# Patient Record
Sex: MALE | Race: WHITE | NOT HISPANIC OR LATINO | Employment: OTHER | URBAN - METROPOLITAN AREA
[De-identification: names, ages, dates, MRNs, and addresses within clinical notes are randomized per-mention and may not be internally consistent; named-entity substitution may affect disease eponyms.]

---

## 2017-01-06 ENCOUNTER — OFFICE VISIT (OUTPATIENT)
Dept: OPTOMETRY | Facility: CLINIC | Age: 77
End: 2017-01-06
Payer: MEDICARE

## 2017-01-06 ENCOUNTER — LAB VISIT (OUTPATIENT)
Dept: LAB | Facility: HOSPITAL | Age: 77
End: 2017-01-06
Attending: INTERNAL MEDICINE
Payer: MEDICARE

## 2017-01-06 ENCOUNTER — OFFICE VISIT (OUTPATIENT)
Dept: FAMILY MEDICINE | Facility: CLINIC | Age: 77
End: 2017-01-06
Payer: MEDICARE

## 2017-01-06 VITALS
WEIGHT: 166.88 LBS | OXYGEN SATURATION: 95 % | DIASTOLIC BLOOD PRESSURE: 80 MMHG | TEMPERATURE: 97 F | SYSTOLIC BLOOD PRESSURE: 110 MMHG | HEART RATE: 65 BPM | HEIGHT: 67 IN | BODY MASS INDEX: 26.19 KG/M2

## 2017-01-06 DIAGNOSIS — H35.3130 AGE-RELATED MACULAR DEGENERATION, DRY, BOTH EYES: ICD-10-CM

## 2017-01-06 DIAGNOSIS — Z00.00 ROUTINE MEDICAL EXAM: Primary | ICD-10-CM

## 2017-01-06 DIAGNOSIS — I10 ESSENTIAL HYPERTENSION: ICD-10-CM

## 2017-01-06 DIAGNOSIS — H52.7 REFRACTIVE ERROR: ICD-10-CM

## 2017-01-06 DIAGNOSIS — I25.10 CORONARY ARTERY DISEASE INVOLVING NATIVE HEART WITHOUT ANGINA PECTORIS, UNSPECIFIED VESSEL OR LESION TYPE: ICD-10-CM

## 2017-01-06 DIAGNOSIS — E11.9 TYPE 2 DIABETES MELLITUS WITHOUT COMPLICATION: ICD-10-CM

## 2017-01-06 DIAGNOSIS — F41.0 PANIC ATTACKS: ICD-10-CM

## 2017-01-06 DIAGNOSIS — H25.13 NUCLEAR SCLEROSIS, BILATERAL: ICD-10-CM

## 2017-01-06 DIAGNOSIS — E11.9 DIABETES MELLITUS TYPE 2 WITHOUT RETINOPATHY: Primary | ICD-10-CM

## 2017-01-06 DIAGNOSIS — E11.9 CONTROLLED TYPE 2 DIABETES MELLITUS WITHOUT COMPLICATION, WITHOUT LONG-TERM CURRENT USE OF INSULIN: ICD-10-CM

## 2017-01-06 LAB
ALBUMIN SERPL BCP-MCNC: 3.6 G/DL
ALP SERPL-CCNC: 59 U/L
ALT SERPL W/O P-5'-P-CCNC: 20 U/L
ANION GAP SERPL CALC-SCNC: 7 MMOL/L
AST SERPL-CCNC: 24 U/L
BASOPHILS # BLD AUTO: 0.02 K/UL
BASOPHILS NFR BLD: 0.3 %
BILIRUB SERPL-MCNC: 0.7 MG/DL
BUN SERPL-MCNC: 12 MG/DL
CALCIUM SERPL-MCNC: 9.3 MG/DL
CHLORIDE SERPL-SCNC: 104 MMOL/L
CHOLEST/HDLC SERPL: 4.6 {RATIO}
CHOLEST/HDLC SERPL: 4.6 {RATIO}
CO2 SERPL-SCNC: 30 MMOL/L
CREAT SERPL-MCNC: 1.3 MG/DL
DIFFERENTIAL METHOD: ABNORMAL
EOSINOPHIL # BLD AUTO: 0.5 K/UL
EOSINOPHIL NFR BLD: 6.9 %
ERYTHROCYTE [DISTWIDTH] IN BLOOD BY AUTOMATED COUNT: 14.7 %
EST. GFR  (AFRICAN AMERICAN): >60 ML/MIN/1.73 M^2
EST. GFR  (NON AFRICAN AMERICAN): 53.8 ML/MIN/1.73 M^2
GLUCOSE SERPL-MCNC: 119 MG/DL
HCT VFR BLD AUTO: 47.3 %
HDL/CHOLESTEROL RATIO: 21.8 %
HDL/CHOLESTEROL RATIO: 21.8 %
HDLC SERPL-MCNC: 271 MG/DL
HDLC SERPL-MCNC: 271 MG/DL
HDLC SERPL-MCNC: 59 MG/DL
HDLC SERPL-MCNC: 59 MG/DL
HGB BLD-MCNC: 15.9 G/DL
LDLC SERPL CALC-MCNC: 178.2 MG/DL
LDLC SERPL CALC-MCNC: 178.2 MG/DL
LYMPHOCYTES # BLD AUTO: 2 K/UL
LYMPHOCYTES NFR BLD: 29.5 %
MCH RBC QN AUTO: 30.5 PG
MCHC RBC AUTO-ENTMCNC: 33.6 %
MCV RBC AUTO: 91 FL
MONOCYTES # BLD AUTO: 0.6 K/UL
MONOCYTES NFR BLD: 9.1 %
NEUTROPHILS # BLD AUTO: 3.6 K/UL
NEUTROPHILS NFR BLD: 53.9 %
NONHDLC SERPL-MCNC: 212 MG/DL
NONHDLC SERPL-MCNC: 212 MG/DL
PLATELET # BLD AUTO: 180 K/UL
PMV BLD AUTO: 10.7 FL
POTASSIUM SERPL-SCNC: 4.1 MMOL/L
PROT SERPL-MCNC: 6.9 G/DL
RBC # BLD AUTO: 5.22 M/UL
SODIUM SERPL-SCNC: 141 MMOL/L
TRIGL SERPL-MCNC: 169 MG/DL
TRIGL SERPL-MCNC: 169 MG/DL
TSH SERPL DL<=0.005 MIU/L-ACNC: 1.56 UIU/ML
WBC # BLD AUTO: 6.71 K/UL

## 2017-01-06 PROCEDURE — 3078F DIAST BP <80 MM HG: CPT | Mod: S$GLB,,, | Performed by: OPTOMETRIST

## 2017-01-06 PROCEDURE — 99999 PR PBB SHADOW E&M-EST. PATIENT-LVL II: CPT | Mod: PBBFAC,,, | Performed by: OPTOMETRIST

## 2017-01-06 PROCEDURE — 99499 UNLISTED E&M SERVICE: CPT | Mod: S$GLB,,, | Performed by: INTERNAL MEDICINE

## 2017-01-06 PROCEDURE — 3074F SYST BP LT 130 MM HG: CPT | Mod: S$GLB,,, | Performed by: OPTOMETRIST

## 2017-01-06 PROCEDURE — 92015 DETERMINE REFRACTIVE STATE: CPT | Mod: S$GLB,,, | Performed by: OPTOMETRIST

## 2017-01-06 PROCEDURE — 3074F SYST BP LT 130 MM HG: CPT | Mod: S$GLB,,, | Performed by: INTERNAL MEDICINE

## 2017-01-06 PROCEDURE — 99999 PR PBB SHADOW E&M-EST. PATIENT-LVL IV: CPT | Mod: PBBFAC,,, | Performed by: INTERNAL MEDICINE

## 2017-01-06 PROCEDURE — 92004 COMPRE OPH EXAM NEW PT 1/>: CPT | Mod: S$GLB,,, | Performed by: OPTOMETRIST

## 2017-01-06 PROCEDURE — 3079F DIAST BP 80-89 MM HG: CPT | Mod: S$GLB,,, | Performed by: INTERNAL MEDICINE

## 2017-01-06 PROCEDURE — 99397 PER PM REEVAL EST PAT 65+ YR: CPT | Mod: S$GLB,,, | Performed by: INTERNAL MEDICINE

## 2017-01-06 PROCEDURE — 99499 UNLISTED E&M SERVICE: CPT | Mod: ,,, | Performed by: INTERNAL MEDICINE

## 2017-01-06 PROCEDURE — 99499 UNLISTED E&M SERVICE: CPT | Mod: S$GLB,,, | Performed by: OPTOMETRIST

## 2017-01-06 RX ORDER — ASPIRIN 81 MG/1
81 TABLET ORAL DAILY
Refills: 0 | COMMUNITY
Start: 2017-01-06 | End: 2018-01-09

## 2017-01-06 RX ORDER — CLONAZEPAM 0.5 MG/1
0.5 TABLET ORAL DAILY PRN
Qty: 60 TABLET | Refills: 0 | Status: SHIPPED | OUTPATIENT
Start: 2017-01-06 | End: 2017-07-21

## 2017-01-06 NOTE — MR AVS SNAPSHOT
[facility]  [address]  [place] [address]  Phone: [phone]  Fax: [phone]                  Eric Jackson   2017 1:30 PM   Office Visit    Description:  Male : 1940   Provider:  Sheila Braden OD   Department:  [facility]           Reason for Visit     Concerns About Ocular Health     Diabetic Eye Exam     Hypertensive Eye Exam           Diagnoses this Visit        Comments    Diabetes mellitus type 2 without retinopathy    -  Primary     Age-related macular degeneration, dry, both eyes         Nuclear sclerosis, bilateral         Refractive error                To Do List           Future Appointments        Provider Department Dept Phone    2017 10:45 AM Ronald Andrade DPM [facility] [phone]      Goals (5 Years of Data)     Increase water intake       Follow-Up and Disposition     Return in about 1 year (around 2018) for Diabetic Eye Exam.      [facility] On [facility]     [facility] On Call Nurse [facility] Line - / Assistance  Registered nurses in the [facility] On Call Center provide clinical advisement, health education, appointment booking, and other advisory services.  Call for this free service at [phone].             Medications           Message regarding Medications     Verify the changes and/or additions to your medication regime listed below are the same as discussed with your clinician today.  If any of these changes or additions are incorrect, please notify your healthcare provider.             Verify that the below list of medications is an accurate representation of the medications you are currently taking.  If none reported, the list may be blank. If incorrect, please contact your healthcare provider. Carry this list with you in case of emergency.           Current Medications     ANDROGEL 20.25 mg/1.25 gram (1.62 %) GlPm     clobetasol 0.05% (TEMOVATE) 0.05 % Oint     imiquimod (ALDARA) 5 % cream     ketoconazole (NIZORAL) 2 % cream      losartan-hydrochlorothiazide 50-12.5 mg (HYZAAR) 50-12.5 mg per tablet Take 1 tablet by mouth once daily.    metformin (GLUCOPHAGE-XR) 500 MG 24 hr tablet Take 1 tablet (500 mg total) by mouth once daily.    mupirocin (BACTROBAN) 2 % ointment     NITROSTAT 0.4 mg SL tablet     ONE TOUCH ULTRA TEST Strp     aspirin (ECOTRIN) 81 MG EC tablet Take 1 tablet (81 mg total) by mouth once daily.    clonazePAM (KLONOPIN) 0.5 MG tablet Take 1 tablet (0.5 mg total) by mouth daily as needed (panic attack).    fluticasone (FLONASE) 50 mcg/actuation nasal spray 1 spray by Each Nare route 2 (two) times daily.    sildenafil (VIAGRA) 100 MG tablet Take 1 tablet (100 mg total) by mouth daily as needed for Erectile Dysfunction.           Clinical Reference Information           Allergies as of 1/6/2017     Statins-hmg-coa Reductase Inhibitors      Immunizations Administered on Date of Encounter - 1/6/2017     None      Instructions    Age-Related Macular Degeneration    Age-Related Macular Degeneration (AMD) is the leading cause of severe vision loss in adults over age 50. The Centers for Disease Control and Prevention estimate that 1.8 million people have AMD and another 7.3 million are at substantial risk for vision loss from AMD. Caucasians are at higher risk for developing AMD than other races. Women also develop AMD at an earlier age than men. This eye disease occurs when there are changes to the macula, a small portion of the retina that is located on the inside back layer of the eye. AMD is a loss of central vision that can occur in two forms: dry or atrophic and wet or exudative.  Most people with macular degeneration have the dry form, for which there is no known treatment. The less common wet form may respond to laser procedures, if diagnosed and treated early.    Some common symptoms are: a gradual loss of ability to see objects clearly, distorted vision, a gradual loss of color vision, and a dark or empty area  appearing in the center of vision. If you experience any of these, contact your doctor of optometry immediately for a comprehensive examination. Central vision that is lost to macular degeneration cannot be restored. However, low vision devices, such as telescopic and microscopic lenses, can be prescribed to maximize existing vision.    Researchers have linked eye-friendly nutrients such as lutein/zeaxanthin, vitamin C, vitamin E, and zinc to reducing the risk of certain eye diseases, including macular degeneration. For more information on the importance of good nutrition and eye health, please see the diet and nutrition section.      Courtesy of The American Optometric Association      Diabetic Retinopathy: Controlling Your Risk Factors    Diabetic retinopathy occurs when diabetes harms blood vessels in the rear of the eye. This can lead to vision loss. You can help reduce your risk of vision loss by taking care of your health. Managing your diabetes and other health problems can make diabetic retinopathy less likely.      A diabetes educator can help you make an action plan to control your risk factors.       Manage Your Diabetes  You can help protect your vision. To do this, keep your blood sugar level in a healthy range, check your blood sugar often, follow your diabetes management plan, and work closely with your health care providers. This includes your endocrinologist (a doctor who specializes in diabetes), primary care provider, or any other provider who helps to manage your diabetes. He or she can help if you are having trouble keeping your blood sugar in range.    Control Your Risk Factors  There are other factors that damage blood vessels. These can make diabetic retinopathy worse. These factors include:  High blood pressure  Smoking  High cholesterol  Work with your health care team to control these problems. This can help lower your risk of developing diabetic retinopathy. A diabetes educator can help  you control blood pressure and high cholesterol. He or she can also talk to you about programs to help you quit smoking. Diabetic patients should also see an eye doctor regularly for an eye exam.     To Learn More  The resources below can help you learn more:  American Diabetes Association   638.678.1498 www.diabetes.org  [employer]   566.173.7521 www.lighthouse.org  [employer] [facility] [place]   660.101.3647 www.nei.nih.gov   [employer]   730.408.2864 www.hormone.org    © 1288-1460 [employer]. [address] [place] [address]. All rights reserved. This information is not intended as a substitute for professional medical care. Always follow your healthcare professional's instructions.              Cataract Surgery FAQs  Frequently Asked Questions    My doctor told me I have a cataract     What do I do next?   Relax. Cataracts are a normal part of aging, and cataract surgery is among the safest and most common procedures performed today.  Most patients who have had cataract surgery report significant improvement in their quality of life because of their improved vision, with a speedy recovery and minimal discomfort or inconvenience.    Your optometrist will recommend a cataract surgeon who will examine your eyes, evaluate the need for surgery, and discuss the details with you and your family.     What exactly is a cataract?   A cataract is simply a darkening or clouding of the eyes internal lens, which blurs your vision.  It is not a film which grows over the eye, but rather a degenerative process which causes the eyes normally clear lens to become cloudy or hazy.     Because this degenerative process occurs slowly over many years, we generally wait until the cataract begins to significantly impact your vision, before recommend surgery.                     How is cataract surgery performed?  Cataract surgery involves removal of the dark or cloudy lens from the eye, and  implantation of a new, clear lens implant or IOL.  Cataract surgery is a lens replacement surgery.          Modern cataract surgery is performed as a same-day surgery and typically takes about 15 minutes to complete.  It is performed while you are awake, but you will be medicated so that you are relaxed and comfortable. Of course, the eye is anesthetized so that you dont feel any discomfort, and many people only vaguely remember the actual procedure, recalling only lights and the sensation of moisture on the eye.     Although is takes about a week to fully heal, most people are able to see better and resume their normal activities the very next day!  You will need to use eye drops for about one month after your surgery.     Will I need glasses after cataract surgery?   The purpose of cataract surgery is to improve the overall quality of your vision, but it does not necessarily eliminate the need for glasses. Although some people dont need to wear glasses after standard cataract surgery, most people will still need to wear glasses for certain tasks.  If you are interested in minimizing or even eliminating the need for glasses, you should ask your surgeon about Refractive Cataract Surgery.    What is Refractive Cataract Surgery?   Refractive Cataract Surgery refers to the use of additional techniques performed either at the time of your cataract surgery or soon afterwards, designed to minimize and often eliminate your need for glasses.  Technologies such as LASIK, Astigmatism Correcting Incisions, Multifocal, Accommodating, or Toric lens implants can all be used, depending on the particular needs of each patient. Only your surgeon can determine if you are a candidate and which techniques are appropriate for your goals and your eye.                         LASIK                Multifocal IOL         Will my insurance cover these additional procedures?   Although your insurance will fully cover your cataract  surgery, any other additional procedures -designed solely to eliminate the need for glasses- are considered elective (not medically necessary), and therefore not covered by your insurance.  Rest assured that your vision will be better after cataract surgery, in either case.  You simply need to decide whether minimizing your dependence on glasses is worth the additional investment in your eyes.  (Costs typically range between $1,000 to $2,000 per eye, depending on your needs).    View a 1hr video discussing cataract surgery with live patient questions and answers on the [facility] Web Site (Keywords: [employer] Cataract):    [email or web address]

## 2017-01-06 NOTE — PATIENT INSTRUCTIONS
Start baby aspirin daily.      We may be able to get you off of metformin.     Keep up the great work with diet and exercise!

## 2017-01-06 NOTE — PROGRESS NOTES
Chief Complaint  Chief Complaint   Patient presents with    Annual Exam       HPI  Eric Jackson is a 76 y.o. male with multiple medical diagnoses as listed in the medical history and problem list that presents for routine physical.  Pt is new to me but is known to this clinic with his last appointment being 8/24/2016.  He was previously followed by one of my partners that has since retired.  His last labs have all been drawn by Dr. Muñoz's office.  No acute complaints.  He is followed intermittently by Dr. Bills at [facility].  Cannot tolerate statins.  Discussed talking to cards about new cholesterol lowering medications.       PAST MEDICAL HISTORY:  Past Medical History   Diagnosis Date    Anxiety     Coronary artery disease     Depression     Diabetes mellitus type II     Erectile dysfunction     Eye injuries      fb ou    GERD (gastroesophageal reflux disease)     Hypertension        PAST SURGICAL HISTORY:  Past Surgical History   Procedure Laterality Date    Hernia repair      Wrist surgery      Coronary artery bypass graft  2001       SOCIAL HISTORY:  Social History     Social History    Marital status:      Spouse name: N/A    Number of children: N/A    Years of education: N/A     Occupational History    Not on file.     Social History Main Topics    Smoking status: Former Smoker     Packs/day: 2.00     Years: 23.00     Quit date: 1/1/1989    Smokeless tobacco: Never Used    Alcohol use 0.6 oz/week     1 Cans of beer per week      Comment: 1 can of beer per day    Drug use: No    Sexual activity: Yes     Partners: Female     Other Topics Concern    Not on file     Social History Narrative       FAMILY HISTORY:  Family History   Problem Relation Age of Onset    Adopted: Yes    No Known Problems Mother     No Known Problems Father     No Known Problems Maternal Grandmother     No Known Problems Maternal Grandfather     No Known Problems Paternal Grandmother     No Known  Problems Paternal Grandfather     No Known Problems Sister     No Known Problems Brother     No Known Problems Maternal Aunt     No Known Problems Maternal Uncle     No Known Problems Paternal Aunt     No Known Problems Paternal Uncle     Amblyopia Neg Hx     Blindness Neg Hx     Cancer Neg Hx     Cataracts Neg Hx     Diabetes Neg Hx     Glaucoma Neg Hx     Hypertension Neg Hx     Macular degeneration Neg Hx     Retinal detachment Neg Hx     Strabismus Neg Hx     Stroke Neg Hx     Thyroid disease Neg Hx        ALLERGIES AND MEDICATIONS: updated and reviewed.  Review of patient's allergies indicates:   Allergen Reactions    Statins-hmg-coa reductase inhibitors Other (See Comments)     Muscle weakness     Current Outpatient Prescriptions   Medication Sig Dispense Refill    ANDROGEL 20.25 mg/1.25 gram (1.62 %) GlPm       clobetasol 0.05% (TEMOVATE) 0.05 % Oint   2    imiquimod (ALDARA) 5 % cream   3    ketoconazole (NIZORAL) 2 % cream       losartan-hydrochlorothiazide 50-12.5 mg (HYZAAR) 50-12.5 mg per tablet Take 1 tablet by mouth once daily. 90 tablet 4    metformin (GLUCOPHAGE-XR) 500 MG 24 hr tablet Take 1 tablet (500 mg total) by mouth once daily. 90 tablet 4    mupirocin (BACTROBAN) 2 % ointment       NITROSTAT 0.4 mg SL tablet       ONE TOUCH ULTRA TEST Strp       aspirin (ECOTRIN) 81 MG EC tablet Take 1 tablet (81 mg total) by mouth once daily.  0    clonazePAM (KLONOPIN) 0.5 MG tablet Take 1 tablet (0.5 mg total) by mouth daily as needed (panic attack). 60 tablet 0    fluticasone (FLONASE) 50 mcg/actuation nasal spray 1 spray by Each Nare route 2 (two) times daily. 16 g 3    sildenafil (VIAGRA) 100 MG tablet Take 1 tablet (100 mg total) by mouth daily as needed for Erectile Dysfunction. 5 tablet 12     No current facility-administered medications for this visit.          ROS  Review of Systems   Constitutional: Negative for chills, fever and unexpected weight change.   HENT:  "Negative for congestion, dental problem, ear pain, hearing loss, rhinorrhea, sore throat and trouble swallowing.    Eyes: Negative for discharge, redness and visual disturbance.   Respiratory: Negative for cough, chest tightness, shortness of breath and wheezing.    Cardiovascular: Negative for chest pain, palpitations and leg swelling.   Gastrointestinal: Negative for abdominal pain, constipation, diarrhea, nausea and vomiting.   Endocrine: Negative for polydipsia, polyphagia and polyuria.   Genitourinary: Negative for decreased urine volume, dysuria and hematuria.   Musculoskeletal: Negative for arthralgias and myalgias.   Skin: Negative for color change and rash.   Neurological: Negative for dizziness, weakness, light-headedness and headaches.   Psychiatric/Behavioral: Negative for decreased concentration, dysphoric mood, sleep disturbance and suicidal ideas.         Physical Exam  Vitals:    01/06/17 0854   BP: 110/80   BP Location: Left arm   Patient Position: Sitting   BP Method: Manual   Pulse: 65   Temp: 97.4 °F (36.3 °C)   TempSrc: Oral   SpO2: 95%   Weight: 75.7 kg (166 lb 14.2 oz)   Height: 5' 7" (1.702 m)    Body mass index is 26.14 kg/(m^2).  Weight: 75.7 kg (166 lb 14.2 oz)   Height: 5' 7" (170.2 cm)   General appearance: alert, appears stated age, cooperative and no distress  Head: Normocephalic, without obvious abnormality, atraumatic  Eyes: PERRL EOMI conjunctiva clear  Ears: normal TM's and external ear canals both ears  Nose: Nares normal. Septum midline. Mucosa normal. No drainage or sinus tenderness.  Throat: lips, mucosa, and tongue normal; teeth and gums normal  Neck: no adenopathy, no carotid bruit, no JVD, supple, symmetrical, trachea midline and thyroid not enlarged, symmetric, no tenderness/mass/nodules  Back: symmetric, no curvature. ROM normal. No CVA tenderness.  Lungs: clear to auscultation bilaterally  Heart: regular rate and rhythm, S1, S2 normal, no murmur, click, rub or " gallop  Abdomen: soft, non-tender; bowel sounds normal; no masses,  no organomegaly  Extremities: extremities normal, atraumatic, no cyanosis or edema  Pulses: 2+ and symmetric  Neurologic: Mental status: Alert, oriented, thought content appropriate  Sensory: normal  Motor:grossly normal  Coordination: normal  Gait: Normal  Symmetric facial movements palate elevated symmetrically tongue midline  Protective Sensation (w/ 10 gram monofilament):  Right: Intact  Left: Intact    Visual Inspection:  toenail thickening bilaterally    Pedal Pulses:   Right: Present  Left: Present    Posterior tibialis:   Right:Present  Left: Present          Health Maintenance       Date Due Completion Date    Pneumococcal (65+) (1 of 2 - PCV13) 11/20/2007 ---    Abdominal Aortic Aneurysm Screening 11/20/2007 ---    TETANUS VACCINE 9/15/2015 9/15/2005    Hemoglobin A1c 1/14/2016 7/14/2015    Override on 2/24/2015: Done (Dr. Arias Muñoz)    Eye Exam 2/10/2016 2/10/2015 (Done)    Override on 2/10/2015: Done    Influenza Vaccine 8/1/2016 4/23/2015 (Declined)    Override on 4/23/2015: Declined    Urine Microalbumin 12/3/2016 12/3/2015    Override on 2/24/2015: Done (Dr. Arias Muñoz)    Colonoscopy 1/20/2017 1/20/2014    Lipid Panel 8/26/2017 8/26/2016 (Done)    Override on 8/26/2016: Done ([facility]-Arias Muñoz MD/GEETHA Razo-Lipid Panel:Cholesterol 227 mg/dL, NON- mg/dL, HDL 61 mg/dL, CHol/HDL Ratio 3.7,  mg/dL, Triglyceride 101 mg/dL)    Override on 2/24/2015: Done (Dr. Arias Muñoz)    Foot Exam 9/9/2017 9/9/2016 (Done)    Override on 9/9/2016: Done ([facility]-Arias Muñoz MD/GEETHA Razo-Diabetic feet examnormal by visual exam, normal pulses,, sensation intact, Monofilament exam 10/10)    Override on 3/3/2015: Done (Dr.Tilak MESSI Sky)            Assessment & Plan  Routine medical exam - Discussed healthy diet,  regular exercise, necessary labs, age appropriate cancer screening, and routine vaccinations.      Essential hypertension - The current medical regimen is effective;  continue present plan and medications.  -     CBC auto differential; Future; Expected date: 1/6/17  -     Comprehensive metabolic panel; Future; Expected date: 1/6/17  -     TSH; Future; Expected date: 1/6/17    Coronary artery disease involving native heart without angina pectoris, unspecified vessel or lesion type - add low dose ASA.  Followed by Dr[facility] Bills at [facility]  -     aspirin (ECOTRIN) 81 MG EC tablet; Take 1 tablet (81 mg total) by mouth once daily.; Refill: 0  -     CBC auto differential; Future; Expected date: 1/6/17  -     Comprehensive metabolic panel; Future; Expected date: 1/6/17  -     Lipid panel; Future; Expected date: 1/6/17    Controlled type 2 diabetes mellitus without complication, without long-term current use of insulin - may be able to come off of metformin.  He no longer wants to see Endocrine since he is well controlled.  Transitioning care here  -     Comprehensive metabolic panel; Future; Expected date: 1/6/17  -     Lipid panel; Future; Expected date: 1/6/17  -     Hemoglobin A1c; Future; Expected date: 1/6/17  -     Microalbumin/creatinine urine ratio; Future; Expected date: 1/6/17  -     Ambulatory referral to Podiatry    Panic attacks - stop alprazolam.  Change to clonazepam.  Occassional use.  Counseled that he can likely stop this.  I had a lengthy discussion with the patient regarding the risks of long term BZD use including but not limited to worsening anxiety symptoms, increased risk of dementia, development of dependence, risk of respiratory suppression.    -     clonazePAM (KLONOPIN) 0.5 MG tablet; Take 1 tablet (0.5 mg total) by mouth daily as needed (panic attack).  Dispense: 60 tablet; Refill: 0        Health Maintenance reviewed, declined vaccinations.    Follow-up: Return in about 6 months (around 7/6/2017)  for follow up for chronic conditions.

## 2017-01-06 NOTE — PROGRESS NOTES
HPI     Dls: 1 yr     Pt here for diabetic and hypertensive eye exam.   Pt states no changes in vision. Pt wears bifocal glasses. Pt states no   tearing no itching no burning no pain no ha's floaters ou off/on.     Eye meds:   Visine ou prn    No results found for: HGBA1C    Last A1c = 6.2 per patient  BS usually 110-120     Family OHx - pt adopted - no family history          Last edited by Sheila Braden, OD on 1/6/2017  2:03 PM.     Assessment /Plan     For exam results, see Encounter Report.    Diabetes mellitus type 2 without retinopathy  - No diabetic retinopathy. Educated patient on importance of good blood sugar control, compliance with meds, and follow up care with PCP. Return in 1 year for dilated eye exam.    Age-related macular degeneration, dry, both eyes  - Educated patient on dry macular degeneration and the possibility of worsening, including progression to wet AMD. Ed pt on AREDs smoker formulation and on the use of an Amsler Grid once a week to detect vision changes. Ed pt to RTC ASAP in the event of any loss of vision or changes. RTC x 1 yr    Nuclear sclerosis, bilateral  - Not visually significant. Educated pt on presence of cataracts and effects on vision. No surgery at this time. Recheck in one year.    Refractive error  - Educated patient on refractive error and discussed lens options. Gave pt Rx for specs. RTC in 1 year.    Eyeglass Final Rx     Eyeglass Final Rx      Sphere Cylinder Axis Add   Right -3.25 +1.00 140 +2.50   Left -3.50 +0.75 055 +2.50       Expiration Date:  1/7/2018

## 2017-01-07 LAB
ESTIMATED AVG GLUCOSE: 126 MG/DL
HBA1C MFR BLD HPLC: 6 %

## 2017-01-07 NOTE — PROGRESS NOTES
Your lab results are looking good except for the cholesterol as was expected.  Please continue your current medications and doses; we can discuss the metformin again at your follow up.  It is doing a good job of controlling your diabetes, but we could consider stopping it if your main goal was to reduce your pill intake.  Please feel free to contact me with any questions or concerns.    Sincerely,  Dom Hill  [email or web address]

## 2017-01-09 ENCOUNTER — OFFICE VISIT (OUTPATIENT)
Dept: PODIATRY | Facility: CLINIC | Age: 77
End: 2017-01-09
Payer: MEDICARE

## 2017-01-09 VITALS — WEIGHT: 166 LBS | HEIGHT: 67 IN | BODY MASS INDEX: 26.06 KG/M2

## 2017-01-09 DIAGNOSIS — B35.1 ONYCHOMYCOSIS DUE TO DERMATOPHYTE: ICD-10-CM

## 2017-01-09 DIAGNOSIS — M20.41 HAMMER TOES OF BOTH FEET: ICD-10-CM

## 2017-01-09 DIAGNOSIS — E11.9 CONTROLLED TYPE 2 DIABETES MELLITUS WITHOUT COMPLICATION, WITHOUT LONG-TERM CURRENT USE OF INSULIN: Primary | ICD-10-CM

## 2017-01-09 DIAGNOSIS — M20.42 HAMMER TOES OF BOTH FEET: ICD-10-CM

## 2017-01-09 PROCEDURE — 4010F ACE/ARB THERAPY RXD/TAKEN: CPT | Mod: S$GLB,,, | Performed by: PODIATRIST

## 2017-01-09 PROCEDURE — 99999 PR PBB SHADOW E&M-EST. PATIENT-LVL II: CPT | Mod: PBBFAC,,, | Performed by: PODIATRIST

## 2017-01-09 PROCEDURE — 1157F ADVNC CARE PLAN IN RCRD: CPT | Mod: S$GLB,,, | Performed by: PODIATRIST

## 2017-01-09 PROCEDURE — 1160F RVW MEDS BY RX/DR IN RCRD: CPT | Mod: S$GLB,,, | Performed by: PODIATRIST

## 2017-01-09 PROCEDURE — 99499 UNLISTED E&M SERVICE: CPT | Mod: S$GLB,,, | Performed by: PODIATRIST

## 2017-01-09 PROCEDURE — 99202 OFFICE O/P NEW SF 15 MIN: CPT | Mod: S$GLB,,, | Performed by: PODIATRIST

## 2017-01-09 PROCEDURE — 3044F HG A1C LEVEL LT 7.0%: CPT | Mod: S$GLB,,, | Performed by: PODIATRIST

## 2017-01-09 PROCEDURE — 1126F AMNT PAIN NOTED NONE PRSNT: CPT | Mod: S$GLB,,, | Performed by: PODIATRIST

## 2017-01-09 PROCEDURE — 1159F MED LIST DOCD IN RCRD: CPT | Mod: S$GLB,,, | Performed by: PODIATRIST

## 2017-01-09 NOTE — LETTER
January 9, 2017      Dom Hill MD  [address] [place] [address]           [facility]  [address] [place] [address]  Phone: [phone]          Patient: Eric Jackson   MR Number: [id number]   YOB: 1940   Date of Visit: 1/9/2017       Dear Dr. Dom Hill:    Thank you for referring Eric Jackson to me for evaluation. Attached you will find relevant portions of my assessment and plan of care.    If you have questions, please do not hesitate to call me. I look forward to following Eric Jackson along with you.    Sincerely,    Ronald Andrade DPM    Enclosure  CC:  No Recipients    If you would like to receive this communication electronically, please contact [email or web address] or [phone] to request more information on [employer] Link access.    For providers and/or their staff who would like to refer a patient to [facility], please contact us through our one-stop-shop provider referral line, [facility] , at [phone].    If you feel you have received this communication in error or would no longer like to receive these types of communications, please e-mail [email or web address]

## 2017-01-09 NOTE — MR AVS SNAPSHOT
[facility]  [address]  [place] [address]  Phone: [phone]                  Eric Jackson   2017 10:45 AM   Office Visit    Description:  Male : 1940   Provider:  Ronald Andrade DPM   Department:  [facility]           Reason for Visit     Diabetes Mellitus     Diabetic Foot Exam     Nail Care                To Do List           Goals (5 Years of Data)     Increase water intake       [facility]     [facility] -  Assistance  Registered nurses in the [facility] provide clinical advisement, health education, appointment booking, and other advisory services.  Call for this free service at [phone].             Medications           Message regarding Medications     Verify the changes and/or additions to your medication regime listed below are the same as discussed with your clinician today.  If any of these changes or additions are incorrect, please notify your healthcare provider.             Verify that the below list of medications is an accurate representation of the medications you are currently taking.  If none reported, the list may be blank. If incorrect, please contact your healthcare provider. Carry this list with you in case of emergency.           Current Medications     ANDROGEL 20.25 mg/1.25 gram (1.62 %) GlPm     aspirin (ECOTRIN) 81 MG EC tablet Take 1 tablet (81 mg total) by mouth once daily.    clobetasol 0.05% (TEMOVATE) 0.05 % Oint     clonazePAM (KLONOPIN) 0.5 MG tablet Take 1 tablet (0.5 mg total) by mouth daily as needed (panic attack).    fluticasone (FLONASE) 50 mcg/actuation nasal spray 1 spray by Each Nare route 2 (two) times daily.    imiquimod (ALDARA) 5 % cream     ketoconazole (NIZORAL) 2 % cream     losartan-hydrochlorothiazide 50-12.5 mg (HYZAAR) 50-12.5 mg per tablet Take 1 tablet by mouth once daily.    metformin (GLUCOPHAGE-XR) 500 MG 24 hr tablet Take 1 tablet (500 mg total) by mouth once  "daily.    mupirocin (BACTROBAN) 2 % ointment     NITROSTAT 0.4 mg SL tablet     ONE TOUCH ULTRA TEST Strp     sildenafil (VIAGRA) 100 MG tablet Take 1 tablet (100 mg total) by mouth daily as needed for Erectile Dysfunction.           Clinical Reference Information           Vital Signs - Last Recorded  Most recent update: 1/9/2017 10:38 AM by Mariel Canseco MA    Ht Wt BMI          5' 7" (1.702 m) 75.3 kg (166 lb) 26 kg/m2        Allergies as of 1/9/2017     Statins-hmg-coa Reductase Inhibitors      Immunizations Administered on Date of Encounter - 1/9/2017     None      "

## 2017-01-09 NOTE — PROGRESS NOTES
Subjective:      Patient ID: Eric Jackson is a 76 y.o. male.    Chief Complaint: Diabetes Mellitus (Pcp Dr. Hill 01/06/2016); Diabetic Foot Exam; and Nail Care    Eric is a 76 y.o. male who presents to the clinic upon referral from Dr. Hill  for evaluation and treatment of diabetic feet. Eric has a past medical history of Anxiety; Coronary artery disease; Depression; Diabetes mellitus type II; Erectile dysfunction; Eye injuries; GERD (gastroesophageal reflux disease); and Hypertension. Patient relates no major problem with feet. Only complaints today consist of long, thick toenails that are difficult for him to trim.    PCP: Dom Hill MD    Date Last Seen by PCP:   Chief Complaint   Patient presents with    Diabetes Mellitus     Pcp Dr. Hill 01/06/2016    Diabetic Foot Exam    Nail Care         Current shoe gear: Tennis shoes    Hemoglobin A1C   Date Value Ref Range Status   01/06/2017 6.0 4.5 - 6.2 % Final     Comment:     According to ADA guidelines, hemoglobin A1C <7.0% represents  optimal control in non-pregnant diabetic patients.  Different  metrics may apply to specific populations.   Standards of Medical Care in Diabetes - 2016.  For the purpose of screening for the presence of diabetes:  <5.7%     Consistent with the absence of diabetes  5.7-6.4%  Consistent with increasing risk for diabetes   (prediabetes)  >or=6.5%  Consistent with diabetes  Currently no consensus exists for use of hemoglobin A1C  for diagnosis of diabetes for children.             Review of Systems   Constitution: Negative for chills and fever.   Cardiovascular: Negative for chest pain, claudication and leg swelling.   Respiratory: Negative for cough and shortness of breath.    Skin: Positive for nail changes.   Musculoskeletal: Negative.    Gastrointestinal: Negative for nausea and vomiting.   Neurological: Negative for numbness and paresthesias.   Psychiatric/Behavioral: Negative for altered mental status.            Objective:      Physical Exam   Constitutional: He is oriented to person, place, and time. He appears well-developed and well-nourished.   HENT:   Head: Normocephalic.   Cardiovascular: Intact distal pulses.    Pulses:       Dorsalis pedis pulses are 2+ on the right side, and 2+ on the left side.        Posterior tibial pulses are 2+ on the right side, and 2+ on the left side.   CRT < 3 sec to tips of toes. No edema noted to b/l LE. No vericosities noted to b/l LEs.      Pulmonary/Chest: No respiratory distress.   Musculoskeletal:   Gastrocnemius equinus noted to b/l ankles with decreased DF noted on exam. MMT 5/5 in DF/PF/Inv/Ev resistance with no reproduction of pain in any direction. Passive range of motion of ankle and pedal joints is painless. Adequate pedal joint ROM.   Semi-reducible hammertoe contractures noted to toes 2-4 b/l-asymptomatic. HAV, mild, non trackbound noted b/l with mild medial bony prominence at 1st met head--asymptomatic.      Neurological: He is alert and oriented to person, place, and time. He has normal strength. No sensory deficit.   Light touch, proprioception, and sharp/dull sensation are all intact bilaterally. Protective threshold with the Rochester-Wienstein monofilament is intact bilaterally. Vibratory sensation intact b/l distal foot.      Skin: Skin is warm, dry and intact. No erythema.   No open lesions, lacerations or wounds noted. Nails are thickened, elongated, discolored yellow/brown with subungual debris and brittleness to R 1-5 and L 1-5. Interdigital spaces clean, dry and intact b/l. No erythema noted to b/l foot. Skin texture normal. Pedal hair adequate. Skin temperature normal b/l foot.    Psychiatric: He has a normal mood and affect. His behavior is normal. Judgment and thought content normal.   Vitals reviewed.            Assessment:       Encounter Diagnoses   Name Primary?    Controlled type 2 diabetes mellitus without complication, without long-term current  use of insulin Yes    Onychomycosis due to dermatophyte     Hammer toes of both feet          Plan:       Eric was seen today for diabetes mellitus, diabetic foot exam and nail care.    Diagnoses and all orders for this visit:    Controlled type 2 diabetes mellitus without complication, without long-term current use of insulin    Onychomycosis due to dermatophyte    Hammer toes of both feet      I counseled the patient on his conditions, their implications and medical management.        - Shoe inspection. Diabetic Foot Education. Patient reminded of the importance of good nutrition and blood sugar control to help prevent podiatric complications of diabetes. Patient instructed on proper foot hygeine. We discussed wearing proper shoe gear, daily foot inspections, never walking without protective shoe gear, never putting sharp instruments to feet, routine podiatric check up visits annually.      - Advised to monitor and inspect feet daily, wear protective shoe gear when ambulatory, moisturizer to maintain skin integrity and follow in this office in approximately 1 year, sooner p.r.n.    - discussed routine and regular trimming of nails to keep them under control.     Discussed regular and routine moisturizer to skin of both feet to help improve dry skin. Advised to apply twice daily until resolution of symptoms. Avoid between toes.     RTC 1 year for annual dm foot exam, sooner, PRN, for nail trimming as Proc B.

## 2017-03-30 ENCOUNTER — OFFICE VISIT (OUTPATIENT)
Dept: FAMILY MEDICINE | Facility: CLINIC | Age: 77
End: 2017-03-30
Payer: MEDICARE

## 2017-03-30 VITALS
WEIGHT: 166 LBS | BODY MASS INDEX: 26.06 KG/M2 | HEIGHT: 67 IN | SYSTOLIC BLOOD PRESSURE: 128 MMHG | HEART RATE: 65 BPM | DIASTOLIC BLOOD PRESSURE: 70 MMHG | RESPIRATION RATE: 18 BRPM | OXYGEN SATURATION: 97 % | TEMPERATURE: 98 F

## 2017-03-30 DIAGNOSIS — T30.0 BURN: Primary | ICD-10-CM

## 2017-03-30 DIAGNOSIS — E11.9 CONTROLLED TYPE 2 DIABETES MELLITUS WITHOUT COMPLICATION, WITHOUT LONG-TERM CURRENT USE OF INSULIN: ICD-10-CM

## 2017-03-30 PROCEDURE — 1159F MED LIST DOCD IN RCRD: CPT | Mod: S$GLB,,, | Performed by: FAMILY MEDICINE

## 2017-03-30 PROCEDURE — 3074F SYST BP LT 130 MM HG: CPT | Mod: S$GLB,,, | Performed by: FAMILY MEDICINE

## 2017-03-30 PROCEDURE — 1157F ADVNC CARE PLAN IN RCRD: CPT | Mod: S$GLB,,, | Performed by: FAMILY MEDICINE

## 2017-03-30 PROCEDURE — 1160F RVW MEDS BY RX/DR IN RCRD: CPT | Mod: S$GLB,,, | Performed by: FAMILY MEDICINE

## 2017-03-30 PROCEDURE — 1125F AMNT PAIN NOTED PAIN PRSNT: CPT | Mod: S$GLB,,, | Performed by: FAMILY MEDICINE

## 2017-03-30 PROCEDURE — 99214 OFFICE O/P EST MOD 30 MIN: CPT | Mod: S$GLB,,, | Performed by: FAMILY MEDICINE

## 2017-03-30 PROCEDURE — 3044F HG A1C LEVEL LT 7.0%: CPT | Mod: S$GLB,,, | Performed by: FAMILY MEDICINE

## 2017-03-30 PROCEDURE — 4010F ACE/ARB THERAPY RXD/TAKEN: CPT | Mod: S$GLB,,, | Performed by: FAMILY MEDICINE

## 2017-03-30 PROCEDURE — 99999 PR PBB SHADOW E&M-EST. PATIENT-LVL III: CPT | Mod: PBBFAC,,, | Performed by: FAMILY MEDICINE

## 2017-03-30 PROCEDURE — 99499 UNLISTED E&M SERVICE: CPT | Mod: S$GLB,,, | Performed by: FAMILY MEDICINE

## 2017-03-30 PROCEDURE — 3078F DIAST BP <80 MM HG: CPT | Mod: S$GLB,,, | Performed by: FAMILY MEDICINE

## 2017-03-30 RX ORDER — SILVER SULFADIAZINE 10 G/1000G
CREAM TOPICAL DAILY
Qty: 50 G | Refills: 0 | Status: SHIPPED | OUTPATIENT
Start: 2017-03-30 | End: 2017-10-04 | Stop reason: ALTCHOICE

## 2017-03-30 RX ORDER — DOXYCYCLINE 100 MG/1
100 CAPSULE ORAL EVERY 12 HOURS
Qty: 20 CAPSULE | Refills: 0 | Status: SHIPPED | OUTPATIENT
Start: 2017-03-30 | End: 2017-04-09

## 2017-03-30 NOTE — PATIENT INSTRUCTIONS
Hot Water Burn  A hot water burn to the skin causes a first- or second-degree burn. A first-degree burn causes only redness and heals in a few days. A second-degree burn is deeper. It causes a blister to form. The blister may break and leak clear fluid. It may become infected. Second-degree burns take 1 to 2 weeks to heal.  Home care  The following guidelines will help you care for your burn at home:  · On the first day, put a small towel soaked in cool water on the area to ease severe pain.  · If no blister formed, you may use creams with benzocaine if the burn is painful.  · If a blister formed and broke and a bandage was applied, change it once a day, or as directed. If the bandage sticks, remove it by soaking it in warm water. Wash the burned area daily with soap and water. Pat dry with a clean towel. For the next 3 to 5 days, put an antibiotic cream or ointment on the area after washing. This will help to prevent an infection and to keep the bandage from sticking.  · If a blister formed, it will go down by itself. Or it will break on its own in the next few days. If the blister breaks, a clear fluid will leak from it for a day or two. The loose skin from the broken blister has no feeling.  You can carefully trim away this skin with clean, small, sharp scissors. Sterilize by soaking in alcohol first. Or wash with soap and water. Wash the raw surface under the blister daily with soap and water. For the next 3 to 5 days, put an antibiotic cream or ointment on the area after washing. This will help prevent an infection and keep the bandage from sticking.  · You may use acetaminophen or ibuprofen to control pain, unless another pain medicine was prescribed. If you have chronic liver or kidney disease, talk with your health care provider before using these medicines. Also talk with your provider if you've had a stomach ulcer or GI bleeding. Don't give ibuprofen to children younger than 6 months of age.  · Don't  pick or scratch at the affected areas. Use over-the-counter medicine for itching.  · Wear a hat, sunscreen, and long sleeves while in the sun.  · Don't wear tight-fitting clothes.  · Add more calories and protein to your diet until the wound has healed.  Follow-up care  Most hot water burns heal without becoming infected. Occasionally an infection occurs even with proper treatment. You should watch for the signs of infection listed below.  When to seek medical advice  Call your healthcare provider right away if any of these occur:  · Pain that gets worse  · Redness or swelling that gets worse  · Pus coming from the wound  · Fever of 100.4º F (38º C) or higher, or as directed by your health care provider  · The wound doesn't seem to be healing  · Nausea or vomiting   Date Last Reviewed: 10/15/2014  © 0183-6284 [employer]. [address] [place] [address], [place] [address]. All rights reserved. This information is not intended as a substitute for professional medical care. Always follow your healthcare professional's instructions.

## 2017-03-30 NOTE — MR AVS SNAPSHOT
[facility]  [address]  [place] [address]  Phone: [phone]  Fax: [phone]                  Eric Jackson   3/30/2017 3:20 PM   Office Visit    Description:  Male : 1940   Provider:  Nabila Mariscal MD   Department:  [facility]           Reason for Visit     Burn           Diagnoses this Visit        Comments    Burn    -  Primary     Controlled type 2 diabetes mellitus without complication, without long-term current use of insulin                To Do List           Goals (5 Years of Data)     Increase water intake       Follow-Up and Disposition     Return if symptoms worsen or fail to improve.       These Medications        Disp Refills Start End    silver sulfADIAZINE 1% (SILVADENE) 1 % cream 50 g 0 3/30/2017     Apply topically once daily. - Topical (Top)    Pharmacy: [facility] [address] [place] [address] [address] AT [facility] #: [phone]         [facility]     [facility] On Call Nurse Care Line -  Assistance  Unless otherwise directed by your provider, please contact [facility], our nurse care line that is available for  assistance.     Registered nurses in the [facility] Call Center provide: appointment scheduling, clinical advisement, health education, and other advisory services.  Call: [phone] (toll free)               Medications           Message regarding Medications     Verify the changes and/or additions to your medication regime listed below are the same as discussed with your clinician today.  If any of these changes or additions are incorrect, please notify your healthcare provider.        START taking these NEW medications        Refills    silver sulfADIAZINE 1% (SILVADENE) 1 % cream 0    Sig: Apply topically once daily.    Class: Normal    Route: Topical (Top)           Verify that the below list of medications is an accurate representation of the medications you are  "currently taking.  If none reported, the list may be blank. If incorrect, please contact your healthcare provider. Carry this list with you in case of emergency.           Current Medications     ANDROGEL 20.25 mg/1.25 gram (1.62 %) GlPm     aspirin (ECOTRIN) 81 MG EC tablet Take 1 tablet (81 mg total) by mouth once daily.    clobetasol 0.05% (TEMOVATE) 0.05 % Oint     losartan-hydrochlorothiazide 50-12.5 mg (HYZAAR) 50-12.5 mg per tablet Take 1 tablet by mouth once daily.    metformin (GLUCOPHAGE-XR) 500 MG 24 hr tablet Take 1 tablet (500 mg total) by mouth once daily.    mupirocin (BACTROBAN) 2 % ointment     clonazePAM (KLONOPIN) 0.5 MG tablet Take 1 tablet (0.5 mg total) by mouth daily as needed (panic attack).    fluticasone (FLONASE) 50 mcg/actuation nasal spray 1 spray by Each Nare route 2 (two) times daily.    imiquimod (ALDARA) 5 % cream     ketoconazole (NIZORAL) 2 % cream     NITROSTAT 0.4 mg SL tablet     ONE TOUCH ULTRA TEST Strp     sildenafil (VIAGRA) 100 MG tablet Take 1 tablet (100 mg total) by mouth daily as needed for Erectile Dysfunction.    silver sulfADIAZINE 1% (SILVADENE) 1 % cream Apply topically once daily.           Clinical Reference Information           Your Vitals Were     BP Pulse Temp Resp Height Weight    128/70 65 97.8 °F (36.6 °C) (Oral) 18 5' 7" (1.702 m) 75.3 kg (166 lb 0.1 oz)    SpO2 BMI             97% 26 kg/m2         Blood Pressure          Most Recent Value    BP  128/70      Allergies as of 3/30/2017     Statins-hmg-coa Reductase Inhibitors      Immunizations Administered on Date of Encounter - 3/30/2017     None      Instructions      Hot Water Burn  A hot water burn to the skin causes a first- or second-degree burn. A first-degree burn causes only redness and heals in a few days. A second-degree burn is deeper. It causes a blister to form. The blister may break and leak clear fluid. It may become infected. Second-degree burns take 1 to 2 weeks to heal.  Home care  The " following guidelines will help you care for your burn at home:  · On the first day, put a small towel soaked in cool water on the area to ease severe pain.  · If no blister formed, you may use creams with benzocaine if the burn is painful.  · If a blister formed and broke and a bandage was applied, change it once a day, or as directed. If the bandage sticks, remove it by soaking it in warm water. Wash the burned area daily with soap and water. Pat dry with a clean towel. For the next 3 to 5 days, put an antibiotic cream or ointment on the area after washing. This will help to prevent an infection and to keep the bandage from sticking.  · If a blister formed, it will go down by itself. Or it will break on its own in the next few days. If the blister breaks, a clear fluid will leak from it for a day or two. The loose skin from the broken blister has no feeling.  You can carefully trim away this skin with clean, small, sharp scissors. Sterilize by soaking in alcohol first. Or wash with soap and water. Wash the raw surface under the blister daily with soap and water. For the next 3 to 5 days, put an antibiotic cream or ointment on the area after washing. This will help prevent an infection and keep the bandage from sticking.  · You may use acetaminophen or ibuprofen to control pain, unless another pain medicine was prescribed. If you have chronic liver or kidney disease, talk with your health care provider before using these medicines. Also talk with your provider if you've had a stomach ulcer or GI bleeding. Don't give ibuprofen to children younger than 6 months of age.  · Don't pick or scratch at the affected areas. Use over-the-counter medicine for itching.  · Wear a hat, sunscreen, and long sleeves while in the sun.  · Don't wear tight-fitting clothes.  · Add more calories and protein to your diet until the wound has healed.  Follow-up care  Most hot water burns heal without becoming infected. Occasionally an  infection occurs even with proper treatment. You should watch for the signs of infection listed below.  When to seek medical advice  Call your healthcare provider right away if any of these occur:  · Pain that gets worse  · Redness or swelling that gets worse  · Pus coming from the wound  · Fever of 100.4º F (38º C) or higher, or as directed by your health care provider  · The wound doesn't seem to be healing  · Nausea or vomiting   Date Last Reviewed: 10/15/2014  © 1654-8595 [employer]. [address] [place] [address]. All rights reserved. This information is not intended as a substitute for professional medical care. Always follow your healthcare professional's instructions.             Language Assistance Services     ATTENTION: Language assistance services are available, free of charge. Please call [phone].      ATENCIÓN: Si mariela green, tiene a acuña disposición servicios gratuitos de asistencia lingüística. Llame al [phone].     CHÚ Ý: N?u b?n nói Ti?ng Vi?t, có các d?ch v? h? tr? ngôn ng? mi?n phí dành cho b?n. G?i s? [phone].         [facility] complies with applicable Federal civil rights laws and does not discriminate on the basis of race, color, national origin, age, disability, or sex.

## 2017-03-31 NOTE — PROGRESS NOTES
Chief Complaint   Patient presents with    Burn     L hand       HPI  Eric Jackson is a 76 y.o. male with multiple medical diagnoses as listed in the medical history and problem list that presents for evaluation of a burn on his left hand that occurred four days ago after he spilled boiling hot water on his left hand. His pain is tolerable, but he is having redness and skin sloughing and has been burned in the past and would like topical therapy. He is diabetic but his sugar is controlled. He has noted some areas of the burn with a small amt of purulent drainage. He is not having numbness or tingling.     PAST MEDICAL HISTORY:  Past Medical History:   Diagnosis Date    Anxiety     Coronary artery disease     Depression     Diabetes mellitus type II     Erectile dysfunction     Eye injuries     fb ou    GERD (gastroesophageal reflux disease)     Hypertension        PAST SURGICAL HISTORY:  Past Surgical History:   Procedure Laterality Date    CORONARY ARTERY BYPASS GRAFT  2001    HERNIA REPAIR      WRIST SURGERY         SOCIAL HISTORY:  Social History     Social History    Marital status:      Spouse name: N/A    Number of children: N/A    Years of education: N/A     Occupational History    Not on file.     Social History Main Topics    Smoking status: Former Smoker     Packs/day: 2.00     Years: 23.00     Quit date: 1/1/1989    Smokeless tobacco: Never Used    Alcohol use 0.6 oz/week     1 Cans of beer per week      Comment: 1 can of beer per day    Drug use: No    Sexual activity: Yes     Partners: Female     Other Topics Concern    Not on file     Social History Narrative       FAMILY HISTORY:  Family History   Problem Relation Age of Onset    Adopted: Yes    No Known Problems Mother     No Known Problems Father     No Known Problems Maternal Grandmother     No Known Problems Maternal Grandfather     No Known Problems Paternal Grandmother     No Known Problems Paternal  Grandfather     No Known Problems Sister     No Known Problems Brother     No Known Problems Maternal Aunt     No Known Problems Maternal Uncle     No Known Problems Paternal Aunt     No Known Problems Paternal Uncle     Amblyopia Neg Hx     Blindness Neg Hx     Cancer Neg Hx     Cataracts Neg Hx     Diabetes Neg Hx     Glaucoma Neg Hx     Hypertension Neg Hx     Macular degeneration Neg Hx     Retinal detachment Neg Hx     Strabismus Neg Hx     Stroke Neg Hx     Thyroid disease Neg Hx        ALLERGIES AND MEDICATIONS: updated and reviewed.  Review of patient's allergies indicates:   Allergen Reactions    Statins-hmg-coa reductase inhibitors Other (See Comments)     Muscle weakness     Current Outpatient Prescriptions   Medication Sig Dispense Refill    ANDROGEL 20.25 mg/1.25 gram (1.62 %) GlPm       aspirin (ECOTRIN) 81 MG EC tablet Take 1 tablet (81 mg total) by mouth once daily.  0    clobetasol 0.05% (TEMOVATE) 0.05 % Oint   2    losartan-hydrochlorothiazide 50-12.5 mg (HYZAAR) 50-12.5 mg per tablet Take 1 tablet by mouth once daily. 90 tablet 4    metformin (GLUCOPHAGE-XR) 500 MG 24 hr tablet Take 1 tablet (500 mg total) by mouth once daily. 90 tablet 4    mupirocin (BACTROBAN) 2 % ointment       clonazePAM (KLONOPIN) 0.5 MG tablet Take 1 tablet (0.5 mg total) by mouth daily as needed (panic attack). 60 tablet 0    doxycycline (VIBRAMYCIN) 100 MG Cap Take 1 capsule (100 mg total) by mouth every 12 (twelve) hours. 20 capsule 0    fluticasone (FLONASE) 50 mcg/actuation nasal spray 1 spray by Each Nare route 2 (two) times daily. 16 g 3    imiquimod (ALDARA) 5 % cream   3    ketoconazole (NIZORAL) 2 % cream       NITROSTAT 0.4 mg SL tablet       ONE TOUCH ULTRA TEST Strp       sildenafil (VIAGRA) 100 MG tablet Take 1 tablet (100 mg total) by mouth daily as needed for Erectile Dysfunction. 5 tablet 12    silver sulfADIAZINE 1% (SILVADENE) 1 % cream Apply topically once daily. 50 g 0  "    No current facility-administered medications for this visit.        ROS  Review of Systems   Constitutional: Negative for chills, fatigue, fever and unexpected weight change.   HENT: Negative for ear pain, postnasal drip, rhinorrhea, sinus pressure and sore throat.    Eyes: Negative for photophobia and visual disturbance.   Respiratory: Negative for apnea, cough, chest tightness, shortness of breath and wheezing.    Cardiovascular: Negative for chest pain and palpitations.   Gastrointestinal: Negative for abdominal pain, blood in stool, constipation, diarrhea, nausea and vomiting.   Genitourinary: Negative for difficulty urinating.   Musculoskeletal: Negative for arthralgias and joint swelling.   Skin: Positive for color change and wound. Negative for rash.   Neurological: Negative for facial asymmetry, speech difficulty, weakness, numbness and headaches.   Psychiatric/Behavioral: Negative for dysphoric mood.       Physical Exam  Vitals:    03/30/17 1516   BP: 128/70   Pulse: 65   Resp: 18   Temp: 97.8 °F (36.6 °C)   TempSrc: Oral   SpO2: 97%   Weight: 75.3 kg (166 lb 0.1 oz)   Height: 5' 7" (1.702 m)    Body mass index is 26 kg/(m^2).  Weight: 75.3 kg (166 lb 0.1 oz)   Height: 5' 7" (170.2 cm)     Physical Exam   Constitutional: He is oriented to person, place, and time. He appears well-developed and well-nourished.   HENT:   Head: Normocephalic and atraumatic.   Eyes: EOM are normal.   Neurological: He is alert and oriented to person, place, and time.   Skin: Skin is warm and dry. No rash noted. There is erythema.   Left hand with third degree burn over wrist and dorsal hand surface, skin sloughing present over wrist, small area of induration over medial wrist, no signs of purulent drainage   Psychiatric: He has a normal mood and affect. His behavior is normal.   Nursing note and vitals reviewed.      Health Maintenance       Date Due Completion Date    Abdominal Aortic Aneurysm Screening 11/20/2007 ---    " Colonoscopy 1/20/2017 1/20/2014    Hemoglobin A1c 7/6/2017 1/6/2017    Override on 2/24/2015: Done (Dr. Arias Muñoz)    Foot Exam 1/6/2018 1/6/2017    Override on 9/9/2016: Done ([facility]-Arias Muñoz MD/Nelda Finley, FNP-Diabetic feet examnormal by visual exam, normal pulses,, sensation intact, Monofilament exam 10/10)    Override on 3/3/2015: Done (Dr.Tilak MESSI Sky)    Lipid Panel 1/6/2018 1/6/2017    Override on 8/26/2016: Done ([facility]-Arias Muñoz MD/Nelda Finley, RODRIGUEZP-Lipid Panel:Cholesterol 227 mg/dL, NON- mg/dL, HDL 61 mg/dL, CHol/HDL Ratio 3.7,  mg/dL, Triglyceride 101 mg/dL)    Override on 2/24/2015: Done (Dr. Arias Muñoz)    Eye Exam 1/6/2018 1/6/2017 (Done)    Override on 1/6/2017: Done    Override on 2/10/2015: Done    Urine Microalbumin 1/6/2018 1/6/2017    Override on 2/24/2015: Done (Dr. Arias Muñoz)    TETANUS VACCINE 3/30/2027 3/30/2017 (Declined)    Override on 3/30/2017: Declined            ASSESSMENT     1. Burn    2. Controlled type 2 diabetes mellitus without complication, without long-term current use of insulin        PLAN:     Burn  -     silver sulfADIAZINE 1% (SILVADENE) 1 % cream; Apply topically once daily.  Dispense: 50 g; Refill: 0  -     doxycycline (VIBRAMYCIN) 100 MG Cap; Take 1 capsule (100 mg total) by mouth every 12 (twelve) hours.  Dispense: 20 capsule; Refill: 0    Controlled type 2 diabetes mellitus without complication, without long-term current use of insulin  -     doxycycline (VIBRAMYCIN) 100 MG Cap; Take 1 capsule (100 mg total) by mouth every 12 (twelve) hours.  Dispense: 20 capsule; Refill: 0        Antibiotic prophylaxis due to diabetes and infection risk  Silvadene cream TID with occlusive dressing, may d/c once skin slough completes  Keep wound covered during work, he is a  and he works with gloves      Nabila Mariscal MD  03/31/2017 7:41  AM        Return if symptoms worsen or fail to improve.

## 2017-07-03 ENCOUNTER — TELEPHONE (OUTPATIENT)
Dept: FAMILY MEDICINE | Facility: CLINIC | Age: 77
End: 2017-07-03

## 2017-07-03 NOTE — TELEPHONE ENCOUNTER
----- Message from Alda Alva sent at 6/30/2017  4:14 PM CDT -----  Good Afternoon,               My name is Alda & I schedule the HRA appts here at [facility] in [place]. While I was on the line with patient Eric Jackson (mrn# [id number]) he requested to have his high blood pressure medication refilled because he ran out of pills.  If possible can someone please contact & assist Mr. Jackson regarding his request. Thank you, your assistance in this matter would be greatly appreciated.

## 2017-07-03 NOTE — TELEPHONE ENCOUNTER
Patient states he has not established care with a PCP. I scheduled an appointment with Dr. Mariscal. Patient states he is not completely out of medication.

## 2017-07-21 ENCOUNTER — OFFICE VISIT (OUTPATIENT)
Dept: FAMILY MEDICINE | Facility: CLINIC | Age: 77
End: 2017-07-21
Payer: MEDICARE

## 2017-07-21 VITALS
OXYGEN SATURATION: 97 % | TEMPERATURE: 98 F | HEART RATE: 66 BPM | RESPIRATION RATE: 16 BRPM | WEIGHT: 165.13 LBS | DIASTOLIC BLOOD PRESSURE: 70 MMHG | BODY MASS INDEX: 25.92 KG/M2 | HEIGHT: 67 IN | SYSTOLIC BLOOD PRESSURE: 128 MMHG

## 2017-07-21 DIAGNOSIS — I10 ESSENTIAL HYPERTENSION: ICD-10-CM

## 2017-07-21 DIAGNOSIS — F32.A DEPRESSION, UNSPECIFIED DEPRESSION TYPE: ICD-10-CM

## 2017-07-21 DIAGNOSIS — E11.9 CONTROLLED TYPE 2 DIABETES MELLITUS WITHOUT COMPLICATION, WITHOUT LONG-TERM CURRENT USE OF INSULIN: Primary | ICD-10-CM

## 2017-07-21 DIAGNOSIS — I25.10 CORONARY ARTERY DISEASE INVOLVING NATIVE HEART WITHOUT ANGINA PECTORIS, UNSPECIFIED VESSEL OR LESION TYPE: ICD-10-CM

## 2017-07-21 DIAGNOSIS — F41.9 ANXIETY: ICD-10-CM

## 2017-07-21 PROCEDURE — 99214 OFFICE O/P EST MOD 30 MIN: CPT | Mod: S$GLB,,, | Performed by: FAMILY MEDICINE

## 2017-07-21 PROCEDURE — 1126F AMNT PAIN NOTED NONE PRSNT: CPT | Mod: S$GLB,,, | Performed by: FAMILY MEDICINE

## 2017-07-21 PROCEDURE — 99999 PR PBB SHADOW E&M-EST. PATIENT-LVL III: CPT | Mod: PBBFAC,,, | Performed by: FAMILY MEDICINE

## 2017-07-21 PROCEDURE — 3044F HG A1C LEVEL LT 7.0%: CPT | Mod: S$GLB,,, | Performed by: FAMILY MEDICINE

## 2017-07-21 PROCEDURE — 99499 UNLISTED E&M SERVICE: CPT | Mod: S$GLB,,, | Performed by: FAMILY MEDICINE

## 2017-07-21 PROCEDURE — 4010F ACE/ARB THERAPY RXD/TAKEN: CPT | Mod: S$GLB,,, | Performed by: FAMILY MEDICINE

## 2017-07-21 PROCEDURE — 1159F MED LIST DOCD IN RCRD: CPT | Mod: S$GLB,,, | Performed by: FAMILY MEDICINE

## 2017-07-21 RX ORDER — VENLAFAXINE HYDROCHLORIDE 150 MG/1
150 CAPSULE, EXTENDED RELEASE ORAL DAILY
Qty: 90 CAPSULE | Refills: 2 | Status: SHIPPED | OUTPATIENT
Start: 2017-07-21 | End: 2018-06-29 | Stop reason: SDUPTHER

## 2017-07-21 RX ORDER — ALPRAZOLAM 1 MG/1
1 TABLET ORAL NIGHTLY PRN
Qty: 60 TABLET | Refills: 1 | Status: SHIPPED | OUTPATIENT
Start: 2017-07-21 | End: 2018-05-24 | Stop reason: SDUPTHER

## 2017-07-21 RX ORDER — LOSARTAN POTASSIUM AND HYDROCHLOROTHIAZIDE 12.5; 5 MG/1; MG/1
1 TABLET ORAL DAILY
Qty: 90 TABLET | Refills: 3 | Status: SHIPPED | OUTPATIENT
Start: 2017-07-21 | End: 2018-05-24 | Stop reason: SDUPTHER

## 2017-07-21 NOTE — PROGRESS NOTES
Chief Complaint   Patient presents with    Establish Care       HPI  Eric Jackson is a 76 y.o. male with multiple medical diagnoses as listed in the medical history and problem list that presents for establishment of care .    He has diabetes, hypertension and anxiety/depression. He has seen another provider at this practice and was started on clonazepam for his anxiety. He states this does not work well for him. One xanax script may last him a year, he uses it as needed. He does not have any thoughts of harm. He would like to refill his effexor.     He feels well and has no complaints, he is still working construction.    PAST MEDICAL HISTORY:  Past Medical History:   Diagnosis Date    Anxiety     Coronary artery disease     Depression     Diabetes mellitus type II     Erectile dysfunction     Eye injuries     fb ou    GERD (gastroesophageal reflux disease)     Hypertension        PAST SURGICAL HISTORY:  Past Surgical History:   Procedure Laterality Date    CORONARY ARTERY BYPASS GRAFT  2001    HERNIA REPAIR      WRIST SURGERY         SOCIAL HISTORY:  Social History     Social History    Marital status:      Spouse name: N/A    Number of children: N/A    Years of education: N/A     Occupational History    Not on file.     Social History Main Topics    Smoking status: Former Smoker     Packs/day: 2.00     Years: 23.00     Quit date: 1/1/1989    Smokeless tobacco: Never Used    Alcohol use 0.6 oz/week     1 Cans of beer per week      Comment: 1 can of beer per day    Drug use: No    Sexual activity: Yes     Partners: Female     Other Topics Concern    Not on file     Social History Narrative    No narrative on file       FAMILY HISTORY:  Family History   Problem Relation Age of Onset    Adopted: Yes    No Known Problems Mother     No Known Problems Father     No Known Problems Maternal Grandmother     No Known Problems Maternal Grandfather     No Known Problems Paternal  Grandmother     No Known Problems Paternal Grandfather     No Known Problems Sister     No Known Problems Brother     No Known Problems Maternal Aunt     No Known Problems Maternal Uncle     No Known Problems Paternal Aunt     No Known Problems Paternal Uncle     Amblyopia Neg Hx     Blindness Neg Hx     Cancer Neg Hx     Cataracts Neg Hx     Diabetes Neg Hx     Glaucoma Neg Hx     Hypertension Neg Hx     Macular degeneration Neg Hx     Retinal detachment Neg Hx     Strabismus Neg Hx     Stroke Neg Hx     Thyroid disease Neg Hx        ALLERGIES AND MEDICATIONS: updated and reviewed.  Review of patient's allergies indicates:   Allergen Reactions    Statins-hmg-coa reductase inhibitors Other (See Comments)     Muscle weakness     Current Outpatient Prescriptions   Medication Sig Dispense Refill    ANDROGEL 20.25 mg/1.25 gram (1.62 %) GlPm       aspirin (ECOTRIN) 81 MG EC tablet Take 1 tablet (81 mg total) by mouth once daily.  0    losartan-hydrochlorothiazide 50-12.5 mg (HYZAAR) 50-12.5 mg per tablet Take 1 tablet by mouth once daily. 90 tablet 3    metformin (GLUCOPHAGE-XR) 500 MG 24 hr tablet Take 1 tablet (500 mg total) by mouth once daily. 90 tablet 4    ONE TOUCH ULTRA TEST Strp       alprazolam (XANAX) 1 MG tablet Take 1 tablet (1 mg total) by mouth nightly as needed for Anxiety. 60 tablet 1    clobetasol 0.05% (TEMOVATE) 0.05 % Oint   2    fluticasone (FLONASE) 50 mcg/actuation nasal spray 1 spray by Each Nare route 2 (two) times daily. 16 g 3    imiquimod (ALDARA) 5 % cream   3    ketoconazole (NIZORAL) 2 % cream       mupirocin (BACTROBAN) 2 % ointment       NITROSTAT 0.4 mg SL tablet       sildenafil (VIAGRA) 100 MG tablet Take 1 tablet (100 mg total) by mouth daily as needed for Erectile Dysfunction. 5 tablet 12    silver sulfADIAZINE 1% (SILVADENE) 1 % cream Apply topically once daily. 50 g 0    venlafaxine (EFFEXOR-XR) 150 MG Cp24 Take 1 capsule (150 mg total) by mouth  "once daily. 90 capsule 2     No current facility-administered medications for this visit.        ROS  Review of Systems   Constitutional: Negative for chills, fatigue, fever and unexpected weight change.   HENT: Negative for ear pain, postnasal drip, rhinorrhea, sinus pressure and sore throat.    Eyes: Negative for photophobia and visual disturbance.   Respiratory: Negative for apnea, cough, chest tightness, shortness of breath and wheezing.    Cardiovascular: Negative for chest pain and palpitations.   Gastrointestinal: Negative for abdominal pain, blood in stool, constipation, diarrhea, nausea and vomiting.   Genitourinary: Negative for difficulty urinating.   Musculoskeletal: Negative for arthralgias and joint swelling.   Skin: Negative for rash.   Neurological: Negative for facial asymmetry, speech difficulty, weakness, numbness and headaches.   Psychiatric/Behavioral: Positive for dysphoric mood. The patient is nervous/anxious.        Physical Exam  Vitals:    07/21/17 1124   BP: 128/70   Pulse: 66   Resp: 16   Temp: 97.9 °F (36.6 °C)   TempSrc: Oral   SpO2: 97%   Weight: 74.9 kg (165 lb 2 oz)   Height: 5' 7" (1.702 m)    Body mass index is 25.86 kg/m².  Weight: 74.9 kg (165 lb 2 oz)   Height: 5' 7" (170.2 cm)     Physical Exam   Constitutional: He is oriented to person, place, and time. He appears well-developed and well-nourished.   HENT:   Head: Normocephalic and atraumatic.   Eyes: EOM are normal.   Neurological: He is alert and oriented to person, place, and time.   Skin: Skin is warm and dry. No rash noted.   Psychiatric: He has a normal mood and affect. His behavior is normal.   No SI/HI   Nursing note and vitals reviewed.      Health Maintenance       Date Due Completion Date    Abdominal Aortic Aneurysm Screening 11/20/2007 ---    Colonoscopy 01/20/2017 1/20/2014    Hemoglobin A1c 07/06/2017 1/6/2017    Override on 2/24/2015: Done (Dr. Arias Muñoz)    Foot Exam 01/06/2018 1/6/2017    Override on " 9/9/2016: Done ([facility]-Arias Muñoz MD/GEETHA Razo-Diabetic feet examnormal by visual exam, normal pulses,, sensation intact, Monofilament exam 10/10)    Override on 3/3/2015: Done (Dr.Tilak MESSI Sky)    Lipid Panel 01/06/2018 1/6/2017    Override on 8/26/2016: Done ([facility]-Arias Muñoz MD/GEETHA Razo-Lipid Panel:Cholesterol 227 mg/dL, NON- mg/dL, HDL 61 mg/dL, CHol/HDL Ratio 3.7,  mg/dL, Triglyceride 101 mg/dL)    Override on 2/24/2015: Done (Dr. Arias Muñoz)    Eye Exam 01/06/2018 1/6/2017 (Done)    Override on 1/6/2017: Done    Override on 2/10/2015: Done    Urine Microalbumin 01/06/2018 1/6/2017    Override on 2/24/2015: Done (Dr. Arias Muñoz)    TETANUS VACCINE 03/30/2027 3/30/2017 (Declined)    Override on 3/30/2017: Declined            ASSESSMENT     1. Controlled type 2 diabetes mellitus without complication, without long-term current use of insulin    2. Essential hypertension    3. Coronary artery disease involving native heart without angina pectoris, unspecified vessel or lesion type    4. Depression, unspecified depression type    5. Anxiety        PLAN:     Controlled type 2 diabetes mellitus without complication, without long-term current use of insulin  -This is a chronic medical condition that is stable under the current regimen. Continue to monitor and we will make medication adjustments as needed.       Essential hypertension  -This is a chronic medical condition that is stable under the current regimen. Continue to monitor and we will make medication adjustments as needed.     -     losartan-hydrochlorothiazide 50-12.5 mg (HYZAAR) 50-12.5 mg per tablet; Take 1 tablet by mouth once daily.  Dispense: 90 tablet; Refill: 3    Coronary artery disease involving native heart without angina pectoris, unspecified vessel or lesion type  -This is a chronic medical condition  that is stable under the current regimen. Continue to monitor and we will make medication adjustments as needed.       Depression, unspecified depression type  -continue current dose  -     venlafaxine (EFFEXOR-XR) 150 MG Cp24; Take 1 capsule (150 mg total) by mouth once daily.  Dispense: 90 capsule; Refill: 2    Anxiety  -he is aware of the side effects of long term benzo use  -will refill xanax he will follow up when he is due for refills, he is aware this is a controlled substance        Nabila Mariscal MD  07/21/2017 1:17 PM        Return in about 3 months (around 10/21/2017) for Follow up.

## 2017-10-03 NOTE — PROGRESS NOTES
"This note was created by combination of typed  and Dragon dictation.  Transcription errors may be present.  If there are any questions, please contact me.    Assessment & Plan  Malaise and fatigue  Insomnia, unspecified type  -nonfocal exam.  Trial of trazodone.  He's tried OTC meds without relief. Tried antihistamines; some effort already with self directed CBT online. Reports normal labs about 3 months ago with endo.  -     trazodone (DESYREL) 50 MG tablet; 1/4 to 1/2 tablet at night for sleep  Dispense: 30 tablet; Refill: 5        There are no discontinued medications.    Follow-up: No Follow-up on file.      =================================================================      No chief complaint on file.      DARRYL Reyes is a 76 y.o. male, last appointment with this clinic was 7/21/2017.    Pt of Dr. Mariscal.  Followed for DM2; CAD; HTN; anxiety and depression    Notes fatigue.  Not sleeping well.  Hx of xanax but does not like the rx b/c of disorientation the following morning.  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  Thinks he sleeps for 3 hours at a time. Last week - legs and arms felt weak.  Almost like growing pains. Aching "so bad" this was Thur and stayed in bed a lot on Fri and Sat.  Monday started to feel better.  Still was sleeping on and off.  Got on the treadmill but instead of energized he felt fatigued.  Worked out light weights too.  Reports labs with endo was good.  Reports 3 months ago labs done. A1c, CMP possibly but he is not sure.  Takes testosterone supplement, unsure if that was recently checked or not.  Notes at work with hard exertion, some dyspnea. No chest pain no dyspnea. No fever no chills no changes no diarrhea no dysuria. No rash.  Yes joint pain - pain in the hands, knees, elbows; these are chronic.  Not on statin.  SE of meds.  He thinks that he is feeling accumulated effects of not sleeping well.  Has tried melatonin without relief. OTC antihistamine " without relief.    Has tried limited self directed CBT online for insomnia.  Is interested in medication for sleep to take PRN.  BP elevated he thinks b/c he has had poor sleep.  Has had this before in similar situation.    Patient Care Team:  Nabila Mariscal MD as PCP - General (Internal Medicine)  Arias Muñoz MD as Consulting Physician (Endocrinology)    Patient Active Problem List    Diagnosis Date Noted    Insomnia 08/10/2014    GERD (gastroesophageal reflux disease)     Anxiety     Depression     Coronary artery disease 11/14/2012    Essential hypertension 11/14/2012    Diabetes mellitus type II, controlled 11/14/2012       PAST MEDICAL HISTORY:  Past Medical History:   Diagnosis Date    Anxiety     Coronary artery disease     Depression     Diabetes mellitus type II     Erectile dysfunction     Eye injuries     fb ou    GERD (gastroesophageal reflux disease)     Hypertension        PAST SURGICAL HISTORY:  Past Surgical History:   Procedure Laterality Date    CORONARY ARTERY BYPASS GRAFT  2001    HERNIA REPAIR      WRIST SURGERY         SOCIAL HISTORY:  Social History     Social History    Marital status:      Spouse name: N/A    Number of children: N/A    Years of education: N/A     Occupational History    Not on file.     Social History Main Topics    Smoking status: Former Smoker     Packs/day: 2.00     Years: 23.00     Quit date: 1/1/1989    Smokeless tobacco: Never Used    Alcohol use 0.6 oz/week     1 Cans of beer per week      Comment: 1 can of beer per day    Drug use: No    Sexual activity: Yes     Partners: Female     Other Topics Concern    Not on file     Social History Narrative    No narrative on file       ALLERGIES AND MEDICATIONS: updated and reviewed.  Review of patient's allergies indicates:   Allergen Reactions    Statins-hmg-coa reductase inhibitors Other (See Comments)     Muscle weakness     Current Outpatient Prescriptions   Medication Sig Dispense  Refill    alprazolam (XANAX) 1 MG tablet Take 1 tablet (1 mg total) by mouth nightly as needed for Anxiety. 60 tablet 1    ANDROGEL 20.25 mg/1.25 gram (1.62 %) GlPm       aspirin (ECOTRIN) 81 MG EC tablet Take 1 tablet (81 mg total) by mouth once daily.  0    clobetasol 0.05% (TEMOVATE) 0.05 % Oint   2    fluticasone (FLONASE) 50 mcg/actuation nasal spray 1 spray by Each Nare route 2 (two) times daily. 16 g 3    imiquimod (ALDARA) 5 % cream   3    ketoconazole (NIZORAL) 2 % cream       losartan-hydrochlorothiazide 50-12.5 mg (HYZAAR) 50-12.5 mg per tablet Take 1 tablet by mouth once daily. 90 tablet 3    metformin (GLUCOPHAGE-XR) 500 MG 24 hr tablet Take 1 tablet (500 mg total) by mouth once daily. 90 tablet 4    mupirocin (BACTROBAN) 2 % ointment       NITROSTAT 0.4 mg SL tablet       ONE TOUCH ULTRA TEST Strp       sildenafil (VIAGRA) 100 MG tablet Take 1 tablet (100 mg total) by mouth daily as needed for Erectile Dysfunction. 5 tablet 12    silver sulfADIAZINE 1% (SILVADENE) 1 % cream Apply topically once daily. 50 g 0    venlafaxine (EFFEXOR-XR) 150 MG Cp24 Take 1 capsule (150 mg total) by mouth once daily. 90 capsule 2     No current facility-administered medications for this visit.        Review of Systems   Constitutional: Positive for malaise/fatigue. Negative for fever and weight loss.   HENT: Negative for congestion.    Eyes: Negative for blurred vision and pain.   Respiratory: Negative for shortness of breath and wheezing.    Cardiovascular: Negative for chest pain, palpitations and leg swelling.   Gastrointestinal: Negative for abdominal pain, blood in stool, constipation, diarrhea and heartburn.   Genitourinary: Negative for dysuria, hematuria and urgency.   Musculoskeletal: Positive for myalgias. Negative for joint pain.   Neurological: Negative for tingling, focal weakness, weakness and headaches.   Psychiatric/Behavioral: Negative for depression. The patient has insomnia. The patient  "is not nervous/anxious.        Physical Exam   Vitals:    10/04/17 1120   BP: (!) 142/74   Pulse: 60   Temp: 98.1 °F (36.7 °C)   SpO2: 97%   Weight: 72.6 kg (160 lb)   Height: 5' 7" (1.702 m)    There is no height or weight on file to calculate BMI.  Vitals:    10/04/17 1120   BP: (!) 142/74   Pulse: 60   Temp: 98.1 °F (36.7 °C)   SpO2: 97%   Weight: 72.6 kg (160 lb)   Height: 5' 7" (1.702 m)               Physical Exam   Constitutional: He is oriented to person, place, and time. He appears well-developed and well-nourished. No distress.   Eyes: EOM are normal.   Cardiovascular: Normal rate, regular rhythm and normal heart sounds.    No murmur heard.  Pulmonary/Chest: Effort normal and breath sounds normal.   Abdominal: Soft. He exhibits no distension. There is no tenderness. There is no guarding.   Musculoskeletal: Normal range of motion.   Neurological: He is alert and oriented to person, place, and time. Coordination normal.   Skin: Skin is warm and dry.   Psychiatric: He has a normal mood and affect. His behavior is normal. Thought content normal.     "

## 2017-10-04 ENCOUNTER — OFFICE VISIT (OUTPATIENT)
Dept: FAMILY MEDICINE | Facility: CLINIC | Age: 77
End: 2017-10-04
Payer: MEDICARE

## 2017-10-04 VITALS
OXYGEN SATURATION: 97 % | HEART RATE: 60 BPM | DIASTOLIC BLOOD PRESSURE: 74 MMHG | SYSTOLIC BLOOD PRESSURE: 142 MMHG | BODY MASS INDEX: 25.11 KG/M2 | WEIGHT: 160 LBS | TEMPERATURE: 98 F | HEIGHT: 67 IN

## 2017-10-04 DIAGNOSIS — R53.81 MALAISE AND FATIGUE: Primary | ICD-10-CM

## 2017-10-04 DIAGNOSIS — R53.83 MALAISE AND FATIGUE: Primary | ICD-10-CM

## 2017-10-04 DIAGNOSIS — G47.00 INSOMNIA, UNSPECIFIED TYPE: ICD-10-CM

## 2017-10-04 PROCEDURE — 99213 OFFICE O/P EST LOW 20 MIN: CPT | Mod: S$GLB,,, | Performed by: INTERNAL MEDICINE

## 2017-10-04 PROCEDURE — 99999 PR PBB SHADOW E&M-EST. PATIENT-LVL III: CPT | Mod: PBBFAC,,, | Performed by: INTERNAL MEDICINE

## 2017-10-04 PROCEDURE — 99499 UNLISTED E&M SERVICE: CPT | Mod: S$GLB,,, | Performed by: INTERNAL MEDICINE

## 2017-10-04 RX ORDER — TRAZODONE HYDROCHLORIDE 50 MG/1
TABLET ORAL
Qty: 30 TABLET | Refills: 5 | Status: SHIPPED | OUTPATIENT
Start: 2017-10-04 | End: 2018-01-09

## 2017-10-04 NOTE — PATIENT INSTRUCTIONS
Trazodone tablets  What is this medicine?  TRAZODONE (TRAZ oh done) is used to treat depression.  How should I use this medicine?  Take this medicine by mouth with a glass of water. Follow the directions on the prescription label. Take this medicine shortly after a meal or a light snack. Take your medicine at regular intervals. Do not take your medicine more often than directed. Do not stop taking this medicine suddenly except upon the advice of your doctor. Stopping this medicine too quickly may cause serious side effects or your condition may worsen.  A special MedGuide will be given to you by the pharmacist with each prescription and refill. Be sure to read this information carefully each time.  Talk to your pediatrician regarding the use of this medicine in children. Special care may be needed.  What side effects may I notice from receiving this medicine?  Side effects that you should report to your doctor or health care professional as soon as possible:  · allergic reactions like skin rash, itching or hives, swelling of the face, lips, or tongue  · fast, irregular heartbeat  · feeling faint or lightheaded, falls  · painful erections or other sexual dysfunction  · suicidal thoughts or other mood changes  · trembling  Side effects that usually do not require medical attention (report to your doctor or health care professional if they continue or are bothersome):  · constipation  · headache  · muscle aches or pains  · nausea, vomiting  · unusually weak or tired  What may interact with this medicine?  Do not take this medicine with any of the following medications:  · certain medicines for fungal infections like fluconazole, itraconazole, ketoconazole, posaconazole, voriconazole  · cisapride  · dofetilide  · dronedarone  · linezolid  · MAOIs like Carbex, Eldepryl, Marplan, Nardil, and Parnate  · mesoridazine  · methylene blue (injected into a vein)  · pimozide  · saquinavir  · thioridazine  · ziprasidone  This  medicine may also interact with the following medications:  · alcohol  · antiviral medicines for HIV or AIDS  · aspirin and aspirin-like medicines  · barbiturates like phenobarbital  · certain medicines for blood pressure, heart disease, irregular heart beat  · certain medicines for depression, anxiety, or psychotic disturbances  · certain medicines for migraine headache like almotriptan, eletriptan, frovatriptan, naratriptan, rizatriptan, sumatriptan, zolmitriptan  · certain medicines for seizures like carbamazepine and phenytoin  · certain medicines for sleep  · certain medicines that treat or prevent blood clots like dalteparin, enoxaparin, warfarin  · digoxin  · fentanyl  · lithium  · NSAIDS, medicines for pain and inflammation, like ibuprofen or naproxen  · other medicines that prolong the QT interval (cause an abnormal heart rhythm)  · rasagiline  · supplements like Rosetta's wort, kava kava, valerian  · tramadol  · tryptophan  What if I miss a dose?  If you miss a dose, take it as soon as you can. If it is almost time for your next dose, take only that dose. Do not take double or extra doses.  Where should I keep my medicine?  Keep out of the reach of children.  Store at room temperature between 15 and 30 degrees C (59 to 86 degrees F). Protect from light. Keep container tightly closed. Throw away any unused medicine after the expiration date.  What should I tell my health care provider before I take this medicine?  They need to know if you have any of these conditions:  · attempted suicide or thinking about it  · bipolar disorder  · bleeding problems  · glaucoma  · heart disease, or previous heart attack  · irregular heart beat  · kidney or liver disease  · low levels of sodium in the blood  · an unusual or allergic reaction to trazodone, other medicines, foods, dyes or preservatives  · pregnant or trying to get pregnant  · breast-feeding  What should I watch for while using this medicine?  Tell your doctor  if your symptoms do not get better or if they get worse. Visit your doctor or health care professional for regular checks on your progress. Because it may take several weeks to see the full effects of this medicine, it is important to continue your treatment as prescribed by your doctor.  Patients and their families should watch out for new or worsening thoughts of suicide or depression. Also watch out for sudden changes in feelings such as feeling anxious, agitated, panicky, irritable, hostile, aggressive, impulsive, severely restless, overly excited and hyperactive, or not being able to sleep. If this happens, especially at the beginning of treatment or after a change in dose, call your health care professional.  You may get drowsy or dizzy. Do not drive, use machinery, or do anything that needs mental alertness until you know how this medicine affects you. Do not stand or sit up quickly, especially if you are an older patient. This reduces the risk of dizzy or fainting spells. Alcohol may interfere with the effect of this medicine. Avoid alcoholic drinks.  This medicine may cause dry eyes and blurred vision. If you wear contact lenses you may feel some discomfort. Lubricating drops may help. See your eye doctor if the problem does not go away or is severe.  Your mouth may get dry. Chewing sugarless gum, sucking hard candy and drinking plenty of water may help. Contact your doctor if the problem does not go away or is severe.  NOTE:This sheet is a summary. It may not cover all possible information. If you have questions about this medicine, talk to your doctor, pharmacist, or health care provider. Copyright© 2017 [employer]

## 2017-10-18 ENCOUNTER — INITIAL CONSULT (OUTPATIENT)
Dept: OPHTHALMOLOGY | Facility: CLINIC | Age: 77
End: 2017-10-18
Payer: MEDICARE

## 2017-10-18 DIAGNOSIS — H35.3132 NONEXUDATIVE AGE-RELATED MACULAR DEGENERATION, BILATERAL, INTERMEDIATE DRY STAGE: Primary | ICD-10-CM

## 2017-10-18 DIAGNOSIS — H43.813 PVD (POSTERIOR VITREOUS DETACHMENT), BILATERAL: ICD-10-CM

## 2017-10-18 DIAGNOSIS — H35.373 EPIRETINAL MEMBRANE (ERM) OF BOTH EYES: ICD-10-CM

## 2017-10-18 DIAGNOSIS — H25.13 NUCLEAR SCLEROSIS OF BOTH EYES: ICD-10-CM

## 2017-10-18 PROCEDURE — 99999 PR PBB SHADOW E&M-EST. PATIENT-LVL II: CPT | Mod: PBBFAC,,, | Performed by: OPHTHALMOLOGY

## 2017-10-18 PROCEDURE — 92225 PR SPECIAL EYE EXAM, INITIAL: CPT | Mod: 50,S$GLB,, | Performed by: OPHTHALMOLOGY

## 2017-10-18 PROCEDURE — 92014 COMPRE OPH EXAM EST PT 1/>: CPT | Mod: S$GLB,,, | Performed by: OPHTHALMOLOGY

## 2017-10-18 PROCEDURE — 92134 CPTRZ OPH DX IMG PST SGM RTA: CPT | Mod: S$GLB,,, | Performed by: OPHTHALMOLOGY

## 2017-10-18 NOTE — LETTER
October 18, 2017      Sheila Braden, OD  [address]  [place] [address]           [facility]  [address] [facility] [address]  [place] [address]  Phone: [phone]  Fax: [phone]          Patient: Eric Jackson   MR Number: [id number]   YOB: 1940   Date of Visit: 10/18/2017       Dear Dr. Sheila Braden:    Thank you for referring Eric Jackson to me for evaluation. Attached you will find relevant portions of my assessment and plan of care.    If you have questions, please do not hesitate to call me. I look forward to following Eric Jackson along with you.    Sincerely,    Mil Wood MD    Enclosure  CC:  No Recipients    If you would like to receive this communication electronically, please contact [email or web address] or [phone] to request more information on [facility] Link access.    For providers and/or their staff who would like to refer a patient to [facility], please contact us through our one-stop-shop provider referral line, [facility], at [phone].    If you feel you have received this communication in error or would no longer like to receive these types of communications, please e-mail [email or web address]

## 2017-10-18 NOTE — PROGRESS NOTES
HPI     Macular Degeneration Consult   CEZAR 01/06/2017 Dr. Braden    Pt sts since last visit with Dr. Braden no change in vision just requested to   be seen by retina specialist to learn more about his AMD and what else he   can do to help it.  Pt notes he has some blur and distortion OD has come   on gradually.  OS Va good.  No pain.  (-)Flashes (-)Floaters  (+)Photophobia  (+)Glare    Visine PRN   Taking Preservision    Diabetes x9 years diagnosed   LBS:[id number] this am  Hemoglobin A1C       Date                     Value               Ref Range             Status                01/06/2017               6.0                 4.5 - 6.2 %           Final            ----------    Hx of metal taken out of eyes several times was a    No SX     Last edited by Mil Wood MD on 10/18/2017  8:40 AM. (History)        [place] SDOCT:   OD: good quality, ERM with some distortion, no IRF/SRF  OS: good quality, ERM with min distortion, no IRF/SRF      Assessment /Plan     For exam results, see Encounter Report.    Nonexudative age-related macular degeneration, bilateral, intermediate dry stage    Epiretinal membrane (ERM) of both eyes    PVD (posterior vitreous detachment), bilateral    Nuclear sclerosis of both eyes      Dry Int ARMD OU  Discussed Dry and Wet AMD in detail  Recommend AREDS 2 Vitamins  Home Amsler Grid Testing    ERM OU.  Some distortion OD, may have some contribution to Va OD.  Discussed PPV/MP.  Pt elects observation at this time    NS OU-pt overall content with vision and doesn't want any sx now-observe    PVD OU-no breaks, RD precautions    RTC 6 months sooner PRN

## 2017-12-14 ENCOUNTER — OFFICE VISIT (OUTPATIENT)
Dept: FAMILY MEDICINE | Facility: CLINIC | Age: 77
End: 2017-12-14
Payer: MEDICARE

## 2017-12-14 VITALS
TEMPERATURE: 98 F | SYSTOLIC BLOOD PRESSURE: 160 MMHG | BODY MASS INDEX: 26.85 KG/M2 | RESPIRATION RATE: 18 BRPM | WEIGHT: 171.06 LBS | HEART RATE: 75 BPM | DIASTOLIC BLOOD PRESSURE: 92 MMHG | OXYGEN SATURATION: 97 % | HEIGHT: 67 IN

## 2017-12-14 DIAGNOSIS — I10 ESSENTIAL HYPERTENSION: ICD-10-CM

## 2017-12-14 DIAGNOSIS — E11.9 CONTROLLED TYPE 2 DIABETES MELLITUS WITHOUT COMPLICATION, WITHOUT LONG-TERM CURRENT USE OF INSULIN: ICD-10-CM

## 2017-12-14 DIAGNOSIS — J18.9 ATYPICAL PNEUMONIA: Primary | ICD-10-CM

## 2017-12-14 PROCEDURE — 99214 OFFICE O/P EST MOD 30 MIN: CPT | Mod: S$GLB,,, | Performed by: FAMILY MEDICINE

## 2017-12-14 PROCEDURE — 99499 UNLISTED E&M SERVICE: CPT | Mod: S$GLB,,, | Performed by: FAMILY MEDICINE

## 2017-12-14 PROCEDURE — 99999 PR PBB SHADOW E&M-EST. PATIENT-LVL III: CPT | Mod: PBBFAC,,, | Performed by: FAMILY MEDICINE

## 2017-12-14 RX ORDER — BENZONATATE 200 MG/1
200 CAPSULE ORAL 3 TIMES DAILY PRN
Qty: 45 CAPSULE | Refills: 1 | Status: SHIPPED | OUTPATIENT
Start: 2017-12-14 | End: 2017-12-24

## 2017-12-14 RX ORDER — AZITHROMYCIN 250 MG/1
TABLET, FILM COATED ORAL
Qty: 6 TABLET | Refills: 0 | Status: SHIPPED | OUTPATIENT
Start: 2017-12-14 | End: 2017-12-19

## 2017-12-14 NOTE — PROGRESS NOTES
Chief Complaint   Patient presents with    URI    Influenza       HPI  Eric Jackson is a 77 y.o. male with multiple medical diagnoses as listed in the medical history and problem list that presents for evaluation for 7 days of chills, productive cough for which he was taking OTC nyquil at  A half dose. He is not having nasal congestion. He is having headache. He has been having dizziness. He has had pleuritic chest pain.    PAST MEDICAL HISTORY:  Past Medical History:   Diagnosis Date    Anxiety     Coronary artery disease     Depression     Diabetes mellitus type II     Erectile dysfunction     Eye injuries     fb ou    GERD (gastroesophageal reflux disease)     Hypertension        PAST SURGICAL HISTORY:  Past Surgical History:   Procedure Laterality Date    CORONARY ARTERY BYPASS GRAFT  2001    HERNIA REPAIR      WRIST SURGERY         SOCIAL HISTORY:  Social History     Social History    Marital status:      Spouse name: N/A    Number of children: N/A    Years of education: N/A     Occupational History    Not on file.     Social History Main Topics    Smoking status: Former Smoker     Packs/day: 2.00     Years: 23.00     Quit date: 1/1/1989    Smokeless tobacco: Never Used    Alcohol use 0.6 oz/week     1 Cans of beer per week      Comment: 1 can of beer per day    Drug use: No    Sexual activity: Yes     Partners: Female     Other Topics Concern    Not on file     Social History Narrative    No narrative on file       FAMILY HISTORY:  Family History   Problem Relation Age of Onset    Adopted: Yes    No Known Problems Mother     No Known Problems Father     No Known Problems Maternal Grandmother     No Known Problems Maternal Grandfather     No Known Problems Paternal Grandmother     No Known Problems Paternal Grandfather     No Known Problems Sister     No Known Problems Brother     No Known Problems Maternal Aunt     No Known Problems Maternal Uncle     No Known  Problems Paternal Aunt     No Known Problems Paternal Uncle     Amblyopia Neg Hx     Blindness Neg Hx     Cancer Neg Hx     Cataracts Neg Hx     Diabetes Neg Hx     Glaucoma Neg Hx     Hypertension Neg Hx     Macular degeneration Neg Hx     Retinal detachment Neg Hx     Strabismus Neg Hx     Stroke Neg Hx     Thyroid disease Neg Hx        ALLERGIES AND MEDICATIONS: updated and reviewed.  Review of patient's allergies indicates:   Allergen Reactions    Statins-hmg-coa reductase inhibitors Other (See Comments)     Muscle weakness     Current Outpatient Prescriptions   Medication Sig Dispense Refill    alprazolam (XANAX) 1 MG tablet Take 1 tablet (1 mg total) by mouth nightly as needed for Anxiety. 60 tablet 1    ANDROGEL 20.25 mg/1.25 gram (1.62 %) GlPm       aspirin (ECOTRIN) 81 MG EC tablet Take 1 tablet (81 mg total) by mouth once daily.  0    clobetasol 0.05% (TEMOVATE) 0.05 % Oint   2    fluticasone (FLONASE) 50 mcg/actuation nasal spray 1 spray by Each Nare route 2 (two) times daily. 16 g 3    imiquimod (ALDARA) 5 % cream   3    ketoconazole (NIZORAL) 2 % cream       losartan-hydrochlorothiazide 50-12.5 mg (HYZAAR) 50-12.5 mg per tablet Take 1 tablet by mouth once daily. 90 tablet 3    metformin (GLUCOPHAGE-XR) 500 MG 24 hr tablet Take 1 tablet (500 mg total) by mouth once daily. 90 tablet 4    mupirocin (BACTROBAN) 2 % ointment       NITROSTAT 0.4 mg SL tablet       ONE TOUCH ULTRA TEST Strp       sildenafil (VIAGRA) 100 MG tablet Take 1 tablet (100 mg total) by mouth daily as needed for Erectile Dysfunction. 5 tablet 12    trazodone (DESYREL) 50 MG tablet 1/4 to 1/2 tablet at night for sleep 30 tablet 5    venlafaxine (EFFEXOR-XR) 150 MG Cp24 Take 1 capsule (150 mg total) by mouth once daily. 90 capsule 2    azithromycin (Z-ASHLEY) 250 MG tablet Take 2 tablets by mouth on day 1; Take 1 tablet by mouth on days 2-5 6 tablet 0    benzonatate (TESSALON) 200 MG capsule Take 1 capsule  "(200 mg total) by mouth 3 (three) times daily as needed. 45 capsule 1     No current facility-administered medications for this visit.        ROS  Review of Systems   Constitutional: Positive for chills. Negative for fatigue, fever and unexpected weight change.   HENT: Positive for congestion and sore throat. Negative for ear pain, postnasal drip, rhinorrhea and sinus pressure.    Eyes: Negative for photophobia and visual disturbance.   Respiratory: Positive for cough. Negative for apnea, chest tightness, shortness of breath and wheezing.    Cardiovascular: Negative for chest pain and palpitations.   Gastrointestinal: Negative for abdominal pain, blood in stool, constipation, diarrhea, nausea and vomiting.   Genitourinary: Negative for difficulty urinating.   Musculoskeletal: Negative for arthralgias and joint swelling.   Skin: Negative for rash.   Neurological: Positive for light-headedness and headaches. Negative for facial asymmetry, speech difficulty, weakness and numbness.   Psychiatric/Behavioral: Negative for dysphoric mood.       Physical Exam  Vitals:    12/14/17 1300 12/14/17 1352   BP: (!) 150/96 (!) 160/92   Pulse: 75    Resp: 18    Temp: 98.4 °F (36.9 °C)    TempSrc: Oral    SpO2: 97%    Weight: 77.6 kg (171 lb 1.2 oz)    Height: 5' 7" (1.702 m)     Body mass index is 26.79 kg/m².  Weight: 77.6 kg (171 lb 1.2 oz)   Height: 5' 7" (170.2 cm)     Physical Exam   Constitutional: He is oriented to person, place, and time. He appears well-developed and well-nourished.   HENT:   Head: Normocephalic and atraumatic.   Nose: Mucosal edema present. Right sinus exhibits frontal sinus tenderness. Right sinus exhibits no maxillary sinus tenderness. Left sinus exhibits frontal sinus tenderness. Left sinus exhibits no maxillary sinus tenderness.   Mouth/Throat: No oropharyngeal exudate.   Eyes: EOM are normal.   Cardiovascular: Normal rate, regular rhythm and normal heart sounds.  Exam reveals no gallop and no friction " rub.    No murmur heard.  Pulmonary/Chest: Effort normal and breath sounds normal. No respiratory distress. He has no wheezes. He has no rales. He exhibits no tenderness.   Lymphadenopathy:     He has no cervical adenopathy.   Neurological: He is alert and oriented to person, place, and time.   Skin: Skin is warm and dry.   Psychiatric: He has a normal mood and affect. His behavior is normal.   Nursing note and vitals reviewed.      Health Maintenance       Date Due Completion Date    Abdominal Aortic Aneurysm Screening 11/20/2007 ---    Colonoscopy 01/20/2017 1/20/2014    Hemoglobin A1c 09/30/2017 3/30/2017    Override on 2/24/2015: Done (Dr. Arias Muñoz)    Foot Exam 01/06/2018 1/6/2017    Override on 9/9/2016: Done ([facility]-Arias Muñoz MD/Nelda Finley, RODRIGUEZP-Diabetic feet examnormal by visual exam, normal pulses,, sensation intact, Monofilament exam 10/10)    Override on 3/3/2015: Done (Dr.Tilak MESSI Sky)    Urine Microalbumin 01/06/2018 1/6/2017    Override on 2/24/2015: Done (Dr. Arias Muñoz)    Lipid Panel 01/06/2018 1/6/2017    Override on 8/26/2016: Done ([facility]-Arias Muñoz MD/Nelda Finley, RODRIGUEZP-Lipid Panel:Cholesterol 227 mg/dL, NON- mg/dL, HDL 61 mg/dL, CHol/HDL Ratio 3.7,  mg/dL, Triglyceride 101 mg/dL)    Override on 2/24/2015: Done (Dr. Arias Muñoz)    Eye Exam 10/18/2018 10/18/2017    Override on 1/6/2017: Done    Override on 2/10/2015: Done    TETANUS VACCINE 03/30/2027 3/30/2017 (Declined)    Override on 3/30/2017: Declined            ASSESSMENT     1. Atypical pneumonia    2. Controlled type 2 diabetes mellitus without complication, without long-term current use of insulin    3. Essential hypertension        PLAN:     Problem List Items Addressed This Visit        Cardiac/Vascular    Essential hypertension  -needs BP recheck, does not want to make nurse visit but insists he will  call us to make an appt       Endocrine    Diabetes mellitus type II, controlled  -will get records from Dr. Shah for his A1C      Other Visit Diagnoses     Atypical pneumonia    -  Primary  -notify me if symptoms worsen and we will get x ray    Relevant Medications    azithromycin (Z-ASHLEY) 250 MG tablet    benzonatate (TESSALON) 200 MG capsule            Nabila Mariscal MD  12/14/2017 1:25 PM        Return if symptoms worsen or fail to improve.

## 2018-01-02 ENCOUNTER — OFFICE VISIT (OUTPATIENT)
Dept: FAMILY MEDICINE | Facility: CLINIC | Age: 78
End: 2018-01-02
Payer: MEDICARE

## 2018-01-02 ENCOUNTER — HOSPITAL ENCOUNTER (OUTPATIENT)
Dept: RADIOLOGY | Facility: HOSPITAL | Age: 78
Discharge: HOME | End: 2018-01-02
Attending: NURSE PRACTITIONER
Payer: MEDICARE

## 2018-01-02 VITALS
HEART RATE: 64 BPM | TEMPERATURE: 98 F | HEIGHT: 67 IN | OXYGEN SATURATION: 99 % | WEIGHT: 169.19 LBS | SYSTOLIC BLOOD PRESSURE: 112 MMHG | DIASTOLIC BLOOD PRESSURE: 50 MMHG | BODY MASS INDEX: 26.55 KG/M2

## 2018-01-02 DIAGNOSIS — E11.9 CONTROLLED TYPE 2 DIABETES MELLITUS WITHOUT COMPLICATION, WITHOUT LONG-TERM CURRENT USE OF INSULIN: ICD-10-CM

## 2018-01-02 DIAGNOSIS — R53.83 FATIGUE, UNSPECIFIED TYPE: ICD-10-CM

## 2018-01-02 DIAGNOSIS — R06.02 SHORTNESS OF BREATH: ICD-10-CM

## 2018-01-02 DIAGNOSIS — R06.02 SHORTNESS OF BREATH: Primary | ICD-10-CM

## 2018-01-02 DIAGNOSIS — I10 ESSENTIAL HYPERTENSION: ICD-10-CM

## 2018-01-02 PROCEDURE — 99999 PR PBB SHADOW E&M-EST. PATIENT-LVL V: CPT | Mod: PBBFAC,,, | Performed by: NURSE PRACTITIONER

## 2018-01-02 PROCEDURE — 93005 ELECTROCARDIOGRAM TRACING: CPT | Mod: S$GLB,,, | Performed by: NURSE PRACTITIONER

## 2018-01-02 PROCEDURE — 99214 OFFICE O/P EST MOD 30 MIN: CPT | Mod: S$GLB,,, | Performed by: NURSE PRACTITIONER

## 2018-01-02 PROCEDURE — 99499 UNLISTED E&M SERVICE: CPT | Mod: S$GLB,,, | Performed by: NURSE PRACTITIONER

## 2018-01-02 PROCEDURE — 71046 X-RAY EXAM CHEST 2 VIEWS: CPT | Mod: FY,,, | Performed by: RADIOLOGY

## 2018-01-02 PROCEDURE — 71046 X-RAY EXAM CHEST 2 VIEWS: CPT | Mod: TC,PO,FY

## 2018-01-02 PROCEDURE — 93010 ELECTROCARDIOGRAM REPORT: CPT | Mod: S$GLB,,, | Performed by: INTERNAL MEDICINE

## 2018-01-02 RX ORDER — METFORMIN HYDROCHLORIDE 500 MG/1
TABLET ORAL
COMMUNITY
Start: 2017-11-29 | End: 2018-01-24 | Stop reason: SDUPTHER

## 2018-01-02 NOTE — PROGRESS NOTES
History of Present Illness   Eric Jackson is a 77 y.o. man with medical history as listed below who presents today for evaluation of shortness of breath. He states that over the past month he has noticed that he is feeling winded on exertion. This occurs with minimal exertion despite patient being fairly active on a regular basis. He also complains of some tingling in his legs at times. He feels his pulse is more bounding but does not feel as if his heart is racing. He also feels more fatigued than usual. He has been checking his blood pressure and heart rate at home which have been within normal range. He has had no chest pain, palpitations, dizziness, or lower extremity swelling. He has no fevers or chills. He has had one episode of black tarry stool. He has no additional complaints and is otherwise healthy on today's visit.     Past Medical History:   Diagnosis Date    Anxiety     Coronary artery disease     Depression     Diabetes mellitus type II     Erectile dysfunction     Eye injuries     fb ou    GERD (gastroesophageal reflux disease)     Hypertension        Past Surgical History:   Procedure Laterality Date    CORONARY ARTERY BYPASS GRAFT  2001    HERNIA REPAIR      WRIST SURGERY         Social History     Social History    Marital status:      Spouse name: N/A    Number of children: N/A    Years of education: N/A     Social History Main Topics    Smoking status: Former Smoker     Packs/day: 2.00     Years: 23.00     Quit date: 1/1/1989    Smokeless tobacco: Never Used    Alcohol use 0.6 oz/week     1 Cans of beer per week      Comment: 1 can of beer per day    Drug use: No    Sexual activity: Yes     Partners: Female     Other Topics Concern    None     Social History Narrative    None       Family History   Problem Relation Age of Onset    Adopted: Yes    No Known Problems Mother     No Known Problems Father     No Known Problems Maternal Grandmother     No Known  "Problems Maternal Grandfather     No Known Problems Paternal Grandmother     No Known Problems Paternal Grandfather     No Known Problems Sister     No Known Problems Brother     No Known Problems Maternal Aunt     No Known Problems Maternal Uncle     No Known Problems Paternal Aunt     No Known Problems Paternal Uncle     Amblyopia Neg Hx     Blindness Neg Hx     Cancer Neg Hx     Cataracts Neg Hx     Diabetes Neg Hx     Glaucoma Neg Hx     Hypertension Neg Hx     Macular degeneration Neg Hx     Retinal detachment Neg Hx     Strabismus Neg Hx     Stroke Neg Hx     Thyroid disease Neg Hx        Review of Systems  Review of Systems   Constitutional: Positive for malaise/fatigue. Negative for fever.   Respiratory: Positive for shortness of breath. Negative for cough and wheezing.    Cardiovascular: Negative for chest pain, palpitations, orthopnea, leg swelling and PND.   Neurological: Positive for tingling and headaches. Negative for dizziness and loss of consciousness.     A complete review of systems was otherwise negative.    Physical Exam  BP (!) 112/50 (BP Location: Right arm, Patient Position: Sitting, BP Method: Medium (Manual))   Pulse 64   Temp 97.5 °F (36.4 °C) (Oral)   Ht 5' 7" (1.702 m)   Wt 76.8 kg (169 lb 3.3 oz)   SpO2 99%   BMI 26.50 kg/m²   General appearance: alert, appears stated age, cooperative and no distress  Eyes: conjunctivae/corneas clear. PERRL, EOM's intact. Fundi benign., conjunctivae pale  Throat: lips, mucosa, and tongue normal; teeth and gums normal  Lungs: clear to auscultation bilaterally  Heart: regular rate and rhythm, S1, S2 normal, no murmur, click, rub or gallop  Extremities: extremities normal, atraumatic, no cyanosis or edema  Pulses: 2+ and symmetric  Skin: Skin color, texture, turgor normal. No rashes or lesions or skin is pale  Neurologic: Grossly normal    Assessment/Plan  Shortness of breath  EKG shows no ST or T-wave abnormality. 1st degree AV " block.  We will obtain chest x-ray and labs as listed below.  Concern for anemia given presentation.  If blood counts low, given one episode of black tarry stool with history of coffee grind emesis, we will refer him to GI for further work-up and management.  ER precautions discussed with patient at length.  RTC PRN for persistent or worsening symptoms.  -     X-Ray Chest PA And Lateral; Future; Expected date: 01/02/2018  -     CBC auto differential; Future; Expected date: 01/02/2018  -     Comprehensive metabolic panel; Future; Expected date: 01/02/2018  -     IN OFFICE EKG 12-LEAD (to Muse)  -     TSH; Future; Expected date: 01/02/2018  -     T4, free; Future; Expected date: 01/02/2018    Fatigue, unspecified type  As above.  -     X-Ray Chest PA And Lateral; Future; Expected date: 01/02/2018  -     CBC auto differential; Future; Expected date: 01/02/2018  -     Comprehensive metabolic panel; Future; Expected date: 01/02/2018  -     IN OFFICE EKG 12-LEAD (to Muse)  -     TSH; Future; Expected date: 01/02/2018  -     T4, free; Future; Expected date: 01/02/2018    Essential hypertension  The current medical regimen is effective;  continue present plan and medications.    Controlled type 2 diabetes mellitus without complication, without long-term current use of insulin  The current medical regimen is effective;  continue present plan and medications.    He has verbalized understanding and is in agreement with plan of care.    Return if symptoms worsen or fail to improve.

## 2018-01-02 NOTE — PATIENT INSTRUCTIONS

## 2018-01-03 ENCOUNTER — TELEPHONE (OUTPATIENT)
Dept: FAMILY MEDICINE | Facility: CLINIC | Age: 78
End: 2018-01-03

## 2018-01-03 DIAGNOSIS — D64.9 NORMOCYTIC ANEMIA: Primary | ICD-10-CM

## 2018-01-03 DIAGNOSIS — R53.83 FATIGUE, UNSPECIFIED TYPE: ICD-10-CM

## 2018-01-03 DIAGNOSIS — R06.02 SHORTNESS OF BREATH: ICD-10-CM

## 2018-01-03 NOTE — TELEPHONE ENCOUNTER
Spoke with patient and informed him of lab results which reveal a normocytic anemia. We will repeat CBC on Friday, if H&H decreased or if shortness of breath and fatigue worsen, he will go to ER. We will have colonoscopy and endoscopy and refer him to hematology. He has verbalized understanding and agreement with plan with no further questions or concerns.    FRED RayC

## 2018-01-05 ENCOUNTER — PATIENT MESSAGE (OUTPATIENT)
Dept: FAMILY MEDICINE | Facility: CLINIC | Age: 78
End: 2018-01-05

## 2018-01-05 ENCOUNTER — TELEPHONE (OUTPATIENT)
Dept: FAMILY MEDICINE | Facility: CLINIC | Age: 78
End: 2018-01-05

## 2018-01-05 ENCOUNTER — LAB VISIT (OUTPATIENT)
Dept: LAB | Facility: HOSPITAL | Age: 78
End: 2018-01-05
Attending: NURSE PRACTITIONER
Payer: MEDICARE

## 2018-01-05 DIAGNOSIS — D64.9 NORMOCYTIC ANEMIA: ICD-10-CM

## 2018-01-05 DIAGNOSIS — R53.83 FATIGUE, UNSPECIFIED TYPE: ICD-10-CM

## 2018-01-05 DIAGNOSIS — R06.02 SHORTNESS OF BREATH: ICD-10-CM

## 2018-01-05 LAB
BASOPHILS # BLD AUTO: 0.02 K/UL
BASOPHILS NFR BLD: 0.2 %
DIFFERENTIAL METHOD: ABNORMAL
EOSINOPHIL # BLD AUTO: 0.4 K/UL
EOSINOPHIL NFR BLD: 3.3 %
ERYTHROCYTE [DISTWIDTH] IN BLOOD BY AUTOMATED COUNT: 15.8 %
HCT VFR BLD AUTO: 25.6 %
HGB BLD-MCNC: 8.3 G/DL
LYMPHOCYTES # BLD AUTO: 1.6 K/UL
LYMPHOCYTES NFR BLD: 13.7 %
MCH RBC QN AUTO: 31.1 PG
MCHC RBC AUTO-ENTMCNC: 32.4 G/DL
MCV RBC AUTO: 96 FL
MONOCYTES # BLD AUTO: 0.9 K/UL
MONOCYTES NFR BLD: 8.2 %
NEUTROPHILS # BLD AUTO: 8.5 K/UL
NEUTROPHILS NFR BLD: 74.2 %
PLATELET # BLD AUTO: 257 K/UL
PMV BLD AUTO: 9.4 FL
RBC # BLD AUTO: 2.67 M/UL
WBC # BLD AUTO: 11.37 K/UL

## 2018-01-05 PROCEDURE — 36415 COLL VENOUS BLD VENIPUNCTURE: CPT

## 2018-01-05 PROCEDURE — 85025 COMPLETE CBC W/AUTO DIFF WBC: CPT

## 2018-01-09 ENCOUNTER — INITIAL CONSULT (OUTPATIENT)
Dept: HEMATOLOGY/ONCOLOGY | Facility: CLINIC | Age: 78
End: 2018-01-09
Payer: MEDICARE

## 2018-01-09 ENCOUNTER — LAB VISIT (OUTPATIENT)
Dept: LAB | Facility: HOSPITAL | Age: 78
End: 2018-01-09
Attending: INTERNAL MEDICINE
Payer: MEDICARE

## 2018-01-09 VITALS
SYSTOLIC BLOOD PRESSURE: 129 MMHG | OXYGEN SATURATION: 97 % | WEIGHT: 169.06 LBS | HEIGHT: 67 IN | BODY MASS INDEX: 26.53 KG/M2 | TEMPERATURE: 98 F | HEART RATE: 85 BPM | DIASTOLIC BLOOD PRESSURE: 59 MMHG

## 2018-01-09 DIAGNOSIS — D53.9 NUTRITIONAL ANEMIA: ICD-10-CM

## 2018-01-09 DIAGNOSIS — D64.9 ANEMIA, UNSPECIFIED TYPE: Primary | ICD-10-CM

## 2018-01-09 DIAGNOSIS — D64.9 ANEMIA, UNSPECIFIED TYPE: ICD-10-CM

## 2018-01-09 LAB
BASOPHILS # BLD AUTO: 0.01 K/UL
BASOPHILS NFR BLD: 0.1 %
DIFFERENTIAL METHOD: ABNORMAL
EOSINOPHIL # BLD AUTO: 0.1 K/UL
EOSINOPHIL NFR BLD: 0.6 %
ERYTHROCYTE [DISTWIDTH] IN BLOOD BY AUTOMATED COUNT: 14.1 %
HCT VFR BLD AUTO: 25 %
HGB BLD-MCNC: 8.2 G/DL
IRON SERPL-MCNC: 11 UG/DL
LDH SERPL L TO P-CCNC: 175 U/L
LYMPHOCYTES # BLD AUTO: 1.1 K/UL
LYMPHOCYTES NFR BLD: 10.1 %
MCH RBC QN AUTO: 30.1 PG
MCHC RBC AUTO-ENTMCNC: 32.8 G/DL
MCV RBC AUTO: 92 FL
MONOCYTES # BLD AUTO: 1.7 K/UL
MONOCYTES NFR BLD: 15.2 %
NEUTROPHILS # BLD AUTO: 8.1 K/UL
NEUTROPHILS NFR BLD: 74 %
PLATELET # BLD AUTO: 310 K/UL
PMV BLD AUTO: 8.8 FL
RBC # BLD AUTO: 2.72 M/UL
RETICS/RBC NFR AUTO: 4.9 %
SATURATED IRON: 4 %
TOTAL IRON BINDING CAPACITY: 263 UG/DL
TRANSFERRIN SERPL-MCNC: 178 MG/DL
WBC # BLD AUTO: 10.93 K/UL

## 2018-01-09 PROCEDURE — 99205 OFFICE O/P NEW HI 60 MIN: CPT | Mod: S$GLB,,, | Performed by: INTERNAL MEDICINE

## 2018-01-09 PROCEDURE — 36415 COLL VENOUS BLD VENIPUNCTURE: CPT

## 2018-01-09 PROCEDURE — 99999 PR PBB SHADOW E&M-EST. PATIENT-LVL III: CPT | Mod: PBBFAC,,, | Performed by: INTERNAL MEDICINE

## 2018-01-09 PROCEDURE — 83540 ASSAY OF IRON: CPT

## 2018-01-09 PROCEDURE — 83615 LACTATE (LD) (LDH) ENZYME: CPT

## 2018-01-09 PROCEDURE — 85025 COMPLETE CBC W/AUTO DIFF WBC: CPT

## 2018-01-09 PROCEDURE — 85045 AUTOMATED RETICULOCYTE COUNT: CPT

## 2018-01-09 PROCEDURE — 82607 VITAMIN B-12: CPT

## 2018-01-09 RX ORDER — FERROUS GLUCONATE 324(38)MG
324 TABLET ORAL
Status: ON HOLD | COMMUNITY
End: 2018-01-17 | Stop reason: HOSPADM

## 2018-01-09 RX ORDER — CHOLECALCIFEROL (VITAMIN D3) 25 MCG
1000 TABLET ORAL DAILY
COMMUNITY

## 2018-01-09 NOTE — PROGRESS NOTES
Chief Complaint :  Anemia.    Hx of Present illness :  Patient is a 77 y.o. year old male who presents to the clinic today for  Evaluation of anemia.      Allergies :    Review of patient's allergies indicates:   Allergen Reactions    Statins-hmg-coa reductase inhibitors Other (See Comments)     Muscle weakness       Occupation :  [employer]     Transfusion :  2012    Menstrual & obstetric Hx :  N/A      Present Meds :   Medication List with Changes/Refills   Current Medications    ALPRAZOLAM (XANAX) 1 MG TABLET    Take 1 tablet (1 mg total) by mouth nightly as needed for Anxiety.    ANDROGEL 20.25 MG/1.25 GRAM (1.62 %) GLPM        FERROUS GLUCONATE (FERGON) 324 MG TABLET    Take 324 mg by mouth daily with breakfast.    LOSARTAN-HYDROCHLOROTHIAZIDE 50-12.5 MG (HYZAAR) 50-12.5 MG PER TABLET    Take 1 tablet by mouth once daily.    METFORMIN (GLUCOPHAGE) 500 MG TABLET        METFORMIN (GLUCOPHAGE-XR) 500 MG 24 HR TABLET    Take 1 tablet (500 mg total) by mouth once daily.    ONE TOUCH ULTRA TEST STRP        VENLAFAXINE (EFFEXOR-XR) 150 MG CP24    Take 1 capsule (150 mg total) by mouth once daily.    VITAMIN D 1000 UNITS TAB    Take 1,000 Units by mouth once daily.   Discontinued Medications    ASPIRIN (ECOTRIN) 81 MG EC TABLET    Take 1 tablet (81 mg total) by mouth once daily.    CLOBETASOL 0.05% (TEMOVATE) 0.05 % OINT        FLUTICASONE (FLONASE) 50 MCG/ACTUATION NASAL SPRAY    1 spray by Each Nare route 2 (two) times daily.    IMIQUIMOD (ALDARA) 5 % CREAM        KETOCONAZOLE (NIZORAL) 2 % CREAM        MUPIROCIN (BACTROBAN) 2 % OINTMENT        NITROSTAT 0.4 MG SL TABLET        SILDENAFIL (VIAGRA) 100 MG TABLET    Take 1 tablet (100 mg total) by mouth daily as needed for Erectile Dysfunction.    TRAZODONE (DESYREL) 50 MG TABLET    1/4 to 1/2 tablet at night for sleep       Past Medical Hx :  Upper GI Bleed in 2012. No Hx of PUD, Hepatitis, Liver disease, CVA, seizure disorder, PE, DVT. No prior Hx of TB,Sarcoid,  Lupus, rheumatoid arthritis. Hard of Hearing  One episode of malaria recurrence.  Past Medical Hx :  Past Medical History:   Diagnosis Date    Anxiety     Coronary artery disease     Depression     Diabetes mellitus type II     Erectile dysfunction     Eye injuries     fb ou    GERD (gastroesophageal reflux disease)     Hypertension        Travel Hx :  [place] in the 70's. Had had one episode of Malaria    Immunization :  Immunization History   Administered Date(s) Administered    Hepatitis A, Adult 09/15/2005    Hepatitis B, Adult 09/15/2005, 10/19/2005, 03/27/2006    Td (ADULT) 09/15/2005, 01/01/2016       Family Hx :  Family History   Problem Relation Age of Onset    Adopted: Yes    No Known Problems Mother     No Known Problems Father     No Known Problems Maternal Grandmother     No Known Problems Maternal Grandfather     No Known Problems Paternal Grandmother     No Known Problems Paternal Grandfather     No Known Problems Sister     No Known Problems Brother     No Known Problems Maternal Aunt     No Known Problems Maternal Uncle     No Known Problems Paternal Aunt     No Known Problems Paternal Uncle     Amblyopia Neg Hx     Blindness Neg Hx     Cancer Neg Hx     Cataracts Neg Hx     Diabetes Neg Hx     Glaucoma Neg Hx     Hypertension Neg Hx     Macular degeneration Neg Hx     Retinal detachment Neg Hx     Strabismus Neg Hx     Stroke Neg Hx     Thyroid disease Neg Hx        Social Hx :  Social History     Social History    Marital status:      Spouse name: N/A    Number of children: N/A    Years of education: N/A     Occupational History    Not on file.     Social History Main Topics    Smoking status: Former Smoker     Packs/day: 2.00     Years: 23.00     Quit date: 1/1/1989    Smokeless tobacco: Never Used    Alcohol use 0.6 oz/week     1 Cans of beer per week      Comment: 1 can of beer per day    Drug use: No    Sexual activity: Yes      Partners: Female     Other Topics Concern    Not on file     Social History Narrative    No narrative on file       Surgery :  CABG 2001. EGD; Colonoscopy.    Symptoms :    Review of Systems   Constitutional: Positive for weight loss (ten pounds in 3 months). Negative for chills, diaphoresis, fever and malaise/fatigue.   HENT: Positive for hearing loss (Hard of hearing). Negative for congestion, ear discharge, ear pain, nosebleeds, sinus pain, sore throat and tinnitus.    Eyes: Negative for blurred vision, double vision, photophobia, pain, discharge and redness.   Respiratory: Positive for cough (Non Productive) and shortness of breath. Negative for hemoptysis, sputum production, wheezing and stridor.    Cardiovascular: Negative for chest pain, palpitations, orthopnea, claudication, leg swelling and PND.   Gastrointestinal: Positive for constipation (Chronic Constipation), heartburn and nausea. Negative for abdominal pain, blood in stool, diarrhea and vomiting.   Genitourinary: Negative for dysuria, flank pain, frequency, hematuria and urgency.   Musculoskeletal: Positive for back pain and joint pain. Negative for falls, myalgias and neck pain.   Neurological: Positive for dizziness and weakness. Negative for tingling, tremors, sensory change, speech change, focal weakness, seizures, loss of consciousness and headaches.   Endo/Heme/Allergies: Negative for polydipsia. Does not bruise/bleed easily.   Psychiatric/Behavioral: Negative for depression, hallucinations, memory loss, substance abuse and suicidal ideas. The patient does not have insomnia.        Physical Exam :  Wife present in the room.  Physical Exam   Constitutional: He is oriented to person, place, and time and well-developed, well-nourished, and in no distress. No distress.   HENT:   Head: Normocephalic and atraumatic.   Right Ear: External ear normal.   Left Ear: External ear normal.   Nose: Nose normal.   Mouth/Throat: Oropharynx is clear and moist.  No oropharyngeal exudate.   Eyes: Conjunctivae and EOM are normal. Pupils are equal, round, and reactive to light. Right eye exhibits no discharge. Left eye exhibits no discharge. No scleral icterus.   Neck: Normal range of motion. Neck supple. No JVD present. No tracheal deviation present. No thyromegaly present.   Cardiovascular: Normal rate, regular rhythm, normal heart sounds and intact distal pulses.    Pulmonary/Chest: Effort normal and breath sounds normal. No stridor. No respiratory distress. He has no wheezes. He has no rales. He exhibits no tenderness.   Abdominal: Soft. Bowel sounds are normal. He exhibits no distension and no mass. There is no tenderness. There is no rebound and no guarding.   Genitourinary:   Genitourinary Comments: Not examined   Musculoskeletal: Normal range of motion.   Lymphadenopathy:        Head (right side): No submental, no submandibular, no tonsillar, no preauricular, no posterior auricular and no occipital adenopathy present.        Head (left side): No submental, no submandibular, no tonsillar, no preauricular, no posterior auricular and no occipital adenopathy present.     He has no axillary adenopathy.        Right: No inguinal, no supraclavicular and no epitrochlear adenopathy present.        Left: No inguinal, no supraclavicular and no epitrochlear adenopathy present.   Neurological: He is alert and oriented to person, place, and time. He has normal motor skills, normal sensation, normal strength, normal reflexes and intact cranial nerves. Gait normal. GCS score is 15.   Skin: Skin is warm, dry and intact. No rash noted. No cyanosis. No pallor. Nails show no clubbing.   Psychiatric: Mood, memory, affect and judgment normal.         Labs & Imaging :  hgb 15.9 in jan 2017 01/02/18 : hgb 8.3; Hct 25.6. Plts 257,000 ANC 8,500. MCV 96. Cr. 0.9. Ca 8.4 Alb 3.2. Chest X Ray No acute process      Dx :  Normochromic normocytic anemia. Symptomatic. On oral iron x 3 days. Remote Hx  of Upper GI bleed      Assessment & Plan: Reviewed with patient & spouse. EGD & Colonoscopy being scheduled. Check Iron level today. Await results of Endoscopy. Patient & spouse agreeable

## 2018-01-09 NOTE — LETTER
January 9, 2018      Paola Meyer, JANEE  [address] [place] [address]           [facility]  [address] [facility] [address] [place] [address]  Phone: [phone]          Patient: Eric Jackson   MR Number: [id number]   YOB: 1940   Date of Visit: 1/9/2018       Dear Paola Meyer:    Thank you for referring Eric Jackson to me for evaluation. Attached you will find relevant portions of my assessment and plan of care.    If you have questions, please do not hesitate to call me. I look forward to following Eric Jackson along with you.    Sincerely,    Amita Jackson MD    Enclosure  CC:  No Recipients    If you would like to receive this communication electronically, please contact [email or web address] or [phone] to request more information on [facility] access.    For providers and/or their staff who would like to refer a patient to [facility], please contact us through our one-stop-shop provider referral line, Calvin Yarbrough, at [phone].    If you feel you have received this communication in error or would no longer like to receive these types of communications, please e-mail [email or web address]

## 2018-01-10 ENCOUNTER — TELEPHONE (OUTPATIENT)
Dept: FAMILY MEDICINE | Facility: CLINIC | Age: 78
End: 2018-01-10

## 2018-01-10 DIAGNOSIS — Z12.11 SCREEN FOR COLON CANCER: Primary | ICD-10-CM

## 2018-01-10 LAB — VIT B12 SERPL-MCNC: 600 PG/ML

## 2018-01-10 RX ORDER — POLYETHYLENE GLYCOL 3350, SODIUM SULFATE, SODIUM CHLORIDE, POTASSIUM CHLORIDE, SODIUM ASCORBATE, AND ASCORBIC ACID 7.5-2.691G
2000 KIT ORAL ONCE
Qty: 1 BOTTLE | Refills: 0 | Status: SHIPPED | OUTPATIENT
Start: 2018-01-10 | End: 2018-01-10

## 2018-01-10 NOTE — TELEPHONE ENCOUNTER
----- Message from Ella Marroquin sent at 1/10/2018 10:49 AM CST -----  Contact: self  Pt is asking for orders for a Colonoscopy.  Please contact pt back at [phone].

## 2018-01-10 NOTE — TELEPHONE ENCOUNTER
Please let him know that his diabetes is under control and no change is needed to his medications.

## 2018-01-10 NOTE — TELEPHONE ENCOUNTER
Please let the patient know the colonoscopy and endoscopy were ordered on 1/3/2018    Thanks!  LINETTE Ray

## 2018-01-15 ENCOUNTER — HOSPITAL ENCOUNTER (INPATIENT)
Facility: HOSPITAL | Age: 78
LOS: 2 days | Discharge: HOME | DRG: 194 | End: 2018-01-17
Attending: EMERGENCY MEDICINE | Admitting: EMERGENCY MEDICINE
Payer: MEDICARE

## 2018-01-15 DIAGNOSIS — F32.A DEPRESSION, UNSPECIFIED DEPRESSION TYPE: ICD-10-CM

## 2018-01-15 DIAGNOSIS — E11.9 CONTROLLED TYPE 2 DIABETES MELLITUS WITHOUT COMPLICATION, WITHOUT LONG-TERM CURRENT USE OF INSULIN: ICD-10-CM

## 2018-01-15 DIAGNOSIS — D64.9 SYMPTOMATIC ANEMIA: ICD-10-CM

## 2018-01-15 DIAGNOSIS — J18.9 PNEUMONIA OF LEFT LOWER LOBE DUE TO INFECTIOUS ORGANISM: ICD-10-CM

## 2018-01-15 DIAGNOSIS — K21.9 GASTROESOPHAGEAL REFLUX DISEASE, ESOPHAGITIS PRESENCE NOT SPECIFIED: ICD-10-CM

## 2018-01-15 DIAGNOSIS — I10 ESSENTIAL HYPERTENSION: ICD-10-CM

## 2018-01-15 DIAGNOSIS — R50.9 FEVER: ICD-10-CM

## 2018-01-15 DIAGNOSIS — R53.1 GENERALIZED WEAKNESS: ICD-10-CM

## 2018-01-15 DIAGNOSIS — D64.9 SYMPTOMATIC ANEMIA: Primary | ICD-10-CM

## 2018-01-15 DIAGNOSIS — R74.01 TRANSAMINITIS: ICD-10-CM

## 2018-01-15 DIAGNOSIS — E87.1 HYPONATREMIA: ICD-10-CM

## 2018-01-15 LAB
ABO + RH BLD: NORMAL
ALBUMIN SERPL BCP-MCNC: 2.6 G/DL
ALP SERPL-CCNC: 247 U/L
ALT SERPL W/O P-5'-P-CCNC: 129 U/L
ANION GAP SERPL CALC-SCNC: 10 MMOL/L
APTT BLDCRRT: 28.2 SEC
AST SERPL-CCNC: 83 U/L
BASOPHILS # BLD AUTO: 0.04 K/UL
BASOPHILS NFR BLD: 0.3 %
BILIRUB SERPL-MCNC: 0.5 MG/DL
BILIRUB UR QL STRIP: NEGATIVE
BLD GP AB SCN CELLS X3 SERPL QL: NORMAL
BLD PROD TYP BPU: NORMAL
BLD PROD TYP BPU: NORMAL
BLOOD UNIT EXPIRATION DATE: NORMAL
BLOOD UNIT EXPIRATION DATE: NORMAL
BLOOD UNIT TYPE CODE: 600
BLOOD UNIT TYPE CODE: 600
BLOOD UNIT TYPE: NORMAL
BLOOD UNIT TYPE: NORMAL
BUN SERPL-MCNC: 12 MG/DL
CALCIUM SERPL-MCNC: 9.4 MG/DL
CHLORIDE SERPL-SCNC: 97 MMOL/L
CLARITY UR: CLEAR
CO2 SERPL-SCNC: 25 MMOL/L
CODING SYSTEM: NORMAL
CODING SYSTEM: NORMAL
COLOR UR: YELLOW
CREAT SERPL-MCNC: 1.1 MG/DL
DIFFERENTIAL METHOD: ABNORMAL
DISPENSE STATUS: NORMAL
DISPENSE STATUS: NORMAL
EOSINOPHIL # BLD AUTO: 0.2 K/UL
EOSINOPHIL NFR BLD: 1.3 %
ERYTHROCYTE [DISTWIDTH] IN BLOOD BY AUTOMATED COUNT: 15.2 %
EST. GFR  (AFRICAN AMERICAN): >60 ML/MIN/1.73 M^2
EST. GFR  (NON AFRICAN AMERICAN): >60 ML/MIN/1.73 M^2
FERRITIN SERPL-MCNC: 176 NG/ML
FLUAV AG SPEC QL IA: NEGATIVE
FLUBV AG SPEC QL IA: NEGATIVE
GLUCOSE SERPL-MCNC: 142 MG/DL
GLUCOSE UR QL STRIP: NEGATIVE
HCT VFR BLD AUTO: 23.6 %
HGB BLD-MCNC: 7.4 G/DL
HGB UR QL STRIP: NEGATIVE
INR PPP: 1.1
IRON SERPL-MCNC: <10 UG/DL
KETONES UR QL STRIP: NEGATIVE
LACTATE SERPL-SCNC: 1 MMOL/L
LDH SERPL L TO P-CCNC: 192 U/L
LEUKOCYTE ESTERASE UR QL STRIP: NEGATIVE
LYMPHOCYTES # BLD AUTO: 1 K/UL
LYMPHOCYTES NFR BLD: 7 %
MAGNESIUM SERPL-MCNC: 2.2 MG/DL
MCH RBC QN AUTO: 29 PG
MCHC RBC AUTO-ENTMCNC: 31.4 G/DL
MCV RBC AUTO: 93 FL
MICROSCOPIC COMMENT: NORMAL
MONOCYTES # BLD AUTO: 1.3 K/UL
MONOCYTES NFR BLD: 9.7 %
NEUTROPHILS # BLD AUTO: 11.1 K/UL
NEUTROPHILS NFR BLD: 81.3 %
NITRITE UR QL STRIP: NEGATIVE
PH UR STRIP: 8 [PH] (ref 5–8)
PLATELET # BLD AUTO: 652 K/UL
PMV BLD AUTO: 9.4 FL
POCT GLUCOSE: 110 MG/DL (ref 70–110)
POTASSIUM SERPL-SCNC: 4.7 MMOL/L
PROT SERPL-MCNC: 7.3 G/DL
PROT UR QL STRIP: NEGATIVE
PROTHROMBIN TIME: 11.3 SEC
RBC # BLD AUTO: 2.55 M/UL
RETICS/RBC NFR AUTO: 2.5 %
RH BLD: NORMAL
SATURATED IRON: ABNORMAL %
SODIUM SERPL-SCNC: 132 MMOL/L
SP GR UR STRIP: 1.01 (ref 1–1.03)
SPECIMEN SOURCE: NORMAL
TOTAL IRON BINDING CAPACITY: 235 UG/DL
TRANS ERYTHROCYTES VOL PATIENT: NORMAL ML
TRANS ERYTHROCYTES VOL PATIENT: NORMAL ML
TRANSFERRIN SERPL-MCNC: 159 MG/DL
TROPONIN I SERPL DL<=0.01 NG/ML-MCNC: 0.01 NG/ML
URN SPEC COLLECT METH UR: NORMAL
UROBILINOGEN UR STRIP-ACNC: NEGATIVE EU/DL
WBC # BLD AUTO: 13.6 K/UL

## 2018-01-15 PROCEDURE — 81000 URINALYSIS NONAUTO W/SCOPE: CPT

## 2018-01-15 PROCEDURE — 93010 ELECTROCARDIOGRAM REPORT: CPT | Mod: ,,, | Performed by: INTERNAL MEDICINE

## 2018-01-15 PROCEDURE — 82746 ASSAY OF FOLIC ACID SERUM: CPT

## 2018-01-15 PROCEDURE — 36430 TRANSFUSION BLD/BLD COMPNT: CPT

## 2018-01-15 PROCEDURE — 87040 BLOOD CULTURE FOR BACTERIA: CPT | Mod: 59

## 2018-01-15 PROCEDURE — 25000003 PHARM REV CODE 250: Performed by: EMERGENCY MEDICINE

## 2018-01-15 PROCEDURE — 84484 ASSAY OF TROPONIN QUANT: CPT

## 2018-01-15 PROCEDURE — P9021 RED BLOOD CELLS UNIT: HCPCS

## 2018-01-15 PROCEDURE — 82728 ASSAY OF FERRITIN: CPT

## 2018-01-15 PROCEDURE — 85610 PROTHROMBIN TIME: CPT

## 2018-01-15 PROCEDURE — 83605 ASSAY OF LACTIC ACID: CPT

## 2018-01-15 PROCEDURE — 86901 BLOOD TYPING SEROLOGIC RH(D): CPT | Mod: 91

## 2018-01-15 PROCEDURE — 21400001 HC TELEMETRY ROOM

## 2018-01-15 PROCEDURE — 85730 THROMBOPLASTIN TIME PARTIAL: CPT

## 2018-01-15 PROCEDURE — 63600175 PHARM REV CODE 636 W HCPCS: Performed by: EMERGENCY MEDICINE

## 2018-01-15 PROCEDURE — 83540 ASSAY OF IRON: CPT

## 2018-01-15 PROCEDURE — 83615 LACTATE (LD) (LDH) ENZYME: CPT

## 2018-01-15 PROCEDURE — 83010 ASSAY OF HAPTOGLOBIN QUANT: CPT

## 2018-01-15 PROCEDURE — 99291 CRITICAL CARE FIRST HOUR: CPT | Mod: 25

## 2018-01-15 PROCEDURE — 87086 URINE CULTURE/COLONY COUNT: CPT

## 2018-01-15 PROCEDURE — 96374 THER/PROPH/DIAG INJ IV PUSH: CPT

## 2018-01-15 PROCEDURE — 87400 INFLUENZA A/B EACH AG IA: CPT

## 2018-01-15 PROCEDURE — 96361 HYDRATE IV INFUSION ADD-ON: CPT

## 2018-01-15 PROCEDURE — 86901 BLOOD TYPING SEROLOGIC RH(D): CPT

## 2018-01-15 PROCEDURE — 85025 COMPLETE CBC W/AUTO DIFF WBC: CPT

## 2018-01-15 PROCEDURE — 85045 AUTOMATED RETICULOCYTE COUNT: CPT

## 2018-01-15 PROCEDURE — 80053 COMPREHEN METABOLIC PANEL: CPT

## 2018-01-15 PROCEDURE — 86920 COMPATIBILITY TEST SPIN: CPT

## 2018-01-15 PROCEDURE — 83735 ASSAY OF MAGNESIUM: CPT

## 2018-01-15 PROCEDURE — 96375 TX/PRO/DX INJ NEW DRUG ADDON: CPT

## 2018-01-15 RX ORDER — HYDRALAZINE HYDROCHLORIDE 20 MG/ML
10 INJECTION INTRAMUSCULAR; INTRAVENOUS EVERY 6 HOURS PRN
Status: DISCONTINUED | OUTPATIENT
Start: 2018-01-15 | End: 2018-01-17 | Stop reason: HOSPADM

## 2018-01-15 RX ORDER — LOSARTAN POTASSIUM 25 MG/1
50 TABLET ORAL DAILY
Status: DISCONTINUED | OUTPATIENT
Start: 2018-01-16 | End: 2018-01-17 | Stop reason: HOSPADM

## 2018-01-15 RX ORDER — PANTOPRAZOLE SODIUM 40 MG/1
40 TABLET, DELAYED RELEASE ORAL DAILY
Status: DISCONTINUED | OUTPATIENT
Start: 2018-01-16 | End: 2018-01-17 | Stop reason: HOSPADM

## 2018-01-15 RX ORDER — GLUCAGON 1 MG
1 KIT INJECTION
Status: DISCONTINUED | OUTPATIENT
Start: 2018-01-15 | End: 2018-01-17 | Stop reason: HOSPADM

## 2018-01-15 RX ORDER — CEFTRIAXONE 1 G/1
1 INJECTION, POWDER, FOR SOLUTION INTRAMUSCULAR; INTRAVENOUS
Status: DISCONTINUED | OUTPATIENT
Start: 2018-01-15 | End: 2018-01-15 | Stop reason: CLARIF

## 2018-01-15 RX ORDER — IBUPROFEN 200 MG
16 TABLET ORAL
Status: DISCONTINUED | OUTPATIENT
Start: 2018-01-15 | End: 2018-01-17 | Stop reason: HOSPADM

## 2018-01-15 RX ORDER — IBUPROFEN 200 MG
24 TABLET ORAL
Status: DISCONTINUED | OUTPATIENT
Start: 2018-01-15 | End: 2018-01-17 | Stop reason: HOSPADM

## 2018-01-15 RX ORDER — INSULIN ASPART 100 [IU]/ML
0-5 INJECTION, SOLUTION INTRAVENOUS; SUBCUTANEOUS
Status: DISCONTINUED | OUTPATIENT
Start: 2018-01-15 | End: 2018-01-17 | Stop reason: HOSPADM

## 2018-01-15 RX ORDER — HYDROCODONE BITARTRATE AND ACETAMINOPHEN 500; 5 MG/1; MG/1
TABLET ORAL
Status: DISCONTINUED | OUTPATIENT
Start: 2018-01-15 | End: 2018-01-17 | Stop reason: HOSPADM

## 2018-01-15 RX ORDER — IPRATROPIUM BROMIDE AND ALBUTEROL SULFATE 2.5; .5 MG/3ML; MG/3ML
3 SOLUTION RESPIRATORY (INHALATION) EVERY 4 HOURS PRN
Status: DISCONTINUED | OUTPATIENT
Start: 2018-01-15 | End: 2018-01-17 | Stop reason: HOSPADM

## 2018-01-15 RX ORDER — ACETAMINOPHEN 500 MG
1000 TABLET ORAL
Status: COMPLETED | OUTPATIENT
Start: 2018-01-15 | End: 2018-01-15

## 2018-01-15 RX ORDER — RAMELTEON 8 MG/1
8 TABLET ORAL NIGHTLY PRN
Status: DISCONTINUED | OUTPATIENT
Start: 2018-01-15 | End: 2018-01-17 | Stop reason: HOSPADM

## 2018-01-15 RX ORDER — CEFTRIAXONE 1 G/1
1 INJECTION, POWDER, FOR SOLUTION INTRAMUSCULAR; INTRAVENOUS
Status: DISCONTINUED | OUTPATIENT
Start: 2018-01-15 | End: 2018-01-15 | Stop reason: SDUPTHER

## 2018-01-15 RX ORDER — CEFEPIME HYDROCHLORIDE 1 G/1
1 INJECTION, POWDER, FOR SOLUTION INTRAMUSCULAR; INTRAVENOUS
Status: COMPLETED | OUTPATIENT
Start: 2018-01-15 | End: 2018-01-15

## 2018-01-15 RX ORDER — FERROUS GLUCONATE 324(38)MG
324 TABLET ORAL
Status: DISCONTINUED | OUTPATIENT
Start: 2018-01-16 | End: 2018-01-17 | Stop reason: HOSPADM

## 2018-01-15 RX ORDER — VENLAFAXINE HYDROCHLORIDE 37.5 MG/1
150 CAPSULE, EXTENDED RELEASE ORAL DAILY
Status: DISCONTINUED | OUTPATIENT
Start: 2018-01-16 | End: 2018-01-17 | Stop reason: HOSPADM

## 2018-01-15 RX ORDER — LOSARTAN POTASSIUM AND HYDROCHLOROTHIAZIDE 12.5; 5 MG/1; MG/1
1 TABLET ORAL DAILY
Status: DISCONTINUED | OUTPATIENT
Start: 2018-01-16 | End: 2018-01-15

## 2018-01-15 RX ADMIN — SODIUM CHLORIDE 1000 ML: 0.9 INJECTION, SOLUTION INTRAVENOUS at 02:01

## 2018-01-15 RX ADMIN — CEFEPIME 1 G: 1 INJECTION, POWDER, FOR SOLUTION INTRAMUSCULAR; INTRAVENOUS at 02:01

## 2018-01-15 RX ADMIN — AZITHROMYCIN MONOHYDRATE 500 MG: 500 INJECTION, POWDER, LYOPHILIZED, FOR SOLUTION INTRAVENOUS at 07:01

## 2018-01-15 RX ADMIN — CEFTRIAXONE SODIUM 1 G: 1 INJECTION, POWDER, FOR SOLUTION INTRAMUSCULAR; INTRAVENOUS at 07:01

## 2018-01-15 RX ADMIN — ACETAMINOPHEN 1000 MG: 500 TABLET ORAL at 02:01

## 2018-01-15 NOTE — ED PROVIDER NOTES
"Encounter Date: 1/15/2018    SCRIBE #1 NOTE: I, Wendy Telles, am scribing for, and in the presence of,  Leticia Ding MD. I have scribed the following portions of the note - Other sections scribed: ROS and HPI.       History     Chief Complaint   Patient presents with    Fatigue     chronic generalized weakness. "Feels worse today." Hx of Anemia. No Transfusions before.      CC: Fatigue  HPI: This 77 y.o. male with a past medical history of Anxiety; CAD; Depression; Diabetes mellitus type II;  GERD; and Hypertension, presents to the ED complaining of worsening fatigue and generalized weakness today. He was not able to walk today. He has hx of anemia. Labs were drawn last week also showed anemia. He denies bloody/tarry stool, hemoptysis, hematemesis and/or bleeding from any other orifices. He has an appointment scheduled with Dr. Ruggiero, Hematology for next Thursday. He also has an appointment scheduled for endoscopy and colonoscopy for next Wednesday. He denies any other complaint at this time. Denies any prior hx of blood transfusion.      The history is provided by the patient. No  was used.     Review of patient's allergies indicates:   Allergen Reactions    Statins-hmg-coa reductase inhibitors Other (See Comments)     Muscle weakness     Past Medical History:   Diagnosis Date    Anxiety     Coronary artery disease     Depression     Diabetes mellitus type II     Erectile dysfunction     Eye injuries     fb ou    GERD (gastroesophageal reflux disease)     Hypertension      Past Surgical History:   Procedure Laterality Date    CORONARY ARTERY BYPASS GRAFT  2001    HERNIA REPAIR      WRIST SURGERY       Family History   Problem Relation Age of Onset    Adopted: Yes    No Known Problems Mother     No Known Problems Father     No Known Problems Maternal Grandmother     No Known Problems Maternal Grandfather     No Known Problems Paternal Grandmother     No Known Problems Paternal " Grandfather     No Known Problems Sister     No Known Problems Brother     No Known Problems Maternal Aunt     No Known Problems Maternal Uncle     No Known Problems Paternal Aunt     No Known Problems Paternal Uncle     Amblyopia Neg Hx     Blindness Neg Hx     Cancer Neg Hx     Cataracts Neg Hx     Diabetes Neg Hx     Glaucoma Neg Hx     Hypertension Neg Hx     Macular degeneration Neg Hx     Retinal detachment Neg Hx     Strabismus Neg Hx     Stroke Neg Hx     Thyroid disease Neg Hx      Social History   Substance Use Topics    Smoking status: Former Smoker     Packs/day: 2.00     Years: 23.00     Quit date: 1/1/1989    Smokeless tobacco: Never Used    Alcohol use 0.6 oz/week     1 Cans of beer per week      Comment: 1 can of beer per day     Review of Systems   Constitutional: Positive for fatigue. Negative for chills and fever.   HENT: Negative for ear pain and sore throat.    Eyes: Negative for pain.   Respiratory: Negative for cough and shortness of breath.    Cardiovascular: Negative for chest pain.   Gastrointestinal: Negative for abdominal pain, diarrhea, nausea and vomiting.   Endocrine: Negative.    Genitourinary: Negative for dysuria.   Musculoskeletal: Negative for back pain.        (-) arm or leg problems   Skin: Negative for rash.   Neurological: Positive for weakness (generalized). Negative for headaches.   Psychiatric/Behavioral: Negative.  Negative for agitation and behavioral problems.   All other systems reviewed and are negative.      Physical Exam     Initial Vitals [01/15/18 1045]   BP Pulse Resp Temp SpO2   117/60 83 16 (!) 101 °F (38.3 °C) 95 %      MAP       79         Physical Exam    Nursing note and vitals reviewed.  Constitutional: He appears well-developed and well-nourished. He is not diaphoretic. No distress.   HENT:   Head: Normocephalic and atraumatic.   Eyes: Conjunctivae and EOM are normal. Pupils are equal, round, and reactive to light.   The patient has  pale bilateral conjunctivae.   Neck: Normal range of motion. Neck supple. No thyromegaly present.   Cardiovascular: Normal rate and regular rhythm. Exam reveals no gallop and no friction rub.    No murmur heard.  Pulmonary/Chest: Breath sounds normal. No respiratory distress. He has no wheezes. He has no rhonchi. He has no rales.   Abdominal: Soft. Bowel sounds are normal. He exhibits no distension. There is no tenderness.   Genitourinary: Rectal exam shows guaiac negative stool. Guaiac negative stool.   Genitourinary Comments: Rectal exam showed normal rectal tone.  Brown stool guaiac negative.  Chaperone was Ms. Karen RN.   Musculoskeletal: Normal range of motion. He exhibits no edema or tenderness.   Lymphadenopathy:     He has no cervical adenopathy.   Neurological: He is alert and oriented to person, place, and time. No cranial nerve deficit or sensory deficit.   Skin: Skin is warm and dry. There is pallor.   Psychiatric: He has a normal mood and affect. Thought content normal.         ED Course   Procedures  Labs Reviewed   COMPREHENSIVE METABOLIC PANEL - Abnormal; Notable for the following:        Result Value    Sodium 132 (*)     Glucose 142 (*)     Albumin 2.6 (*)     Alkaline Phosphatase 247 (*)     AST 83 (*)      (*)     All other components within normal limits   CBC W/ AUTO DIFFERENTIAL - Abnormal; Notable for the following:     WBC 13.60 (*)     RBC 2.55 (*)     Hemoglobin 7.4 (*)     Hematocrit 23.6 (*)     MCHC 31.4 (*)     RDW 15.2 (*)     Platelets 652 (*)     Gran # 11.1 (*)     Mono # 1.3 (*)     Gran% 81.3 (*)     Lymph% 7.0 (*)     All other components within normal limits   RETICULOCYTES - Abnormal; Notable for the following:     Retic 2.5 (*)     All other components within normal limits   IRON AND TIBC - Abnormal; Notable for the following:     Iron <10 (*)     Transferrin 159 (*)     TIBC 235 (*)     All other components within normal limits   MAGNESIUM   LACTIC ACID, PLASMA    URINALYSIS   APTT   PROTIME-INR   TROPONIN I   INFLUENZA A AND B ANTIGEN   URINALYSIS MICROSCOPIC   FERRITIN   LACTATE DEHYDROGENASE   OCCULT BLOOD X 1, STOOL   TYPE & SCREEN   RH TYPING     Imaging Results          X-Ray Chest AP Portable (Final result)  Result time 01/15/18 18:31:00    Final result by Jozef Brandon MD (01/15/18 18:31:00)                 Impression:     Findings compatible with pneumonia affecting likely the superior segment of the left lower lobe and portion of the left upper lobe.  Followup radiographs are recommended in 6-8 weeks to document resolution.      Electronically signed by: JOZEF BRANDON MD  Date:     01/15/18  Time:    18:31              Narrative:    Comparison: 1/2/18.    Findings: AP radiograph of the chest demonstrates a focal air space consolidation involving both the left upper and lower lobe.  The right lung is free of abnormality.  There is no pneumothorax or pleural effusion.  No mass.  The patient has undergone median sternotomy and CABG.  The osseous and soft tissue structures are stable for this patient.                              EKG Readings: (Independently Interpreted)   Initial Reading: No STEMI. Rhythm: Normal Sinus Rhythm. Heart Rate: 83. ST Segments: Non-Specific ST Segment Depression. T Waves: Normal.          Medical Decision Making:   Differential Diagnosis:   Symptomatic anemia.  Generalized weakness.  Fever.  Other:   I have discussed this case with another health care provider.       <> Summary of the Discussion: I consulted the patient hematologist , he wants patient transfused with units of blood.  Patient was admitted by Dr. Melissa Fletcher internal medicine doctor on-call.  I told the patient's nurse Ms. Prajapatidylan to carry out additional blood test as ordered by Dr. Fletcher prior to starting blood transfusion.            Scribe Attestation:   Scribe #1: I performed the above scribed service and the documentation accurately describes the  services I performed. I attest to the accuracy of the note.    Attending Attestation:         Attending Critical Care:   Critical Care Times:   Direct Patient Care (initial evaluation, reassessments, and time considering the case)................................................................20 minutes.   Additional History from reviewing old medical records or taking additional history from the family, EMS, PCP, etc.......................5 minutes.   Ordering, Reviewing, and Interpreting Diagnostic Studies...............................................................................................................10 minutes.   Documentation..................................................................................................................................................................................10 minutes.   Consultation with other Physicians. .................................................................................................................................................15 minutes.   Consultation with the patient's family directly relating to the patient's condition, care, and DNR status (when patient unable)......5 minutes.   ==============================================================  · Total Critical Care Time - exclusive of procedural time: 65 minutes.  ==============================================================  Critical care was time spent personally by me on the following activities: obtaining history from patient or relative, examination of patient, review of old charts, ordering lab, x-rays, and/or EKG, development of treatment plan with patient or relative, discussions with primary provider and discussion with consultants.   Critical Care Condition: potentially life-threatening     Physician Attestation for Scribe:  Physician Attestation Statement for Scribe #1: I, Leticia Ding MD, reviewed documentation, as scribed by Wendy Telles MD in my presence, and it is  both accurate and complete.     Comments: I, Dr. Ding, personally performed the services described in this documentation. All medical record entries made by the scribe were at my direction and in my presence.  I have reviewed the chart and agree that the record reflects my personal performance and is accurate and complete.              ED Course      Clinical Impression:   The primary encounter diagnosis was Symptomatic anemia. Diagnoses of Fever, Generalized weakness, and Pneumonia of left lower lobe due to infectious organism were also pertinent to this visit.    Disposition:   Disposition: Admitted  Condition: Stable                        Leticia Ding MD  01/16/18 1161

## 2018-01-15 NOTE — ED TRIAGE NOTES
Pt arrived to ED c/o fatigue, generalized weakness for a month. Pt said that seeing PCP and was dx with anemic.

## 2018-01-16 ENCOUNTER — DOCUMENTATION ONLY (OUTPATIENT)
Dept: HEMATOLOGY/ONCOLOGY | Facility: CLINIC | Age: 78
End: 2018-01-16

## 2018-01-16 LAB
BASOPHILS # BLD AUTO: 0.01 K/UL
BASOPHILS # BLD AUTO: 0.01 K/UL
BASOPHILS NFR BLD: 0.1 %
BASOPHILS NFR BLD: 0.1 %
DIFFERENTIAL METHOD: ABNORMAL
DIFFERENTIAL METHOD: ABNORMAL
EOSINOPHIL # BLD AUTO: 0.2 K/UL
EOSINOPHIL # BLD AUTO: 0.3 K/UL
EOSINOPHIL NFR BLD: 1.5 %
EOSINOPHIL NFR BLD: 2.3 %
ERYTHROCYTE [DISTWIDTH] IN BLOOD BY AUTOMATED COUNT: 15.2 %
ERYTHROCYTE [DISTWIDTH] IN BLOOD BY AUTOMATED COUNT: 15.3 %
FOLATE SERPL-MCNC: 12.1 NG/ML
HAPTOGLOB SERPL-MCNC: 499 MG/DL
HCT VFR BLD AUTO: 26.7 %
HCT VFR BLD AUTO: 27.3 %
HGB BLD-MCNC: 9 G/DL
HGB BLD-MCNC: 9 G/DL
LYMPHOCYTES # BLD AUTO: 1.1 K/UL
LYMPHOCYTES # BLD AUTO: 1.3 K/UL
LYMPHOCYTES NFR BLD: 10.2 %
LYMPHOCYTES NFR BLD: 7.5 %
MCH RBC QN AUTO: 29.2 PG
MCH RBC QN AUTO: 29.9 PG
MCHC RBC AUTO-ENTMCNC: 33 G/DL
MCHC RBC AUTO-ENTMCNC: 33.7 G/DL
MCV RBC AUTO: 89 FL
MCV RBC AUTO: 89 FL
MONOCYTES # BLD AUTO: 1 K/UL
MONOCYTES # BLD AUTO: 1.5 K/UL
MONOCYTES NFR BLD: 8 %
MONOCYTES NFR BLD: 9.7 %
NEUTROPHILS # BLD AUTO: 12.2 K/UL
NEUTROPHILS # BLD AUTO: 9.8 K/UL
NEUTROPHILS NFR BLD: 79.4 %
NEUTROPHILS NFR BLD: 81.2 %
PLATELET # BLD AUTO: 511 K/UL
PLATELET # BLD AUTO: 572 K/UL
PMV BLD AUTO: 8.8 FL
PMV BLD AUTO: 9.4 FL
POCT GLUCOSE: 114 MG/DL (ref 70–110)
POCT GLUCOSE: 152 MG/DL (ref 70–110)
POCT GLUCOSE: 159 MG/DL (ref 70–110)
POCT GLUCOSE: 183 MG/DL (ref 70–110)
RBC # BLD AUTO: 3.01 M/UL
RBC # BLD AUTO: 3.08 M/UL
WBC # BLD AUTO: 12.31 K/UL
WBC # BLD AUTO: 15 K/UL

## 2018-01-16 PROCEDURE — 25000003 PHARM REV CODE 250: Performed by: INTERNAL MEDICINE

## 2018-01-16 PROCEDURE — 94761 N-INVAS EAR/PLS OXIMETRY MLT: CPT

## 2018-01-16 PROCEDURE — 63600175 PHARM REV CODE 636 W HCPCS: Performed by: HOSPITALIST

## 2018-01-16 PROCEDURE — 25000003 PHARM REV CODE 250: Performed by: NURSE PRACTITIONER

## 2018-01-16 PROCEDURE — 36415 COLL VENOUS BLD VENIPUNCTURE: CPT

## 2018-01-16 PROCEDURE — 63600175 PHARM REV CODE 636 W HCPCS: Performed by: EMERGENCY MEDICINE

## 2018-01-16 PROCEDURE — 85025 COMPLETE CBC W/AUTO DIFF WBC: CPT | Mod: 91

## 2018-01-16 PROCEDURE — 21400001 HC TELEMETRY ROOM

## 2018-01-16 RX ORDER — ACETAMINOPHEN 500 MG
500 TABLET ORAL EVERY 6 HOURS PRN
Status: DISCONTINUED | OUTPATIENT
Start: 2018-01-16 | End: 2018-01-17 | Stop reason: HOSPADM

## 2018-01-16 RX ADMIN — VENLAFAXINE HYDROCHLORIDE 150 MG: 37.5 CAPSULE, EXTENDED RELEASE ORAL at 09:01

## 2018-01-16 RX ADMIN — ACETAMINOPHEN 500 MG: 500 TABLET ORAL at 04:01

## 2018-01-16 RX ADMIN — AZITHROMYCIN MONOHYDRATE 500 MG: 500 INJECTION, POWDER, LYOPHILIZED, FOR SOLUTION INTRAVENOUS at 06:01

## 2018-01-16 RX ADMIN — LOSARTAN POTASSIUM 50 MG: 25 TABLET, FILM COATED ORAL at 09:01

## 2018-01-16 RX ADMIN — INSULIN DETEMIR 3 UNITS: 100 INJECTION, SOLUTION SUBCUTANEOUS at 10:01

## 2018-01-16 RX ADMIN — PANTOPRAZOLE SODIUM 40 MG: 40 TABLET, DELAYED RELEASE ORAL at 09:01

## 2018-01-16 RX ADMIN — CEFTRIAXONE 1 G: 1 INJECTION, SOLUTION INTRAVENOUS at 07:01

## 2018-01-16 RX ADMIN — FERROUS GLUCONATE TAB 324 MG (37.5 MG ELEMENTAL IRON) 324 MG: 324 (37.5 FE) TAB at 09:01

## 2018-01-16 NOTE — HPI
Eric Jackson is a 77 y.o. male with a history as below who presented to the ED with a CC of worsening generalized weakness. He reports ~ 1.5 weeks ago he was told he was anemic and contributed symptoms to that. He states he has been occasionally SOB, occasion non-productive dry cough, poor appetite and unable to get warm.  He denies fever, diaphoresis, chest pain, palpitations, abdominal pain, hematuria, melena, NVD or any other associated symptoms. He does not smoke and drinks 2-3 beers a weak.  In ED, found to have an elevated temp (101). CXR concerning for PNA. CBC with elevated WBCs (13.60) + low hgb/hct 7.4/23.6.  Admitted for symptomatic anemia + left lower lobe PNA

## 2018-01-16 NOTE — ASSESSMENT & PLAN NOTE
- On Admit hgb/hct 7.4/23.6, appears chronicity with iron deficiency component  - shortness of breath may have been secondary to symptomatic anemia versus PNA  - No signs of overt bleeding  - transfused 2U RBC with Hgb response to 9  - continue to trend CBC BID  - GI consulted: recommend outpatient EGD and cscope  - Heme recs pending  - Fe 325 daily

## 2018-01-16 NOTE — ASSESSMENT & PLAN NOTE
Transaminitis.    Asymptomatic. Could be APR in response to low hgb/hct +/- PNA +/- dehydration 2/2 poor nutrition +/- use of diuretics.  Will monitor for now and repeat CMP in AM. Consider fluid restriction if it does not self correct.

## 2018-01-16 NOTE — ASSESSMENT & PLAN NOTE
CXR shows pneumonia affecting likely the superior segment of the left lower lobe and portion of the left upper lobe. Likely cause of leukocytosis and elevated temp (101).  Empiric IV ABX initiated. Blood cultures pending. O2 PRN - keep sats >93%. Duonebs q4 PRN. CBC in AM

## 2018-01-16 NOTE — SUBJECTIVE & OBJECTIVE
Past Medical History:   Diagnosis Date    Anxiety     Coronary artery disease     Depression     Diabetes mellitus type II     Erectile dysfunction     Eye injuries     fb ou    GERD (gastroesophageal reflux disease)     Hypertension        Past Surgical History:   Procedure Laterality Date    CORONARY ARTERY BYPASS GRAFT  2001    HERNIA REPAIR      WRIST SURGERY         Review of patient's allergies indicates:   Allergen Reactions    Statins-hmg-coa reductase inhibitors Other (See Comments)     Muscle weakness       No current facility-administered medications on file prior to encounter.      Current Outpatient Prescriptions on File Prior to Encounter   Medication Sig    losartan-hydrochlorothiazide 50-12.5 mg (HYZAAR) 50-12.5 mg per tablet Take 1 tablet by mouth once daily.    metformin (GLUCOPHAGE-XR) 500 MG 24 hr tablet Take 1 tablet (500 mg total) by mouth once daily.    vitamin D 1000 units Tab Take 1,000 Units by mouth once daily.    alprazolam (XANAX) 1 MG tablet Take 1 tablet (1 mg total) by mouth nightly as needed for Anxiety.    ANDROGEL 20.25 mg/1.25 gram (1.62 %) GlPm     ferrous gluconate (FERGON) 324 MG tablet Take 324 mg by mouth daily with breakfast.    metFORMIN (GLUCOPHAGE) 500 MG tablet     ONE TOUCH ULTRA TEST Strp     venlafaxine (EFFEXOR-XR) 150 MG Cp24 Take 1 capsule (150 mg total) by mouth once daily.     Family History     Problem Relation (Age of Onset)    No Known Problems Mother, Father, Maternal Grandmother, Maternal Grandfather, Paternal Grandmother, Paternal Grandfather, Sister, Brother, Maternal Aunt, Maternal Uncle, Paternal Aunt, Paternal Uncle        Social History Main Topics    Smoking status: Former Smoker     Packs/day: 2.00     Years: 23.00     Quit date: 1/1/1989    Smokeless tobacco: Never Used    Alcohol use 0.6 oz/week     1 Cans of beer per week      Comment: 1 can of beer per day    Drug use: No    Sexual activity: Yes     Partners: Female      Review of Systems   Constitutional: Positive for activity change, appetite change and chills. Negative for diaphoresis and fever.   HENT: Negative for congestion, postnasal drip, sinus pressure and sore throat.    Eyes: Negative for visual disturbance.   Respiratory: Positive for cough (dry cough) and shortness of breath. Negative for chest tightness and wheezing.    Cardiovascular: Negative for chest pain, palpitations and leg swelling.   Gastrointestinal: Negative for abdominal distention, abdominal pain, anal bleeding, blood in stool, constipation, diarrhea, nausea and vomiting.   Endocrine: Negative.    Genitourinary: Negative for dysuria and hematuria.   Musculoskeletal: Negative.    Skin: Negative.    Allergic/Immunologic: Negative.    Neurological: Positive for weakness (generalized). Negative for dizziness, numbness and headaches.   Hematological: Negative.    Psychiatric/Behavioral: Negative.      Objective:     Vital Signs (Most Recent):  Temp: 97.4 °F (36.3 °C) (01/15/18 2014)  Pulse: 63 (01/15/18 2014)  Resp: 18 (01/15/18 2014)  BP: (!) 122/57 (01/15/18 2014)  SpO2: 97 % (01/15/18 2014) Vital Signs (24h Range):  Temp:  [97.4 °F (36.3 °C)-101 °F (38.3 °C)] 97.4 °F (36.3 °C)  Pulse:  [63-83] 63  Resp:  [12-18] 18  SpO2:  [95 %-100 %] 97 %  BP: ()/(51-60) 122/57     Weight: 73.5 kg (162 lb 0.6 oz)  Body mass index is 25.38 kg/m².    Physical Exam        Significant Labs:   Blood Culture: No results for input(s): LABBLOO in the last 48 hours.  CBC:   Recent Labs  Lab 01/15/18  1415   WBC 13.60*   HGB 7.4*   HCT 23.6*   *     CMP:   Recent Labs  Lab 01/15/18  1415   *   K 4.7   CL 97   CO2 25   *   BUN 12   CREATININE 1.1   CALCIUM 9.4   PROT 7.3   ALBUMIN 2.6*   BILITOT 0.5   ALKPHOS 247*   AST 83*   *   ANIONGAP 10   EGFRNONAA >60     Cardiac Markers: No results for input(s): CKMB, MYOGLOBIN, BNP, TROPISTAT in the last 48 hours.  Coagulation:   Recent Labs  Lab  01/15/18  1415   INR 1.1   APTT 28.2     Troponin:   Recent Labs  Lab 01/15/18  1415   TROPONINI 0.008     TSH:   Recent Labs  Lab 01/02/18  1323   TSH 2.033       Significant Imaging: \  Imaging Results          X-Ray Chest AP Portable (Final result)  Result time 01/15/18 18:31:00    Final result by Jozef Brandon MD (01/15/18 18:31:00)                 Impression:     Findings compatible with pneumonia affecting likely the superior segment of the left lower lobe and portion of the left upper lobe.  Followup radiographs are recommended in 6-8 weeks to document resolution.      Electronically signed by: JOZEF BRANDON MD  Date:     01/15/18  Time:    18:31              Narrative:    Comparison: 1/2/18.    Findings: AP radiograph of the chest demonstrates a focal air space consolidation involving both the left upper and lower lobe.  The right lung is free of abnormality.  There is no pneumothorax or pleural effusion.  No mass.  The patient has undergone median sternotomy and CABG.  The osseous and soft tissue structures are stable for this patient.

## 2018-01-16 NOTE — H&P
"[facility] Hospital Medicine  History & Physical    Patient Name: Eric Jackson  MRN: [id number]  Admission Date: 1/15/2018  Attending Physician: Melissa Fletcher MD   Primary Care Provider: Nabila Mariscal MD         Patient information was obtained from patient and ER records.     Subjective:     Principal Problem:<principal problem not specified>    Chief Complaint:   Chief Complaint   Patient presents with    Fatigue     chronic generalized weakness. "Feels worse today." Hx of Anemia. No Transfusions before.         HPI: Eric Jackson is a 77 y.o. male with a history as below who presented to the ED with a CC of worsening generalized weakness. He reports ~ 1.5 weeks ago he was told he was anemic and contributed symptoms to that. He states he has been occasionally SOB, occasion non-productive dry cough, poor appetite and unable to get warm.  He denies fever, diaphoresis, chest pain, palpitations, abdominal pain, hematuria, melena, NVD or any other associated symptoms. He does not smoke and drinks 2-3 beers a weak.  In ED, found to have an elevated temp (101). CXR concerning for PNA. CBC with elevated WBCs (13.60) + low hgb/hct 7.4/23.6.  Admitted for symptomatic anemia + left lower lobe PNA             Past Medical History:   Diagnosis Date    Anxiety     Coronary artery disease     Depression     Diabetes mellitus type II     Erectile dysfunction     Eye injuries     fb ou    GERD (gastroesophageal reflux disease)     Hypertension        Past Surgical History:   Procedure Laterality Date    CORONARY ARTERY BYPASS GRAFT  2001    HERNIA REPAIR      WRIST SURGERY         Review of patient's allergies indicates:   Allergen Reactions    Statins-hmg-coa reductase inhibitors Other (See Comments)     Muscle weakness       No current facility-administered medications on file prior to encounter.      Current Outpatient Prescriptions on File Prior to Encounter   Medication Sig    " losartan-hydrochlorothiazide 50-12.5 mg (HYZAAR) 50-12.5 mg per tablet Take 1 tablet by mouth once daily.    metformin (GLUCOPHAGE-XR) 500 MG 24 hr tablet Take 1 tablet (500 mg total) by mouth once daily.    vitamin D 1000 units Tab Take 1,000 Units by mouth once daily.    alprazolam (XANAX) 1 MG tablet Take 1 tablet (1 mg total) by mouth nightly as needed for Anxiety.    ANDROGEL 20.25 mg/1.25 gram (1.62 %) GlPm     ferrous gluconate (FERGON) 324 MG tablet Take 324 mg by mouth daily with breakfast.    metFORMIN (GLUCOPHAGE) 500 MG tablet     ONE TOUCH ULTRA TEST Strp     venlafaxine (EFFEXOR-XR) 150 MG Cp24 Take 1 capsule (150 mg total) by mouth once daily.     Family History     Problem Relation (Age of Onset)    No Known Problems Mother, Father, Maternal Grandmother, Maternal Grandfather, Paternal Grandmother, Paternal Grandfather, Sister, Brother, Maternal Aunt, Maternal Uncle, Paternal Aunt, Paternal Uncle        Social History Main Topics    Smoking status: Former Smoker     Packs/day: 2.00     Years: 23.00     Quit date: 1/1/1989    Smokeless tobacco: Never Used    Alcohol use 0.6 oz/week     1 Cans of beer per week      Comment: 1 can of beer per day    Drug use: No    Sexual activity: Yes     Partners: Female     Review of Systems   Constitutional: Positive for activity change, appetite change and chills. Negative for diaphoresis and fever.   HENT: Negative for congestion, postnasal drip, sinus pressure and sore throat.    Eyes: Negative for visual disturbance.   Respiratory: Positive for cough (dry cough) and shortness of breath. Negative for chest tightness and wheezing.    Cardiovascular: Negative for chest pain, palpitations and leg swelling.   Gastrointestinal: Negative for abdominal distention, abdominal pain, anal bleeding, blood in stool, constipation, diarrhea, nausea and vomiting.   Endocrine: Negative.    Genitourinary: Negative for dysuria and hematuria.   Musculoskeletal: Negative.     Skin: Negative.    Allergic/Immunologic: Negative.    Neurological: Positive for weakness (generalized). Negative for dizziness, numbness and headaches.   Hematological: Negative.    Psychiatric/Behavioral: Negative.      Objective:     Vital Signs (Most Recent):  Temp: 97.4 °F (36.3 °C) (01/15/18 2014)  Pulse: 63 (01/15/18 2014)  Resp: 18 (01/15/18 2014)  BP: (!) 122/57 (01/15/18 2014)  SpO2: 97 % (01/15/18 2014) Vital Signs (24h Range):  Temp:  [97.4 °F (36.3 °C)-101 °F (38.3 °C)] 97.4 °F (36.3 °C)  Pulse:  [63-83] 63  Resp:  [12-18] 18  SpO2:  [95 %-100 %] 97 %  BP: ()/(51-60) 122/57     Weight: 73.5 kg (162 lb 0.6 oz)  Body mass index is 25.38 kg/m².    Physical Exam        Significant Labs:   Blood Culture: No results for input(s): LABBLOO in the last 48 hours.  CBC:   Recent Labs  Lab 01/15/18  1415   WBC 13.60*   HGB 7.4*   HCT 23.6*   *     CMP:   Recent Labs  Lab 01/15/18  1415   *   K 4.7   CL 97   CO2 25   *   BUN 12   CREATININE 1.1   CALCIUM 9.4   PROT 7.3   ALBUMIN 2.6*   BILITOT 0.5   ALKPHOS 247*   AST 83*   *   ANIONGAP 10   EGFRNONAA >60     Cardiac Markers: No results for input(s): CKMB, MYOGLOBIN, BNP, TROPISTAT in the last 48 hours.  Coagulation:   Recent Labs  Lab 01/15/18  1415   INR 1.1   APTT 28.2     Troponin:   Recent Labs  Lab 01/15/18  1415   TROPONINI 0.008     TSH:   Recent Labs  Lab 01/02/18  1323   TSH 2.033       Significant Imaging: \  Imaging Results          X-Ray Chest AP Portable (Final result)  Result time 01/15/18 18:31:00    Final result by Jozef Brandon MD (01/15/18 18:31:00)                 Impression:     Findings compatible with pneumonia affecting likely the superior segment of the left lower lobe and portion of the left upper lobe.  Followup radiographs are recommended in 6-8 weeks to document resolution.      Electronically signed by: JOZEF BRANDON MD  Date:     01/15/18  Time:    18:31              Narrative:    Comparison:  1/2/18.    Findings: AP radiograph of the chest demonstrates a focal air space consolidation involving both the left upper and lower lobe.  The right lung is free of abnormality.  There is no pneumothorax or pleural effusion.  No mass.  The patient has undergone median sternotomy and CABG.  The osseous and soft tissue structures are stable for this patient.                                Assessment/Plan:     Transaminitis    See above.            Hyponatremia    Transaminitis.    Asymptomatic. Could be APR in response to low hgb/hct +/- PNA +/- dehydration 2/2 poor nutrition +/- use of diuretics.  Will monitor for now and repeat CMP in AM. Consider fluid restriction if it does not self correct.             Left lower lobe pneumonia    CXR shows pneumonia affecting likely the superior segment of the left lower lobe and portion of the left upper lobe. Likely cause of leukocytosis and elevated temp (101).  Empiric IV ABX initiated. Blood cultures pending. O2 PRN - keep sats >93%. Duonebs q4 PRN. CBC in AM                Symptomatic anemia, microcytic/hypochromic     On Admit hgb/hct 7.4/23.6, appears chronicity with iron deficiency component.  ?symptomatic anemia versus ?PNA. No signs of overt bleeding. Baseline appears to be  Hgb/hct 8/25. Plan to transfuse 2u PRBCs. Continue Fe supplement. Serial H/H. GI/hematology consulted in ED.                  Insomnia    [place] Nightly.          Depression    Continue effexor          GERD (gastroesophageal reflux disease)    Continue PPI          Diabetes mellitus type II, controlled    A1C 5.5 Jan 2018. Controlled on a home oral regimen. POC glucose checks AC and HS. Correction scale and adjust accordingly. Hypoglycemia Protocol                Essential hypertension    Continue current medication regimen. Hydralazine IV PRN             VTE Risk Mitigation         Ordered     Low Risk of VTE  Once      01/15/18 2026             GEETHA Fong  Department of Hospital  Medicine   [facility]

## 2018-01-16 NOTE — ASSESSMENT & PLAN NOTE
- Asymptomatic  - Na 132 on admit  - etiology: nutritional vs diuretics vs PNA  - monitor BMP in AM

## 2018-01-16 NOTE — ASSESSMENT & PLAN NOTE
- meets sepsis criteria by fever and leukocytosis  - CXR showing ARIK/LLL PNA  - started ceftriaxone + azithromycin on 1/15  - on room air  - blood cultures pending  - PRN duonebs Q4

## 2018-01-16 NOTE — PROGRESS NOTES
Spoke with ER physician on 01/16/17. Brought to ER by family. Very weak. Hgb 7.5. Recommend two unit PRBC transfusion and GI consult.

## 2018-01-16 NOTE — ASSESSMENT & PLAN NOTE
A1C 5.5 1/2018  On oral Rx at home  ACHS accuchecks  Glucose goal <180- well controlled, has not required SSI  Hypoglycemia Protocol

## 2018-01-16 NOTE — PROGRESS NOTES
[facility] Medicine  Progress Note    Patient Name: Eric Jackson  MRN: [id number]  Patient Class: IP- Inpatient   Admission Date: 1/15/2018  Length of Stay: 1 days  Attending Physician: Melissa Fletcehr MD  Primary Care Provider: Nabila Mariscal MD        Subjective:     Principal Problem:<principal problem not specified>    HPI:  Eric Jackson is a 77 y.o. male with a history as below who presented to the ED with a CC of worsening generalized weakness. He reports ~ 1.5 weeks ago he was told he was anemic and contributed symptoms to that. He states he has been occasionally SOB, occasion non-productive dry cough, poor appetite and unable to get warm.  He denies fever, diaphoresis, chest pain, palpitations, abdominal pain, hematuria, melena, NVD or any other associated symptoms. He does not smoke and drinks 2-3 beers a weak.  In ED, found to have an elevated temp (101). CXR concerning for PNA. CBC with elevated WBCs (13.60) + low hgb/hct 7.4/23.6.  Admitted for symptomatic anemia + left lower lobe PNA             Hospital Course:  Admitted for community acquired PNA and anemia. Started on CTX + azithro for PNA with improvement in shortness of breath and cough. Transfused 2U for anemia. Seen by GI who defer EGD and cscope to outpatient in the setting of no active bleeding and normal Hgb.     Interval History: Improving shortness of breath and cough. Denies hematochezia, melena, hematemesis.     Review of Systems   All other systems reviewed and are negative.    Objective:     Vital Signs (Most Recent):  Temp: 98.4 °F (36.9 °C) (01/16/18 1624)  Pulse: 84 (01/16/18 1624)  Resp: 20 (01/16/18 1624)  BP: 117/61 (01/16/18 1624)  SpO2: (!) 94 % (01/16/18 1624) Vital Signs (24h Range):  Temp:  [97.4 °F (36.3 °C)-101.8 °F (38.8 °C)] 98.4 °F (36.9 °C)  Pulse:  [63-97] 84  Resp:  [12-22] 20  SpO2:  [93 %-100 %] 94 %  BP: (100-134)/(51-62) 117/61     Weight: 73.5 kg (162 lb 0.6 oz)  Body mass index  is 25.38 kg/m².    Intake/Output Summary (Last 24 hours) at 01/16/18 1704  Last data filed at 01/16/18 0900   Gross per 24 hour   Intake          1153.67 ml   Output              550 ml   Net           603.67 ml      Physical Exam   Constitutional: He is oriented to person, place, and time. He appears well-developed. No distress.   HENT:   Head: Normocephalic and atraumatic.   Nose: Nose normal.   Mouth/Throat: Oropharynx is clear and moist. No oropharyngeal exudate.   Eyes: Conjunctivae and EOM are normal. Pupils are equal, round, and reactive to light. No scleral icterus.   Neck: Neck supple. No JVD present.   Cardiovascular: Normal rate, regular rhythm, normal heart sounds and intact distal pulses.  Exam reveals no gallop and no friction rub.    No murmur heard.  Pulmonary/Chest: Effort normal. No respiratory distress. He has no wheezes. He has no rales.   On room air. Crackles L mid lung field   Abdominal: Soft. Bowel sounds are normal. He exhibits no distension and no mass. There is no tenderness. There is no guarding.   Musculoskeletal: He exhibits no edema, tenderness or deformity.   Lymphadenopathy:     He has no cervical adenopathy.   Neurological: He is alert and oriented to person, place, and time. No cranial nerve deficit or sensory deficit.   Skin: Skin is warm and dry. He is not diaphoretic.       Significant Labs:   BMP:   Recent Labs  Lab 01/15/18  1415   *   *   K 4.7   CL 97   CO2 25   BUN 12   CREATININE 1.1   CALCIUM 9.4   MG 2.2     CBC:   Recent Labs  Lab 01/15/18  1415 01/16/18  0603   WBC 13.60* 15.00*   HGB 7.4* 9.0*   HCT 23.6* 27.3*   * 572*       Significant Imaging: I have reviewed and interpreted all pertinent imaging results/findings within the past 24 hours.    Assessment/Plan:      Transaminitis    - AST/ALT/ALP elevated, previously normal  - no abdominal pain on exam  - may be secondary to acute illness  - check CMP in AM  - if not improved, check hepatitis panel  and RUQ US          Hyponatremia    - Asymptomatic  - Na 132 on admit  - etiology: nutritional vs diuretics vs PNA  - monitor BMP in AM          Left lower lobe pneumonia    - meets sepsis criteria by fever and leukocytosis  - CXR showing ARIK/LLL PNA  - started ceftriaxone + azithromycin on 1/15  - on room air  - blood cultures pending  - PRN duonebs Q4                Symptomatic anemia, microcytic/hypochromic     - On Admit hgb/hct 7.4/23.6, appears chronicity with iron deficiency component  - shortness of breath may have been secondary to symptomatic anemia versus PNA  - No signs of overt bleeding  - transfused 2U RBC with Hgb response to 9  - continue to trend CBC BID  - GI consulted: recommend outpatient EGD and cscope  - Heme recs pending  - Fe 325 daily                 Insomnia    PRN ramelteon          Depression    Continue venlafaxine 150mg daily          GERD (gastroesophageal reflux disease)    Continue Pantoprazole 40mg QD          Diabetes mellitus type II, controlled    A1C 5.5 1/2018  On oral Rx at home  ACHS accuchecks  Glucose goal <180- well controlled, has not required SSI  Hypoglycemia Protocol                 Essential hypertension    BP controlled   Continue losartan 50mg daily            VTE Risk Mitigation         Ordered     Low Risk of VTE  Once      01/15/18 2026      No pharm DVT ppx in setting of anemia requiring transfusion          Melissa Fletcher MD  Department of Hospital Medicine   [facility]

## 2018-01-16 NOTE — HOSPITAL COURSE
Mr. Jackson was admitted for community acquired PNA and symptomatic anemia. He was treated with Ceftriaxone and Azithromycin for PNA with improvement in shortness of breath and cough. He did meet criteria for sepsis with fever and leukocytosis which resolved prior to discharge. On admit his H/H was 7.4/23.6. He was transfused 2U PRBC for anemia. He was seen by GI who defered EGD and colonscopy to outpatient in the setting of no active bleeding. His H/H remained stable post transfusion. He was discharged on PO abx to complete full course of treatment for his PNA and he was stable on RA and he was arranged for follow up with GI for endoscopy as outpatient.

## 2018-01-16 NOTE — ASSESSMENT & PLAN NOTE
A1C 5.5 Jan 2018. Controlled on a home oral regimen. POC glucose checks AC and HS. Correction scale and adjust accordingly. Hypoglycemia Protocol

## 2018-01-16 NOTE — ASSESSMENT & PLAN NOTE
On Admit hgb/hct 7.4/23.6, appears chronicity with iron deficiency component.  ?symptomatic anemia versus ?PNA. No signs of overt bleeding. Baseline appears to be  Hgb/hct 8/25. Plan to transfuse 2u PRBCs. Continue Fe supplement. Serial H/H. GI/hematology consulted in ED.

## 2018-01-16 NOTE — SUBJECTIVE & OBJECTIVE
Interval History: Improving shortness of breath and cough. Denies hematochezia, melena, hematemesis.     Review of Systems   All other systems reviewed and are negative.    Objective:     Vital Signs (Most Recent):  Temp: 98.4 °F (36.9 °C) (01/16/18 1624)  Pulse: 84 (01/16/18 1624)  Resp: 20 (01/16/18 1624)  BP: 117/61 (01/16/18 1624)  SpO2: (!) 94 % (01/16/18 1624) Vital Signs (24h Range):  Temp:  [97.4 °F (36.3 °C)-101.8 °F (38.8 °C)] 98.4 °F (36.9 °C)  Pulse:  [63-97] 84  Resp:  [12-22] 20  SpO2:  [93 %-100 %] 94 %  BP: (100-134)/(51-62) 117/61     Weight: 73.5 kg (162 lb 0.6 oz)  Body mass index is 25.38 kg/m².    Intake/Output Summary (Last 24 hours) at 01/16/18 1704  Last data filed at 01/16/18 0900   Gross per 24 hour   Intake          1153.67 ml   Output              550 ml   Net           603.67 ml      Physical Exam   Constitutional: He is oriented to person, place, and time. He appears well-developed. No distress.   HENT:   Head: Normocephalic and atraumatic.   Nose: Nose normal.   Mouth/Throat: Oropharynx is clear and moist. No oropharyngeal exudate.   Eyes: Conjunctivae and EOM are normal. Pupils are equal, round, and reactive to light. No scleral icterus.   Neck: Neck supple. No JVD present.   Cardiovascular: Normal rate, regular rhythm, normal heart sounds and intact distal pulses.  Exam reveals no gallop and no friction rub.    No murmur heard.  Pulmonary/Chest: Effort normal. No respiratory distress. He has no wheezes. He has no rales.   On room air. Crackles L mid lung field   Abdominal: Soft. Bowel sounds are normal. He exhibits no distension and no mass. There is no tenderness. There is no guarding.   Musculoskeletal: He exhibits no edema, tenderness or deformity.   Lymphadenopathy:     He has no cervical adenopathy.   Neurological: He is alert and oriented to person, place, and time. No cranial nerve deficit or sensory deficit.   Skin: Skin is warm and dry. He is not diaphoretic.        Significant Labs:   BMP:   Recent Labs  Lab 01/15/18  1415   *   *   K 4.7   CL 97   CO2 25   BUN 12   CREATININE 1.1   CALCIUM 9.4   MG 2.2     CBC:   Recent Labs  Lab 01/15/18  1415 01/16/18  0603   WBC 13.60* 15.00*   HGB 7.4* 9.0*   HCT 23.6* 27.3*   * 572*       Significant Imaging: I have reviewed and interpreted all pertinent imaging results/findings within the past 24 hours.

## 2018-01-16 NOTE — CONSULTS
"Chief complaint: Low blood counts    History of present illness: The patient is a 77 year old male with a history of HTN, DM, CAD s/p CABG, depression, anxiety and up's esophagus presenting with generalized weakness. He reports progressive weakness for "a little while", worsening over the past 2-3 weeks. Initial work up revealed a hemoglobin of 7.4 and hematocrit of 23.6. Patient denies black or bloody stools. He states his bowel pattern has been regular. No abdominal pain, nausea, vomiting, dysphagia or weight loss. He has chronic GERD, but states he does not take medication for this at home currently. He denies NSAID use. His most recent colonoscopy was in January 2014 and revealed 4 benign polyps and internal hemorrhoids. He underwent an EGD/stretta in March 2015 which showed small hiatal hernia and up's esophagus.       Past medical history:  Hypertension.  Diabetes mellitus.  Coronary artery disease.  Depression.  Anxiety.  Up's esophagus.     Past surgical history:  Coronary artery bypass graft, 2001.  Inguinal hernia repair, bilateral.  Wrist surgery - ligament repair.     Social history:  Former smoker, quit many years ago.   He drinks approximately one case of beer in a month.   No illicit drugs.    Family history:  Unknown - patient was adopted.     Review of systems:  CONSTITUTIONAL: Positive for weakness and fatigue. Negative for fever, weight loss, weight gain.  HEENT: Eyes: Negative for double/blurred vision. Ears: Negative pain or loss of hearing. Nose:Negative for nasal congestion. Negative for rhinorrhea Mouth: Negative for dry mouth/pain Throat: Negative for masses or hoarseness.  CARDIOVASCULAR: Negative for chest pain or palpitations.  RESPIRATORY: Positive for cough. Negative for SOB.  GASTROINTESTINAL: See HPI  GENITOURINARY: Negative for dysuria, hematuria.  MUSCULOSKELETAL: Negative for osteoarthritis, muscle pain.  SKIN: Negative for rashes/lesions.  NEUROLOGIC: Negative for " headaches, numbness/tingling.  ENDOCRINE: Positive for diabetes mellitus. No thyroid abnormalities.  HEMATOLOGIC: Negative for anemia or blood dyscrasias.    Physical examination:  Vitals:    01/16/18 0245 01/16/18 0337 01/16/18 0803 01/16/18 1126   BP: 120/62 (!) 115/57 (!) 127/59 (!) 110/59   BP Location:  Left arm Left arm Left arm   Patient Position:  Lying Sitting Lying   Pulse: 96 97 80 76   Resp: 18 18 20 18   Temp: 99 °F (37.2 °C) (!) 101.8 °F (38.8 °C) 97.7 °F (36.5 °C) 98.2 °F (36.8 °C)   TempSrc: Oral Oral Oral Oral   SpO2: 98% (!) 93% (!) 93% (!) 93%   Weight:  73.5 kg (162 lb 0.6 oz)     Height:         General: well developed, well nourished, no apparent distress  HENT: Normocephalic, hearing grossly intact   Eyes: PERRLA, EOMI, anicteric sclera  Cardiovascular: Regular rate and rhythm. No murmur noted.   Lungs: Non-labored respirations. Breath sounds equal.   Abdomen: soft, NTND, normoactive BS  Extremities: No C/C/E, 2+ dorsalis pedis pulses bilaterally  Neuro: Alert and oriented x 3, no asterixes or tremors  Psych: Appropriate mood and affect. No SI.   Skin: No jaundice, rashes or lesions  Musculoskeletal: 5/5 strength bilaterally    Lab Results   Component Value Date    WBC 15.00 (H) 01/16/2018    HGB 9.0 (L) 01/16/2018    HCT 27.3 (L) 01/16/2018    MCV 89 01/16/2018     (H) 01/16/2018     Lab Results   Component Value Date     (L) 01/15/2018    K 4.7 01/15/2018    CL 97 01/15/2018    CO2 25 01/15/2018    BUN 12 01/15/2018    CREATININE 1.1 01/15/2018    CALCIUM 9.4 01/15/2018    ANIONGAP 10 01/15/2018    ESTGFRAFRICA >60 01/15/2018    EGFRNONAA >60 01/15/2018   AST= 83  ALT= 129  ALP= 247  Bilirubin= 0.5    Imaging:  Chest x-ray (1-15-18):  Findings compatible with pneumonia affecting likely the superior segment of the left lower lobe and portion of the left upper lobe.  Followup radiographs are recommended in 6-8 weeks to document resolution.      Assessment:  77 year old male with a  history of HTN, DM, CAD s/p CABG, depression, anxiety and up's esophagus presenting with symptomatic anemia, LLL pneumonia - on empiric abx, and elevated liver enzymes.     Plan:  1. Symptomatic anemia - normocytic, iron level low; ferritin normal. No overt GI bleeding. Transfused 2 units pRBCs. Continue PPI once daily. He will need EGD/colonoscopy in the near future, but recommend treating pneumonia prior to endoscopic procedures. Likely can be done on an outpatient basis. Case discussed with Dr. Aguayo.

## 2018-01-16 NOTE — ASSESSMENT & PLAN NOTE
- AST/ALT/ALP elevated, previously normal  - no abdominal pain on exam  - may be secondary to acute illness  - check CMP in AM  - if not improved, check hepatitis panel and RUQ US

## 2018-01-17 VITALS
HEART RATE: 70 BPM | OXYGEN SATURATION: 95 % | HEIGHT: 67 IN | WEIGHT: 162.06 LBS | SYSTOLIC BLOOD PRESSURE: 132 MMHG | TEMPERATURE: 97 F | BODY MASS INDEX: 25.44 KG/M2 | DIASTOLIC BLOOD PRESSURE: 65 MMHG | RESPIRATION RATE: 16 BRPM

## 2018-01-17 LAB
ALBUMIN SERPL BCP-MCNC: 2.2 G/DL
ALP SERPL-CCNC: 172 U/L
ALT SERPL W/O P-5'-P-CCNC: 83 U/L
ANION GAP SERPL CALC-SCNC: 7 MMOL/L
AST SERPL-CCNC: 53 U/L
BACTERIA UR CULT: NORMAL
BASOPHILS # BLD AUTO: 0.04 K/UL
BASOPHILS NFR BLD: 0.4 %
BILIRUB SERPL-MCNC: 0.3 MG/DL
BUN SERPL-MCNC: 14 MG/DL
CALCIUM SERPL-MCNC: 8.7 MG/DL
CHLORIDE SERPL-SCNC: 101 MMOL/L
CO2 SERPL-SCNC: 27 MMOL/L
CREAT SERPL-MCNC: 0.9 MG/DL
DIFFERENTIAL METHOD: ABNORMAL
EOSINOPHIL # BLD AUTO: 0.4 K/UL
EOSINOPHIL NFR BLD: 3.6 %
ERYTHROCYTE [DISTWIDTH] IN BLOOD BY AUTOMATED COUNT: 15.1 %
EST. GFR  (AFRICAN AMERICAN): >60 ML/MIN/1.73 M^2
EST. GFR  (NON AFRICAN AMERICAN): >60 ML/MIN/1.73 M^2
GLUCOSE SERPL-MCNC: 112 MG/DL
HCT VFR BLD AUTO: 28.1 %
HGB BLD-MCNC: 9 G/DL
LYMPHOCYTES # BLD AUTO: 1.2 K/UL
LYMPHOCYTES NFR BLD: 11.1 %
MCH RBC QN AUTO: 28.9 PG
MCHC RBC AUTO-ENTMCNC: 32 G/DL
MCV RBC AUTO: 90 FL
MONOCYTES # BLD AUTO: 1.2 K/UL
MONOCYTES NFR BLD: 11 %
NEUTROPHILS # BLD AUTO: 8.2 K/UL
NEUTROPHILS NFR BLD: 73.9 %
PLATELET # BLD AUTO: 651 K/UL
PMV BLD AUTO: 9.5 FL
POCT GLUCOSE: 119 MG/DL (ref 70–110)
POCT GLUCOSE: 173 MG/DL (ref 70–110)
POTASSIUM SERPL-SCNC: 4.6 MMOL/L
PROT SERPL-MCNC: 6.1 G/DL
RBC # BLD AUTO: 3.11 M/UL
SODIUM SERPL-SCNC: 135 MMOL/L
WBC # BLD AUTO: 11.17 K/UL

## 2018-01-17 PROCEDURE — 25000003 PHARM REV CODE 250: Performed by: NURSE PRACTITIONER

## 2018-01-17 PROCEDURE — 80053 COMPREHEN METABOLIC PANEL: CPT

## 2018-01-17 PROCEDURE — 63600175 PHARM REV CODE 636 W HCPCS: Performed by: HOSPITALIST

## 2018-01-17 PROCEDURE — 36415 COLL VENOUS BLD VENIPUNCTURE: CPT

## 2018-01-17 PROCEDURE — 85025 COMPLETE CBC W/AUTO DIFF WBC: CPT

## 2018-01-17 PROCEDURE — 99900035 HC TECH TIME PER 15 MIN (STAT)

## 2018-01-17 PROCEDURE — 94760 N-INVAS EAR/PLS OXIMETRY 1: CPT

## 2018-01-17 RX ORDER — AZITHROMYCIN 500 MG/1
500 TABLET, FILM COATED ORAL DAILY
Qty: 4 TABLET | Refills: 0 | Status: SHIPPED | OUTPATIENT
Start: 2018-01-17 | End: 2018-01-21

## 2018-01-17 RX ORDER — AZITHROMYCIN 250 MG/1
500 TABLET, FILM COATED ORAL ONCE
Status: DISCONTINUED | OUTPATIENT
Start: 2018-01-17 | End: 2018-01-17 | Stop reason: HOSPADM

## 2018-01-17 RX ORDER — CEFUROXIME AXETIL 500 MG/1
500 TABLET ORAL EVERY 12 HOURS
Qty: 14 TABLET | Refills: 0 | Status: SHIPPED | OUTPATIENT
Start: 2018-01-17 | End: 2018-01-24

## 2018-01-17 RX ORDER — PANTOPRAZOLE SODIUM 40 MG/1
40 TABLET, DELAYED RELEASE ORAL DAILY
Qty: 30 TABLET | Refills: 0 | Status: SHIPPED | OUTPATIENT
Start: 2018-01-17 | End: 2019-01-14

## 2018-01-17 RX ORDER — CEFUROXIME AXETIL 250 MG/1
500 TABLET ORAL EVERY 12 HOURS
Status: DISCONTINUED | OUTPATIENT
Start: 2018-01-17 | End: 2018-01-17 | Stop reason: HOSPADM

## 2018-01-17 RX ADMIN — CEFTRIAXONE 1 G: 1 INJECTION, SOLUTION INTRAVENOUS at 06:01

## 2018-01-17 RX ADMIN — PANTOPRAZOLE SODIUM 40 MG: 40 TABLET, DELAYED RELEASE ORAL at 07:01

## 2018-01-17 RX ADMIN — VENLAFAXINE HYDROCHLORIDE 150 MG: 37.5 CAPSULE, EXTENDED RELEASE ORAL at 07:01

## 2018-01-17 RX ADMIN — FERROUS GLUCONATE TAB 324 MG (37.5 MG ELEMENTAL IRON) 324 MG: 324 (37.5 FE) TAB at 07:01

## 2018-01-17 RX ADMIN — LOSARTAN POTASSIUM 50 MG: 25 TABLET, FILM COATED ORAL at 07:01

## 2018-01-17 NOTE — PROGRESS NOTES
WRITTEN HEALTHCARE INFORMATION:    Follow-up Information     Nabila Mariscal MD On 1/31/2018.    Specialty:  Internal Medicine  Why:  11:00 a.m.  Wednesday January 31, 2018 see Dr. Mariscal for your hospital followup.  Contact information:  [address] [facility]  [place] [address]  [phone]             Sy Carrera MD. Schedule an appointment as soon as possible for a visit in 2 weeks.    Specialty:  Gastroenterology  Contact information:  [address] [place] [address]  [address]  [facility]  [place] [address]  [phone]                 [facility]  Unless otherwise directed by your provider, please   contact [facility], our nurse care line   that is available for 24/7 assistance.      [phone] (toll-free)      Registered nurses in the [facility]   provide: appointment scheduling, clinical advisement, health education, and other advisory services.  Thank you for choosing [facility] for your care.  Within 48-72 hours after leaving the hospital you will receive a call from [facility] nurses following up to see how you are doing.  The team will ask you a few questions and the call will last approximately 20 minutes.    Please answer any calls you may receive from [facility].  We want to continue to support you as you manage your healthcare needs.  [facility] is happy to have the opportunity to serve you.    Sincerely      Tish-  Your [facility]  [phone]

## 2018-01-17 NOTE — PROGRESS NOTES
"Gastroenterology    CC: Anemia    HPI 77 y.o. male with no overt bleeding.  No abd pain.  No N/V.        Past Medical History:   Diagnosis Date    Anxiety     Coronary artery disease     Depression     Diabetes mellitus type II     Erectile dysfunction     Eye injuries     fb ou    GERD (gastroesophageal reflux disease)     Hypertension          Review of Systems  General ROS: negative for chills, fever  Cardiovascular ROS: no chest pain or dyspnea at rest    Physical Examination  /61 (BP Location: Left arm, Patient Position: Lying)   Pulse 75   Temp 97.8 °F (36.6 °C) (Oral)   Resp 18   Ht 5' 7" (1.702 m)   Wt 73.5 kg (162 lb 0.6 oz)   SpO2 96%   BMI 25.38 kg/m²   General appearance: alert, cooperative, no distress  HENT: Normocephalic, atraumatic, neck symmetrical, no nasal discharge   Eyes: conjunctivae/corneas clear, no icterus, EOM's intact  Lungs: resp non-labored  Heart: regular rate   Abdomen: soft, non-tender; ND, no rebound  Extremities: extremities symmetric; no clubbing, cyanosis, or edema  Neurologic: Alert and oriented X 3, MAEW    Labs:  H/H up after transfusion    Assessment:     77 year old male with a history of HTN, DM, CAD s/p CABG, depression, anxiety and up's esophagus presenting with symptomatic anemia, LLL pneumonia - on empiric abx.    Plan:   1. Symptomatic anemia - normocytic, iron level low; ferritin normal. No overt GI bleeding. Transfused 2 units pRBCs. Continue PPI once daily empircally. He will need EGD/colonoscopy in the near future, but recommend treating pneumonia prior to endoscopic procedures. Will plan as outpatient.  D/W pt.  Will arrange follow up.  Call back if H/H drops significantly or if overt bleeding occurs.          "

## 2018-01-18 ENCOUNTER — ANESTHESIA (OUTPATIENT)
Dept: ENDOSCOPY | Facility: HOSPITAL | Age: 78
End: 2018-01-18

## 2018-01-18 ENCOUNTER — PES CALL (OUTPATIENT)
Dept: ADMINISTRATIVE | Facility: CLINIC | Age: 78
End: 2018-01-18

## 2018-01-18 ENCOUNTER — ANESTHESIA EVENT (OUTPATIENT)
Dept: ENDOSCOPY | Facility: HOSPITAL | Age: 78
End: 2018-01-18

## 2018-01-18 NOTE — ANESTHESIA PREPROCEDURE EVALUATION
01/18/2018  Eric Jackson is a 77 y.o., male.    Pre-op Assessment    I have reviewed the Patient Summary Reports.     I have reviewed the Medications.     Review of Systems  Anesthesia Hx:  No problems with previous Anesthesia    Social:  Former Smoker, Alcohol Use    Cardiovascular:   Hypertension CAD      Hepatic/GI:   GERD Liver Disease,    Endocrine:   Diabetes, well controlled, type 2    Psych:   Psychiatric History anxiety depression          Physical Exam  General:  Well nourished    Airway/Jaw/Neck:  Airway Findings: Mouth Opening: Normal Tongue: Normal  General Airway Assessment: Adult  Mallampati: II  TM Distance: Normal, at least 6 cm  Jaw/Neck Findings:  Neck ROM: Normal ROM      Dental:  Dental Findings: In tact   Chest/Lungs:  Chest/Lungs Findings: Clear to auscultation, Normal Respiratory Rate     Heart/Vascular:  Heart Findings: Rate: Normal        Mental Status:  Mental Status Findings:  Cooperative, Alert and Oriented         Anesthesia Plan  Type of Anesthesia, risks & benefits discussed:  Anesthesia Type:  general  Patient's Preference:   Intra-op Monitoring Plan: standard ASA monitors  Intra-op Monitoring Plan Comments:   Post Op Pain Control Plan: multimodal analgesia, IV/PO Opioids PRN and per primary service following discharge from PACU  Post Op Pain Control Plan Comments:   Induction:   IV  Beta Blocker:  Patient is not currently on a Beta-Blocker (No further documentation required).       Informed Consent:    ASA Score: 3     Day of Surgery Review of History & Physical:    H&P update referred to the surgeon.

## 2018-01-19 ENCOUNTER — PATIENT OUTREACH (OUTPATIENT)
Dept: ADMINISTRATIVE | Facility: CLINIC | Age: 78
End: 2018-01-19

## 2018-01-19 NOTE — PATIENT INSTRUCTIONS
Discharge Instructions for Pneumonia  You have been diagnosed with pneumonia, a serious lung infection. Most cases of pneumonia are caused by bacteria. Pneumonia most often occurs in older adults, young children, and people with chronic health problems.  Home Care  Take your medication exactly as directed. Dont skip doses. Continue taking your antibiotics as directed until they are all gone--even if you start to feel better. This will prevent the pneumonia from coming back.  Drink at least 8 glasses of water daily, unless directed otherwise. This helps to loosen and thin secretions so that you can cough them up.  Use a cool-mist humidifier in your bedroom. Be sure to clean the humidifier daily.  Coughing up mucus is normal. Dont use medications to suppress your cough unless your cough is dry, painful, or interferes with your sleep. You may use an expectorant if ordered by your doctor.  Warm compresses or a moist heating pad on the lowest setting can be used to relieve chest discomfort. Use several times a day for 15-20 minutes at a time. (To prevent injuring your skin, be sure the temperature of the compress or heating pad is warm, not hot.)  Get plenty of rest until your fever, shortness of breath, and chest pain go away.  Plan to get a flu shot every year.  Ask your doctor about a pneumonia vaccination.  Follow-Up  Make a follow-up appointment as directed by our staff.    When to Seek Medical Attention  Call 911 right away if you have any of the following:  Chest pain  Trouble breathing  Blue lips or fingernails  Otherwise, call your doctor if you have any of the following:  Fever above 100.4°F  Yellow, green, bloody, or smelly sputum  More than normal mucus production  Vomiting   © 5970-7324 [employer], [address] [place] [address], [place] [address]. All rights reserved. This information is not intended as a substitute for professional medical care. Always follow your healthcare professional's instructions.

## 2018-01-19 NOTE — DISCHARGE SUMMARY
[facility] Hospital Medicine  Discharge Summary      Patient Name: Eric Jackson  MRN: [id number]  Admission Date: 1/15/2018  Hospital Length of Stay: 2 days  Discharge Date and Time: 1/17/2018  2:45 PM  Attending Physician: Cristina Gutierrez MD  Discharging Provider: Cristina Gutierrez MD  Primary Care Provider: Nabila Mariscal MD      HPI:   Eric Jackson is a 77 y.o. male with a history as below who presented to the ED with a CC of worsening generalized weakness. He reports ~ 1.5 weeks ago he was told he was anemic and contributed symptoms to that. He states he has been occasionally SOB, occasion non-productive dry cough, poor appetite and unable to get warm.  He denies fever, diaphoresis, chest pain, palpitations, abdominal pain, hematuria, melena, NVD or any other associated symptoms. He does not smoke and drinks 2-3 beers a weak.  In ED, found to have an elevated temp (101). CXR concerning for PNA. CBC with elevated WBCs (13.60) + low hgb/hct 7.4/23.6.  Admitted for symptomatic anemia + left lower lobe PNA     * No surgery found *      Hospital Course:   Mr. Jackson was admitted for community acquired PNA and symptomatic anemia. He was treated with Ceftriaxone and Azithromycin for PNA with improvement in shortness of breath and cough. He did meet criteria for sepsis with fever and leukocytosis which resolved prior to discharge. On admit his H/H was 7.4/23.6. He was transfused 2U PRBC for anemia. He was seen by GI who defered EGD and colonscopy to outpatient in the setting of no active bleeding. His H/H remained stable post transfusion. He was discharged on PO abx to complete full course of treatment for his PNA and he was stable on RA and he was arranged for follow up with GI for endoscopy as outpatient.     Consults:   Consults         Status Ordering Provider     Inpatient consult to Gastroenterology  Once     Provider:  Ravi Roque MD    Completed RAYA DENNIS,        Service: Hospital  Medicine    Final Active Diagnoses:    Diagnosis Date Noted POA    PRINCIPAL PROBLEM:  Left lower lobe pneumonia [J18.1] 01/15/2018 Yes    Symptomatic anemia, microcytic/hypochromic  [D64.9] 01/15/2018 Yes    Hyponatremia [E87.1] 01/15/2018 Yes    Transaminitis [R74.0] 01/15/2018 Yes    Insomnia [G47.00] 08/10/2014 Yes    GERD (gastroesophageal reflux disease) [K21.9]  Yes    Depression [F32.9]  Yes    Diabetes mellitus type II, controlled [E11.9] 11/14/2012 Yes    Essential hypertension [I10] 11/14/2012 Yes      Problems Resolved During this Admission:    Diagnosis Date Noted Date Resolved POA       Discharged Condition: good    Disposition: Home or Self Care    Follow Up:  Follow-up Information     Nabila Mariscal MD On 1/31/2018.    Specialty:  Internal Medicine  Why:  11:00 a.m.  Wednesday January 31, 2018 see Dr. Mariscal for your hospital followup.  Contact information:  [address] [facility] [address] [phone]             Sy Carrera MD. Schedule an appointment as soon as possible for a visit in 2 weeks.    Specialty:  Gastroenterology  Contact information:  [address] [place] [address]  [address]  [facility]  [facility] [address] [phone]                 Patient Instructions:     Diet diabetic     Activity as tolerated       Significant Diagnostic Studies:   CXR (1/15/18):  Findings compatible with pneumonia affecting likely the superior segment of the left lower lobe and portion of the left upper lobe.  Followup radiographs are recommended in 6-8 weeks to document resolution.    Pending Diagnostic Studies:     None         Medications:  Reconciled Home Medications:   Discharge Medication List as of 1/17/2018  1:58 PM      START taking these medications    Details   azithromycin (ZITHROMAX) 500 MG tablet Take 1 tablet (500 mg total) by mouth once daily., Starting Wed 1/17/2018, Until Sun 1/21/2018, Normal      cefUROXime (CEFTIN) 500 MG tablet Take 1 tablet (500 mg  total) by mouth every 12 (twelve) hours., Starting Wed 1/17/2018, Until Wed 1/24/2018, Normal      pantoprazole (PROTONIX) 40 MG tablet Take 1 tablet (40 mg total) by mouth once daily., Starting Wed 1/17/2018, Until Thu 1/17/2019, Normal         CONTINUE these medications which have NOT CHANGED    Details   alprazolam (XANAX) 1 MG tablet Take 1 tablet (1 mg total) by mouth nightly as needed for Anxiety., Starting Fri 7/21/2017, Until Mon 10/4/2021, Print      ANDROGEL 20.25 mg/1.25 gram (1.62 %) GlPm Starting 9/16/2013, Until Discontinued, Historical Med      losartan-hydrochlorothiazide 50-12.5 mg (HYZAAR) 50-12.5 mg per tablet Take 1 tablet by mouth once daily., Starting Fri 7/21/2017, Until Sat 7/21/2018, Normal      metFORMIN (GLUCOPHAGE) 500 MG tablet Starting Wed 11/29/2017, Historical Med      metformin (GLUCOPHAGE-XR) 500 MG 24 hr tablet Take 1 tablet (500 mg total) by mouth once daily., Starting 2/1/2016, Until Discontinued, Normal      ONE TOUCH ULTRA TEST Strp Starting 9/9/2013, Until Discontinued, Historical Med      venlafaxine (EFFEXOR-XR) 150 MG Cp24 Take 1 capsule (150 mg total) by mouth once daily., Starting Fri 7/21/2017, Until Thu 12/14/2017, Normal      vitamin D 1000 units Tab Take 1,000 Units by mouth once daily., Historical Med         STOP taking these medications       ferrous gluconate (FERGON) 324 MG tablet Comments:   Reason for Stopping:               Indwelling Lines/Drains at time of discharge:   Lines/Drains/Airways          No matching active lines, drains, or airways          Time spent on the discharge of patient: 40 minutes  Patient was seen and examined on the date of discharge and determined to be suitable for discharge.         Cristina Gutierrez MD  Department of Hospital Medicine  [facility]

## 2018-01-19 NOTE — Clinical Note
Please forward this important TCC information to your provider in order to maximize the post discharge care delivery of this patient.  C3 nurse spoke with Eric Jackson  for a TCC post hospital discharge follow up call. The patient has a scheduled HOSFU appointment with Nabila Mariscal MD on 1/24 @ 0060.  Respectfully, Diana Mcclelland RN  [facility] C[facility]   [email or web address]     Please do not reply to this message, as this inbox is not routinely monitored.

## 2018-01-20 LAB
BACTERIA BLD CULT: NORMAL
BACTERIA BLD CULT: NORMAL

## 2018-01-24 ENCOUNTER — OFFICE VISIT (OUTPATIENT)
Dept: FAMILY MEDICINE | Facility: CLINIC | Age: 78
End: 2018-01-24
Payer: MEDICARE

## 2018-01-24 ENCOUNTER — HOSPITAL ENCOUNTER (OUTPATIENT)
Dept: RADIOLOGY | Facility: HOSPITAL | Age: 78
Discharge: HOME | End: 2018-01-24
Attending: INTERNAL MEDICINE
Payer: MEDICARE

## 2018-01-24 VITALS
SYSTOLIC BLOOD PRESSURE: 134 MMHG | BODY MASS INDEX: 25.51 KG/M2 | HEART RATE: 64 BPM | WEIGHT: 162.5 LBS | DIASTOLIC BLOOD PRESSURE: 70 MMHG | HEIGHT: 67 IN | TEMPERATURE: 98 F | OXYGEN SATURATION: 99 %

## 2018-01-24 DIAGNOSIS — D50.9 IRON DEFICIENCY ANEMIA, UNSPECIFIED IRON DEFICIENCY ANEMIA TYPE: ICD-10-CM

## 2018-01-24 DIAGNOSIS — J18.9 PNEUMONIA OF LEFT LOWER LOBE DUE TO INFECTIOUS ORGANISM: Primary | ICD-10-CM

## 2018-01-24 DIAGNOSIS — E11.9 CONTROLLED TYPE 2 DIABETES MELLITUS WITHOUT COMPLICATION, WITHOUT LONG-TERM CURRENT USE OF INSULIN: ICD-10-CM

## 2018-01-24 DIAGNOSIS — J18.9 PNEUMONIA OF LEFT LOWER LOBE DUE TO INFECTIOUS ORGANISM: ICD-10-CM

## 2018-01-24 PROCEDURE — 99496 TRANSJ CARE MGMT HIGH F2F 7D: CPT | Mod: S$GLB,,, | Performed by: INTERNAL MEDICINE

## 2018-01-24 PROCEDURE — 71046 X-RAY EXAM CHEST 2 VIEWS: CPT | Mod: TC,PO

## 2018-01-24 PROCEDURE — 99499 UNLISTED E&M SERVICE: CPT | Mod: S$GLB,,, | Performed by: INTERNAL MEDICINE

## 2018-01-24 PROCEDURE — 71046 X-RAY EXAM CHEST 2 VIEWS: CPT | Mod: 26,,, | Performed by: RADIOLOGY

## 2018-01-24 PROCEDURE — 99999 PR PBB SHADOW E&M-EST. PATIENT-LVL III: CPT | Mod: PBBFAC,,, | Performed by: INTERNAL MEDICINE

## 2018-01-24 NOTE — PROGRESS NOTES
Transitional Care Note  Subjective:       Patient ID: Eric Jackson is a 77 y.o. male.  Chief Complaint: Transitional Care    Family and/or Caretaker present at visit?  Yes.  Diagnostic tests reviewed/disposition: I have reviewed all completed as well as pending diagnostic tests at the time of discharge.  Disease/illness education: n/a  Home health/community services discussion/referrals: Patient does not have home health established from hospital visit.  They do not need home health.  If needed, we will set up home health for the patient.   Establishment or re-establishment of referral orders for community resources: ordered EGD and colonoscopy.   Discussion with other health care providers: No discussion with other health care providers necessary.   DM2; CAD status post CABG 2001; HTN; anxiety and depression; upper GI bleed 2012.  Hospital admission for general weakness.  Left lower lobe community-acquired pneumonia.  He was anemic on admission and he was transfused 2 units red blood cells.  He was seen by GI inpatient, recommended outpatient EGD and colonoscopy given no active bleeding.  1/2014 colonoscopy with 4 polyps and internal hemorrhoids.  EGD done 3/2015 small hiatal hernia and Archer's esophagus.    Feels very fatigued since hospital discharge.  Normally goes on the treadmill but goes maybe treadmill 2 mph x 30 min and fatigued.  Feeling very tired.  However is committed to staying physically active.  No fevers no chills.  No cough.  He finished his course of antibiotics.  It was Ceftin and Zithromax.  He does feel better post transfusion but still feels like he may have some anemia still.    He is ready to pursue EGD and colonoscopy to rule out blood loss as the cause of his iron deficiency anemia.      Review of Systems   Constitutional: Positive for fatigue. Negative for chills, diaphoresis and fever.   HENT: Negative for sinus pressure.    Respiratory: Negative for chest tightness, shortness of  breath and wheezing.    Cardiovascular: Negative for chest pain and palpitations.   Neurological: Negative for dizziness, light-headedness, numbness and headaches.       Objective:      Physical Exam   Constitutional: He is oriented to person, place, and time. He appears well-developed and well-nourished. No distress.   Eyes: EOM are normal.   Cardiovascular: Normal rate, regular rhythm and normal heart sounds.    No murmur heard.  Pulses:       Dorsalis pedis pulses are 3+ on the right side, and 3+ on the left side.        Posterior tibial pulses are 3+ on the right side, and 3+ on the left side.   Pulmonary/Chest: Effort normal and breath sounds normal.   No dullness to percussion.  I heard no crackles.  Crackles were heard during his inpatient admission on the left side.   Musculoskeletal: Normal range of motion.        Right foot: There is no deformity.        Left foot: There is no deformity.   Feet:   Right Foot:   Protective Sensation: 5 sites tested. 5 sites sensed.   Skin Integrity: Negative for ulcer, blister, skin breakdown, erythema or warmth.   Left Foot:   Protective Sensation: 5 sites tested. 5 sites sensed.   Skin Integrity: Negative for ulcer, blister, skin breakdown, erythema or warmth.   Neurological: He is alert and oriented to person, place, and time. Coordination normal.   Skin: Skin is warm and dry.   Psychiatric: He has a normal mood and affect. His behavior is normal. Thought content normal.       Assessment:       1. Pneumonia of left lower lobe due to infectious organism    2. Iron deficiency anemia, unspecified iron deficiency anemia type    3. Controlled type 2 diabetes mellitus without complication, without long-term current use of insulin        Plan:   Pneumonia of left lower lobe due to infectious organism-clinically looks good.  Check chest x-ray to look for clearing.  No indication to restart antibiotics at this time.  -     X-Ray Chest PA And Lateral; Future; Expected date:  01/24/2018    Iron deficiency anemia, unspecified iron deficiency anemia type-status post blood transfusion while hospitalized.  It's been about 10 days since his last blood count, we'll check a CBC for stability.  Ordered EGD and colonoscopy.  -     CBC auto differential; Future; Expected date: 01/24/2018  -     Case request GI: COLONOSCOPY, ESOPHAGOGASTRODUODENOSCOPY (EGD)    Controlled type 2 diabetes mellitus without complication, without long-term current use of insulin-foot exam normal today.

## 2018-01-25 ENCOUNTER — TELEPHONE (OUTPATIENT)
Dept: FAMILY MEDICINE | Facility: CLINIC | Age: 78
End: 2018-01-25

## 2018-01-25 DIAGNOSIS — J18.9 PNEUMONIA OF LEFT LOWER LOBE DUE TO INFECTIOUS ORGANISM: Primary | ICD-10-CM

## 2018-01-25 NOTE — TELEPHONE ENCOUNTER
Please call pt (he does not use [facility]) - the chest X ray shows partial resolution of the pneumonia.  Would repeat 1 month.  I put in the order for 1 month and a reminder as well.    His blood count was stable - no drop.

## 2018-01-26 ENCOUNTER — TELEPHONE (OUTPATIENT)
Dept: FAMILY MEDICINE | Facility: CLINIC | Age: 78
End: 2018-01-26

## 2018-01-26 NOTE — TELEPHONE ENCOUNTER
----- Message from Miya Mtz sent at 1/26/2018 10:56 AM CST -----  Contact: Eric [phone]  Pt is calling to have orders put in for a colonoscopy and an endoscopy. He was released from the hospital a week ago. Please call at your earliest convenience.

## 2018-01-29 ENCOUNTER — TELEPHONE (OUTPATIENT)
Dept: FAMILY MEDICINE | Facility: CLINIC | Age: 78
End: 2018-01-29

## 2018-01-29 NOTE — TELEPHONE ENCOUNTER
"I submitted order for EGD and colonoscopy at his OV.  Under status it is listed as "completed". Do I have to resubmit the referral?  "

## 2018-01-29 NOTE — TELEPHONE ENCOUNTER
----- Message from Ольгаtess Diaz sent at 1/29/2018 12:22 PM CST -----  Contact: self  Please advise pt......Pt is calling to have orders put in for a colonoscopy and an endoscopy. He was released from the hospital a week ago. Contact pt at [phone].    Thanks-

## 2018-01-29 NOTE — TELEPHONE ENCOUNTER
Patient was upset that the orders still have not been yet been placed for colonoscopy and endoscopy. Patient stated that he was in the hospital for anemia and they wanted patient to have tests done to make sure he wasnt bleeding internally. Patient seen Dr. Boo recently and stated he was suppose to place orders. Please advise.

## 2018-02-01 RX ORDER — POLYETHYLENE GLYCOL 3350, SODIUM SULFATE, SODIUM CHLORIDE, POTASSIUM CHLORIDE, SODIUM ASCORBATE, AND ASCORBIC ACID 7.5-2.691G
2000 KIT ORAL ONCE
Qty: 1 BOTTLE | Refills: 0 | Status: SHIPPED | OUTPATIENT
Start: 2018-02-01 | End: 2018-02-01

## 2018-02-06 RX ORDER — POLYETHYLENE GLYCOL 3350, SODIUM SULFATE, SODIUM CHLORIDE, POTASSIUM CHLORIDE, SODIUM ASCORBATE, AND ASCORBIC ACID 7.5-2.691G
2000 KIT ORAL ONCE
Qty: 1 BOTTLE | Refills: 0 | Status: SHIPPED | OUTPATIENT
Start: 2018-02-06 | End: 2018-02-06

## 2018-02-09 ENCOUNTER — ANESTHESIA EVENT (OUTPATIENT)
Dept: ENDOSCOPY | Facility: HOSPITAL | Age: 78
End: 2018-02-09
Payer: MEDICARE

## 2018-02-09 ENCOUNTER — HOSPITAL ENCOUNTER (OUTPATIENT)
Facility: HOSPITAL | Age: 78
Discharge: HOME | End: 2018-02-09
Attending: INTERNAL MEDICINE | Admitting: INTERNAL MEDICINE
Payer: MEDICARE

## 2018-02-09 ENCOUNTER — ANESTHESIA (OUTPATIENT)
Dept: ENDOSCOPY | Facility: HOSPITAL | Age: 78
End: 2018-02-09
Payer: MEDICARE

## 2018-02-09 VITALS
HEIGHT: 67 IN | HEART RATE: 95 BPM | RESPIRATION RATE: 18 BRPM | TEMPERATURE: 98 F | SYSTOLIC BLOOD PRESSURE: 111 MMHG | OXYGEN SATURATION: 96 % | DIASTOLIC BLOOD PRESSURE: 67 MMHG | WEIGHT: 165 LBS | BODY MASS INDEX: 25.9 KG/M2

## 2018-02-09 DIAGNOSIS — D50.9 IRON DEFICIENCY ANEMIA: ICD-10-CM

## 2018-02-09 LAB — POCT GLUCOSE: 105 MG/DL (ref 70–110)

## 2018-02-09 PROCEDURE — 27201012 HC FORCEPS, HOT/COLD, DISP: Performed by: INTERNAL MEDICINE

## 2018-02-09 PROCEDURE — 25000003 PHARM REV CODE 250: Performed by: ANESTHESIOLOGY

## 2018-02-09 PROCEDURE — D9220A PRA ANESTHESIA: Mod: ANES,,, | Performed by: ANESTHESIOLOGY

## 2018-02-09 PROCEDURE — 37000008 HC ANESTHESIA 1ST 15 MINUTES: Performed by: INTERNAL MEDICINE

## 2018-02-09 PROCEDURE — 45385 COLONOSCOPY W/LESION REMOVAL: CPT | Performed by: INTERNAL MEDICINE

## 2018-02-09 PROCEDURE — 63600175 PHARM REV CODE 636 W HCPCS: Performed by: NURSE ANESTHETIST, CERTIFIED REGISTERED

## 2018-02-09 PROCEDURE — 88305 TISSUE EXAM BY PATHOLOGIST: CPT | Mod: 26,,, | Performed by: PATHOLOGY

## 2018-02-09 PROCEDURE — D9220A PRA ANESTHESIA: Mod: CRNA,,, | Performed by: NURSE ANESTHETIST, CERTIFIED REGISTERED

## 2018-02-09 PROCEDURE — 25000003 PHARM REV CODE 250: Performed by: NURSE ANESTHETIST, CERTIFIED REGISTERED

## 2018-02-09 PROCEDURE — 88305 TISSUE EXAM BY PATHOLOGIST: CPT | Performed by: PATHOLOGY

## 2018-02-09 PROCEDURE — 43239 EGD BIOPSY SINGLE/MULTIPLE: CPT | Performed by: INTERNAL MEDICINE

## 2018-02-09 PROCEDURE — 37000009 HC ANESTHESIA EA ADD 15 MINS: Performed by: INTERNAL MEDICINE

## 2018-02-09 PROCEDURE — 27201089 HC SNARE, DISP (ANY): Performed by: INTERNAL MEDICINE

## 2018-02-09 RX ORDER — LIDOCAINE HCL/PF 100 MG/5ML
SYRINGE (ML) INTRAVENOUS
Status: DISCONTINUED | OUTPATIENT
Start: 2018-02-09 | End: 2018-02-09

## 2018-02-09 RX ORDER — PANTOPRAZOLE SODIUM 40 MG/1
40 TABLET, DELAYED RELEASE ORAL DAILY
Qty: 30 TABLET | Refills: 11 | Status: SHIPPED | OUTPATIENT
Start: 2018-02-09 | End: 2018-05-24

## 2018-02-09 RX ORDER — LIDOCAINE HYDROCHLORIDE 20 MG/ML
INJECTION, SOLUTION EPIDURAL; INFILTRATION; INTRACAUDAL; PERINEURAL
Status: DISCONTINUED
Start: 2018-02-09 | End: 2018-02-09 | Stop reason: HOSPADM

## 2018-02-09 RX ORDER — PROPOFOL 10 MG/ML
VIAL (ML) INTRAVENOUS
Status: DISCONTINUED
Start: 2018-02-09 | End: 2018-02-09 | Stop reason: HOSPADM

## 2018-02-09 RX ORDER — SODIUM CHLORIDE 9 MG/ML
INJECTION, SOLUTION INTRAVENOUS ONCE
Status: COMPLETED | OUTPATIENT
Start: 2018-02-09 | End: 2018-02-09

## 2018-02-09 RX ORDER — PROPOFOL 10 MG/ML
VIAL (ML) INTRAVENOUS
Status: COMPLETED
Start: 2018-02-09 | End: 2018-02-09

## 2018-02-09 RX ORDER — PROPOFOL 10 MG/ML
VIAL (ML) INTRAVENOUS
Status: DISCONTINUED | OUTPATIENT
Start: 2018-02-09 | End: 2018-02-09

## 2018-02-09 RX ORDER — SODIUM CHLORIDE 9 MG/ML
INJECTION, SOLUTION INTRAVENOUS CONTINUOUS PRN
Status: DISCONTINUED | OUTPATIENT
Start: 2018-02-09 | End: 2018-02-09

## 2018-02-09 RX ADMIN — LIDOCAINE HYDROCHLORIDE 100 MG: 20 INJECTION, SOLUTION INTRAVENOUS at 11:02

## 2018-02-09 RX ADMIN — PROPOFOL 50 MG: 10 INJECTION, EMULSION INTRAVENOUS at 11:02

## 2018-02-09 RX ADMIN — PROPOFOL 100 MG: 10 INJECTION, EMULSION INTRAVENOUS at 11:02

## 2018-02-09 RX ADMIN — SODIUM CHLORIDE: 0.9 INJECTION, SOLUTION INTRAVENOUS at 10:02

## 2018-02-09 RX ADMIN — PROPOFOL 50 MG: 10 INJECTION, EMULSION INTRAVENOUS at 12:02

## 2018-02-09 NOTE — TRANSFER OF CARE
"Anesthesia Transfer of Care Note    Patient: Eric Jackson    Procedure(s) Performed: Procedure(s) (LRB):  COLONOSCOPY (N/A)  ESOPHAGOGASTRODUODENOSCOPY (EGD) (N/A)    Patient location: GI    Anesthesia Type: general    Transport from OR: Transported from OR on room air with adequate spontaneous ventilation    Post pain: adequate analgesia    Post assessment: no apparent anesthetic complications and tolerated procedure well    Post vital signs: stable    Level of consciousness: sedated    Nausea/Vomiting: no nausea/vomiting    Complications: none    Transfer of care protocol was followed      Last vitals:   Visit Vitals  BP (!) 103/56 (BP Location: Left arm, Patient Position: Lying)   Pulse 64   Temp 36.4 °C (97.6 °F)   Resp 18   Ht 5' 7" (1.702 m)   Wt 74.8 kg (165 lb)   SpO2 (!) 94%   BMI 25.84 kg/m²     "

## 2018-02-12 NOTE — ANESTHESIA PREPROCEDURE EVALUATION
02/12/2018  Eric Jackson is a 77 y.o., male.    Anesthesia Evaluation     I have reviewed the Nursing Notes.      Review of Systems  Social:  Former Smoker   Hematology/Oncology:         -- Anemia:   Cardiovascular:   Denies Pacemaker. Hypertension CAD   CABG/stent     Pulmonary:   Denies Asthma.  Denies Shortness of breath.  Denies Sleep Apnea.    Hepatic/GI:   Bowel Prep. GERD    Neurological:  Neurology Normal    Endocrine:   Diabetes, type 2 Denies Hypothyroidism. Denies Hyperthyroidism.    Psych:   Psychiatric History anxiety          Physical Exam  General:  Well nourished    Airway/Jaw/Neck:  AIRWAY FINDINGS: Normal           Mental Status:  Mental Status Findings: Normal        Anesthesia Plan  Type of Anesthesia, risks & benefits discussed:  Anesthesia Type:  general  Patient's Preference:   Intra-op Monitoring Plan: standard ASA monitors  Intra-op Monitoring Plan Comments:   Post Op Pain Control Plan:   Post Op Pain Control Plan Comments:   Induction:   IV  Beta Blocker:  Patient is not currently on a Beta-Blocker (No further documentation required).       Informed Consent: Patient understands risks and agrees with Anesthesia plan.  Questions answered. Anesthesia consent signed with patient.  ASA Score: 2     Day of Surgery Review of History & Physical:    H&P update referred to the surgeon.         Ready For Surgery From Anesthesia Perspective.

## 2018-02-12 NOTE — ANESTHESIA POSTPROCEDURE EVALUATION
"Anesthesia Post Evaluation    Patient: Eric Jackson    Procedure(s) Performed: Procedure(s) (LRB):  COLONOSCOPY (N/A)  ESOPHAGOGASTRODUODENOSCOPY (EGD) (N/A)    Final Anesthesia Type: general  Patient location during evaluation: GI PACU  Patient participation: Yes- Able to Participate  Level of consciousness: awake and alert  Post-procedure vital signs: reviewed and stable  Pain management: adequate  Airway patency: patent  PONV status at discharge: No PONV  Anesthetic complications: no      Cardiovascular status: blood pressure returned to baseline and hemodynamically stable  Respiratory status: unassisted and spontaneous ventilation  Hydration status: euvolemic  Follow-up not needed.        Visit Vitals  /67 (BP Location: Left arm, Patient Position: Lying)   Pulse 95   Temp 36.4 °C (97.6 °F)   Resp 18   Ht 5' 7" (1.702 m)   Wt 74.8 kg (165 lb)   SpO2 96%   BMI 25.84 kg/m²       Pain/Edith Score: No Data Recorded      "

## 2018-02-22 ENCOUNTER — HOSPITAL ENCOUNTER (OUTPATIENT)
Dept: RADIOLOGY | Facility: HOSPITAL | Age: 78
Discharge: HOME | End: 2018-02-22
Attending: INTERNAL MEDICINE
Payer: MEDICARE

## 2018-02-22 DIAGNOSIS — J18.9 PNEUMONIA OF LEFT LOWER LOBE DUE TO INFECTIOUS ORGANISM: Primary | ICD-10-CM

## 2018-02-22 DIAGNOSIS — J18.9 PNEUMONIA OF LEFT LOWER LOBE DUE TO INFECTIOUS ORGANISM: ICD-10-CM

## 2018-02-22 PROCEDURE — 71046 X-RAY EXAM CHEST 2 VIEWS: CPT | Mod: 26,,, | Performed by: RADIOLOGY

## 2018-02-22 PROCEDURE — 71046 X-RAY EXAM CHEST 2 VIEWS: CPT | Mod: TC,FY,PO

## 2018-02-22 NOTE — PROGRESS NOTES
CXR continued clearing of pneumonia need 1 more (hopefully) to ensure resolution, orders submitted for 1 month.  Has follow up tomorrow with PA for clinical evaluation post pneumonia.

## 2018-02-23 ENCOUNTER — OFFICE VISIT (OUTPATIENT)
Dept: FAMILY MEDICINE | Facility: CLINIC | Age: 78
End: 2018-02-23
Payer: MEDICARE

## 2018-02-23 ENCOUNTER — LAB VISIT (OUTPATIENT)
Dept: LAB | Facility: HOSPITAL | Age: 78
End: 2018-02-23
Payer: MEDICARE

## 2018-02-23 VITALS
BODY MASS INDEX: 26.82 KG/M2 | DIASTOLIC BLOOD PRESSURE: 68 MMHG | TEMPERATURE: 98 F | OXYGEN SATURATION: 96 % | HEIGHT: 67 IN | WEIGHT: 170.88 LBS | SYSTOLIC BLOOD PRESSURE: 164 MMHG | HEART RATE: 74 BPM

## 2018-02-23 DIAGNOSIS — D64.9 SYMPTOMATIC ANEMIA: ICD-10-CM

## 2018-02-23 DIAGNOSIS — J18.9 PNEUMONIA OF LEFT LOWER LOBE DUE TO INFECTIOUS ORGANISM: Primary | ICD-10-CM

## 2018-02-23 DIAGNOSIS — D50.9 IRON DEFICIENCY ANEMIA, UNSPECIFIED IRON DEFICIENCY ANEMIA TYPE: ICD-10-CM

## 2018-02-23 DIAGNOSIS — I10 ESSENTIAL HYPERTENSION: ICD-10-CM

## 2018-02-23 LAB
BASOPHILS # BLD AUTO: 0.05 K/UL
BASOPHILS NFR BLD: 0.7 %
DIFFERENTIAL METHOD: ABNORMAL
EOSINOPHIL # BLD AUTO: 0.6 K/UL
EOSINOPHIL NFR BLD: 9.1 %
ERYTHROCYTE [DISTWIDTH] IN BLOOD BY AUTOMATED COUNT: 14.7 %
HCT VFR BLD AUTO: 39.1 %
HGB BLD-MCNC: 12.2 G/DL
IMM GRANULOCYTES # BLD AUTO: 0.02 K/UL
IMM GRANULOCYTES NFR BLD AUTO: 0.3 %
LYMPHOCYTES # BLD AUTO: 2.6 K/UL
LYMPHOCYTES NFR BLD: 36.4 %
MCH RBC QN AUTO: 28.1 PG
MCHC RBC AUTO-ENTMCNC: 31.2 G/DL
MCV RBC AUTO: 90 FL
MONOCYTES # BLD AUTO: 0.5 K/UL
MONOCYTES NFR BLD: 7 %
NEUTROPHILS # BLD AUTO: 3.3 K/UL
NEUTROPHILS NFR BLD: 46.5 %
NRBC BLD-RTO: 0 /100 WBC
PLATELET # BLD AUTO: 293 K/UL
PMV BLD AUTO: 9.9 FL
RBC # BLD AUTO: 4.34 M/UL
WBC # BLD AUTO: 7.01 K/UL

## 2018-02-23 PROCEDURE — 1126F AMNT PAIN NOTED NONE PRSNT: CPT | Mod: S$GLB,,, | Performed by: NURSE PRACTITIONER

## 2018-02-23 PROCEDURE — 99999 PR PBB SHADOW E&M-EST. PATIENT-LVL IV: CPT | Mod: PBBFAC,,, | Performed by: NURSE PRACTITIONER

## 2018-02-23 PROCEDURE — 1159F MED LIST DOCD IN RCRD: CPT | Mod: S$GLB,,, | Performed by: NURSE PRACTITIONER

## 2018-02-23 PROCEDURE — 3008F BODY MASS INDEX DOCD: CPT | Mod: S$GLB,,, | Performed by: NURSE PRACTITIONER

## 2018-02-23 PROCEDURE — 99499 UNLISTED E&M SERVICE: CPT | Mod: S$GLB,,, | Performed by: NURSE PRACTITIONER

## 2018-02-23 PROCEDURE — 99214 OFFICE O/P EST MOD 30 MIN: CPT | Mod: S$GLB,,, | Performed by: NURSE PRACTITIONER

## 2018-02-23 PROCEDURE — 85025 COMPLETE CBC W/AUTO DIFF WBC: CPT

## 2018-02-23 PROCEDURE — 36415 COLL VENOUS BLD VENIPUNCTURE: CPT | Mod: PO

## 2018-02-23 NOTE — PROGRESS NOTES
History of Present Illness   Eric Jackson is a 77 y.o. man with medical history as listed below who presents today for follow-up on left lower lobe pneumonia. He was admitted on 1/15/18 for pneumonia with symptomatic anemia. He was treated with IV abx and blood transfusion, 2 units. He had follow-up with PCP on 1/24/18 and repeat chest x-ray was ordered for one month. Repeat x-ray on yesterday which showed left lower lobe pneumonia still present but improving. He reports feeling better but still noticing shortness of breath with exertion. He has also noticed that his blood pressure have been consistently running >130/80 for the past month. He is taking his medication daily as prescribed. He denies productive cough, wheezing, chest pain, palpitations, or other symptoms. He has no additional complaints and is otherwise healthy on today's visit.      Past Medical History:   Diagnosis Date    Anxiety     Coronary artery disease     Depression     Diabetes mellitus type II     Erectile dysfunction     Eye injuries     fb ou    GERD (gastroesophageal reflux disease)     Hypertension        Past Surgical History:   Procedure Laterality Date    COLONOSCOPY N/A 2/9/2018    Procedure: COLONOSCOPY;  Surgeon: Mathieu Cao MD;  Location: [facility];  Service: Endoscopy;  Laterality: N/A;    CORONARY ARTERY BYPASS GRAFT  2001    HERNIA REPAIR      WRIST SURGERY         Social History     Social History    Marital status:      Spouse name: N/A    Number of children: N/A    Years of education: N/A     Social History Main Topics    Smoking status: Former Smoker     Packs/day: 2.00     Years: 23.00     Quit date: 1/1/1989    Smokeless tobacco: Never Used    Alcohol use 0.6 oz/week     1 Cans of beer per week      Comment: 1 can of beer per day    Drug use: No    Sexual activity: Yes     Partners: Female     Other Topics Concern    None     Social History Narrative    None       Family History  "  Problem Relation Age of Onset    Adopted: Yes    No Known Problems Mother     No Known Problems Father     No Known Problems Maternal Grandmother     No Known Problems Maternal Grandfather     No Known Problems Paternal Grandmother     No Known Problems Paternal Grandfather     No Known Problems Sister     No Known Problems Brother     No Known Problems Maternal Aunt     No Known Problems Maternal Uncle     No Known Problems Paternal Aunt     No Known Problems Paternal Uncle     Amblyopia Neg Hx     Blindness Neg Hx     Cancer Neg Hx     Cataracts Neg Hx     Diabetes Neg Hx     Glaucoma Neg Hx     Hypertension Neg Hx     Macular degeneration Neg Hx     Retinal detachment Neg Hx     Strabismus Neg Hx     Stroke Neg Hx     Thyroid disease Neg Hx        Review of Systems  Review of Systems   Constitutional: Negative for chills, fever and malaise/fatigue.   Respiratory: Positive for shortness of breath (on exertion). Negative for cough, sputum production and wheezing.    Cardiovascular: Negative for chest pain, palpitations and leg swelling.     A complete review of systems was otherwise negative.    Physical Exam  BP (!) 164/68 (BP Location: Right arm, Patient Position: Sitting, BP Method: Medium (Manual))   Pulse 74   Temp 97.7 °F (36.5 °C) (Oral)   Ht 5' 7" (1.702 m)   Wt 77.5 kg (170 lb 13.7 oz)   SpO2 96%   BMI 26.76 kg/m²   General appearance: alert, appears stated age, cooperative and no distress  Eyes: negative findings: lids and lashes normal, conjunctivae and sclerae normal and pupils equal, round, reactive to light and accomodation  Neck: no JVD and supple, symmetrical, trachea midline  Lungs: clear to auscultation bilaterally and no wheezes, rales, or ronchi  Heart: regular rate and rhythm, S1, S2 normal, no murmur, click, rub or gallop  Extremities: extremities normal, atraumatic, no cyanosis or edema  Pulses: 2+ and symmetric  Skin: Skin color, texture, turgor normal. No " rashes or lesions  Neurologic: Grossly normal    Assessment/Plan  Pneumonia of left lower lobe due to infectious organism  Doing better, slow improvement. No indication for additional abx at this time.  Repeat chest x-ray in one month to ensure resolution.    Essential hypertension  Continue to monitor BP at home and record readings.   Take one dose of losartan-HCTZ in the morning, if >130/80 in the evening take second dose. If BP controlled we will switch to 100mg-25mg tab.    Symptomatic anemia, microcytic/hypochromic   Recheck blood counts today.  This may be attributing to his KIMBROUGH.  Follow-up with hemonc as warranted.   -     CBC auto differential; Future; Expected date: 02/23/2018    Iron deficiency anemia, unspecified iron deficiency anemia type  As above.  -     CBC auto differential; Future; Expected date: 02/23/2018    He has verbalized understanding and is in agreement with plan of care.    Follow-up in about 1 month (around 3/23/2018) for follow-up chronic conditions.

## 2018-02-26 ENCOUNTER — TELEPHONE (OUTPATIENT)
Dept: FAMILY MEDICINE | Facility: CLINIC | Age: 78
End: 2018-02-26

## 2018-02-26 NOTE — TELEPHONE ENCOUNTER
Please let the patient know his blood counts are continuing to improve and are within an acceptable range. We will repeat the x-ray in about one month to check on the status of the pneumonia. Let me know if he has any questions.    Thanks!  LINETTE Ray

## 2018-03-06 ENCOUNTER — TELEPHONE (OUTPATIENT)
Dept: FAMILY MEDICINE | Facility: CLINIC | Age: 78
End: 2018-03-06

## 2018-03-06 NOTE — TELEPHONE ENCOUNTER
Patient instructed to take OTC iron pills and follow up with an xray at the end of the month. Patient verbalized understanding stating he would like to try the iron pills before making an appointment to be seen. Patient instructed to call the office if symptoms worsen or persist. No further questions or concerns at this time

## 2018-03-06 NOTE — TELEPHONE ENCOUNTER
----- Message from Afshan Perales sent at 3/6/2018 12:00 PM CST -----  Contact: self  Pt called stating he is experiencing same pneumonia symptoms/lethargic. Contact pt and advise at [phone].    Thanks-

## 2018-03-09 ENCOUNTER — OFFICE VISIT (OUTPATIENT)
Dept: OPTOMETRY | Facility: CLINIC | Age: 78
End: 2018-03-09
Payer: MEDICARE

## 2018-03-09 DIAGNOSIS — H35.3132 INTERMEDIATE STAGE NONEXUDATIVE AGE-RELATED MACULAR DEGENERATION OF BOTH EYES: ICD-10-CM

## 2018-03-09 DIAGNOSIS — E11.9 DIABETIC EYE EXAM: Primary | ICD-10-CM

## 2018-03-09 DIAGNOSIS — E11.9 TYPE 2 DIABETES MELLITUS WITHOUT COMPLICATION: ICD-10-CM

## 2018-03-09 DIAGNOSIS — H52.7 REFRACTIVE ERROR: ICD-10-CM

## 2018-03-09 DIAGNOSIS — H25.13 NUCLEAR SCLEROSIS OF BOTH EYES: ICD-10-CM

## 2018-03-09 DIAGNOSIS — Z01.00 DIABETIC EYE EXAM: Primary | ICD-10-CM

## 2018-03-09 PROCEDURE — 99999 PR PBB SHADOW E&M-EST. PATIENT-LVL II: CPT | Mod: PBBFAC,,, | Performed by: OPTOMETRIST

## 2018-03-09 PROCEDURE — 99499 UNLISTED E&M SERVICE: CPT | Mod: S$GLB,,, | Performed by: OPTOMETRIST

## 2018-03-09 PROCEDURE — 92015 DETERMINE REFRACTIVE STATE: CPT | Mod: S$GLB,,, | Performed by: OPTOMETRIST

## 2018-03-09 PROCEDURE — 92014 COMPRE OPH EXAM EST PT 1/>: CPT | Mod: S$GLB,,, | Performed by: OPTOMETRIST

## 2018-03-09 NOTE — PROGRESS NOTES
Subjective:       Patient ID: Eric Jackson is a 77 y.o. male      Chief Complaint   Patient presents with    Diabetic Eye Exam     NIDDM 2, A1c = 5.5 (1/2/18), lbs: 120 x 1 day      History of Present Illness  Dls: 10/18/17 Dr. Wood     Pt here for diabetic eye exam.  Pt wants a new rx for glasses. Pt wears bifocal glasses.   Pt states no tearing no itching no burning no pain no ha's no floaters.     Eye meds:  otc visine ou prn    Hemoglobin A1C       Date                     Value               Ref Range           Status                01/02/2018               5.5                 4.0 - 5.6 %         Final                  01/06/2017               6.0                 4.5 - 6.2 %         Final              ----------     Assessment/Plan:     1. Diabetic eye exam  - Eyemed vision exam  - No diabetic retinopathy. Educated patient on importance of good blood sugar control, compliance with meds, and follow up care with PCP. Return in 1 year for dilated eye exam, sooner PRN.    2. Nuclear sclerosis of both eyes  Educated pt on presence of cataracts and effects on vision. No surgery at this time. Recheck in one year.    3. Intermediate stage nonexudative age-related macular degeneration of both eyes  Stable. Continue home amsler. Pt has follow up scheduled with retina next month.    4. Refractive error  Educated patient on refractive error and discussed lens options. Dispensed updated spectacle Rx. Educated about adaptation period to new specs.    Eyeglass Final Rx     Eyeglass Final Rx       Sphere Cylinder Axis Add    Right -3.50 +1.50 145 +2.50    Left -3.75 +1.25 060 +2.50    Expiration Date:  3/10/2019                  Follow-up for retina follow up with Dr. Wood as scheduled.

## 2018-04-17 ENCOUNTER — LAB VISIT (OUTPATIENT)
Dept: LAB | Facility: HOSPITAL | Age: 78
End: 2018-04-17
Attending: FAMILY MEDICINE
Payer: MEDICARE

## 2018-04-17 ENCOUNTER — OFFICE VISIT (OUTPATIENT)
Dept: FAMILY MEDICINE | Facility: CLINIC | Age: 78
End: 2018-04-17
Payer: MEDICARE

## 2018-04-17 VITALS
HEIGHT: 67 IN | OXYGEN SATURATION: 96 % | SYSTOLIC BLOOD PRESSURE: 148 MMHG | WEIGHT: 171 LBS | HEART RATE: 80 BPM | BODY MASS INDEX: 26.84 KG/M2 | DIASTOLIC BLOOD PRESSURE: 80 MMHG | TEMPERATURE: 98 F

## 2018-04-17 DIAGNOSIS — I10 ESSENTIAL HYPERTENSION: ICD-10-CM

## 2018-04-17 DIAGNOSIS — I10 ESSENTIAL HYPERTENSION: Primary | ICD-10-CM

## 2018-04-17 DIAGNOSIS — K21.9 GASTROESOPHAGEAL REFLUX DISEASE, ESOPHAGITIS PRESENCE NOT SPECIFIED: ICD-10-CM

## 2018-04-17 DIAGNOSIS — E11.9 TYPE 2 DIABETES MELLITUS WITHOUT COMPLICATION: ICD-10-CM

## 2018-04-17 DIAGNOSIS — D64.9 SYMPTOMATIC ANEMIA: ICD-10-CM

## 2018-04-17 DIAGNOSIS — Z87.891 FORMER SMOKER: ICD-10-CM

## 2018-04-17 DIAGNOSIS — Z87.01 HISTORY OF PNEUMONIA: ICD-10-CM

## 2018-04-17 DIAGNOSIS — E11.9 CONTROLLED TYPE 2 DIABETES MELLITUS WITHOUT COMPLICATION, WITHOUT LONG-TERM CURRENT USE OF INSULIN: ICD-10-CM

## 2018-04-17 PROBLEM — J18.9 LEFT LOWER LOBE PNEUMONIA: Status: RESOLVED | Noted: 2018-01-15 | Resolved: 2018-04-17

## 2018-04-17 LAB
CREAT UR-MCNC: 35 MG/DL
MICROALBUMIN UR DL<=1MG/L-MCNC: 3 UG/ML
MICROALBUMIN/CREATININE RATIO: 8.6 UG/MG

## 2018-04-17 PROCEDURE — 99499 UNLISTED E&M SERVICE: CPT | Mod: S$GLB,,, | Performed by: NURSE PRACTITIONER

## 2018-04-17 PROCEDURE — 3077F SYST BP >= 140 MM HG: CPT | Mod: CPTII,S$GLB,, | Performed by: NURSE PRACTITIONER

## 2018-04-17 PROCEDURE — 99214 OFFICE O/P EST MOD 30 MIN: CPT | Mod: S$GLB,,, | Performed by: NURSE PRACTITIONER

## 2018-04-17 PROCEDURE — 82043 UR ALBUMIN QUANTITATIVE: CPT

## 2018-04-17 PROCEDURE — 3079F DIAST BP 80-89 MM HG: CPT | Mod: CPTII,S$GLB,, | Performed by: NURSE PRACTITIONER

## 2018-04-17 PROCEDURE — 99999 PR PBB SHADOW E&M-EST. PATIENT-LVL V: CPT | Mod: PBBFAC,,, | Performed by: NURSE PRACTITIONER

## 2018-04-17 PROCEDURE — 3044F HG A1C LEVEL LT 7.0%: CPT | Mod: CPTII,S$GLB,, | Performed by: NURSE PRACTITIONER

## 2018-04-17 RX ORDER — AMLODIPINE BESYLATE 2.5 MG/1
TABLET ORAL
Qty: 90 TABLET | Refills: 0 | OUTPATIENT
Start: 2018-04-17

## 2018-04-17 RX ORDER — AMLODIPINE BESYLATE 2.5 MG/1
2.5 TABLET ORAL DAILY
Qty: 30 TABLET | Refills: 0 | Status: SHIPPED | OUTPATIENT
Start: 2018-04-17 | End: 2018-05-24 | Stop reason: ALTCHOICE

## 2018-04-17 NOTE — PATIENT INSTRUCTIONS
Taking Amlodipine  Amlodipine (pw-IT-gr-stephan) is a calcium channel blocker. It helps relax your blood vessels and get more blood and oxygen to your heart. Relaxing the blood vessels also helps lower your blood pressure and relieve any chest pain you may have.     Each time you take your medicine, gali it on the calendar so you won't forget.     Medication tips  · Read the fact sheet that comes with your medicine. It tells you when and how to take it. Ask for a sheet if you dont get one.  · Take your medicine at the same time each day. If it upsets your stomach, take it with food or milk.  · If you miss a dose, take it as soon as you remember -- unless it is almost time for your next dose. If so, skip the missed dose. Do not take a double dose.  · Call your doctor or pharmacist if you have any questions about taking your medicine.  · Take your medicine even if you feel well. Most people with high blood pressure dont feel sick.  For your safety  · Ask your doctor or pharmacist if there are any foods or medicines you should avoid.  · To prevent dizziness, get up slowly after sitting or lying down.  · Do not stop taking your medicine unless your doctor tells you to. Doing so can make your condition worse. When stopping this medicine, the dose may need to be slowly decreased.  · Tell your doctor or pharmacist before taking any other prescription or over-the-counter medicines. This includes vitamin or mineral supplements and herbal remedies.  · Talk to your doctor or pharmacist about whether drinking alcohol is safe while taking amlodipine.  · Be sure to refill your prescription before you run out.  · Do not share your medicine with anyone.  · Ask your doctor how often you should have your blood pressure checked.  When to seek medical advice  Call your healthcare provider right away if any of these occur:  · You notice swelling in your ankles or feet or your skin flushes  · You have a headache or nausea  · You feel  tired or weak  · You have severe dizziness  · You feel chest pain  · You have trouble breathing  · You develop a skin rash or itching   Date Last Reviewed: 6/1/2016  © 3932-1067 [employer]. [address] [place] [address], [place] [address]. All rights reserved. This information is not intended as a substitute for professional medical care. Always follow your healthcare professional's instructions.

## 2018-04-18 ENCOUNTER — PES CALL (OUTPATIENT)
Dept: ADMINISTRATIVE | Facility: CLINIC | Age: 78
End: 2018-04-18

## 2018-04-20 ENCOUNTER — OFFICE VISIT (OUTPATIENT)
Dept: SLEEP MEDICINE | Facility: CLINIC | Age: 78
End: 2018-04-20
Payer: MEDICARE

## 2018-04-20 VITALS
HEIGHT: 67 IN | DIASTOLIC BLOOD PRESSURE: 78 MMHG | HEART RATE: 81 BPM | BODY MASS INDEX: 26.51 KG/M2 | WEIGHT: 168.88 LBS | OXYGEN SATURATION: 97 % | SYSTOLIC BLOOD PRESSURE: 128 MMHG

## 2018-04-20 DIAGNOSIS — R06.09 DYSPNEA ON EXERTION: Primary | ICD-10-CM

## 2018-04-20 DIAGNOSIS — G47.00 INSOMNIA, UNSPECIFIED TYPE: ICD-10-CM

## 2018-04-20 DIAGNOSIS — Z23 NEED FOR PROPHYLACTIC VACCINATION AGAINST STREPTOCOCCUS PNEUMONIAE (PNEUMOCOCCUS): ICD-10-CM

## 2018-04-20 DIAGNOSIS — G47.33 OSA (OBSTRUCTIVE SLEEP APNEA): ICD-10-CM

## 2018-04-20 PROCEDURE — G0009 ADMIN PNEUMOCOCCAL VACCINE: HCPCS | Mod: S$GLB,,, | Performed by: INTERNAL MEDICINE

## 2018-04-20 PROCEDURE — 90670 PCV13 VACCINE IM: CPT | Mod: S$GLB,,, | Performed by: INTERNAL MEDICINE

## 2018-04-20 PROCEDURE — 99499 UNLISTED E&M SERVICE: CPT | Mod: S$GLB,,, | Performed by: INTERNAL MEDICINE

## 2018-04-20 PROCEDURE — 3074F SYST BP LT 130 MM HG: CPT | Mod: CPTII,S$GLB,, | Performed by: INTERNAL MEDICINE

## 2018-04-20 PROCEDURE — 99999 PR PBB SHADOW E&M-EST. PATIENT-LVL III: CPT | Mod: PBBFAC,,, | Performed by: INTERNAL MEDICINE

## 2018-04-20 PROCEDURE — 3078F DIAST BP <80 MM HG: CPT | Mod: CPTII,S$GLB,, | Performed by: INTERNAL MEDICINE

## 2018-04-20 PROCEDURE — 99203 OFFICE O/P NEW LOW 30 MIN: CPT | Mod: S$GLB,,, | Performed by: INTERNAL MEDICINE

## 2018-04-20 NOTE — PROGRESS NOTES
Prevnar-13 vaccine administered, marion well. Instructed to wait 15mins for observation, no reaction noted @ time of discharge.

## 2018-04-20 NOTE — LETTER
April 20, 2018      Paola Meyer, JANEE  [address] [facility]  [place] [address]           [facility]  [address] [facility] [place] [address]  Phone: [phone]  Fax: [phone]          Patient: Eric Jackson   MR Number: [id number]   YOB: 1940   Date of Visit: 4/20/2018       Dear Paola Meyer:    Thank you for referring Eric Jackson to me for evaluation. Attached you will find relevant portions of my assessment and plan of care.    If you have questions, please do not hesitate to call me. I look forward to following Eric Jackson along with you.    Sincerely,    Severino Brink MD    Enclosure  CC:  No Recipients    If you would like to receive this communication electronically, please contact [email or web address] or [phone] to request more information on GetNotes Link access.    For providers and/or their staff who would like to refer a patient to [facility], please contact us through our one-stop-shop provider referral line, [facility], at [phone].    If you feel you have received this communication in error or would no longer like to receive these types of communications, please e-mail [email or web address]

## 2018-04-20 NOTE — PROGRESS NOTES
"Eric Jackson  was seen as a follow up.  Our last encounter was 3/28/14.      CHIEF COMPLAINT:  COPD; Pneumonia; and Follow-up      HISTORY OF PRESENT ILLNESS: Eric Jackson is a 77 y.o. male with pmh of cad, htn, dm2, gerd, anxiety, nely, tobacco abuse (quit 1989) is here for pulm/sleep follow up.  Our first encounter was 2/28/14.  Patient noticed decreased in exercise tolerance since 12/13.  Patient normally can walk 4-5 miles per day without issue.  However, patient experienced dyspnea when walking 2 miles.  Patient denied fever/chills.  +intermittent wheezing and occasional cough.  Prior episode of dyspnea, patient symptoms improve significantly with nebulizer.  No chest pain.  No orthopnea.  No lower extremities edema.  No PND.  Recently hospitalized for chest pain and underwent c @ [facility].  Patient was told to have "small blockage."  No stent placement.      Patient was diagnosed with nely by Dr. Hearn and currently on CPAP 11 by Dr. Hearn.    Since last visit, patient was hospitalized 1/15/18-1/17/18 for anemia and infiltrate.  Patient was treated with antibiotic along with prbc transfusion.  Patient is back to baseline.  Currently, no fever/chills.  No chest pain.  No orthopnea.  Currently, walking 3-4 miles daily without issue.      Sleep routine:  Bed partner: wife   Time to bed: 8-9 pm   Sleep onset latency: 5 minutes till 60 minutes  Disruptions or awakenings: 0-2 times (no difficulty with going back to sleep)  Wakeup time: 4:30-5 am   Perceived sleep quality: poor (2 out of 5)   Daytime naps: none      PAST MEDICAL HISTORY:    Active Ambulatory Problems     Diagnosis Date Noted    Essential hypertension 11/14/2012    Diabetes mellitus type II, controlled 11/14/2012    GERD (gastroesophageal reflux disease)     Anxiety     Depression     Insomnia 08/10/2014    Symptomatic anemia, microcytic/hypochromic  01/15/2018    Hyponatremia 01/15/2018    Transaminitis 01/15/2018    Iron deficiency " anemia 02/09/2018     Resolved Ambulatory Problems     Diagnosis Date Noted    Acute constipation 08/25/2012    GI bleed 11/14/2012    Acute post-hemorrhagic anemia 11/14/2012    Chest pain 02/03/2014    Other and unspecified angina pectoris 12/30/2014    Type II or unspecified type diabetes mellitus without mention of complication, uncontrolled 12/30/2014    Left lower lobe pneumonia 01/15/2018     Past Medical History:   Diagnosis Date    Anxiety     Coronary artery disease     Depression     Diabetes mellitus type II     Erectile dysfunction     Eye injuries     GERD (gastroesophageal reflux disease)     Hypertension                 PAST SURGICAL HISTORY:    Past Surgical History:   Procedure Laterality Date    COLONOSCOPY N/A 2/9/2018    Procedure: COLONOSCOPY;  Surgeon: Mathieu Cao MD;  Location: [facility];  Service: Endoscopy;  Laterality: N/A;    CORONARY ARTERY BYPASS GRAFT  2001    HERNIA REPAIR      WRIST SURGERY           FAMILY HISTORY:                Family History   Problem Relation Age of Onset    Adopted: Yes    No Known Problems Mother     No Known Problems Father     No Known Problems Maternal Grandmother     No Known Problems Maternal Grandfather     No Known Problems Paternal Grandmother     No Known Problems Paternal Grandfather     No Known Problems Sister     No Known Problems Brother     No Known Problems Maternal Aunt     No Known Problems Maternal Uncle     No Known Problems Paternal Aunt     No Known Problems Paternal Uncle     Amblyopia Neg Hx     Blindness Neg Hx     Cancer Neg Hx     Cataracts Neg Hx     Diabetes Neg Hx     Glaucoma Neg Hx     Hypertension Neg Hx     Macular degeneration Neg Hx     Retinal detachment Neg Hx     Strabismus Neg Hx     Stroke Neg Hx     Thyroid disease Neg Hx        SOCIAL HISTORY:          Tobacco:   History   Smoking Status    Former Smoker    Packs/day: 2.00    Years: 23.00    Quit date: 1/1/1989    Smokeless Tobacco    Never Used       alcohol use:    History   Alcohol Use    0.6 oz/week    1 Cans of beer per week     Comment: 1 can of beer per day                 Occupation:  Retired     ALLERGIES:    Review of patient's allergies indicates:   Allergen Reactions    Statins-hmg-coa reductase inhibitors Other (See Comments)     Muscle weakness       CURRENT MEDICATIONS:    Current Outpatient Prescriptions   Medication Sig Dispense Refill    alprazolam (XANAX) 1 MG tablet Take 1 tablet (1 mg total) by mouth nightly as needed for Anxiety. 60 tablet 1    amLODIPine (NORVASC) 2.5 MG tablet Take 1 tablet (2.5 mg total) by mouth once daily. 30 tablet 0    ANDROGEL 20.25 mg/1.25 gram (1.62 %) GlPm       losartan-hydrochlorothiazide 50-12.5 mg (HYZAAR) 50-12.5 mg per tablet Take 1 tablet by mouth once daily. 90 tablet 3    metformin (GLUCOPHAGE-XR) 500 MG 24 hr tablet Take 1 tablet (500 mg total) by mouth once daily. 90 tablet 4    ONE TOUCH ULTRA TEST Strp       pantoprazole (PROTONIX) 40 MG tablet Take 1 tablet (40 mg total) by mouth once daily. 30 tablet 0    pantoprazole (PROTONIX) 40 MG tablet Take 1 tablet (40 mg total) by mouth once daily. 30 tablet 11    vitamin D 1000 units Tab Take 1,000 Units by mouth once daily.      venlafaxine (EFFEXOR-XR) 150 MG Cp24 Take 1 capsule (150 mg total) by mouth once daily. 90 capsule 2     No current facility-administered medications for this visit.                   REVIEW OF SYSTEMS:     Pulmonary related symptoms as per HPI.  Gen:  no weight loss, no fever, no night sweat  HEENT:  no visual disturbance, no sore throat, decreased hearing acuity  CV:  No chest pain, no orthopnea, no PND  GI:  no melena, no hematochezia, no diarhea, no constipation.  :  no dysuria, no hematuria, no hesistancy, no dribbling  Neuro:  no syncope, no vertigo, no tinitus  Psych:  No homocide or suicide ideation; no depression.  Endocrine:  No heat or cold  "intolerance.  Sleep:  Started on cpap  Otherwise, a balance of systems reviewed is negative.          PHYSICAL EXAM:  Vitals:    04/20/18 1357   BP: 128/78   Pulse: 81   SpO2: 97%   Weight: 76.6 kg (168 lb 14 oz)   Height: 5' 7" (1.702 m)   PainSc: 0-No pain     Body mass index is 26.45 kg/m².     GENERAL:  well develop; no apparent distress  HEENT:  no nasal congestion; no discharge noted; class 4 modified mallampatti.   NECK:  supple; no palpable masses.  CARDIO: regular rate and rhythm  PULM:  clear to auscultation bilaterally; no intercostals retractions; no accessory muscle usage   ABDOMEN:  soft nontender/nondistended.  +bowel sound  EXTREMITIES no cce  NEURO:  CN II-XII intact.  5/5 motor in all extremities.  sensation grossly intact   to light touch.  PSYCH:  normal affect.  Alert and oriented x 4    LABS  Pulmonary Functions Testing Results: 3/6/14 ratio 62%; fvc 79%; fev1 625; tlc 105%; flco 62%  ABG none  CXR:  2/3/14 no effusion or consolidation  2/22/18 resolution of left lung infiltrate.  CT CHEST:  none  PPD none  bnp 2/3/14 41        psg 12/5/13 ahi of 5.1  cpap 1/27/14 optimal cpap of 11 cm H20  Compliance data days with >4 hours usage of 70%    ASSESSMENT  1. Dyspnea on exertion     2. CAL (obstructive sleep apnea)     3. Insomnia, unspecified type     4. Need for prophylactic vaccination against Streptococcus pneumoniae (pneumococcus)  Pneumococcal Conjugate Vaccine (13 Valent) (IM)       PLAN:  -dyspnea - multifactorial.  +cad + copd.  S/p recent lhc with cad.  Patient recommended medical treatment for cad.  Back to baseline.      -copd -stopped smoking.  Prn albuterol.  Recent exacerbation is due to pna and anemia.  Back to baseline.  Pneumonia vaccine.    -cal - have not been using cpap.  Sleep better without cpap.      -pna - s/p antibiotic.  Last cxr 2/22/18 was cleared.      -insomnia - maintenance is the issue.  DDx: untreated cal +/- anxiety. s/p ambien, doxepin, melatonin, and benadryl " without improvement.  Patient find xanax most helpful.  Sleep maintenance improve with stimulus control and sleep restriction.  Sleep time 11 pm till 5 am.  Overall, doing better.    Patient will Follow-up in about 1 year (around 4/20/2019), or if symptoms worsen or fail to improve.

## 2018-05-18 ENCOUNTER — TELEPHONE (OUTPATIENT)
Dept: ENDOCRINOLOGY | Facility: CLINIC | Age: 78
End: 2018-05-18

## 2018-05-24 ENCOUNTER — OFFICE VISIT (OUTPATIENT)
Dept: FAMILY MEDICINE | Facility: CLINIC | Age: 78
End: 2018-05-24
Payer: MEDICARE

## 2018-05-24 ENCOUNTER — HOSPITAL ENCOUNTER (OUTPATIENT)
Dept: RADIOLOGY | Facility: HOSPITAL | Age: 78
Discharge: HOME | End: 2018-05-24
Attending: INTERNAL MEDICINE
Payer: MEDICARE

## 2018-05-24 VITALS
TEMPERATURE: 98 F | BODY MASS INDEX: 27.01 KG/M2 | OXYGEN SATURATION: 96 % | WEIGHT: 172.06 LBS | SYSTOLIC BLOOD PRESSURE: 146 MMHG | DIASTOLIC BLOOD PRESSURE: 84 MMHG | HEART RATE: 67 BPM | HEIGHT: 67 IN

## 2018-05-24 DIAGNOSIS — E11.9 CONTROLLED TYPE 2 DIABETES MELLITUS WITHOUT COMPLICATION, WITHOUT LONG-TERM CURRENT USE OF INSULIN: ICD-10-CM

## 2018-05-24 DIAGNOSIS — I10 ESSENTIAL HYPERTENSION: ICD-10-CM

## 2018-05-24 DIAGNOSIS — D64.9 SYMPTOMATIC ANEMIA: ICD-10-CM

## 2018-05-24 DIAGNOSIS — E29.1 HYPOGONADISM MALE: ICD-10-CM

## 2018-05-24 DIAGNOSIS — D50.9 IRON DEFICIENCY ANEMIA, UNSPECIFIED IRON DEFICIENCY ANEMIA TYPE: ICD-10-CM

## 2018-05-24 DIAGNOSIS — Z87.01 HISTORY OF BACTERIAL PNEUMONIA: Primary | ICD-10-CM

## 2018-05-24 DIAGNOSIS — J18.9 PNEUMONIA OF LEFT LOWER LOBE DUE TO INFECTIOUS ORGANISM: ICD-10-CM

## 2018-05-24 DIAGNOSIS — F41.9 ANXIETY: ICD-10-CM

## 2018-05-24 PROCEDURE — 71046 X-RAY EXAM CHEST 2 VIEWS: CPT | Mod: 26,,, | Performed by: RADIOLOGY

## 2018-05-24 PROCEDURE — 71046 X-RAY EXAM CHEST 2 VIEWS: CPT | Mod: TC,FY,PO

## 2018-05-24 PROCEDURE — 99499 UNLISTED E&M SERVICE: CPT | Mod: S$GLB,,, | Performed by: INTERNAL MEDICINE

## 2018-05-24 PROCEDURE — 99999 PR PBB SHADOW E&M-EST. PATIENT-LVL III: CPT | Mod: PBBFAC,,, | Performed by: INTERNAL MEDICINE

## 2018-05-24 PROCEDURE — 3044F HG A1C LEVEL LT 7.0%: CPT | Mod: CPTII,S$GLB,, | Performed by: INTERNAL MEDICINE

## 2018-05-24 PROCEDURE — 3074F SYST BP LT 130 MM HG: CPT | Mod: CPTII,S$GLB,, | Performed by: INTERNAL MEDICINE

## 2018-05-24 PROCEDURE — 99214 OFFICE O/P EST MOD 30 MIN: CPT | Mod: S$GLB,,, | Performed by: INTERNAL MEDICINE

## 2018-05-24 PROCEDURE — 3079F DIAST BP 80-89 MM HG: CPT | Mod: CPTII,S$GLB,, | Performed by: INTERNAL MEDICINE

## 2018-05-24 RX ORDER — LOSARTAN POTASSIUM AND HYDROCHLOROTHIAZIDE 12.5; 5 MG/1; MG/1
1 TABLET ORAL 2 TIMES DAILY
Qty: 180 TABLET | Refills: 3 | Status: SHIPPED | OUTPATIENT
Start: 2018-05-24 | End: 2018-06-29 | Stop reason: SDUPTHER

## 2018-05-24 RX ORDER — ALPRAZOLAM 1 MG/1
1 TABLET ORAL NIGHTLY PRN
Qty: 60 TABLET | Refills: 0 | Status: SHIPPED | OUTPATIENT
Start: 2018-05-24 | End: 2018-06-29 | Stop reason: SDUPTHER

## 2018-05-24 RX ORDER — TESTOSTERONE 20.25 MG/1.25G
1 GEL TOPICAL DAILY
Qty: 75 G | Refills: 0 | Status: SHIPPED | OUTPATIENT
Start: 2018-05-24 | End: 2018-06-29 | Stop reason: SDUPTHER

## 2018-05-24 NOTE — PROGRESS NOTES
This note was created by combination of typed  and Dragon dictation.  Transcription errors may be present.  If there are any questions, please contact me.    Assessment & Plan:   History of bacterial pneumonia-repeat chest x-ray today    Essential hypertension-labile blood pressures.  He has been checking his blood pressures a bit too frequently I think.  Reduce blood pressure frequency to 2 to 3 times a day.  Increase losartan HCT, take twice daily.  Stop the amlodipine.  -     losartan-hydrochlorothiazide 50-12.5 mg (HYZAAR) 50-12.5 mg per tablet; Take 1 tablet by mouth 2 (two) times daily.  Dispense: 180 tablet; Refill: 3  -     Comprehensive metabolic panel; Future; Expected date: 05/24/2018    Controlled type 2 diabetes mellitus without complication, without long-term current use of insulin-check A1c.  On metformin.  -     Hemoglobin A1c; Future; Expected date: 05/24/2018  -     blood sugar diagnostic (ONETOUCH ULTRA TEST) Strp; Daily glc test  Dispense: 100 strip; Refill: 11    Iron deficiency anemia, unspecified iron deficiency anemia type  Symptomatic anemia, microcytic/hypochromic -has been taking an iron supplement.  Required transfusion with his last hospitalization.  Check CBC, ferritin and iron.  If iron stores are good stop the iron.  -     CBC auto differential; Future; Expected date: 05/24/2018  -     Ferritin; Future; Expected date: 05/24/2018  -     Iron and TIBC; Future; Expected date: 05/24/2018    Hypogonadism male-check testosterone levels on 1 pump daily.  Refilled to pharmacy.  -     Testosterone; Future; Expected date: 05/24/2018  -     testosterone (ANDROGEL) 20.25 mg/1.25 gram (1.62 %) GlPm; Apply 1 Pump topically once daily.  Dispense: 75 g; Refill: 0    Other orders-refilled alprazolam to take as needed.  Refill test strips.  -     ALPRAZolam (XANAX) 1 MG tablet; Take 1 tablet (1 mg total) by mouth nightly as needed for Anxiety.  Dispense: 60 tablet; Refill: 0    Medications  Discontinued During This Encounter   Medication Reason    amLODIPine (NORVASC) 2.5 MG tablet Therapy completed    losartan-hydrochlorothiazide 50-12.5 mg (HYZAAR) 50-12.5 mg per tablet Reorder    alprazolam (XANAX) 1 MG tablet Reorder    ANDROGEL 20.25 mg/1.25 gram (1.62 %) GlPm Reorder    pantoprazole (PROTONIX) 40 MG tablet     ONE TOUCH ULTRA TEST Strp Reorder     Modified Medications    Modified Medication Previous Medication    ALPRAZOLAM (XANAX) 1 MG TABLET alprazolam (XANAX) 1 MG tablet       Take 1 tablet (1 mg total) by mouth nightly as needed for Anxiety.    Take 1 tablet (1 mg total) by mouth nightly as needed for Anxiety.    BLOOD SUGAR DIAGNOSTIC (ONETOUCH ULTRA TEST) STRP ONE TOUCH ULTRA TEST Strp       Daily glc test        LOSARTAN-HYDROCHLOROTHIAZIDE 50-12.5 MG (HYZAAR) 50-12.5 MG PER TABLET losartan-hydrochlorothiazide 50-12.5 mg (HYZAAR) 50-12.5 mg per tablet       Take 1 tablet by mouth 2 (two) times daily.    Take 1 tablet by mouth once daily.    TESTOSTERONE (ANDROGEL) 20.25 MG/1.25 GRAM (1.62 %) GLPM ANDROGEL 20.25 mg/1.25 gram (1.62 %) GlPm       Apply 1 Pump topically once daily.         New Prescriptions    No medications on file       Follow Up: No Follow-up on file.  F/u with PCP      Subjective:     Chief Complaint   Patient presents with    Medication Refill       DARRYL  Eric is a 77 y.o. male, last appointment with this clinic was 4/17/2018.    DM2; CAD status post CABG 2001; HTN; anxiety and depression; upper GI bleed 2012.    Wants to establish endo with [facility]. But he needs RF on medications in the interim.    Male hypogonadism.  Uses testosterone gel, 1 pump once a day.  Was originally prescribed 2 pumps once a day but only taking the 1.  He does not like to take more medication than absolutely necessary.    He had labile blood pressures with variable pressures.  Was taking his pressures up to 10 times a day by his estimate.  In the mornings, before exercise, after  exercise, etc.  No real pattern to his blood pressure fluctuations.  For example 1 morning he could wake up and his blood pressure will be systolic 150 and then the following morning 120.  Generally diet is pretty uniform without excessive sodium intake.  When he was last seen in the office amlodipine was added on for the evening dose and he has not really found that it made any difference.  He would like to keep his blood pressure medicines as streamlined as possible and is inquiring about taking losartan HCT twice daily instead of just once a day.  I think that is reasonable.    Depression and anxiety.  Takes alprazolam on an as-needed basis.  Requesting refill on that.  The Effexor he takes for maybe 1 week at a time and finds that it is effective when taking that way.  Has not noted any correlation of blood pressure variations with Effexor intake.  Takes Effexor maybe once every few months.    He was anemic requiring blood transfusion while hospitalized for pneumonia.  Has been taking an iron supplement daily.  I will check his levels.    Exercises regularly, treadmill.    He needs repeat chest x-ray to ensure resolution of the pneumonia.     He brings in blood pressure meter today, verified its accuracy.    Patient Care Team:  Nabila Mariscal MD as PCP - General (Internal Medicine)  Arias Muñoz MD as Consulting Physician (Endocrinology)    Patient Active Problem List    Diagnosis Date Noted    Iron deficiency anemia 02/09/2018    Symptomatic anemia, microcytic/hypochromic  01/15/2018    Hyponatremia 01/15/2018    Transaminitis 01/15/2018    Insomnia 08/10/2014    GERD (gastroesophageal reflux disease)     Anxiety     Depression     Essential hypertension 11/14/2012    Diabetes mellitus type II, controlled 11/14/2012       PAST MEDICAL HISTORY:  Past Medical History:   Diagnosis Date    Anxiety     Coronary artery disease     Depression     Diabetes mellitus type II     Erectile dysfunction      Eye injuries     fb ou    GERD (gastroesophageal reflux disease)     Hypertension        PAST SURGICAL HISTORY:  Past Surgical History:   Procedure Laterality Date    COLONOSCOPY N/A 2/9/2018    Procedure: COLONOSCOPY;  Surgeon: Mathieu Cao MD;  Location[facility];  Service: Endoscopy;  Laterality: N/A;    CORONARY ARTERY BYPASS GRAFT  2001    HERNIA REPAIR      WRIST SURGERY         SOCIAL HISTORY:  Social History     Social History    Marital status:      Spouse name: N/A    Number of children: N/A    Years of education: N/A     Occupational History    Not on file.     Social History Main Topics    Smoking status: Former Smoker     Packs/day: 2.00     Years: 23.00     Quit date: 1/1/1989    Smokeless tobacco: Never Used    Alcohol use 0.6 oz/week     1 Cans of beer per week      Comment: 1 can of beer per day    Drug use: No    Sexual activity: Yes     Partners: Female     Other Topics Concern    Not on file     Social History Narrative    No narrative on file       ALLERGIES AND MEDICATIONS: updated and reviewed.  Review of patient's allergies indicates:   Allergen Reactions    Statins-hmg-coa reductase inhibitors Other (See Comments)     Muscle weakness     Current Outpatient Prescriptions   Medication Sig Dispense Refill    alprazolam (XANAX) 1 MG tablet Take 1 tablet (1 mg total) by mouth nightly as needed for Anxiety. 60 tablet 1    amLODIPine (NORVASC) 2.5 MG tablet Take 1 tablet (2.5 mg total) by mouth once daily. 30 tablet 0    ANDROGEL 20.25 mg/1.25 gram (1.62 %) GlPm       losartan-hydrochlorothiazide 50-12.5 mg (HYZAAR) 50-12.5 mg per tablet Take 1 tablet by mouth once daily. 90 tablet 3    metformin (GLUCOPHAGE-XR) 500 MG 24 hr tablet Take 1 tablet (500 mg total) by mouth once daily. 90 tablet 4    ONE TOUCH ULTRA TEST Strp       vit C/vit E ac/lut/copper/zinc (PRESERVISION LUTEIN ORAL) Take 1 tablet by mouth once daily.      vitamin D 1000 units Tab Take  "1,000 Units by mouth once daily.      pantoprazole (PROTONIX) 40 MG tablet Take 1 tablet (40 mg total) by mouth once daily. 30 tablet 0    pantoprazole (PROTONIX) 40 MG tablet Take 1 tablet (40 mg total) by mouth once daily. 30 tablet 11    venlafaxine (EFFEXOR-XR) 150 MG Cp24 Take 1 capsule (150 mg total) by mouth once daily. 90 capsule 2     No current facility-administered medications for this visit.        Review of Systems   Constitutional: Negative for chills and fever.   Respiratory: Negative for shortness of breath.    Cardiovascular: Negative for chest pain and palpitations.       Objective:   Physical Exam   Vitals:    05/24/18 1310   BP: (!) 103/56   BP Location: Right arm   Patient Position: Sitting   BP Method: Large (Automatic)   Pulse: 67   Temp: 98 °F (36.7 °C)   SpO2: 96%   Weight: 78 kg (172 lb 1.1 oz)   Height: 5' 7" (1.702 m)    Body mass index is 26.95 kg/m².  Weight: 78 kg (172 lb 1.1 oz)   Height: 5' 7" (170.2 cm)     Physical Exam   Constitutional: He is oriented to person, place, and time. He appears well-developed and well-nourished. No distress.   Eyes: EOM are normal.   Cardiovascular: Normal rate, regular rhythm and normal heart sounds.    No murmur heard.  Pulmonary/Chest: Effort normal and breath sounds normal.   Musculoskeletal: Normal range of motion.   Neurological: He is alert and oriented to person, place, and time. Coordination normal.   Skin: Skin is warm and dry.   Psychiatric: He has a normal mood and affect. His behavior is normal. Thought content normal.     "

## 2018-06-12 DIAGNOSIS — E11.9 CONTROLLED TYPE 2 DIABETES MELLITUS WITHOUT COMPLICATION, WITHOUT LONG-TERM CURRENT USE OF INSULIN: ICD-10-CM

## 2018-06-29 ENCOUNTER — OFFICE VISIT (OUTPATIENT)
Dept: FAMILY MEDICINE | Facility: CLINIC | Age: 78
End: 2018-06-29
Payer: MEDICARE

## 2018-06-29 VITALS
TEMPERATURE: 98 F | SYSTOLIC BLOOD PRESSURE: 144 MMHG | BODY MASS INDEX: 27 KG/M2 | WEIGHT: 172 LBS | HEIGHT: 67 IN | DIASTOLIC BLOOD PRESSURE: 82 MMHG | HEART RATE: 81 BPM | OXYGEN SATURATION: 94 %

## 2018-06-29 DIAGNOSIS — E11.9 CONTROLLED TYPE 2 DIABETES MELLITUS WITHOUT COMPLICATION, WITHOUT LONG-TERM CURRENT USE OF INSULIN: Primary | ICD-10-CM

## 2018-06-29 DIAGNOSIS — R74.01 TRANSAMINITIS: ICD-10-CM

## 2018-06-29 DIAGNOSIS — D50.9 IRON DEFICIENCY ANEMIA, UNSPECIFIED IRON DEFICIENCY ANEMIA TYPE: ICD-10-CM

## 2018-06-29 DIAGNOSIS — E11.9 TYPE 2 DIABETES MELLITUS WITHOUT COMPLICATION, WITHOUT LONG-TERM CURRENT USE OF INSULIN: ICD-10-CM

## 2018-06-29 DIAGNOSIS — E29.1 HYPOGONADISM MALE: ICD-10-CM

## 2018-06-29 DIAGNOSIS — K21.9 GASTROESOPHAGEAL REFLUX DISEASE, ESOPHAGITIS PRESENCE NOT SPECIFIED: ICD-10-CM

## 2018-06-29 DIAGNOSIS — F51.01 PRIMARY INSOMNIA: ICD-10-CM

## 2018-06-29 DIAGNOSIS — F32.A DEPRESSION, UNSPECIFIED DEPRESSION TYPE: ICD-10-CM

## 2018-06-29 DIAGNOSIS — F41.9 ANXIETY: ICD-10-CM

## 2018-06-29 DIAGNOSIS — I10 ESSENTIAL HYPERTENSION: ICD-10-CM

## 2018-06-29 PROCEDURE — 3044F HG A1C LEVEL LT 7.0%: CPT | Mod: CPTII,S$GLB,, | Performed by: NURSE PRACTITIONER

## 2018-06-29 PROCEDURE — 99214 OFFICE O/P EST MOD 30 MIN: CPT | Mod: S$GLB,,, | Performed by: NURSE PRACTITIONER

## 2018-06-29 PROCEDURE — 3077F SYST BP >= 140 MM HG: CPT | Mod: CPTII,S$GLB,, | Performed by: NURSE PRACTITIONER

## 2018-06-29 PROCEDURE — 99999 PR PBB SHADOW E&M-EST. PATIENT-LVL IV: CPT | Mod: PBBFAC,,, | Performed by: NURSE PRACTITIONER

## 2018-06-29 PROCEDURE — 99499 UNLISTED E&M SERVICE: CPT | Mod: S$GLB,,, | Performed by: NURSE PRACTITIONER

## 2018-06-29 PROCEDURE — 3079F DIAST BP 80-89 MM HG: CPT | Mod: CPTII,S$GLB,, | Performed by: NURSE PRACTITIONER

## 2018-06-29 RX ORDER — LANCETS
EACH MISCELLANEOUS
Qty: 100 EACH | Refills: 0 | Status: SHIPPED | OUTPATIENT
Start: 2018-06-29 | End: 2019-11-05

## 2018-06-29 RX ORDER — ALPRAZOLAM 1 MG/1
1 TABLET ORAL NIGHTLY PRN
Qty: 60 TABLET | Refills: 2 | Status: SHIPPED | OUTPATIENT
Start: 2018-06-29 | End: 2019-01-14 | Stop reason: SDUPTHER

## 2018-06-29 RX ORDER — LOSARTAN POTASSIUM AND HYDROCHLOROTHIAZIDE 12.5; 5 MG/1; MG/1
1 TABLET ORAL DAILY
Qty: 90 TABLET | Refills: 3 | Status: SHIPPED | OUTPATIENT
Start: 2018-06-29 | End: 2019-01-14 | Stop reason: SDUPTHER

## 2018-06-29 RX ORDER — INSULIN PUMP SYRINGE, 3 ML
EACH MISCELLANEOUS
Qty: 1 EACH | Refills: 0 | Status: SHIPPED | OUTPATIENT
Start: 2018-06-29 | End: 2019-11-05

## 2018-06-29 RX ORDER — MIRTAZAPINE 30 MG/1
30 TABLET, FILM COATED ORAL NIGHTLY
Qty: 90 TABLET | Refills: 3 | Status: SHIPPED | OUTPATIENT
Start: 2018-06-29 | End: 2019-12-09 | Stop reason: SDUPTHER

## 2018-06-29 RX ORDER — METFORMIN HYDROCHLORIDE 500 MG/1
500 TABLET, EXTENDED RELEASE ORAL DAILY
Qty: 90 TABLET | Refills: 3 | Status: SHIPPED | OUTPATIENT
Start: 2018-06-29 | End: 2019-01-14 | Stop reason: SDUPTHER

## 2018-06-29 RX ORDER — TESTOSTERONE 20.25 MG/1.25G
1 GEL TOPICAL DAILY
Qty: 75 G | Refills: 2 | Status: SHIPPED | OUTPATIENT
Start: 2018-06-29 | End: 2018-12-07

## 2018-06-29 RX ORDER — VENLAFAXINE HYDROCHLORIDE 75 MG/1
75 CAPSULE, EXTENDED RELEASE ORAL DAILY
Qty: 90 CAPSULE | Refills: 3 | Status: SHIPPED | OUTPATIENT
Start: 2018-06-29 | End: 2019-07-30 | Stop reason: DRUGHIGH

## 2018-06-29 NOTE — PROGRESS NOTES
History of Present Illness   Eric Jackson is a 77 y.o. man with medical history as listed below who presents today for refill on his diabetic supplies. He recently switched to [facility] as his pharmacy and is unable to have his strips and lancets filled at his local pharmacy. He is taking and tolerating his medications with no complications. His blood sugars and blood pressures have been running within acceptable range at home. He continues to exercise daily and monitor his dietary intake. He is otherwise feeling well and has no additional complaints on today's visit.      Past Medical History:   Diagnosis Date    Anxiety     Coronary artery disease     Depression     Diabetes mellitus type II     Erectile dysfunction     Eye injuries     fb ou    GERD (gastroesophageal reflux disease)     Hypertension        Past Surgical History:   Procedure Laterality Date    COLONOSCOPY N/A 2/9/2018    Procedure: COLONOSCOPY;  Surgeon: Mathieu Cao MD;  Location: [facility];  Service: Endoscopy;  Laterality: N/A;    CORONARY ARTERY BYPASS GRAFT  2001    HERNIA REPAIR      WRIST SURGERY         Social History     Social History    Marital status:      Spouse name: N/A    Number of children: N/A    Years of education: N/A     Social History Main Topics    Smoking status: Former Smoker     Packs/day: 2.00     Years: 23.00     Quit date: 1/1/1989    Smokeless tobacco: Never Used    Alcohol use 0.6 oz/week     1 Cans of beer per week      Comment: 1 can of beer per day    Drug use: No    Sexual activity: Yes     Partners: Female     Other Topics Concern    None     Social History Narrative    None       Family History   Problem Relation Age of Onset    Adopted: Yes    No Known Problems Mother     No Known Problems Father     No Known Problems Maternal Grandmother     No Known Problems Maternal Grandfather     No Known Problems Paternal Grandmother     No Known Problems Paternal Grandfather   "   No Known Problems Sister     No Known Problems Brother     No Known Problems Maternal Aunt     No Known Problems Maternal Uncle     No Known Problems Paternal Aunt     No Known Problems Paternal Uncle     Amblyopia Neg Hx     Blindness Neg Hx     Cancer Neg Hx     Cataracts Neg Hx     Diabetes Neg Hx     Glaucoma Neg Hx     Hypertension Neg Hx     Macular degeneration Neg Hx     Retinal detachment Neg Hx     Strabismus Neg Hx     Stroke Neg Hx     Thyroid disease Neg Hx        Review of Systems  Review of Systems   Constitutional: Negative for fever.   Eyes: Negative for blurred vision and double vision.   Respiratory: Negative for shortness of breath.    Cardiovascular: Negative for chest pain, palpitations and leg swelling.   Skin: Negative for rash.   Neurological: Negative for dizziness, loss of consciousness and headaches.   Endo/Heme/Allergies: Negative for polydipsia.     A complete review of systems was otherwise negative.    Physical Exam  BP (!) 144/82 (BP Location: Right arm, Patient Position: Sitting, BP Method: Medium (Manual))   Pulse 81   Temp 97.9 °F (36.6 °C) (Oral)   Ht 5' 7" (1.702 m)   Wt 78 kg (172 lb)   SpO2 (!) 94%   BMI 26.94 kg/m²   General appearance: alert, appears stated age, cooperative and no distress  Lungs: clear to auscultation bilaterally  Heart: regular rate and rhythm, S1, S2 normal, no murmur, click, rub or gallop  Extremities: extremities normal, atraumatic, no cyanosis or edema  Pulses: 2+ and symmetric  Skin: Skin color, texture, turgor normal. No rashes or lesions  Neurologic: Grossly normal    Assessment/Plan  Controlled type 2 diabetes mellitus without complication, without long-term current use of insulin  The current medical regimen is effective;  continue present plan and medications.  -     lancets Misc; To check BG 2 times daily, to use with insurance preferred meter  Dispense: 100 each; Refill: 0  -     blood sugar diagnostic Strp; To check " BG 2 times daily, to use with insurance preferred meter  Dispense: 100 each; Refill: 0  -     blood-glucose meter kit; To check BG 2 times daily, to use with insurance preferred meter  Dispense: 1 each; Refill: 0  -     metFORMIN (GLUCOPHAGE-XR) 500 MG 24 hr tablet; Take 1 tablet (500 mg total) by mouth once daily.  Dispense: 90 tablet; Refill: 3    Essential hypertension  The current medical regimen is effective;  continue present plan and medications.  -     losartan-hydrochlorothiazide 50-12.5 mg (HYZAAR) 50-12.5 mg per tablet; Take 1 tablet by mouth once daily.  Dispense: 90 tablet; Refill: 3    Type 2 diabetes mellitus without complication, without long-term current use of insulin  The current medical regimen is effective;  continue present plan and medications.  -     metFORMIN (GLUCOPHAGE-XR) 500 MG 24 hr tablet; Take 1 tablet (500 mg total) by mouth once daily.  Dispense: 90 tablet; Refill: 3    Hypogonadism male  The current medical regimen is effective;  continue present plan and medications.    Depression, unspecified depression type  The current medical regimen is effective;  continue present plan and medications.  -     venlafaxine (EFFEXOR-XR) 75 MG 24 hr capsule; Take 1 capsule (75 mg total) by mouth once daily.  Dispense: 90 capsule; Refill: 3    Primary insomnia  Start the Remeron, trying to cut back on his Xanax use, agree with this plan.  -     mirtazapine (REMERON) 30 MG tablet; Take 1 tablet (30 mg total) by mouth every evening.  Dispense: 90 tablet; Refill: 3    Iron deficiency anemia, unspecified iron deficiency anemia type  The current medical regimen is effective;  continue present plan and medications.    Gastroesophageal reflux disease, esophagitis presence not specified  The current medical regimen is effective;  continue present plan and medications.  Stable off of PPI with dietary modifications.    Transaminitis  The current medical regimen is effective;  continue present plan and  medications.    He has verbalized understanding and is in agreement with plan of care.    Follow-up in about 6 months (around 12/29/2018) for routine follow-up.

## 2018-08-03 DIAGNOSIS — E11.9 TYPE 2 DIABETES MELLITUS WITHOUT COMPLICATION: ICD-10-CM

## 2018-08-29 ENCOUNTER — PES CALL (OUTPATIENT)
Dept: ADMINISTRATIVE | Facility: CLINIC | Age: 78
End: 2018-08-29

## 2018-11-07 ENCOUNTER — TELEPHONE (OUTPATIENT)
Dept: FAMILY MEDICINE | Facility: CLINIC | Age: 78
End: 2018-11-07

## 2018-11-07 DIAGNOSIS — R97.20 ELEVATED PSA: Primary | ICD-10-CM

## 2018-11-07 NOTE — TELEPHONE ENCOUNTER
----- Message from Justine Jon sent at 11/7/2018  2:01 PM CST -----  Contact: self [phone]  Pt is requesting a referral to urology bc his PSA level is high.

## 2018-11-07 NOTE — TELEPHONE ENCOUNTER
Did he get a psa done recently?  None done with our office.  Will submit referral but he should be sure to bring any recent labs with him to consult.

## 2018-12-07 ENCOUNTER — OFFICE VISIT (OUTPATIENT)
Dept: UROLOGY | Facility: CLINIC | Age: 78
End: 2018-12-07
Payer: MEDICARE

## 2018-12-07 VITALS
BODY MASS INDEX: 27.21 KG/M2 | SYSTOLIC BLOOD PRESSURE: 122 MMHG | HEIGHT: 67 IN | WEIGHT: 173.38 LBS | DIASTOLIC BLOOD PRESSURE: 70 MMHG

## 2018-12-07 DIAGNOSIS — R97.20 ELEVATED PSA: Primary | ICD-10-CM

## 2018-12-07 DIAGNOSIS — R39.198 SLOW URINARY STREAM: ICD-10-CM

## 2018-12-07 DIAGNOSIS — R35.1 NOCTURIA: ICD-10-CM

## 2018-12-07 DIAGNOSIS — E29.1 HYPOGONADISM IN MALE: ICD-10-CM

## 2018-12-07 LAB
BILIRUB SERPL-MCNC: NORMAL MG/DL
BLOOD URINE, POC: NORMAL
COLOR, POC UA: NORMAL
GLUCOSE UR QL STRIP: NORMAL
KETONES UR QL STRIP: NORMAL
LEUKOCYTE ESTERASE URINE, POC: NORMAL
NITRITE, POC UA: NORMAL
PH, POC UA: 5
PROTEIN, POC: NORMAL
SPECIFIC GRAVITY, POC UA: 1030
UROBILINOGEN, POC UA: NORMAL

## 2018-12-07 PROCEDURE — 81001 URINALYSIS AUTO W/SCOPE: CPT | Mod: S$GLB,,, | Performed by: NURSE PRACTITIONER

## 2018-12-07 PROCEDURE — 1101F PT FALLS ASSESS-DOCD LE1/YR: CPT | Mod: CPTII,S$GLB,, | Performed by: NURSE PRACTITIONER

## 2018-12-07 PROCEDURE — 3078F DIAST BP <80 MM HG: CPT | Mod: CPTII,S$GLB,, | Performed by: NURSE PRACTITIONER

## 2018-12-07 PROCEDURE — 99999 PR PBB SHADOW E&M-EST. PATIENT-LVL III: CPT | Mod: PBBFAC,HCNC,, | Performed by: NURSE PRACTITIONER

## 2018-12-07 PROCEDURE — 99214 OFFICE O/P EST MOD 30 MIN: CPT | Mod: 25,S$GLB,, | Performed by: NURSE PRACTITIONER

## 2018-12-07 PROCEDURE — 3074F SYST BP LT 130 MM HG: CPT | Mod: CPTII,S$GLB,, | Performed by: NURSE PRACTITIONER

## 2018-12-07 RX ORDER — CIPROFLOXACIN 500 MG/1
500 TABLET ORAL 2 TIMES DAILY
Qty: 6 TABLET | Refills: 0 | Status: SHIPPED | OUTPATIENT
Start: 2018-12-07 | End: 2018-12-10

## 2018-12-07 RX ORDER — TAMSULOSIN HYDROCHLORIDE 0.4 MG/1
0.4 CAPSULE ORAL DAILY
Qty: 30 CAPSULE | Refills: 11 | Status: SHIPPED | OUTPATIENT
Start: 2018-12-07 | End: 2019-01-14

## 2018-12-07 NOTE — PROGRESS NOTES
Subjective:       Patient ID: Eric Jackson is a 78 y.o. male who is a new patient was referred by Samir Boo MD for elevated PSA    Chief Complaint:   Chief Complaint   Patient presents with    Other     Patient states here for elevated PSA.. weak urine stream    Other     Constant urges to have bowel movements       Elevated PSA  Patient is here with an elevated PSA. He has no personal history and no family history of prostate cancer.  He has no prior genitourinary history of hematuria, hematospermia, prostatitis, UTI, urolithiasis, previous  surgery.  Of note, patient was seen by Dr. Puga in 11/2014 for an elevated PSA of 4.7. Patient declined prostate biopsy at that time and elected to observe PSA  He has not been seen by a urologist since this time  Previous PSA values are :  No results found for: PSA    He is being treated for hypogonadism with testosterone replacement by his endocrinologist, Dr. Muñoz.  He is currently using Androgel 1 pump daily    Patient also complains of lower urinary tract symptoms. He reports intermittency, nocturia three times a night and weak stream. He denies frequency, incomplete emptying, straining and urgency. Patient states symptoms are of moderate severity. Onset of symptoms was several years ago and was gradual in onset.  He reports worsening of LUTS within the last 3-4 months.  He reports a history of no complicating symptoms. He denies flank pain, gross hematuria, kidney stones and recurrent UTI.    ACTIVE MEDICAL ISSUES:  Patient Active Problem List   Diagnosis    Essential hypertension    Diabetes mellitus type II, controlled    GERD (gastroesophageal reflux disease)    Anxiety    Depression    Insomnia    Symptomatic anemia, microcytic/hypochromic     Hyponatremia    Transaminitis    Iron deficiency anemia    Elevated PSA    Slow urinary stream       PAST MEDICAL HISTORY  Past Medical History:   Diagnosis Date    Anxiety     Coronary artery  disease     Depression     Diabetes mellitus type II     Erectile dysfunction     Eye injuries     fb ou    GERD (gastroesophageal reflux disease)     Hypertension        PAST SURGICAL HISTORY:  Past Surgical History:   Procedure Laterality Date    COLONOSCOPY N/A 2018    Procedure: COLONOSCOPY;  Surgeon: Mathieu Cao MD;  Location: [facility];  Service: Endoscopy;  Laterality: N/A;    COLONOSCOPY N/A 2018    Performed by [facility]ieu Cao MD at [facility]    CORONARY ARTERY BYPASS GRAFT  2001    EGD (ESOPHAGOGASTRODUODENOSCOPY) Left 2012    [facility]    ESOPHAGOGASTRODUODENOSCOPY (EGD) N/A 2018    Performed by [facility]ieu Cao MD at [facility]    HERNIA REPAIR      WRIST SURGERY         SOCIAL HISTORY:  Social History     Tobacco Use    Smoking status: Former Smoker     Packs/day: 2.00     Years: 23.00     Pack years: 46.00     Last attempt to quit: 1989     Years since quittin.9    Smokeless tobacco: Never Used   Substance Use Topics    Alcohol use: Yes     Alcohol/week: 0.6 oz     Types: 1 Cans of beer per week     Comment: 1 can of beer per day    Drug use: No       FAMILY HISTORY:  Family History   Adopted: Yes   Problem Relation Age of Onset    No Known Problems Mother     No Known Problems Father     No Known Problems Maternal Grandmother     No Known Problems Maternal Grandfather     No Known Problems Paternal Grandmother     No Known Problems Paternal Grandfather     No Known Problems Sister     No Known Problems Brother     No Known Problems Maternal Aunt     No Known Problems Maternal Uncle     No Known Problems Paternal Aunt     No Known Problems Paternal Uncle     Amblyopia Neg Hx     Blindness Neg Hx     Cancer Neg Hx     Cataracts Neg Hx     Diabetes Neg Hx     Glaucoma Neg Hx     Hypertension Neg Hx     Macular degeneration Neg Hx     Retinal detachment Neg Hx     Strabismus Neg Hx     Stroke Neg  Hx     Thyroid disease Neg Hx        ALLERGIES AND MEDICATIONS: updated and reviewed.  Review of patient's allergies indicates:   Allergen Reactions    Prochlorperazine      convulsions    Statins-hmg-coa reductase inhibitors Other (See Comments)     Muscle weakness     Current Outpatient Medications   Medication Sig    blood sugar diagnostic Strp To check BG 2 times daily, to use with insurance preferred meter    blood-glucose meter kit To check BG 2 times daily, to use with insurance preferred meter    lancets Misc To check BG 2 times daily, to use with insurance preferred meter    losartan-hydrochlorothiazide 50-12.5 mg (HYZAAR) 50-12.5 mg per tablet Take 1 tablet by mouth once daily.    metFORMIN (GLUCOPHAGE-XR) 500 MG 24 hr tablet Take 1 tablet (500 mg total) by mouth once daily.    mirtazapine (REMERON) 30 MG tablet Take 1 tablet (30 mg total) by mouth every evening.    pantoprazole (PROTONIX) 40 MG tablet Take 1 tablet (40 mg total) by mouth once daily.    vit C/vit E ac/lut/copper/zinc (PRESERVISION LUTEIN ORAL) Take 1 tablet by mouth once daily.    vitamin D 1000 units Tab Take 1,000 Units by mouth once daily.    ALPRAZolam (XANAX) 1 MG tablet Take 1 tablet (1 mg total) by mouth nightly as needed for Anxiety.    ciprofloxacin HCl (CIPRO) 500 MG tablet Take 1 tablet (500 mg total) by mouth 2 (two) times daily. for 3 days    tamsulosin (FLOMAX) 0.4 mg Cap Take 1 capsule (0.4 mg total) by mouth once daily.    venlafaxine (EFFEXOR-XR) 75 MG 24 hr capsule Take 1 capsule (75 mg total) by mouth once daily.     No current facility-administered medications for this visit.        Review of Systems   Constitutional: Negative for activity change, chills, fatigue, fever and unexpected weight change.   Eyes: Negative for discharge, redness and visual disturbance.   Respiratory: Negative for cough, shortness of breath and wheezing.    Cardiovascular: Negative for chest pain and leg swelling.  "  Gastrointestinal: Negative for abdominal distention, abdominal pain, constipation, diarrhea, nausea and vomiting.   Genitourinary: Positive for difficulty urinating. Negative for discharge, dysuria, flank pain, frequency, hematuria, penile swelling, testicular pain and urgency.   Musculoskeletal: Negative for arthralgias, joint swelling and myalgias.   Skin: Negative for color change and rash.   Neurological: Negative for dizziness and light-headedness.   Psychiatric/Behavioral: Negative for behavioral problems and confusion. The patient is not nervous/anxious.        Objective:      Vitals:    12/07/18 1306   BP: 122/70   Weight: 78.7 kg (173 lb 6.3 oz)   Height: 5' 7" (1.702 m)     Physical Exam   Nursing note and vitals reviewed.  Constitutional: He is oriented to person, place, and time. He appears well-developed.   HENT:   Head: Normocephalic and atraumatic.   Nose: Nose normal.   Eyes: Conjunctivae are normal. Right eye exhibits no discharge. Left eye exhibits no discharge.   Neck: Normal range of motion. Neck supple. No tracheal deviation present. No thyromegaly present.   Cardiovascular: Normal rate and regular rhythm.    Pulmonary/Chest: Effort normal. No stridor. No respiratory distress. He has no wheezes.   Abdominal: Soft. He exhibits no distension. There is no hepatosplenomegaly. There is no tenderness. There is no CVA tenderness. No hernia. Hernia confirmed negative in the right inguinal area and confirmed negative in the left inguinal area.   Genitourinary: Prostate is enlarged. Prostate is not tender. Right testis shows no mass, no swelling and no tenderness. Left testis shows no mass, no swelling and no tenderness. Circumcised. No phimosis, penile erythema or penile tenderness. No discharge found.       Musculoskeletal: Normal range of motion. He exhibits no edema.   Neurological: He is alert and oriented to person, place, and time.   Skin: Skin is warm and dry. No rash noted. No erythema. "     Psychiatric: He has a normal mood and affect. His behavior is normal. Judgment normal.       Urine dipstick shows negative for all components.  Micro exam: negative for WBC's or RBC's.    PSA 10/17/18--7.6    Assessment:       1. Elevated PSA    2. Slow urinary stream    3. Hypogonadism in male    4. Nocturia          Plan:       1. Elevated PSA   - Discussed the etiology of elevated PSA above age-corrected normal including BPH, infection/inflammation of the prostate, and prostate cancer. We had a long discussion regarding workup of elevated PSA.    - He understands that a prostate biopsy is indicated for definitive diagnosis of prostate cancer. Risks, benefits, and alternative of TRUS PBx were discussed thoroughly. Risks include, but are not limited to, pain, bleeding, infection, and sepsis. His pre-procedure regimen would require enema the morning of PBx and appropriate PO antibiotics for 3 days starting the day prior to procedure. He will also receive IM injection of antibiotics immediately before the procedure. He understands even after a prostate biopsy, prostate cancer can be missed and close follow up is necessary, with possible further imaging and/or repeat biopsy in the future.  -He would like to proceed with prostate biopsy  -We discussed how testosterone replacement could potentiate prostate cancer. Advised patient to stop Androgel at this time  - POCT urinalysis, dipstick or tablet reag  - Transrectal Ultrasound w/ Biopsy; Future    2. Slow urinary stream  - tamsulosin (FLOMAX) 0.4 mg Cap; Take 1 capsule (0.4 mg total) by mouth once daily.  Dispense: 30 capsule; Refill: 11    3. Hypogonadism in male  -Recommend patient stop Androgel    4. Nocturia  - POCT urinalysis, dipstick or tablet reag  - tamsulosin (FLOMAX) 0.4 mg Cap; Take 1 capsule (0.4 mg total) by mouth once daily.  Dispense: 30 capsule; Refill: 11            Follow-up in about 4 weeks (around 1/4/2019) for prostate biopsy.

## 2018-12-07 NOTE — LETTER
December 7, 2018      Samir Boo MD  [address] [facility]  [place] [address]           [facility]  [address] [facility] [address]  [place] [address]  Phone: [phone]  Fax: [phone]          Patient: Eric Jackson   MR Number: [id number]   YOB: 1940   Date of Visit: 12/7/2018       Dear Dr. Samir Boo:    Thank you for referring Eric Jackson to me for evaluation. Attached you will find relevant portions of my assessment and plan of care.    If you have questions, please do not hesitate to call me. I look forward to following Eric Jackson along with you.    Sincerely,    Lynnette Kovacs, NP    Enclosure  CC:  No Recipients    If you would like to receive this communication electronically, please contact [email or web address] or [phone] to request more information on [facility] Link access.    For providers and/or their staff who would like to refer a patient to [facility], please contact us through our one-stop-shop provider referral line, [facility] , at [phone].    If you feel you have received this communication in error or would no longer like to receive these types of communications, please e-mail [email or web address]

## 2019-01-07 ENCOUNTER — PROCEDURE VISIT (OUTPATIENT)
Dept: UROLOGY | Facility: CLINIC | Age: 79
End: 2019-01-07
Payer: MEDICARE

## 2019-01-07 ENCOUNTER — PATIENT OUTREACH (OUTPATIENT)
Dept: ADMINISTRATIVE | Facility: HOSPITAL | Age: 79
End: 2019-01-07

## 2019-01-07 DIAGNOSIS — R97.20 ELEVATED PSA: ICD-10-CM

## 2019-01-07 PROCEDURE — 76872 US TRANSRECTAL: CPT | Mod: HCNC,S$GLB,, | Performed by: UROLOGY

## 2019-01-07 PROCEDURE — 76942 TRANSRECTAL ULTRASOUND W/ BIOPSY: ICD-10-PCS | Mod: 59,HCNC,S$GLB, | Performed by: UROLOGY

## 2019-01-07 PROCEDURE — 88305 TISSUE SPECIMEN TO PATHOLOGY, UROLOGY: ICD-10-PCS | Mod: 26,HCNC,, | Performed by: PATHOLOGY

## 2019-01-07 PROCEDURE — 88342 TISSUE SPECIMEN TO PATHOLOGY, UROLOGY: ICD-10-PCS | Mod: 26,HCNC,, | Performed by: PATHOLOGY

## 2019-01-07 PROCEDURE — 55700 TRANSRECTAL ULTRASOUND W/ BIOPSY: ICD-10-PCS | Mod: HCNC,S$GLB,, | Performed by: UROLOGY

## 2019-01-07 PROCEDURE — 88342 IMHCHEM/IMCYTCHM 1ST ANTB: CPT | Mod: 26,HCNC,, | Performed by: PATHOLOGY

## 2019-01-07 PROCEDURE — 76942 ECHO GUIDE FOR BIOPSY: CPT | Mod: 59,HCNC,S$GLB, | Performed by: UROLOGY

## 2019-01-07 PROCEDURE — 88341 TISSUE SPECIMEN TO PATHOLOGY, UROLOGY: ICD-10-PCS | Mod: 26,HCNC,, | Performed by: PATHOLOGY

## 2019-01-07 PROCEDURE — 76872 TRANSRECTAL ULTRASOUND W/ BIOPSY: ICD-10-PCS | Mod: HCNC,S$GLB,, | Performed by: UROLOGY

## 2019-01-07 PROCEDURE — 55700 TRANSRECTAL ULTRASOUND W/ BIOPSY: CPT | Mod: HCNC,S$GLB,, | Performed by: UROLOGY

## 2019-01-07 PROCEDURE — 88341 IMHCHEM/IMCYTCHM EA ADD ANTB: CPT | Mod: 26,HCNC,, | Performed by: PATHOLOGY

## 2019-01-07 PROCEDURE — 88305 TISSUE EXAM BY PATHOLOGIST: CPT | Mod: HCNC | Performed by: PATHOLOGY

## 2019-01-07 PROCEDURE — 88305 TISSUE EXAM BY PATHOLOGIST: CPT | Mod: 26,HCNC,, | Performed by: PATHOLOGY

## 2019-01-07 PROCEDURE — 88342 IMHCHEM/IMCYTCHM 1ST ANTB: CPT | Mod: HCNC | Performed by: PATHOLOGY

## 2019-01-07 PROCEDURE — 88341 IMHCHEM/IMCYTCHM EA ADD ANTB: CPT | Mod: HCNC,59 | Performed by: PATHOLOGY

## 2019-01-07 RX ORDER — GENTAMICIN SULFATE 40 MG/ML
80 INJECTION, SOLUTION INTRAMUSCULAR; INTRAVENOUS
Status: DISCONTINUED | OUTPATIENT
Start: 2019-01-07 | End: 2019-01-14

## 2019-01-07 NOTE — PROCEDURES
"Transrectal Ultrasound w/ Biopsy  Date/Time: 1/7/2019 11:00 AM  Performed by: Layne Huff MD  Authorized by: Lynnette Kovacs NP     Consent Done?:  Yes (Written)  Time out: Immediately prior to procedure a "time out" was called to verify the correct patient, procedure, equipment, support staff and site/side marked as required.    Indications: Elevated PSA    Preparation: Patient was prepped and draped in usual sterile fashion    Total Biopsies:  12    Patient tolerance:  Patient tolerated the procedure well with no immediate complications    CLINICAL HISTORY: Elevated PSA.     Written informed consent obtained prior to procedure.    OPERATIVE PROCEDURE: The patient was brought to the procedure room and after identification by name and number was placed supine on the procedure room table. Pre-operative PO antibiotics and enema was confirmed to be completed per protocol prior to commencement of procedure. He confirmed no use of anti-coagulations in the garrett-operative time. The patient was moved into the lateral decubitis position. Rectal numbing jelly was administered. IM gentamycin was given per protocol.    The transrectal ultrasound probe was inserted through the patient's anus and into his rectum without difficulty. The area of the pudendal nerve was identified bilaterally and injected with 3 mL of 1% lidocaine for nerve block bilaterally. Prostatic apex was also injected with 3 mL of 1% lidocaine bilaterally for further analgesia.    Measurement of the prostate revealed a prostate of approximately 79.9 grams in size. TRUS was carefully performed looking for any ultrasound abnormalities.    A series of prostate needle core biopsies was then performed. A total of 12 biopsies were taken - two each from base, mid, and apex of prostate on both left and right side. These specimens were labeled by side and location and sent to pathology for further analysis.     Transrectal ultrasound probe was removed. The " patient tolerated the procedure well. He was then cleaned and returned to the supine position. Post-procedure instructions given.    ESTIMATED BLOOD LOSS: Minimal.     COMPLICATIONS: None.     FINDINGS:   1. 12 cores of prostate were removed for analysis.   2. 79.9 grams prostate by ultrasound measurement.   3. PSA - 4.7  4. PSA density - 0.06    FOLLOWUP: The patient will follow up in 7 to 10 days for the biopsy   results.      ____________________________  CORY ORELLANA MD

## 2019-01-11 PROBLEM — D12.6 TUBULAR ADENOMA OF COLON: Status: ACTIVE | Noted: 2019-01-11

## 2019-01-11 PROBLEM — E29.1 HYPOGONADISM IN MALE: Status: ACTIVE | Noted: 2019-01-11

## 2019-01-11 PROBLEM — R74.01 TRANSAMINITIS: Status: RESOLVED | Noted: 2018-01-15 | Resolved: 2019-01-11

## 2019-01-11 NOTE — PROGRESS NOTES
This note was created by combination of typed  and Dragon dictation.  Transcription errors may be present.  If there are any questions, please contact me.    Assessment & Plan:   Controlled type 2 diabetes mellitus without complication, without long-term current use of insulin  Type 2 diabetes mellitus without complication, without long-term current use of insulin  -managed by endo.  Check future a1c.  Last check 10/2018 was normal. Foot exam normal. UTD eye exam. On ARB. Not on statin b/c of SE.  We talked about zetia.  He is not opposed to trial of zetia but would like to clear up his prostate issue first. He would like to discuss at future visit.  -     Comprehensive metabolic panel; Future; Expected date: 01/14/2019  -     Lipid panel; Future; Expected date: 01/14/2019  -     TSH; Future; Expected date: 01/14/2019  -     Hemoglobin A1c; Future; Expected date: 01/14/2019  -     metFORMIN (GLUCOPHAGE-XR) 500 MG 24 hr tablet; Take 1 tablet (500 mg total) by mouth once daily.  Dispense: 90 tablet; Refill: 3    Tubular adenoma of colon 2/2018 3 adenomas repeat 3 years    Iron deficiency anemia, unspecified iron deficiency anemia type  -check future labs, blood count, iron profile.  Off of iron supplement.  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    Hypogonadism in male  -he has been off of testosterone for the past month or so and would like to see what his testosterone levels look like off of the medication.  He would like to minimize medications if possible.  I will check future lab in about a month, testosterone off of medication so he will have been off of it for 2 months.  -     Testosterone; Future; Expected date: 01/14/2019    Essential hypertension  -stable, losartan HCTZ refilled.  -      losartan-hydrochlorothiazide 50-12.5 mg (HYZAAR) 50-12.5 mg per tablet; Take 1 tablet by mouth once daily.  Dispense: 90 tablet; Refill: 3    Anxiety  -for PRN use - refilled xanax.  Takes effexor, takes 3 weeks out of the month.   -     ALPRAZolam (XANAX) 1 MG tablet; Take 1 tablet (1 mg total) by mouth nightly as needed for Anxiety.  Dispense: 60 tablet; Refill: 2    There are no discontinued medications.    meds sent this encounter:       Follow Up: No Follow-up on file.    Subjective:     Chief Complaint   Patient presents with    Hypertension    Diabetes       DARRYL Reyes is a 78 y.o. male, last appointment with this clinic was Visit date not found.    DM2  CAD status post CABG 2001  HTN  anxiety and depression  upper GI bleed 2012  Hypogonad on testosterone replacement  Iron deficieny anemia  Statin myalgias  02/09/2018 EGD esophageal mucosal changes consistent with long segment Archer's.  Medium hiatal hernia  02/09/2018 colonoscopy with polyps, 3 tubular adenomas.    5/2018 CBC normalized, iron good. On iron, stop it.   a1c good  Testosterone good on current dose androgel  CXR normalized    I increased his losartan hctz and stopped amlodipine. He saw NP and decreased his losartan hctz.    Saw urology for elevated PSA. Plan was TRUS and bx. 1/7/19 had that done. Results pending.   Statin myalgias. Consider zetia.    Having SE of the tamsulosin - improved urination but had SE of low back pain.      last xanax 10/6 #60    effexor - The Effexor he takes for maybe 3 weeks at a time and finds that it is effective when taking that way. If he takes it chronically, makes him have problems with enjoyment.    Has been using half dose of testosterone.  Has been off of it for a month in anticipation of prostate bx; and he wants to see what the testosterone level looks like off of it.    We talked about zetia.  He is somewhat interested but wants to have prostate follow up first.  He's modified his diet.     Labs  from Dr. Muñoz - very high. 10/17/18 microalbumin ratio 6    HDL 52   Creatinine 1.11  a1c 6.1  PSA 7.6    Patient Care Team:  Samir Boo MD as PCP - General (Internal Medicine)  Arias Muñoz MD as Consulting Physician (Endocrinology)    Patient Active Problem List    Diagnosis Date Noted    Elevated PSA 12/07/2018    Slow urinary stream 12/07/2018    Iron deficiency anemia 02/09/2018    Symptomatic anemia, microcytic/hypochromic  01/15/2018    Hyponatremia 01/15/2018    Transaminitis 01/15/2018    Insomnia 08/10/2014    GERD (gastroesophageal reflux disease)     Anxiety     Depression     Essential hypertension 11/14/2012    Diabetes mellitus type II, controlled 11/14/2012       PAST MEDICAL HISTORY:  Past Medical History:   Diagnosis Date    Anxiety     Coronary artery disease     Depression     Diabetes mellitus type II     Erectile dysfunction     Eye injuries     fb ou    GERD (gastroesophageal reflux disease)     Hypertension        PAST SURGICAL HISTORY:  Past Surgical History:   Procedure Laterality Date    COLONOSCOPY N/A 2/9/2018    [facility]    CORONARY ARTERY BYPASS GRAFT  2001    EGD (ESOPHAGOGASTRODUODENOSCOPY) Left 11/14/2012    Performed by Carlos Navarro [facility]    ESOPHAGOGASTRODUODENOSCOPY (EGD) N/A 2/9/2018    Per[facility] at [facility]    HERNIA REPAIR      WRIST SURGERY         SOCIAL HISTORY:  Social History     Socioeconomic History    Marital status:      Spouse name: Not on file    Number of children: Not on file    Years of education: Not on file    Highest education level: Not on file   Social Needs    Financial resource strain: Not on file    Food insecurity - worry: Not on file    Food insecurity - inability: Not on file    Transportation needs - medical: Not on file    Transportation needs - non-medical: Not on file   Occupational History    Not on  file   Tobacco Use    Smoking status: Former Smoker     Packs/day: 2.00     Years: 23.00     Pack years: 46.00     Last attempt to quit: 1989     Years since quittin.0    Smokeless tobacco: Never Used   Substance and Sexual Activity    Alcohol use: Yes     Alcohol/week: 0.6 oz     Types: 1 Cans of beer per week     Comment: 1 can of beer per day    Drug use: No    Sexual activity: Yes     Partners: Female   Other Topics Concern    Not on file   Social History Narrative    Not on file       ALLERGIES AND MEDICATIONS: updated and reviewed.  Review of patient's allergies indicates:   Allergen Reactions    Prochlorperazine      convulsions    Statins-hmg-coa reductase inhibitors Other (See Comments)     Muscle weakness     Current Outpatient Medications   Medication Sig Dispense Refill    ALPRAZolam (XANAX) 1 MG tablet Take 1 tablet (1 mg total) by mouth nightly as needed for Anxiety. 60 tablet 2    blood sugar diagnostic Strp To check BG 2 times daily, to use with insurance preferred meter 100 each 0    blood-glucose meter kit To check BG 2 times daily, to use with insurance preferred meter 1 each 0    lancets Misc To check BG 2 times daily, to use with insurance preferred meter 100 each 0    losartan-hydrochlorothiazide 50-12.5 mg (HYZAAR) 50-12.5 mg per tablet Take 1 tablet by mouth once daily. 90 tablet 3    metFORMIN (GLUCOPHAGE-XR) 500 MG 24 hr tablet Take 1 tablet (500 mg total) by mouth once daily. 90 tablet 3    mirtazapine (REMERON) 30 MG tablet Take 1 tablet (30 mg total) by mouth every evening. 90 tablet 3    pantoprazole (PROTONIX) 40 MG tablet Take 1 tablet (40 mg total) by mouth once daily. 30 tablet 0    tamsulosin (FLOMAX) 0.4 mg Cap Take 1 capsule (0.4 mg total) by mouth once daily. 30 capsule 11    venlafaxine (EFFEXOR-XR) 75 MG 24 hr capsule Take 1 capsule (75 mg total) by mouth once daily. 90 capsule 3    vit C/vit E ac/lut/copper/zinc (PRESERVISION LUTEIN ORAL) Take 1  "tablet by mouth once daily.      vitamin D 1000 units Tab Take 1,000 Units by mouth once daily.       Current Facility-Administered Medications   Medication Dose Route Frequency Provider Last Rate Last Dose    gentamicin injection 80 mg  80 mg Intramuscular 1 time in Clinic/HOD Layne Huff MD           Review of Systems   Constitutional: Negative for chills and fever.   Respiratory: Negative for shortness of breath.    Cardiovascular: Negative for chest pain and palpitations.       Objective:   Physical Exam   Vitals:    01/14/19 1048   BP: 126/78   Pulse: 78   Temp: 98.3 °F (36.8 °C)   SpO2: 98%   Weight: 81.4 kg (179 lb 9 oz)   Height: 5' 7" (1.702 m)    Body mass index is 28.12 kg/m².            Physical Exam   Constitutional: He is oriented to person, place, and time. He appears well-developed and well-nourished. No distress.   Eyes: EOM are normal.   Cardiovascular: Normal rate, regular rhythm and normal heart sounds.   No murmur heard.  Pulses:       Dorsalis pedis pulses are 3+ on the right side.        Posterior tibial pulses are 3+ on the right side.   Pulmonary/Chest: Effort normal and breath sounds normal.   Musculoskeletal: Normal range of motion.        Right foot: There is no deformity.   Feet:   Right Foot:   Protective Sensation: 5 sites tested. 5 sites sensed.   Skin Integrity: Negative for ulcer, blister, skin breakdown, erythema or warmth.   Neurological: He is alert and oriented to person, place, and time. Coordination normal.   Skin: Skin is warm and dry.   Psychiatric: He has a normal mood and affect. His behavior is normal. Thought content normal.     "

## 2019-01-14 ENCOUNTER — PATIENT MESSAGE (OUTPATIENT)
Dept: FAMILY MEDICINE | Facility: CLINIC | Age: 79
End: 2019-01-14

## 2019-01-14 ENCOUNTER — OFFICE VISIT (OUTPATIENT)
Dept: FAMILY MEDICINE | Facility: CLINIC | Age: 79
End: 2019-01-14
Payer: MEDICARE

## 2019-01-14 VITALS
TEMPERATURE: 98 F | OXYGEN SATURATION: 98 % | DIASTOLIC BLOOD PRESSURE: 78 MMHG | HEIGHT: 67 IN | BODY MASS INDEX: 28.18 KG/M2 | SYSTOLIC BLOOD PRESSURE: 126 MMHG | HEART RATE: 78 BPM | WEIGHT: 179.56 LBS

## 2019-01-14 DIAGNOSIS — D50.9 IRON DEFICIENCY ANEMIA, UNSPECIFIED IRON DEFICIENCY ANEMIA TYPE: ICD-10-CM

## 2019-01-14 DIAGNOSIS — E11.9 CONTROLLED TYPE 2 DIABETES MELLITUS WITHOUT COMPLICATION, WITHOUT LONG-TERM CURRENT USE OF INSULIN: Primary | ICD-10-CM

## 2019-01-14 DIAGNOSIS — E29.1 HYPOGONADISM MALE: ICD-10-CM

## 2019-01-14 DIAGNOSIS — D12.6 TUBULAR ADENOMA OF COLON: ICD-10-CM

## 2019-01-14 DIAGNOSIS — I10 ESSENTIAL HYPERTENSION: ICD-10-CM

## 2019-01-14 DIAGNOSIS — F41.9 ANXIETY: ICD-10-CM

## 2019-01-14 DIAGNOSIS — K22.70 BARRETT'S ESOPHAGUS WITHOUT DYSPLASIA: ICD-10-CM

## 2019-01-14 DIAGNOSIS — E29.1 HYPOGONADISM IN MALE: ICD-10-CM

## 2019-01-14 DIAGNOSIS — E11.9 TYPE 2 DIABETES MELLITUS WITHOUT COMPLICATION, WITHOUT LONG-TERM CURRENT USE OF INSULIN: ICD-10-CM

## 2019-01-14 LAB
CHOLEST SERPL-MSCNC: 265 MG/DL (ref 0–200)
CREAT SERPL-MCNC: 1.1 MG/DL (ref 0.6–1.3)
HBA1C MFR BLD: 6.1 % (ref 4–6)
HDLC SERPL-MCNC: 52 MG/DL (ref 35–70)
LDLC SERPL CALC-MCNC: 182 MG/DL (ref 0–160)
PROSTATE SPECIFIC ANTIGEN, TOTAL: 7.6
TRIGL SERPL-MCNC: 153 MG/DL (ref 40–160)

## 2019-01-14 PROCEDURE — 99214 PR OFFICE/OUTPT VISIT, EST, LEVL IV, 30-39 MIN: ICD-10-PCS | Mod: HCNC,S$GLB,, | Performed by: INTERNAL MEDICINE

## 2019-01-14 PROCEDURE — 3074F PR MOST RECENT SYSTOLIC BLOOD PRESSURE < 130 MM HG: ICD-10-PCS | Mod: CPTII,HCNC,S$GLB, | Performed by: INTERNAL MEDICINE

## 2019-01-14 PROCEDURE — 99499 UNLISTED E&M SERVICE: CPT | Mod: HCNC,S$GLB,, | Performed by: INTERNAL MEDICINE

## 2019-01-14 PROCEDURE — 99499 RISK ADDL DX/OHS AUDIT: ICD-10-PCS | Mod: HCNC,S$GLB,, | Performed by: INTERNAL MEDICINE

## 2019-01-14 PROCEDURE — 99999 PR PBB SHADOW E&M-EST. PATIENT-LVL III: CPT | Mod: PBBFAC,HCNC,, | Performed by: INTERNAL MEDICINE

## 2019-01-14 PROCEDURE — 3074F SYST BP LT 130 MM HG: CPT | Mod: CPTII,HCNC,S$GLB, | Performed by: INTERNAL MEDICINE

## 2019-01-14 PROCEDURE — 3078F DIAST BP <80 MM HG: CPT | Mod: CPTII,HCNC,S$GLB, | Performed by: INTERNAL MEDICINE

## 2019-01-14 PROCEDURE — 99214 OFFICE O/P EST MOD 30 MIN: CPT | Mod: HCNC,S$GLB,, | Performed by: INTERNAL MEDICINE

## 2019-01-14 PROCEDURE — 1101F PR PT FALLS ASSESS DOC 0-1 FALLS W/OUT INJ PAST YR: ICD-10-PCS | Mod: CPTII,HCNC,S$GLB, | Performed by: INTERNAL MEDICINE

## 2019-01-14 PROCEDURE — 99999 PR PBB SHADOW E&M-EST. PATIENT-LVL III: ICD-10-PCS | Mod: PBBFAC,HCNC,, | Performed by: INTERNAL MEDICINE

## 2019-01-14 PROCEDURE — 3078F PR MOST RECENT DIASTOLIC BLOOD PRESSURE < 80 MM HG: ICD-10-PCS | Mod: CPTII,HCNC,S$GLB, | Performed by: INTERNAL MEDICINE

## 2019-01-14 PROCEDURE — 1101F PT FALLS ASSESS-DOCD LE1/YR: CPT | Mod: CPTII,HCNC,S$GLB, | Performed by: INTERNAL MEDICINE

## 2019-01-14 RX ORDER — METFORMIN HYDROCHLORIDE 500 MG/1
500 TABLET, EXTENDED RELEASE ORAL DAILY
Qty: 90 TABLET | Refills: 3 | Status: SHIPPED | OUTPATIENT
Start: 2019-01-14 | End: 2019-02-28

## 2019-01-14 RX ORDER — LOSARTAN POTASSIUM AND HYDROCHLOROTHIAZIDE 12.5; 5 MG/1; MG/1
1 TABLET ORAL DAILY
Qty: 90 TABLET | Refills: 3 | Status: SHIPPED | OUTPATIENT
Start: 2019-01-14 | End: 2019-12-09 | Stop reason: SDUPTHER

## 2019-01-14 RX ORDER — ALPRAZOLAM 1 MG/1
1 TABLET ORAL NIGHTLY PRN
Qty: 60 TABLET | Refills: 2 | Status: SHIPPED | OUTPATIENT
Start: 2019-01-14 | End: 2019-12-09 | Stop reason: SDUPTHER

## 2019-01-22 NOTE — PATIENT INSTRUCTIONS
Iron-Deficiency Anemia (Adult)  Red blood cells carry oxygen to the tissues of your body. Anemia is a condition in which you have too few red blood cells. You need iron to make red cells. Anemia makes you feel tired and run down. When anemia becomes severe, your skin becomes pale. You may feel short of breath after physical activity. Other symptoms include:  · Headaches  · Dizziness  · Leg cramps with physical activity  · Drowsiness  · Restless legs  Your anemia is caused by not having enough iron in your body. This may be because of:  · Loss of blood. This can be caused by heavy menstrual periods. It can also be caused by bleeding from the stomach or intestines.  · Poor diet. You may not be eating enough foods that contain iron.  · Inability to absorb iron from the foods you eat  · Pregnancy  If your blood count is low enough, your healthcare provider may prescribe an iron supplement. It usually takes about 2 to 3 months of treatment with iron supplements to correct anemia. Severe cases of anemia need a blood transfusion to quickly ease symptoms and deliver more oxygen to the cells.  Home care  Follow these guidelines when caring for yourself at home:  · Eat foods high in iron. This will boost the amount of iron stored in your body. It is a natural way to build up the number of blood cells. Good sources of iron include beef, liver, spinach and other dark green leafy vegetables, whole grains, beans, and nuts.  · Do not overexert yourself.  · Talk with your healthcare provider before traveling by air or traveling to high altitudes.  Follow-up care  Follow up with your healthcare provider in 2 months, or as advised. This is to have another red blood cell count to be sure your anemia has been fixed.  When to seek medical advice  Call your healthcare provider right away if any of these occur:  · Shortness of breath or chest pain  · Dizziness or fainting  · Vomiting blood or passing red or black-colored stool   Date  Same bridging plan as before, given by cardiology    Last Reviewed: 2/25/2016  © 1034-3302 [employer]. [address] [place] [address], [place] [address]. All rights reserved. This information is not intended as a substitute for professional medical care. Always follow your healthcare professional's instructions.

## 2019-01-25 ENCOUNTER — PES CALL (OUTPATIENT)
Dept: ADMINISTRATIVE | Facility: CLINIC | Age: 79
End: 2019-01-25

## 2019-01-29 ENCOUNTER — OFFICE VISIT (OUTPATIENT)
Dept: UROLOGY | Facility: CLINIC | Age: 79
End: 2019-01-29
Payer: MEDICARE

## 2019-01-29 VITALS
BODY MASS INDEX: 28.04 KG/M2 | DIASTOLIC BLOOD PRESSURE: 76 MMHG | SYSTOLIC BLOOD PRESSURE: 138 MMHG | WEIGHT: 178.69 LBS | HEIGHT: 67 IN

## 2019-01-29 DIAGNOSIS — R39.198 SLOW URINARY STREAM: ICD-10-CM

## 2019-01-29 DIAGNOSIS — E29.1 HYPOGONADISM IN MALE: ICD-10-CM

## 2019-01-29 DIAGNOSIS — C61 PROSTATE CANCER: Primary | ICD-10-CM

## 2019-01-29 PROCEDURE — 99499 UNLISTED E&M SERVICE: CPT | Mod: HCNC,S$GLB,, | Performed by: UROLOGY

## 2019-01-29 PROCEDURE — 3075F PR MOST RECENT SYSTOLIC BLOOD PRESS GE 130-139MM HG: ICD-10-PCS | Mod: HCNC,CPTII,S$GLB, | Performed by: UROLOGY

## 2019-01-29 PROCEDURE — 99999 PR PBB SHADOW E&M-EST. PATIENT-LVL III: ICD-10-PCS | Mod: PBBFAC,HCNC,, | Performed by: UROLOGY

## 2019-01-29 PROCEDURE — 3078F DIAST BP <80 MM HG: CPT | Mod: HCNC,CPTII,S$GLB, | Performed by: UROLOGY

## 2019-01-29 PROCEDURE — 99499 RISK ADDL DX/OHS AUDIT: ICD-10-PCS | Mod: HCNC,S$GLB,, | Performed by: UROLOGY

## 2019-01-29 PROCEDURE — 3075F SYST BP GE 130 - 139MM HG: CPT | Mod: HCNC,CPTII,S$GLB, | Performed by: UROLOGY

## 2019-01-29 PROCEDURE — 1101F PR PT FALLS ASSESS DOC 0-1 FALLS W/OUT INJ PAST YR: ICD-10-PCS | Mod: HCNC,CPTII,S$GLB, | Performed by: UROLOGY

## 2019-01-29 PROCEDURE — 99999 PR PBB SHADOW E&M-EST. PATIENT-LVL III: CPT | Mod: PBBFAC,HCNC,, | Performed by: UROLOGY

## 2019-01-29 PROCEDURE — 99214 PR OFFICE/OUTPT VISIT, EST, LEVL IV, 30-39 MIN: ICD-10-PCS | Mod: HCNC,S$GLB,, | Performed by: UROLOGY

## 2019-01-29 PROCEDURE — 3078F PR MOST RECENT DIASTOLIC BLOOD PRESSURE < 80 MM HG: ICD-10-PCS | Mod: HCNC,CPTII,S$GLB, | Performed by: UROLOGY

## 2019-01-29 PROCEDURE — 1101F PT FALLS ASSESS-DOCD LE1/YR: CPT | Mod: HCNC,CPTII,S$GLB, | Performed by: UROLOGY

## 2019-01-29 PROCEDURE — 99214 OFFICE O/P EST MOD 30 MIN: CPT | Mod: HCNC,S$GLB,, | Performed by: UROLOGY

## 2019-01-29 NOTE — PROGRESS NOTES
"Subjective:       Eric Jackson is a 78 y.o. male who is an established patient who was seen for evaluation of elevated PSA.      Elevated PSA/PCa  He was seen by Lynnette for elevated PSA. No known family history of PCa (pt is adopted). He has no prior genitourinary history of hematuria, hematospermia, prostatitis, UTI, urolithiasis, previous  surgery.  Of note, patient was seen by Dr. Puga in 11/2014 for an elevated PSA of 4.7. Patient declined prostate biopsy at that time and elected to observe PSA.    PSA:  11/4 - 4.7   10/18 - 7.6    He is s/p TRUS PBx on 1/7/18. He did well after procedure. Unfortunately, biopsy showed intermediate risk [place] 3+4 PCa in 3/12 cores. [place] 4 up to 30% of one core.       Low T  He had been treated for low T with testosterone replacement by his endocrinologist, Dr Shah (Androgel 1 pump daily). This was stopped at initial visit 2/2 elevated PSA.      LUTS  He also c/o LUTS - intermittency, nocturia x 3, weak stream. He was started on Flomax.         The following portions of the patient's history were reviewed and updated as appropriate: allergies, current medications, past family history, past medical history, past social history, past surgical history and problem list.    Review of Systems  Constitutional: no fever or chills  ENT: no nasal congestion or sore throat  Respiratory: no cough or shortness of breath  Cardiovascular: no chest pain or palpitations  Gastrointestinal: no nausea or vomiting, tolerating diet  Genitourinary: as per HPI  Hematologic/Lymphatic: no easy bruising or lymphadenopathy  Musculoskeletal: no arthralgias or myalgias  Skin: no rashes or lesions  Neurological: no seizures or tremors  Behavioral/Psych: no auditory or visual hallucinations       Objective:    Vitals: /76   Ht 5' 7" (1.702 m)   Wt 81.1 kg (178 lb 10.9 oz)   BMI 27.99 kg/m²     Physical Exam   General: well developed, well nourished in no acute distress  Head: " normocephalic, atraumatic  Neck: supple, trachea midline, no obvious enlargement of thyroid  HEENT: EOMI, mucus membranes moist, sclera anicteric, no hearing impairment  Lungs: symmetric expansion, non-labored breathing  Cardiovascular: regular rate and rhythm, normal pulses  Abdomen: soft, non tender, non distended, no palpable masses, no hepatosplenomegaly, no hernias, bladder nonpalpable. No CVA tenderness.  Musculoskeletal: no peripheral edema, normal ROM in bilateral upper and lower extremities  Lymphatics: no cervical or inguinal lymphadenopathy  Skin: no rashes or lesions  Neuro: alert and oriented x 3, no gross deficits  Psych: normal judgment and insight, normal mood/affect and non-anxious  Genitourinary:   patient declined exam   EVA: patient declined exam      Lab Review   Urine analysis today in clinic shows no urine     Lab Results   Component Value Date    WBC 7.11 05/24/2018    HGB 15.0 05/24/2018    HCT 46.2 05/24/2018    MCV 89 05/24/2018     05/24/2018     Lab Results   Component Value Date    CREATININE 1.1 10/17/2018    BUN 14 05/24/2018     No results found for: PSA  No results found for: PSADIAG    Imaging  NA       Assessment/Plan:      1. Prostate cancer   - TRUS PBx 1/19 - 79.9g   - PSA 7.6   - Intermediate risk [place] 3+4 in 3/12 cores   - Discussed treatment options today including WW, AS, prostate surgery (RRP, RPP, RALP), cryoablation, radiation (brachytherapy/XRT with/without ADT), and ADT.    - Discussed risks, benefits, alternatives to each. Understands risk of incontinence and ED.   - He would like to consider brachytherapy. Will refer to Dr Garcia.    - Discussed surgery, XRT as well.     2. Hypogonadism in male    - Stop T replacement 2/2 PCa     3. Slow urinary stream    - Flomax       Follow up - will call

## 2019-02-21 ENCOUNTER — OFFICE VISIT (OUTPATIENT)
Dept: UROLOGY | Facility: CLINIC | Age: 79
End: 2019-02-21
Payer: MEDICARE

## 2019-02-21 VITALS
HEIGHT: 67 IN | RESPIRATION RATE: 14 BRPM | BODY MASS INDEX: 28.23 KG/M2 | HEART RATE: 68 BPM | DIASTOLIC BLOOD PRESSURE: 72 MMHG | SYSTOLIC BLOOD PRESSURE: 118 MMHG | WEIGHT: 179.88 LBS

## 2019-02-21 DIAGNOSIS — R39.198 SLOW URINARY STREAM: ICD-10-CM

## 2019-02-21 DIAGNOSIS — C61 PROSTATE CANCER: Primary | ICD-10-CM

## 2019-02-21 DIAGNOSIS — E29.1 HYPOGONADISM IN MALE: ICD-10-CM

## 2019-02-21 PROCEDURE — 99214 PR OFFICE/OUTPT VISIT, EST, LEVL IV, 30-39 MIN: ICD-10-PCS | Mod: HCNC,S$GLB,, | Performed by: UROLOGY

## 2019-02-21 PROCEDURE — 1101F PT FALLS ASSESS-DOCD LE1/YR: CPT | Mod: HCNC,CPTII,S$GLB, | Performed by: UROLOGY

## 2019-02-21 PROCEDURE — 1101F PR PT FALLS ASSESS DOC 0-1 FALLS W/OUT INJ PAST YR: ICD-10-PCS | Mod: HCNC,CPTII,S$GLB, | Performed by: UROLOGY

## 2019-02-21 PROCEDURE — 3074F PR MOST RECENT SYSTOLIC BLOOD PRESSURE < 130 MM HG: ICD-10-PCS | Mod: HCNC,CPTII,S$GLB, | Performed by: UROLOGY

## 2019-02-21 PROCEDURE — 3078F PR MOST RECENT DIASTOLIC BLOOD PRESSURE < 80 MM HG: ICD-10-PCS | Mod: HCNC,CPTII,S$GLB, | Performed by: UROLOGY

## 2019-02-21 PROCEDURE — 99214 OFFICE O/P EST MOD 30 MIN: CPT | Mod: HCNC,S$GLB,, | Performed by: UROLOGY

## 2019-02-21 PROCEDURE — 3074F SYST BP LT 130 MM HG: CPT | Mod: HCNC,CPTII,S$GLB, | Performed by: UROLOGY

## 2019-02-21 PROCEDURE — 3078F DIAST BP <80 MM HG: CPT | Mod: HCNC,CPTII,S$GLB, | Performed by: UROLOGY

## 2019-02-21 PROCEDURE — 99999 PR PBB SHADOW E&M-EST. PATIENT-LVL III: CPT | Mod: PBBFAC,HCNC,, | Performed by: UROLOGY

## 2019-02-21 PROCEDURE — 99999 PR PBB SHADOW E&M-EST. PATIENT-LVL III: ICD-10-PCS | Mod: PBBFAC,HCNC,, | Performed by: UROLOGY

## 2019-02-21 RX ORDER — CIPROFLOXACIN 500 MG/1
500 TABLET ORAL 2 TIMES DAILY
Qty: 4 TABLET | Refills: 0 | Status: SHIPPED | OUTPATIENT
Start: 2019-02-21 | End: 2019-02-23

## 2019-02-21 RX ORDER — CIPROFLOXACIN 500 MG/1
500 TABLET ORAL 2 TIMES DAILY
Qty: 4 TABLET | Refills: 0 | Status: SHIPPED | OUTPATIENT
Start: 2019-02-21 | End: 2019-02-21 | Stop reason: SDUPTHER

## 2019-02-21 NOTE — H&P
"Subjective:       Eric Jackson is a 78 y.o. male who is an established patient who was seen for evaluation of elevated PSA.      Elevated PSA/PCa  He was seen by Lynnette for elevated PSA. No known family history of PCa (pt is adopted). He has no prior genitourinary history of hematuria, hematospermia, prostatitis, UTI, urolithiasis, previous  surgery.  Of note, patient was seen by Dr. Puga in 11/2014 for an elevated PSA of 4.7. Patient declined prostate biopsy at that time and elected to observe PSA.    PSA:  11/4 - 4.7   10/18 - 7.6    He is s/p TRUS PBx on 1/7/18. He did well after procedure. Unfortunately, biopsy showed intermediate risk [place] 3+4 PCa in 3/12 cores. [place] 4 up to 30% of one core.     He is now planned for XRT. Here to schedule fiducial marker placement.       Low T  He had been treated for low T with testosterone replacement by his endocrinologist, Dr Shah (Androgel 1 pump daily). This was stopped at initial visit 2/2 elevated PSA.      LUTS  He also c/o LUTS - intermittency, nocturia x 3, weak stream. He was started on Flomax.         The following portions of the patient's history were reviewed and updated as appropriate: allergies, current medications, past family history, past medical history, past social history, past surgical history and problem list.    Review of Systems  Constitutional: no fever or chills  ENT: no nasal congestion or sore throat  Respiratory: no cough or shortness of breath  Cardiovascular: no chest pain or palpitations  Gastrointestinal: no nausea or vomiting, tolerating diet  Genitourinary: as per HPI  Hematologic/Lymphatic: no easy bruising or lymphadenopathy  Musculoskeletal: no arthralgias or myalgias  Skin: no rashes or lesions  Neurological: no seizures or tremors  Behavioral/Psych: no auditory or visual hallucinations       Objective:    Vitals: /72   Pulse 68   Resp 14   Ht 5' 7" (1.702 m)   Wt 81.6 kg (179 lb 14.3 oz)   BMI 28.18 kg/m² "     Physical Exam   General: well developed, well nourished in no acute distress  Head: normocephalic, atraumatic  Neck: supple, trachea midline, no obvious enlargement of thyroid  HEENT: EOMI, mucus membranes moist, sclera anicteric, no hearing impairment  Lungs: symmetric expansion, non-labored breathing  Cardiovascular: regular rate and rhythm, normal pulses  Abdomen: soft, non tender, non distended, no palpable masses, no hepatosplenomegaly, no hernias, bladder nonpalpable. No CVA tenderness.  Musculoskeletal: no peripheral edema, normal ROM in bilateral upper and lower extremities  Lymphatics: no cervical or inguinal lymphadenopathy  Skin: no rashes or lesions  Neuro: alert and oriented x 3, no gross deficits  Psych: normal judgment and insight, normal mood/affect and non-anxious  Genitourinary:   patient declined exam   EVA: patient declined exam      Lab Review   Urine analysis today in clinic shows no urine     Lab Results   Component Value Date    WBC 7.11 05/24/2018    HGB 15.0 05/24/2018    HCT 46.2 05/24/2018    MCV 89 05/24/2018     05/24/2018     Lab Results   Component Value Date    CREATININE 1.1 10/17/2018    BUN 14 05/24/2018     No results found for: PSA  No results found for: PSADIAG    Imaging  NA       Assessment/Plan:      1. Prostate cancer   - TRUS PBx 1/19 - 79.9g   - PSA 7.6   - Intermediate risk [place] 3+4 in 3/12 cores   - Discussed treatment options today including WW, AS, prostate surgery (RRP, RPP, RALP), cryoablation, radiation (brachytherapy/XRT with/without ADT), and ADT.    - Discussed risks, benefits, alternatives to each. Understands risk of incontinence and ED.   - He would like to consider brachytherapy. Will refer to Dr Garcia.    - Discussed surgery, XRT as well.   - Now planned for XRT with Dr Garcia.    - Fiducial markers 2/26/19   - Cipro/Fleets to pharmacy     2. Hypogonadism in male    - Stop T replacement 2/2 PCa     3. Slow urinary stream    - Flomax        Follow up 6 months with PSA

## 2019-02-21 NOTE — PROGRESS NOTES
"Subjective:       Eric Jackson is a 78 y.o. male who is an established patient who was seen for evaluation of elevated PSA.      Elevated PSA/PCa  He was seen by Lynnette for elevated PSA. No known family history of PCa (pt is adopted). He has no prior genitourinary history of hematuria, hematospermia, prostatitis, UTI, urolithiasis, previous  surgery.  Of note, patient was seen by Dr. Puga in 11/2014 for an elevated PSA of 4.7. Patient declined prostate biopsy at that time and elected to observe PSA.    PSA:  11/4 - 4.7   10/18 - 7.6    He is s/p TRUS PBx on 1/7/18. He did well after procedure. Unfortunately, biopsy showed intermediate risk [place] 3+4 PCa in 3/12 cores. [place] 4 up to 30% of one core.     He is now planned for XRT. Here to schedule fiducial marker placement.       Low T  He had been treated for low T with testosterone replacement by his endocrinologist, Dr hSah (Androgel 1 pump daily). This was stopped at initial visit 2/2 elevated PSA.      LUTS  He also c/o LUTS - intermittency, nocturia x 3, weak stream. He was started on Flomax.         The following portions of the patient's history were reviewed and updated as appropriate: allergies, current medications, past family history, past medical history, past social history, past surgical history and problem list.    Review of Systems  Constitutional: no fever or chills  ENT: no nasal congestion or sore throat  Respiratory: no cough or shortness of breath  Cardiovascular: no chest pain or palpitations  Gastrointestinal: no nausea or vomiting, tolerating diet  Genitourinary: as per HPI  Hematologic/Lymphatic: no easy bruising or lymphadenopathy  Musculoskeletal: no arthralgias or myalgias  Skin: no rashes or lesions  Neurological: no seizures or tremors  Behavioral/Psych: no auditory or visual hallucinations       Objective:    Vitals: /72   Pulse 68   Resp 14   Ht 5' 7" (1.702 m)   Wt 81.6 kg (179 lb 14.3 oz)   BMI 28.18 kg/m² "     Physical Exam   General: well developed, well nourished in no acute distress  Head: normocephalic, atraumatic  Neck: supple, trachea midline, no obvious enlargement of thyroid  HEENT: EOMI, mucus membranes moist, sclera anicteric, no hearing impairment  Lungs: symmetric expansion, non-labored breathing  Cardiovascular: regular rate and rhythm, normal pulses  Abdomen: soft, non tender, non distended, no palpable masses, no hepatosplenomegaly, no hernias, bladder nonpalpable. No CVA tenderness.  Musculoskeletal: no peripheral edema, normal ROM in bilateral upper and lower extremities  Lymphatics: no cervical or inguinal lymphadenopathy  Skin: no rashes or lesions  Neuro: alert and oriented x 3, no gross deficits  Psych: normal judgment and insight, normal mood/affect and non-anxious  Genitourinary:   patient declined exam   EVA: patient declined exam      Lab Review   Urine analysis today in clinic shows no urine     Lab Results   Component Value Date    WBC 7.11 05/24/2018    HGB 15.0 05/24/2018    HCT 46.2 05/24/2018    MCV 89 05/24/2018     05/24/2018     Lab Results   Component Value Date    CREATININE 1.1 10/17/2018    BUN 14 05/24/2018     No results found for: PSA  No results found for: PSADIAG    Imaging  NA       Assessment/Plan:      1. Prostate cancer   - TRUS PBx 1/19 - 79.9g   - PSA 7.6   - Intermediate risk [place] 3+4 in 3/12 cores   - Discussed treatment options today including WW, AS, prostate surgery (RRP, RPP, RALP), cryoablation, radiation (brachytherapy/XRT with/without ADT), and ADT.    - Discussed risks, benefits, alternatives to each. Understands risk of incontinence and ED.   - He would like to consider brachytherapy. Will refer to Dr Garcia.    - Discussed surgery, XRT as well.   - Now planned for XRT with Dr Garcia.    - Fiducial markers 2/26/19   - Cipro/Fleets to pharmacy     2. Hypogonadism in male    - Stop T replacement 2/2 PCa     3. Slow urinary stream    - Flomax        Follow up 6 months with PSA

## 2019-02-22 ENCOUNTER — HOSPITAL ENCOUNTER (OUTPATIENT)
Dept: PREADMISSION TESTING | Facility: HOSPITAL | Age: 79
Discharge: HOME | End: 2019-02-22
Attending: UROLOGY
Payer: MEDICARE

## 2019-02-22 VITALS
BODY MASS INDEX: 27.89 KG/M2 | DIASTOLIC BLOOD PRESSURE: 72 MMHG | HEART RATE: 80 BPM | TEMPERATURE: 99 F | RESPIRATION RATE: 16 BRPM | OXYGEN SATURATION: 96 % | HEIGHT: 67 IN | WEIGHT: 177.69 LBS | SYSTOLIC BLOOD PRESSURE: 116 MMHG

## 2019-02-22 DIAGNOSIS — C61 PROSTATE CANCER: ICD-10-CM

## 2019-02-22 DIAGNOSIS — Z01.818 PRE-OP TESTING: Primary | ICD-10-CM

## 2019-02-22 DIAGNOSIS — Z12.5 ENCOUNTER FOR SCREENING FOR MALIGNANT NEOPLASM OF PROSTATE: ICD-10-CM

## 2019-02-22 LAB
ALBUMIN SERPL BCP-MCNC: 4 G/DL
ALP SERPL-CCNC: 68 U/L
ALT SERPL W/O P-5'-P-CCNC: 20 U/L
ANION GAP SERPL CALC-SCNC: 8 MMOL/L
AST SERPL-CCNC: 24 U/L
BASOPHILS # BLD AUTO: 0.03 K/UL
BASOPHILS NFR BLD: 0.4 %
BILIRUB SERPL-MCNC: 0.3 MG/DL
BUN SERPL-MCNC: 18 MG/DL
CALCIUM SERPL-MCNC: 10 MG/DL
CHLORIDE SERPL-SCNC: 103 MMOL/L
CO2 SERPL-SCNC: 30 MMOL/L
CREAT SERPL-MCNC: 1.4 MG/DL
DIFFERENTIAL METHOD: NORMAL
EOSINOPHIL # BLD AUTO: 0.3 K/UL
EOSINOPHIL NFR BLD: 3.7 %
ERYTHROCYTE [DISTWIDTH] IN BLOOD BY AUTOMATED COUNT: 13 %
EST. GFR  (AFRICAN AMERICAN): 56 ML/MIN/1.73 M^2
EST. GFR  (NON AFRICAN AMERICAN): 48 ML/MIN/1.73 M^2
GLUCOSE SERPL-MCNC: 133 MG/DL
HCT VFR BLD AUTO: 44.6 %
HGB BLD-MCNC: 15 G/DL
LYMPHOCYTES # BLD AUTO: 2 K/UL
LYMPHOCYTES NFR BLD: 26.9 %
MCH RBC QN AUTO: 30.7 PG
MCHC RBC AUTO-ENTMCNC: 33.6 G/DL
MCV RBC AUTO: 91 FL
MONOCYTES # BLD AUTO: 0.7 K/UL
MONOCYTES NFR BLD: 9.5 %
NEUTROPHILS # BLD AUTO: 4.3 K/UL
NEUTROPHILS NFR BLD: 59.5 %
PLATELET # BLD AUTO: 232 K/UL
PMV BLD AUTO: 9.8 FL
POTASSIUM SERPL-SCNC: 4.5 MMOL/L
PROT SERPL-MCNC: 7.2 G/DL
RBC # BLD AUTO: 4.89 M/UL
SODIUM SERPL-SCNC: 141 MMOL/L
WBC # BLD AUTO: 7.25 K/UL

## 2019-02-22 PROCEDURE — 80053 COMPREHEN METABOLIC PANEL: CPT | Mod: HCNC

## 2019-02-22 PROCEDURE — 36415 COLL VENOUS BLD VENIPUNCTURE: CPT | Mod: HCNC

## 2019-02-22 PROCEDURE — 85025 COMPLETE CBC W/AUTO DIFF WBC: CPT | Mod: HCNC

## 2019-02-22 PROCEDURE — 84153 ASSAY OF PSA TOTAL: CPT | Mod: HCNC

## 2019-02-22 NOTE — DISCHARGE INSTRUCTIONS
Your surgery is scheduled for _Tuesday Feb. 26, 2019___________________.    Call [phone] between 2 p.m. and 5 p.m. on   __Monday _____________ to find out your arrival time for the day of your surgery.      Please report to SAME DAY SURGERY UNIT on the 2nd FLOOR at _______ a.m.  Use front door entrance. The doors open at 0530 am.      INSTRUCTIONS IMPORTANT!!!  ¨ Do not eat or drink after 12 midnight-including water. OK to brush teeth, no   gum, candy or mints!    ¨ Take only these medicines with a small swallow of water-morning of surgery.  Take Xanax if needed am of surgery, with water.              _x___  Prep instructions: ENEMA   SHOWER   ______________  ____  Please shower using Hibiclens soap the night before AND  the morning of your surgery/procedure. Do not use Hibiclens on your face or genitals               If your surgery is around your belly button ([place]) be sure to wash inside your belly button also. Rinse hibiclens off completely.  _x___  No shaving of procedural area at least 4-5 days before surgery due to increased risk of skin irritation and/or possible infection.  _x___  Do not wear makeup, including mascara. WEARING EYE MAKEUP MAY   LEAD TO SERIOUS EYE INJURY during surgery.  _x___  No powder, lotions or creams to your body.  _x___  You may wear only deodorant on the day of surgery.  _x___  Please remove all jewelry, including piercings and leave at home.  _x___  No money or valuables needed. Please leave at home.  You may bring your cell phone.  _x___  Please bring any documents given by your doctor.  _x___  If going home the same day, arrange for a ride home. You will not be able to   drive if Anesthesia was used.  ____  Children under 18 years require a parent / guardian present the entire time   they are in surgery / recovery.  _x___  Wear loose fitting clothing. Allow for dressings, bandages.  _x___  Stop Aspirin, Ibuprofen, Motrin and Aleve at least 3-5 days before  surgery, unless  otherwise instructed by your doctor, or the nurse.              You MAY use Tylenol/acetaminophen until day of surgery.  _x___  If you take diabetic medication, do not take am of surgery unless instructed by Doctor.  _x___  Call MD for temperature above 101 degrees.        _x___ Stop taking any Fish Oil supplement or any Vitamins that contain Vitamin at least 5 days prior to surgery.          I have read or had read and explained to me, and understand the above information.  Additional comments or instructions:Please call   [phone] if you have any questions regarding the instructions above.

## 2019-02-23 LAB — COMPLEXED PSA SERPL-MCNC: 8.4 NG/ML

## 2019-02-26 ENCOUNTER — HOSPITAL ENCOUNTER (OUTPATIENT)
Dept: RADIOLOGY | Facility: HOSPITAL | Age: 79
Discharge: HOME | DRG: 871 | End: 2019-02-26
Attending: UROLOGY | Admitting: UROLOGY
Payer: MEDICARE

## 2019-02-26 ENCOUNTER — ANESTHESIA EVENT (OUTPATIENT)
Dept: SURGERY | Facility: HOSPITAL | Age: 79
DRG: 871 | End: 2019-02-26
Payer: MEDICARE

## 2019-02-26 ENCOUNTER — ANESTHESIA (OUTPATIENT)
Dept: SURGERY | Facility: HOSPITAL | Age: 79
DRG: 871 | End: 2019-02-26
Payer: MEDICARE

## 2019-02-26 DIAGNOSIS — C61 PROSTATE CANCER: ICD-10-CM

## 2019-02-26 PROCEDURE — D9220A PRA ANESTHESIA: Mod: HCNC,ANES,, | Performed by: ANESTHESIOLOGY

## 2019-02-26 PROCEDURE — 63600175 PHARM REV CODE 636 W HCPCS: Mod: HCNC | Performed by: UROLOGY

## 2019-02-26 PROCEDURE — 63600175 PHARM REV CODE 636 W HCPCS: Mod: HCNC | Performed by: NURSE ANESTHETIST, CERTIFIED REGISTERED

## 2019-02-26 PROCEDURE — 25000003 PHARM REV CODE 250: Mod: HCNC | Performed by: NURSE ANESTHETIST, CERTIFIED REGISTERED

## 2019-02-26 PROCEDURE — D9220A PRA ANESTHESIA: ICD-10-PCS | Mod: HCNC,ANES,, | Performed by: ANESTHESIOLOGY

## 2019-02-26 RX ORDER — FENTANYL CITRATE 50 UG/ML
INJECTION, SOLUTION INTRAMUSCULAR; INTRAVENOUS
Status: DISCONTINUED | OUTPATIENT
Start: 2019-02-26 | End: 2019-02-26

## 2019-02-26 RX ORDER — PHENYLEPHRINE HYDROCHLORIDE 10 MG/ML
INJECTION INTRAVENOUS
Status: DISCONTINUED | OUTPATIENT
Start: 2019-02-26 | End: 2019-02-26

## 2019-02-26 RX ORDER — SODIUM CHLORIDE, SODIUM LACTATE, POTASSIUM CHLORIDE, CALCIUM CHLORIDE 600; 310; 30; 20 MG/100ML; MG/100ML; MG/100ML; MG/100ML
INJECTION, SOLUTION INTRAVENOUS CONTINUOUS PRN
Status: DISCONTINUED | OUTPATIENT
Start: 2019-02-26 | End: 2019-02-26

## 2019-02-26 RX ORDER — PROPOFOL 10 MG/ML
VIAL (ML) INTRAVENOUS
Status: DISCONTINUED | OUTPATIENT
Start: 2019-02-26 | End: 2019-02-26

## 2019-02-26 RX ORDER — LIDOCAINE HCL/PF 100 MG/5ML
SYRINGE (ML) INTRAVENOUS
Status: DISCONTINUED | OUTPATIENT
Start: 2019-02-26 | End: 2019-02-26

## 2019-02-26 RX ADMIN — PROPOFOL 100 MG: 10 INJECTION, EMULSION INTRAVENOUS at 12:02

## 2019-02-26 RX ADMIN — FENTANYL CITRATE 50 MCG: 50 INJECTION INTRAMUSCULAR; INTRAVENOUS at 12:02

## 2019-02-26 RX ADMIN — SODIUM CHLORIDE, SODIUM LACTATE, POTASSIUM CHLORIDE, AND CALCIUM CHLORIDE: .6; .31; .03; .02 INJECTION, SOLUTION INTRAVENOUS at 12:02

## 2019-02-26 RX ADMIN — PHENYLEPHRINE HYDROCHLORIDE 100 MCG: 10 INJECTION INTRAVENOUS at 01:02

## 2019-02-26 RX ADMIN — PROPOFOL 20 MG: 10 INJECTION, EMULSION INTRAVENOUS at 01:02

## 2019-02-26 RX ADMIN — LIDOCAINE HYDROCHLORIDE 20 MG: 20 INJECTION, SOLUTION INTRAVENOUS at 12:02

## 2019-02-26 RX ADMIN — PROPOFOL 20 MG: 10 INJECTION, EMULSION INTRAVENOUS at 12:02

## 2019-02-26 RX ADMIN — GENTAMICIN SULFATE 80 MG: 80 INJECTION, SOLUTION INTRAVENOUS at 12:02

## 2019-02-26 NOTE — TRANSFER OF CARE
"Anesthesia Transfer of Care Note    Patient: Eric Jackson    Procedure(s) Performed: Procedure(s) (LRB):  ULTRASOUND, PROSTATE, RECTAL APPROACH, WITH GOLD FIDUCIAL MARKER INSERTION (N/A)    Patient location: PACU    Anesthesia Type: general (tiva)    Transport from OR: Transported from OR on 2-3 L/min O2 by NC with adequate spontaneous ventilation    Post pain: adequate analgesia    Post assessment: no apparent anesthetic complications    Post vital signs: stable    Level of consciousness: sedated    Nausea/Vomiting: no nausea/vomiting    Complications: none    Transfer of care protocol was followed      Last vitals:   Visit Vitals  BP (!) 98/55 (BP Location: Left arm, Patient Position: Lying)   Pulse 68   Temp 36.5 °C (97.7 °F) (Oral)   Resp 16   Ht 5' 7" (1.702 m)   Wt 80.6 kg (177 lb 11 oz)   SpO2 95%   BMI 27.83 kg/m²     "

## 2019-02-26 NOTE — ANESTHESIA POSTPROCEDURE EVALUATION
"Anesthesia Post Evaluation    Patient: Eric Jackson    Procedure(s) Performed: Procedure(s) (LRB):  ULTRASOUND, PROSTATE, RECTAL APPROACH, WITH GOLD FIDUCIAL MARKER INSERTION (N/A)    Final Anesthesia Type: general  Patient location during evaluation: PACU  Patient participation: Yes- Able to Participate  Level of consciousness: awake and alert, oriented and awake  Post-procedure vital signs: reviewed and stable  Pain management: adequate  Airway patency: patent  PONV status at discharge: No PONV  Anesthetic complications: no      Cardiovascular status: blood pressure returned to baseline, hemodynamically stable and stable  Respiratory status: unassisted and spontaneous ventilation  Hydration status: euvolemic  Follow-up not needed.        Visit Vitals  BP (!) 107/55   Pulse 64   Temp 36.5 °C (97.7 °F) (Oral)   Resp (!) 8   Ht 5' 7" (1.702 m)   Wt 80.6 kg (177 lb 11 oz)   SpO2 96%   BMI 27.83 kg/m²       Pain/Edith Score: Edith Score: 9 (2/26/2019  1:50 PM)        "

## 2019-02-26 NOTE — ANESTHESIA PREPROCEDURE EVALUATION
02/26/2019  Eric Jackson is a 78 y.o., male.    Anesthesia Evaluation     I have reviewed the Nursing Notes.      Review of Systems  Anesthesia Hx:  No problems with previous Anesthesia   Social:  Non-Smoker, Former Smoker    Cardiovascular:   Exercise tolerance: good Denies Pacemaker. Hypertension  Denies Valvular problems/Murmurs.  Denies MI. CAD  asymptomatic CABG/stent (cabg ~ 17 yrs ago)  Denies Dysrhythmias.   Denies Angina.             Good exercise tolerance and neg angio 12 months ago   Pulmonary:  Pulmonary Normal    Renal/:  Renal/ Normal     Hepatic/GI:   No Bowel Prep. Denies PUD. GERD Denies Liver Disease. Denies Hepatitis.    Neurological:  Neurology Normal    Endocrine:   Diabetes, type 2 Denies Hypothyroidism. Denies Hyperthyroidism.    Psych:   Psychiatric History          Physical Exam  General:  Well nourished    Airway/Jaw/Neck:  AIRWAY FINDINGS: Normal      Chest/Lungs:  Chest/Lungs Clear    Heart/Vascular:  Heart Findings: Normal       Mental Status:  Mental Status Findings: Normal        Anesthesia Plan  Type of Anesthesia, risks & benefits discussed:  Anesthesia Type:  general  Patient's Preference:   Intra-op Monitoring Plan: standard ASA monitors  Intra-op Monitoring Plan Comments:   Post Op Pain Control Plan:   Post Op Pain Control Plan Comments:   Induction:   IV  Beta Blocker:  Patient is not currently on a Beta-Blocker (No further documentation required).       Informed Consent: Patient understands risks and agrees with Anesthesia plan.  Questions answered. Anesthesia consent signed with patient.  ASA Score: 2     Day of Surgery Review of History & Physical:  There are no significant changes.  H&P update referred to the provider.         Ready For Surgery From Anesthesia Perspective.

## 2019-02-27 PROBLEM — R97.20 ELEVATED PSA: Status: RESOLVED | Noted: 2018-12-07 | Resolved: 2019-02-27

## 2019-02-28 ENCOUNTER — HOSPITAL ENCOUNTER (INPATIENT)
Facility: HOSPITAL | Age: 79
LOS: 6 days | Discharge: HOME HEALTH | DRG: 871 | End: 2019-03-06
Attending: EMERGENCY MEDICINE | Admitting: HOSPITALIST
Payer: MEDICARE

## 2019-02-28 DIAGNOSIS — R19.7 DIARRHEA, UNSPECIFIED TYPE: ICD-10-CM

## 2019-02-28 DIAGNOSIS — A41.9 SEPSIS SECONDARY TO UTI: ICD-10-CM

## 2019-02-28 DIAGNOSIS — R79.89 TROPONIN I ABOVE REFERENCE RANGE: ICD-10-CM

## 2019-02-28 DIAGNOSIS — E87.20 LACTIC ACIDOSIS: ICD-10-CM

## 2019-02-28 DIAGNOSIS — N28.9 RENAL INSUFFICIENCY: ICD-10-CM

## 2019-02-28 DIAGNOSIS — N41.0 ACUTE PROSTATITIS: ICD-10-CM

## 2019-02-28 DIAGNOSIS — C61 PROSTATE CANCER: ICD-10-CM

## 2019-02-28 DIAGNOSIS — J10.1 INFLUENZA A: ICD-10-CM

## 2019-02-28 DIAGNOSIS — E83.39 HYPOPHOSPHATEMIA: ICD-10-CM

## 2019-02-28 DIAGNOSIS — R50.9 FEVER 106 DEGREES F OR OVER: Primary | ICD-10-CM

## 2019-02-28 DIAGNOSIS — R33.9 URINARY RETENTION: ICD-10-CM

## 2019-02-28 DIAGNOSIS — R41.0 DELIRIUM: ICD-10-CM

## 2019-02-28 DIAGNOSIS — R00.0 TACHYCARDIA: ICD-10-CM

## 2019-02-28 DIAGNOSIS — J10.1 INFLUENZA B: ICD-10-CM

## 2019-02-28 DIAGNOSIS — R11.2 NAUSEA AND VOMITING, INTRACTABILITY OF VOMITING NOT SPECIFIED, UNSPECIFIED VOMITING TYPE: ICD-10-CM

## 2019-02-28 DIAGNOSIS — I21.4 NON-STEMI (NON-ST ELEVATED MYOCARDIAL INFARCTION): ICD-10-CM

## 2019-02-28 DIAGNOSIS — N39.0 SEPSIS SECONDARY TO UTI: ICD-10-CM

## 2019-02-28 DIAGNOSIS — R65.10 SIRS (SYSTEMIC INFLAMMATORY RESPONSE SYNDROME): ICD-10-CM

## 2019-02-28 DIAGNOSIS — I42.9 CARDIOMYOPATHY: ICD-10-CM

## 2019-02-28 LAB
ALBUMIN SERPL BCP-MCNC: 3.1 G/DL
ALLENS TEST: ABNORMAL
ALP SERPL-CCNC: 79 U/L
ALT SERPL W/O P-5'-P-CCNC: 56 U/L
AMORPH CRY URNS QL MICRO: ABNORMAL
AMPHET+METHAMPHET UR QL: NEGATIVE
ANION GAP SERPL CALC-SCNC: 9 MMOL/L
APTT BLDCRRT: 29.9 SEC
AST SERPL-CCNC: 66 U/L
BACTERIA #/AREA URNS HPF: ABNORMAL /HPF
BARBITURATES UR QL SCN>200 NG/ML: NEGATIVE
BASOPHILS # BLD AUTO: 0.01 K/UL
BASOPHILS NFR BLD: 0.2 %
BENZODIAZ UR QL SCN>200 NG/ML: NORMAL
BILIRUB SERPL-MCNC: 0.8 MG/DL
BILIRUB UR QL STRIP: NEGATIVE
BNP SERPL-MCNC: 195 PG/ML
BUN SERPL-MCNC: 18 MG/DL
BZE UR QL SCN: NEGATIVE
CALCIUM SERPL-MCNC: 8.2 MG/DL
CANNABINOIDS UR QL SCN: NEGATIVE
CHLORIDE SERPL-SCNC: 106 MMOL/L
CK SERPL-CCNC: 404 U/L
CK SERPL-CCNC: 405 U/L
CLARITY UR: ABNORMAL
CO2 SERPL-SCNC: 20 MMOL/L
COLOR UR: ABNORMAL
CREAT SERPL-MCNC: 1.6 MG/DL
CREAT UR-MCNC: 111.9 MG/DL
CTP QC/QA: YES
DELSYS: ABNORMAL
DIFFERENTIAL METHOD: ABNORMAL
EOSINOPHIL # BLD AUTO: 0 K/UL
EOSINOPHIL NFR BLD: 0.2 %
ERYTHROCYTE [DISTWIDTH] IN BLOOD BY AUTOMATED COUNT: 13.2 %
EST. GFR  (AFRICAN AMERICAN): 48 ML/MIN/1.73 M^2
EST. GFR  (NON AFRICAN AMERICAN): 41 ML/MIN/1.73 M^2
FIO2: 100
FLOW: 15
FLUAV AG NPH QL: POSITIVE
FLUBV AG NPH QL: POSITIVE
GLUCOSE CSF-MCNC: 88 MG/DL
GLUCOSE SERPL-MCNC: 197 MG/DL
GLUCOSE UR QL STRIP: ABNORMAL
HCO3 UR-SCNC: 17.4 MMOL/L (ref 24–28)
HCT VFR BLD AUTO: 40.3 %
HGB BLD-MCNC: 13.3 G/DL
HGB UR QL STRIP: ABNORMAL
HIV1+2 IGG SERPL QL IA.RAPID: NEGATIVE
HYALINE CASTS #/AREA URNS LPF: 0 /LPF
INR PPP: 1.2
KETONES UR QL STRIP: NEGATIVE
LACTATE SERPL-SCNC: 2.4 MMOL/L
LACTATE SERPL-SCNC: 2.6 MMOL/L
LEUKOCYTE ESTERASE UR QL STRIP: ABNORMAL
LIPASE SERPL-CCNC: 34 U/L
LYMPHOCYTES # BLD AUTO: 0.4 K/UL
LYMPHOCYTES NFR BLD: 7.3 %
MAGNESIUM SERPL-MCNC: 1.5 MG/DL
MCH RBC QN AUTO: 30.3 PG
MCHC RBC AUTO-ENTMCNC: 33 G/DL
MCV RBC AUTO: 92 FL
METHADONE UR QL SCN>300 NG/ML: NEGATIVE
MICROSCOPIC COMMENT: ABNORMAL
MODE: ABNORMAL
MONOCYTES # BLD AUTO: 0.3 K/UL
MONOCYTES NFR BLD: 4.5 %
NEUTROPHILS # BLD AUTO: 5.3 K/UL
NEUTROPHILS NFR BLD: 88.5 %
NITRITE UR QL STRIP: NEGATIVE
NON-SQ EPI CELLS #/AREA URNS HPF: 1 /HPF
OPIATES UR QL SCN: NEGATIVE
PCO2 BLDA: 28.3 MMHG (ref 35–45)
PCP UR QL SCN>25 NG/ML: NEGATIVE
PH SMN: 7.4 [PH] (ref 7.35–7.45)
PH UR STRIP: 7 [PH] (ref 5–8)
PHOSPHATE SERPL-MCNC: <1 MG/DL
PLATELET # BLD AUTO: 57 K/UL
PLATELET BLD QL SMEAR: ABNORMAL
PMV BLD AUTO: 9.9 FL
PO2 BLDA: 85 MMHG (ref 80–100)
POC BE: -6 MMOL/L
POC SATURATED O2: 97 % (ref 95–100)
POC TCO2: 18 MMOL/L (ref 23–27)
POCT GLUCOSE: 173 MG/DL (ref 70–110)
POTASSIUM SERPL-SCNC: 3 MMOL/L
PROT CSF-MCNC: 47 MG/DL
PROT SERPL-MCNC: 5.8 G/DL
PROT UR QL STRIP: ABNORMAL
PROTHROMBIN TIME: 12.9 SEC
RBC # BLD AUTO: 4.39 M/UL
RBC #/AREA URNS HPF: >100 /HPF (ref 0–4)
SAMPLE: ABNORMAL
SITE: ABNORMAL
SODIUM SERPL-SCNC: 135 MMOL/L
SP GR UR STRIP: 1.01 (ref 1–1.03)
SP02: 96
SQUAMOUS #/AREA URNS HPF: 3 /HPF
TOXICOLOGY INFORMATION: NORMAL
TROPONIN I SERPL DL<=0.01 NG/ML-MCNC: 0.92 NG/ML
TSH SERPL DL<=0.005 MIU/L-ACNC: 1.72 UIU/ML
URN SPEC COLLECT METH UR: ABNORMAL
UROBILINOGEN UR STRIP-ACNC: NEGATIVE EU/DL
WBC # BLD AUTO: 6.04 K/UL
WBC #/AREA URNS HPF: 25 /HPF (ref 0–5)

## 2019-02-28 PROCEDURE — 87804 INFLUENZA ASSAY W/OPTIC: CPT | Mod: 59,HCNC

## 2019-02-28 PROCEDURE — 85025 COMPLETE CBC W/AUTO DIFF WBC: CPT | Mod: HCNC

## 2019-02-28 PROCEDURE — 87088 URINE BACTERIA CULTURE: CPT | Mod: HCNC

## 2019-02-28 PROCEDURE — 25000003 PHARM REV CODE 250: Mod: HCNC | Performed by: EMERGENCY MEDICINE

## 2019-02-28 PROCEDURE — 83690 ASSAY OF LIPASE: CPT | Mod: HCNC

## 2019-02-28 PROCEDURE — 84100 ASSAY OF PHOSPHORUS: CPT | Mod: HCNC

## 2019-02-28 PROCEDURE — 80307 DRUG TEST PRSMV CHEM ANLYZR: CPT | Mod: HCNC

## 2019-02-28 PROCEDURE — 82550 ASSAY OF CK (CPK): CPT | Mod: 91,HCNC

## 2019-02-28 PROCEDURE — 82945 GLUCOSE OTHER FLUID: CPT | Mod: HCNC

## 2019-02-28 PROCEDURE — 87186 SC STD MICRODIL/AGAR DIL: CPT | Mod: 59,HCNC

## 2019-02-28 PROCEDURE — 86592 SYPHILIS TEST NON-TREP QUAL: CPT | Mod: HCNC

## 2019-02-28 PROCEDURE — 81000 URINALYSIS NONAUTO W/SCOPE: CPT | Mod: HCNC,59

## 2019-02-28 PROCEDURE — 87427 SHIGA-LIKE TOXIN AG IA: CPT | Mod: 59,HCNC

## 2019-02-28 PROCEDURE — 93005 ELECTROCARDIOGRAM TRACING: CPT | Mod: HCNC

## 2019-02-28 PROCEDURE — 82550 ASSAY OF CK (CPK): CPT | Mod: HCNC

## 2019-02-28 PROCEDURE — 86703 HIV-1/HIV-2 1 RESULT ANTBDY: CPT | Mod: HCNC

## 2019-02-28 PROCEDURE — 63600175 PHARM REV CODE 636 W HCPCS: Mod: HCNC | Performed by: EMERGENCY MEDICINE

## 2019-02-28 PROCEDURE — 96366 THER/PROPH/DIAG IV INF ADDON: CPT | Mod: HCNC

## 2019-02-28 PROCEDURE — 87449 NOS EACH ORGANISM AG IA: CPT | Mod: HCNC

## 2019-02-28 PROCEDURE — 87040 BLOOD CULTURE FOR BACTERIA: CPT | Mod: HCNC

## 2019-02-28 PROCEDURE — 85730 THROMBOPLASTIN TIME PARTIAL: CPT | Mod: HCNC

## 2019-02-28 PROCEDURE — 99900035 HC TECH TIME PER 15 MIN (STAT): Mod: HCNC

## 2019-02-28 PROCEDURE — 87252 VIRUS INOCULATION TISSUE: CPT | Mod: HCNC

## 2019-02-28 PROCEDURE — 84436 ASSAY OF TOTAL THYROXINE: CPT | Mod: HCNC

## 2019-02-28 PROCEDURE — 82803 BLOOD GASES ANY COMBINATION: CPT | Mod: HCNC

## 2019-02-28 PROCEDURE — 36600 WITHDRAWAL OF ARTERIAL BLOOD: CPT | Mod: HCNC

## 2019-02-28 PROCEDURE — 87045 FECES CULTURE AEROBIC BACT: CPT | Mod: HCNC

## 2019-02-28 PROCEDURE — 83874 ASSAY OF MYOGLOBIN: CPT | Mod: HCNC

## 2019-02-28 PROCEDURE — 96375 TX/PRO/DX INJ NEW DRUG ADDON: CPT | Mod: 59,HCNC

## 2019-02-28 PROCEDURE — 84484 ASSAY OF TROPONIN QUANT: CPT | Mod: HCNC

## 2019-02-28 PROCEDURE — 20000000 HC ICU ROOM: Mod: HCNC

## 2019-02-28 PROCEDURE — 84443 ASSAY THYROID STIM HORMONE: CPT | Mod: HCNC

## 2019-02-28 PROCEDURE — 84145 PROCALCITONIN (PCT): CPT | Mod: HCNC

## 2019-02-28 PROCEDURE — 89051 BODY FLUID CELL COUNT: CPT | Mod: HCNC

## 2019-02-28 PROCEDURE — 62270 DX LMBR SPI PNXR: CPT | Mod: HCNC

## 2019-02-28 PROCEDURE — 87070 CULTURE OTHR SPECIMN AEROBIC: CPT | Mod: HCNC

## 2019-02-28 PROCEDURE — 27000221 HC OXYGEN, UP TO 24 HOURS: Mod: HCNC

## 2019-02-28 PROCEDURE — 87205 SMEAR GRAM STAIN: CPT | Mod: HCNC

## 2019-02-28 PROCEDURE — 87046 STOOL CULTR AEROBIC BACT EA: CPT | Mod: 59,HCNC

## 2019-02-28 PROCEDURE — 99291 CRITICAL CARE FIRST HOUR: CPT | Mod: 25,HCNC

## 2019-02-28 PROCEDURE — 85610 PROTHROMBIN TIME: CPT | Mod: HCNC

## 2019-02-28 PROCEDURE — C9113 INJ PANTOPRAZOLE SODIUM, VIA: HCPCS | Mod: HCNC | Performed by: EMERGENCY MEDICINE

## 2019-02-28 PROCEDURE — 82962 GLUCOSE BLOOD TEST: CPT | Mod: HCNC

## 2019-02-28 PROCEDURE — 25500020 PHARM REV CODE 255: Mod: HCNC | Performed by: EMERGENCY MEDICINE

## 2019-02-28 PROCEDURE — 83735 ASSAY OF MAGNESIUM: CPT | Mod: HCNC

## 2019-02-28 PROCEDURE — 93010 ELECTROCARDIOGRAM REPORT: CPT | Mod: HCNC,,, | Performed by: INTERNAL MEDICINE

## 2019-02-28 PROCEDURE — 96368 THER/DIAG CONCURRENT INF: CPT | Mod: HCNC

## 2019-02-28 PROCEDURE — 94761 N-INVAS EAR/PLS OXIMETRY MLT: CPT | Mod: HCNC

## 2019-02-28 PROCEDURE — 83880 ASSAY OF NATRIURETIC PEPTIDE: CPT | Mod: HCNC

## 2019-02-28 PROCEDURE — 80053 COMPREHEN METABOLIC PANEL: CPT | Mod: HCNC

## 2019-02-28 PROCEDURE — 83605 ASSAY OF LACTIC ACID: CPT | Mod: 91,HCNC

## 2019-02-28 PROCEDURE — 99000 SPECIMEN HANDLING OFFICE-LAB: CPT | Mod: HCNC

## 2019-02-28 PROCEDURE — 93010 EKG 12-LEAD: ICD-10-PCS | Mod: HCNC,,, | Performed by: INTERNAL MEDICINE

## 2019-02-28 PROCEDURE — 87086 URINE CULTURE/COLONY COUNT: CPT | Mod: HCNC

## 2019-02-28 PROCEDURE — 87077 CULTURE AEROBIC IDENTIFY: CPT | Mod: HCNC

## 2019-02-28 PROCEDURE — 96365 THER/PROPH/DIAG IV INF INIT: CPT | Mod: HCNC

## 2019-02-28 PROCEDURE — 84157 ASSAY OF PROTEIN OTHER: CPT | Mod: HCNC

## 2019-02-28 RX ORDER — ONDANSETRON 2 MG/ML
4 INJECTION INTRAMUSCULAR; INTRAVENOUS
Status: COMPLETED | OUTPATIENT
Start: 2019-02-28 | End: 2019-02-28

## 2019-02-28 RX ORDER — INSULIN ASPART 100 [IU]/ML
0-5 INJECTION, SOLUTION INTRAVENOUS; SUBCUTANEOUS EVERY 6 HOURS PRN
Status: DISCONTINUED | OUTPATIENT
Start: 2019-02-28 | End: 2019-03-01

## 2019-02-28 RX ORDER — DEXAMETHASONE SODIUM PHOSPHATE 4 MG/ML
12 INJECTION, SOLUTION INTRA-ARTICULAR; INTRALESIONAL; INTRAMUSCULAR; INTRAVENOUS; SOFT TISSUE
Status: COMPLETED | OUTPATIENT
Start: 2019-02-28 | End: 2019-02-28

## 2019-02-28 RX ORDER — ONDANSETRON 2 MG/ML
4 INJECTION INTRAMUSCULAR; INTRAVENOUS EVERY 8 HOURS PRN
Status: DISCONTINUED | OUTPATIENT
Start: 2019-02-28 | End: 2019-03-01

## 2019-02-28 RX ORDER — PANTOPRAZOLE SODIUM 40 MG/10ML
40 INJECTION, POWDER, LYOPHILIZED, FOR SOLUTION INTRAVENOUS
Status: COMPLETED | OUTPATIENT
Start: 2019-02-28 | End: 2019-02-28

## 2019-02-28 RX ORDER — OSELTAMIVIR PHOSPHATE 75 MG/1
75 CAPSULE ORAL
Status: COMPLETED | OUTPATIENT
Start: 2019-02-28 | End: 2019-02-28

## 2019-02-28 RX ORDER — GLUCAGON 1 MG
1 KIT INJECTION
Status: DISCONTINUED | OUTPATIENT
Start: 2019-02-28 | End: 2019-03-01

## 2019-02-28 RX ORDER — CIPROFLOXACIN 2 MG/ML
400 INJECTION, SOLUTION INTRAVENOUS
Status: COMPLETED | OUTPATIENT
Start: 2019-02-28 | End: 2019-02-28

## 2019-02-28 RX ORDER — DEXTROSE MONOHYDRATE, SODIUM CHLORIDE, AND POTASSIUM CHLORIDE 50; 2.98; 4.5 G/1000ML; G/1000ML; G/1000ML
1000 INJECTION, SOLUTION INTRAVENOUS
Status: DISCONTINUED | OUTPATIENT
Start: 2019-02-28 | End: 2019-02-28

## 2019-02-28 RX ORDER — SODIUM CHLORIDE 0.9 % (FLUSH) 0.9 %
5 SYRINGE (ML) INJECTION
Status: DISCONTINUED | OUTPATIENT
Start: 2019-02-28 | End: 2019-03-06 | Stop reason: HOSPADM

## 2019-02-28 RX ORDER — KETOROLAC TROMETHAMINE 30 MG/ML
15 INJECTION, SOLUTION INTRAMUSCULAR; INTRAVENOUS
Status: COMPLETED | OUTPATIENT
Start: 2019-02-28 | End: 2019-02-28

## 2019-02-28 RX ORDER — DEXTROSE MONOHYDRATE AND SODIUM CHLORIDE 5; .9 G/100ML; G/100ML
INJECTION, SOLUTION INTRAVENOUS CONTINUOUS
Status: DISCONTINUED | OUTPATIENT
Start: 2019-02-28 | End: 2019-02-28

## 2019-02-28 RX ORDER — DEXTROSE MONOHYDRATE AND SODIUM CHLORIDE 5; .9 G/100ML; G/100ML
1000 INJECTION, SOLUTION INTRAVENOUS
Status: COMPLETED | OUTPATIENT
Start: 2019-02-28 | End: 2019-02-28

## 2019-02-28 RX ORDER — ACETAMINOPHEN 650 MG/1
650 SUPPOSITORY RECTAL
Status: COMPLETED | OUTPATIENT
Start: 2019-02-28 | End: 2019-02-28

## 2019-02-28 RX ORDER — HEPARIN SODIUM 5000 [USP'U]/ML
5000 INJECTION, SOLUTION INTRAVENOUS; SUBCUTANEOUS EVERY 8 HOURS
Status: DISCONTINUED | OUTPATIENT
Start: 2019-02-28 | End: 2019-03-01

## 2019-02-28 RX ORDER — CIPROFLOXACIN 2 MG/ML
400 INJECTION, SOLUTION INTRAVENOUS
Status: DISCONTINUED | OUTPATIENT
Start: 2019-03-01 | End: 2019-03-01

## 2019-02-28 RX ADMIN — OSELTAMIVIR PHOSPHATE 75 MG: 75 CAPSULE ORAL at 10:02

## 2019-02-28 RX ADMIN — ACETAMINOPHEN 650 MG: 650 SUPPOSITORY RECTAL at 05:02

## 2019-02-28 RX ADMIN — HEPARIN SODIUM 5000 UNITS: 5000 INJECTION, SOLUTION INTRAVENOUS; SUBCUTANEOUS at 10:02

## 2019-02-28 RX ADMIN — IOHEXOL 100 ML: 350 INJECTION, SOLUTION INTRAVENOUS at 08:02

## 2019-02-28 RX ADMIN — DEXAMETHASONE SODIUM PHOSPHATE 12 MG: 4 INJECTION, SOLUTION INTRAMUSCULAR; INTRAVENOUS at 07:02

## 2019-02-28 RX ADMIN — VANCOMYCIN HYDROCHLORIDE 1750 MG: 100 INJECTION, POWDER, LYOPHILIZED, FOR SOLUTION INTRAVENOUS at 06:02

## 2019-02-28 RX ADMIN — SODIUM CHLORIDE 2448 ML: 0.9 INJECTION, SOLUTION INTRAVENOUS at 05:02

## 2019-02-28 RX ADMIN — PIPERACILLIN AND TAZOBACTAM 4.5 G: 4; .5 INJECTION, POWDER, LYOPHILIZED, FOR SOLUTION INTRAVENOUS; PARENTERAL at 05:02

## 2019-02-28 RX ADMIN — POTASSIUM PHOSPHATE, MONOBASIC AND POTASSIUM PHOSPHATE, DIBASIC 30 MMOL: 224; 236 INJECTION, SOLUTION, CONCENTRATE INTRAVENOUS at 09:02

## 2019-02-28 RX ADMIN — DEXTROSE AND SODIUM CHLORIDE 1000 ML: 5; .9 INJECTION, SOLUTION INTRAVENOUS at 08:02

## 2019-02-28 RX ADMIN — CIPROFLOXACIN 400 MG: 2 INJECTION, SOLUTION INTRAVENOUS at 06:02

## 2019-02-28 RX ADMIN — DEXTROSE AND SODIUM CHLORIDE: 5; .9 INJECTION, SOLUTION INTRAVENOUS at 07:02

## 2019-02-28 RX ADMIN — KETOROLAC TROMETHAMINE 15 MG: 30 INJECTION, SOLUTION INTRAMUSCULAR at 07:02

## 2019-02-28 RX ADMIN — ACYCLOVIR SODIUM 820 MG: 500 INJECTION, SOLUTION INTRAVENOUS at 07:02

## 2019-02-28 RX ADMIN — PANTOPRAZOLE SODIUM 40 MG: 40 INJECTION, POWDER, LYOPHILIZED, FOR SOLUTION INTRAVENOUS at 09:02

## 2019-02-28 RX ADMIN — ONDANSETRON 4 MG: 2 INJECTION INTRAMUSCULAR; INTRAVENOUS at 07:02

## 2019-02-28 NOTE — Clinical Note
66 ml injected throughout the case. 30 mL total wasted during the case. 96 mL total used in the case.

## 2019-02-28 NOTE — ED TRIAGE NOTES
Arrived via EMS. EMS report pt's wife called EMS. Reports pt complains of flu like symptoms. Was last seen normal around 2pm today. Wife reports giving pt 3 ibuprofen then. Reports she found pt with unconscious. EMS reports rectal temp of 106.

## 2019-02-28 NOTE — Clinical Note
Catheter is inserted into the MARILYN. The vessel(s) were injected and visualized in multiple views.

## 2019-02-28 NOTE — Clinical Note
Catheter is inserted into the ostium left anterior descending artery. The vessel(s) were injected and visualized in multiple views.

## 2019-03-01 PROBLEM — N41.0 ACUTE PROSTATITIS: Status: ACTIVE | Noted: 2019-03-01

## 2019-03-01 PROBLEM — I25.10 CORONARY ARTERY DISEASE: Status: ACTIVE | Noted: 2019-03-01

## 2019-03-01 PROBLEM — B99.9 INFECTIOUS ENCEPHALOPATHY: Status: ACTIVE | Noted: 2019-03-01

## 2019-03-01 PROBLEM — G93.49 INFECTIOUS ENCEPHALOPATHY: Status: ACTIVE | Noted: 2019-03-01

## 2019-03-01 PROBLEM — I21.4 NON-STEMI (NON-ST ELEVATED MYOCARDIAL INFARCTION): Status: ACTIVE | Noted: 2019-03-01

## 2019-03-01 PROBLEM — A41.9 SEPSIS SECONDARY TO UTI: Status: ACTIVE | Noted: 2019-03-01

## 2019-03-01 PROBLEM — N39.0 SEPSIS SECONDARY TO UTI: Status: ACTIVE | Noted: 2019-03-01

## 2019-03-01 LAB
ALBUMIN SERPL BCP-MCNC: 2.9 G/DL
ALP SERPL-CCNC: 62 U/L
ALT SERPL W/O P-5'-P-CCNC: 95 U/L
ANION GAP SERPL CALC-SCNC: 9 MMOL/L
AORTIC ROOT ANNULUS: 3.13 CM
AORTIC VALVE CUSP SEPERATION: 1.44 CM
ASCENDING AORTA: 2.73 CM
AST SERPL-CCNC: 119 U/L
AV INDEX (PROSTH): 0.43
AV MEAN GRADIENT: 5.65 MMHG
AV PEAK GRADIENT: 12.96 MMHG
AV VALVE AREA: 1.85 CM2
AV VELOCITY RATIO: 0.42
BASOPHILS # BLD AUTO: 0 K/UL
BASOPHILS NFR BLD: 0 %
BILIRUB SERPL-MCNC: 0.8 MG/DL
BSA FOR ECHO PROCEDURE: 2.03 M2
BUN SERPL-MCNC: 17 MG/DL
C DIFF GDH STL QL: NEGATIVE
C DIFF TOX A+B STL QL IA: NEGATIVE
CALCIUM SERPL-MCNC: 8.2 MG/DL
CHLORIDE SERPL-SCNC: 106 MMOL/L
CLARITY CSF: CLEAR
CO2 SERPL-SCNC: 18 MMOL/L
COLOR CSF: COLORLESS
CREAT SERPL-MCNC: 1.3 MG/DL
CV ECHO LV RWT: 0.41 CM
DIFFERENTIAL METHOD: ABNORMAL
DOP CALC AO PEAK VEL: 1.8 M/S
DOP CALC AO VTI: 35.56 CM
DOP CALC LVOT AREA: 4.34 CM2
DOP CALC LVOT DIAMETER: 2.35 CM
DOP CALC LVOT PEAK VEL: 0.75 M/S
DOP CALC LVOT STROKE VOLUME: 65.81 CM3
DOP CALCLVOT PEAK VEL VTI: 15.18 CM
E COLI SXT1 STL QL IA: NEGATIVE
E COLI SXT2 STL QL IA: NEGATIVE
E WAVE DECELERATION TIME: 212.24 MSEC
E/A RATIO: 0.91
ECHO LV POSTERIOR WALL: 1.03 CM (ref 0.6–1.1)
EOSINOPHIL # BLD AUTO: 0 K/UL
EOSINOPHIL NFR BLD: 0 %
ERYTHROCYTE [DISTWIDTH] IN BLOOD BY AUTOMATED COUNT: 13.7 %
EST. GFR  (AFRICAN AMERICAN): >60 ML/MIN/1.73 M^2
EST. GFR  (NON AFRICAN AMERICAN): 53 ML/MIN/1.73 M^2
ESTIMATED AVG GLUCOSE: 131 MG/DL
FRACTIONAL SHORTENING: 27 % (ref 28–44)
GLUCOSE SERPL-MCNC: 186 MG/DL
HBA1C MFR BLD HPLC: 6.2 %
HCT VFR BLD AUTO: 41.2 %
HGB BLD-MCNC: 13.3 G/DL
INTERVENTRICULAR SEPTUM: 0.69 CM (ref 0.6–1.1)
IVRT: 0.09 MSEC
LA MAJOR: 4.68 CM
LA MINOR: 4.75 CM
LA WIDTH: 3.82 CM
LACTATE SERPL-SCNC: 1.5 MMOL/L
LACTATE SERPL-SCNC: 1.8 MMOL/L
LEFT ATRIUM SIZE: 3.54 CM
LEFT ATRIUM VOLUME INDEX: 26.7 ML/M2
LEFT ATRIUM VOLUME: 54.19 CM3
LEFT INTERNAL DIMENSION IN SYSTOLE: 3.67 CM (ref 2.1–4)
LEFT VENTRICLE DIASTOLIC VOLUME INDEX: 59.14 ML/M2
LEFT VENTRICLE DIASTOLIC VOLUME: 119.82 ML
LEFT VENTRICLE MASS INDEX: 74.3 G/M2
LEFT VENTRICLE SYSTOLIC VOLUME INDEX: 28.1 ML/M2
LEFT VENTRICLE SYSTOLIC VOLUME: 57.01 ML
LEFT VENTRICULAR INTERNAL DIMENSION IN DIASTOLE: 5.03 CM (ref 3.5–6)
LEFT VENTRICULAR MASS: 150.6 G
LYMPHOCYTES # BLD AUTO: 0.4 K/UL
LYMPHOCYTES NFR BLD: 4.1 %
MAGNESIUM SERPL-MCNC: 2.6 MG/DL
MCH RBC QN AUTO: 29.9 PG
MCHC RBC AUTO-ENTMCNC: 32.3 G/DL
MCV RBC AUTO: 93 FL
MONOCYTES # BLD AUTO: 0.6 K/UL
MONOCYTES NFR BLD: 6.9 %
MV PEAK A VEL: 0.8 M/S
MV PEAK E VEL: 0.73 M/S
NEUTROPHILS # BLD AUTO: 8.2 K/UL
NEUTROPHILS NFR BLD: 89 %
PHOSPHATE SERPL-MCNC: 3.6 MG/DL
PISA TR MAX VEL: 2.33 M/S
PLATELET # BLD AUTO: 53 K/UL
PMV BLD AUTO: 11.3 FL
POCT GLUCOSE: 126 MG/DL (ref 70–110)
POCT GLUCOSE: 145 MG/DL (ref 70–110)
POCT GLUCOSE: 174 MG/DL (ref 70–110)
POCT GLUCOSE: 195 MG/DL (ref 70–110)
POCT GLUCOSE: 212 MG/DL (ref 70–110)
POTASSIUM SERPL-SCNC: 4.6 MMOL/L
PROCALCITONIN SERPL IA-MCNC: 27.8 NG/ML
PROT SERPL-MCNC: 5.8 G/DL
PV PEAK VELOCITY: 0.75 CM/S
RA MAJOR: 4.41 CM
RA WIDTH: 4.07 CM
RBC # BLD AUTO: 4.45 M/UL
RBC # CSF: 225 /CU MM
RIGHT VENTRICULAR END-DIASTOLIC DIMENSION: 3.68 CM
RV TISSUE DOPPLER FREE WALL SYSTOLIC VELOCITY 1 (APICAL 4 CHAMBER VIEW): 5.01 M/S
SINUS: 3.02 CM
SODIUM SERPL-SCNC: 133 MMOL/L
SPECIMEN VOL CSF: 1 ML
STJ: 2.8 CM
T4 SERPL-MCNC: 5.3 UG/DL
TR MAX PG: 21.72 MMHG
TRICUSPID ANNULAR PLANE SYSTOLIC EXCURSION: 1.15 CM
TROPONIN I SERPL DL<=0.01 NG/ML-MCNC: 1.67 NG/ML
TROPONIN I SERPL DL<=0.01 NG/ML-MCNC: 2.5 NG/ML
WBC # BLD AUTO: 9.23 K/UL
WBC # CSF: 2 /CU MM

## 2019-03-01 PROCEDURE — 99223 1ST HOSP IP/OBS HIGH 75: CPT | Mod: HCNC,,, | Performed by: INTERNAL MEDICINE

## 2019-03-01 PROCEDURE — 63600175 PHARM REV CODE 636 W HCPCS: Mod: HCNC | Performed by: EMERGENCY MEDICINE

## 2019-03-01 PROCEDURE — 25000003 PHARM REV CODE 250: Mod: HCNC | Performed by: INTERNAL MEDICINE

## 2019-03-01 PROCEDURE — 83036 HEMOGLOBIN GLYCOSYLATED A1C: CPT | Mod: HCNC

## 2019-03-01 PROCEDURE — 25000003 PHARM REV CODE 250: Mod: HCNC | Performed by: EMERGENCY MEDICINE

## 2019-03-01 PROCEDURE — 83735 ASSAY OF MAGNESIUM: CPT | Mod: HCNC

## 2019-03-01 PROCEDURE — 99291 CRITICAL CARE FIRST HOUR: CPT | Mod: HCNC,25,, | Performed by: INTERNAL MEDICINE

## 2019-03-01 PROCEDURE — 84484 ASSAY OF TROPONIN QUANT: CPT | Mod: 91,HCNC

## 2019-03-01 PROCEDURE — 21400001 HC TELEMETRY ROOM: Mod: HCNC

## 2019-03-01 PROCEDURE — 25000003 PHARM REV CODE 250: Mod: HCNC | Performed by: HOSPITALIST

## 2019-03-01 PROCEDURE — 84484 ASSAY OF TROPONIN QUANT: CPT | Mod: HCNC

## 2019-03-01 PROCEDURE — 99223 1ST HOSP IP/OBS HIGH 75: CPT | Mod: HCNC,,, | Performed by: NURSE PRACTITIONER

## 2019-03-01 PROCEDURE — 99223 PR INITIAL HOSPITAL CARE,LEVL III: ICD-10-PCS | Mod: HCNC,,, | Performed by: INTERNAL MEDICINE

## 2019-03-01 PROCEDURE — 85025 COMPLETE CBC W/AUTO DIFF WBC: CPT | Mod: HCNC

## 2019-03-01 PROCEDURE — 99291 PR CRITICAL CARE, E/M 30-74 MINUTES: ICD-10-PCS | Mod: HCNC,25,, | Performed by: INTERNAL MEDICINE

## 2019-03-01 PROCEDURE — 63600175 PHARM REV CODE 636 W HCPCS: Mod: HCNC | Performed by: HOSPITALIST

## 2019-03-01 PROCEDURE — 99223 PR INITIAL HOSPITAL CARE,LEVL III: ICD-10-PCS | Mod: HCNC,,, | Performed by: NURSE PRACTITIONER

## 2019-03-01 PROCEDURE — 83605 ASSAY OF LACTIC ACID: CPT | Mod: HCNC

## 2019-03-01 PROCEDURE — 80053 COMPREHEN METABOLIC PANEL: CPT | Mod: HCNC

## 2019-03-01 PROCEDURE — 36415 COLL VENOUS BLD VENIPUNCTURE: CPT | Mod: HCNC

## 2019-03-01 PROCEDURE — 84100 ASSAY OF PHOSPHORUS: CPT | Mod: HCNC

## 2019-03-01 RX ORDER — NAPROXEN SODIUM 220 MG/1
81 TABLET, FILM COATED ORAL DAILY
Status: DISCONTINUED | OUTPATIENT
Start: 2019-03-01 | End: 2019-03-06 | Stop reason: HOSPADM

## 2019-03-01 RX ORDER — MAGNESIUM SULFATE HEPTAHYDRATE 40 MG/ML
2 INJECTION, SOLUTION INTRAVENOUS ONCE
Status: COMPLETED | OUTPATIENT
Start: 2019-03-01 | End: 2019-03-01

## 2019-03-01 RX ORDER — MEROPENEM AND SODIUM CHLORIDE 1 G/50ML
1 INJECTION, SOLUTION INTRAVENOUS
Status: DISCONTINUED | OUTPATIENT
Start: 2019-03-01 | End: 2019-03-04

## 2019-03-01 RX ORDER — ATORVASTATIN CALCIUM 40 MG/1
40 TABLET, FILM COATED ORAL DAILY
Status: DISCONTINUED | OUTPATIENT
Start: 2019-03-01 | End: 2019-03-06 | Stop reason: HOSPADM

## 2019-03-01 RX ORDER — ONDANSETRON 2 MG/ML
8 INJECTION INTRAMUSCULAR; INTRAVENOUS EVERY 8 HOURS PRN
Status: DISCONTINUED | OUTPATIENT
Start: 2019-03-01 | End: 2019-03-06 | Stop reason: HOSPADM

## 2019-03-01 RX ORDER — GLUCAGON 1 MG
1 KIT INJECTION
Status: DISCONTINUED | OUTPATIENT
Start: 2019-03-01 | End: 2019-03-06 | Stop reason: HOSPADM

## 2019-03-01 RX ORDER — SODIUM CHLORIDE 450 MG/100ML
INJECTION, SOLUTION INTRAVENOUS CONTINUOUS
Status: DISCONTINUED | OUTPATIENT
Start: 2019-03-01 | End: 2019-03-01

## 2019-03-01 RX ORDER — IBUPROFEN 200 MG
24 TABLET ORAL
Status: DISCONTINUED | OUTPATIENT
Start: 2019-03-01 | End: 2019-03-06 | Stop reason: HOSPADM

## 2019-03-01 RX ORDER — POTASSIUM CHLORIDE 20 MEQ/15ML
40 SOLUTION ORAL ONCE
Status: COMPLETED | OUTPATIENT
Start: 2019-03-01 | End: 2019-03-01

## 2019-03-01 RX ORDER — CLOPIDOGREL 300 MG/1
300 TABLET, FILM COATED ORAL ONCE
Status: COMPLETED | OUTPATIENT
Start: 2019-03-01 | End: 2019-03-01

## 2019-03-01 RX ORDER — INSULIN ASPART 100 [IU]/ML
1-10 INJECTION, SOLUTION INTRAVENOUS; SUBCUTANEOUS
Status: DISCONTINUED | OUTPATIENT
Start: 2019-03-01 | End: 2019-03-06 | Stop reason: HOSPADM

## 2019-03-01 RX ORDER — OSELTAMIVIR PHOSPHATE 75 MG/1
75 CAPSULE ORAL 2 TIMES DAILY
Status: COMPLETED | OUTPATIENT
Start: 2019-03-01 | End: 2019-03-05

## 2019-03-01 RX ORDER — VENLAFAXINE HYDROCHLORIDE 75 MG/1
75 CAPSULE, EXTENDED RELEASE ORAL DAILY
Status: DISCONTINUED | OUTPATIENT
Start: 2019-03-01 | End: 2019-03-06 | Stop reason: HOSPADM

## 2019-03-01 RX ORDER — IBUPROFEN 200 MG
16 TABLET ORAL
Status: DISCONTINUED | OUTPATIENT
Start: 2019-03-01 | End: 2019-03-06 | Stop reason: HOSPADM

## 2019-03-01 RX ORDER — MIRTAZAPINE 15 MG/1
30 TABLET, FILM COATED ORAL NIGHTLY
Status: DISCONTINUED | OUTPATIENT
Start: 2019-03-01 | End: 2019-03-06 | Stop reason: HOSPADM

## 2019-03-01 RX ORDER — SODIUM,POTASSIUM PHOSPHATES 280-250MG
1 POWDER IN PACKET (EA) ORAL ONCE
Status: COMPLETED | OUTPATIENT
Start: 2019-03-01 | End: 2019-03-01

## 2019-03-01 RX ADMIN — ACYCLOVIR SODIUM 820 MG: 50 INJECTION, SOLUTION INTRAVENOUS at 08:03

## 2019-03-01 RX ADMIN — OSELTAMIVIR PHOSPHATE 75 MG: 75 CAPSULE ORAL at 09:03

## 2019-03-01 RX ADMIN — METOPROLOL SUCCINATE 12.5 MG: 25 TABLET, EXTENDED RELEASE ORAL at 03:03

## 2019-03-01 RX ADMIN — MIRTAZAPINE 30 MG: 15 TABLET, FILM COATED ORAL at 09:03

## 2019-03-01 RX ADMIN — PIPERACILLIN AND TAZOBACTAM 4.5 G: 4; .5 INJECTION, POWDER, LYOPHILIZED, FOR SOLUTION INTRAVENOUS; PARENTERAL at 01:03

## 2019-03-01 RX ADMIN — MAGNESIUM SULFATE IN WATER 2 G: 40 INJECTION, SOLUTION INTRAVENOUS at 01:03

## 2019-03-01 RX ADMIN — MEROPENEM AND SODIUM CHLORIDE 1 G: 1 INJECTION, SOLUTION INTRAVENOUS at 07:03

## 2019-03-01 RX ADMIN — ASPIRIN 81 MG 81 MG: 81 TABLET ORAL at 03:03

## 2019-03-01 RX ADMIN — MEROPENEM AND SODIUM CHLORIDE 1 G: 1 INJECTION, SOLUTION INTRAVENOUS at 03:03

## 2019-03-01 RX ADMIN — CLOPIDOGREL BISULFATE 300 MG: 300 TABLET, FILM COATED ORAL at 03:03

## 2019-03-01 RX ADMIN — ATORVASTATIN CALCIUM 40 MG: 40 TABLET, FILM COATED ORAL at 03:03

## 2019-03-01 RX ADMIN — VENLAFAXINE HYDROCHLORIDE 75 MG: 75 CAPSULE, EXTENDED RELEASE ORAL at 03:03

## 2019-03-01 RX ADMIN — POTASSIUM CHLORIDE 40 MEQ: 1.5 SOLUTION ORAL at 01:03

## 2019-03-01 RX ADMIN — SODIUM CHLORIDE: 0.45 INJECTION, SOLUTION INTRAVENOUS at 01:03

## 2019-03-01 RX ADMIN — POTASSIUM & SODIUM PHOSPHATES POWDER PACK 280-160-250 MG 1 PACKET: 280-160-250 PACK at 01:03

## 2019-03-01 NOTE — PROGRESS NOTES
Results for DANE GARVEY (MRN [id number]) as of 2/28/2019 18:33   Ref. Range 2/28/2019 18:14   POC PH Latest Ref Range: 7.35 - 7.45  7.397   POC PCO2 Latest Ref Range: 35 - 45 mmHg 28.3 (LL)   POC PO2 Latest Ref Range: 80 - 100 mmHg 85   POC BE Latest Ref Range: -2 to 2 mmol/L -6   POC HCO3 Latest Ref Range: 24 - 28 mmol/L 17.4 (L)   POC SATURATED O2 Latest Ref Range: 95 - 100 % 97   POC TCO2 Latest Ref Range: 23 - 27 mmol/L 18 (L)   FiO2 Unknown 100   Flow Unknown 15   Sample Unknown ARTERIAL   DelSys Unknown NRB   Allens Test Unknown Pass   Site Unknown LR   Mode Unknown SPONT

## 2019-03-01 NOTE — ASSESSMENT & PLAN NOTE
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 [place] 4 up to 30% of one core.   -S/p gold seed fiducial markers on 2/26/19 by Dr. Huff for XRT

## 2019-03-01 NOTE — SUBJECTIVE & OBJECTIVE
Past Medical History:   Diagnosis Date    Anxiety     Coronary artery disease     Depression     Diabetes mellitus type II     Erectile dysfunction     Eye injuries     fb ou    GERD (gastroesophageal reflux disease)     Hypertension        Past Surgical History:   Procedure Laterality Date    COLONOSCOPY N/A 2/9/2018    Performed by Mathieu Cao [facility] at [facility]    CORONARY ARTERY BYPASS GRAFT  2001    EGD (ESOPHAGOGASTRODUODENOSCOPY) Left 11/14/2012    Performed by Carlos Navarro MD at [facility] END[facility]    ESOPHAGOGASTRODUODENOSCOPY (EGD) N/A 2/9/2018    Performed by [facility]ieu Cao [facility] at [facility] END[facility]    HERNIA REPAIR      ULTRASOUND, PROSTATE, RECTAL APPROACH, WITH GOLD FIDUCIAL MARKER INSERTION N/A 2/26/2019    Performed by Layne Huff MD at [facility] OR    WRIST SURGERY         Review of patient's allergies indicates:   Allergen Reactions    Tamsulosin     Prochlorperazine      convulsions    Statins-hmg-coa reductase inhibitors Other (See Comments)     Muscle weakness       Medications:  Medications Prior to Admission   Medication Sig    ALPRAZolam (XANAX) 1 MG tablet Take 1 tablet (1 mg total) by mouth nightly as needed for Anxiety.    blood sugar diagnostic Strp To check BG 2 times daily, to use with insurance preferred meter    blood-glucose meter kit To check BG 2 times daily, to use with insurance preferred meter    HYDROcodone-acetaminophen (NORCO) 5-325 mg per tablet Take 1 tablet by mouth every 6 (six) hours as needed for Pain.    lancets Misc To check BG 2 times daily, to use with insurance preferred meter    losartan-hydrochlorothiazide 50-12.5 mg (HYZAAR) 50-12.5 mg per tablet Take 1 tablet by mouth once daily.    mirtazapine (REMERON) 30 MG tablet Take 1 tablet (30 mg total) by mouth every evening. (Patient taking differently: Take 30 mg by mouth nightly as needed. )    venlafaxine (EFFEXOR-XR) 75 MG 24 hr capsule Take 1 capsule (75 mg total) by mouth once daily.  (Patient taking differently: Take 75 mg by mouth daily as needed. )    vit C/vit E ac/lut/copper/zinc (PRESERVISION LUTEIN ORAL) Take 1 tablet by mouth once daily.    vitamin D 1000 units Tab Take 1,000 Units by mouth once daily.     Antibiotics (From admission, onward)    Start     Stop Route Frequency Ordered    19 0745  meropenem-0.9% sodium chloride 1 g/50 mL IVPB      -- IV Every 8 hours (non-standard times) 19 0639        Antifungals (From admission, onward)    None        Antivirals (From admission, onward)        Stop Route Frequency     oseltamivir      2059 Oral 2 times daily           Immunization History   Administered Date(s) Administered    Hepatitis A, Adult 09/15/2005    Hepatitis B, Adult 09/15/2005, 10/19/2005, 2006    Pneumococcal Conjugate - 13 Valent 2018    Td (ADULT) 09/15/2005, 2016       Family History     Problem Relation (Age of Onset)    No Known Problems Mother, Father, Maternal Grandmother, Maternal Grandfather, Paternal Grandmother, Paternal Grandfather, Sister, Brother, Maternal Aunt, Maternal Uncle, Paternal Aunt, Paternal Uncle        Social History     Socioeconomic History    Marital status:      Spouse name: None    Number of children: None    Years of education: None    Highest education level: None   Social Needs    Financial resource strain: None    Food insecurity - worry: None    Food insecurity - inability: None    Transportation needs - medical: None    Transportation needs - non-medical: None   Occupational History    None   Tobacco Use    Smoking status: Former Smoker     Packs/day: 2.00     Years: 23.00     Pack years: 46.00     Last attempt to quit: 1989     Years since quittin.1    Smokeless tobacco: Never Used   Substance and Sexual Activity    Alcohol use: Yes     Alcohol/week: 0.6 oz     Types: 1 Cans of beer per week     Comment: occassional    Drug use: No    Sexual activity: Yes      Partners: Female   Other Topics Concern    None   Social History Narrative    None     Review of Systems   Unable to perform ROS: Mental status change   Constitutional: Positive for appetite change.     Objective:     Vital Signs (Most Recent):  Temp: 97.8 °F (36.6 °C) (03/01/19 0945)  Pulse: (!) 59 (03/01/19 1100)  Resp: 17 (03/01/19 1100)  BP: (!) 100/57 (03/01/19 1100)  SpO2: 99 % (03/01/19 1100) Vital Signs (24h Range):  Temp:  [95.3 °F (35.2 °C)-106.4 °F (41.3 °C)] 97.8 °F (36.6 °C)  Pulse:  [] 59  Resp:  [15-62] 17  SpO2:  [93 %-100 %] 99 %  BP: ()/(53-94) 100/57     Weight: 82.6 kg (182 lb)  Body mass index is 25.38 kg/m².    Estimated Creatinine Clearance: 51.5 mL/min (based on SCr of 1.3 mg/dL).    Physical Exam   Constitutional: He appears well-developed and well-nourished. No distress.   HENT:   Head: Normocephalic and atraumatic.   Right Ear: External ear normal.   Left Ear: External ear normal.   Eyes: Conjunctivae and EOM are normal. Pupils are equal, round, and reactive to light.   Pulmonary/Chest: Effort normal and breath sounds normal. No respiratory distress.   Abdominal: Soft. Bowel sounds are normal. He exhibits no distension. There is no tenderness.   Musculoskeletal: Normal range of motion. He exhibits no tenderness or deformity.   Neurological: He is alert.   Skin: Skin is warm. No rash noted. He is not diaphoretic. No erythema.   Nursing note and vitals reviewed.      Significant Labs:   Blood Culture:   Recent Labs   Lab 02/28/19  1747 02/28/19  1755   LABBLOO Gram stain michelle bottle: Gram negative rods  Results called to and read back by: Saima Harrison 03/01/2019  06:30  Gram stain aer bottle: Gram negative rods   Positive results previously called Gram stain michelle bottle: Gram negative rods  Results called to and read back by: Saima Harrison 03/01/2019  06:30  Gram stain aer bottle: Gram negative rods   Positive results previously called     CBC:   Recent Labs   Lab  02/28/19 1924 03/01/19 0419   WBC 6.04 9.23   HGB 13.3* 13.3*   HCT 40.3 41.2   PLT 57* 53*     CMP:   Recent Labs   Lab 02/28/19 1924 03/01/19 0419   * 133*   K 3.0* 4.6    106   CO2 20* 18*   * 186*   BUN 18 17   CREATININE 1.6* 1.3   CALCIUM 8.2* 8.2*   PROT 5.8* 5.8*   ALBUMIN 3.1* 2.9*   BILITOT 0.8 0.8   ALKPHOS 79 62   AST 66* 119*   ALT 56* 95*   ANIONGAP 9 9   EGFRNONAA 41* 53*     Prealbumin: No results for input(s): PREALBUMIN in the last 48 hours.  Respiratory Culture: No results for input(s): GSRESP, RESPIRATORYC in the last 4320 hours.  Urine Culture: No results for input(s): LABURIN in the last 4320 hours.  All pertinent labs within the past 24 hours have been reviewed.    Significant Imaging: I have reviewed all pertinent imaging results/findings within the past 24 hours.

## 2019-03-01 NOTE — H&P
[facility] Medicine  History & Physical    Patient Name: Eric Jackson  MRN: [id number]  Admission Date: 03/01/2019  Attending Physician: Jamir Cook MD, MPH      PCP:     Samir Boo MD    CC:     Chief Complaint   Patient presents with    Altered Mental Status     Per EMS, wife called for patient unresponsive.  Had prostate surgery on Tuesday.  Wife states was A&O at 2 pm.  She gave 3 ibuprofen.  Later found pt altered and not responding to her.  EMS reports 90% on RA and a 106 axillary temp.  PT arrives on a NRB and doesn't respond to stimulus other than grunting.        HISTORY OF PRESENT ILLNESS:     Eric Jackson is a 78 y.o. male that (in part)  has a past medical history of Anxiety, Coronary artery disease, Depression, Diabetes mellitus type II, Erectile dysfunction, Eye injuries, GERD (gastroesophageal reflux disease), and Hypertension.  has a past surgical history that includes Hernia repair; Wrist surgery; Coronary artery bypass graft (2001); Colonoscopy (N/A, 2/9/2018); and Transrectal ultrasound of prostate with insertion of gold fiducial marker (N/A, 2/26/2019). Presents to [facility] Presented with altered mental status.  Patient had prostate procedure on Tuesday.  On Wednesday he started to feel ill and by this afternoon he developed severe altered mental status and became essentially unresponsive.  The wife reports that he was given Motrin at around 2:00 p.m. and laid down for a nap.  He was unable to be aroused after his nap.  EMS was activated.  When they arrived he had axillary temperature of 106°.  Urology notes indicate the patient had prostate surgery this evening procedure.  ER physician spoke with the surgeon reports given the patient perioperative antibiotics including gentamicin.  No general anesthesia was utilized nor did he have any medications that could contribute to possible malignant  hypothermia.    In the emergency department routine laboratory studies were obtained as well as urinalysis.  CT abdomen pelvis were also performed.  He also received CT of the head and a lumbar puncture which was significant for markedly elevated opening pressure.  Urinalysis revealed Evidence of urinary tract infection with a clinical picture of suspected bacteremia resulting in severe sepsis with infectious encephalopathy.  Sepsis protocol initiated.  Lastly, rapid flu was also positive.  He was started on Tamiflu.    Hospital medicine has been asked to admit for further evaluation and treatment.       REVIEW OF SYSTEMS:     Unable to obtain due to current condition of the patient    PAST MEDICAL / SURGICAL HISTORY:     Past Medical History:   Diagnosis Date    Anxiety     Coronary artery disease     Depression     Diabetes mellitus type II     Erectile dysfunction     Eye injuries     fb ou    GERD (gastroesophageal reflux disease)     Hypertension      Past Surgical History:   Procedure Laterality Date    COLONOSCOPY N/A 2/9/2018    Performed by Mathieu Cao MD at [facility] ENDO    CORONARY ARTERY BYPASS GRAFT  2001    EGD (ESOPHAGOGASTRODUODENOSCOPY) Left 11/14/2012    Performed by Carlos Navarro MD at [facility] ENDO    ESOPHAGOGASTRODUODENOSCOPY (EGD) N/A 2/9/2018    Performed by Mathieu Cao MD at [facility] ENDO    HERNIA REPAIR      ULTRASOUND, PROSTATE, RECTAL APPROACH, WITH GOLD FIDUCIAL MARKER INSERTION N/A 2/26/2019    Performed by Layne Huff MD at [facility] OR    WRIST SURGERY           FAMILY HISTORY:     Family History   Adopted: Yes   Problem Relation Age of Onset    No Known Problems Mother     No Known Problems Father     No Known Problems Maternal Grandmother     No Known Problems Maternal Grandfather     No Known Problems Paternal Grandmother     No Known Problems Paternal Grandfather     No Known Problems Sister     No Known Problems Brother     No Known Problems  Maternal Aunt     No Known Problems Maternal Uncle     No Known Problems Paternal Aunt     No Known Problems Paternal Uncle     Amblyopia Neg Hx     Blindness Neg Hx     Cancer Neg Hx     Cataracts Neg Hx     Diabetes Neg Hx     Glaucoma Neg Hx     Hypertension Neg Hx     Macular degeneration Neg Hx     Retinal detachment Neg Hx     Strabismus Neg Hx     Stroke Neg Hx     Thyroid disease Neg Hx          SOCIAL HISTORY:     Social History     Socioeconomic History    Marital status:      Spouse name: None    Number of children: None    Years of education: None    Highest education level: None   Social Needs    Financial resource strain: None    Food insecurity - worry: None    Food insecurity - inability: None    Transportation needs - medical: None    Transportation needs - non-medical: None   Occupational History    None   Tobacco Use    Smoking status: Former Smoker     Packs/day: 2.00     Years: 23.00     Pack years: 46.00     Last attempt to quit: 1989     Years since quittin.1    Smokeless tobacco: Never Used   Substance and Sexual Activity    Alcohol use: Yes     Alcohol/week: 0.6 oz     Types: 1 Cans of beer per week     Comment: occassional    Drug use: No    Sexual activity: Yes     Partners: Female   Other Topics Concern    None   Social History Narrative    None         ALLERGIES:       Review of patient's allergies indicates:   Allergen Reactions    Tamsulosin     Prochlorperazine      convulsions    Statins-hmg-coa reductase inhibitors Other (See Comments)     Muscle weakness         HEALTH SCREENING:     Influenza vaccine not up-to-date for this season.  Prevnar 13 pneumonia vaccine =  evidence of previous vaccination found in the medical record      HOME MEDICATIONS:     Prior to Admission medications    Medication Sig Start Date End Date Taking? Authorizing Provider   ALPRAZolam (XANAX) 1 MG tablet Take 1 tablet (1 mg total) by mouth nightly as  needed for Anxiety. 1/14/19 2/22/19  Samir Boo MD   blood sugar diagnostic Strp To check BG 2 times daily, to use with insurance preferred meter 6/29/18   Paola Meyer NP   blood-glucose meter kit To check BG 2 times daily, to use with insurance preferred meter 6/29/18 6/29/19  Paola Meyer NP   HYDROcodone-acetaminophen (NORCO) 5-325 mg per tablet Take 1 tablet by mouth every 6 (six) hours as needed for Pain. 2/26/19   Layne Huff MD   lancets Misc To check BG 2 times daily, to use with insurance preferred meter 6/29/18   Paola Meyer NP   losartan-hydrochlorothiazide 50-12.5 mg (HYZAAR) 50-12.5 mg per tablet Take 1 tablet by mouth once daily. 1/14/19 1/14/20  Samir Boo MD   mirtazapine (REMERON) 30 MG tablet Take 1 tablet (30 mg total) by mouth every evening.  Patient taking differently: Take 30 mg by mouth nightly as needed.  6/29/18 6/29/19  Paola Meyer NP   venlafaxine (EFFEXOR-XR) 75 MG 24 hr capsule Take 1 capsule (75 mg total) by mouth once daily.  Patient taking differently: Take 75 mg by mouth daily as needed.  6/29/18 2/22/19  Paola Meyer NP   vit C/vit E ac/lut/copper/zinc (PRESERVISION LUTEIN ORAL) Take 1 tablet by mouth once daily.    Historical Provider, MD   vitamin D 1000 units Tab Take 1,000 Units by mouth once daily.    Historical Provider, MD          HOSPITAL MEDICATIONS:     Scheduled Meds:    acyclovir  10 mg/kg Intravenous Q12H    heparin (porcine)  5,000 Units Subcutaneous Q8H    magnesium sulfate IVPB  2 g Intravenous Once    oseltamivir  75 mg Oral BID    piperacillin-tazobactam (ZOSYN) IVPB  4.5 g Intravenous Q8H    potassium phosphate IVPB  30 mmol Intravenous Once    vancomycin (VANCOCIN) IVPB  15 mg/kg Intravenous Q24H     Continuous Infusions:    sodium chloride 0.45% 50 mL/hr at 03/01/19 0146     PRN Meds: dextrose 50%, glucagon (human recombinant), insulin aspart U-100, ondansetron, sodium chloride  0.9%      PHYSICAL EXAM:     Wt Readings from Last 1 Encounters:   02/28/19 2200 82.7 kg (182 lb 5.1 oz)   02/28/19 1726 81.6 kg (180 lb)     Body mass index is 25.43 kg/m².  Vitals:    02/28/19 2345 03/01/19 0000 03/01/19 0015 03/01/19 0030   BP: (!) 90/56 (!) 94/59 (!) 95/56 (!) 94/55   BP Location: Right arm Right arm Right arm Right arm   Patient Position: Lying Lying Lying Lying   Pulse: 74 74 74 75   Resp: (!) 29 (!) 28 (!) 26 (!) 28   Temp:  98.5 °F (36.9 °C)     TempSrc:  Axillary     SpO2: 99% 99% 100% 99%   Weight:       Height:              -- General appearance:  Chronically ill-appearing male who is lying in bed.  well developed. appears stated age .  Somnolent.  No apparent distress.  -- Head: normocephalic, atraumatic   -- Eyes: conjunctivae clear. Extraocular muscles intact  -- Nose: Nares normal. Septum midline.   -- Mouth/Throat: lips, mucosa, and tongue normal. no throat erythema.   -- Neck: supple, symmetrical, trachea midline, no JVD and thyroid not grossly enlarged, appears symmetric  -- Lungs: clear to auscultation bilaterally. Increased respiratory rate with  normal respiratory effort. No use of accessory muscles.   -- Chest wall: no tenderness. equal bilateral chest rise   -- Heart: regular rate and regular rhythm. S1, S2 normal.  no click, rub or gallop   -- Abdomen: soft, non-tender, non-distended, non-tympanic; bowel sounds normal; no masses  -- Extremities: no cyanosis, clubbing or edema.   -- Pulses: 2+ and symmetric   -- Skin: color normal, texture normal, turgor normal. No rashes or lesions.   -- Neurologic:  Globally decreased muscle strength and tone.  Somnolent.  Encephalopathic.  No focal numbness or weakness. CNII-XII intact. [place] coma scale: 10      LABORATORY STUDIES:     Recent Results (from the past 36 hour(s))   CPK    Collection Time: 02/28/19  5:27 PM   Result Value Ref Range     (H) 20 - 200 U/L   Urinalysis    Collection Time: 02/28/19  5:59 PM   Result Value  Ref Range    Specimen UA Urine, Catheterized     Color, UA Red (A) Yellow, Straw, Yu    Appearance, UA Cloudy (A) Clear    pH, UA 7.0 5.0 - 8.0    Specific Gravity, UA 1.015 1.005 - 1.030    Protein, UA 2+ (A) Negative    Glucose, UA 1+ (A) Negative    Ketones, UA Negative Negative    Bilirubin (UA) Negative Negative    Occult Blood UA 3+ (A) Negative    Nitrite, UA Negative Negative    Urobilinogen, UA Negative <2.0 EU/dL    Leukocytes, UA 2+ (A) Negative   Drug screen panel, emergency    Collection Time: 02/28/19  5:59 PM   Result Value Ref Range    Benzodiazepines Presumptive Positive     Methadone metabolites Negative     Cocaine (Metab.) Negative     Opiate Scrn, Ur Negative     Barbiturate Screen, Ur Negative     Amphetamine Screen, Ur Negative     THC Negative     Phencyclidine Negative     Creatinine, Random Ur 111.9 23.0 - 375.0 mg/dL    Toxicology Information SEE COMMENT    Urinalysis Microscopic    Collection Time: 02/28/19  5:59 PM   Result Value Ref Range    RBC, UA >100 (H) 0 - 4 /hpf    WBC, UA 25 (H) 0 - 5 /hpf    Bacteria, UA Few (A) None-Occ /hpf    Squam Epithel, UA 3 /hpf    Non-Squam Epith 1 (A) <1/hpf /hpf    Hyaline Casts, UA 0 0-1/lpf /lpf    Amorphous, UA Few None-Moderate    Microscopic Comment SEE COMMENT    ISTAT PROCEDURE    Collection Time: 02/28/19  6:14 PM   Result Value Ref Range    POC PH 7.397 7.35 - 7.45    POC PCO2 28.3 (LL) 35 - 45 mmHg    POC PO2 85 80 - 100 mmHg    POC HCO3 17.4 (L) 24 - 28 mmol/L    POC BE -6 -2 to 2 mmol/L    POC SATURATED O2 97 95 - 100 %    POC TCO2 18 (L) 23 - 27 mmol/L    Sample ARTERIAL     Site LR     Allens Test Pass     DelSys NRB     Mode SPONT     Flow 15     FiO2 100     Sp02 96    POCT glucose    Collection Time: 02/28/19  6:30 PM   Result Value Ref Range    POCT Glucose 173 (H) 70 - 110 mg/dL   Glucose, CSF (Tube 1)    Collection Time: 02/28/19  7:17 PM   Result Value Ref Range    Glucose, CSF 88 (H) 40 - 70 mg/dL   Protein, CSF (Tube 1)     Collection Time: 02/28/19  7:17 PM   Result Value Ref Range    Protein, CSF 47 (H) 15 - 40 mg/dL   CSF culture and Gram Stain (Tube 2)    Collection Time: 02/28/19  7:17 PM   Result Value Ref Range    Gram Stain Result Cytospin indicates:     Gram Stain Result No WBC's     Gram Stain Result No organisms seen    CSF cell count with differential (Tube 4)    Collection Time: 02/28/19  7:17 PM   Result Value Ref Range    Heme Aliquot 1.0 mL    Appearance, CSF Clear Clear    Color, CSF Colorless Colorless    WBC, CSF 2 0 - 5 /cu mm    RBC,  (A) 0 /cu mm   CBC auto differential    Collection Time: 02/28/19  7:24 PM   Result Value Ref Range    WBC 6.04 3.90 - 12.70 K/uL    RBC 4.39 (L) 4.60 - 6.20 M/uL    Hemoglobin 13.3 (L) 14.0 - 18.0 g/dL    Hematocrit 40.3 40.0 - 54.0 %    MCV 92 82 - 98 fL    MCH 30.3 27.0 - 31.0 pg    MCHC 33.0 32.0 - 36.0 g/dL    RDW 13.2 11.5 - 14.5 %    Platelets 57 (L) 150 - 350 K/uL    MPV 9.9 9.2 - 12.9 fL    Gran # (ANC) 5.3 1.8 - 7.7 K/uL    Lymph # 0.4 (L) 1.0 - 4.8 K/uL    Mono # 0.3 0.3 - 1.0 K/uL    Eos # 0.0 0.0 - 0.5 K/uL    Baso # 0.01 0.00 - 0.20 K/uL    Gran% 88.5 (H) 38.0 - 73.0 %    Lymph% 7.3 (L) 18.0 - 48.0 %    Mono% 4.5 4.0 - 15.0 %    Eosinophil% 0.2 0.0 - 8.0 %    Basophil% 0.2 0.0 - 1.9 %    Platelet Estimate Decreased (A)     Differential Method Automated    Comprehensive metabolic panel    Collection Time: 02/28/19  7:24 PM   Result Value Ref Range    Sodium 135 (L) 136 - 145 mmol/L    Potassium 3.0 (L) 3.5 - 5.1 mmol/L    Chloride 106 95 - 110 mmol/L    CO2 20 (L) 23 - 29 mmol/L    Glucose 197 (H) 70 - 110 mg/dL    BUN, Bld 18 8 - 23 mg/dL    Creatinine 1.6 (H) 0.5 - 1.4 mg/dL    Calcium 8.2 (L) 8.7 - 10.5 mg/dL    Total Protein 5.8 (L) 6.0 - 8.4 g/dL    Albumin 3.1 (L) 3.5 - 5.2 g/dL    Total Bilirubin 0.8 0.1 - 1.0 mg/dL    Alkaline Phosphatase 79 55 - 135 U/L    AST 66 (H) 10 - 40 U/L    ALT 56 (H) 10 - 44 U/L    Anion Gap 9 8 - 16 mmol/L    eGFR if African  American 48 (A) >60 mL/min/1.73 m^2    eGFR if non African American 41 (A) >60 mL/min/1.73 m^2   Lactic acid, plasma #1    Collection Time: 02/28/19  7:24 PM   Result Value Ref Range    Lactate (Lactic Acid) 2.6 (H) 0.5 - 2.2 mmol/L   Magnesium    Collection Time: 02/28/19  7:24 PM   Result Value Ref Range    Magnesium 1.5 (L) 1.6 - 2.6 mg/dL   Phosphorus    Collection Time: 02/28/19  7:24 PM   Result Value Ref Range    Phosphorus <1.0 (LL) 2.7 - 4.5 mg/dL   APTT    Collection Time: 02/28/19  7:24 PM   Result Value Ref Range    aPTT 29.9 21.0 - 32.0 sec   Protime-INR    Collection Time: 02/28/19  7:24 PM   Result Value Ref Range    Prothrombin Time 12.9 (H) 9.0 - 12.5 sec    INR 1.2 0.8 - 1.2   Brain natriuretic peptide    Collection Time: 02/28/19  7:24 PM   Result Value Ref Range     (H) 0 - 99 pg/mL   Lipase    Collection Time: 02/28/19  7:24 PM   Result Value Ref Range    Lipase 34 4 - 60 U/L   Troponin I    Collection Time: 02/28/19  7:24 PM   Result Value Ref Range    Troponin I 0.915 (H) 0.000 - 0.026 ng/mL   CK    Collection Time: 02/28/19  7:24 PM   Result Value Ref Range     (H) 20 - 200 U/L   TSH    Collection Time: 02/28/19  7:24 PM   Result Value Ref Range    TSH 1.715 0.400 - 4.000 uIU/mL   Rapid HIV    Collection Time: 02/28/19  7:24 PM   Result Value Ref Range    HIV Rapid Testing Negative Negative   POCT Influenza A/B    Collection Time: 02/28/19  8:40 PM   Result Value Ref Range    Rapid Influenza A Ag Positive (A) Negative    Rapid Influenza B Ag Positive (A) Negative     Acceptable Yes    POCT glucose    Collection Time: 02/28/19  8:52 PM   Result Value Ref Range    POCT Glucose 195 (H) 70 - 110 mg/dL   Lactic acid, plasma #2    Collection Time: 02/28/19  9:29 PM   Result Value Ref Range    Lactate (Lactic Acid) 2.4 (H) 0.5 - 2.2 mmol/L   POCT glucose    Collection Time: 03/01/19  1:44 AM   Result Value Ref Range    POCT Glucose 212 (H) 70 - 110 mg/dL       Lab  Results   Component Value Date    INR 1.2 02/28/2019    INR 1.1 01/15/2018    INR 1.0 02/03/2014     Lab Results   Component Value Date    HGBA1C 6.1 (A) 10/17/2018     Recent Labs     02/26/19  1051 02/28/19  1830 02/28/19  2052 03/01/19  0144   POCTGLUCOSE 119* 173* 195* 212*           MICROBIOLOGY DATA:     Urine Culture, Routine   Date Value Ref Range Status   01/15/2018 No significant growth  Final       Microbiology x 7d:   Microbiology Results (last 7 days)     Procedure Component Value Units Date/Time    CSF culture and Gram Stain (Tube 2) [[id number]] Collected:  02/28/19 1917    Order Status:  Completed Specimen:  CSF (Spinal Fluid) from CSF Tap, Tube 2 Updated:  02/28/19 2126     Gram Stain Result Cytospin indicates:      No WBC's      No organisms seen    Narrative:       On which sequentially labeled tube should this analysis be  performed?->2    Stool culture **CANNOT BE ORDERED STAT** [[id number]] Collected:  02/28/19 1905    Order Status:  Sent Specimen:  Stool Updated:  02/28/19 2006    E. coli 0157 antigen [[id number]] Collected:  02/28/19 1905    Order Status:  No result Specimen:  Stool Updated:  02/28/19 2006    Clostridium difficile EIA [[id number]] Collected:  02/28/19 1905    Order Status:  Sent Specimen:  Stool Updated:  02/28/19 2006    Urine culture [[id number]] Collected:  02/28/19 1759    Order Status:  Sent Specimen:  Urine, Catheterized Updated:  02/28/19 1845    Blood culture x two cultures. Draw prior to antibiotics. [[id number]] Collected:  02/28/19 1747    Order Status:  Sent Specimen:  Blood from Peripheral, Hand, Left Updated:  02/28/19 1811    Blood culture x two cultures. Draw prior to antibiotics. [[id number]] Collected:  02/28/19 1755    Order Status:  Sent Specimen:  Blood from Peripheral, Forearm, Left Updated:  02/28/19 1810            IMAGING:     Imaging Results          CT Abdomen Pelvis With Contrast (Final result)  Result time 02/28/19 20:59:35    Final result by Sis CAMARENA  MD Caroline (02/28/19 20:59:35)                 Impression:      Motion limited examination.    1. No acute intra-abdominal abnormalities identified.  2. Cholelithiasis.  3. Small left renal hypodensity which measures slightly higher than simple fluid density.  This could represent a small cyst or complex cyst.  Future nonemergent ultrasound follow-up could be obtained to ensure cystic nature of this lesion.  4. Additional findings as detailed above.      Electronically signed by: Sis Velazquez MD  Date:    02/28/2019  Time:    20:59             Narrative:    EXAMINATION:  CT ABDOMEN PELVIS WITH CONTRAST    CLINICAL HISTORY:  Bowel obstruction, high-grade;    TECHNIQUE:  Low dose axial images, sagittal and coronal reformations were obtained from the lung bases to the pubic symphysis following the IV administration of 100 mL of Omnipaque 350 .  Oral contrast was not given.    COMPARISON:  None.    FINDINGS:  The visualized portion of the heart is unremarkable.  The lung bases are clear.    Evaluation of the abdomen and pelvis is limited by motion blurring artifact.    No significant hepatic abnormalities are identified.  There is no intra-or extrahepatic biliary ductal dilatation.  Small layering calcified stones are seen in the gallbladder.  The stomach, pancreas, spleen, and adrenal glands are unremarkable.    Kidneys enhance normally with no evidence of hydronephrosis.  Small left renal hypodense lesion is seen which measures slightly higher than simple fluid density.  No definite abnormalities are seen along the ureteral courses.  Urinary bladder is collapsed around a Cantu catheter.  Postsurgical changes are seen involving the prostate.    Appendix measures upper limits of normal in caliber at 7 mm with no surrounding inflammatory changes seen.  The visualized loops of small and large bowel show no evidence of obstruction or inflammation.  No free air or free fluid.    Extensive aortic atherosclerosis is  seen.    No acute osseous abnormality identified.  Degenerative endplate changes are visualized at the L2-3 level.  Subcutaneous soft tissue structures are unremarkable.                               CT Head Without Contrast (Final result)  Result time 02/28/19 18:53:39    Final result by Sis Velazquez MD (02/28/19 18:53:39)                 Impression:      No acute intracranial abnormalities identified.      Electronically signed by: Sis Velazquez MD  Date:    02/28/2019  Time:    18:53             Narrative:    EXAMINATION:  CT HEAD WITHOUT CONTRAST    CLINICAL HISTORY:  Confusion/delirium, altered LOC, unexplained;    TECHNIQUE:  Low dose axial images were obtained through the head.  Coronal and sagittal reformations were also performed. Contrast was not administered.    COMPARISON:  None.    FINDINGS:  There is mild generalized cerebral volume loss and chronic microvascular ischemic change.  No evidence of acute/recent major vascular distribution cerebral infarction, intraparenchymal hemorrhage, or intra-axial space occupying lesion. The ventricular system is normal in size and configuration with no evidence of hydrocephalus. No effacement of the skull-base cisterns. No abnormal extra-axial fluid collections or blood products. Visualized paranasal sinuses and mastoid air cells are clear. The calvarium shows no significant abnormality.                               X-Ray Chest AP Portable (Final result)  Result time 02/28/19 18:17:49    Final result by Caesar Garza MD (02/28/19 18:17:49)                 Impression:      1. Status post CABG.  No acute radiographic findings in the chest on this single view.      Electronically signed by: Caesar Garza MD  Date:    02/28/2019  Time:    18:17             Narrative:    EXAMINATION:  XR CHEST AP PORTABLE    CLINICAL HISTORY:  Sepsis;    TECHNIQUE:  Single frontal view of the chest was performed.    COMPARISON:  Radiograph 05/24/2018.    FINDINGS:  Cardiac  monitoring leads project over the bilateral hemithoraces peer mediastinal structures are midline.  Hilar contours are unremarkable.  Cardiac silhouette is stable in size.  Postoperative changes of CABG.  Lung volumes are symmetric.  No consolidation.  No pneumothorax or pleural effusions.  No free air beneath the diaphragm.  Sternotomy wires are well aligned.  Degenerative changes of the spine and glenohumeral joints noted.                                  CONSULTS:     IP CONSULT TO UROLOGY       ASSESSMENT & PLAN:     Primary Diagnosis:  Fever 106 degrees F or over    Active Hospital Problems    Diagnosis  POA    *Fever 106 degrees F or over [R50.9]  Yes     Priority: 1 - High    Sepsis secondary to UTI [A41.9, N39.0]  Yes     Priority: 2     Infectious encephalopathy [G93.49, B99.9]  Yes     Priority: 2     Prostate cancer 2/25/19 Transrectal ultrasound of prostate and gold fiducial marker placement [C61]  Yes     Priority: 3      1/7/19 biopsy showed intermediate risk Tanisha 3+4 PCa in 3/12 cores. Tanisha 4 up to 30% of one core.   2/25/19 Transrectal ultrasound of prostate and gold fiducial marker placement      Diabetes mellitus type II, controlled [E11.9]  Yes    Essential hypertension [I10]  Yes      Resolved Hospital Problems   No resolved problems to display.       Urinary tract infection, bacterial  As evidenced by UA and History of recent urologic procedure.  Patient received perioperative antibiotics.  Suspicious for large release of lipopolysaccharide secondary to gentamicin induced bacteriocidal effect.  · Urine culture and Gram stain obtained prior to antibiotics  · Given empiric antibiotics w/ Cipro and Zosyn.  Prophylactic vancomycin also given the ED, although low threshold to discontinue as I doubt this is a Gram  positive organism.  · Will tailor antibiotic regimen according to culture & sensitivity results  · Maintain euvolemic status with IV fluids    Infectious  encephalopathy  · Secondary to acute infectious process as outlined above with UTI and sepsis  · Treatment as above  · Supportive care  · Fall precautions    Non ST-elevation myocardial infarction   · Intitial EKG findings shows no evidence of ST-elevation MI  · Initial troponin is above normal limits with a significant increase on the 2nd measurement  Recent Labs     02/28/19  1727 02/28/19  1924 03/01/19  0419   * 405*  --    TROPONINI  --  0.915* 2.500*       · Intermediate to  risk for MI  · Initiate ACS protocol to treat MI  · Trend cardiac enzymes  · Aspirin  · Beta-blocker contraindicated in sepsis  · Statin  · Supplemental oxygen  · nitroglycerine and morphine PRN  · 2D echocardiogram  · TSH, FT3, FT4  · ESR expected to be elevated.  Will obtain cardiac specific CRP   · PT, PTT, INR   · Tight glycemic control  · Monitor on telemetry unit  · Consult cardiologist  · Heparin infusion or Full-dose Lovenox cannot be given due to thrombocytopenia; defer to Cardiology    Diabetes mellitus type 2  · BG in acceptable range at this time  · Maintain w/ subcutaneous insulin management order set  · Hold oral diabetic meds  · ADA 1800 kcal diet  · BG goal while in patient is <180mg/dL  · HgA1c = Pending    Essential Hypertension  · Goal while inpatient is a systolic blood pressure less than 160mmHg  · BP in acceptable range at this time  · continue current home regimen due to sepsis as noted above  · PRN antihypertensives available    Influenza positive  Rapid influenza positive for a and B.  Started on Tamiflu.    Hypokalemia   · Due to GI losses or poor oral intake  · Check magnesium  · Replace potassium orally if possible, if not then IV  · Monitor with serial labs    Hypophosphatemia  · Due to GI losses or poor oral intake  · Replace phosphorus IV  · Check serial phosphorus levels      VTE Risk Mitigation (From admission, onward)        Ordered     heparin (porcine) injection 5,000 Units  Every 8 hours       02/28/19 2202     IP VTE HIGH RISK PATIENT  Once      02/28/19 2202            Adult PRN medications available   DVT prophylaxis given       DISPOSITION:     Will admit to the Hospital Medicine service for further evaluation and treatment.    Chart reviewed and updated where applicable.    High Risk Conditions:  Patient has a condition that poses threat to life and bodily function:  Gram-negative bacteremia, non ST-elevation MI      ===============================================================    Jamir Cook MD, MPH  Department of Hospital Medicine   [facility]  [phone] pg  (7pm - 6am)    Critical care was given for Eric Jackson who has a condition that poses threat to life and bodily function, including:  Sepsis Gram-negative bacteremia, myocardial infarction     Critical care was time spent personally by me on the following activities: development of treatment plan with patient or surrogate and bedside caregivers, discussions with consultants, evaluation of patient's response to treatment, examination of patient, ordering and performing treatments and interventions, ordering and review of laboratory studies, ordering and review of radiographic studies, pulse oximetry, re-evaluation of patient's condition. This critical care time did not overlap with that of any other provider or involve time for any procedures.    Reviewed: previous chart and vitals  Reviewed previous: labs, x-ray, CT scan   Interpretation: labs, x-ray, CT scan, CSF studies    Total Critical Care time: 65 minutes. This excludes time spent performing separately reportable procedures and services.  Counseling/Conference Time: 20 minutes          This note is dictated using [employer] voice recognition software.  There are word recognition mistakes that are occasionally missed on review.

## 2019-03-01 NOTE — HPI
78-year-old man with history of coronary artery disease, depression, diabetes and prostate cancer presents with altered mental status.  Per EMS the patient had a prostate surgery 2 days ago.  He had a fever this afternoon and was given Motrin at 2:00 p.m. today and then took a nap.  Right before arrival wife was unable to arouse the patient.  No other history available from the patient or EMS at this time.  Per chart review the patient had seeds placed into the prostate.  I spoke with anesthesia who states the patient did not received general anesthesia, nor did he receive any inciting medications known to be related to malignant hyperthermia.  Although the anesthesiologist states if they used the mask the patient may have gotten residual gas from bleed out in the line.    [facility] but has not seen them regularly.  He says that he was admitted over here a couple years ago (* 2014 on review of record) but transferred to [place] for cardiac catheterization.  He was told that everything looked fine at that time.  He has not been on adequate medicines for secondary prevention does not recall when they were stopped.  His mental status changes are improved today he is alert and oriented x3.  He denies any chest pain leading up to the hospital presentation.  His wife who is at bedside denies any complaints from a cardiopulmonary standpoint.  He currently denies any chest pain, shortness of breath or palpitations.  He has experienced no PND, orthopnea or lower extremity edema.  He denies any dizziness to the point of presyncope or syncope.

## 2019-03-01 NOTE — CONSULTS
[facility]  Urology  Consult Note    Patient Name: Eric Jackson  MRN: [id number]  Admission Date: 2/28/2019  Hospital Length of Stay: 1   Code Status: Full Code   Attending Provider: Milagros Carmen MD   Consulting Provider: Lynnette Kovacs NP  Primary Care Physician: Samir Boo MD  Principal Problem:Fever 106 degrees F or over    Inpatient consult to Urology  Consult performed by: Lynnette Kovacs NP  Consult ordered by: Jamir Cook MD  Assessment/Recommendations:           Subjective:     HPI:  79yo M recently diagnosed with prostate cancer. S/p gold seed fiducial markers on 2/26/19 by Dr. Huff for XRT. He was brought in by EMS for altered mental status  and fever of 106 (axillary). UA concerning for UTI.  Now admitted to ICU with severe sepsis with infectious encephalopathy. He also tested positive for Influenza. Patient noted to be oriented on exam    UCx--pending      Past Medical History:   Diagnosis Date    Anxiety     Coronary artery disease     Depression     Diabetes mellitus type II     Erectile dysfunction     Eye injuries     fb ou    GERD (gastroesophageal reflux disease)     Hypertension        Past Surgical History:   Procedure Laterality Date    COLONOSCOPY N/A 2/9/2018    [facility] at [facility]    CORONARY ARTERY BYPASS GRAFT  2001    EGD (ESOPHAGOGASTRODUODENOSCOPY) Left 11/14/2012    [facility] at [facility]    ESOPHAGOGASTRODUODENOSCOPY (EGD) N/A 2/9/2018    [facility] at [facility] END[facility]    HERNIA REPAIR      ULTRASOUND, PROSTATE, RECTAL APPROACH, WITH GOLD FIDUCIAL MARKER INSERTION N/A 2/26/2019    [facility] by Layne Huff [facility] at [facility] OR    WRIST SURGERY         Review of patient's allergies indicates:   Allergen Reactions    Tamsulosin     Prochlorperazine      convulsions    Statins-hmg-coa reductase inhibitors Other (See Comments)     Muscle weakness        Family History     Problem Relation (Age of Onset)    No Known Problems Mother, Father, Maternal Grandmother, Maternal Grandfather, Paternal Grandmother, Paternal Grandfather, Sister, Brother, Maternal Aunt, Maternal Uncle, Paternal Aunt, Paternal Uncle          Tobacco Use    Smoking status: Former Smoker     Packs/day: 2.00     Years: 23.00     Pack years: 46.00     Last attempt to quit: 1989     Years since quittin.1    Smokeless tobacco: Never Used   Substance and Sexual Activity    Alcohol use: Yes     Alcohol/week: 0.6 oz     Types: 1 Cans of beer per week     Comment: occassional    Drug use: No    Sexual activity: Yes     Partners: Female       Review of Systems   Constitutional: Negative for chills, fatigue and fever.   HENT: Negative for ear pain.    Eyes: Negative for pain and discharge.   Respiratory: Negative for cough, chest tightness, shortness of breath and wheezing.    Cardiovascular: Negative for chest pain, palpitations and leg swelling.   Gastrointestinal: Negative for abdominal distention, abdominal pain, blood in stool, diarrhea, nausea and vomiting.   Genitourinary: Negative for difficulty urinating, dysuria, flank pain, hematuria, testicular pain and urgency.   Musculoskeletal: Negative for arthralgias, joint swelling and neck pain.   Skin: Negative for pallor, rash and wound.   Neurological: Negative for dizziness, syncope, weakness and headaches.   Hematological: Negative for adenopathy.   Psychiatric/Behavioral: Negative for agitation, confusion and suicidal ideas.       Objective:     Temp:  [95.3 °F (35.2 °C)-106.4 °F (41.3 °C)] 95.3 °F (35.2 °C)  Pulse:  [] 67  Resp:  [15-62] 19  SpO2:  [93 %-100 %] 99 %  BP: ()/(53-94) 117/58     Body mass index is 25.43 kg/m².    Date 19 0700 - 19 0659   Shift 5424-1954 5486-5882 7082-1184 24 Hour Total   INTAKE   P.O. 40   40   I.V.(mL/kg) 50(0.6)   50(0.6)   IV Piggyback 300   300   Shift Total(mL/kg)  390(4.7)   390(4.7)   OUTPUT   Urine(mL/kg/hr) 1050   1050   Shift Total(mL/kg) 1050(12.7)   1050(12.7)   Weight (kg) 82.7 82.7 82.7 82.7          Drains     Drain                 NG/OG Tube 02/28/19 2230 Moise sump 18 Fr. Left nostril less than 1 day         Urethral Catheter 02/28/19 1747 Straight-tip 18 Fr. less than 1 day                Physical Exam   Constitutional: He is oriented to person, place, and time. He appears well-developed.   HENT:   Head: Normocephalic and atraumatic.   Nose: Nose normal.   Eyes: Conjunctivae are normal. Right eye exhibits no discharge. Left eye exhibits no discharge.   Neck: Normal range of motion. Neck supple. No tracheal deviation present. No thyromegaly present.   Cardiovascular: Normal rate and regular rhythm.    Pulmonary/Chest: Effort normal. No respiratory distress. He has no wheezes.   Abdominal: Soft. He exhibits no distension. There is no hepatosplenomegaly. There is no tenderness. There is no CVA tenderness. No hernia.   Genitourinary:   Genitourinary Comments: Cantu draining yellow urine   Musculoskeletal: Normal range of motion. He exhibits no edema.   Neurological: He is alert and oriented to person, place, and time.   Skin: Skin is warm and dry. No rash noted. No erythema.     Psychiatric: He has a normal mood and affect. His behavior is normal. Judgment normal.       Significant Labs:    BMP:  Recent Labs   Lab 02/22/19  1455 02/28/19 1924 03/01/19 0419    135* 133*   K 4.5 3.0* 4.6    106 106   CO2 30* 20* 18*   BUN 18 18 17   CREATININE 1.4 1.6* 1.3   CALCIUM 10.0 8.2* 8.2*       CBC:  Recent Labs   Lab 02/22/19  1455 02/28/19 1924 03/01/19 0419   WBC 7.25 6.04 9.23   HGB 15.0 13.3* 13.3*   HCT 44.6 40.3 41.2    57* 53*       Blood Culture:   Recent Labs   Lab 02/28/19 1747 02/28/19  1755   LABBLOO Gram stain michelle bottle: Gram negative rods  Results called to and read back by: Saima Harrison 03/01/2019  06:30  Gram stain aer bottle: Gram  negative rods   Positive results previously called Gram stain michelle bottle: Gram negative rods  Results called to and read back by: Saima Harrison 03/01/2019  06:30  Gram stain aer bottle: Gram negative rods   Positive results previously called     CMP:   Recent Labs   Lab 02/22/19  1455 02/28/19  1924 03/01/19  0419   * 197* 186*    135* 133*   K 4.5 3.0* 4.6    106 106   CO2 30* 20* 18*   BUN 18 18 17   CREATININE 1.4 1.6* 1.3   CALCIUM 10.0 8.2* 8.2*   MG  --  1.5* 2.6     Urine Culture: No results for input(s): LABURIN in the last 168 hours.  Urine Studies:   Recent Labs   Lab 02/28/19  1759   COLORU Red*   APPEARANCEUA Cloudy*   PHUR 7.0   SPECGRAV 1.015   PROTEINUA 2+*   GLUCUA 1+*   KETONESU Negative   BILIRUBINUA Negative   OCCULTUA 3+*   NITRITE Negative   UROBILINOGEN Negative   LEUKOCYTESUR 2+*   RBCUA >100*   WBCUA 25*   BACTERIA Few*   SQUAMEPITHEL 3   HYALINECASTS 0       Significant Imaging:  All pertinent imaging results/findings from the past 24 hours have been reviewed.                    Assessment and Plan:     Acute prostatitis    -Suspect prostate infection after recent  fiducial marker placment  -Follow cultures  -Treat x 4-6 weeks     Sepsis secondary to UTI    -UCx pending       Prostate cancer 2/25/19 Transrectal ultrasound of prostate and gold fiducial marker placement    -S/p TRUS PBx on 1/7/19-- intermediate risk [place] 3+4 PCa in 3/12 cores. [place] 4 up to 30% of one core.   -S/p gold seed fiducial markers on 2/26/19 by Dr. Huff for XRT                    VTE Risk Mitigation (From admission, onward)        Ordered     Place LINA hose  Until discontinued      03/01/19 0531     IP VTE HIGH RISK PATIENT  Once      02/28/19 2202          Thank you for your consult. I will follow-up with patient. Please contact us if you have any additional questions.    Lynnette Kovacs NP  Urology  [facility]

## 2019-03-01 NOTE — CARE UPDATE
I have interviewed and examines the patient and agree with Dr. Cook's H&P.    Eric Jackson is a 79 yo man with CAD, diabetes, anxiety/depression, GERD, HTN, and ED s/p transrectal ultrasound of prostate with insertion of gold fiducial marker on 2/26/2019 who presented with altered mental status.  Patient had prostate procedure on Tuesday.  On Wednesday he started to feel ill and by this afternoon he developed severe altered mental status and became essentially unresponsive.  The wife reports that he was given Motrin at around 2:00 p.m. and laid down for a nap.  He was unable to be aroused after his nap.  EMS was activated.  When they arrived he had axillary temperature of 106°.  ER physician spoke with the urologist who recommended including gentamicin in the abx regimen.  No general anesthesia was utilized nor did he have any medications that could contribute to possible malignant hypothermia.     In the emergency department routine laboratory studies were obtained as well as urinalysis.  CT abdomen pelvis were also performed.  He also received CT of the head and a lumbar puncture which was significant for markedly elevated opening pressure.  Urinalysis revealed evidence of urinary tract infection with a clinical picture of suspected bacteremia resulting in severe sepsis with infectious encephalopathy. Sepsis protocol initiated.  Lastly, rapid flu was also positive.  He was started on Tamiflu.    The patient improved quickly after admission. ID was consulted to assist with antibiotic regimen. Cardiology was consulted as troponin elevated and the patient has a history of CAD. Urology also following.

## 2019-03-01 NOTE — ED NOTES
Dr. Mcdowell at bedside, ice packs applied to patient's bilateral axillary, head, groin.  EMANUEL Perkins inserting iced normal saline to insert in mckeon catheter.  Cooling blanket in place.

## 2019-03-01 NOTE — HPI
77yo M recently diagnosed with prostate cancer. S/p gold seed fiducial markers on 2/26/19 by Dr. Huff for XRT. He was brought in by EMS for altered mental status  and fever of 106 (axillary). UA concerning for UTI.  Now admitted to ICU with severe sepsis with infectious encephalopathy. He also tested positive for Influenza. Patient noted to be oriented on exam    UCx--pending

## 2019-03-01 NOTE — SUBJECTIVE & OBJECTIVE
Past Medical History:   Diagnosis Date    Anxiety     Coronary artery disease     Depression     Diabetes mellitus type II     Erectile dysfunction     Eye injuries     fb ou    GERD (gastroesophageal reflux disease)     Hypertension        Past Surgical History:   Procedure Laterality Date    COLONOSCOPY N/A 2/9/2018    Performed by [facility]ieu Coa MD at [facility] ENDO    CORONARY ARTERY BYPASS GRAFT  2001    EGD (ESOPHAGOGASTRODUODENOSCOPY) Left 11/14/2012    Performed by [facility]arro [facility] at [facility] ENDO    ESOPHAGOGASTRODUODENOSCOPY (EGD) N/A 2/9/2018    Performed by [facility]ieu Cao MD at [facility] ENDO    HERNIA REPAIR      ULTRASOUND, PROSTATE, RECTAL APPROACH, WITH GOLD FIDUCIAL MARKER INSERTION N/A 2/26/2019    Performed by Layne Huff MD at [facility] OR    WRIST SURGERY         Review of patient's allergies indicates:   Allergen Reactions    Tamsulosin     Prochlorperazine      convulsions    Statins-hmg-coa reductase inhibitors Other (See Comments)     Muscle weakness       No current facility-administered medications on file prior to encounter.      Current Outpatient Medications on File Prior to Encounter   Medication Sig    ALPRAZolam (XANAX) 1 MG tablet Take 1 tablet (1 mg total) by mouth nightly as needed for Anxiety.    blood sugar diagnostic Strp To check BG 2 times daily, to use with insurance preferred meter    blood-glucose meter kit To check BG 2 times daily, to use with insurance preferred meter    HYDROcodone-acetaminophen (NORCO) 5-325 mg per tablet Take 1 tablet by mouth every 6 (six) hours as needed for Pain.    lancets Misc To check BG 2 times daily, to use with insurance preferred meter    losartan-hydrochlorothiazide 50-12.5 mg (HYZAAR) 50-12.5 mg per tablet Take 1 tablet by mouth once daily.    mirtazapine (REMERON) 30 MG tablet Take 1 tablet (30 mg total) by mouth every evening. (Patient taking differently: Take 30 mg by mouth nightly as needed. )    venlafaxine  (EFFEXOR-XR) 75 MG 24 hr capsule Take 1 capsule (75 mg total) by mouth once daily. (Patient taking differently: Take 75 mg by mouth daily as needed. )    vit C/vit E ac/lut/copper/zinc (PRESERVISION LUTEIN ORAL) Take 1 tablet by mouth once daily.    vitamin D 1000 units Tab Take 1,000 Units by mouth once daily.     Family History     Problem Relation (Age of Onset)    No Known Problems Mother, Father, Maternal Grandmother, Maternal Grandfather, Paternal Grandmother, Paternal Grandfather, Sister, Brother, Maternal Aunt, Maternal Uncle, Paternal Aunt, Paternal Uncle        Tobacco Use    Smoking status: Former Smoker     Packs/day: 2.00     Years: 23.00     Pack years: 46.00     Last attempt to quit: 1989     Years since quittin.1    Smokeless tobacco: Never Used   Substance and Sexual Activity    Alcohol use: Yes     Alcohol/week: 0.6 oz     Types: 1 Cans of beer per week     Comment: occassional    Drug use: No    Sexual activity: Yes     Partners: Female     Review of Systems   Constitution: Negative.   HENT: Negative.    Eyes: Negative.    Cardiovascular: Negative for chest pain, dyspnea on exertion, irregular heartbeat, leg swelling, near-syncope, orthopnea, palpitations, paroxysmal nocturnal dyspnea and syncope.   Respiratory: Negative for shortness of breath.    Skin: Negative.    Musculoskeletal: Negative.    Gastrointestinal: Negative for abdominal pain, constipation and diarrhea.   Genitourinary: Negative for dysuria.   Neurological: Negative for dizziness.   Psychiatric/Behavioral: Negative.      Objective:     Vital Signs (Most Recent):  Temp: 97.8 °F (36.6 °C) (19 0945)  Pulse: (!) 59 (19 1100)  Resp: 17 (19 1100)  BP: (!) 100/57 (19 1100)  SpO2: 99 % (19 1100) Vital Signs (24h Range):  Temp:  [95.3 °F (35.2 °C)-106.4 °F (41.3 °C)] 97.8 °F (36.6 °C)  Pulse:  [] 59  Resp:  [15-62] 17  SpO2:  [93 %-100 %] 99 %  BP: ()/(53-94) 100/57     Weight: 82.6  kg (182 lb)  Body mass index is 25.38 kg/m².    SpO2: 99 %  O2 Device (Oxygen Therapy): Non Rebreather Mask      Intake/Output Summary (Last 24 hours) at 3/1/2019 1125  Last data filed at 3/1/2019 0923  Gross per 24 hour   Intake 4788 ml   Output 2320 ml   Net 2468 ml       Lines/Drains/Airways     Drain                 NG/OG Tube 02/28/19 2230 Muse sump 18 Fr. Left nostril less than 1 day         Urethral Catheter 02/28/19 1747 Straight-tip 18 Fr. less than 1 day          Airway                 Airway - Non-Surgical 02/26/19 1241 Nasal Cannula 2 days          Peripheral Intravenous Line                 Peripheral IV - Single Lumen 02/28/19 1700 Left Antecubital less than 1 day         Peripheral IV - Single Lumen 02/28/19 1723 Right Forearm less than 1 day         Peripheral IV - Single Lumen 02/28/19 1905 Right Antecubital less than 1 day                Physical Exam   Constitutional: He is oriented to person, place, and time. He appears well-developed and well-nourished. No distress.   HENT:   Head: Normocephalic and atraumatic.   Eyes: Conjunctivae and EOM are normal. Pupils are equal, round, and reactive to light.   Neck: Normal range of motion. Neck supple. No thyromegaly present.   Cardiovascular: Normal rate, regular rhythm and normal heart sounds.   No murmur heard.  Pulmonary/Chest: Effort normal and breath sounds normal. No respiratory distress. He has no wheezes. He has no rales. He exhibits no tenderness.   Abdominal: Soft. Bowel sounds are normal.   Musculoskeletal: He exhibits no edema.   Neurological: He is alert and oriented to person, place, and time.   Skin: Skin is warm and dry.   Psychiatric: He has a normal mood and affect. His behavior is normal.       Significant Labs:   CMP   Recent Labs   Lab 02/28/19  1924 03/01/19  0419   * 133*   K 3.0* 4.6    106   CO2 20* 18*   * 186*   BUN 18 17   CREATININE 1.6* 1.3   CALCIUM 8.2* 8.2*   PROT 5.8* 5.8*   ALBUMIN 3.1* 2.9*    BILITOT 0.8 0.8   ALKPHOS 79 62   AST 66* 119*   ALT 56* 95*   ANIONGAP 9 9   ESTGFRAFRICA 48* >60   EGFRNONAA 41* 53*   , CBC   Recent Labs   Lab 02/28/19 1924 03/01/19 0419   WBC 6.04 9.23   HGB 13.3* 13.3*   HCT 40.3 41.2   PLT 57* 53*   , INR   Recent Labs   Lab 02/28/19 1924   INR 1.2   , Lipid Panel No results for input(s): CHOL, HDL, LDLCALC, TRIG, CHOLHDL in the last 48 hours. and Troponin   Recent Labs   Lab 02/28/19 1924 03/01/19 0419 03/01/19  1003   TROPONINI 0.915* 2.500* 1.673*       Significant Imaging: Echocardiogram:   Transthoracic echo (TTE) complete (Cupid Only):   Results for orders placed or performed during the hospital encounter of 02/28/19   Transthoracic echo (TTE) complete (Cupid Only)   Result Value Ref Range    BSA 2.03 m2    LA WIDTH 3.82 cm    AORTIC VALVE CUSP SEPERATION 1.44 cm    PV PEAK VELOCITY 0.75 cm/s    LVIDD 5.03 3.5 - 6.0 cm    IVS 0.69 0.6 - 1.1 cm    PW 1.03 0.6 - 1.1 cm    Ao root annulus 3.13 cm    LVIDS 3.67 2.1 - 4.0 cm    FS 27 28 - 44 %    LA volume 54.19 cm3    Sinus 3.02 cm    STJ 2.80 cm    Ascending aorta 2.73 cm    LV mass 150.60 g    LA size 3.54 cm    RVDD 3.68 cm    TAPSE 1.15 cm    RV S' 5.01 m/s    Left Ventricle Relative Wall Thickness 0.41 cm    AV mean gradient 5.65 mmHg    AV valve area 1.85 cm2    AV Velocity Ratio 0.42     AV index (prosthetic) 0.43     E/A ratio 0.91     E wave decelartion time 212.24 msec    IVRT 0.09 msec    LVOT diameter 2.35 cm    LVOT area 4.34 cm2    LVOT peak zackary [id number] m/s    LVOT peak VTI 15.18 cm    Ao peak zackary 1.80 m/s    Ao VTI 35.56 cm    LVOT stroke volume 65.81 cm3    AV peak gradient 12.96 mmHg    MV Peak E Zackary 0.73 m/s    TR Max Zackary 2.33 m/s    MV Peak A Zackary 0.80 m/s    LV Systolic Volume 57.01 mL    LV Systolic Volume Index 28.1 mL/m2    LV Diastolic Volume 119.82 mL    LV Diastolic Volume Index 59.14 mL/m2    LA Volume Index 26.7 mL/m2    LV Mass Index 74.3 g/m2    RA Major Axis 4.41 cm    Left Atrium  Minor Axis 4.75 cm    Left Atrium Major Axis 4.68 cm    Triscuspid Valve Regurgitation Peak Gradient 21.72 mmHg    RA Width 4.07 cm     · Normal left ventricular systolic function. The estimated ejection fraction is 55%    * all labs reviewed and echocardiogram personally interpreted

## 2019-03-01 NOTE — ASSESSMENT & PLAN NOTE
History of CABG 2001  On no medicines for secondary prevention  Add back initially beta-blocker  Add dual anti-platelet therapy  Add high-dose statin

## 2019-03-01 NOTE — EICU
Patient seen over video, chart reviewed, spoke with RN.     78-year-old man with history of coronary artery disease s/p CABG, depression, diabetes and prostate cancer with seeds placed into the prostate  presents with altered mental status.  Per EMS the patient had a prostate surgery 2 days ago.  He had a fever this afternoon and was given Motrin at 2:00 p.m. today and then took a nap.  Right before arrival wife was unable to arouse the patient. Patient had LP which was not frankly abnormal. Came out positive for both Flu A & B. Had fever upto 106 F, but afebrile now.     Lying in bed. As per RN, drowsy, responds to questions but not fully oriented.   Vitals stable, MAP 69     WBC 7.25   Platelets 232 --> 57   Hb 15 --> 13.3   K 3, Cr 1.6   Mg 1.5 , P < 1     trops 0.915   CSF W2, R225, P47, Glu88       CT head -- neg     CT A/P   1. No acute intra-abdominal abnormalities identified.  2. Cholelithiasis.  3. Small left renal hypodensity which measures slightly higher than simple fluid density. This could represent a small cyst or complex cyst. Future nonemergent ultrasound follow-up could be obtained to ensure cystic nature of this lesion.      CXR -- neg       PLAN :  - Acyclovir IV , Vanco, Zosyn, tamiflu , Cipro   - r/o C diff   - f/u CSF studies, cultures   - correct dyseletrolytemia   - DC suction , but keep NPO except meds for now   - check lipase, serial trops         6:40 AM   Blood culture growing GNR 2/4  Trops going up   Platelets low   Hypothermic   - will change Zosyn to Meropenem   - not sure if this is viral myocarditis from Influenza vs NSTEMI or sepsis induced troponemia. Given his decreasing heparin, will not start Heparin drip, antiplatelets; will await Cardiology consult   - TTE ordered

## 2019-03-01 NOTE — SUBJECTIVE & OBJECTIVE
Past Medical History:   Diagnosis Date    Anxiety     Coronary artery disease     Depression     Diabetes mellitus type II     Erectile dysfunction     Eye injuries     fb ou    GERD (gastroesophageal reflux disease)     Hypertension        Past Surgical History:   Procedure Laterality Date    COLONOSCOPY N/A 2018    [facility]    CORONARY ARTERY BYPASS GRAFT      EGD (ESOPHAGOGASTRODUODENOSCOPY) Left 2012    [facility] at [facility] ENDO    ESOPHAGOGASTRODUODENOSCOPY (EGD) N/A 2018    [facility] END[facility]    HERNIA REPAIR      ULTRASOUND, PROSTATE, RECTAL APPROACH, WITH GOLD FIDUCIAL MARKER INSERTION N/A 2019    [facility]formed by Layne Huff [facility] at [facility] OR    WRIST SURGERY         Review of patient's allergies indicates:   Allergen Reactions    Tamsulosin     Prochlorperazine      convulsions    Statins-hmg-coa reductase inhibitors Other (See Comments)     Muscle weakness       Family History     Problem Relation (Age of Onset)    No Known Problems Mother, Father, Maternal Grandmother, Maternal Grandfather, Paternal Grandmother, Paternal Grandfather, Sister, Brother, Maternal Aunt, Maternal Uncle, Paternal Aunt, Paternal Uncle          Tobacco Use    Smoking status: Former Smoker     Packs/day: 2.00     Years: 23.00     Pack years: 46.00     Last attempt to quit: 1989     Years since quittin.1    Smokeless tobacco: Never Used   Substance and Sexual Activity    Alcohol use: Yes     Alcohol/week: 0.6 oz     Types: 1 Cans of beer per week     Comment: occassional    Drug use: No    Sexual activity: Yes     Partners: Female       Review of Systems   Constitutional: Negative for chills, fatigue and fever.   HENT: Negative for ear pain.    Eyes: Negative for pain and discharge.   Respiratory: Negative for cough, chest tightness, shortness of breath and wheezing.    Cardiovascular:  Negative for chest pain, palpitations and leg swelling.   Gastrointestinal: Negative for abdominal distention, abdominal pain, blood in stool, diarrhea, nausea and vomiting.   Genitourinary: Negative for difficulty urinating, dysuria, flank pain, hematuria, testicular pain and urgency.   Musculoskeletal: Negative for arthralgias, joint swelling and neck pain.   Skin: Negative for pallor, rash and wound.   Neurological: Negative for dizziness, syncope, weakness and headaches.   Hematological: Negative for adenopathy.   Psychiatric/Behavioral: Negative for agitation, confusion and suicidal ideas.       Objective:     Temp:  [95.3 °F (35.2 °C)-106.4 °F (41.3 °C)] 95.3 °F (35.2 °C)  Pulse:  [] 67  Resp:  [15-62] 19  SpO2:  [93 %-100 %] 99 %  BP: ()/(53-94) 117/58     Body mass index is 25.43 kg/m².    Date 03/01/19 0700 - 03/02/19 0659   Shift 0490-0522 7018-5475 7437-2512 24 Hour Total   INTAKE   P.O. 40   40   I.V.(mL/kg) 50(0.6)   50(0.6)   IV Piggyback 300   300   Shift Total(mL/kg) 390(4.7)   390(4.7)   OUTPUT   Urine(mL/kg/hr) 1050   1050   Shift Total(mL/kg) 1050(12.7)   1050(12.7)   Weight (kg) 82.7 82.7 82.7 82.7          Drains     Drain                 NG/OG Tube 02/28/19 2230 [place] sump 18 Fr. Left nostril less than 1 day         Urethral Catheter 02/28/19 1747 Straight-tip 18 Fr. less than 1 day                Physical Exam   Constitutional: He is oriented to person, place, and time. He appears well-developed.   HENT:   Head: Normocephalic and atraumatic.   Nose: Nose normal.   Eyes: Conjunctivae are normal. Right eye exhibits no discharge. Left eye exhibits no discharge.   Neck: Normal range of motion. Neck supple. No tracheal deviation present. No thyromegaly present.   Cardiovascular: Normal rate and regular rhythm.    Pulmonary/Chest: Effort normal. No respiratory distress. He has no wheezes.   Abdominal: Soft. He exhibits no distension. There is no hepatosplenomegaly. There is no  tenderness. There is no CVA tenderness. No hernia.   Genitourinary:   Genitourinary Comments: Cantu draining yellow urine   Musculoskeletal: Normal range of motion. He exhibits no edema.   Neurological: He is alert and oriented to person, place, and time.   Skin: Skin is warm and dry. No rash noted. No erythema.     Psychiatric: He has a normal mood and affect. His behavior is normal. Judgment normal.       Significant Labs:    BMP:  Recent Labs   Lab 02/22/19  1455 02/28/19 1924 03/01/19  0419    135* 133*   K 4.5 3.0* 4.6    106 106   CO2 30* 20* 18*   BUN 18 18 17   CREATININE 1.4 1.6* 1.3   CALCIUM 10.0 8.2* 8.2*       CBC:  Recent Labs   Lab 02/22/19  1455 02/28/19 1924 03/01/19 0419   WBC 7.25 6.04 9.23   HGB 15.0 13.3* 13.3*   HCT 44.6 40.3 41.2    57* 53*       Blood Culture:   Recent Labs   Lab 02/28/19  1747 02/28/19  1755   LABBLOO Gram stain michelle bottle: Gram negative rods  Results called to and read back by: Saima Harrison 03/01/2019  06:30  Gram stain aer bottle: Gram negative rods   Positive results previously called Gram stain michelle bottle: Gram negative rods  Results called to and read back by: Saima Harrison 03/01/2019  06:30  Gram stain aer bottle: Gram negative rods   Positive results previously called     CMP:   Recent Labs   Lab 02/22/19  1455 02/28/19 1924 03/01/19  0419   * 197* 186*    135* 133*   K 4.5 3.0* 4.6    106 106   CO2 30* 20* 18*   BUN 18 18 17   CREATININE 1.4 1.6* 1.3   CALCIUM 10.0 8.2* 8.2*   MG  --  1.5* 2.6     Urine Culture: No results for input(s): LABURIN in the last 168 hours.  Urine Studies:   Recent Labs   Lab 02/28/19  1759   COLORU Red*   APPEARANCEUA Cloudy*   PHUR 7.0   SPECGRAV 1.015   PROTEINUA 2+*   GLUCUA 1+*   KETONESU Negative   BILIRUBINUA Negative   OCCULTUA 3+*   NITRITE Negative   UROBILINOGEN Negative   LEUKOCYTESUR 2+*   RBCUA >100*   WBCUA 25*   BACTERIA Few*   SQUAMEPITHEL 3   HYALINECASTS 0       Significant  Imaging:  All pertinent imaging results/findings from the past 24 hours have been reviewed.

## 2019-03-01 NOTE — HPI
Eric Jackson is a 79 yo man with CAD, diabetes, anxiety/depression, GERD, HTN, and ED s/p transrectal ultrasound of prostate with insertion of gold fiducial marker on 2/26/2019 who presented with altered mental status.  Patient had prostate procedure on Tuesday.  On Wednesday he started to feel ill and by this afternoon he developed severe altered mental status and became essentially unresponsive.  The wife reports that he was given Motrin at around 2:00 p.m. and laid down for a nap.  He was unable to be aroused after his nap.  EMS was activated.  When they arrived he had axillary temperature of 106°.  ER physician spoke with the urologist who recommended including gentamicin in the abx regimen.  No general anesthesia was utilized nor did he have any medications that could contribute to possible malignant hypothermia.     In the emergency department routine laboratory studies were obtained as well as urinalysis.  CT abdomen pelvis were also performed.  He also received CT of the head and a lumbar puncture which was significant for markedly elevated opening pressure.  Urinalysis revealed Evidence of urinary tract infection with a clinical picture of suspected bacteremia resulting in severe sepsis with infectious encephalopathy. Sepsis protocol initiated.  Lastly, rapid flu was also positive.  He was started on Tamiflu.

## 2019-03-01 NOTE — CONSULTS
[facility]  Infectious Disease  Consult Note    Patient Name: Eric Jackson  MRN: [id number]  Admission Date: 2/28/2019  Hospital Length of Stay: 1 days  Attending Physician: Milagros Carmen MD  Primary Care Provider: Samir Boo MD     Isolation Status: Special Contact and Droplet    Patient information was obtained from patient, spouse/SO, past medical records and ER records.      Inpatient consult to Infectious Diseases  Consult performed by: Bharath Duncan MD  Consult ordered by: Milagros Carmen MD        Assessment/Plan:     * Sepsis secondary to UTI    - GNB in blood x 2  - de-escalated to meropenem pending culture results (okay to de-escalate further once blood culture is finalized)  - follow urine and repeat blood culture to determine po abx options, if any  - agree with treating for acute prostatitis  - influenza (?); on Tamiflu based on screen     Thank you for your consult. I will follow-up with patient. Please contact us if you have any additional questions.    Bharath Duncan MD  Infectious Disease  [facility]    Subjective:     Principal Problem: Sepsis secondary to UTI    HPI: Eric Jackson is a 78 y.o. male that (in part)  has a past medical history of Anxiety, Coronary artery disease, Depression, Diabetes mellitus type II, Erectile dysfunction, Eye injuries, GERD (gastroesophageal reflux disease), and Hypertension.  has a past surgical history that includes Hernia repair; Wrist surgery; Coronary artery bypass graft (2001); Colonoscopy (N/A, 2/9/2018); and Transrectal ultrasound of prostate with insertion of gold fiducial marker (N/A, 2/26/2019). Presents to [facility] Emergency Department Presented with altered mental status.  Patient had prostate procedure on Tuesday.  On Wednesday he started to feel ill and by this afternoon he developed severe altered mental status and became essentially unresponsive.  The  wife reports that he was given Motrin at around 2:00 p.m. and laid down for a nap.  He was unable to be aroused after his nap.  EMS was activated.  When they arrived he had axillary temperature of 106°.  Urology notes indicate the patient had prostate surgery this evening procedure.  ER physician spoke with the surgeon reports given the patient perioperative antibiotics including gentamicin.  No general anesthesia was utilized nor did he have any medications that could contribute to possible malignant hypothermia.     In the emergency department routine laboratory studies were obtained as well as urinalysis.  CT abdomen pelvis were also performed.  He also received CT of the head and a lumbar puncture which was significant for markedly elevated opening pressure.  Urinalysis revealed Evidence of urinary tract infection with a clinical picture of suspected bacteremia resulting in severe sepsis with infectious encephalopathy.  Sepsis protocol initiated.  Lastly, rapid flu was also positive.  He was started on Tamiflu, meropenem, ACV, and vancomycin. I am consulted for ID recs (I stopped the vancomycin and ACV based on his Gram negative sepsis).     The patient is lucid but his mental status waxes and wanes. He is feeling better.         Past Medical History:   Diagnosis Date    Anxiety     Coronary artery disease     Depression     Diabetes mellitus type II     Erectile dysfunction     Eye injuries     fb ou    GERD (gastroesophageal reflux disease)     Hypertension        Past Surgical History:   Procedure Laterality Date    COLONOSCOPY N/A 2/9/2018    [facility] ENDO    CORONARY ARTERY BYPASS GRAFT  2001    EGD (ESOPHAGOGASTRODUODENOSCOPY) Left 11/14/2012    [facility] ENDO    ESOPHAGOGASTRODUODENOSCOPY (EGD) N/A 2/9/2018    [facility] ENDO    HERNIA REPAIR      ULTRASOUND, PROSTATE, RECTAL APPROACH, WITH GOLD  FIDUCIAL MARKER INSERTION N/A 2/26/2019    [facility]    WRIST SURGERY         Review of patient's allergies indicates:   Allergen Reactions    Tamsulosin     Prochlorperazine      convulsions    Statins-hmg-coa reductase inhibitors Other (See Comments)     Muscle weakness       Medications:  Medications Prior to Admission   Medication Sig    ALPRAZolam (XANAX) 1 MG tablet Take 1 tablet (1 mg total) by mouth nightly as needed for Anxiety.    blood sugar diagnostic Strp To check BG 2 times daily, to use with insurance preferred meter    blood-glucose meter kit To check BG 2 times daily, to use with insurance preferred meter    HYDROcodone-acetaminophen (NORCO) 5-325 mg per tablet Take 1 tablet by mouth every 6 (six) hours as needed for Pain.    lancets Misc To check BG 2 times daily, to use with insurance preferred meter    losartan-hydrochlorothiazide 50-12.5 mg (HYZAAR) 50-12.5 mg per tablet Take 1 tablet by mouth once daily.    mirtazapine (REMERON) 30 MG tablet Take 1 tablet (30 mg total) by mouth every evening. (Patient taking differently: Take 30 mg by mouth nightly as needed. )    venlafaxine (EFFEXOR-XR) 75 MG 24 hr capsule Take 1 capsule (75 mg total) by mouth once daily. (Patient taking differently: Take 75 mg by mouth daily as needed. )    vit C/vit E ac/lut/copper/zinc (PRESERVISION LUTEIN ORAL) Take 1 tablet by mouth once daily.    vitamin D 1000 units Tab Take 1,000 Units by mouth once daily.     Antibiotics (From admission, onward)    Start     Stop Route Frequency Ordered    03/01/19 0745  meropenem-0.9% sodium chloride 1 g/50 mL IVPB      -- IV Every 8 hours (non-standard times) 03/01/19 0639        Antifungals (From admission, onward)    None        Antivirals (From admission, onward)        Stop Route Frequency     oseltamivir      03/05 2059 Oral 2 times daily           Immunization History   Administered Date(s) Administered    Hepatitis A,  Adult 09/15/2005    Hepatitis B, Adult 09/15/2005, 10/19/2005, 2006    Pneumococcal Conjugate - 13 Valent 2018    Td (ADULT) 09/15/2005, 2016       Family History     Problem Relation (Age of Onset)    No Known Problems Mother, Father, Maternal Grandmother, Maternal Grandfather, Paternal Grandmother, Paternal Grandfather, Sister, Brother, Maternal Aunt, Maternal Uncle, Paternal Aunt, Paternal Uncle        Social History     Socioeconomic History    Marital status:      Spouse name: None    Number of children: None    Years of education: None    Highest education level: None   Social Needs    Financial resource strain: None    Food insecurity - worry: None    Food insecurity - inability: None    Transportation needs - medical: None    Transportation needs - non-medical: None   Occupational History    None   Tobacco Use    Smoking status: Former Smoker     Packs/day: 2.00     Years: 23.00     Pack years: 46.00     Last attempt to quit: 1989     Years since quittin.1    Smokeless tobacco: Never Used   Substance and Sexual Activity    Alcohol use: Yes     Alcohol/week: 0.6 oz     Types: 1 Cans of beer per week     Comment: occassional    Drug use: No    Sexual activity: Yes     Partners: Female   Other Topics Concern    None   Social History Narrative    None     Review of Systems   Unable to perform ROS: Mental status change   Constitutional: Positive for appetite change.     Objective:     Vital Signs (Most Recent):  Temp: 97.8 °F (36.6 °C) (19 0945)  Pulse: (!) 59 (19 1100)  Resp: 17 (19 1100)  BP: (!) 100/57 (19 1100)  SpO2: 99 % (19 1100) Vital Signs (24h Range):  Temp:  [95.3 °F (35.2 °C)-106.4 °F (41.3 °C)] 97.8 °F (36.6 °C)  Pulse:  [] 59  Resp:  [15-62] 17  SpO2:  [93 %-100 %] 99 %  BP: ()/(53-94) 100/57     Weight: 82.6 kg (182 lb)  Body mass index is 25.38 kg/m².    Estimated Creatinine Clearance: 51.5 mL/min  (based on SCr of 1.3 mg/dL).    Physical Exam   Constitutional: He appears well-developed and well-nourished. No distress.   HENT:   Head: Normocephalic and atraumatic.   Right Ear: External ear normal.   Left Ear: External ear normal.   Eyes: Conjunctivae and EOM are normal. Pupils are equal, round, and reactive to light.   Pulmonary/Chest: Effort normal and breath sounds normal. No respiratory distress.   Abdominal: Soft. Bowel sounds are normal. He exhibits no distension. There is no tenderness.   Musculoskeletal: Normal range of motion. He exhibits no tenderness or deformity.   Neurological: He is alert.   Skin: Skin is warm. No rash noted. He is not diaphoretic. No erythema.   Nursing note and vitals reviewed.      Significant Labs:   Blood Culture:   Recent Labs   Lab 02/28/19  1747 02/28/19  1755   LABBLOO Gram stain michelle bottle: Gram negative rods  Results called to and read back by: Saima Harriosn 03/01/2019  06:30  Gram stain aer bottle: Gram negative rods   Positive results previously called Gram stain michelle bottle: Gram negative rods  Results called to and read back by: Saima Harrison 03/01/2019  06:30  Gram stain aer bottle: Gram negative rods   Positive results previously called     CBC:   Recent Labs   Lab 02/28/19 1924 03/01/19  0419   WBC 6.04 9.23   HGB 13.3* 13.3*   HCT 40.3 41.2   PLT 57* 53*     CMP:   Recent Labs   Lab 02/28/19 1924 03/01/19  0419   * 133*   K 3.0* 4.6    106   CO2 20* 18*   * 186*   BUN 18 17   CREATININE 1.6* 1.3   CALCIUM 8.2* 8.2*   PROT 5.8* 5.8*   ALBUMIN 3.1* 2.9*   BILITOT 0.8 0.8   ALKPHOS 79 62   AST 66* 119*   ALT 56* 95*   ANIONGAP 9 9   EGFRNONAA 41* 53*     Prealbumin: No results for input(s): PREALBUMIN in the last 48 hours.  Respiratory Culture: No results for input(s): GSRESP, RESPIRATORYC in the last 4320 hours.  Urine Culture: No results for input(s): LABURIN in the last 4320 hours.  All pertinent labs within the past 24 hours have been  reviewed.    Significant Imaging: I have reviewed all pertinent imaging results/findings within the past 24 hours.

## 2019-03-01 NOTE — PLAN OF CARE
Problem: Adult Inpatient Plan of Care  Goal: Plan of Care Review  Outcome: Ongoing (interventions implemented as appropriate)  Patient alert, more awake since mid morning.  Denies pain all shift.  Afebrile.  No stools this shift.  Cantu remains patent.  Droplet/Contact isolation continues

## 2019-03-01 NOTE — ASSESSMENT & PLAN NOTE
-Suspect prostate infection after recent  fiducial marker placment  -Follow cultures  -Treat x 4-6 weeks

## 2019-03-01 NOTE — ED NOTES
Assumed care of pt from Briana RN. Pt awake, unable to assess LOC. MD at bedside for LP. VSS, pt on non rebreather, PIV infusing vancomycin, PIV2 infusing Cipro. Side rails up x 1. Will continue to monitor pt.

## 2019-03-01 NOTE — ASSESSMENT & PLAN NOTE
- GNB in blood x 2  - de-escalated to meropenem pending culture results (and then again once cultures are finalized)  - follow urine and repeat blood culture  - agree with treating for acute prostatitis based on final culture results  - influenza (?); on Tamiflu based on screen

## 2019-03-01 NOTE — ANESTHESIA POSTPROCEDURE EVALUATION
"Anesthesia Post Evaluation    Patient: Eric Jackson    Procedure(s) Performed: Procedure(s) (LRB):  ULTRASOUND, PROSTATE, RECTAL APPROACH, WITH GOLD FIDUCIAL MARKER INSERTION (N/A)        Anesthetic complications: yes  Perioperative Events: other periop events (see comments)  Anne-operative Events Comments: Called by er 18:00 hour 2/28/2019  Patient in er with high fever..called to ask if patient was exposed to general anesthesia...  Informed er doc that patient received a mac with fentanyl and prop..called crna to verify (Sticker)..verified that patient did get mac and never had mask near face...          Visit Vitals  BP (!) 156/75   Pulse (!) 59   Temp 36.8 °C (98.3 °F) (Oral)   Resp 18   Ht 5' 7" (1.702 m)   Wt 80.6 kg (177 lb 11 oz)   SpO2 97%   BMI 27.83 kg/m²       Pain/Edith Score: Pain Rating Prior to Med Admin: 10 (2/28/2019  5:37 PM)        "

## 2019-03-01 NOTE — PLAN OF CARE
03/01/19 1248   Discharge Assessment   Assessment Type Discharge Planning Assessment   Assessment information obtained from? Patient   Prior to hospitilization cognitive status: Alert/Oriented   Prior to hospitalization functional status: Independent   Current cognitive status: Alert/Oriented   Current Functional Status: Independent   Facility Arrived From: home   Lives With spouse   Able to Return to Prior Arrangements yes   Is patient able to care for self after discharge? Unable to determine at this time (comments)   Who are your caregiver(s) and their phone number(s)? braydon [phone]   Patient's perception of discharge disposition home or selfcare   Readmission Within the Last 30 Days no previous admission in last 30 days   Patient currently being followed by outpatient case management? No   Patient currently receives any other outside agency services? No   Equipment Currently Used at Home glucometer   Do you have any problems affording any of your prescribed medications? No   Is the patient taking medications as prescribed? yes   Does the patient have transportation home? Yes   Transportation Anticipated family or friend will provide   Dialysis Name and Scheduled days N/A   Does the patient receive services at the Coumadin Clinic? No   Discharge Plan A Home with family;Home Health   Discharge Plan B Home with family   DME Needed Upon Discharge  none   Patient/Family in Agreement with Plan yes         CHELSEA met with pt and pt's family at bedside. SW explained her role in Care Management. Pt voiced understanding. SW inquired about HELP AT HOME. Pt stated that he will have Braydon at home to help for support. SW voiced understanding. SW inquired about responsibilities when it comes to  MANAGING HER/HIS HEALTH at home and what it entails. Pt inquired about details. SW informed pt of RESPONSIBILITIES of:    1. Follow up appointments  2. Getting Prescriptions filled  3. Taking medications as prescribed.     Pt  "voiced understanding.  SW explained "My Health Packet" blue folder and the pink and green tabs that are on the folder as well. Discharge Brochure given to pt with Care Team information. Pt voiced understanding.     Pt's pharmacy:   [id number] [place] [facility] [address]  [address]  [place] [address]  Phone: [phone] Fax: [phone]    [facility] - [facility] [address]  [address] [facility] [address]  Phone: [phone] Fax: [phone]      Pt's preference for appointments:morning       "

## 2019-03-01 NOTE — CONSULTS
[facility] [place]  Cardiology  Consult Note    Patient Name: Eric Jackson  MRN: [id number]  Admission Date: 2/28/2019  Hospital Length of Stay: 1 days  Code Status: Full Code   Attending Provider: Milagros Carmen MD   Consulting Provider: eRji Seay MD  Primary Care Physician: Samir Boo MD  Principal Problem:Non-STEMI (non-ST elevated myocardial infarction)    Patient information was obtained from patient, spouse/SO, past medical records and ER records.     Inpatient consult to Cardiology  Consult performed by: Reji Seay MD  Consult ordered by: Jamir Cook MD  Reason for consult: Non-STEMI        Subjective:     Chief Complaint:  Altered mental status     HPI:   78-year-old man with history of coronary artery disease, depression, diabetes and prostate cancer presents with altered mental status.  Per EMS the patient had a prostate surgery 2 days ago.  He had a fever this afternoon and was given Motrin at 2:00 p.m. today and then took a nap.  Right before arrival wife was unable to arouse the patient.  No other history available from the patient or EMS at this time.  Per chart review the patient had seeds placed into the prostate.  I spoke with anesthesia who states the patient did not received general anesthesia, nor did he receive any inciting medications known to be related to malignant hyperthermia.  Although the anesthesiologist states if they used the mask the patient may have gotten residual gas from bleed out in the line.    [facility] but has not seen them regularly.  He says that he was admitted over here a couple years ago (* 2014 on review of record) but transferred to [place] for cardiac catheterization.  He was told that everything looked fine at that time.  He has not been on adequate medicines for secondary prevention does not recall when they were stopped.  His mental status changes are improved today he is alert and  oriented x3.  He denies any chest pain leading up to the hospital presentation.  His wife who is at bedside denies any complaints from a cardiopulmonary standpoint.  He currently denies any chest pain, shortness of breath or palpitations.  He has experienced no PND, orthopnea or lower extremity edema.  He denies any dizziness to the point of presyncope or syncope.    Past Medical History:   Diagnosis Date    Anxiety     Coronary artery disease     Depression     Diabetes mellitus type II     Erectile dysfunction     Eye injuries     fb ou    GERD (gastroesophageal reflux disease)     Hypertension        Past Surgical History:   Procedure Laterality Date    COLONOSCOPY N/A 2/9/2018    Performed by [facility] at [facility] ENDO    CORONARY ARTERY BYPASS GRAFT  2001    EGD (ESOPHAGOGASTRODUODENOSCOPY) Left 11/14/2012    Performed by Carlos Navarro [facility] at [facility] END[facility]    ESOPHAGOGASTRODUODENOSCOPY (EGD) N/A 2/9/2018    Performed by [facility] at [facility] END[facility]    HERNIA REPAIR      ULTRASOUND, PROSTATE, RECTAL APPROACH, WITH GOLD FIDUCIAL MARKER INSERTION N/A 2/26/2019    Performed by Layne Huff [facility] at [facility] OR    WRIST SURGERY         Review of patient's allergies indicates:   Allergen Reactions    Tamsulosin     Prochlorperazine      convulsions    Statins-hmg-coa reductase inhibitors Other (See Comments)     Muscle weakness       No current facility-administered medications on file prior to encounter.      Current Outpatient Medications on File Prior to Encounter   Medication Sig    ALPRAZolam (XANAX) 1 MG tablet Take 1 tablet (1 mg total) by mouth nightly as needed for Anxiety.    blood sugar diagnostic Strp To check BG 2 times daily, to use with insurance preferred meter    blood-glucose meter kit To check BG 2 times daily, to use with insurance preferred meter    HYDROcodone-acetaminophen (NORCO) 5-325 mg per tablet Take 1 tablet by mouth every 6 (six) hours as needed for  Pain.    lancets Misc To check BG 2 times daily, to use with insurance preferred meter    losartan-hydrochlorothiazide 50-12.5 mg (HYZAAR) 50-12.5 mg per tablet Take 1 tablet by mouth once daily.    mirtazapine (REMERON) 30 MG tablet Take 1 tablet (30 mg total) by mouth every evening. (Patient taking differently: Take 30 mg by mouth nightly as needed. )    venlafaxine (EFFEXOR-XR) 75 MG 24 hr capsule Take 1 capsule (75 mg total) by mouth once daily. (Patient taking differently: Take 75 mg by mouth daily as needed. )    vit C/vit E ac/lut/copper/zinc (PRESERVISION LUTEIN ORAL) Take 1 tablet by mouth once daily.    vitamin D 1000 units Tab Take 1,000 Units by mouth once daily.     Family History     Problem Relation (Age of Onset)    No Known Problems Mother, Father, Maternal Grandmother, Maternal Grandfather, Paternal Grandmother, Paternal Grandfather, Sister, Brother, Maternal Aunt, Maternal Uncle, Paternal Aunt, Paternal Uncle        Tobacco Use    Smoking status: Former Smoker     Packs/day: 2.00     Years: 23.00     Pack years: 46.00     Last attempt to quit: 1989     Years since quittin.1    Smokeless tobacco: Never Used   Substance and Sexual Activity    Alcohol use: Yes     Alcohol/week: 0.6 oz     Types: 1 Cans of beer per week     Comment: occassional    Drug use: No    Sexual activity: Yes     Partners: Female     Review of Systems   Constitution: Negative.   HENT: Negative.    Eyes: Negative.    Cardiovascular: Negative for chest pain, dyspnea on exertion, irregular heartbeat, leg swelling, near-syncope, orthopnea, palpitations, paroxysmal nocturnal dyspnea and syncope.   Respiratory: Negative for shortness of breath.    Skin: Negative.    Musculoskeletal: Negative.    Gastrointestinal: Negative for abdominal pain, constipation and diarrhea.   Genitourinary: Negative for dysuria.   Neurological: Negative for dizziness.   Psychiatric/Behavioral: Negative.      Objective:     Vital Signs  (Most Recent):  Temp: 97.8 °F (36.6 °C) (03/01/19 0945)  Pulse: (!) 59 (03/01/19 1100)  Resp: 17 (03/01/19 1100)  BP: (!) 100/57 (03/01/19 1100)  SpO2: 99 % (03/01/19 1100) Vital Signs (24h Range):  Temp:  [95.3 °F (35.2 °C)-106.4 °F (41.3 °C)] 97.8 °F (36.6 °C)  Pulse:  [] 59  Resp:  [15-62] 17  SpO2:  [93 %-100 %] 99 %  BP: ()/(53-94) 100/57     Weight: 82.6 kg (182 lb)  Body mass index is 25.38 kg/m².    SpO2: 99 %  O2 Device (Oxygen Therapy): Non Rebreather Mask      Intake/Output Summary (Last 24 hours) at 3/1/2019 1125  Last data filed at 3/1/2019 0923  Gross per 24 hour   Intake 4788 ml   Output 2320 ml   Net 2468 ml       Lines/Drains/Airways     Drain                 NG/OG Tube 02/28/19 2230 [place] sump 18 Fr. Left nostril less than 1 day         Urethral Catheter 02/28/19 1747 Straight-tip 18 Fr. less than 1 day          Airway                 Airway - Non-Surgical 02/26/19 1241 Nasal Cannula 2 days          Peripheral Intravenous Line                 Peripheral IV - Single Lumen 02/28/19 1700 Left Antecubital less than 1 day         Peripheral IV - Single Lumen 02/28/19 1723 Right Forearm less than 1 day         Peripheral IV - Single Lumen 02/28/19 1905 Right Antecubital less than 1 day                Physical Exam   Constitutional: He is oriented to person, place, and time. He appears well-developed and well-nourished. No distress.   HENT:   Head: Normocephalic and atraumatic.   Eyes: Conjunctivae and EOM are normal. Pupils are equal, round, and reactive to light.   Neck: Normal range of motion. Neck supple. No thyromegaly present.   Cardiovascular: Normal rate, regular rhythm and normal heart sounds.   No murmur heard.  Pulmonary/Chest: Effort normal and breath sounds normal. No respiratory distress. He has no wheezes. He has no rales. He exhibits no tenderness.   Abdominal: Soft. Bowel sounds are normal.   Musculoskeletal: He exhibits no edema.   Neurological: He is alert and oriented to  person, place, and time.   Skin: Skin is warm and dry.   Psychiatric: He has a normal mood and affect. His behavior is normal.       Significant Labs:   CMP   Recent Labs   Lab 02/28/19 1924 03/01/19 0419   * 133*   K 3.0* 4.6    106   CO2 20* 18*   * 186*   BUN 18 17   CREATININE 1.6* 1.3   CALCIUM 8.2* 8.2*   PROT 5.8* 5.8*   ALBUMIN 3.1* 2.9*   BILITOT 0.8 0.8   ALKPHOS 79 62   AST 66* 119*   ALT 56* 95*   ANIONGAP 9 9   ESTGFRAFRICA 48* >60   EGFRNONAA 41* 53*   , CBC   Recent Labs   Lab 02/28/19 1924 03/01/19 0419   WBC 6.04 9.23   HGB 13.3* 13.3*   HCT 40.3 41.2   PLT 57* 53*   , INR   Recent Labs   Lab 02/28/19 1924   INR 1.2   , Lipid Panel No results for input(s): CHOL, HDL, LDLCALC, TRIG, CHOLHDL in the last 48 hours. and Troponin   Recent Labs   Lab 02/28/19 1924 03/01/19 0419 03/01/19  1003   TROPONINI 0.915* 2.500* 1.673*       Significant Imaging: Echocardiogram:   Transthoracic echo (TTE) complete (Cupid Only):   Results for orders placed or performed during the hospital encounter of 02/28/19   Transthoracic echo (TTE) complete (Cupid Only)   Result Value Ref Range    BSA 2.03 m2    LA WIDTH 3.82 cm    AORTIC VALVE CUSP SEPERATION 1.44 cm    PV PEAK VELOCITY 0.75 cm/s    LVIDD 5.03 3.5 - 6.0 cm    IVS 0.69 0.6 - 1.1 cm    PW 1.03 0.6 - 1.1 cm    Ao root annulus 3.13 cm    LVIDS 3.67 2.1 - 4.0 cm    FS 27 28 - 44 %    LA volume 54.19 cm3    Sinus 3.02 cm    STJ 2.80 cm    Ascending aorta 2.73 cm    LV mass 150.60 g    LA size 3.54 cm    RVDD 3.68 cm    TAPSE 1.15 cm    RV S' 5.01 m/s    Left Ventricle Relative Wall Thickness 0.41 cm    AV mean gradient 5.65 mmHg    AV valve area 1.85 cm2    AV Velocity Ratio 0.42     AV index (prosthetic) 0.43     E/A ratio 0.91     E wave decelartion time 212.24 msec    IVRT 0.09 msec    LVOT diameter 2.35 cm    LVOT area 4.34 cm2    LVOT peak gurpreet [id number] m/s    LVOT peak VTI 15.18 cm    Ao peak gurpreet 1.80 m/s    Ao VTI 35.56 cm    LVOT  stroke volume 65.81 cm3    AV peak gradient 12.96 mmHg    MV Peak E Zackary 0.73 m/s    TR Max Zackary 2.33 m/s    MV Peak A Zackary 0.80 m/s    LV Systolic Volume 57.01 mL    LV Systolic Volume Index 28.1 mL/m2    LV Diastolic Volume 119.82 mL    LV Diastolic Volume Index 59.14 mL/m2    LA Volume Index 26.7 mL/m2    LV Mass Index 74.3 g/m2    RA Major Axis 4.41 cm    Left Atrium Minor Axis 4.75 cm    Left Atrium Major Axis 4.68 cm    Triscuspid Valve Regurgitation Peak Gradient 21.72 mmHg    RA Width 4.07 cm     · Normal left ventricular systolic function. The estimated ejection fraction is 55%    * all labs reviewed and echocardiogram personally interpreted    Assessment and Plan:     * Non-STEMI (non-ST elevated myocardial infarction)    Elevated troponin consistent with type 2 MI versus small plaque rupture  EF preserved  Optimize medicines for secondary prevention as below  Request catheterization report from 2014  Discussed options and will likely plan for catheterization with graft restudy on Monday if stable     Coronary artery disease    History of CABG 2001  On no medicines for secondary prevention  Add back initially beta-blocker  Add dual anti-platelet therapy  Add high-dose statin     Sepsis secondary to UTI    On antibiotics per primary     Essential hypertension    Currently stable  Previously on Hyzaar  BP is currently marginal and unable to tolerate     Diabetes mellitus type II, controlled    Management per IM     Infectious encephalopathy    Improving and almost at baseline per wife at bedside         VTE Risk Mitigation (From admission, onward)        Ordered     Place LINA hose  Until discontinued      03/01/19 0531     IP VTE HIGH RISK PATIENT  Once      02/28/19 2202        * total ICU time spent on case 40 min    Thank you for your consult. I will follow-up with patient. Please contact us if you have any additional questions.    Reji Seay MD  Cardiology   [facility]

## 2019-03-01 NOTE — PLAN OF CARE
Problem: Adult Inpatient Plan of Care  Goal: Plan of Care Review  Outcome: Ongoing (interventions implemented as appropriate)  Pt admitted to the ICU for septic.  Pt is drowsy, but oriented x4.  NG placed in Lt nare per MD order.  1/2 NS at 50ml/hr.  Pt was sinus rhythm to sinus valentine.No falls/injuries this shift.Pt denies any pain.

## 2019-03-01 NOTE — ED PROVIDER NOTES
Encounter Date: 2/28/2019       History     Chief Complaint   Patient presents with    Altered Mental Status     Per EMS, wife called for patient unresponsive.  Had prostate surgery on Tuesday.  Wife states was A&O at 2 pm.  She gave 3 ibuprofen.  Later found pt altered and not responding to her.  EMS reports 90% on RA and a 106 axillary temp.  PT arrives on a NRB and doesn't respond to stimulus other than grunting.      78-year-old man with history of coronary artery disease, depression, diabetes and prostate cancer presents with altered mental status.  Per EMS the patient had a prostate surgery 2 days ago.  He had a fever this afternoon and was given Motrin at 2:00 p.m. today and then took a nap.  Right before arrival wife was unable to arouse the patient.  No other history available from the patient or EMS at this time.  Per chart review the patient had seeds placed into the prostate.  I spoke with anesthesia who states the patient did not received general anesthesia, nor did he receive any inciting medications known to be related to malignant hyperthermia.  Although the anesthesiologist states if they used the mask the patient may have gotten residual gas from bleed out in the line.          Review of patient's allergies indicates:   Allergen Reactions    Tamsulosin     Prochlorperazine      convulsions    Statins-hmg-coa reductase inhibitors Other (See Comments)     Muscle weakness     Past Medical History:   Diagnosis Date    Anxiety     Coronary artery disease     Depression     Diabetes mellitus type II     Erectile dysfunction     Eye injuries     fb ou    GERD (gastroesophageal reflux disease)     Hypertension      Past Surgical History:   Procedure Laterality Date    COLONOSCOPY N/A 2/9/2018    Performed by [facility]ieu [facility]o [facility] at [facility] ENDO    CORONARY ARTERY BYPASS GRAFT  2001    EGD (ESOPHAGOGASTRODUODENOSCOPY) Left 11/14/2012    Performed by Carlos Navarro MD at [facility] ENDO     ESOPHAGOGASTRODUODENOSCOPY (EGD) N/A 2018    Performed by [facility]ieu [facility]o [facility] at [facility] ENDO    HERNIA REPAIR      ULTRASOUND, PROSTATE, RECTAL APPROACH, WITH GOLD FIDUCIAL MARKER INSERTION N/A 2019    Performed by Layne Huff MD at [facility] OR    WRIST SURGERY       Family History   Adopted: Yes   Problem Relation Age of Onset    No Known Problems Mother     No Known Problems Father     No Known Problems Maternal Grandmother     No Known Problems Maternal Grandfather     No Known Problems Paternal Grandmother     No Known Problems Paternal Grandfather     No Known Problems Sister     No Known Problems Brother     No Known Problems Maternal Aunt     No Known Problems Maternal Uncle     No Known Problems Paternal Aunt     No Known Problems Paternal Uncle     Amblyopia Neg Hx     Blindness Neg Hx     Cancer Neg Hx     Cataracts Neg Hx     Diabetes Neg Hx     Glaucoma Neg Hx     Hypertension Neg Hx     Macular degeneration Neg Hx     Retinal detachment Neg Hx     Strabismus Neg Hx     Stroke Neg Hx     Thyroid disease Neg Hx      Social History     Tobacco Use    Smoking status: Former Smoker     Packs/day: 2.00     Years: 23.00     Pack years: 46.00     Last attempt to quit: 1989     Years since quittin.1    Smokeless tobacco: Never Used   Substance Use Topics    Alcohol use: Yes     Alcohol/week: 0.6 oz     Types: 1 Cans of beer per week     Comment: occassional    Drug use: No     Review of Systems   Unable to perform ROS: Mental status change   Constitutional: Positive for fever.   Psychiatric/Behavioral: Positive for confusion.       Physical Exam     Initial Vitals [19 1726]   BP Pulse Resp Temp SpO2   (!) 110/55 (!) 155 (!) 30 (!) 106.4 °F (41.3 °C) 98 %      MAP       --         Physical Exam    Nursing note and vitals reviewed.  Constitutional: He appears well-developed and well-nourished. He appears distressed.   HENT:   Head: Atraumatic.   Eyes: EOM  are normal. Pupils are equal, round, and reactive to light.   Neck: No JVD present.   Some muscle rigidity to the neck   Cardiovascular:   Tachycardic heart rate of 150   Pulmonary/Chest:   Respiratory rate of 45 on arrival   Abdominal: Soft. Bowel sounds are normal.   Musculoskeletal: Normal range of motion.   No muscle rigidity to the arms but some muscle rigidity to the lower extremities   Neurological:   Patient makes incomprehensible sounds but not related to stimulation.  No spontaneous movement.  No withdrawal from pain.  Eyes are closed.  Patient does have a gag reflex.   Skin: Skin is warm and dry.   Psychiatric: He has a normal mood and affect. Thought content normal.         ED Course   Lumbar Puncture  Date/Time: 2/28/2019 7:12 PM  Location procedure was performed: [facility] EMERGENCY DEPARTMENT  Performed by: Bo Mcdowell MD  Authorized by: Bo Mcdowell MD   Pre-operative diagnosis:  Altered mental status, hyperpyrexia  Post-operative diagnosis: altered mental status, hyperpyrexia, elevated csf pressure  Consent Done: Emergent Situation  Indications: evaluation for infection and evaluation for altered mental status  Anesthesia: local infiltration    Anesthesia:  Local Anesthetic: lidocaine 1% without epinephrine  Anesthetic total: 2 mL  Patient sedated: no  Description of findings: csf clear, with opening pressure [id number]   Preparation: Patient was prepped and draped in the usual sterile fashion.  Lumbar space: L4-L5 interspace  Patient's position: right lateral decubitus  Needle gauge: 20  Needle type: spinal needle - Quincke tip  Needle length: 2.5 in  Number of attempts: 2  Opening pressure: 56 cm H2O  Fluid appearance: clear  Tubes of fluid: 4  Total volume: 8 ml  Post-procedure: site cleaned and adhesive bandage applied  Complications: No  Estimated blood loss (mL): 0  Specimens: Yes  Implants: No  Patient tolerance: Patient tolerated the procedure well with no immediate  complications        Labs Reviewed   COMPREHENSIVE METABOLIC PANEL - Abnormal; Notable for the following components:       Result Value    Sodium 135 (*)     Potassium 3.0 (*)     CO2 20 (*)     Glucose 197 (*)     Creatinine 1.6 (*)     Calcium 8.2 (*)     Total Protein 5.8 (*)     Albumin 3.1 (*)     AST 66 (*)     ALT 56 (*)     eGFR if  48 (*)     eGFR if non  41 (*)     All other components within normal limits    Narrative:     Recoll. [id number] by MYRIAM at 02/28/2019 18:25, reason: gross   hemolysis/discarded/maren   LACTIC ACID, PLASMA - Abnormal; Notable for the following components:    Lactate (Lactic Acid) 2.6 (*)     All other components within normal limits    Narrative:     Recoll. [id number] by MYRIAM at 02/28/2019 18:25, reason: gross   hemolysis/discarded/maren   URINALYSIS - Abnormal; Notable for the following components:    Color, UA Red (*)     Appearance, UA Cloudy (*)     Protein, UA 2+ (*)     Glucose, UA 1+ (*)     Occult Blood UA 3+ (*)     Leukocytes, UA 2+ (*)     All other components within normal limits   MAGNESIUM - Abnormal; Notable for the following components:    Magnesium 1.5 (*)     All other components within normal limits    Narrative:     Recoll. [id number] by MYRIAM at 02/28/2019 18:25, reason: gross   hemolysis/discarded/maren   PHOSPHORUS - Abnormal; Notable for the following components:    Phosphorus <1.0 (*)     All other components within normal limits    Narrative:     phos   critical result(s) called and verbal readback obtained from   benja, 02/28/2019 19:57  Recoll. [id number] by MYRIAM at 02/28/2019 18:25, reason: gross   hemolysis/discarded/maren   PROTIME-INR - Abnormal; Notable for the following components:    Prothrombin Time 12.9 (*)     All other components within normal limits    Narrative:     Recoll. [id number] by MYRIAM at 02/28/2019 18:25, reason: gross   hemolysis/discarded/maren   B-TYPE NATRIURETIC PEPTIDE - Abnormal; Notable  for the following components:     (*)     All other components within normal limits    Narrative:     Recoll. [id number] by LM1 at 02/28/2019 18:25, reason: gross   hemolysis/discarded/maren   TROPONIN I - Abnormal; Notable for the following components:    Troponin I 0.915 (*)     All other components within normal limits    Narrative:     Recoll. [id number] by LM1 at 02/28/2019 18:25, reason: gross   hemolysis/discarded/maren   CK - Abnormal; Notable for the following components:     (*)     All other components within normal limits   CK - Abnormal; Notable for the following components:     (*)     All other components within normal limits   GLUCOSE, CSF - Abnormal; Notable for the following components:    Glucose, CSF 88 (*)     All other components within normal limits   PROTEIN, CSF - Abnormal; Notable for the following components:    Protein, CSF 47 (*)     All other components within normal limits   URINALYSIS MICROSCOPIC - Abnormal; Notable for the following components:    RBC, UA >100 (*)     WBC, UA 25 (*)     Bacteria, UA Few (*)     Non-Squam Epith 1 (*)     All other components within normal limits   POCT INFLUENZA A/B - Abnormal; Notable for the following components:    Rapid Influenza A Ag Positive (*)     Rapid Influenza B Ag Positive (*)     All other components within normal limits   ISTAT PROCEDURE - Abnormal; Notable for the following components:    POC PCO2 28.3 (*)     POC HCO3 17.4 (*)     POC TCO2 18 (*)     All other components within normal limits   POCT GLUCOSE - Abnormal; Notable for the following components:    POCT Glucose 173 (*)     All other components within normal limits   CULTURE, BLOOD   CULTURE, BLOOD   CULTURE, URINE   CULTURE, CSF  (INCLUDES STAIN)   CULTURE, STOOL   CLOSTRIDIUM DIFFICILE   ENTEROHEMORRHAGIC E.COLI   APTT    Narrative:     Recoll. [id number] by LM1 at 02/28/2019 18:25, reason: gross   hemolysis/discarded/maren   LIPASE    Narrative:      Recoll. [id number] by LM1 at 02/28/2019 18:25, reason: gross   hemolysis/discarded/maren   DRUG SCREEN PANEL, URINE EMERGENCY   CSF CELL COUNT WITH DIFFERENTIAL   RAPID HIV   CBC W/ AUTO DIFFERENTIAL   LACTIC ACID, PLASMA   PROCALCITONIN   MYOGLOBIN, SERUM   TSH   T4   FREEZE AND HOLD -    CULTURE, VIRAL   VDRL, CSF   POCT INFLUENZA A/B   POCT GLUCOSE MONITORING CONTINUOUS          Imaging Results          CT Abdomen Pelvis With Contrast (Final result)  Result time 02/28/19 20:59:35    Final result by Sis Velazquez MD (02/28/19 20:59:35)                 Impression:      Motion limited examination.    1. No acute intra-abdominal abnormalities identified.  2. Cholelithiasis.  3. Small left renal hypodensity which measures slightly higher than simple fluid density.  This could represent a small cyst or complex cyst.  Future nonemergent ultrasound follow-up could be obtained to ensure cystic nature of this lesion.  4. Additional findings as detailed above.      Electronically signed by: Sis Velazquez MD  Date:    02/28/2019  Time:    20:59             Narrative:    EXAMINATION:  CT ABDOMEN PELVIS WITH CONTRAST    CLINICAL HISTORY:  Bowel obstruction, high-grade;    TECHNIQUE:  Low dose axial images, sagittal and coronal reformations were obtained from the lung bases to the pubic symphysis following the IV administration of 100 mL of Omnipaque 350 .  Oral contrast was not given.    COMPARISON:  None.    FINDINGS:  The visualized portion of the heart is unremarkable.  The lung bases are clear.    Evaluation of the abdomen and pelvis is limited by motion blurring artifact.    No significant hepatic abnormalities are identified.  There is no intra-or extrahepatic biliary ductal dilatation.  Small layering calcified stones are seen in the gallbladder.  The stomach, pancreas, spleen, and adrenal glands are unremarkable.    Kidneys enhance normally with no evidence of hydronephrosis.  Small left renal hypodense  lesion is seen which measures slightly higher than simple fluid density.  No definite abnormalities are seen along the ureteral courses.  Urinary bladder is collapsed around a Cantu catheter.  Postsurgical changes are seen involving the prostate.    Appendix measures upper limits of normal in caliber at 7 mm with no surrounding inflammatory changes seen.  The visualized loops of small and large bowel show no evidence of obstruction or inflammation.  No free air or free fluid.    Extensive aortic atherosclerosis is seen.    No acute osseous abnormality identified.  Degenerative endplate changes are visualized at the L2-3 level.  Subcutaneous soft tissue structures are unremarkable.                               CT Head Without Contrast (Final result)  Result time 02/28/19 18:53:39    Final result by Sis Velazquez MD (02/28/19 18:53:39)                 Impression:      No acute intracranial abnormalities identified.      Electronically signed by: Sis Velazquez MD  Date:    02/28/2019  Time:    18:53             Narrative:    EXAMINATION:  CT HEAD WITHOUT CONTRAST    CLINICAL HISTORY:  Confusion/delirium, altered LOC, unexplained;    TECHNIQUE:  Low dose axial images were obtained through the head.  Coronal and sagittal reformations were also performed. Contrast was not administered.    COMPARISON:  None.    FINDINGS:  There is mild generalized cerebral volume loss and chronic microvascular ischemic change.  No evidence of acute/recent major vascular distribution cerebral infarction, intraparenchymal hemorrhage, or intra-axial space occupying lesion. The ventricular system is normal in size and configuration with no evidence of hydrocephalus. No effacement of the skull-base cisterns. No abnormal extra-axial fluid collections or blood products. Visualized paranasal sinuses and mastoid air cells are clear. The calvarium shows no significant abnormality.                               X-Ray Chest AP Portable (Final  result)  Result time 02/28/19 18:17:49    Final result by Caesar Garza MD (02/28/19 18:17:49)                 Impression:      1. Status post CABG.  No acute radiographic findings in the chest on this single view.      Electronically signed by: Caesar Garza MD  Date:    02/28/2019  Time:    18:17             Narrative:    EXAMINATION:  XR CHEST AP PORTABLE    CLINICAL HISTORY:  Sepsis;    TECHNIQUE:  Single frontal view of the chest was performed.    COMPARISON:  Radiograph 05/24/2018.    FINDINGS:  Cardiac monitoring leads project over the bilateral hemithoraces peer mediastinal structures are midline.  Hilar contours are unremarkable.  Cardiac silhouette is stable in size.  Postoperative changes of CABG.  Lung volumes are symmetric.  No consolidation.  No pneumothorax or pleural effusions.  No free air beneath the diaphragm.  Sternotomy wires are well aligned.  Degenerative changes of the spine and glenohumeral joints noted.                                18:00- initial sepsis order set used.  Patient being placed and cooling blanket, ice packs, cool IV fluids.  Patient's mental status appears to be improving heart rate trending downward.  Patient turned over to oncoming physician Dr. Mcdowell for continued evaluation, treatment, and Disposition.                ADDENDUM NOTE; Receiving Doctor at 1800    Febrile, altered patient, 2 days s/p prostate seeding done with local anesthesia.   General: diaphoretic, mumbling follows commands, eye opening spontaneusly, Sat 98% RA, Temp now 103, , /71  HEENT- neck not stiff, orphorynx dry, pt with poor dention, brownish phlegm, good gag reflex with cough   Chest: no increase work of breathing, rhochi, Heart tachycardic without Murmur  Abd-Soft, non tender, hypoactive BS.s  Back:atraumatic, skin intact, no rash  Ext-n/v intact, no edema  Neuro:no facial asymetry, prrl, no nystagmus, opens eyes spont, good gag, mumbles,   Skin- warm to touch, diaphoretic,  no rash.    Plan: cont IV hydration , active cooling, CT head then LP, Antibiotic infusing, admit to ICU, case disussed with Dr Rosendo Mcclain at 1830, will call Dr. Cook when workup completed in ED.      MDM:  Febrile patient with alteration mental status 2 days status post prostate seed placement, at the time of placement the patient was on Cipro for 3 days and also received IV gentamicin which may explain why does not have an elevated white blood cell count other than influenza positive , no other source of infection noted he has no pneumonia CT scan reveals no intra-abdominal infection gross CSF fluid analysis is normal patient's temperature is improved as has his heart rate but he is continues to be altered and nonverbal.  He has received triple antibiotics also antivirals adequate fluid hydration and will be admitted to the ICU the case was discussed with Dr. Cook Hospitalist, also Dr. Huff  the urologist.  I have discussed the case with the patient's wife and also with his son who lives in [place] and explained to them the seriousness of patient's condition.  Blood urine and CSF cultures are pending at time of admission.    Ekg@17:23- rate of 156, wide complex with a QRS duration of 166 milliseconds, nonspecific intraventricular conduction delay, irregular baseline, no STEMI, normal axis, development of interventricular conduction delay since 15th January 2018                               Clinical Impression:       ICD-10-CM ICD-9-CM   1. Fever 106 degrees F or over R50.9 780.60   2. Tachycardia R00.0 785.0   3. Prostate cancer C61 185   4. Delirium R41.0 780.09   5. SIRS (systemic inflammatory response syndrome) R65.10 995.90   6. Renal insufficiency N28.9 593.9   7. Hypophosphatemia E83.39 275.3   8. Nausea and vomiting, intractability of vomiting not specified, unspecified vomiting type R11.2 787.01   9. Diarrhea, unspecified type R19.7 787.91   10. Troponin I above reference range R74.8 790.6   11.  Influenza A J10.1 487.1   12. Influenza B J10.1 487.1   13. Lactic acidosis E87.2 276.2                   Critical care time: 45 min critical care time discussion with physicians family members an additional investigation of patient's pathology.             Bo Mcdowell MD  02/28/19 1916       Bo Mcdowell MD  02/28/19 1926       Bo Mcdowell MD  02/28/19 1932       Bo Mcdowell MD  02/28/19 2110       Bo Mcdowell MD  02/28/19 2145

## 2019-03-01 NOTE — ED NOTES
Pt had episode of vomiting, provider notified, RN suctioned 75 ml, dark brown liquid from pt mouth

## 2019-03-01 NOTE — ASSESSMENT & PLAN NOTE
Elevated troponin consistent with type 2 MI versus small plaque rupture  EF preserved  Optimize medicines for secondary prevention as below  Discussed options and will likely plan for catheterization with graft restudy on Monday if stable

## 2019-03-01 NOTE — HPI
Eric Jackson is a 78 y.o. male that (in part)  has a past medical history of Anxiety, Coronary artery disease, Depression, Diabetes mellitus type II, Erectile dysfunction, Eye injuries, GERD (gastroesophageal reflux disease), and Hypertension.  has a past surgical history that includes Hernia repair; Wrist surgery; Coronary artery bypass graft (2001); Colonoscopy (N/A, 2/9/2018); and Transrectal ultrasound of prostate with insertion of gold fiducial marker (N/A, 2/26/2019). Presents to [facility] Emergency Department Presented with altered mental status.  Patient had prostate procedure on Tuesday.  On Wednesday he started to feel ill and by this afternoon he developed severe altered mental status and became essentially unresponsive.  The wife reports that he was given Motrin at around 2:00 p.m. and laid down for a nap.  He was unable to be aroused after his nap.  EMS was activated.  When they arrived he had axillary temperature of 106°.  Urology notes indicate the patient had prostate surgery this evening procedure.  ER physician spoke with the surgeon reports given the patient perioperative antibiotics including gentamicin.  No general anesthesia was utilized nor did he have any medications that could contribute to possible malignant hypothermia.     In the emergency department routine laboratory studies were obtained as well as urinalysis.  CT abdomen pelvis were also performed.  He also received CT of the head and a lumbar puncture which was significant for markedly elevated opening pressure.  Urinalysis revealed Evidence of urinary tract infection with a clinical picture of suspected bacteremia resulting in severe sepsis with infectious encephalopathy.  Sepsis protocol initiated.  Lastly, rapid flu was also positive.  He was started on Tamiflu, meropenem, ACV, and vancomycin. I am consulted for ID recs (I stopped the vancomycin and ACV based on his Gram negative sepsis).     The  patient is lucid but his mental status waxes and wanes. He is feeling better.

## 2019-03-02 PROBLEM — R33.9 URINARY RETENTION: Status: ACTIVE | Noted: 2019-03-02

## 2019-03-02 PROBLEM — N28.1 RENAL CYST, LEFT: Status: ACTIVE | Noted: 2019-03-02

## 2019-03-02 LAB
ALBUMIN SERPL BCP-MCNC: 2.9 G/DL
ALP SERPL-CCNC: 58 U/L
ALT SERPL W/O P-5'-P-CCNC: 95 U/L
ANION GAP SERPL CALC-SCNC: 5 MMOL/L
AST SERPL-CCNC: 80 U/L
BACTERIA UR CULT: NORMAL
BASOPHILS # BLD AUTO: 0 K/UL
BASOPHILS NFR BLD: 0 %
BILIRUB SERPL-MCNC: 0.6 MG/DL
BUN SERPL-MCNC: 20 MG/DL
CALCIUM SERPL-MCNC: 8.6 MG/DL
CHLORIDE SERPL-SCNC: 113 MMOL/L
CO2 SERPL-SCNC: 24 MMOL/L
CREAT SERPL-MCNC: 1.2 MG/DL
DIFFERENTIAL METHOD: ABNORMAL
EOSINOPHIL # BLD AUTO: 0 K/UL
EOSINOPHIL NFR BLD: 0 %
ERYTHROCYTE [DISTWIDTH] IN BLOOD BY AUTOMATED COUNT: 13.9 %
EST. GFR  (AFRICAN AMERICAN): >60 ML/MIN/1.73 M^2
EST. GFR  (NON AFRICAN AMERICAN): 58 ML/MIN/1.73 M^2
GLUCOSE SERPL-MCNC: 127 MG/DL
HCT VFR BLD AUTO: 39.8 %
HGB BLD-MCNC: 13.1 G/DL
LYMPHOCYTES # BLD AUTO: 0.9 K/UL
LYMPHOCYTES NFR BLD: 8.8 %
MAGNESIUM SERPL-MCNC: 2.7 MG/DL
MCH RBC QN AUTO: 30.5 PG
MCHC RBC AUTO-ENTMCNC: 32.9 G/DL
MCV RBC AUTO: 93 FL
MONOCYTES # BLD AUTO: 0.9 K/UL
MONOCYTES NFR BLD: 8.8 %
MYOGLOBIN SERPL-MCNC: 269 MCG/L
NEUTROPHILS # BLD AUTO: 8.5 K/UL
NEUTROPHILS NFR BLD: 82.4 %
PHOSPHATE SERPL-MCNC: 3.1 MG/DL
PLATELET # BLD AUTO: 62 K/UL
PMV BLD AUTO: 11.1 FL
POCT GLUCOSE: 110 MG/DL (ref 70–110)
POCT GLUCOSE: 115 MG/DL (ref 70–110)
POCT GLUCOSE: 120 MG/DL (ref 70–110)
POCT GLUCOSE: 129 MG/DL (ref 70–110)
POCT GLUCOSE: 130 MG/DL (ref 70–110)
POTASSIUM SERPL-SCNC: 4.8 MMOL/L
PROT SERPL-MCNC: 6 G/DL
RBC # BLD AUTO: 4.3 M/UL
SODIUM SERPL-SCNC: 142 MMOL/L
WBC # BLD AUTO: 10.34 K/UL

## 2019-03-02 PROCEDURE — 36415 COLL VENOUS BLD VENIPUNCTURE: CPT | Mod: HCNC

## 2019-03-02 PROCEDURE — 25000003 PHARM REV CODE 250: Mod: HCNC | Performed by: INTERNAL MEDICINE

## 2019-03-02 PROCEDURE — 80053 COMPREHEN METABOLIC PANEL: CPT | Mod: HCNC

## 2019-03-02 PROCEDURE — 27000221 HC OXYGEN, UP TO 24 HOURS: Mod: HCNC

## 2019-03-02 PROCEDURE — 99233 PR SUBSEQUENT HOSPITAL CARE,LEVL III: ICD-10-PCS | Mod: HCNC,,, | Performed by: UROLOGY

## 2019-03-02 PROCEDURE — 63600175 PHARM REV CODE 636 W HCPCS: Mod: HCNC | Performed by: HOSPITALIST

## 2019-03-02 PROCEDURE — 87040 BLOOD CULTURE FOR BACTERIA: CPT | Mod: 59,HCNC

## 2019-03-02 PROCEDURE — 21400001 HC TELEMETRY ROOM: Mod: HCNC

## 2019-03-02 PROCEDURE — 83735 ASSAY OF MAGNESIUM: CPT | Mod: HCNC

## 2019-03-02 PROCEDURE — 99233 SBSQ HOSP IP/OBS HIGH 50: CPT | Mod: HCNC,,, | Performed by: UROLOGY

## 2019-03-02 PROCEDURE — 85025 COMPLETE CBC W/AUTO DIFF WBC: CPT | Mod: HCNC

## 2019-03-02 PROCEDURE — 84100 ASSAY OF PHOSPHORUS: CPT | Mod: HCNC

## 2019-03-02 RX ORDER — ZOLPIDEM TARTRATE 5 MG/1
5 TABLET ORAL NIGHTLY PRN
Status: DISCONTINUED | OUTPATIENT
Start: 2019-03-02 | End: 2019-03-06 | Stop reason: HOSPADM

## 2019-03-02 RX ADMIN — MEROPENEM AND SODIUM CHLORIDE 1 G: 1 INJECTION, SOLUTION INTRAVENOUS at 03:03

## 2019-03-02 RX ADMIN — ASPIRIN 81 MG 81 MG: 81 TABLET ORAL at 09:03

## 2019-03-02 RX ADMIN — MEROPENEM AND SODIUM CHLORIDE 1 G: 1 INJECTION, SOLUTION INTRAVENOUS at 11:03

## 2019-03-02 RX ADMIN — MIRTAZAPINE 30 MG: 15 TABLET, FILM COATED ORAL at 08:03

## 2019-03-02 RX ADMIN — VENLAFAXINE HYDROCHLORIDE 75 MG: 75 CAPSULE, EXTENDED RELEASE ORAL at 09:03

## 2019-03-02 RX ADMIN — OSELTAMIVIR PHOSPHATE 75 MG: 75 CAPSULE ORAL at 08:03

## 2019-03-02 RX ADMIN — METOPROLOL SUCCINATE 12.5 MG: 25 TABLET, EXTENDED RELEASE ORAL at 09:03

## 2019-03-02 RX ADMIN — MEROPENEM AND SODIUM CHLORIDE 1 G: 1 INJECTION, SOLUTION INTRAVENOUS at 12:03

## 2019-03-02 RX ADMIN — ATORVASTATIN CALCIUM 40 MG: 40 TABLET, FILM COATED ORAL at 09:03

## 2019-03-02 RX ADMIN — OSELTAMIVIR PHOSPHATE 75 MG: 75 CAPSULE ORAL at 09:03

## 2019-03-02 RX ADMIN — MEROPENEM AND SODIUM CHLORIDE 1 G: 1 INJECTION, SOLUTION INTRAVENOUS at 09:03

## 2019-03-02 NOTE — SUBJECTIVE & OBJECTIVE
Interval History:     Feels better  No chills  Transferred to floor this am    Cantu draining well  Not bothersome    Cards following   Plans for heatr cath monday    Review of Systems  Objective:     Temp:  [97.3 °F (36.3 °C)-98.2 °F (36.8 °C)] 97.6 °F (36.4 °C)  Pulse:  [59-72] 62  Resp:  [17-27] 17  SpO2:  [97 %-100 %] 98 %  BP: ()/(51-69) 123/67     Body mass index is 25.38 kg/m².            Drains     Drain                 Urethral Catheter 02/28/19 1747 Straight-tip 18 Fr. 1 day                Physical Exam    Ao*4  resp normal rate' breathing not labored  cv normal rate  Ab nondistended  Ext no edema  Bladder not distended  Cantu draining clear yellow urine    Significant Labs:    BMP:  Recent Labs   Lab 02/28/19 1924 03/01/19 0419 03/02/19  0634   * 133* 142   K 3.0* 4.6 4.8    106 113*   CO2 20* 18* 24   BUN 18 17 20   CREATININE 1.6* 1.3 1.2   CALCIUM 8.2* 8.2* 8.6*       CBC:   Recent Labs   Lab 02/28/19 1924 03/01/19 0419 03/02/19  0634   WBC 6.04 9.23 10.34   HGB 13.3* 13.3* 13.1*   HCT 40.3 41.2 39.8*   PLT 57* 53* 62*       Blood Culture:   Recent Labs   Lab 02/28/19 1747 02/28/19  1755   LABBLOO Gram stain michelle bottle: Gram negative rods  Results called to and read back by: Saima Harrison 03/01/2019  06:30  Gram stain aer bottle: Gram negative rods   Positive results previously called  PRESUMPTIVE E COLI Gram stain michelle bottle: Gram negative rods  Results called to and read back by: Saima Harrison 03/01/2019  06:30  Gram stain aer bottle: Gram negative rods   Positive results previously called  PRESUMPTIVE E COLI  Identification and susceptibility pending       Urine Culture:   Recent Labs   Lab 02/28/19  1759   LABURIN ESCHERICHIA COLI  10,000 - 49,999 cfu/ml         Significant Imaging:  -

## 2019-03-02 NOTE — PLAN OF CARE
Problem: Infection  Goal: Infection Symptom Resolution    Intervention: Prevent or Manage Infection   03/02/19 0644   Manage Diarrhea   Isolation Precautions droplet precautions maintained;contact precautions maintained

## 2019-03-02 NOTE — PROGRESS NOTES
[facility] Hospital Medicine  Progress Note    Patient Name: Eric Jackson  MRN: [id number]  Patient Class: IP- Inpatient   Admission Date: 2/28/2019  Length of Stay: 2 days  Attending Physician: Dave Seo MD  Primary Care Provider: Samir Boo MD        Subjective:     Principal Problem:Non-STEMI (non-ST elevated myocardial infarction)    HPI:  Eric Jackson is a 77 yo man with CAD, diabetes, anxiety/depression, GERD, HTN, and ED s/p transrectal ultrasound of prostate with insertion of gold fiducial marker on 2/26/2019 who presented with altered mental status.  Patient had prostate procedure on Tuesday.  On Wednesday he started to feel ill and by this afternoon he developed severe altered mental status and became essentially unresponsive.  The wife reports that he was given Motrin at around 2:00 p.m. and laid down for a nap.  He was unable to be aroused after his nap.  EMS was activated.  When they arrived he had axillary temperature of 106°.  ER physician spoke with the urologist who recommended including gentamicin in the abx regimen.  No general anesthesia was utilized nor did he have any medications that could contribute to possible malignant hypothermia.     In the emergency department routine laboratory studies were obtained as well as urinalysis.  CT abdomen pelvis were also performed.  He also received CT of the head and a lumbar puncture which was significant for markedly elevated opening pressure.  Urinalysis revealed Evidence of urinary tract infection with a clinical picture of suspected bacteremia resulting in severe sepsis with infectious encephalopathy. Sepsis protocol initiated.  Lastly, rapid flu was also positive.  He was started on Tamiflu.    Hospital Course:  Eric Jackson is a 77 yo man with CAD, diabetes, anxiety/depression, GERD, HTN, and ED s/p transrectal ultrasound of prostate with insertion of gold fiducial marker on 2/26/2019 who presented with  altered mental status.  Patient had prostate procedure on Tuesday.  On Wednesday he started to feel ill and by this afternoon he developed severe altered mental status and became essentially unresponsive.  The wife reports that he was given Motrin at around 2:00 p.m. and laid down for a nap.  He was unable to be aroused after his nap.  EMS was activated.  When they arrived he had axillary temperature of 106°.   The patient was found to have E-coli UTI with likely E-coli bacteremia.  Urology and GI were consulted. The patient initially went to the ICU but was transferred out on 3/1. Repeat blood cultures were ordered on 3/2/19. ID recommended Meropenem. Cards evaluated the patient as well for non-stemi.    Interval History: No new complaints.       Review of Systems   Constitutional: Negative for activity change.   HENT: Negative for congestion.    Respiratory: Negative for chest tightness and shortness of breath.    Cardiovascular: Negative for chest pain.   Gastrointestinal: Negative for abdominal pain.   Genitourinary: Negative for difficulty urinating.     Objective:     Vital Signs (Most Recent):  Temp: 97.6 °F (36.4 °C) (03/02/19 0718)  Pulse: 62 (03/02/19 0718)  Resp: 17 (03/02/19 0718)  BP: 123/67 (03/02/19 0718)  SpO2: 98 % (03/02/19 0900) Vital Signs (24h Range):  Temp:  [97.3 °F (36.3 °C)-98.2 °F (36.8 °C)] 97.6 °F (36.4 °C)  Pulse:  [62-72] 62  Resp:  [17-27] 17  SpO2:  [97 %-100 %] 98 %  BP: ()/(51-69) 123/67     Weight: 82.6 kg (182 lb)  Body mass index is 25.38 kg/m².    Intake/Output Summary (Last 24 hours) at 3/2/2019 1120  Last data filed at 3/2/2019 0030  Gross per 24 hour   Intake 990 ml   Output 1475 ml   Net -485 ml      Physical Exam   Constitutional: He is oriented to person, place, and time. He appears well-developed and well-nourished.   HENT:   Head: Normocephalic and atraumatic.   Neurological: He is alert and oriented to person, place, and time.   Skin: Skin is warm and dry.    Psychiatric: He has a normal mood and affect. His behavior is normal.   Nursing note and vitals reviewed.      Significant Labs:   BMP:   Recent Labs   Lab 03/02/19  0634   *      K 4.8   *   CO2 24   BUN 20   CREATININE 1.2   CALCIUM 8.6*   MG 2.7*     CBC:   Recent Labs   Lab 02/28/19  1924 03/01/19  0419 03/02/19  0634   WBC 6.04 9.23 10.34   HGB 13.3* 13.3* 13.1*   HCT 40.3 41.2 39.8*   PLT 57* 53* 62*       Significant Imaging:     Assessment/Plan:      * Non-STEMI (non-ST elevated myocardial infarction)    Cards evaluated.  Plan for C on Monday if stable.           Sepsis secondary to UTI    E-coli with resistance. Likely E-coli Bacteremia as well. Repeated blood cultures today.  Continue Meropenem for now. ID recs.   This was likely due to recent urologic procedure.          Urinary retention           Coronary artery disease    Cards following.          Acute prostatitis    As above.          Infectious encephalopathy    Resolved.          Fever 106 degrees F or over    From above. Resolved.          Prostate cancer 2/25/19 Transrectal ultrasound of prostate and gold fiducial marker placement    Follow urology recs.          Diabetes mellitus type II, controlled    No other known manifestations        Essential hypertension    No acute issues.          elevated LFT's- from sepsis? Other. Trending down. Did also not thrombocytopenia- underlying liver disease? Will check INR.     Anemia of chronic disease- stable.     VTE Risk Mitigation (From admission, onward)        Ordered     Place LINA hose  Until discontinued      03/01/19 0531     IP VTE HIGH RISK PATIENT  Once      02/28/19 1559              Dave Starr MD  Department of Hospital Medicine   [facility]

## 2019-03-02 NOTE — HOSPITAL COURSE
Eric Jackson is a 79 yo man with CAD, diabetes, anxiety/depression, GERD, HTN, and ED s/p transrectal ultrasound of prostate with insertion of gold fiducial marker on 2/26/2019 who presented with altered mental status.  Patient had prostate procedure on Tuesday.  On Wednesday he started to feel ill and by this afternoon he developed severe altered mental status and became essentially unresponsive.  The wife reports that he was given Motrin at around 2:00 p.m. and laid down for a nap.  He was unable to be aroused after his nap.  EMS was activated.  When they arrived he had axillary temperature of 106°.  The patient was found to have E-coli UTI with likely E-coli bacteremia.  Urology and GI were consulted. The patient initially went to the ICU but was transferred out on 3/1. Repeat blood cultures were ordered on 3/2/19. ID recommended Meropenem. Cards evaluated the patient as well for non-stemi and took for LHC on 3/4/19. On 3/6/19 ID recommended patient go home with 6 weeks of Ceftriaxone.  The patient went for a picc line on the dame day and will be discharged to home with H/H. Activity as tolerated. Diet- low NA. Follow up with ID and urology and PCP in 1-2 weeks.

## 2019-03-02 NOTE — NURSING TRANSFER
Nursing Transfer Note      3/2/2019     Transfer To: Room 303    Transfer via wheelchair    Transfer with 2L of O2, cardiac monitoring    Transported by RACHEL Harrison RN    Medicines sent: Yes     Chart send with patient: Yes    Notified: spouse    Upon arrival to floor: cardiac monitor applied, patient oriented to room, call bell in reach and bed in lowest position

## 2019-03-02 NOTE — ASSESSMENT & PLAN NOTE
Cont IV abx per hosp med while inpatient  Would treat with 2 weeks po abx as outpatient; will defer choice to hosp med; consider augmentin

## 2019-03-02 NOTE — PLAN OF CARE
Problem: Fall Injury Risk  Goal: Absence of Fall and Fall-Related Injury    Intervention: Identify and Manage Contributors to Fall Injury Risk   03/01/19 2348   Identify and Manage Contributors to Fall Injury Risk   Self-Care Promotion independence encouraged   Manage Acute Allergic Reaction   Medication Review/Management medications reviewed     Intervention: Promote Injury-Free Environment   03/01/19 2348 03/02/19 1500   Optimize Balance and Safe Activity   Safety Promotion/Fall Prevention --  assistive device/personal item within reach;bed alarm set;instructed to call staff for mobility;side rails raised x 2;Fall Risk reviewed with patient/family;medications reviewed   Optimize Harvey and Functional Mobility   Environmental Safety Modification assistive device/personal items within reach --

## 2019-03-02 NOTE — ASSESSMENT & PLAN NOTE
E-coli with resistance. Likely E-coli Bacteremia as well. Repeated blood cultures today.  Continue Meropenem for now. ID recs.   This was likely due to recent urologic procedure.

## 2019-03-02 NOTE — PROGRESS NOTES
[facility]  Urology  Progress Note    Patient Name: Eric Jackson  MRN: [id number]  Admission Date: 2/28/2019  Hospital Length of Stay: 2 days  Code Status: Full Code   Attending Provider: Dave Seo MD   Primary Care Physician: Samir Boo MD    Subjective:     HPI:  77yo M recently diagnosed with prostate cancer. S/p gold seed fiducial markers on 2/26/19 by Dr. Huff for XRT. He was brought in by EMS for altered mental status  and fever of 106 (axillary). UA concerning for UTI.  Now admitted to ICU with severe sepsis with infectious encephalopathy. He also tested positive for Influenza. Patient noted to be oriented on exam    UCx--pending      Interval History:     Feels better  No chills  Transferred to floor this am    Cantu draining well  Not bothersome    Cards following   Plans for heatr cath monday    Review of Systems  Objective:     Temp:  [97.3 °F (36.3 °C)-98.2 °F (36.8 °C)] 97.6 °F (36.4 °C)  Pulse:  [59-72] 62  Resp:  [17-27] 17  SpO2:  [97 %-100 %] 98 %  BP: ()/(51-69) 123/67     Body mass index is 25.38 kg/m².            Drains     Drain                 Urethral Catheter 02/28/19 1747 Straight-tip 18 Fr. 1 day                Physical Exam    Ao*4  resp normal rate' breathing not labored  cv normal rate  Ab nondistended  Ext no edema  Bladder not distended  Cantu draining clear yellow urine    Significant Labs:    BMP:  Recent Labs   Lab 02/28/19 1924 03/01/19 0419 03/02/19  0634   * 133* 142   K 3.0* 4.6 4.8    106 113*   CO2 20* 18* 24   BUN 18 17 20   CREATININE 1.6* 1.3 1.2   CALCIUM 8.2* 8.2* 8.6*       CBC:   Recent Labs   Lab 02/28/19 1924 03/01/19 0419 03/02/19  0634   WBC 6.04 9.23 10.34   HGB 13.3* 13.3* 13.1*   HCT 40.3 41.2 39.8*   PLT 57* 53* 62*       Blood Culture:   Recent Labs   Lab 02/28/19 1747 02/28/19  1755   LABBLOO Gram stain michelle bottle: Gram negative rods  Results called to and read back by: Saima Harrison 03/01/2019   06:30  Gram stain aer bottle: Gram negative rods   Positive results previously called  PRESUMPTIVE E COLI Gram stain michelle bottle: Gram negative rods  Results called to and read back by: Saima Harrison 03/01/2019  06:30  Gram stain aer bottle: Gram negative rods   Positive results previously called  PRESUMPTIVE E COLI  Identification and susceptibility pending       Urine Culture:   Recent Labs   Lab 02/28/19  1759   LABURIN ESCHERICHIA COLI  10,000 - 49,999 cfu/ml         Significant Imaging:  -                  Assessment/Plan:     Urinary retention    Cont mckeon until Monday      Acute prostatitis    -Suspect prostate infection after recent  fiducial marker placment    Cont IV abx       Sepsis secondary to UTI    Cont IV abx per hosp med while inpatient  Would treat with 2 weeks po abx as outpatient; will defer choice to hosp med; consider augmentin         Prostate cancer 2/25/19 Transrectal ultrasound of prostate and gold fiducial marker placement    -S/p TRUS PBx on 1/7/19-- intermediate risk [place] 3+4 PCa in 3/12 cores. [place] 4 up to 30% of one core.   -S/p gold seed fiducial markers on 2/26/19 by Dr. Huff for XRT                    [facility]  team will see pt Monday AM  Please call if any new issues before then    thanks    VTE Risk Mitigation (From admission, onward)        Ordered     Place LINA hose  Until discontinued      03/01/19 0531     IP VTE HIGH RISK PATIENT  Once      02/28/19 4904          Jules Rivera MD  Urology  [facility]

## 2019-03-02 NOTE — SUBJECTIVE & OBJECTIVE
Interval History: No new complaints.       Review of Systems   Constitutional: Negative for activity change.   HENT: Negative for congestion.    Respiratory: Negative for chest tightness and shortness of breath.    Cardiovascular: Negative for chest pain.   Gastrointestinal: Negative for abdominal pain.   Genitourinary: Negative for difficulty urinating.     Objective:     Vital Signs (Most Recent):  Temp: 97.6 °F (36.4 °C) (03/02/19 0718)  Pulse: 62 (03/02/19 0718)  Resp: 17 (03/02/19 0718)  BP: 123/67 (03/02/19 0718)  SpO2: 98 % (03/02/19 0900) Vital Signs (24h Range):  Temp:  [97.3 °F (36.3 °C)-98.2 °F (36.8 °C)] 97.6 °F (36.4 °C)  Pulse:  [62-72] 62  Resp:  [17-27] 17  SpO2:  [97 %-100 %] 98 %  BP: ()/(51-69) 123/67     Weight: 82.6 kg (182 lb)  Body mass index is 25.38 kg/m².    Intake/Output Summary (Last 24 hours) at 3/2/2019 1120  Last data filed at 3/2/2019 0030  Gross per 24 hour   Intake 990 ml   Output 1475 ml   Net -485 ml      Physical Exam   Constitutional: He is oriented to person, place, and time. He appears well-developed and well-nourished.   HENT:   Head: Normocephalic and atraumatic.   Neurological: He is alert and oriented to person, place, and time.   Skin: Skin is warm and dry.   Psychiatric: He has a normal mood and affect. His behavior is normal.   Nursing note and vitals reviewed.      Significant Labs:   BMP:   Recent Labs   Lab 03/02/19  0634   *      K 4.8   *   CO2 24   BUN 20   CREATININE 1.2   CALCIUM 8.6*   MG 2.7*     CBC:   Recent Labs   Lab 02/28/19  1924 03/01/19  0419 03/02/19  0634   WBC 6.04 9.23 10.34   HGB 13.3* 13.3* 13.1*   HCT 40.3 41.2 39.8*   PLT 57* 53* 62*       Significant Imaging:

## 2019-03-03 LAB
ALBUMIN SERPL BCP-MCNC: 2.7 G/DL
ALP SERPL-CCNC: 60 U/L
ALT SERPL W/O P-5'-P-CCNC: 67 U/L
ANION GAP SERPL CALC-SCNC: 6 MMOL/L
AST SERPL-CCNC: 49 U/L
BACTERIA BLD CULT: NORMAL
BACTERIA CSF CULT: NO GROWTH
BACTERIA STL CULT: NORMAL
BILIRUB SERPL-MCNC: 0.6 MG/DL
BUN SERPL-MCNC: 24 MG/DL
CALCIUM SERPL-MCNC: 8.4 MG/DL
CHLORIDE SERPL-SCNC: 112 MMOL/L
CO2 SERPL-SCNC: 24 MMOL/L
CREAT SERPL-MCNC: 1.1 MG/DL
EST. GFR  (AFRICAN AMERICAN): >60 ML/MIN/1.73 M^2
EST. GFR  (NON AFRICAN AMERICAN): >60 ML/MIN/1.73 M^2
GLUCOSE SERPL-MCNC: 100 MG/DL
GRAM STN SPEC: NORMAL
POCT GLUCOSE: 104 MG/DL (ref 70–110)
POCT GLUCOSE: 105 MG/DL (ref 70–110)
POCT GLUCOSE: 144 MG/DL (ref 70–110)
POCT GLUCOSE: 94 MG/DL (ref 70–110)
POTASSIUM SERPL-SCNC: 4.4 MMOL/L
PROT SERPL-MCNC: 5.5 G/DL
SODIUM SERPL-SCNC: 142 MMOL/L

## 2019-03-03 PROCEDURE — 21400001 HC TELEMETRY ROOM: Mod: HCNC

## 2019-03-03 PROCEDURE — 36415 COLL VENOUS BLD VENIPUNCTURE: CPT | Mod: HCNC

## 2019-03-03 PROCEDURE — 25000003 PHARM REV CODE 250: Mod: HCNC | Performed by: INTERNAL MEDICINE

## 2019-03-03 PROCEDURE — 63600175 PHARM REV CODE 636 W HCPCS: Mod: HCNC | Performed by: HOSPITALIST

## 2019-03-03 PROCEDURE — 99232 PR SUBSEQUENT HOSPITAL CARE,LEVL II: ICD-10-PCS | Mod: HCNC,,, | Performed by: INTERNAL MEDICINE

## 2019-03-03 PROCEDURE — 80053 COMPREHEN METABOLIC PANEL: CPT | Mod: HCNC

## 2019-03-03 PROCEDURE — 27000221 HC OXYGEN, UP TO 24 HOURS: Mod: HCNC

## 2019-03-03 PROCEDURE — 99232 SBSQ HOSP IP/OBS MODERATE 35: CPT | Mod: HCNC,,, | Performed by: INTERNAL MEDICINE

## 2019-03-03 PROCEDURE — 63600175 PHARM REV CODE 636 W HCPCS: Mod: HCNC | Performed by: INTERNAL MEDICINE

## 2019-03-03 RX ORDER — ENOXAPARIN SODIUM 100 MG/ML
40 INJECTION SUBCUTANEOUS EVERY 24 HOURS
Status: DISCONTINUED | OUTPATIENT
Start: 2019-03-03 | End: 2019-03-06 | Stop reason: HOSPADM

## 2019-03-03 RX ADMIN — MIRTAZAPINE 30 MG: 15 TABLET, FILM COATED ORAL at 08:03

## 2019-03-03 RX ADMIN — MEROPENEM AND SODIUM CHLORIDE 1 G: 1 INJECTION, SOLUTION INTRAVENOUS at 09:03

## 2019-03-03 RX ADMIN — ASPIRIN 81 MG 81 MG: 81 TABLET ORAL at 09:03

## 2019-03-03 RX ADMIN — OSELTAMIVIR PHOSPHATE 75 MG: 75 CAPSULE ORAL at 09:03

## 2019-03-03 RX ADMIN — MEROPENEM AND SODIUM CHLORIDE 1 G: 1 INJECTION, SOLUTION INTRAVENOUS at 04:03

## 2019-03-03 RX ADMIN — MEROPENEM AND SODIUM CHLORIDE 1 G: 1 INJECTION, SOLUTION INTRAVENOUS at 11:03

## 2019-03-03 RX ADMIN — ENOXAPARIN SODIUM 40 MG: 100 INJECTION SUBCUTANEOUS at 04:03

## 2019-03-03 RX ADMIN — METOPROLOL SUCCINATE 12.5 MG: 25 TABLET, EXTENDED RELEASE ORAL at 09:03

## 2019-03-03 RX ADMIN — OSELTAMIVIR PHOSPHATE 75 MG: 75 CAPSULE ORAL at 08:03

## 2019-03-03 RX ADMIN — VENLAFAXINE HYDROCHLORIDE 75 MG: 75 CAPSULE, EXTENDED RELEASE ORAL at 09:03

## 2019-03-03 RX ADMIN — ATORVASTATIN CALCIUM 40 MG: 40 TABLET, FILM COATED ORAL at 09:03

## 2019-03-03 NOTE — PROGRESS NOTES
[facility]  Cardiology  Progress Note    Patient Name: Eric Jackson  MRN: [id number]  Admission Date: 2/28/2019  Hospital Length of Stay: 3 days  Code Status: Full Code   Attending Physician: Dave Seo MD   Primary Care Physician: Samir Boo MD  Expected Discharge Date:   Principal Problem:Non-STEMI (non-ST elevated myocardial infarction)    Subjective:     Hospital Course:   3-1:  Admitted to the ICU with altered mental status, UTI/sepsis and troponin elevation consistent with non STEMI  3-2:  Transferred to telemetry    Interval History:  No chest pain shortness of breath is stable.  Feels back to baseline terms of mental status.  Agreeable for heart catheterization in a.m.    Telemetry:  Normal sinus rhythm    Review of Systems   All other systems reviewed and are negative.    Objective:     Vital Signs (Most Recent):  Temp: 97.8 °F (36.6 °C) (03/03/19 1126)  Pulse: (!) 58 (03/03/19 1126)  Resp: 17 (03/03/19 1126)  BP: (!) 147/77 (03/03/19 1126)  SpO2: 99 % (03/03/19 1126) Vital Signs (24h Range):  Temp:  [97.2 °F (36.2 °C)-98.2 °F (36.8 °C)] 97.8 °F (36.6 °C)  Pulse:  [52-59] 58  Resp:  [16-18] 17  SpO2:  [98 %-100 %] 99 %  BP: (121-147)/(65-77) 147/77     Weight: 82.6 kg (182 lb)  Body mass index is 25.38 kg/m².     SpO2: 99 %  O2 Device (Oxygen Therapy): nasal cannula      Intake/Output Summary (Last 24 hours) at 3/3/2019 1127  Last data filed at 3/3/2019 0600  Gross per 24 hour   Intake 580 ml   Output 1330 ml   Net -750 ml       Lines/Drains/Airways     Drain                 Urethral Catheter 02/28/19 1747 Straight-tip 18 Fr. 2 days          Airway                 Airway - Non-Surgical 02/26/19 1241 Nasal Cannula 4 days          Peripheral Intravenous Line                 Peripheral IV - Single Lumen 02/28/19 1700 Left Antecubital 2 days         Peripheral IV - Single Lumen 02/28/19 1723 Right Forearm 2 days         Peripheral IV - Single Lumen 02/28/19 1905 Right  Antecubital 2 days                Physical Exam   Constitutional: He is oriented to person, place, and time. He appears well-developed and well-nourished. No distress.   HENT:   Head: Normocephalic and atraumatic.   Eyes: Conjunctivae and EOM are normal. Pupils are equal, round, and reactive to light.   Neck: Normal range of motion. Neck supple. No thyromegaly present.   Cardiovascular: Normal rate, regular rhythm and normal heart sounds.   No murmur heard.  Pulmonary/Chest: Effort normal and breath sounds normal. No respiratory distress. He has no wheezes. He has no rales. He exhibits no tenderness.   Abdominal: Soft. Bowel sounds are normal.   Musculoskeletal: He exhibits no edema.   Neurological: He is alert and oriented to person, place, and time.   Skin: Skin is warm and dry.   Psychiatric: He has a normal mood and affect. His behavior is normal.       Significant Labs:   BMP:   Recent Labs   Lab 03/02/19  0634 03/03/19  0533   * 100    142   K 4.8 4.4   * 112*   CO2 24 24   BUN 20 24*   CREATININE 1.2 1.1   CALCIUM 8.6* 8.4*   MG 2.7*  --    , CBC   Recent Labs   Lab 03/02/19  0634   WBC 10.34   HGB 13.1*   HCT 39.8*   PLT 62*   , INR No results for input(s): INR, PROTIME in the last 48 hours., Lipid Panel No results for input(s): CHOL, HDL, LDLCALC, TRIG, CHOLHDL in the last 48 hours. and Troponin No results for input(s): TROPONINI in the last 48 hours.    Significant Imaging: Echocardiogram:   Transthoracic echo (TTE) complete (Cupid Only):   Results for orders placed or performed during the hospital encounter of 02/28/19   Transthoracic echo (TTE) complete (Cupid Only)   Result Value Ref Range    BSA 2.03 m2    LA WIDTH 3.82 cm    AORTIC VALVE CUSP SEPERATION 1.44 cm    PV PEAK VELOCITY 0.75 cm/s    LVIDD 5.03 3.5 - 6.0 cm    IVS 0.69 0.6 - 1.1 cm    PW 1.03 0.6 - 1.1 cm    Ao root annulus 3.13 cm    LVIDS 3.67 2.1 - 4.0 cm    FS 27 28 - 44 %    LA volume 54.19 cm3    Sinus 3.02 cm    STJ  2.80 cm    Ascending aorta 2.73 cm    LV mass 150.60 g    LA size 3.54 cm    RVDD 3.68 cm    TAPSE 1.15 cm    RV S' 5.01 m/s    Left Ventricle Relative Wall Thickness 0.41 cm    AV mean gradient 5.65 mmHg    AV valve area 1.85 cm2    AV Velocity Ratio 0.42     AV index (prosthetic) 0.43     E/A ratio 0.91     E wave decelartion time 212.24 msec    IVRT 0.09 msec    LVOT diameter 2.35 cm    LVOT area 4.34 cm2    LVOT peak zackary [id number] m/s    LVOT peak VTI 15.18 cm    Ao peak zackary 1.80 m/s    Ao VTI 35.56 cm    LVOT stroke volume 65.81 cm3    AV peak gradient 12.96 mmHg    MV Peak E Zackary 0.73 m/s    TR Max Zackary 2.33 m/s    MV Peak A Zackary 0.80 m/s    LV Systolic Volume 57.01 mL    LV Systolic Volume Index 28.1 mL/m2    LV Diastolic Volume 119.82 mL    LV Diastolic Volume Index 59.14 mL/m2    LA Volume Index 26.7 mL/m2    LV Mass Index 74.3 g/m2    RA Major Axis 4.41 cm    Left Atrium Minor Axis 4.75 cm    Left Atrium Major Axis 4.68 cm    Triscuspid Valve Regurgitation Peak Gradient 21.72 mmHg    RA Width 4.07 cm     · Normal left ventricular systolic function. The estimated ejection fraction is 55%    * all labs reviewed and echocardiogram personally interpreted    Assessment and Plan:     Brief HPI:  Transferred to telemetry in appears stable and back to baseline terms of mental status.  With troponin elevation would plan for relook angiography/catheterization with grafts study in a.m.    * Non-STEMI (non-ST elevated myocardial infarction)    Elevated troponin consistent with type 2 MI versus small plaque rupture  EF preserved  Optimize medicines for secondary prevention as below  Discussed options and will likely plan for catheterization with graft restudy on Monday if stable     Coronary artery disease    History of CABG 2001  On no medicines for secondary prevention  Add back initially beta-blocker  Add dual anti-platelet therapy  Add high-dose statin     Sepsis secondary to UTI    On antibiotics per primary      Essential hypertension    Currently stable  Previously on Hyzaar  BP is currently marginal and unable to tolerate     Diabetes mellitus type II, controlled    Management per IM     Infectious encephalopathy    Improving and almost at baseline per wife at bedside       * still not received records from [facility] with previous catheterization report 2014    VTE Risk Mitigation (From admission, onward)        Ordered     enoxaparin injection 40 mg  Daily      03/03/19 1039     Place LINA hose  Until discontinued      03/01/19 0531     IP VTE HIGH RISK PATIENT  Once      02/28/19 2647          Reji Seay MD  Cardiology  [facility]

## 2019-03-03 NOTE — PROGRESS NOTES
[facility] Hospital Medicine  Progress Note    Patient Name: Eric Jackson  MRN: [id number]  Patient Class: IP- Inpatient   Admission Date: 2/28/2019  Length of Stay: 3 days  Attending Physician: Dave Seo MD  Primary Care Provider: Samir Boo MD        Subjective:     Principal Problem:Non-STEMI (non-ST elevated myocardial infarction)    HPI:  Eric Jackson is a 79 yo man with CAD, diabetes, anxiety/depression, GERD, HTN, and ED s/p transrectal ultrasound of prostate with insertion of gold fiducial marker on 2/26/2019 who presented with altered mental status.  Patient had prostate procedure on Tuesday.  On Wednesday he started to feel ill and by this afternoon he developed severe altered mental status and became essentially unresponsive.  The wife reports that he was given Motrin at around 2:00 p.m. and laid down for a nap.  He was unable to be aroused after his nap.  EMS was activated.  When they arrived he had axillary temperature of 106°.  ER physician spoke with the urologist who recommended including gentamicin in the abx regimen.  No general anesthesia was utilized nor did he have any medications that could contribute to possible malignant hypothermia.     In the emergency department routine laboratory studies were obtained as well as urinalysis.  CT abdomen pelvis were also performed.  He also received CT of the head and a lumbar puncture which was significant for markedly elevated opening pressure.  Urinalysis revealed Evidence of urinary tract infection with a clinical picture of suspected bacteremia resulting in severe sepsis with infectious encephalopathy. Sepsis protocol initiated.  Lastly, rapid flu was also positive.  He was started on Tamiflu.    Hospital Course:  Eric Jackson is a 79 yo man with CAD, diabetes, anxiety/depression, GERD, HTN, and ED s/p transrectal ultrasound of prostate with insertion of gold fiducial marker on 2/26/2019 who presented with  altered mental status.  Patient had prostate procedure on Tuesday.  On Wednesday he started to feel ill and by this afternoon he developed severe altered mental status and became essentially unresponsive.  The wife reports that he was given Motrin at around 2:00 p.m. and laid down for a nap.  He was unable to be aroused after his nap.  EMS was activated.  When they arrived he had axillary temperature of 106°.   The patient was found to have E-coli UTI with likely E-coli bacteremia.  Urology and GI were consulted. The patient initially went to the ICU but was transferred out on 3/1. Repeat blood cultures were ordered on 3/2/19. ID recommended Meropenem. Cards evaluated the patient as well for non-stemi.    Interval History: No new issues.   Review of Systems   Constitutional: Negative for activity change.   HENT: Negative for congestion.    Respiratory: Negative for chest tightness and shortness of breath.    Cardiovascular: Negative for chest pain.   Gastrointestinal: Negative for abdominal pain.   Genitourinary: Negative for difficulty urinating.     Objective:     Vital Signs (Most Recent):  Temp: 98.2 °F (36.8 °C) (03/03/19 0729)  Pulse: (!) 53 (03/03/19 0729)  Resp: 16 (03/03/19 0729)  BP: (!) 145/65 (03/03/19 0729)  SpO2: 98 % (03/03/19 0729) Vital Signs (24h Range):  Temp:  [97.2 °F (36.2 °C)-98.2 °F (36.8 °C)] 98.2 °F (36.8 °C)  Pulse:  [52-59] 53  Resp:  [16-18] 16  SpO2:  [98 %-100 %] 98 %  BP: (121-145)/(65-76) 145/65     Weight: 82.6 kg (182 lb)  Body mass index is 25.38 kg/m².    Intake/Output Summary (Last 24 hours) at 3/3/2019 1037  Last data filed at 3/3/2019 0600  Gross per 24 hour   Intake 580 ml   Output 1330 ml   Net -750 ml      Physical Exam   Constitutional: He is oriented to person, place, and time. He appears well-developed and well-nourished.   HENT:   Head: Normocephalic and atraumatic.   Neurological: He is alert and oriented to person, place, and time.   Skin: Skin is warm and dry.    Psychiatric: He has a normal mood and affect. His behavior is normal.   Nursing note and vitals reviewed.      Significant Labs:   BMP:   Recent Labs   Lab 03/02/19  0634 03/03/19  0533   * 100    142   K 4.8 4.4   * 112*   CO2 24 24   BUN 20 24*   CREATININE 1.2 1.1   CALCIUM 8.6* 8.4*   MG 2.7*  --      CBC:   Recent Labs   Lab 03/02/19  0634   WBC 10.34   HGB 13.1*   HCT 39.8*   PLT 62*       Significant Imaging:     Assessment/Plan:      * Non-STEMI (non-ST elevated myocardial infarction)    Cards evaluated.  Plan for [facility] on Monday if stable.           Sepsis secondary to UTI    E-coli with resistance. Likely E-coli Bacteremia as well. Repeated blood cultures today.  Continue Meropenem for now. ID recs.   This was likely due to recent urologic procedure.          Urinary retention           Coronary artery disease    Cards following.          Acute prostatitis    As above.          Infectious encephalopathy    Resolved.          Fever 106 degrees F or over    From above. Resolved.          Prostate cancer 2/25/19 Transrectal ultrasound of prostate and gold fiducial marker placement    Follow urology recs.          Diabetes mellitus type II, controlled    No other known manifestations        Essential hypertension    No acute issues.            VTE Risk Mitigation (From admission, onward)        Ordered     Place LINA hose  Until discontinued      03/01/19 0531     IP VTE HIGH RISK PATIENT  Once      02/28/19 2202        ID recs and to [facility] Monday.   Home likely Tuesday         Dave Starr MD  Department of Hospital Medicine   [facility]

## 2019-03-03 NOTE — SUBJECTIVE & OBJECTIVE
Interval History: No new issues.   Review of Systems   Constitutional: Negative for activity change.   HENT: Negative for congestion.    Respiratory: Negative for chest tightness and shortness of breath.    Cardiovascular: Negative for chest pain.   Gastrointestinal: Negative for abdominal pain.   Genitourinary: Negative for difficulty urinating.     Objective:     Vital Signs (Most Recent):  Temp: 98.2 °F (36.8 °C) (03/03/19 0729)  Pulse: (!) 53 (03/03/19 0729)  Resp: 16 (03/03/19 0729)  BP: (!) 145/65 (03/03/19 0729)  SpO2: 98 % (03/03/19 0729) Vital Signs (24h Range):  Temp:  [97.2 °F (36.2 °C)-98.2 °F (36.8 °C)] 98.2 °F (36.8 °C)  Pulse:  [52-59] 53  Resp:  [16-18] 16  SpO2:  [98 %-100 %] 98 %  BP: (121-145)/(65-76) 145/65     Weight: 82.6 kg (182 lb)  Body mass index is 25.38 kg/m².    Intake/Output Summary (Last 24 hours) at 3/3/2019 1037  Last data filed at 3/3/2019 0600  Gross per 24 hour   Intake 580 ml   Output 1330 ml   Net -750 ml      Physical Exam   Constitutional: He is oriented to person, place, and time. He appears well-developed and well-nourished.   HENT:   Head: Normocephalic and atraumatic.   Neurological: He is alert and oriented to person, place, and time.   Skin: Skin is warm and dry.   Psychiatric: He has a normal mood and affect. His behavior is normal.   Nursing note and vitals reviewed.      Significant Labs:   BMP:   Recent Labs   Lab 03/02/19  0634 03/03/19  0533   * 100    142   K 4.8 4.4   * 112*   CO2 24 24   BUN 20 24*   CREATININE 1.2 1.1   CALCIUM 8.6* 8.4*   MG 2.7*  --      CBC:   Recent Labs   Lab 03/02/19  0634   WBC 10.34   HGB 13.1*   HCT 39.8*   PLT 62*       Significant Imaging:

## 2019-03-03 NOTE — NURSING
Bedside report received from JUANJO Vernon. Patient is awake and alert. Patient appears to be in no apparent distress. Safety maintained. Will continue to monitor.

## 2019-03-03 NOTE — PLAN OF CARE
Problem: Infection  Goal: Infection Symptom Resolution    Intervention: Prevent or Manage Infection   03/03/19 0603   Manage Diarrhea   Isolation Precautions contact precautions maintained;droplet precautions maintained

## 2019-03-03 NOTE — NURSING
End of shift bedside report given to EMANUEL Vernon. Patient in no apparent distress.     12 hour chart check complete.

## 2019-03-03 NOTE — SUBJECTIVE & OBJECTIVE
Interval History:  No chest pain shortness of breath is stable.  Feels back to baseline terms of mental status.  Agreeable for heart catheterization in a.m.    Telemetry:  Normal sinus rhythm    Review of Systems   All other systems reviewed and are negative.    Objective:     Vital Signs (Most Recent):  Temp: 97.8 °F (36.6 °C) (03/03/19 1126)  Pulse: (!) 58 (03/03/19 1126)  Resp: 17 (03/03/19 1126)  BP: (!) 147/77 (03/03/19 1126)  SpO2: 99 % (03/03/19 1126) Vital Signs (24h Range):  Temp:  [97.2 °F (36.2 °C)-98.2 °F (36.8 °C)] 97.8 °F (36.6 °C)  Pulse:  [52-59] 58  Resp:  [16-18] 17  SpO2:  [98 %-100 %] 99 %  BP: (121-147)/(65-77) 147/77     Weight: 82.6 kg (182 lb)  Body mass index is 25.38 kg/m².     SpO2: 99 %  O2 Device (Oxygen Therapy): nasal cannula      Intake/Output Summary (Last 24 hours) at 3/3/2019 1127  Last data filed at 3/3/2019 0600  Gross per 24 hour   Intake 580 ml   Output 1330 ml   Net -750 ml       Lines/Drains/Airways     Drain                 Urethral Catheter 02/28/19 1747 Straight-tip 18 Fr. 2 days          Airway                 Airway - Non-Surgical 02/26/19 1241 Nasal Cannula 4 days          Peripheral Intravenous Line                 Peripheral IV - Single Lumen 02/28/19 1700 Left Antecubital 2 days         Peripheral IV - Single Lumen 02/28/19 1723 Right Forearm 2 days         Peripheral IV - Single Lumen 02/28/19 1905 Right Antecubital 2 days                Physical Exam   Constitutional: He is oriented to person, place, and time. He appears well-developed and well-nourished. No distress.   HENT:   Head: Normocephalic and atraumatic.   Eyes: Conjunctivae and EOM are normal. Pupils are equal, round, and reactive to light.   Neck: Normal range of motion. Neck supple. No thyromegaly present.   Cardiovascular: Normal rate, regular rhythm and normal heart sounds.   No murmur heard.  Pulmonary/Chest: Effort normal and breath sounds normal. No respiratory distress. He has no wheezes. He has  no rales. He exhibits no tenderness.   Abdominal: Soft. Bowel sounds are normal.   Musculoskeletal: He exhibits no edema.   Neurological: He is alert and oriented to person, place, and time.   Skin: Skin is warm and dry.   Psychiatric: He has a normal mood and affect. His behavior is normal.       Significant Labs:   BMP:   Recent Labs   Lab 03/02/19  0634 03/03/19  0533   * 100    142   K 4.8 4.4   * 112*   CO2 24 24   BUN 20 24*   CREATININE 1.2 1.1   CALCIUM 8.6* 8.4*   MG 2.7*  --    , CBC   Recent Labs   Lab 03/02/19  0634   WBC 10.34   HGB 13.1*   HCT 39.8*   PLT 62*   , INR No results for input(s): INR, PROTIME in the last 48 hours., Lipid Panel No results for input(s): CHOL, HDL, LDLCALC, TRIG, CHOLHDL in the last 48 hours. and Troponin No results for input(s): TROPONINI in the last 48 hours.    Significant Imaging: Echocardiogram:   Transthoracic echo (TTE) complete (Cupid Only):   Results for orders placed or performed during the hospital encounter of 02/28/19   Transthoracic echo (TTE) complete (Cupid Only)   Result Value Ref Range    BSA 2.03 m2    LA WIDTH 3.82 cm    AORTIC VALVE CUSP SEPERATION 1.44 cm    PV PEAK VELOCITY 0.75 cm/s    LVIDD 5.03 3.5 - 6.0 cm    IVS 0.69 0.6 - 1.1 cm    PW 1.03 0.6 - 1.1 cm    Ao root annulus 3.13 cm    LVIDS 3.67 2.1 - 4.0 cm    FS 27 28 - 44 %    LA volume 54.19 cm3    Sinus 3.02 cm    STJ 2.80 cm    Ascending aorta 2.73 cm    LV mass 150.60 g    LA size 3.54 cm    RVDD 3.68 cm    TAPSE 1.15 cm    RV S' 5.01 m/s    Left Ventricle Relative Wall Thickness 0.41 cm    AV mean gradient 5.65 mmHg    AV valve area 1.85 cm2    AV Velocity Ratio 0.42     AV index (prosthetic) 0.43     E/A ratio 0.91     E wave decelartion time 212.24 msec    IVRT 0.09 msec    LVOT diameter 2.35 cm    LVOT area 4.34 cm2    LVOT peak gurpreet [id number] m/s    LVOT peak VTI 15.18 cm    Ao peak gurpreet 1.80 m/s    Ao VTI 35.56 cm    LVOT stroke volume 65.81 cm3    AV peak gradient  12.96 mmHg    MV Peak E Zackary 0.73 m/s    TR Max Zackary 2.33 m/s    MV Peak A Zackary 0.80 m/s    LV Systolic Volume 57.01 mL    LV Systolic Volume Index 28.1 mL/m2    LV Diastolic Volume 119.82 mL    LV Diastolic Volume Index 59.14 mL/m2    LA Volume Index 26.7 mL/m2    LV Mass Index 74.3 g/m2    RA Major Axis 4.41 cm    Left Atrium Minor Axis 4.75 cm    Left Atrium Major Axis 4.68 cm    Triscuspid Valve Regurgitation Peak Gradient 21.72 mmHg    RA Width 4.07 cm     · Normal left ventricular systolic function. The estimated ejection fraction is 55%    * all labs reviewed and echocardiogram personally interpreted

## 2019-03-03 NOTE — HOSPITAL COURSE
3-1:  Admitted to the ICU with altered mental status, UTI/sepsis and troponin elevation consistent with non STEMI  3-2:  Transferred to telemetry  3/4/19: cath with patent [id number], ?culprit in small Diag, no PCI performed (?demand MI)    Interval Hx: no cp/sob/palps/LH.  Case d/w family at bedside.    Tele: SR (personally reviewed and interpreted)

## 2019-03-04 PROBLEM — I21.4 NSTEMI (NON-ST ELEVATED MYOCARDIAL INFARCTION): Status: ACTIVE | Noted: 2019-03-04

## 2019-03-04 LAB
ALBUMIN SERPL BCP-MCNC: 2.8 G/DL
ALP SERPL-CCNC: 55 U/L
ALT SERPL W/O P-5'-P-CCNC: 54 U/L
ANION GAP SERPL CALC-SCNC: 6 MMOL/L
AST SERPL-CCNC: 35 U/L
BILIRUB SERPL-MCNC: 0.7 MG/DL
BUN SERPL-MCNC: 19 MG/DL
CALCIUM SERPL-MCNC: 8.5 MG/DL
CHLORIDE SERPL-SCNC: 109 MMOL/L
CO2 SERPL-SCNC: 25 MMOL/L
CREAT SERPL-MCNC: 0.9 MG/DL
EST. GFR  (AFRICAN AMERICAN): >60 ML/MIN/1.73 M^2
EST. GFR  (NON AFRICAN AMERICAN): >60 ML/MIN/1.73 M^2
GLUCOSE SERPL-MCNC: 109 MG/DL
OHS CV CATH LVEDP PRE: 22
POCT GLUCOSE: 108 MG/DL (ref 70–110)
POCT GLUCOSE: 109 MG/DL (ref 70–110)
POCT GLUCOSE: 117 MG/DL (ref 70–110)
POCT GLUCOSE: 150 MG/DL (ref 70–110)
POTASSIUM SERPL-SCNC: 4 MMOL/L
PROT SERPL-MCNC: 5.7 G/DL
SODIUM SERPL-SCNC: 140 MMOL/L

## 2019-03-04 PROCEDURE — C1894 INTRO/SHEATH, NON-LASER: HCPCS | Mod: HCNC | Performed by: INTERNAL MEDICINE

## 2019-03-04 PROCEDURE — 25000003 PHARM REV CODE 250: Mod: HCNC | Performed by: INTERNAL MEDICINE

## 2019-03-04 PROCEDURE — 99233 PR SUBSEQUENT HOSPITAL CARE,LEVL III: ICD-10-PCS | Mod: HCNC,,, | Performed by: INTERNAL MEDICINE

## 2019-03-04 PROCEDURE — 99152 MOD SED SAME PHYS/QHP 5/>YRS: CPT | Mod: HCNC,,, | Performed by: INTERNAL MEDICINE

## 2019-03-04 PROCEDURE — 63600175 PHARM REV CODE 636 W HCPCS: Mod: HCNC | Performed by: INTERNAL MEDICINE

## 2019-03-04 PROCEDURE — 99233 PR SUBSEQUENT HOSPITAL CARE,LEVL III: ICD-10-PCS | Mod: HCNC,,, | Performed by: PHYSICIAN ASSISTANT

## 2019-03-04 PROCEDURE — 80053 COMPREHEN METABOLIC PANEL: CPT | Mod: HCNC

## 2019-03-04 PROCEDURE — 99233 SBSQ HOSP IP/OBS HIGH 50: CPT | Mod: HCNC,,, | Performed by: UROLOGY

## 2019-03-04 PROCEDURE — 99152 MOD SED SAME PHYS/QHP 5/>YRS: CPT | Mod: HCNC | Performed by: INTERNAL MEDICINE

## 2019-03-04 PROCEDURE — 99233 SBSQ HOSP IP/OBS HIGH 50: CPT | Mod: HCNC,,, | Performed by: PHYSICIAN ASSISTANT

## 2019-03-04 PROCEDURE — 99153 MOD SED SAME PHYS/QHP EA: CPT | Mod: HCNC | Performed by: INTERNAL MEDICINE

## 2019-03-04 PROCEDURE — 99152 PR MOD CONSCIOUS SEDATION, SAME PHYS, 5+ YRS, FIRST 15 MIN: ICD-10-PCS | Mod: HCNC,,, | Performed by: INTERNAL MEDICINE

## 2019-03-04 PROCEDURE — 36415 COLL VENOUS BLD VENIPUNCTURE: CPT | Mod: HCNC

## 2019-03-04 PROCEDURE — C1769 GUIDE WIRE: HCPCS | Mod: HCNC | Performed by: INTERNAL MEDICINE

## 2019-03-04 PROCEDURE — 25500020 PHARM REV CODE 255: Mod: HCNC | Performed by: INTERNAL MEDICINE

## 2019-03-04 PROCEDURE — 93459 L HRT ART/GRFT ANGIO: CPT | Mod: 26,HCNC,, | Performed by: INTERNAL MEDICINE

## 2019-03-04 PROCEDURE — 99233 PR SUBSEQUENT HOSPITAL CARE,LEVL III: ICD-10-PCS | Mod: HCNC,,, | Performed by: UROLOGY

## 2019-03-04 PROCEDURE — 21400001 HC TELEMETRY ROOM: Mod: HCNC

## 2019-03-04 PROCEDURE — 94761 N-INVAS EAR/PLS OXIMETRY MLT: CPT | Mod: HCNC

## 2019-03-04 PROCEDURE — 63600175 PHARM REV CODE 636 W HCPCS: Mod: HCNC | Performed by: PHYSICIAN ASSISTANT

## 2019-03-04 PROCEDURE — 93459 L HRT ART/GRFT ANGIO: CPT | Mod: HCNC | Performed by: INTERNAL MEDICINE

## 2019-03-04 PROCEDURE — 99233 SBSQ HOSP IP/OBS HIGH 50: CPT | Mod: HCNC,,, | Performed by: INTERNAL MEDICINE

## 2019-03-04 PROCEDURE — 93459: ICD-10-PCS | Mod: 26,HCNC,, | Performed by: INTERNAL MEDICINE

## 2019-03-04 RX ORDER — HYDRALAZINE HYDROCHLORIDE 25 MG/1
25 TABLET, FILM COATED ORAL EVERY 8 HOURS
Status: DISCONTINUED | OUTPATIENT
Start: 2019-03-04 | End: 2019-03-04

## 2019-03-04 RX ORDER — CLOPIDOGREL BISULFATE 75 MG/1
75 TABLET ORAL DAILY
Status: DISCONTINUED | OUTPATIENT
Start: 2019-03-05 | End: 2019-03-06 | Stop reason: HOSPADM

## 2019-03-04 RX ORDER — CLOPIDOGREL 300 MG/1
600 TABLET, FILM COATED ORAL ONCE
Status: COMPLETED | OUTPATIENT
Start: 2019-03-04 | End: 2019-03-04

## 2019-03-04 RX ORDER — FENTANYL CITRATE 50 UG/ML
INJECTION, SOLUTION INTRAMUSCULAR; INTRAVENOUS
Status: DISCONTINUED | OUTPATIENT
Start: 2019-03-04 | End: 2019-03-04 | Stop reason: HOSPADM

## 2019-03-04 RX ORDER — LOSARTAN POTASSIUM 25 MG/1
25 TABLET ORAL DAILY
Status: DISCONTINUED | OUTPATIENT
Start: 2019-03-04 | End: 2019-03-05

## 2019-03-04 RX ORDER — MIDAZOLAM HYDROCHLORIDE 1 MG/ML
INJECTION, SOLUTION INTRAMUSCULAR; INTRAVENOUS
Status: DISCONTINUED | OUTPATIENT
Start: 2019-03-04 | End: 2019-03-04 | Stop reason: HOSPADM

## 2019-03-04 RX ORDER — ACETAMINOPHEN 325 MG/1
650 TABLET ORAL EVERY 4 HOURS PRN
Status: DISCONTINUED | OUTPATIENT
Start: 2019-03-04 | End: 2019-03-06 | Stop reason: HOSPADM

## 2019-03-04 RX ORDER — LIDOCAINE HYDROCHLORIDE 10 MG/ML
INJECTION, SOLUTION EPIDURAL; INFILTRATION; INTRACAUDAL; PERINEURAL
Status: DISCONTINUED | OUTPATIENT
Start: 2019-03-04 | End: 2019-03-04 | Stop reason: HOSPADM

## 2019-03-04 RX ORDER — MORPHINE SULFATE 10 MG/ML
2 INJECTION INTRAMUSCULAR; INTRAVENOUS; SUBCUTANEOUS EVERY 10 MIN PRN
Status: DISCONTINUED | OUTPATIENT
Start: 2019-03-04 | End: 2019-03-06 | Stop reason: HOSPADM

## 2019-03-04 RX ORDER — ATROPINE SULFATE 0.1 MG/ML
0.5 INJECTION INTRAVENOUS
Status: DISCONTINUED | OUTPATIENT
Start: 2019-03-04 | End: 2019-03-06 | Stop reason: HOSPADM

## 2019-03-04 RX ORDER — HYDROCODONE BITARTRATE AND ACETAMINOPHEN 5; 325 MG/1; MG/1
1 TABLET ORAL EVERY 4 HOURS PRN
Status: DISCONTINUED | OUTPATIENT
Start: 2019-03-04 | End: 2019-03-06 | Stop reason: HOSPADM

## 2019-03-04 RX ORDER — SODIUM CHLORIDE 9 MG/ML
INJECTION, SOLUTION INTRAVENOUS CONTINUOUS
Status: ACTIVE | OUTPATIENT
Start: 2019-03-04 | End: 2019-03-04

## 2019-03-04 RX ORDER — HYDRALAZINE HYDROCHLORIDE 25 MG/1
50 TABLET, FILM COATED ORAL EVERY 8 HOURS
Status: DISCONTINUED | OUTPATIENT
Start: 2019-03-04 | End: 2019-03-06 | Stop reason: HOSPADM

## 2019-03-04 RX ADMIN — OSELTAMIVIR PHOSPHATE 75 MG: 75 CAPSULE ORAL at 08:03

## 2019-03-04 RX ADMIN — CLOPIDOGREL BISULFATE 600 MG: 300 TABLET, FILM COATED ORAL at 07:03

## 2019-03-04 RX ADMIN — LOSARTAN POTASSIUM 25 MG: 25 TABLET, FILM COATED ORAL at 08:03

## 2019-03-04 RX ADMIN — MIRTAZAPINE 30 MG: 15 TABLET, FILM COATED ORAL at 08:03

## 2019-03-04 RX ADMIN — ASPIRIN 81 MG 81 MG: 81 TABLET ORAL at 08:03

## 2019-03-04 RX ADMIN — HYDRALAZINE HYDROCHLORIDE 25 MG: 25 TABLET ORAL at 12:03

## 2019-03-04 RX ADMIN — ENOXAPARIN SODIUM 40 MG: 100 INJECTION SUBCUTANEOUS at 05:03

## 2019-03-04 RX ADMIN — HYDRALAZINE HYDROCHLORIDE 25 MG: 25 TABLET ORAL at 04:03

## 2019-03-04 RX ADMIN — CEFTRIAXONE 2 G: 2 INJECTION, SOLUTION INTRAVENOUS at 08:03

## 2019-03-04 RX ADMIN — METOPROLOL SUCCINATE 12.5 MG: 25 TABLET, EXTENDED RELEASE ORAL at 08:03

## 2019-03-04 RX ADMIN — SODIUM CHLORIDE: 0.9 INJECTION, SOLUTION INTRAVENOUS at 07:03

## 2019-03-04 RX ADMIN — HYDRALAZINE HYDROCHLORIDE 50 MG: 25 TABLET ORAL at 10:03

## 2019-03-04 RX ADMIN — SODIUM CHLORIDE 250 ML: 0.9 INJECTION, SOLUTION INTRAVENOUS at 11:03

## 2019-03-04 RX ADMIN — VENLAFAXINE HYDROCHLORIDE 75 MG: 75 CAPSULE, EXTENDED RELEASE ORAL at 08:03

## 2019-03-04 NOTE — SUBJECTIVE & OBJECTIVE
Review of Systems   Gastrointestinal: Negative for melena.   Genitourinary: Negative for hematuria.     Objective:     Vital Signs (Most Recent):  Temp: 97.6 °F (36.4 °C) (03/04/19 0319)  Pulse: 64 (03/04/19 0319)  Resp: 18 (03/04/19 0319)  BP: (!) 165/79 (03/04/19 0319)  SpO2: 95 % (03/04/19 0319) Vital Signs (24h Range):  Temp:  [97.5 °F (36.4 °C)-98.3 °F (36.8 °C)] 97.6 °F (36.4 °C)  Pulse:  [53-64] 64  Resp:  [16-18] 18  SpO2:  [95 %-99 %] 95 %  BP: (137-165)/(65-83) 165/79     Weight: 85 kg (187 lb 6.3 oz)  Body mass index is 26.14 kg/m².     SpO2: 95 %  O2 Device (Oxygen Therapy): room air      Intake/Output Summary (Last 24 hours) at 3/4/2019 0658  Last data filed at 3/3/2019 2324  Gross per 24 hour   Intake 720 ml   Output 1150 ml   Net -430 ml       Lines/Drains/Airways     Drain                 Urethral Catheter 02/28/19 1747 Straight-tip 18 Fr. 3 days          Airway                 Airway - Non-Surgical 02/26/19 1241 Nasal Cannula 5 days          Peripheral Intravenous Line                 Peripheral IV - Single Lumen 02/28/19 1700 Left Antecubital 3 days         Peripheral IV - Single Lumen 02/28/19 1723 Right Forearm 3 days         Peripheral IV - Single Lumen 02/28/19 1905 Right Antecubital 3 days                Physical Exam   Constitutional: He is oriented to person, place, and time. He appears well-developed and well-nourished.   HENT:   Head: Normocephalic and atraumatic.   Eyes: Conjunctivae and EOM are normal. Pupils are equal, round, and reactive to light. No scleral icterus.   Neck: Normal range of motion. Neck supple. No JVD present. Carotid bruit is not present. No tracheal deviation present. No thyromegaly present.   Cardiovascular: Normal rate, regular rhythm, S1 normal and S2 normal. Exam reveals no gallop and no friction rub.   No murmur heard.  Pulmonary/Chest: Effort normal and breath sounds normal. No respiratory distress. He has no wheezes. He has no rales. He exhibits no  tenderness.   Abdominal: Soft. He exhibits no distension.   Musculoskeletal: Normal range of motion. He exhibits no edema.   Neurological: He is alert and oriented to person, place, and time. He has normal strength. No cranial nerve deficit.   Skin: Skin is warm and dry. No rash noted.   Psychiatric: He has a normal mood and affect. His behavior is normal.       Current Medications:   aspirin  81 mg Oral Daily    atorvastatin  40 mg Oral Daily    enoxaparin  40 mg Subcutaneous Daily    meropenem (MERREM) IVPB  1 g Intravenous Q8H    metoprolol succinate  12.5 mg Oral Daily    mirtazapine  30 mg Oral QHS    oseltamivir  75 mg Oral BID    PRESER VISION  (ORAL MULTI VITAMIN )  2 tablet Oral BID    venlafaxine  75 mg Oral Daily       dextrose 50%, dextrose 50%, glucagon (human recombinant), glucose, glucose, insulin aspart U-100, ondansetron, sodium chloride 0.9%, zolpidem    Laboratory (all labs reviewed):  CBC:  Recent Labs   Lab 05/24/18  1358 02/22/19  1455 02/28/19  1924 03/01/19  0419 03/02/19  0634   WHITE BLOOD CELL COUNT 7.11 7.25 6.04 9.23 10.34   HEMOGLOBIN 15.0 15.0 13.3 L 13.3 L 13.1 L   HEMATOCRIT 46.2 44.6 40.3 41.2 39.8 L   PLATELETS 217 232 57 L 53 L 62 L       CHEMISTRIES:  Recent Labs   Lab 01/15/18  1415  02/22/19  1455 02/28/19  1924 03/01/19  0419 03/02/19  0634 03/03/19  0533   GLUCOSE 142 H   < > 133 H 197 H 186 H 127 H 100   SODIUM 132 L   < > 141 135 L 133 L 142 142   POTASSIUM 4.7   < > 4.5 3.0 L 4.6 4.8 4.4   BUN BLD 12   < > 18 18 17 20 24 H   CREATININE 1.1   < > 1.4 1.6 H 1.3 1.2 1.1   EGFR IF  >60   < > 56 A 48 A >60 >60 >60   EGFR IF NON- >60   < > 48 A 41 A 53 A 58 A >60   CALCIUM 9.4   < > 10.0 8.2 L 8.2 L 8.6 L 8.4 L   MAGNESIUM 2.2  --   --  1.5 L 2.6 2.7 H  --     < > = values in this interval not displayed.       CARDIAC BIOMARKERS:  Recent Labs   Lab 01/15/18  1415 02/28/19  1727 02/28/19  1924 03/01/19  0419 03/01/19  1003   CPK  --  404 H  405 H  --   --    TROPONIN I 0.008  --  0.915 H 2.500 H 1.673 H       COAGS:  Recent Labs   Lab 01/15/18  1415 02/28/19  1924   INR 1.1 1.2       LIPIDS/LFTS:  Recent Labs   Lab 01/06/17  0947  10/17/18 02/22/19  1455 02/28/19  1924 03/01/19  0419 03/02/19  0634 03/03/19  0533   CHOLESTEROL 271 H  271 H  --  265 A  --   --   --   --   --    TRIGLYCERIDES 169 H  169 H  --  153  --   --   --   --   --    HDL 59  59  --  52  --   --   --   --   --    LDL CALC  --   --  182 A  --   --   --   --   --    LDL CHOLESTEROL 178.2 H  178.2 H  --   --   --   --   --   --   --    NON-HDL CHOLESTEROL 212  212  --   --   --   --   --   --   --    AST 24   < >  --  24 66 H 119 H 80 H 49 H   ALT 20   < >  --  20 56 H 95 H 95 H 67 H    < > = values in this interval not displayed.       BNP:  Recent Labs   Lab 02/28/19 1924    H       TSH:  Recent Labs   Lab 01/06/17  0947 01/02/18  1323 02/28/19 1924   TSH 1.560 2.033 1.715       Free T4:  Recent Labs   Lab 01/02/18  1323   FREE T4 0.84       Diagnostic Results:  Echo: 3/1/19  · Normal left ventricular systolic function. The estimated ejection fraction is 55%    Cath: planned today

## 2019-03-04 NOTE — PROGRESS NOTES
[facility]  Cardiology  Progress Note    Patient Name: Eric Jackson  MRN: [id number]  Admission Date: 2/28/2019  Hospital Length of Stay: 4 days  Code Status: Full Code   Attending Physician: Dave Seo MD   Primary Care Physician: Samir Boo MD  Expected Discharge Date:   Principal Problem:Non-STEMI (non-ST elevated myocardial infarction)    Subjective:     Hospital Course:   3-1:  Admitted to the ICU with altered mental status, UTI/sepsis and troponin elevation consistent with non STEMI  3-2:  Transferred to telemetry    Interval Hx: no cp/sob.  Feeling back to baseline from MS perspective.  Case d/w RN and Dr. Seay.  Cath planned today.    Tele: SR, NSVT (personally reviewed and interpreted)        Review of Systems   Gastrointestinal: Negative for melena.   Genitourinary: Negative for hematuria.     Objective:     Vital Signs (Most Recent):  Temp: 97.6 °F (36.4 °C) (03/04/19 0319)  Pulse: 64 (03/04/19 0319)  Resp: 18 (03/04/19 0319)  BP: (!) 165/79 (03/04/19 0319)  SpO2: 95 % (03/04/19 0319) Vital Signs (24h Range):  Temp:  [97.5 °F (36.4 °C)-98.3 °F (36.8 °C)] 97.6 °F (36.4 °C)  Pulse:  [53-64] 64  Resp:  [16-18] 18  SpO2:  [95 %-99 %] 95 %  BP: (137-165)/(65-83) 165/79     Weight: 85 kg (187 lb 6.3 oz)  Body mass index is 26.14 kg/m².     SpO2: 95 %  O2 Device (Oxygen Therapy): room air      Intake/Output Summary (Last 24 hours) at 3/4/2019 0658  Last data filed at 3/3/2019 2324  Gross per 24 hour   Intake 720 ml   Output 1150 ml   Net -430 ml       Lines/Drains/Airways     Drain                 Urethral Catheter 02/28/19 1747 Straight-tip 18 Fr. 3 days          Airway                 Airway - Non-Surgical 02/26/19 1241 Nasal Cannula 5 days          Peripheral Intravenous Line                 Peripheral IV - Single Lumen 02/28/19 1700 Left Antecubital 3 days         Peripheral IV - Single Lumen 02/28/19 1723 Right Forearm 3 days         Peripheral IV - Single Lumen 02/28/19  1905 Right Antecubital 3 days                Physical Exam   Constitutional: He is oriented to person, place, and time. He appears well-developed and well-nourished.   HENT:   Head: Normocephalic and atraumatic.   Eyes: Conjunctivae and EOM are normal. Pupils are equal, round, and reactive to light. No scleral icterus.   Neck: Normal range of motion. Neck supple. No JVD present. Carotid bruit is not present. No tracheal deviation present. No thyromegaly present.   Cardiovascular: Normal rate, regular rhythm, S1 normal and S2 normal. Exam reveals no gallop and no friction rub.   No murmur heard.  Pulmonary/Chest: Effort normal and breath sounds normal. No respiratory distress. He has no wheezes. He has no rales. He exhibits no tenderness.   Abdominal: Soft. He exhibits no distension.   Musculoskeletal: Normal range of motion. He exhibits no edema.   Neurological: He is alert and oriented to person, place, and time. He has normal strength. No cranial nerve deficit.   Skin: Skin is warm and dry. No rash noted.   Psychiatric: He has a normal mood and affect. His behavior is normal.       Current Medications:   aspirin  81 mg Oral Daily    atorvastatin  40 mg Oral Daily    enoxaparin  40 mg Subcutaneous Daily    meropenem (MERREM) IVPB  1 g Intravenous Q8H    metoprolol succinate  12.5 mg Oral Daily    mirtazapine  30 mg Oral QHS    oseltamivir  75 mg Oral BID    PRESER VISION  (ORAL MULTI VITAMIN )  2 tablet Oral BID    venlafaxine  75 mg Oral Daily       dextrose 50%, dextrose 50%, glucagon (human recombinant), glucose, glucose, insulin aspart U-100, ondansetron, sodium chloride 0.9%, zolpidem    Laboratory (all labs reviewed):  CBC:  Recent Labs   Lab 05/24/18  1358 02/22/19  1455 02/28/19  1924 03/01/19  0419 03/02/19  0634   WHITE BLOOD CELL COUNT 7.11 7.25 6.04 9.23 10.34   HEMOGLOBIN 15.0 15.0 13.3 L 13.3 L 13.1 L   HEMATOCRIT 46.2 44.6 40.3 41.2 39.8 L   PLATELETS 217 232 57 L 53 L 62 L        CHEMISTRIES:  Recent Labs   Lab 01/15/18  1415  02/22/19  1455 02/28/19 1924 03/01/19  0419 03/02/19  0634 03/03/19  0533   GLUCOSE 142 H   < > 133 H 197 H 186 H 127 H 100   SODIUM 132 L   < > 141 135 L 133 L 142 142   POTASSIUM 4.7   < > 4.5 3.0 L 4.6 4.8 4.4   BUN BLD 12   < > 18 18 17 20 24 H   CREATININE 1.1   < > 1.4 1.6 H 1.3 1.2 1.1   EGFR IF  >60   < > 56 A 48 A >60 >60 >60   EGFR IF NON- >60   < > 48 A 41 A 53 A 58 A >60   CALCIUM 9.4   < > 10.0 8.2 L 8.2 L 8.6 L 8.4 L   MAGNESIUM 2.2  --   --  1.5 L 2.6 2.7 H  --     < > = values in this interval not displayed.       CARDIAC BIOMARKERS:  Recent Labs   Lab 01/15/18  1415 02/28/19  1727 02/28/19 1924 03/01/19 0419 03/01/19  1003   CPK  --  404 H 405 H  --   --    TROPONIN I 0.008  --  0.915 H 2.500 H 1.673 H       COAGS:  Recent Labs   Lab 01/15/18  1415 02/28/19 1924   INR 1.1 1.2       LIPIDS/LFTS:  Recent Labs   Lab 01/06/17  0947  10/17/18 02/22/19  1455 02/28/19 1924 03/01/19 0419 03/02/19  0634 03/03/19  0533   CHOLESTEROL 271 H  271 H  --  265 A  --   --   --   --   --    TRIGLYCERIDES 169 H  169 H  --  153  --   --   --   --   --    HDL 59  59  --  52  --   --   --   --   --    LDL CALC  --   --  182 A  --   --   --   --   --    LDL CHOLESTEROL 178.2 H  178.2 H  --   --   --   --   --   --   --    NON-HDL CHOLESTEROL 212  212  --   --   --   --   --   --   --    AST 24   < >  --  24 66 H 119 H 80 H 49 H   ALT 20   < >  --  20 56 H 95 H 95 H 67 H    < > = values in this interval not displayed.       BNP:  Recent Labs   Lab 02/28/19 1924    H       TSH:  Recent Labs   Lab 01/06/17  0947 01/02/18  1323 02/28/19  1924   TSH 1.560 2.033 1.715       Free T4:  Recent Labs   Lab 01/02/18  1323   FREE T4 0.84       Diagnostic Results:  Echo: 3/1/19  · Normal left ventricular systolic function. The estimated ejection fraction is 55%    Cath: planned today      Assessment and Plan:     * Non-STEMI (non-ST  elevated myocardial infarction)    Elevated troponin consistent with type 2 MI versus small plaque rupture  EF preserved  Optimize medicines for secondary prevention (statin allergy noted)  Load plavix  Cath today    Risks, benefits and alternatives of the catheterization procedure were discussed with the patient which include but are not limited to: bleeding, infection, death, heart attack, arrhythmia, kidney failure, stroke, need for emergency surgery, etc.  Patient understands and and agrees to proceed.  Consent was placed on the chart.       Coronary artery disease    History of CABG 2001  On no medicines for secondary prevention on admission  Resume med rx  Statin allergy noted     Diabetes mellitus type II, controlled    Management per IM     Essential hypertension    Resume losartan 25mg qd  Cont toprol XL     Sepsis secondary to UTI    On antibiotics per primary     Infectious encephalopathy    Improving.  Pt AAO x3 this am.         VTE Risk Mitigation (From admission, onward)        Ordered     enoxaparin injection 40 mg  Daily      03/03/19 1039     Place LINA hose  Until discontinued      03/01/19 0531     IP VTE HIGH RISK PATIENT  Once      02/28/19 2202          Jamir Nam MD  Cardiology  [facility] [place]

## 2019-03-04 NOTE — PLAN OF CARE
03/04/19 1134   Medicare Message   Important Message from Medicare regarding Discharge Appeal Rights Given to patient/caregiver;Explained to patient/caregiver;Other (comments)   Date IMM was signed 03/04/19   Time IMM was signed 1131

## 2019-03-04 NOTE — ASSESSMENT & PLAN NOTE
77 y/o male with CAD, DMII, HTN, underwetn transrectal ultrasound of prostate with gold fiducial marker 2/23/19 for prostate cancer presents with AMS found to have E.coli bacteremia 2/2 to UTI.     - discontinued meropenem, started ceftriaxone [id number].   - anticipate 6 weeks of IV abx to treat acute prostatitis given recent procedure (4/13/19)   - will need weekly cbc cmp sent to ID clinic at   - on Tamiflu based on influenza screen  - repeat blood cultures 3/2 NGTD

## 2019-03-04 NOTE — PLAN OF CARE
Problem: Infection  Goal: Infection Symptom Resolution    Intervention: Prevent or Manage Infection   03/04/19 1501   Manage Diarrhea   Isolation Precautions contact precautions maintained;droplet precautions maintained   Prevent or Manage Infection   Infection Management aseptic technique maintained

## 2019-03-04 NOTE — NURSING
End of shift bedside report given to JUANJO Mckenzie. Patient in no apparent distress.     12 hour chart check complete.

## 2019-03-04 NOTE — ASSESSMENT & PLAN NOTE
History of CABG 2001  On no medicines for secondary prevention on admission  Resume med rx  Statin allergy noted

## 2019-03-04 NOTE — ASSESSMENT & PLAN NOTE
Elevated troponin consistent with type 2 MI versus small plaque rupture  EF preserved  Optimize medicines for secondary prevention (statin allergy noted)  Load plavix  Cath today    Risks, benefits and alternatives of the catheterization procedure were discussed with the patient which include but are not limited to: bleeding, infection, death, heart attack, arrhythmia, kidney failure, stroke, need for emergency surgery, etc.  Patient understands and and agrees to proceed.  Consent was placed on the chart.

## 2019-03-04 NOTE — PROGRESS NOTES
[facility]  Infectious Disease  Progress Note    Patient Name: Eric Jackson  MRN: [id number]  Admission Date: 2/28/2019  Length of Stay: 4 days  Attending Physician: Dave Seo MD  Primary Care Provider: Samir Boo MD    Isolation Status: Special Contact and Droplet  Assessment/Plan:      Sepsis secondary to UTI    79 y/o male with CAD, DMII, HTN, underwetn transrectal ultrasound of prostate with gold fiducial marker 2/23/19 for prostate cancer presents with AMS found to have E.coli bacteremia 2/2 to UTI.     - discontinued meropenem, started ceftriaxone [id number].   - anticipate 6 weeks of IV abx to treat acute prostatitis given recent procedure (4/13/19)   - will need weekly cbc cmp sent to ID clinic at   - on Tamiflu based on influenza screen  - repeat blood cultures 3/2 NGTD         Thank you for your consult. I will sign off. Please contact us if you have any additional questions.    Vishnu Logan PA-C  Infectious Disease  [facility]  Pgr [phone]    Subjective:     Principal Problem:Non-STEMI (non-ST elevated myocardial infarction)    HPI: Eric Jackson is a 78 y.o. male that (in part)  has a past medical history of Anxiety, Coronary artery disease, Depression, Diabetes mellitus type II, Erectile dysfunction, Eye injuries, GERD (gastroesophageal reflux disease), and Hypertension.  has a past surgical history that includes Hernia repair; Wrist surgery; Coronary artery bypass graft (2001); Colonoscopy (N/A, 2/9/2018); and Transrectal ultrasound of prostate with insertion of gold fiducial marker (N/A, 2/26/2019). Presents to [facility] Emergency Department Presented with altered mental status.  Patient had prostate procedure on Tuesday.  On Wednesday he started to feel ill and by this afternoon he developed severe altered mental status and became essentially unresponsive.  The wife reports that he was given Motrin at around  2:00 p.m. and laid down for a nap.  He was unable to be aroused after his nap.  EMS was activated.  When they arrived he had axillary temperature of 106°.  Urology notes indicate the patient had prostate surgery this evening procedure.  ER physician spoke with the surgeon reports given the patient perioperative antibiotics including gentamicin.  No general anesthesia was utilized nor did he have any medications that could contribute to possible malignant hypothermia.     In the emergency department routine laboratory studies were obtained as well as urinalysis.  CT abdomen pelvis were also performed.  He also received CT of the head and a lumbar puncture which was significant for markedly elevated opening pressure.  Urinalysis revealed Evidence of urinary tract infection with a clinical picture of suspected bacteremia resulting in severe sepsis with infectious encephalopathy.  Sepsis protocol initiated.  Lastly, rapid flu was also positive.  He was started on Tamiflu, meropenem, ACV, and vancomycin. I am consulted for ID recs (I stopped the vancomycin and ACV based on his Gram negative sepsis).     The patient is lucid but his mental status waxes and wanes. He is feeling better.       Interval History:   NAEON  Repeat blood cultures NGTD  Feels much improved      Review of Systems   Constitutional: Negative for chills, diaphoresis, fatigue and fever.   Respiratory: Negative for cough and shortness of breath.    Cardiovascular: Negative for chest pain.   Gastrointestinal: Negative for abdominal pain, nausea and vomiting.   Genitourinary: Negative for dysuria.   Skin: Negative for wound.   Neurological: Negative for headaches.   All other systems reviewed and are negative.    Objective:     Vital Signs (Most Recent):  Temp: 98.5 °F (36.9 °C) (03/04/19 1530)  Pulse: (!) 58 (03/04/19 1530)  Resp: 16 (03/04/19 1530)  BP: (!) 164/76 (03/04/19 1530)  SpO2: 97 % (03/04/19 1530) Vital Signs (24h Range):  Temp:  [97.5 °F  (36.4 °C)-98.5 °F (36.9 °C)] 98.5 °F (36.9 °C)  Pulse:  [48-64] 58  Resp:  [16-18] 16  SpO2:  [94 %-97 %] 97 %  BP: (137-179)/(72-84) 164/76     Weight: 85 kg (187 lb 6.3 oz)  Body mass index is 26.14 kg/m².    Estimated Creatinine Clearance: 74.4 mL/min (based on SCr of 0.9 mg/dL).    Physical Exam   Constitutional: He appears well-developed and well-nourished. No distress.   HENT:   Head: Normocephalic and atraumatic.   Right Ear: External ear normal.   Left Ear: External ear normal.   Eyes: Conjunctivae and EOM are normal. Pupils are equal, round, and reactive to light.   Pulmonary/Chest: Effort normal and breath sounds normal. No respiratory distress.   Abdominal: Soft. Bowel sounds are normal. He exhibits no distension. There is no tenderness.   Musculoskeletal: Normal range of motion. He exhibits no tenderness or deformity.   Neurological: He is alert.   Skin: Skin is warm. No rash noted. He is not diaphoretic. No erythema.   Nursing note and vitals reviewed.      Significant Labs:   Blood Culture:   Recent Labs   Lab 02/28/19  1747 02/28/19  1755 03/02/19  1244 03/02/19  1300   LABBLOO Gram stain michelle bottle: Gram negative rods  Results called to and read back by: Saima Harrison 03/01/2019  06:30  Gram stain aer bottle: Gram negative rods   Positive results previously called  ESCHERICHIA COLI  For susceptibility see order #[id number]   Gram stain michelle bottle: Gram negative rods  Results called to and read back by: Saima Harrison 03/01/2019  06:30  Gram stain aer bottle: Gram negative rods   Positive results previously called  ESCHERICHIA COLI No Growth to date  No Growth to date  No Growth to date No Growth to date  No Growth to date  No Growth to date     CBC: No results for input(s): WBC, HGB, HCT, PLT in the last 48 hours.  CMP:   Recent Labs   Lab 03/03/19  0533 03/04/19  0547    140   K 4.4 4.0   * 109   CO2 24 25    109   BUN 24* 19   CREATININE 1.1 0.9   CALCIUM 8.4* 8.5*    PROT 5.5* 5.7*   ALBUMIN 2.7* 2.8*   BILITOT 0.6 0.7   ALKPHOS 60 55   AST 49* 35   ALT 67* 54*   ANIONGAP 6* 6*   EGFRNONAA >60 >60     Urine Culture:   Recent Labs   Lab 02/28/19  1759   LABURIN ESCHERICHIA COLI  10,000 - 49,999 cfu/ml       Urine Studies:   Recent Labs   Lab 02/28/19  1759   COLORU Red*   APPEARANCEUA Cloudy*   PHUR 7.0   SPECGRAV 1.015   PROTEINUA 2+*   GLUCUA 1+*   KETONESU Negative   BILIRUBINUA Negative   OCCULTUA 3+*   NITRITE Negative   UROBILINOGEN Negative   LEUKOCYTESUR 2+*   RBCUA >100*   WBCUA 25*   BACTERIA Few*   SQUAMEPITHEL 3   HYALINECASTS 0     Wound Culture: No results for input(s): LABAERO in the last 4320 hours.  All pertinent labs within the past 24 hours have been reviewed.    Significant Imaging: I have reviewed all pertinent imaging results/findings within the past 24 hours.

## 2019-03-04 NOTE — PROGRESS NOTES
[facility] Hospital Medicine  Progress Note    Patient Name: Eric Jackson  MRN: [id number]  Patient Class: IP- Inpatient   Admission Date: 2/28/2019  Length of Stay: 4 days  Attending Physician: Dave Seo MD  Primary Care Provider: Samir Boo MD        Subjective:     Principal Problem:Non-STEMI (non-ST elevated myocardial infarction)    HPI:  Eric Jackson is a 77 yo man with CAD, diabetes, anxiety/depression, GERD, HTN, and ED s/p transrectal ultrasound of prostate with insertion of gold fiducial marker on 2/26/2019 who presented with altered mental status.  Patient had prostate procedure on Tuesday.  On Wednesday he started to feel ill and by this afternoon he developed severe altered mental status and became essentially unresponsive.  The wife reports that he was given Motrin at around 2:00 p.m. and laid down for a nap.  He was unable to be aroused after his nap.  EMS was activated.  When they arrived he had axillary temperature of 106°.  ER physician spoke with the urologist who recommended including gentamicin in the abx regimen.  No general anesthesia was utilized nor did he have any medications that could contribute to possible malignant hypothermia.     In the emergency department routine laboratory studies were obtained as well as urinalysis.  CT abdomen pelvis were also performed.  He also received CT of the head and a lumbar puncture which was significant for markedly elevated opening pressure.  Urinalysis revealed Evidence of urinary tract infection with a clinical picture of suspected bacteremia resulting in severe sepsis with infectious encephalopathy. Sepsis protocol initiated.  Lastly, rapid flu was also positive.  He was started on Tamiflu.    Hospital Course:  Eric Jackson is a 77 yo man with CAD, diabetes, anxiety/depression, GERD, HTN, and ED s/p transrectal ultrasound of prostate with insertion of gold fiducial marker on 2/26/2019 who presented with  altered mental status.  Patient had prostate procedure on Tuesday.  On Wednesday he started to feel ill and by this afternoon he developed severe altered mental status and became essentially unresponsive.  The wife reports that he was given Motrin at around 2:00 p.m. and laid down for a nap.  He was unable to be aroused after his nap.  EMS was activated.  When they arrived he had axillary temperature of 106°.   The patient was found to have E-coli UTI with likely E-coli bacteremia.  Urology and GI were consulted. The patient initially went to the ICU but was transferred out on 3/1. Repeat blood cultures were ordered on 3/2/19. ID recommended Meropenem. Cards evaluated the patient as well for non-stemi and took for LHC on 3/4/19.    Interval History: No new issues.       Review of Systems   Constitutional: Negative for activity change.   HENT: Negative for congestion.    Respiratory: Negative for chest tightness and shortness of breath.    Cardiovascular: Negative for chest pain.   Gastrointestinal: Negative for abdominal pain.   Genitourinary: Negative for difficulty urinating.     Objective:     Vital Signs (Most Recent):  Temp: 97.8 °F (36.6 °C) (03/04/19 0713)  Pulse: (!) 54 (03/04/19 0713)  Resp: 18 (03/04/19 0713)  BP: (!) 179/84 (03/04/19 0713)  SpO2: 96 % (03/04/19 0713) Vital Signs (24h Range):  Temp:  [97.5 °F (36.4 °C)-98.3 °F (36.8 °C)] 97.8 °F (36.6 °C)  Pulse:  [54-64] 54  Resp:  [17-18] 18  SpO2:  [95 %-99 %] 96 %  BP: (137-179)/(72-84) 179/84     Weight: 85 kg (187 lb 6.3 oz)  Body mass index is 26.14 kg/m².    Intake/Output Summary (Last 24 hours) at 3/4/2019 1023  Last data filed at 3/4/2019 0710  Gross per 24 hour   Intake 480 ml   Output 1600 ml   Net -1120 ml      Physical Exam   Constitutional: He is oriented to person, place, and time. He appears well-developed and well-nourished.   HENT:   Head: Normocephalic and atraumatic.   Neurological: He is alert and oriented to person, place, and time.    Skin: Skin is warm and dry.   Psychiatric: He has a normal mood and affect. His behavior is normal.   Nursing note and vitals reviewed.      Significant Labs:   BMP:   Recent Labs   Lab 03/04/19  0547         K 4.0      CO2 25   BUN 19   CREATININE 0.9   CALCIUM 8.5*     CBC: No results for input(s): WBC, HGB, HCT, PLT in the last 48 hours.    Significant Imaging:     Assessment/Plan:      * Non-STEMI (non-ST elevated myocardial infarction)    Cards evaluated.  Plan for [facility] on Monday if stable.     [facility] today           Sepsis secondary to UTI    E-coli with resistance.  E-coli Bacteremia as well. Repeated blood cultures today.  Continue Meropenem for now. ID recs.   This was likely due to recent urologic procedure.     Ceftriaxone   2 weeks from 3/2.  Repeat blood cultures negative.        NSTEMI (non-ST elevated myocardial infarction)    [facility] today        Urinary retention           Coronary artery disease    Cards following.          Acute prostatitis    As above.          Infectious encephalopathy    Resolved.          Fever 106 degrees F or over    From above. Resolved.          Prostate cancer 2/25/19 Transrectal ultrasound of prostate and gold fiducial marker placement    Follow urology recs.          Diabetes mellitus type II, controlled    No other known manifestations        Essential hypertension    No acute issues.            VTE Risk Mitigation (From admission, onward)        Ordered     enoxaparin injection 40 mg  Daily      03/03/19 1039     Place LINA hose  Until discontinued      03/01/19 0531     IP VTE HIGH RISK PATIENT  Once      02/28/19 2202        Ok to resume diet once returns from [facility].         Dave Starr MD  Department of Hospital Medicine   [facility]

## 2019-03-04 NOTE — PROGRESS NOTES
[facility]  Urology  Progress Note    Patient Name: Eric Jackson  MRN: [id number]  Admission Date: 2/28/2019  Hospital Length of Stay: 4 days  Code Status: Full Code   Attending Provider: Dave Seo MD   Primary Care Physician: Samir Boo MD    Subjective:     HPI:  77yo M recently diagnosed with prostate cancer. S/p gold seed fiducial markers on 2/26/19 by Dr. Huff for XRT. He was brought in by EMS for altered mental status  and fever of 106 (axillary). UA concerning for UTI.  Now admitted to ICU with severe sepsis with infectious encephalopathy. He also tested positive for Influenza. Patient noted to be oriented on exam    UCx--pending      Interval History: Requests mckeon to be removed. +constipation. Otherwise feels well.     Review of Systems   Constitutional: Negative for chills and fever.   HENT: Negative for hearing loss, sore throat and trouble swallowing.    Eyes: Negative.    Respiratory: Negative for cough and shortness of breath.    Cardiovascular: Negative for chest pain.   Gastrointestinal: Negative for abdominal distention, abdominal pain, constipation, diarrhea, nausea and vomiting.   Genitourinary: Negative for frequency and hematuria.   Musculoskeletal: Negative for arthralgias and neck pain.   Skin: Negative for color change, pallor and rash.   Neurological: Negative for dizziness and seizures.   Hematological: Negative for adenopathy.   Psychiatric/Behavioral: Negative for confusion.   All other systems reviewed and are negative.    Objective:     Temp:  [97.5 °F (36.4 °C)-98.3 °F (36.8 °C)] 97.8 °F (36.6 °C)  Pulse:  [51-64] 53  Resp:  [16-18] 16  SpO2:  [94 %-97 %] 97 %  BP: (137-179)/(72-84) 167/79     Body mass index is 26.14 kg/m².    Date 03/04/19 0700 - 03/05/19 0659   Shift 9286-4292 3145-8296 1768-1633 24 Hour Total   INTAKE   Shift Total(mL/kg)       OUTPUT   Urine(mL/kg/hr) 1000   1000   Shift Total(mL/kg) 1000(11.8)   1000(11.8)   Weight (kg) 85  85 85 85          Drains     Drain                 Urethral Catheter 02/28/19 1747 Straight-tip 18 Fr. 3 days                Physical Exam   Vitals reviewed.  Constitutional: He is oriented to person, place, and time. He appears well-developed and well-nourished. No distress.   HENT:   Head: Normocephalic and atraumatic.   Eyes: Right eye exhibits no discharge. Left eye exhibits no discharge. No scleral icterus.   Neck: Normal range of motion. Neck supple.   Cardiovascular: Normal rate and regular rhythm.    Pulmonary/Chest: Effort normal and breath sounds normal. No respiratory distress.   Abdominal: Soft. Bowel sounds are normal. He exhibits no distension. There is no tenderness. There is no rebound and no guarding.   Genitourinary:   Genitourinary Comments: Cantu - clear yellow urine   Musculoskeletal: Normal range of motion.   Neurological: He is alert and oriented to person, place, and time.   Skin: Skin is warm and dry. He is not diaphoretic. No erythema.         Significant Labs:    BMP:  Recent Labs   Lab 03/02/19  0634 03/03/19  0533 03/04/19  0547    142 140   K 4.8 4.4 4.0   * 112* 109   CO2 24 24 25   BUN 20 24* 19   CREATININE 1.2 1.1 0.9   CALCIUM 8.6* 8.4* 8.5*       CBC:   Recent Labs   Lab 02/28/19  1924 03/01/19  0419 03/02/19  0634   WBC 6.04 9.23 10.34   HGB 13.3* 13.3* 13.1*   HCT 40.3 41.2 39.8*   PLT 57* 53* 62*       Blood Culture:   Recent Labs   Lab 02/28/19  1755 03/02/19  1244 03/02/19  1300   LABBLOO Gram stain michelle bottle: Gram negative rods  Results called to and read back by: Saima Harrison 03/01/2019  06:30  Gram stain aer bottle: Gram negative rods   Positive results previously called  ESCHERICHIA COLI No Growth to date  No Growth to date No Growth to date  No Growth to date     Urine Culture:   Recent Labs   Lab 02/28/19 1759   LABURIN ESCHERICHIA COLI  10,000 - 49,999 cfu/ml       Urine Studies:   Recent Labs   Lab 02/28/19 1759   COLORU Red*   APPEARANCEUA  Cloudy*   PHUR 7.0   SPECGRAV 1.015   PROTEINUA 2+*   GLUCUA 1+*   KETONESU Negative   BILIRUBINUA Negative   OCCULTUA 3+*   NITRITE Negative   UROBILINOGEN Negative   LEUKOCYTESUR 2+*   RBCUA >100*   WBCUA 25*   BACTERIA Few*   SQUAMEPITHEL 3   HYALINECASTS 0       Significant Imaging:  All pertinent imaging results/findings from the past 24 hours have been reviewed.                  Assessment/Plan:     Urinary retention     - d/c mckeon now (after cardiac procedure)     Acute prostatitis    - Suspect prostate infection after recent  fiducial marker placment  - Cont IV abx while inpatient       Sepsis secondary to UTI    Cont IV abx per hosp med while inpatient  Would treat with 2 weeks po abx as outpatient; will defer choice to hosp [facility]         Prostate cancer 2/25/19 Transrectal ultrasound of prostate and gold fiducial marker placement    -S/p TRUS PBx on 1/7/19-- intermediate risk [place] 3+4 PCa in 3/12 cores. [place] 4 up to 30% of one core.   -S/p gold seed fiducial markers on 2/26/19 by Dr. Huff for future XRT                    VTE Risk Mitigation (From admission, onward)        Ordered     enoxaparin injection 40 mg  Daily      03/03/19 1039     Place LINA hose  Until discontinued      03/01/19 0531     IP VTE HIGH RISK PATIENT  Once      02/28/19 2202          Layne Huff MD  Urology  [facility]

## 2019-03-04 NOTE — ASSESSMENT & PLAN NOTE
E-coli with resistance.  E-coli Bacteremia as well. Repeated blood cultures today.  Continue Meropenem for now. ID recs.   This was likely due to recent urologic procedure.     Ceftriaxone   2 weeks from 3/2.  Repeat blood cultures negative.

## 2019-03-04 NOTE — SUBJECTIVE & OBJECTIVE
Interval History: Requests mckeon to be removed. +constipation. Otherwise feels well.     Review of Systems   Constitutional: Negative for chills and fever.   HENT: Negative for hearing loss, sore throat and trouble swallowing.    Eyes: Negative.    Respiratory: Negative for cough and shortness of breath.    Cardiovascular: Negative for chest pain.   Gastrointestinal: Negative for abdominal distention, abdominal pain, constipation, diarrhea, nausea and vomiting.   Genitourinary: Negative for frequency and hematuria.   Musculoskeletal: Negative for arthralgias and neck pain.   Skin: Negative for color change, pallor and rash.   Neurological: Negative for dizziness and seizures.   Hematological: Negative for adenopathy.   Psychiatric/Behavioral: Negative for confusion.   All other systems reviewed and are negative.    Objective:     Temp:  [97.5 °F (36.4 °C)-98.3 °F (36.8 °C)] 97.8 °F (36.6 °C)  Pulse:  [51-64] 53  Resp:  [16-18] 16  SpO2:  [94 %-97 %] 97 %  BP: (137-179)/(72-84) 167/79     Body mass index is 26.14 kg/m².    Date 03/04/19 0700 - 03/05/19 0659   Shift 7093-3326 8135-9931 3848-3310 24 Hour Total   INTAKE   Shift Total(mL/kg)       OUTPUT   Urine(mL/kg/hr) 1000   1000   Shift Total(mL/kg) 1000(11.8)   1000(11.8)   Weight (kg) 85 85 85 85          Drains     Drain                 Urethral Catheter 02/28/19 1747 Straight-tip 18 Fr. 3 days                Physical Exam   Vitals reviewed.  Constitutional: He is oriented to person, place, and time. He appears well-developed and well-nourished. No distress.   HENT:   Head: Normocephalic and atraumatic.   Eyes: Right eye exhibits no discharge. Left eye exhibits no discharge. No scleral icterus.   Neck: Normal range of motion. Neck supple.   Cardiovascular: Normal rate and regular rhythm.    Pulmonary/Chest: Effort normal and breath sounds normal. No respiratory distress.   Abdominal: Soft. Bowel sounds are normal. He exhibits no distension. There is no  tenderness. There is no rebound and no guarding.   Genitourinary:   Genitourinary Comments: Cantu - clear yellow urine   Musculoskeletal: Normal range of motion.   Neurological: He is alert and oriented to person, place, and time.   Skin: Skin is warm and dry. He is not diaphoretic. No erythema.         Significant Labs:    BMP:  Recent Labs   Lab 03/02/19  0634 03/03/19  0533 03/04/19  0547    142 140   K 4.8 4.4 4.0   * 112* 109   CO2 24 24 25   BUN 20 24* 19   CREATININE 1.2 1.1 0.9   CALCIUM 8.6* 8.4* 8.5*       CBC:   Recent Labs   Lab 02/28/19  1924 03/01/19  0419 03/02/19  0634   WBC 6.04 9.23 10.34   HGB 13.3* 13.3* 13.1*   HCT 40.3 41.2 39.8*   PLT 57* 53* 62*       Blood Culture:   Recent Labs   Lab 02/28/19  1755 03/02/19  1244 03/02/19  1300   LABBLOO Gram stain michelle bottle: Gram negative rods  Results called to and read back by: Saima Harrison 03/01/2019  06:30  Gram stain aer bottle: Gram negative rods   Positive results previously called  ESCHERICHIA COLI No Growth to date  No Growth to date No Growth to date  No Growth to date     Urine Culture:   Recent Labs   Lab 02/28/19  1759   LABURIN ESCHERICHIA COLI  10,000 - 49,999 cfu/ml       Urine Studies:   Recent Labs   Lab 02/28/19  1759   COLORU Red*   APPEARANCEUA Cloudy*   PHUR 7.0   SPECGRAV 1.015   PROTEINUA 2+*   GLUCUA 1+*   KETONESU Negative   BILIRUBINUA Negative   OCCULTUA 3+*   NITRITE Negative   UROBILINOGEN Negative   LEUKOCYTESUR 2+*   RBCUA >100*   WBCUA 25*   BACTERIA Few*   SQUAMEPITHEL 3   HYALINECASTS 0       Significant Imaging:  All pertinent imaging results/findings from the past 24 hours have been reviewed.

## 2019-03-04 NOTE — SUBJECTIVE & OBJECTIVE
Interval History:   NAEON  Repeat blood cultures NGTD  Feels much improved      Review of Systems   Constitutional: Negative for chills, diaphoresis, fatigue and fever.   Respiratory: Negative for cough and shortness of breath.    Cardiovascular: Negative for chest pain.   Gastrointestinal: Negative for abdominal pain, nausea and vomiting.   Genitourinary: Negative for dysuria.   Skin: Negative for wound.   Neurological: Negative for headaches.   All other systems reviewed and are negative.    Objective:     Vital Signs (Most Recent):  Temp: 98.5 °F (36.9 °C) (03/04/19 1530)  Pulse: (!) 58 (03/04/19 1530)  Resp: 16 (03/04/19 1530)  BP: (!) 164/76 (03/04/19 1530)  SpO2: 97 % (03/04/19 1530) Vital Signs (24h Range):  Temp:  [97.5 °F (36.4 °C)-98.5 °F (36.9 °C)] 98.5 °F (36.9 °C)  Pulse:  [48-64] 58  Resp:  [16-18] 16  SpO2:  [94 %-97 %] 97 %  BP: (137-179)/(72-84) 164/76     Weight: 85 kg (187 lb 6.3 oz)  Body mass index is 26.14 kg/m².    Estimated Creatinine Clearance: 74.4 mL/min (based on SCr of 0.9 mg/dL).    Physical Exam   Constitutional: He appears well-developed and well-nourished. No distress.   HENT:   Head: Normocephalic and atraumatic.   Right Ear: External ear normal.   Left Ear: External ear normal.   Eyes: Conjunctivae and EOM are normal. Pupils are equal, round, and reactive to light.   Pulmonary/Chest: Effort normal and breath sounds normal. No respiratory distress.   Abdominal: Soft. Bowel sounds are normal. He exhibits no distension. There is no tenderness.   Musculoskeletal: Normal range of motion. He exhibits no tenderness or deformity.   Neurological: He is alert.   Skin: Skin is warm. No rash noted. He is not diaphoretic. No erythema.   Nursing note and vitals reviewed.      Significant Labs:   Blood Culture:   Recent Labs   Lab 02/28/19  1747 02/28/19  1755 03/02/19  1244 03/02/19  1300   LABBLOO Gram stain michelle bottle: Gram negative rods  Results called to and read back by: Saima Harrison  03/01/2019  06:30  Gram stain aer bottle: Gram negative rods   Positive results previously called  ESCHERICHIA COLI  For susceptibility see order #[id number]   Gram stain michelle bottle: Gram negative rods  Results called to and read back by: Saima Harrison 03/01/2019  06:30  Gram stain aer bottle: Gram negative rods   Positive results previously called  ESCHERICHIA COLI No Growth to date  No Growth to date  No Growth to date No Growth to date  No Growth to date  No Growth to date     CBC: No results for input(s): WBC, HGB, HCT, PLT in the last 48 hours.  CMP:   Recent Labs   Lab 03/03/19  0533 03/04/19  0547    140   K 4.4 4.0   * 109   CO2 24 25    109   BUN 24* 19   CREATININE 1.1 0.9   CALCIUM 8.4* 8.5*   PROT 5.5* 5.7*   ALBUMIN 2.7* 2.8*   BILITOT 0.6 0.7   ALKPHOS 60 55   AST 49* 35   ALT 67* 54*   ANIONGAP 6* 6*   EGFRNONAA >60 >60     Urine Culture:   Recent Labs   Lab 02/28/19  1759   LABURIN ESCHERICHIA COLI  10,000 - 49,999 cfu/ml       Urine Studies:   Recent Labs   Lab 02/28/19  1759   COLORU Red*   APPEARANCEUA Cloudy*   PHUR 7.0   SPECGRAV 1.015   PROTEINUA 2+*   GLUCUA 1+*   KETONESU Negative   BILIRUBINUA Negative   OCCULTUA 3+*   NITRITE Negative   UROBILINOGEN Negative   LEUKOCYTESUR 2+*   RBCUA >100*   WBCUA 25*   BACTERIA Few*   SQUAMEPITHEL 3   HYALINECASTS 0     Wound Culture: No results for input(s): LABAERO in the last 4320 hours.  All pertinent labs within the past 24 hours have been reviewed.    Significant Imaging: I have reviewed all pertinent imaging results/findings within the past 24 hours.

## 2019-03-04 NOTE — BRIEF OP NOTE
[facility]  Brief Operative Note     SUMMARY     Surgery Date: 3/4/2019     Surgeon(s) and Role:     * Jamir Nam MD - Primary    Assisting Surgeon: None    Pre-op Diagnosis:  Non-STEMI (non-ST elevated myocardial infarction) [I21.4]    Post-op Diagnosis:  Post-Op Diagnosis Codes:     * Non-STEMI (non-ST elevated myocardial infarction) [I21.4]    Procedure(s) (LRB):  Left heart cath RFA access, 4fr catheter/sheath, not before 9am (Left)  ANGIOGRAM, CORONARY, INCLUDING BYPASS GRAFT, WITH LEFT HEART CATHETERIZATION (N/A)    Anesthesia: RN IV Sedation    Description of the findings of the procedure: see below    Findings/Key Components:  LVEDP: 22mmHg  LVEF: 55% by echo  Wall Motion: normal by echo    Dominance: Right  LM: dist 50%  LAD: mid 99%, competitive flow from LIMA-LAD-OM-RPDA   Diag2 prox 90%, T2 flow (not bypassed)  LCx: prox , territory supplied by LIMA-LAD-OM-RPDA  RCA: mid , territory supplied by LIMA-LAD-OM-RPDA    LIMA-LAD-OM-RPDA patent    No SVG identified    Hemostasis:  RFA    Impression:  NSTEMI  3V CAD, normal LV fxn  Patent LIMA-LAD-OM-RPDA  Culprit likely in small Diag2 (unrevascularized) and likely demand related  Man compression RFA for hemostasis    Plan:  Cont med rx  ASA 81mg qd indefinitely  Plavix 75mg qd for 1 year (thru 3/2020)  BBl/ARB  Statin allergy noted  Consider PCSK9 inhibitor if statin allergy confirmed  Dispo planning appropriate  Pt to follow up with Dr. Seay after discharge    Estimated Blood Loss: <50cc         Specimens: None

## 2019-03-04 NOTE — ASSESSMENT & PLAN NOTE
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 [place] 4 up to 30% of one core.   -S/p gold seed fiducial markers on 2/26/19 by Dr. Huff for future XRT

## 2019-03-04 NOTE — PLAN OF CARE
Problem: Fall Injury Risk  Goal: Absence of Fall and Fall-Related Injury    Intervention: Identify and Manage Contributors to Fall Injury Risk   03/01/19 2348   Identify and Manage Contributors to Fall Injury Risk   Self-Care Promotion independence encouraged   Manage Acute Allergic Reaction   Medication Review/Management medications reviewed     Intervention: Promote Injury-Free Environment   03/01/19 2348 03/03/19 1700   Optimize Balance and Safe Activity   Safety Promotion/Fall Prevention --  assistive device/personal item within reach;bed alarm set;side rails raised x 2;medications reviewed;Fall Risk reviewed with patient/family   Optimize [place] and Functional Mobility   Environmental Safety Modification assistive device/personal items within reach --       03/01/19 2348 03/03/19 1700   Optimize Balance and Safe Activity   Safety Promotion/Fall Prevention --  assistive device/personal item within reach;bed alarm set;side rails raised x 2;medications reviewed;Fall Risk reviewed with patient/family   Optimize [place] and Functional Mobility   Environmental Safety Modification assistive device/personal items within reach --

## 2019-03-04 NOTE — ASSESSMENT & PLAN NOTE
- Suspect prostate infection after recent  fiducial marker placment  - Cont IV abx while inpatient

## 2019-03-04 NOTE — SUBJECTIVE & OBJECTIVE
Interval History: No new issues.       Review of Systems   Constitutional: Negative for activity change.   HENT: Negative for congestion.    Respiratory: Negative for chest tightness and shortness of breath.    Cardiovascular: Negative for chest pain.   Gastrointestinal: Negative for abdominal pain.   Genitourinary: Negative for difficulty urinating.     Objective:     Vital Signs (Most Recent):  Temp: 97.8 °F (36.6 °C) (03/04/19 0713)  Pulse: (!) 54 (03/04/19 0713)  Resp: 18 (03/04/19 0713)  BP: (!) 179/84 (03/04/19 0713)  SpO2: 96 % (03/04/19 0713) Vital Signs (24h Range):  Temp:  [97.5 °F (36.4 °C)-98.3 °F (36.8 °C)] 97.8 °F (36.6 °C)  Pulse:  [54-64] 54  Resp:  [17-18] 18  SpO2:  [95 %-99 %] 96 %  BP: (137-179)/(72-84) 179/84     Weight: 85 kg (187 lb 6.3 oz)  Body mass index is 26.14 kg/m².    Intake/Output Summary (Last 24 hours) at 3/4/2019 1023  Last data filed at 3/4/2019 0710  Gross per 24 hour   Intake 480 ml   Output 1600 ml   Net -1120 ml      Physical Exam   Constitutional: He is oriented to person, place, and time. He appears well-developed and well-nourished.   HENT:   Head: Normocephalic and atraumatic.   Neurological: He is alert and oriented to person, place, and time.   Skin: Skin is warm and dry.   Psychiatric: He has a normal mood and affect. His behavior is normal.   Nursing note and vitals reviewed.      Significant Labs:   BMP:   Recent Labs   Lab 03/04/19  0547         K 4.0      CO2 25   BUN 19   CREATININE 0.9   CALCIUM 8.5*     CBC: No results for input(s): WBC, HGB, HCT, PLT in the last 48 hours.    Significant Imaging:

## 2019-03-04 NOTE — ASSESSMENT & PLAN NOTE
Cont IV abx per hosp med while inpatient  Would treat with 2 weeks po abx as outpatient; will defer choice to hosp med

## 2019-03-05 LAB
ALBUMIN SERPL BCP-MCNC: 2.9 G/DL
ALP SERPL-CCNC: 63 U/L
ALT SERPL W/O P-5'-P-CCNC: 44 U/L
ANION GAP SERPL CALC-SCNC: 7 MMOL/L
ANION GAP SERPL CALC-SCNC: 8 MMOL/L
ANION GAP SERPL CALC-SCNC: 8 MMOL/L
AST SERPL-CCNC: 31 U/L
BASOPHILS # BLD AUTO: 0.05 K/UL
BASOPHILS NFR BLD: 0.5 %
BILIRUB SERPL-MCNC: 0.6 MG/DL
BUN SERPL-MCNC: 17 MG/DL
CALCIUM SERPL-MCNC: 8.6 MG/DL
CALCIUM SERPL-MCNC: 8.8 MG/DL
CALCIUM SERPL-MCNC: 8.8 MG/DL
CHLORIDE SERPL-SCNC: 108 MMOL/L
CHLORIDE SERPL-SCNC: 108 MMOL/L
CHLORIDE SERPL-SCNC: 109 MMOL/L
CO2 SERPL-SCNC: 24 MMOL/L
CREAT SERPL-MCNC: 0.9 MG/DL
DIFFERENTIAL METHOD: ABNORMAL
EOSINOPHIL # BLD AUTO: 0.3 K/UL
EOSINOPHIL NFR BLD: 3 %
ERYTHROCYTE [DISTWIDTH] IN BLOOD BY AUTOMATED COUNT: 12.9 %
EST. GFR  (AFRICAN AMERICAN): >60 ML/MIN/1.73 M^2
EST. GFR  (NON AFRICAN AMERICAN): >60 ML/MIN/1.73 M^2
GLUCOSE SERPL-MCNC: 127 MG/DL
GLUCOSE SERPL-MCNC: 129 MG/DL
GLUCOSE SERPL-MCNC: 129 MG/DL
HCT VFR BLD AUTO: 41.4 %
HGB BLD-MCNC: 13.7 G/DL
LYMPHOCYTES # BLD AUTO: 1.5 K/UL
LYMPHOCYTES NFR BLD: 15.7 %
MCH RBC QN AUTO: 30 PG
MCHC RBC AUTO-ENTMCNC: 33.1 G/DL
MCV RBC AUTO: 91 FL
MONOCYTES # BLD AUTO: 1 K/UL
MONOCYTES NFR BLD: 11 %
NEUTROPHILS # BLD AUTO: 6.6 K/UL
NEUTROPHILS NFR BLD: 69.8 %
PLATELET # BLD AUTO: 140 K/UL
PMV BLD AUTO: 9.9 FL
POCT GLUCOSE: 103 MG/DL (ref 70–110)
POCT GLUCOSE: 123 MG/DL (ref 70–110)
POCT GLUCOSE: 130 MG/DL (ref 70–110)
POCT GLUCOSE: 160 MG/DL (ref 70–110)
POCT GLUCOSE: 173 MG/DL (ref 70–110)
POTASSIUM SERPL-SCNC: 3.8 MMOL/L
PROT SERPL-MCNC: 5.9 G/DL
RBC # BLD AUTO: 4.56 M/UL
SODIUM SERPL-SCNC: 140 MMOL/L
VANCOMYCIN TROUGH SERPL-MCNC: <1.1 UG/ML
WBC # BLD AUTO: 9.47 K/UL

## 2019-03-05 PROCEDURE — 99232 SBSQ HOSP IP/OBS MODERATE 35: CPT | Mod: HCNC,,, | Performed by: INTERNAL MEDICINE

## 2019-03-05 PROCEDURE — 25000003 PHARM REV CODE 250: Mod: HCNC | Performed by: INTERNAL MEDICINE

## 2019-03-05 PROCEDURE — 99232 PR SUBSEQUENT HOSPITAL CARE,LEVL II: ICD-10-PCS | Mod: HCNC,,, | Performed by: INTERNAL MEDICINE

## 2019-03-05 PROCEDURE — 21400001 HC TELEMETRY ROOM: Mod: HCNC

## 2019-03-05 PROCEDURE — 80202 ASSAY OF VANCOMYCIN: CPT | Mod: HCNC

## 2019-03-05 PROCEDURE — 51798 US URINE CAPACITY MEASURE: CPT | Mod: HCNC

## 2019-03-05 PROCEDURE — 99232 SBSQ HOSP IP/OBS MODERATE 35: CPT | Mod: HCNC,,, | Performed by: UROLOGY

## 2019-03-05 PROCEDURE — 80048 BASIC METABOLIC PNL TOTAL CA: CPT | Mod: HCNC

## 2019-03-05 PROCEDURE — 27000221 HC OXYGEN, UP TO 24 HOURS: Mod: HCNC

## 2019-03-05 PROCEDURE — 85025 COMPLETE CBC W/AUTO DIFF WBC: CPT | Mod: HCNC

## 2019-03-05 PROCEDURE — 36415 COLL VENOUS BLD VENIPUNCTURE: CPT | Mod: HCNC

## 2019-03-05 PROCEDURE — 99232 PR SUBSEQUENT HOSPITAL CARE,LEVL II: ICD-10-PCS | Mod: HCNC,,, | Performed by: UROLOGY

## 2019-03-05 PROCEDURE — 63600175 PHARM REV CODE 636 W HCPCS: Mod: HCNC | Performed by: PHYSICIAN ASSISTANT

## 2019-03-05 PROCEDURE — 94761 N-INVAS EAR/PLS OXIMETRY MLT: CPT | Mod: HCNC

## 2019-03-05 PROCEDURE — 63600175 PHARM REV CODE 636 W HCPCS: Mod: HCNC | Performed by: INTERNAL MEDICINE

## 2019-03-05 PROCEDURE — 80053 COMPREHEN METABOLIC PANEL: CPT | Mod: HCNC

## 2019-03-05 RX ORDER — LOSARTAN POTASSIUM 25 MG/1
50 TABLET ORAL DAILY
Status: DISCONTINUED | OUTPATIENT
Start: 2019-03-06 | End: 2019-03-06 | Stop reason: HOSPADM

## 2019-03-05 RX ORDER — LOSARTAN POTASSIUM 25 MG/1
25 TABLET ORAL ONCE
Status: COMPLETED | OUTPATIENT
Start: 2019-03-05 | End: 2019-03-05

## 2019-03-05 RX ADMIN — LOSARTAN POTASSIUM 25 MG: 25 TABLET, FILM COATED ORAL at 08:03

## 2019-03-05 RX ADMIN — ASPIRIN 81 MG 81 MG: 81 TABLET ORAL at 08:03

## 2019-03-05 RX ADMIN — ATORVASTATIN CALCIUM 40 MG: 40 TABLET, FILM COATED ORAL at 08:03

## 2019-03-05 RX ADMIN — CLOPIDOGREL BISULFATE 75 MG: 75 TABLET ORAL at 08:03

## 2019-03-05 RX ADMIN — ENOXAPARIN SODIUM 40 MG: 100 INJECTION SUBCUTANEOUS at 05:03

## 2019-03-05 RX ADMIN — HYDRALAZINE HYDROCHLORIDE 50 MG: 25 TABLET ORAL at 09:03

## 2019-03-05 RX ADMIN — HYDRALAZINE HYDROCHLORIDE 50 MG: 25 TABLET ORAL at 05:03

## 2019-03-05 RX ADMIN — OSELTAMIVIR PHOSPHATE 75 MG: 75 CAPSULE ORAL at 08:03

## 2019-03-05 RX ADMIN — HYDRALAZINE HYDROCHLORIDE 50 MG: 25 TABLET ORAL at 02:03

## 2019-03-05 RX ADMIN — METOPROLOL SUCCINATE 12.5 MG: 25 TABLET, EXTENDED RELEASE ORAL at 08:03

## 2019-03-05 RX ADMIN — LOSARTAN POTASSIUM 25 MG: 25 TABLET, FILM COATED ORAL at 02:03

## 2019-03-05 RX ADMIN — MIRTAZAPINE 30 MG: 15 TABLET, FILM COATED ORAL at 09:03

## 2019-03-05 RX ADMIN — CEFTRIAXONE 2 G: 2 INJECTION, SOLUTION INTRAVENOUS at 08:03

## 2019-03-05 NOTE — PROGRESS NOTES
[facility]  Urology  Progress Note    Patient Name: Eric Jackson  MRN: [id number]  Admission Date: 2/28/2019  Hospital Length of Stay: 5 days  Code Status: Full Code   Attending Provider: Dave Seo MD   Primary Care Physician: Samir Boo MD    Subjective:     HPI:  79yo M recently diagnosed with prostate cancer. S/p gold seed fiducial markers on 2/26/19 by Dr. Huff for XRT. He was brought in by EMS for altered mental status  and fever of 106 (axillary). UA concerning for UTI.  Now admitted to ICU with severe sepsis with infectious encephalopathy. He also tested positive for Influenza. Patient noted to be oriented on exam    UCx--pending      Interval History: Cantu removed, voiding well. PVRs acceptable.     Review of Systems   Constitutional: Negative for chills and fever.   HENT: Negative for hearing loss, sore throat and trouble swallowing.    Eyes: Negative.    Respiratory: Negative for cough and shortness of breath.    Cardiovascular: Negative for chest pain.   Gastrointestinal: Negative for abdominal distention, abdominal pain, constipation, diarrhea, nausea and vomiting.   Genitourinary: Negative for difficulty urinating, frequency and hematuria.   Musculoskeletal: Negative for arthralgias and neck pain.   Skin: Negative for color change, pallor and rash.   Neurological: Negative for dizziness and seizures.   Hematological: Negative for adenopathy.   Psychiatric/Behavioral: Negative for confusion.   All other systems reviewed and are negative.    Objective:     Temp:  [97.4 °F (36.3 °C)-98.6 °F (37 °C)] 98.1 °F (36.7 °C)  Pulse:  [58-80] 69  Resp:  [16-18] 17  SpO2:  [93 %-97 %] 97 %  BP: (142-198)/(74-96) 198/96     Body mass index is 26.14 kg/m².    Date 03/05/19 0700 - 03/06/19 0659   Shift 1241-4378 5943-1060 8756-4711 24 Hour Total   INTAKE   P.O. 600   600   Shift Total(mL/kg) 600(7.1)   600(7.1)   OUTPUT   Urine(mL/kg/hr) 200   200   Shift Total(mL/kg) 200(2.4)    200(2.4)   Weight (kg) 85 85 85 85     Bladder Scan Volume (mL): 41 mL (03/05/19 0615)    Drains          None          Physical Exam   Vitals reviewed.  Constitutional: He is oriented to person, place, and time. He appears well-developed and well-nourished. No distress.   HENT:   Head: Normocephalic and atraumatic.   Eyes: Right eye exhibits no discharge. Left eye exhibits no discharge. No scleral icterus.   Neck: Normal range of motion. Neck supple.   Cardiovascular: Normal rate and regular rhythm.    Pulmonary/Chest: Effort normal and breath sounds normal. No respiratory distress.   Abdominal: Soft. Bowel sounds are normal. He exhibits no distension. There is no tenderness. There is no rebound and no guarding.   Genitourinary:   Genitourinary Comments: Cantu - removed  Bladder nonpalpable   Musculoskeletal: Normal range of motion.   Neurological: He is alert and oriented to person, place, and time.   Skin: Skin is warm and dry. He is not diaphoretic. No erythema.         Significant Labs:    BMP:  Recent Labs   Lab 03/03/19  0533 03/04/19  0547 03/05/19  0535    140 140  140  140   K 4.4 4.0 3.8  3.8  3.8   * 109 109  108  108   CO2 24 25 24  24  24   BUN 24* 19 17  17  17   CREATININE 1.1 0.9 0.9  0.9  0.9   CALCIUM 8.4* 8.5* 8.6*  8.8  8.8       CBC:   Recent Labs   Lab 03/01/19  0419 03/02/19  0634 03/05/19  0535   WBC 9.23 10.34 9.47   HGB 13.3* 13.1* 13.7*   HCT 41.2 39.8* 41.4   PLT 53* 62* 140*       Blood Culture:   Recent Labs   Lab 02/28/19  1755 03/02/19  1244 03/02/19  1300   LABBLOO Gram stain michelle bottle: Gram negative rods  Results called to and read back by: Saima Harrison 03/01/2019  06:30  Gram stain aer bottle: Gram negative rods   Positive results previously called  ESCHERICHIA COLI No Growth to date  No Growth to date  No Growth to date No Growth to date  No Growth to date  No Growth to date     Urine Culture:   Recent Labs   Lab 02/28/19  1759   LABURIN  ESCHERICHIA COLI  10,000 - 49,999 cfu/ml       Urine Studies:   Recent Labs   Lab 02/28/19  1759   COLORU Red*   APPEARANCEUA Cloudy*   PHUR 7.0   SPECGRAV 1.015   PROTEINUA 2+*   GLUCUA 1+*   KETONESU Negative   BILIRUBINUA Negative   OCCULTUA 3+*   NITRITE Negative   UROBILINOGEN Negative   LEUKOCYTESUR 2+*   RBCUA >100*   WBCUA 25*   BACTERIA Few*   SQUAMEPITHEL 3   HYALINECASTS 0       Significant Imaging:  All pertinent imaging results/findings from the past 24 hours have been reviewed.                  Assessment/Plan:     Urinary retention     - Cantu removed, voiding     Acute prostatitis    - Suspect prostate infection after recent  fiducial marker placment  - Cont abx per ID       Sepsis secondary to UTI    Cont IV abx per hosp med while inpatient  Would treat with 2 weeks po abx as outpatient; will defer choice to hosp med         Prostate cancer 2/25/19 Transrectal ultrasound of prostate and gold fiducial marker placement    -S/p TRUS PBx on 1/7/19-- intermediate risk [place] 3+4 PCa in 3/12 cores. Tanish[place] 4 up to 30% of one core.   -S/p gold seed fiducial markers on 2/26/19 by Dr. Huff for future XRT                    VTE Risk Mitigation (From admission, onward)        Ordered     enoxaparin injection 40 mg  Daily      03/03/19 1039     Place LINA hose  Until discontinued      03/01/19 0531     IP VTE HIGH RISK PATIENT  Once      02/28/19 2202          Layne Huff MD  Urology  [facility]

## 2019-03-05 NOTE — SUBJECTIVE & OBJECTIVE
Review of Systems   Gastrointestinal: Negative for melena.   Genitourinary: Negative for hematuria.     Objective:     Vital Signs (Most Recent):  Temp: 98.1 °F (36.7 °C) (03/05/19 1158)  Pulse: 69 (03/05/19 1158)  Resp: 17 (03/05/19 1158)  BP: (!) 198/96 (03/05/19 1158)  SpO2: 97 % (03/05/19 1158) Vital Signs (24h Range):  Temp:  [97.4 °F (36.3 °C)-98.6 °F (37 °C)] 98.1 °F (36.7 °C)  Pulse:  [58-80] 69  Resp:  [16-18] 17  SpO2:  [93 %-97 %] 97 %  BP: (142-198)/(74-96) 198/96     Weight: 85 kg (187 lb 6.3 oz)  Body mass index is 26.14 kg/m².     SpO2: 97 %  O2 Device (Oxygen Therapy): room air      Intake/Output Summary (Last 24 hours) at 3/5/2019 1330  Last data filed at 3/5/2019 1317  Gross per 24 hour   Intake 840 ml   Output 650 ml   Net 190 ml       Lines/Drains/Airways     Airway                 Airway - Non-Surgical 02/26/19 1241 Nasal Cannula 7 days          Peripheral Intravenous Line                 Peripheral IV - Single Lumen 03/05/19 0954 Posterior;Right Hand less than 1 day              Exam unchanged vs 3/4/19  Physical Exam   Constitutional: He is oriented to person, place, and time. He appears well-developed and well-nourished.   HENT:   Head: Normocephalic and atraumatic.   Eyes: Conjunctivae and EOM are normal. Pupils are equal, round, and reactive to light. No scleral icterus.   Neck: Normal range of motion. Neck supple. No JVD present. Carotid bruit is not present. No tracheal deviation present. No thyromegaly present.   Cardiovascular: Normal rate, regular rhythm, S1 normal and S2 normal. Exam reveals no gallop and no friction rub.   No murmur heard.  RFA access site c/d/i   Pulmonary/Chest: Effort normal and breath sounds normal. No respiratory distress. He has no wheezes. He has no rales. He exhibits no tenderness.   Abdominal: Soft. He exhibits no distension.   Musculoskeletal: Normal range of motion. He exhibits no edema.   Neurological: He is alert and oriented to person, place, and  time. He has normal strength. No cranial nerve deficit.   Skin: Skin is warm and dry. No rash noted.   Psychiatric: He has a normal mood and affect. His behavior is normal.       Current Medications:   aspirin  81 mg Oral Daily    atorvastatin  40 mg Oral Daily    cefTRIAXone (ROCEPHIN) IVPB  2 g Intravenous Q24H    clopidogrel  75 mg Oral Daily    enoxaparin  40 mg Subcutaneous Daily    hydrALAZINE  50 mg Oral Q8H    losartan  25 mg Oral Daily    metoprolol succinate  12.5 mg Oral Daily    mirtazapine  30 mg Oral QHS    PRESER VISION  (ORAL MULTI VITAMIN )  2 tablet Oral BID    venlafaxine  75 mg Oral Daily       acetaminophen, atropine, dextrose 50%, dextrose 50%, glucagon (human recombinant), glucose, glucose, HYDROcodone-acetaminophen, insulin aspart U-100, morphine, ondansetron, sodium chloride 0.9%, sodium chloride 0.9%, zolpidem    Laboratory (all labs reviewed):  CBC:  Recent Labs   Lab 02/22/19  1455 02/28/19  1924 03/01/19  0419 03/02/19  0634 03/05/19  0535   WHITE BLOOD CELL COUNT 7.25 6.04 9.23 10.34 9.47   HEMOGLOBIN 15.0 13.3 L 13.3 L 13.1 L 13.7 L   HEMATOCRIT 44.6 40.3 41.2 39.8 L 41.4   PLATELETS 232 57 L 53 L 62 L 140 L       CHEMISTRIES:  Recent Labs   Lab 01/15/18  1415  02/28/19  1924 03/01/19  0419 03/02/19  0634 03/03/19  0533 03/04/19  0547 03/05/19  0535   GLUCOSE 142 H   < > 197 H 186 H 127 H 100 109 127 H  129 H  129 H   SODIUM 132 L   < > 135 L 133 L 142 142 140 140  140  140   POTASSIUM 4.7   < > 3.0 L 4.6 4.8 4.4 4.0 3.8  3.8  3.8   BUN BLD 12   < > 18 17 20 24 H 19 17  17  17   CREATININE 1.1   < > 1.6 H 1.3 1.2 1.1 0.9 0.9  0.9  0.9   EGFR IF  >60   < > 48 A >60 >60 >60 >60 >60  >60  >60   EGFR IF NON- >60   < > 41 A 53 A 58 A >60 >60 >60  >60  >60   CALCIUM 9.4   < > 8.2 L 8.2 L 8.6 L 8.4 L 8.5 L 8.6 L  8.8  8.8   MAGNESIUM 2.2  --  1.5 L 2.6 2.7 H  --   --   --     < > = values in this interval not displayed.       CARDIAC  BIOMARKERS:  Recent Labs   Lab 01/15/18  1415 02/28/19  1727 02/28/19  1924 03/01/19  0419 03/01/19  1003   CPK  --  404 H 405 H  --   --    TROPONIN I 0.008  --  0.915 H 2.500 H 1.673 H       COAGS:  Recent Labs   Lab 01/15/18  1415 02/28/19  1924   INR 1.1 1.2       LIPIDS/LFTS:  Recent Labs   Lab 01/06/17  0947  10/17/18  03/01/19  0419 03/02/19  0634 03/03/19  0533 03/04/19  0547 03/05/19  0535   CHOLESTEROL 271 H  271 H  --  265 A  --   --   --   --   --   --    TRIGLYCERIDES 169 H  169 H  --  153  --   --   --   --   --   --    HDL 59  59  --  52  --   --   --   --   --   --    LDL CALC  --   --  182 A  --   --   --   --   --   --    LDL CHOLESTEROL 178.2 H  178.2 H  --   --   --   --   --   --   --   --    NON-HDL CHOLESTEROL 212  212  --   --   --   --   --   --   --   --    AST 24   < >  --    < > 119 H 80 H 49 H 35 31   ALT 20   < >  --    < > 95 H 95 H 67 H 54 H 44    < > = values in this interval not displayed.       BNP:  Recent Labs   Lab 02/28/19 1924    H       TSH:  Recent Labs   Lab 01/06/17  0947 01/02/18  1323 02/28/19 1924   TSH 1.560 2.033 1.715       Free T4:  Recent Labs   Lab 01/02/18  1323   FREE T4 0.84       Diagnostic Results:  Echo: 3/1/19  · Normal left ventricular systolic function. The estimated ejection fraction is 55%    Cath: 3/4/19 (images personally reviewed and interpreted)  LVEDP: 22mmHg  LVEF: 55% by echo  Wall Motion: normal by echo  Dominance: Right  LM: dist 50%  LAD: mid 99%, competitive flow from LIMA-LAD-OM-RPDA              Diag2 prox 90%, T2 flow (not bypassed)  LCx: prox , territory supplied by LIMA-LAD-OM-RPDA  RCA: mid , territory supplied by LIMA-LAD-OM-RPDA  LIMA-LAD-OM-RPDA patent  No SVG identified  Hemostasis:  RFA man comp  Impression:  NSTEMI  3V CAD, normal LV fxn  Patent LIMA-LAD-OM-RPDA  Culprit likely in small Diag2 (unrevascularized) and likely demand related  Man compression RFA for hemostasis  Plan:  Cont med rx  ASA 81mg qd  indefinitely  Plavix 75mg qd for 1 year (thru 3/2020)  BBl/ARB  Statin allergy noted  Consider PCSK9 inhibitor if statin allergy confirmed  Dispo planning appropriate  Pt to follow up with Dr. Seay after discharge

## 2019-03-05 NOTE — PROGRESS NOTES
[facility]  Cardiology  Progress Note    Patient Name: Eric Jackson  MRN: [id number]  Admission Date: 2/28/2019  Hospital Length of Stay: 5 days  Code Status: Full Code   Attending Physician: Dave Seo MD   Primary Care Physician: Samir Boo MD  Expected Discharge Date:   Principal Problem:Non-STEMI (non-ST elevated myocardial infarction)    Subjective:     Hospital Course:   3-1:  Admitted to the ICU with altered mental status, UTI/sepsis and troponin elevation consistent with non STEMI  3-2:  Transferred to telemetry  3/4/19: cath with patent LIMA-LAD-OM-RCA, ?culprit in small Diag, no PCI performed (?demand MI)    Interval Hx: no cp/sob/palps/LH.  Case d/w family at bedside.    Tele: SR (personally reviewed and interpreted)        Review of Systems   Gastrointestinal: Negative for melena.   Genitourinary: Negative for hematuria.     Objective:     Vital Signs (Most Recent):  Temp: 98.1 °F (36.7 °C) (03/05/19 1158)  Pulse: 69 (03/05/19 1158)  Resp: 17 (03/05/19 1158)  BP: (!) 198/96 (03/05/19 1158)  SpO2: 97 % (03/05/19 1158) Vital Signs (24h Range):  Temp:  [97.4 °F (36.3 °C)-98.6 °F (37 °C)] 98.1 °F (36.7 °C)  Pulse:  [58-80] 69  Resp:  [16-18] 17  SpO2:  [93 %-97 %] 97 %  BP: (142-198)/(74-96) 198/96     Weight: 85 kg (187 lb 6.3 oz)  Body mass index is 26.14 kg/m².     SpO2: 97 %  O2 Device (Oxygen Therapy): room air      Intake/Output Summary (Last 24 hours) at 3/5/2019 1330  Last data filed at 3/5/2019 1317  Gross per 24 hour   Intake 840 ml   Output 650 ml   Net 190 ml       Lines/Drains/Airways     Airway                 Airway - Non-Surgical 02/26/19 1241 Nasal Cannula 7 days          Peripheral Intravenous Line                 Peripheral IV - Single Lumen 03/05/19 0954 Posterior;Right Hand less than 1 day              Exam unchanged vs 3/4/19  Physical Exam   Constitutional: He is oriented to person, place, and time. He appears well-developed and well-nourished.   HENT:    Head: Normocephalic and atraumatic.   Eyes: Conjunctivae and EOM are normal. Pupils are equal, round, and reactive to light. No scleral icterus.   Neck: Normal range of motion. Neck supple. No JVD present. Carotid bruit is not present. No tracheal deviation present. No thyromegaly present.   Cardiovascular: Normal rate, regular rhythm, S1 normal and S2 normal. Exam reveals no gallop and no friction rub.   No murmur heard.  RFA access site c/d/i   Pulmonary/Chest: Effort normal and breath sounds normal. No respiratory distress. He has no wheezes. He has no rales. He exhibits no tenderness.   Abdominal: Soft. He exhibits no distension.   Musculoskeletal: Normal range of motion. He exhibits no edema.   Neurological: He is alert and oriented to person, place, and time. He has normal strength. No cranial nerve deficit.   Skin: Skin is warm and dry. No rash noted.   Psychiatric: He has a normal mood and affect. His behavior is normal.       Current Medications:   aspirin  81 mg Oral Daily    atorvastatin  40 mg Oral Daily    cefTRIAXone (ROCEPHIN) IVPB  2 g Intravenous Q24H    clopidogrel  75 mg Oral Daily    enoxaparin  40 mg Subcutaneous Daily    hydrALAZINE  50 mg Oral Q8H    losartan  25 mg Oral Daily    metoprolol succinate  12.5 mg Oral Daily    mirtazapine  30 mg Oral QHS    PRESER VISION  (ORAL MULTI VITAMIN )  2 tablet Oral BID    venlafaxine  75 mg Oral Daily       acetaminophen, atropine, dextrose 50%, dextrose 50%, glucagon (human recombinant), glucose, glucose, HYDROcodone-acetaminophen, insulin aspart U-100, morphine, ondansetron, sodium chloride 0.9%, sodium chloride 0.9%, zolpidem    Laboratory (all labs reviewed):  CBC:  Recent Labs   Lab 02/22/19  1455 02/28/19  1924 03/01/19  0419 03/02/19  0634 03/05/19  0535   WHITE BLOOD CELL COUNT 7.25 6.04 9.23 10.34 9.47   HEMOGLOBIN 15.0 13.3 L 13.3 L 13.1 L 13.7 L   HEMATOCRIT 44.6 40.3 41.2 39.8 L 41.4   PLATELETS 232 57 L 53 L 62 L 140 L        CHEMISTRIES:  Recent Labs   Lab 01/15/18  1415  02/28/19  1924 03/01/19  0419 03/02/19  0634 03/03/19  0533 03/04/19  0547 03/05/19  0535   GLUCOSE 142 H   < > 197 H 186 H 127 H 100 109 127 H  129 H  129 H   SODIUM 132 L   < > 135 L 133 L 142 142 140 140  140  140   POTASSIUM 4.7   < > 3.0 L 4.6 4.8 4.4 4.0 3.8  3.8  3.8   BUN BLD 12   < > 18 17 20 24 H 19 17  17  17   CREATININE 1.1   < > 1.6 H 1.3 1.2 1.1 0.9 0.9  0.9  0.9   EGFR IF  >60   < > 48 A >60 >60 >60 >60 >60  >60  >60   EGFR IF NON- >60   < > 41 A 53 A 58 A >60 >60 >60  >60  >60   CALCIUM 9.4   < > 8.2 L 8.2 L 8.6 L 8.4 L 8.5 L 8.6 L  8.8  8.8   MAGNESIUM 2.2  --  1.5 L 2.6 2.7 H  --   --   --     < > = values in this interval not displayed.       CARDIAC BIOMARKERS:  Recent Labs   Lab 01/15/18  1415 02/28/19  1727 02/28/19 1924 03/01/19 0419 03/01/19  1003   CPK  --  404 H 405 H  --   --    TROPONIN I 0.008  --  0.915 H 2.500 H 1.673 H       COAGS:  Recent Labs   Lab 01/15/18  1415 02/28/19  1924   INR 1.1 1.2       LIPIDS/LFTS:  Recent Labs   Lab 01/06/17  0947  10/17/18  03/01/19  0419 03/02/19  0634 03/03/19  0533 03/04/19  0547 03/05/19  0535   CHOLESTEROL 271 H  271 H  --  265 A  --   --   --   --   --   --    TRIGLYCERIDES 169 H  169 H  --  153  --   --   --   --   --   --    HDL 59  59  --  52  --   --   --   --   --   --    LDL CALC  --   --  182 A  --   --   --   --   --   --    LDL CHOLESTEROL 178.2 H  178.2 H  --   --   --   --   --   --   --   --    NON-HDL CHOLESTEROL 212  212  --   --   --   --   --   --   --   --    AST 24   < >  --    < > 119 H 80 H 49 H 35 31   ALT 20   < >  --    < > 95 H 95 H 67 H 54 H 44    < > = values in this interval not displayed.       BNP:  Recent Labs   Lab 02/28/19 1924    H       TSH:  Recent Labs   Lab 01/06/17  0947 01/02/18  1323 02/28/19 1924   TSH 1.560 2.033 1.715       Free T4:  Recent Labs   Lab 01/02/18  1323   FREE T4 0.84        Diagnostic Results:  Echo: 3/1/19  · Normal left ventricular systolic function. The estimated ejection fraction is 55%    Cath: 3/4/19 (images personally reviewed and interpreted)  LVEDP: 22mmHg  LVEF: 55% by echo  Wall Motion: normal by echo  Dominance: Right  LM: dist 50%  LAD: mid 99%, competitive flow from LIMA-LAD-OM-RPDA              Diag2 prox 90%, T2 flow (not bypassed)  LCx: prox , territory supplied by LIMA-LAD-OM-RPDA  RCA: mid , territory supplied by LIMA-LAD-OM-RPDA  LIMA-LAD-OM-RPDA patent  No SVG identified  Hemostasis:  RFA man comp  Impression:  NSTEMI  3V CAD, normal LV fxn  Patent LIMA-LAD-OM-RPDA  Culprit likely in small Diag2 (unrevascularized) and likely demand related  Man compression RFA for hemostasis  Plan:  Cont med rx  ASA 81mg qd indefinitely  Plavix 75mg qd for 1 year (thru 3/2020)  BBl/ARB  Statin allergy noted  Consider PCSK9 inhibitor if statin allergy confirmed  Dispo planning appropriate  Pt to follow up with Dr. Seay after discharge          Assessment and Plan:     * Non-STEMI (non-ST elevated myocardial infarction)    Elevated troponin consistent with type 2 MI versus small plaque rupture  EF preserved  Cath 3/4/19 with 3V CAD and patent LIMA-LAD-OM-RCA.  ?culprit in small diag vs demand related.  No PCI performed.  Cont med rx       Coronary artery disease    History of CABG 2001  On no medicines for secondary prevention on admission  Resume med rx  Statin allergy noted     Diabetes mellitus type II, controlled    Management per IM     Essential hypertension    Inc losartan 50mg qd and titrate as needed +/- add amlod  Cont toprol XL     Sepsis secondary to UTI    On antibiotics per primary     Infectious encephalopathy    Improving.  Pt AAO x3 this am.         VTE Risk Mitigation (From admission, onward)        Ordered     enoxaparin injection 40 mg  Daily      03/03/19 1039     Place LINA hose  Until discontinued      03/01/19 0531     IP VTE HIGH RISK PATIENT   Once      02/28/19 2202        Cardiology will sign off, pls call with questions.  Pt to follow up with Dr. Seay after discharge.    Jamir Nam MD  Cardiology  [facility]

## 2019-03-05 NOTE — NURSING
Called Dr. Seo because patients blood pressure was running high. Dr. Seo ordered Hydralazine 25mg Q8 PO.     4:00 pm Patients Blood pressure was still running high, I called Dr. Seo back and he ordered for me to give him another dose of Hydralazine 25mg, and also the order was changed to 50mg Q8h.

## 2019-03-05 NOTE — SUBJECTIVE & OBJECTIVE
Interval History: Cantu removed, voiding well. PVRs acceptable.     Review of Systems   Constitutional: Negative for chills and fever.   HENT: Negative for hearing loss, sore throat and trouble swallowing.    Eyes: Negative.    Respiratory: Negative for cough and shortness of breath.    Cardiovascular: Negative for chest pain.   Gastrointestinal: Negative for abdominal distention, abdominal pain, constipation, diarrhea, nausea and vomiting.   Genitourinary: Negative for difficulty urinating, frequency and hematuria.   Musculoskeletal: Negative for arthralgias and neck pain.   Skin: Negative for color change, pallor and rash.   Neurological: Negative for dizziness and seizures.   Hematological: Negative for adenopathy.   Psychiatric/Behavioral: Negative for confusion.   All other systems reviewed and are negative.    Objective:     Temp:  [97.4 °F (36.3 °C)-98.6 °F (37 °C)] 98.1 °F (36.7 °C)  Pulse:  [58-80] 69  Resp:  [16-18] 17  SpO2:  [93 %-97 %] 97 %  BP: (142-198)/(74-96) 198/96     Body mass index is 26.14 kg/m².    Date 03/05/19 0700 - 03/06/19 0659   Shift 1615-5203 1515-9234 1176-4291 24 Hour Total   INTAKE   P.O. 600   600   Shift Total(mL/kg) 600(7.1)   600(7.1)   OUTPUT   Urine(mL/kg/hr) 200   200   Shift Total(mL/kg) 200(2.4)   200(2.4)   Weight (kg) 85 85 85 85     Bladder Scan Volume (mL): 41 mL (03/05/19 0615)    Drains          None          Physical Exam   Vitals reviewed.  Constitutional: He is oriented to person, place, and time. He appears well-developed and well-nourished. No distress.   HENT:   Head: Normocephalic and atraumatic.   Eyes: Right eye exhibits no discharge. Left eye exhibits no discharge. No scleral icterus.   Neck: Normal range of motion. Neck supple.   Cardiovascular: Normal rate and regular rhythm.    Pulmonary/Chest: Effort normal and breath sounds normal. No respiratory distress.   Abdominal: Soft. Bowel sounds are normal. He exhibits no distension. There is no tenderness.  There is no rebound and no guarding.   Genitourinary:   Genitourinary Comments: Cantu - removed  Bladder nonpalpable   Musculoskeletal: Normal range of motion.   Neurological: He is alert and oriented to person, place, and time.   Skin: Skin is warm and dry. He is not diaphoretic. No erythema.         Significant Labs:    BMP:  Recent Labs   Lab 03/03/19  0533 03/04/19  0547 03/05/19  0535    140 140  140  140   K 4.4 4.0 3.8  3.8  3.8   * 109 109  108  108   CO2 24 25 24  24  24   BUN 24* 19 17  17  17   CREATININE 1.1 0.9 0.9  0.9  0.9   CALCIUM 8.4* 8.5* 8.6*  8.8  8.8       CBC:   Recent Labs   Lab 03/01/19  0419 03/02/19  0634 03/05/19  0535   WBC 9.23 10.34 9.47   HGB 13.3* 13.1* 13.7*   HCT 41.2 39.8* 41.4   PLT 53* 62* 140*       Blood Culture:   Recent Labs   Lab 02/28/19  1755 03/02/19  1244 03/02/19  1300   LABBLOO Gram stain michelle bottle: Gram negative rods  Results called to and read back by: Saima Harrison 03/01/2019  06:30  Gram stain aer bottle: Gram negative rods   Positive results previously called  ESCHERICHIA COLI No Growth to date  No Growth to date  No Growth to date No Growth to date  No Growth to date  No Growth to date     Urine Culture:   Recent Labs   Lab 02/28/19 1759   LABURIN ESCHERICHIA COLI  10,000 - 49,999 cfu/ml       Urine Studies:   Recent Labs   Lab 02/28/19 1759   COLORU Red*   APPEARANCEUA Cloudy*   PHUR 7.0   SPECGRAV 1.015   PROTEINUA 2+*   GLUCUA 1+*   KETONESU Negative   BILIRUBINUA Negative   OCCULTUA 3+*   NITRITE Negative   UROBILINOGEN Negative   LEUKOCYTESUR 2+*   RBCUA >100*   WBCUA 25*   BACTERIA Few*   SQUAMEPITHEL 3   HYALINECASTS 0       Significant Imaging:  All pertinent imaging results/findings from the past 24 hours have been reviewed.

## 2019-03-05 NOTE — ASSESSMENT & PLAN NOTE
Elevated troponin consistent with type 2 MI versus small plaque rupture  EF preserved  Cath 3/4/19 with 3V CAD and patent LIMA-LAD-OM-RCA.  ?culprit in small diag vs demand related.  No PCI performed.  Cont med rx

## 2019-03-05 NOTE — PROGRESS NOTES
Patient was educated on need for output volume of urine post-mckeon removal and provided a urinal.  Patient reported going to the bathroom 3 times during the early morning, and produced a total of 500mL urine from the 3 voids.  Patient stated his last voiding was approx. 30 minutes prior, so bladder scan was done and 241 mL urine was detected.  Patient voided again within the hour, showing output of 200 mL.  Bladder scan reading of residual was 41mL.  Patient had no complaints of pain throughout the shift.

## 2019-03-05 NOTE — ASSESSMENT & PLAN NOTE
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 [place] 4 up to 30% of one core.   -S/p gold seed fiducial markers on 2/26/19 by Dr. Huff for future XRT

## 2019-03-06 VITALS
DIASTOLIC BLOOD PRESSURE: 82 MMHG | OXYGEN SATURATION: 94 % | SYSTOLIC BLOOD PRESSURE: 162 MMHG | HEIGHT: 71 IN | WEIGHT: 187.38 LBS | HEART RATE: 87 BPM | RESPIRATION RATE: 18 BRPM | BODY MASS INDEX: 26.23 KG/M2 | TEMPERATURE: 99 F

## 2019-03-06 PROBLEM — R33.9 URINARY RETENTION: Status: RESOLVED | Noted: 2019-03-02 | Resolved: 2019-03-06

## 2019-03-06 PROBLEM — R50.9 FEVER 106 DEGREES F OR OVER: Status: RESOLVED | Noted: 2019-02-28 | Resolved: 2019-03-06

## 2019-03-06 PROBLEM — B99.9 INFECTIOUS ENCEPHALOPATHY: Status: RESOLVED | Noted: 2019-03-01 | Resolved: 2019-03-06

## 2019-03-06 PROBLEM — G93.49 INFECTIOUS ENCEPHALOPATHY: Status: RESOLVED | Noted: 2019-03-01 | Resolved: 2019-03-06

## 2019-03-06 PROBLEM — A41.9 SEPSIS SECONDARY TO UTI: Status: RESOLVED | Noted: 2019-03-01 | Resolved: 2019-03-06

## 2019-03-06 PROBLEM — N39.0 SEPSIS SECONDARY TO UTI: Status: RESOLVED | Noted: 2019-03-01 | Resolved: 2019-03-06

## 2019-03-06 PROBLEM — I21.4 NSTEMI (NON-ST ELEVATED MYOCARDIAL INFARCTION): Status: RESOLVED | Noted: 2019-03-04 | Resolved: 2019-03-06

## 2019-03-06 LAB
ALBUMIN SERPL BCP-MCNC: 3.4 G/DL
ALP SERPL-CCNC: 66 U/L
ALT SERPL W/O P-5'-P-CCNC: 43 U/L
ANION GAP SERPL CALC-SCNC: 8 MMOL/L
AST SERPL-CCNC: 30 U/L
BILIRUB SERPL-MCNC: 0.7 MG/DL
BUN SERPL-MCNC: 12 MG/DL
CALCIUM SERPL-MCNC: 9.4 MG/DL
CHLORIDE SERPL-SCNC: 108 MMOL/L
CO2 SERPL-SCNC: 26 MMOL/L
CREAT SERPL-MCNC: 0.9 MG/DL
EST. GFR  (AFRICAN AMERICAN): >60 ML/MIN/1.73 M^2
EST. GFR  (NON AFRICAN AMERICAN): >60 ML/MIN/1.73 M^2
GLUCOSE SERPL-MCNC: 117 MG/DL
POCT GLUCOSE: 120 MG/DL (ref 70–110)
POCT GLUCOSE: 138 MG/DL (ref 70–110)
POCT GLUCOSE: 176 MG/DL (ref 70–110)
POTASSIUM SERPL-SCNC: 3.5 MMOL/L
PROT SERPL-MCNC: 6.6 G/DL
SODIUM SERPL-SCNC: 142 MMOL/L
VDRL CSF QL: NORMAL

## 2019-03-06 PROCEDURE — 36569 INSJ PICC 5 YR+ W/O IMAGING: CPT | Mod: HCNC

## 2019-03-06 PROCEDURE — 99233 SBSQ HOSP IP/OBS HIGH 50: CPT | Mod: HCNC,,, | Performed by: NURSE PRACTITIONER

## 2019-03-06 PROCEDURE — 99233 PR SUBSEQUENT HOSPITAL CARE,LEVL III: ICD-10-PCS | Mod: HCNC,,, | Performed by: NURSE PRACTITIONER

## 2019-03-06 PROCEDURE — 25000003 PHARM REV CODE 250: Mod: HCNC | Performed by: INTERNAL MEDICINE

## 2019-03-06 PROCEDURE — 36415 COLL VENOUS BLD VENIPUNCTURE: CPT | Mod: HCNC

## 2019-03-06 PROCEDURE — 63600175 PHARM REV CODE 636 W HCPCS: Mod: HCNC | Performed by: PHYSICIAN ASSISTANT

## 2019-03-06 PROCEDURE — 80053 COMPREHEN METABOLIC PANEL: CPT | Mod: HCNC

## 2019-03-06 RX ORDER — METOPROLOL SUCCINATE 25 MG/1
12.5 TABLET, EXTENDED RELEASE ORAL DAILY
Qty: 15 TABLET | Refills: 11 | Status: SHIPPED | OUTPATIENT
Start: 2019-03-07 | End: 2020-11-09 | Stop reason: ALTCHOICE

## 2019-03-06 RX ORDER — AMLODIPINE BESYLATE 5 MG/1
10 TABLET ORAL DAILY
Status: DISCONTINUED | OUTPATIENT
Start: 2019-03-06 | End: 2019-03-06 | Stop reason: HOSPADM

## 2019-03-06 RX ORDER — NAPROXEN SODIUM 220 MG/1
81 TABLET, FILM COATED ORAL DAILY
Refills: 0
Start: 2019-03-07 | End: 2019-05-01 | Stop reason: ALTCHOICE

## 2019-03-06 RX ORDER — LACTULOSE 10 G/15ML
30 SOLUTION ORAL ONCE
Status: COMPLETED | OUTPATIENT
Start: 2019-03-06 | End: 2019-03-06

## 2019-03-06 RX ORDER — SODIUM CHLORIDE 0.9 % (FLUSH) 0.9 %
10 SYRINGE (ML) INJECTION EVERY 6 HOURS
Status: DISCONTINUED | OUTPATIENT
Start: 2019-03-06 | End: 2019-03-06 | Stop reason: HOSPADM

## 2019-03-06 RX ORDER — SODIUM CHLORIDE 0.9 % (FLUSH) 0.9 %
10 SYRINGE (ML) INJECTION
Status: DISCONTINUED | OUTPATIENT
Start: 2019-03-06 | End: 2019-03-06 | Stop reason: HOSPADM

## 2019-03-06 RX ORDER — AMLODIPINE BESYLATE 10 MG/1
10 TABLET ORAL DAILY
Qty: 30 TABLET | Refills: 5 | Status: SHIPPED | OUTPATIENT
Start: 2019-03-06 | End: 2020-07-14

## 2019-03-06 RX ORDER — ATORVASTATIN CALCIUM 40 MG/1
40 TABLET, FILM COATED ORAL DAILY
Qty: 30 TABLET | Refills: 5 | Status: SHIPPED | OUTPATIENT
Start: 2019-03-07 | End: 2019-04-16

## 2019-03-06 RX ORDER — CLOPIDOGREL BISULFATE 75 MG/1
75 TABLET ORAL DAILY
Qty: 30 TABLET | Refills: 5 | Status: ON HOLD | OUTPATIENT
Start: 2019-03-07 | End: 2020-10-08 | Stop reason: HOSPADM

## 2019-03-06 RX ORDER — HYDRALAZINE HYDROCHLORIDE 50 MG/1
50 TABLET, FILM COATED ORAL EVERY 8 HOURS
Qty: 90 TABLET | Refills: 5 | Status: SHIPPED | OUTPATIENT
Start: 2019-03-06 | End: 2020-11-09 | Stop reason: ALTCHOICE

## 2019-03-06 RX ORDER — ALPRAZOLAM 0.5 MG/1
1 TABLET ORAL 3 TIMES DAILY PRN
Status: DISCONTINUED | OUTPATIENT
Start: 2019-03-06 | End: 2019-03-06 | Stop reason: HOSPADM

## 2019-03-06 RX ORDER — HYDRALAZINE HYDROCHLORIDE 20 MG/ML
10 INJECTION INTRAMUSCULAR; INTRAVENOUS EVERY 6 HOURS PRN
Status: DISCONTINUED | OUTPATIENT
Start: 2019-03-06 | End: 2019-03-06 | Stop reason: HOSPADM

## 2019-03-06 RX ADMIN — CEFTRIAXONE 2 G: 2 INJECTION, SOLUTION INTRAVENOUS at 08:03

## 2019-03-06 RX ADMIN — LACTULOSE 30 G: 20 SOLUTION ORAL at 10:03

## 2019-03-06 RX ADMIN — METOPROLOL SUCCINATE 12.5 MG: 25 TABLET, EXTENDED RELEASE ORAL at 08:03

## 2019-03-06 RX ADMIN — ATORVASTATIN CALCIUM 40 MG: 40 TABLET, FILM COATED ORAL at 08:03

## 2019-03-06 RX ADMIN — ASPIRIN 81 MG 81 MG: 81 TABLET ORAL at 08:03

## 2019-03-06 RX ADMIN — HYDRALAZINE HYDROCHLORIDE 50 MG: 25 TABLET ORAL at 04:03

## 2019-03-06 RX ADMIN — ALPRAZOLAM 1 MG: 0.5 TABLET ORAL at 10:03

## 2019-03-06 RX ADMIN — VENLAFAXINE HYDROCHLORIDE 75 MG: 75 CAPSULE, EXTENDED RELEASE ORAL at 08:03

## 2019-03-06 RX ADMIN — LOSARTAN POTASSIUM 50 MG: 25 TABLET, FILM COATED ORAL at 08:03

## 2019-03-06 RX ADMIN — AMLODIPINE BESYLATE 10 MG: 5 TABLET ORAL at 10:03

## 2019-03-06 RX ADMIN — CLOPIDOGREL BISULFATE 75 MG: 75 TABLET ORAL at 08:03

## 2019-03-06 RX ADMIN — HYDRALAZINE HYDROCHLORIDE 50 MG: 25 TABLET ORAL at 05:03

## 2019-03-06 NOTE — PROGRESS NOTES
CHELSEA contacted [facility] after hours to refer patient for home health services, SW waiting for return phone call from after hours nurse.

## 2019-03-06 NOTE — PLAN OF CARE
Problem: Fall Injury Risk  Goal: Absence of Fall and Fall-Related Injury    Intervention: Identify and Manage Contributors to Fall Injury Risk   03/06/19 0524   Manage Acute Allergic Reaction   Medication Review/Management medications reviewed   Identify and Manage Contributors to Fall Injury Risk   Self-Care Promotion independence encouraged

## 2019-03-06 NOTE — PLAN OF CARE
03/06/19 1656   Final Note   Assessment Type Final Discharge Note   Anticipated Discharge Disposition Home-Health   Hospital Follow Up  Appt(s) scheduled? Yes   [facility] Referral Info   Referral Type (Referrals sent)     Nurse Fidelia notified patient is ready for discharge.

## 2019-03-06 NOTE — PLAN OF CARE
[facility]    HOME HEALTH ORDERS  FACE TO FACE ENCOUNTER    Patient Name: Eric Jackson  YOB: 1940    PCP: Samir Boo MD   PCP Address: [address] [facility] / [place] [address]  PCP Phone Number: [phone]  PCP Fax: [phone]    Encounter Date: 03/06/2019    Admit to Home Health    Diagnoses: bacteremia/prostatitis/Non-stemi     Active Hospital Problems    Diagnosis  POA    *Non-STEMI (non-ST elevated myocardial infarction) [I21.4]  Yes     Priority: 1 - High    Acute prostatitis [N41.0]  Yes    Coronary artery disease [I25.10]  Yes    Prostate cancer 2/25/19 Transrectal ultrasound of prostate and gold fiducial marker placement [C61]  Yes     1/7/19 biopsy showed intermediate risk [place] 3+4 PCa in 3/12 cores. [place] 4 up to 30% of one core.   2/25/19 Transrectal ultrasound of prostate and gold fiducial marker placement      Diabetes mellitus type II, controlled [E11.9]  Yes    Essential hypertension [I10]  Yes      Resolved Hospital Problems    Diagnosis Date Resolved POA    Sepsis secondary to UTI [A41.9, N39.0] 03/06/2019 Yes     Priority: 2     NSTEMI (non-ST elevated myocardial infarction) [I21.4] 03/06/2019 Yes    Urinary retention [R33.9] 03/06/2019 Yes     Resolved.       Infectious encephalopathy [G93.49, B99.9] 03/06/2019 Yes    Fever 106 degrees F or over [R50.9] 03/06/2019 Yes       Future Appointments   Date Time Provider Department Center   3/29/2019 11:00 AM Vishnu Lgoan PA-C [facility] ID [address]   8/15/2019  9:00 AM LAB, [facility] LAB [place]   8/22/2019 10:20 AM Layne Huff MD [facility]     Follow-up Information     Samir Boo MD In 1 week.    Specialty:  Internal Medicine  Contact information:  [address]  [place] [id number] [phone]             Jamir Nam MD In 1 week.    Specialties:  Cardiology, INTERVENTIONAL CARDIOLOGY  Contact information:  [address] [facility] [address]  [address]  [place]  [address]  [phone]             Layne Huff MD In 1 week.    Specialty:  Urology  Contact information:  [address]  [address]  [place] [id number] [phone]                     I have seen and examined this patient face to face today. My clinical findings that support the need for the home health skilled services and home bound status are the following:  Requiring assistive device to leave home due to unsteady gait caused by  Infection.    Allergies:  Review of patient's allergies indicates:   Allergen Reactions    Tamsulosin     Prochlorperazine      convulsions    Statins-hmg-coa reductase inhibitors Other (See Comments)     Muscle weakness       Diet: diabetic diet: 2000 calorie and 2 gram sodium diet    Activities:   activity as tolerated  Nursing:   SN to complete comprehensive assessment including routine vital signs. Instruct on disease process and s/s of complications to report to MD. Review/verify medication list sent home with the patient at time of discharge  and instruct patient/caregiver as needed. Frequency may be adjusted depending on start of care date.    Notify MD if SBP > 160 or < 90; DBP > 90 or < 50; HR > 120 or < 50; Temp > 101; Other:   *      CONSULTS:    Aide to provide assistance with personal care, ADLs, and vital signs.    MISCELLANEOUS CARE:  Routine picc line care.  D/C Picc line last day of Abx on 4/17.       ESR, CRP, BMP, CBC weekly  Fax to [phone]    Medications: Review discharge medications with patient and family and provide education.      Current Discharge Medication List      START taking these medications    Details   amLODIPine (NORVASC) 10 MG tablet Take 1 tablet (10 mg total) by mouth once daily.  Qty: 30 tablet, Refills: 5      aspirin 81 MG Chew Take 1 tablet (81 mg total) by mouth once daily.  Refills: 0      atorvastatin (LIPITOR) 40 MG tablet Take 1 tablet (40 mg total) by mouth once daily.  Qty: 30 tablet, Refills: 5      cefTRIAXone 2 g in dextrose  5 % 50 mL (ROCEPHIN) 2 g/50 mL PgBk IVPB Inject 50 mLs (2 g total) into the vein once daily.      clopidogrel (PLAVIX) 75 mg tablet Take 1 tablet (75 mg total) by mouth once daily.  Qty: 30 tablet, Refills: 5      hydrALAZINE (APRESOLINE) 50 MG tablet Take 1 tablet (50 mg total) by mouth every 8 (eight) hours.  Qty: 90 tablet, Refills: 5      metoprolol succinate (TOPROL-XL) 25 MG 24 hr tablet Take 0.5 tablets (12.5 mg total) by mouth once daily.  Qty: 15 tablet, Refills: 11         CONTINUE these medications which have NOT CHANGED    Details   ALPRAZolam (XANAX) 1 MG tablet Take 1 tablet (1 mg total) by mouth nightly as needed for Anxiety.  Qty: 60 tablet, Refills: 2    Associated Diagnoses: Anxiety      blood sugar diagnostic Strp To check BG 2 times daily, to use with insurance preferred meter  Qty: 100 each, Refills: 0    Associated Diagnoses: Controlled type 2 diabetes mellitus without complication, without long-term current use of insulin      blood-glucose meter kit To check BG 2 times daily, to use with insurance preferred meter  Qty: 1 each, Refills: 0    Associated Diagnoses: Controlled type 2 diabetes mellitus without complication, without long-term current use of insulin      HYDROcodone-acetaminophen (NORCO) 5-325 mg per tablet Take 1 tablet by mouth every 6 (six) hours as needed for Pain.  Qty: 3 tablet, Refills: 0      lancets Misc To check BG 2 times daily, to use with insurance preferred meter  Qty: 100 each, Refills: 0    Associated Diagnoses: Controlled type 2 diabetes mellitus without complication, without long-term current use of insulin      losartan-hydrochlorothiazide 50-12.5 mg (HYZAAR) 50-12.5 mg per tablet Take 1 tablet by mouth once daily.  Qty: 90 tablet, Refills: 3    Associated Diagnoses: Essential hypertension      mirtazapine (REMERON) 30 MG tablet Take 1 tablet (30 mg total) by mouth every evening.  Qty: 90 tablet, Refills: 3    Associated Diagnoses: Primary insomnia       venlafaxine (EFFEXOR-XR) 75 MG 24 hr capsule Take 1 capsule (75 mg total) by mouth once daily.  Qty: 90 capsule, Refills: 3    Associated Diagnoses: Depression, unspecified depression type      vit C/vit E ac/lut/copper/zinc (PRESERVISION LUTEIN ORAL) Take 1 tablet by mouth once daily.      vitamin D 1000 units Tab Take 1,000 Units by mouth once daily.         STOP taking these medications       metFORMIN (GLUCOPHAGE-XR) 500 MG 24 hr tablet Comments:   Reason for Stopping:             continue Ceftriaxone through 4/17.      I certify that this patient is confined to his home and needs intermittent skilled nursing care.

## 2019-03-06 NOTE — ASSESSMENT & PLAN NOTE
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 [place] 4 up to 30% of one core.   -S/p gold seed fiducial markers on 2/26/19 by Dr. Huff for future XRT

## 2019-03-06 NOTE — PROGRESS NOTES
Follow-up Information     Samir Boo MD On 3/18/2019.    Specialty:  Internal Medicine  Why:  Outpatient services follow up  @ 1pm  Contact information:  [address] [place] [address]  [phone]             Layne Huff MD On 4/1/2019.    Specialty:  Urology  Why:  Outpatient services follow up at 2:40pm  Contact information:  [address]  [address]  [place] [facility]  [phone]             Jamir Nam MD On 3/19/2019.    Specialties:  Cardiology, INTERVENTIONAL CARDIOLOGY  Why:  Outpatient services follow up with infectious disease @ 1:40pm.  Placed on waiting list for sooner appt.  Contact information:  [address] [facility]  [place] [address]  [phone]             [facility].    Specialty:  Infusion Provider  Why:  Infusion, nurse Nitza is in route to speak with patient before discharge  Contact information:  [address] [place] [address]  [phone]               WRITTEN DISCHARGE INFORMATION:     Things that YOU are responsible for to Manage Your Care At Home:  1. Getting your prescriptions filled.  2. Taking you medications as directed. DO NOT MISS ANY DOSES!  3. Going to your follow-up doctor appointments. This is important because it allows the doctor to monitor your progress and to determine if any changes need to be made to your treatment plan.                                                                Help at Home  After discharge for assistance [facility] On [facility] Care [facility] 24/7 assistance  [phone]     Thank you for choosing [facility] for your care.  Please answer any calls you may receive from [facility] we want to continue to support you as you manage your healthcare needs.  Sincerely, Your [facility] Manager is,  Caitlin PinedaRN,  [phone]

## 2019-03-06 NOTE — PROGRESS NOTES
Telephone order give per MD hydralazine 10 mg IV q6h for SBP >160. Pt BP rechecked b/p now 136/86. Prn med not given at this time. Will continue to monitor

## 2019-03-06 NOTE — SUBJECTIVE & OBJECTIVE
Interval History: No new issues.     Review of Systems   Constitutional: Negative for activity change.   HENT: Negative for congestion.    Respiratory: Negative for chest tightness and shortness of breath.    Cardiovascular: Negative for chest pain.   Gastrointestinal: Negative for abdominal pain.   Genitourinary: Negative for difficulty urinating.     Objective:     Vital Signs (Most Recent):  Temp: 97.7 °F (36.5 °C) (03/06/19 0813)  Pulse: 80 (03/06/19 0813)  Resp: 18 (03/06/19 0813)  BP: (!) 168/80 (03/06/19 0813)  SpO2: 96 % (03/06/19 0813) Vital Signs (24h Range):  Temp:  [97.7 °F (36.5 °C)-98.1 °F (36.7 °C)] 97.7 °F (36.5 °C)  Pulse:  [65-80] 80  Resp:  [17-18] 18  SpO2:  [95 %-97 %] 96 %  BP: (136-202)/(80-96) 168/80     Weight: 85 kg (187 lb 6.3 oz)  Body mass index is 26.14 kg/m².    Intake/Output Summary (Last 24 hours) at 3/6/2019 0952  Last data filed at 3/6/2019 0553  Gross per 24 hour   Intake 1090 ml   Output --   Net 1090 ml      Physical Exam   Constitutional: He is oriented to person, place, and time. He appears well-developed and well-nourished.   HENT:   Head: Normocephalic and atraumatic.   Neurological: He is alert and oriented to person, place, and time.   Skin: Skin is warm and dry.   Psychiatric: He has a normal mood and affect. His behavior is normal.   Nursing note and vitals reviewed.      Significant Labs:   BMP:   Recent Labs   Lab 03/06/19  0509   *      K 3.5      CO2 26   BUN 12   CREATININE 0.9   CALCIUM 9.4     CBC:   Recent Labs   Lab 03/05/19  0535   WBC 9.47   HGB 13.7*   HCT 41.4   *       Significant Imaging:

## 2019-03-06 NOTE — PROGRESS NOTES
[facility]  Urology  Progress Note    Patient Name: Eric Jackson  MRN: [id number]  Admission Date: 2/28/2019  Hospital Length of Stay: 6 days  Code Status: Full Code   Attending Provider: Dave Seo MD   Primary Care Physician: Samir Boo MD    Subjective:     HPI:  79yo M recently diagnosed with prostate cancer. S/p gold seed fiducial markers on 2/26/19 by Dr. Huff for XRT. He was brought in by EMS for altered mental status  and fever of 106 (axillary). UA concerning for UTI.  Now admitted to ICU with severe sepsis with infectious encephalopathy. He also tested positive for Influenza. Patient noted to be oriented on exam    UCx--pending      Interval History: Patient voiding without difficulty since mckeon removed. No new issues overnight    Review of Systems   Constitutional: Negative for chills, fatigue and fever.   HENT: Negative for ear pain.    Eyes: Negative for pain and discharge.   Respiratory: Negative for cough, chest tightness, shortness of breath and wheezing.    Cardiovascular: Negative for chest pain, palpitations and leg swelling.   Gastrointestinal: Negative for abdominal distention, abdominal pain, blood in stool, diarrhea, nausea and vomiting.   Genitourinary: Negative for decreased urine volume, difficulty urinating, dysuria, flank pain, frequency, hematuria, testicular pain and urgency.   Musculoskeletal: Negative for arthralgias, joint swelling and neck pain.   Skin: Negative for pallor, rash and wound.   Neurological: Negative for dizziness, syncope, weakness and headaches.   Hematological: Negative for adenopathy.   Psychiatric/Behavioral: Negative for agitation, confusion and suicidal ideas.     Objective:     Temp:  [97.4 °F (36.3 °C)-98.1 °F (36.7 °C)] 97.7 °F (36.5 °C)  Pulse:  [65-75] 75  Resp:  [17-18] 18  SpO2:  [95 %-97 %] 95 %  BP: (136-202)/(81-96) 136/86     Body mass index is 26.14 kg/m².       Bladder Scan Volume (mL): 41 mL (03/05/19  0615)    Drains          None          Physical Exam   Constitutional: He is oriented to person, place, and time. He appears well-developed.   HENT:   Head: Normocephalic and atraumatic.   Nose: Nose normal.   Eyes: Conjunctivae are normal. Right eye exhibits no discharge. Left eye exhibits no discharge.   Neck: Normal range of motion. Neck supple. No tracheal deviation present. No thyromegaly present.   Cardiovascular: Normal rate and regular rhythm.    Pulmonary/Chest: Effort normal. No respiratory distress. He has no wheezes.   Abdominal: Soft. He exhibits no distension. There is no hepatosplenomegaly. There is no tenderness. There is no CVA tenderness. No hernia.   Genitourinary:   Genitourinary Comments: Voiding without difficulty    Bladder non palpable   Musculoskeletal: Normal range of motion. He exhibits no edema.   Neurological: He is alert and oriented to person, place, and time.   Skin: Skin is warm and dry. No rash noted. No erythema.     Psychiatric: He has a normal mood and affect. His behavior is normal. Judgment normal.       Significant Labs:    BMP:  Recent Labs   Lab 03/04/19  0547 03/05/19  0535 03/06/19  0509    140  140  140 142   K 4.0 3.8  3.8  3.8 3.5    109  108  108 108   CO2 25 24  24  24 26   BUN 19 17  17  17 12   CREATININE 0.9 0.9  0.9  0.9 0.9   CALCIUM 8.5* 8.6*  8.8  8.8 9.4       CBC:   Recent Labs   Lab 03/01/19  0419 03/02/19  0634 03/05/19  0535   WBC 9.23 10.34 9.47   HGB 13.3* 13.1* 13.7*   HCT 41.2 39.8* 41.4   PLT 53* 62* 140*       Blood Culture:   Recent Labs   Lab 02/28/19  1755 03/02/19  1244 03/02/19  1300   LAB[facility]OO Gram stain michelle bottle: Gram negative rods  Results called to and read back by: Saima Harrison 03/01/2019  06:30  Gram stain aer bottle: Gram negative rods   Positive results previously called  ESCHERICHIA COLI No Growth to date  No Growth to date  No Growth to date  No Growth to date No Growth to date  No Growth to date  No  Growth to date  No Growth to date     Urine Culture:   Recent Labs   Lab 02/28/19  1759   LABURIN ESCHERICHIA COLI  10,000 - 49,999 cfu/ml       Urine Studies:   Recent Labs   Lab 02/28/19  1759   COLORU Red*   APPEARANCEUA Cloudy*   PHUR 7.0   SPECGRAV 1.015   PROTEINUA 2+*   GLUCUA 1+*   KETONESU Negative   BILIRUBINUA Negative   OCCULTUA 3+*   NITRITE Negative   UROBILINOGEN Negative   LEUKOCYTESUR 2+*   RBCUA >100*   WBCUA 25*   BACTERIA Few*   SQUAMEPITHEL 3   HYALINECASTS 0       Significant Imaging:  All pertinent imaging results/findings from the past 24 hours have been reviewed.                  Assessment/Plan:     Urinary retention     - Cantu removed, voiding     Acute prostatitis    - Suspect prostate infection after recent  fiducial marker placment  - Cont abx per ID       Sepsis secondary to UTI    Cont IV abx per hosp med while inpatient  Would treat with 2 weeks po abx as outpatient; will defer choice to hosp med         Prostate cancer 2/25/19 Transrectal ultrasound of prostate and gold fiducial marker placement    -S/p TRUS PBx on 1/7/19-- intermediate risk [place] 3+4 PCa in 3/12 cores. [place] 4 up to 30% of one core.   -S/p gold seed fiducial markers on 2/26/19 by Dr. Huff for future XRT                    VTE Risk Mitigation (From admission, onward)        Ordered     enoxaparin injection 40 mg  Daily      03/03/19 1039     Place LINA hose  Until discontinued      03/01/19 0531     IP VTE HIGH RISK PATIENT  Once      02/28/19 2202          Lynnette Kovacs NP  Urology  [facility] [place]

## 2019-03-06 NOTE — PLAN OF CARE
Problem: Fall Injury Risk  Goal: Absence of Fall and Fall-Related Injury  Outcome: Ongoing (interventions implemented as appropriate)  Pt has been free of falls/injury, encouraged to call for assistance when getting up, states ok.

## 2019-03-06 NOTE — ASSESSMENT & PLAN NOTE
Cards evaluated.  Plan for LHC on Monday if stable.     LHC - patent grafts. Culprit likely small diagonal. Med Rx.  Stable.

## 2019-03-06 NOTE — PROGRESS NOTES
AAOX3  Denies any needs and no distress.  DC and Rx instructions reviewed orally and written.  Pt verbalized understanding

## 2019-03-06 NOTE — PROGRESS NOTES
[facility] declined patient due to staffing.  Attempted to call patient's wife Pamela @ [phone], left messaged regarding update on referral.  [facility] sent new referrals out to [facility].

## 2019-03-06 NOTE — ASSESSMENT & PLAN NOTE
E-coli with resistance.  E-coli Bacteremia as well. Repeated blood cultures today.  Continue Meropenem for now. ID recs.   This was likely due to recent urologic procedure.     Ceftriaxone   2 weeks from 3/2.  Repeat blood cultures negative.     Resolved.  6 weeks of Ceftriaxone per their recs today

## 2019-03-06 NOTE — PROGRESS NOTES
Patient has been accepted to [facility], [place] followed up regarding pricing, spoke with Harper @ [phone], states she will call me back with pricing of antibiotic.

## 2019-03-06 NOTE — SUBJECTIVE & OBJECTIVE
Interval History: Patient voiding without difficulty since mckeon removed. No new issues overnight    Review of Systems   Constitutional: Negative for chills, fatigue and fever.   HENT: Negative for ear pain.    Eyes: Negative for pain and discharge.   Respiratory: Negative for cough, chest tightness, shortness of breath and wheezing.    Cardiovascular: Negative for chest pain, palpitations and leg swelling.   Gastrointestinal: Negative for abdominal distention, abdominal pain, blood in stool, diarrhea, nausea and vomiting.   Genitourinary: Negative for decreased urine volume, difficulty urinating, dysuria, flank pain, frequency, hematuria, testicular pain and urgency.   Musculoskeletal: Negative for arthralgias, joint swelling and neck pain.   Skin: Negative for pallor, rash and wound.   Neurological: Negative for dizziness, syncope, weakness and headaches.   Hematological: Negative for adenopathy.   Psychiatric/Behavioral: Negative for agitation, confusion and suicidal ideas.     Objective:     Temp:  [97.4 °F (36.3 °C)-98.1 °F (36.7 °C)] 97.7 °F (36.5 °C)  Pulse:  [65-75] 75  Resp:  [17-18] 18  SpO2:  [95 %-97 %] 95 %  BP: (136-202)/(81-96) 136/86     Body mass index is 26.14 kg/m².       Bladder Scan Volume (mL): 41 mL (03/05/19 0615)    Drains          None          Physical Exam   Constitutional: He is oriented to person, place, and time. He appears well-developed.   HENT:   Head: Normocephalic and atraumatic.   Nose: Nose normal.   Eyes: Conjunctivae are normal. Right eye exhibits no discharge. Left eye exhibits no discharge.   Neck: Normal range of motion. Neck supple. No tracheal deviation present. No thyromegaly present.   Cardiovascular: Normal rate and regular rhythm.    Pulmonary/Chest: Effort normal. No respiratory distress. He has no wheezes.   Abdominal: Soft. He exhibits no distension. There is no hepatosplenomegaly. There is no tenderness. There is no CVA tenderness. No hernia.   Genitourinary:    Genitourinary Comments: Voiding without difficulty    Bladder non palpable   Musculoskeletal: Normal range of motion. He exhibits no edema.   Neurological: He is alert and oriented to person, place, and time.   Skin: Skin is warm and dry. No rash noted. No erythema.     Psychiatric: He has a normal mood and affect. His behavior is normal. Judgment normal.       Significant Labs:    BMP:  Recent Labs   Lab 03/04/19  0547 03/05/19  0535 03/06/19  0509    140  140  140 142   K 4.0 3.8  3.8  3.8 3.5    109  108  108 108   CO2 25 24  24  24 26   BUN 19 17  17  17 12   CREATININE 0.9 0.9  0.9  0.9 0.9   CALCIUM 8.5* 8.6*  8.8  8.8 9.4       CBC:   Recent Labs   Lab 03/01/19  0419 03/02/19  0634 03/05/19  0535   WBC 9.23 10.34 9.47   HGB 13.3* 13.1* 13.7*   HCT 41.2 39.8* 41.4   PLT 53* 62* 140*       Blood Culture:   Recent Labs   Lab 02/28/19  1755 03/02/19  1244 03/02/19  1300   LAB[facility]OO Gram stain michelle bottle: Gram negative rods  Results called to and read back by: Saima Harrison 03/01/2019  06:30  Gram stain aer bottle: Gram negative rods   Positive results previously called  ESCHERICHIA COLI No Growth to date  No Growth to date  No Growth to date  No Growth to date No Growth to date  No Growth to date  No Growth to date  No Growth to date     Urine Culture:   Recent Labs   Lab 02/28/19  1759   LABURIN ESCHERICHIA COLI  10,000 - 49,999 cfu/ml       Urine Studies:   Recent Labs   Lab 02/28/19  1759   COLORU Red*   APPEARANCEUA Cloudy*   PHUR 7.0   SPECGRAV 1.015   PROTEINUA 2+*   GLUCUA 1+*   KETONESU Negative   BILIRUBINUA Negative   OCCULTUA 3+*   NITRITE Negative   UROBILINOGEN Negative   LEUKOCYTESUR 2+*   RBCUA >100*   WBCUA 25*   BACTERIA Few*   SQUAMEPITHEL 3   HYALINECASTS 0       Significant Imaging:  All pertinent imaging results/findings from the past 24 hours have been reviewed.

## 2019-03-06 NOTE — DISCHARGE SUMMARY
[facility] Hospital Medicine  Discharge Summary      Patient Name: Eric Jackson  MRN: [id number]  Admission Date: 2/28/2019  Hospital Length of Stay: 6 days  Discharge Date and Time:  03/06/2019 9:59 AM  Attending Physician: Dave Seo MD   Discharging Provider: Dave Seo MD  Primary Care Provider: Samir Boo MD      HPI:   Eric Jackosn is a 79 yo man with CAD, diabetes, anxiety/depression, GERD, HTN, and ED s/p transrectal ultrasound of prostate with insertion of gold fiducial marker on 2/26/2019 who presented with altered mental status.  Patient had prostate procedure on Tuesday.  On Wednesday he started to feel ill and by this afternoon he developed severe altered mental status and became essentially unresponsive.  The wife reports that he was given Motrin at around 2:00 p.m. and laid down for a nap.  He was unable to be aroused after his nap.  EMS was activated.  When they arrived he had axillary temperature of 106°.  ER physician spoke with the urologist who recommended including gentamicin in the abx regimen.  No general anesthesia was utilized nor did he have any medications that could contribute to possible malignant hypothermia.     In the emergency department routine laboratory studies were obtained as well as urinalysis.  CT abdomen pelvis were also performed.  He also received CT of the head and a lumbar puncture which was significant for markedly elevated opening pressure.  Urinalysis revealed Evidence of urinary tract infection with a clinical picture of suspected bacteremia resulting in severe sepsis with infectious encephalopathy. Sepsis protocol initiated.  Lastly, rapid flu was also positive.  He was started on Tamiflu.    Procedure(s) (LRB):  Left heart cath RFA access, 4fr catheter/sheath, not before 9am (Left)  ANGIOGRAM, CORONARY, INCLUDING BYPASS GRAFT, WITH LEFT HEART CATHETERIZATION (N/A)      Hospital Course:   Eric Jackson is a 79 yo man  with CAD, diabetes, anxiety/depression, GERD, HTN, and ED s/p transrectal ultrasound of prostate with insertion of gold fiducial marker on 2/26/2019 who presented with altered mental status.  Patient had prostate procedure on Tuesday.  On Wednesday he started to feel ill and by this afternoon he developed severe altered mental status and became essentially unresponsive.  The wife reports that he was given Motrin at around 2:00 p.m. and laid down for a nap.  He was unable to be aroused after his nap.  EMS was activated.  When they arrived he had axillary temperature of 106°.   The patient was found to have E-coli UTI with likely E-coli bacteremia.  Urology and GI were consulted. The patient initially went to the ICU but was transferred out on 3/1. Repeat blood cultures were ordered on 3/2/19. ID recommended Meropenem. Cards evaluated the patient as well for non-stemi and took for LHC on 3/4/19. On 3/6/19 ID recommended patient go home with 6 weeks of Ceftriaxone.  The patient went for a picc line on the dame day and will be discharged to home with H/H. Activity as tolerated. Diet- low NA. Follow up with ID and urology and PCP in 1-2 weeks.        Consults:   Consults (From admission, onward)        Status Ordering Provider     Inpatient consult to Cardiology  Once     Provider:  Odilon Klein MD    Completed RADHA REDDY     Inpatient consult to Infectious Diseases  Once     Provider:  Imani Yoder MD    Completed CHET GUSTAFSON     Inpatient consult to Infectious Diseases  Once     Provider:  Bharath Duncan MD    Acknowledged GRETA SHEIKH     [facility]  Once     Provider:  (Not yet assigned)    Ordered GRETA SHEIKH     Inpatient consult to Social Work  Once     Provider:  (Not yet assigned)    Acknowledged RADHA ROCHA     Inpatient consult to Urology  Once     Provider:  Layne Huff MD    Completed BRENNA CERRATO  Assessment & Plan notes have been filed under this hospital service since the last note was generated.  Service: Hospital Medicine    Final Active Diagnoses:    Diagnosis Date Noted POA    PRINCIPAL PROBLEM:  Non-STEMI (non-ST elevated myocardial infarction) [I21.4] 03/01/2019 Yes    Acute prostatitis [N41.0] 03/01/2019 Yes    Coronary artery disease [I25.10] 03/01/2019 Yes    Prostate cancer 2/25/19 Transrectal ultrasound of prostate and gold fiducial marker placement [C61] 01/29/2019 Yes    Diabetes mellitus type II, controlled [E11.9] 11/14/2012 Yes    Essential hypertension [I10] 11/14/2012 Yes      Problems Resolved During this Admission:    Diagnosis Date Noted Date Resolved POA    Sepsis secondary to UTI [A41.9, N39.0] 03/01/2019 03/06/2019 Yes    NSTEMI (non-ST elevated myocardial infarction) [I21.4] 03/04/2019 03/06/2019 Yes    Urinary retention [R33.9] 03/02/2019 03/06/2019 Yes    Infectious encephalopathy [G93.49, B99.9] 03/01/2019 03/06/2019 Yes    Fever 106 degrees F or over [R50.9] 02/28/2019 03/06/2019 Yes       Discharged Condition: good    Disposition: [place]-Health Care [facility]    Follow Up:  Follow-up Information     Samir Boo MD In 1 week.    Specialty:  Internal Medicine  Contact information:  [address] [facility] [address]  [phone]             Jamir Nam MD In 1 week.    Specialties:  Cardiology, INTERVENTIONAL CARDIOLOGY  Contact information:  [address]  [address]  [facility] [address]  [phone]             Layne Huff MD In 1 week.    Specialty:  Urology  Contact information:  [address]  [address]  [place] [address]  [phone]                 Patient Instructions:   No discharge procedures on file.    Significant Diagnostic Studies:     Pending Diagnostic Studies:     Procedure Component Value Units Date/Time    EKG 12-lead [[id number]]     Order Status:  Sent Lab Status:  No result     Freeze and Hold, [facility] [[id number]] Collected:  02/28/19  1917    Order Status:  Sent Lab Status:  No result     Specimen:  CSF (Spinal Fluid) from Cerebrospinal Fluid     VDRL, CSF (Tube 3) [[id number]] Collected:  02/28/19 1917    Order Status:  Sent Lab Status:  In process Updated:  03/01/19 0739    Specimen:  CSF (Spinal Fluid) from Cerebrospinal Fluid          Medications:  Reconciled Home Medications:      Medication List      START taking these medications    amLODIPine 10 MG tablet  Commonly known as:  NORVASC  Take 1 tablet (10 mg total) by mouth once daily.     aspirin 81 MG Chew  Take 1 tablet (81 mg total) by mouth once daily.  Start taking on:  3/7/2019     atorvastatin 40 MG tablet  Commonly known as:  LIPITOR  Take 1 tablet (40 mg total) by mouth once daily.  Start taking on:  3/7/2019     cefTRIAXone 2 g in dextrose 5 % 50 mL 2 g/50 mL Pgbk IVPB  Commonly known as:  ROCEPHIN  Inject 50 mLs (2 g total) into the vein once daily.     clopidogrel 75 mg tablet  Commonly known as:  PLAVIX  Take 1 tablet (75 mg total) by mouth once daily.  Start taking on:  3/7/2019     hydrALAZINE 50 MG tablet  Commonly known as:  APRESOLINE  Take 1 tablet (50 mg total) by mouth every 8 (eight) hours.     metoprolol succinate 25 MG 24 hr tablet  Commonly known as:  TOPROL-XL  Take 0.5 tablets (12.5 mg total) by mouth once daily.  Start taking on:  3/7/2019        CHANGE how you take these medications    mirtazapine 30 MG tablet  Commonly known as:  REMERON  Take 1 tablet (30 mg total) by mouth every evening.  What changed:    · when to take this  · reasons to take this     venlafaxine 75 MG 24 hr capsule  Commonly known as:  EFFEXOR-XR  Take 1 capsule (75 mg total) by mouth once daily.  What changed:    · when to take this  · reasons to take this        CONTINUE taking these medications    ALPRAZolam 1 MG tablet  Commonly known as:  XANAX  Take 1 tablet (1 mg total) by mouth nightly as needed for Anxiety.     blood sugar diagnostic Strp  To check BG 2 times daily, to use with  insurance preferred meter     blood-glucose meter kit  To check BG 2 times daily, to use with insurance preferred meter     HYDROcodone-acetaminophen 5-325 mg per tablet  Commonly known as:  NORCO  Take 1 tablet by mouth every 6 (six) hours as needed for Pain.     lancets Misc  To check BG 2 times daily, to use with insurance preferred meter     losartan-hydrochlorothiazide 50-12.5 mg 50-12.5 mg per tablet  Commonly known as:  HYZAAR  Take 1 tablet by mouth once daily.     PRESERVISION LUTEIN ORAL  Take 1 tablet by mouth once daily.     vitamin D 1000 units Tab  Commonly known as:  VITAMIN D3  Take 1,000 Units by mouth once daily.        STOP taking these medications    metFORMIN 500 MG 24 hr tablet  Commonly known as:  GLUCOPHAGE-XR            Indwelling Lines/Drains at time of discharge:   Lines/Drains/Airways     Airway                 Airway - Non-Surgical 02/26/19 1241 Nasal Cannula 7 days                Time spent on the discharge of patient: > 30 minutes  Patient was seen and examined on the date of discharge and determined to be suitable for discharge.         Dave Starr MD  Department of Hospital Medicine  [facility]

## 2019-03-06 NOTE — PROGRESS NOTES
Follow-up Information     Samir Boo MD On 3/18/2019.    Specialty:  Internal Medicine  Why:  Outpatient services follow up  @ 1pm  Contact information:  [address] [facility]  [place] [address]  [phone]             Layne Huff MD On 4/1/2019.    Specialty:  Urology  Why:  Outpatient services follow up at 2:40pm  Contact information:  [address]  [address]  [place] [address]  [phone]             Jamir Nam MD On 3/19/2019.    Specialties:  Cardiology, INTERVENTIONAL CARDIOLOGY  Why:  Outpatient services follow up with infectious disease @ 1:40pm.  Placed on waiting list for sooner appt.  Contact information:  [address] [facility]  [place] [address]  [phone]             [facility].    Specialty:  Infusion Provider  Why:  Infusion, nurse Nitza is in route to speak with patient before discharge  Contact information:  [address] [place] [address]  [phone]             [facility].    Specialty:  Home Health Services  Contact information:  [address]  [place] [address]  [phone]

## 2019-03-07 ENCOUNTER — PATIENT OUTREACH (OUTPATIENT)
Dept: ADMINISTRATIVE | Facility: CLINIC | Age: 79
End: 2019-03-07

## 2019-03-07 PROBLEM — I25.118 CORONARY ARTERY DISEASE OF NATIVE ARTERY OF NATIVE HEART WITH STABLE ANGINA PECTORIS: Status: ACTIVE | Noted: 2019-03-01

## 2019-03-07 PROBLEM — Z95.1 STATUS POST CORONARY ARTERY BYPASS GRAFTING: Status: ACTIVE | Noted: 2019-03-07

## 2019-03-07 LAB
BACTERIA BLD CULT: NORMAL
BACTERIA BLD CULT: NORMAL

## 2019-03-07 NOTE — PROGRESS NOTES
3/7/2019 @ 0140  Spoke with Chika with [facility] states they will be out to see patient tomorrow.

## 2019-03-07 NOTE — PATIENT INSTRUCTIONS
Sepsis     To treat sepsis, antibiotics and fluids may by given through an intravenous (IV) line.     Sepsis happens when your body responds with widespread inflammation to a bad infection or bacteremia--the presence of bacteria in your bloodstream. Sepsis can be deadly. Blood pressure may drop and the lungs and kidneys may start to fail. Emergency care for sepsis is crucial.  Risk factors  Those most at risk for sepsis are:  · Infants or older adults  · People who have an illness that weakens their immune system, such as cancer, AIDS, or diabetes  · People being treated with chemotherapy medicines or radiation, which weakens the immune system  · People who have had a transplant  · People with a very severe infection such as pneumonia, meningitis, or a urinary tract infection  When to go to the emergency department (ED)  Sepsis is an emergency. Go to the nearest ED if you have a fever with any of these symptoms:  · Chills and shaking  · Rapid heartbeat and breathing  · Trouble breathing  · Severe nausea or uncontrolled vomiting  · Confusion, disorientation, drowsiness, or dizziness  · Decreased urination  · Severe pain, including in the back or joints   What to expect in the ED  · Blood and urine tests are done to look for bacteria. They also check for organ failure.  · Blood, urine, or sputum cultures may be taken. The samples are sent to a lab. They are placed in a special container. Any bacteria should grow in 24 hours.  · X-rays or other imaging tests may be done.  A person with sepsis will be admitted to the hospital and treated with antibiotics. Treatment may also include oxygen and intravenous fluids.  Date Last Reviewed: 10/1/2016  © 6618-6697 [employer]. [address] [place] [address], [place] [address]. All rights reserved. This information is not intended as a substitute for professional medical care. Always follow your healthcare professional's instructions.

## 2019-03-11 ENCOUNTER — PATIENT OUTREACH (OUTPATIENT)
Dept: ADMINISTRATIVE | Facility: HOSPITAL | Age: 79
End: 2019-03-11

## 2019-03-11 ENCOUNTER — PATIENT MESSAGE (OUTPATIENT)
Dept: ADMINISTRATIVE | Facility: HOSPITAL | Age: 79
End: 2019-03-11

## 2019-03-16 NOTE — PROGRESS NOTES
This note was created by combination of typed  and Dragon dictation.  Transcription errors may be present.  If there are any questions, please contact me.    Assessment & Plan:   Non-STEMI (non-ST elevated myocardial infarction) 3/2019 from sepsis; Culprit likely in small Diag2 (unrevascularized) and likely demand related  Coronary artery disease 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; 3/2019 C patent graft; Culprit likely in small Diag2 (unrevascularized) and likely demand related  -normal left heart catheterization.  Recommendations were Plavix until March 2020.  He has previously seen Dr. Bills and would like to follow up with him about this.    Anxiety  -on review of his refills, he has refilled that about 5 times over the course of the year, and he estimates that he has maybe 100 tablets left at home.  He is okay to just contact me if he needs further refills, for co-pay purposes I will send in number [phone].  I am expecting no refill request for the next 3 months.    Prostate cancer 2/25/19 Transrectal ultrasound of prostate and gold fiducial marker placement  -has an upcoming visit with Radiation Oncology as well as with Urology.  He will get continued IV antibiotics for the next 5 weeks for E coli sepsis.   and has follow-up with Infectious Disease.    Other orders  -get pneumonia shot when he is better  -     Cancel: (In Office Administered) Pneumococcal Polysaccharide Vaccine (23 Valent) (SQ/IM)    Needs renal US to characterize cyst.  Address next visit.    Medications Discontinued During This Encounter   Medication Reason    HYDROcodone-acetaminophen (NORCO) 5-325 mg per tablet Therapy completed       meds sent this encounter:       Follow Up: No Follow-up on file. follow up 3-4 months.    Subjective:     Chief Complaint   Patient presents with    hospital followup       DARRYL Reyes is a 78 y.o. male, last appointment with this clinic was 1/14/2019.    Family  "and/or Caretaker present at visit?  No.  Diagnostic tests reviewed/disposition: No diagnosic tests pending after this hospitalization.  Disease/illness education: none  Home health/community services discussion/referrals: Patient has home health established at [facility].   Establishment or re-establishment of referral orders for community resources: No other necessary community resources.   Discussion with other health care providers: No discussion with other health care providers necessary.     Last seen 1/2019.  At that time stopped testosterone  Prostate CA s/p gold implant.  2/2019 hospitalization E coli sepsis and influenza after prostate seeding  needs renal US to characterize abn finding on CT 2/2019 - left renal cyst? complicated?  Hospitalization complicated by NSTEMI. LHC patent graft likely NSTEMI due to demand ischemia from unrevascularized d1; plavix till 3/2020    Has 5 more weeks of abx.  R PICC line. Doing OK no fever no chills.  No obvious SE of the IV antibiotic.   Has restarted physical activity - treatmill, light weights.     I had last refilled xanax for #30.  In the past he would get #60 with 2 RF for a year. Better copay. Had refilled 5 times from 2017 through 2018.  He has about 100 pills left at home by his recall.  Hx of depression with SI years ago and found the xanax "got me over the hump". He is aware of SE including habit forming potential and other SE.  He knows that with anxiety his first line will be exercise.     He'll call me with refill requests when he runs out.    Has appt with radiology tomorrow to start therapy - XRT for the prostate CA  F/u with ID 3/29  4/1 urology.  Canceled appt with Geena.  Pt used to go to [facility] and will re-establish with [facility].     Denies CP, dyspnea, no fever no chills.  Urination is good/bad. tamsulosin made him feel funny so he stopped it.     Patient Care Team:  Samir Boo MD as PCP - General (Internal Medicine)  Arias Muñoz MD as " Consulting Physician (Endocrinology)  Melanie Orantes MA as Care Coordinator  René Bills MD (Cardiology)  Sheila Braden OD as Consulting Physician (Optometry)    Patient Active Problem List    Diagnosis Date Noted    Status post coronary artery bypass grafting single vessel 03/07/2019    Renal cyst, left, possible; incidental on CT 2/2019; needs renal US to characterize 03/02/2019 2/2019 CT abd/pelvis: Small left renal hypodensity which measures slightly higher than simple fluid density.  This could represent a small cyst or complex cyst.  Future nonemergent ultrasound follow-up could be obtained to ensure cystic nature of this lesion.     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; 3/2019 [facility] patent graft; Culprit likely in small Diag2 (unrevascularized) and likely demand related 03/01/2019     3/2019 [facility]  LM: dist 50%  LAD: mid 99%, competitive flow from LIMA-LAD-OM-RPDA              Diag2 prox 90%, T2 flow (not bypassed)  LCx: prox , territory supplied by LIMA-LAD-OM-RPDA  RCA: mid , territory supplied by LIMA-LAD-OM-RPDA     LIMA-LAD-OM-RPDA patent   No SVG identified     Impression:  NSTEMI  3V CAD, normal LV fxn  Patent LIMA-LAD-OM-RPDA  Culprit likely in small Diag2 (unrevascularized) and likely demand related     Plan:  Cont med rx  ASA 81mg qd indefinitely  Plavix 75mg qd for 1 year (thru 3/2020)      Prostate cancer 2/25/19 Transrectal ultrasound of prostate and gold fiducial marker placement 01/29/2019 1/7/19 biopsy showed intermediate risk Tanisha 3+4 PCa in 3/12 cores. Tanisha 4 up to 30% of one core.   2/25/19 Transrectal ultrasound of prostate and gold fiducial marker placement      Tubular adenoma of colon 2/2018 3 adenomas repeat  3 years 01/11/2019    Hypogonadism in male 01/11/2019    Slow urinary stream 12/07/2018    Iron deficiency anemia 02/09/2018    Symptomatic anemia, microcytic/hypochromic  01/15/2018    Hyponatremia 01/15/2018    Insomnia 08/10/2014    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 2/2018     Anxiety     Depression     Essential hypertension 11/14/2012    Type 2 diabetes mellitus without complication, without long-term current use of insulin 11/14/2012       PAST MEDICAL HISTORY:  Past Medical History:   Diagnosis Date    Anxiety     Coronary artery disease     Depression     Diabetes mellitus type II     Erectile dysfunction     Eye injuries     fb ou    GERD (gastroesophageal reflux disease)     Hypertension        PAST SURGICAL HISTORY:  Past Surgical History:   Procedure Laterality Date    ANGIOGRAM, CORONARY, INCLUDING BYPASS GRAFT, WITH LEFT HEART CATHETERIZATION N/A 3/4/2019    Performed [facility] at [facility] CATH LAB    COLONOSCOPY N/A 2/9/2018    Per[facility] by Mathieu Cao [facility] at [facility] ENDO    CORONARY ARTERY BYPASS GRAFT  2001    EGD (ESOPHAGOGASTRODUODENOSCOPY) Left 11/14/2012    Performed by Carlos Navarro [facility] at [facility] ENDO    ESOPHAGOGASTRODUODENOSCOPY (EGD) N/A 2/9/2018    Performed by [facility] at [facility] ENDO    HERNIA REPAIR      Left heart cath RFA access, 4fr catheter/sheath, not before 9am Left 3/4/2019    Performed by [facility]ir [facility] at [facility] CATH LAB    ULTRASOUND, PROSTATE, RECTAL APPROACH, WITH GOLD FIDUCIAL MARKER INSERTION N/A 2/26/2019    Performed by Layne Huff [facility] at [facility] OR    WRIST SURGERY         SOCIAL HISTORY:  Social History     Socioeconomic History    Marital status:      Spouse name: Not on file    Number of children: Not on file    Years of education: Not on file    Highest education level: Not on file   Social Needs    Financial resource strain: Not on file    Food insecurity - worry: Not on file     Food insecurity - inability: Not on file    Transportation needs - medical: Not on file    Transportation needs - non-medical: Not on file   Occupational History    Not on file   Tobacco Use    Smoking status: Former Smoker     Packs/day: 2.00     Years: 23.00     Pack years: 46.00     Last attempt to quit: 1989     Years since quittin.2    Smokeless tobacco: Never Used   Substance and Sexual Activity    Alcohol use: Yes     Alcohol/week: 0.6 oz     Types: 1 Cans of beer per week     Comment: occassional    Drug use: No    Sexual activity: Yes     Partners: Female   Other Topics Concern    Not on file   Social History Narrative    Not on file       ALLERGIES AND MEDICATIONS: updated and reviewed.  Review of patient's allergies indicates:   Allergen Reactions    Tamsulosin     Prochlorperazine      convulsions    Statins-hmg-coa reductase inhibitors Other (See Comments)     Muscle weakness     Current Outpatient Medications   Medication Sig Dispense Refill    ALPRAZolam (XANAX) 1 MG tablet Take 1 tablet (1 mg total) by mouth nightly as needed for Anxiety. 60 tablet 2    amLODIPine (NORVASC) 10 MG tablet Take 1 tablet (10 mg total) by mouth once daily. 30 tablet 5    aspirin 81 MG Chew Take 1 tablet (81 mg total) by mouth once daily.  0    atorvastatin (LIPITOR) 40 MG tablet Take 1 tablet (40 mg total) by mouth once daily. 30 tablet 5    blood sugar diagnostic Strp To check BG 2 times daily, to use with insurance preferred meter 100 each 0    blood-glucose meter kit To check BG 2 times daily, to use with insurance preferred meter 1 each 0    cefTRIAXone 2 g in dextrose 5 % 50 mL (ROCEPHIN) 2 g/50 mL PgBk IVPB Inject 50 mLs (2 g total) into the vein once daily.      clopidogrel (PLAVIX) 75 mg tablet Take 1 tablet (75 mg total) by mouth once daily. 30 tablet 5    hydrALAZINE (APRESOLINE) 50 MG tablet Take 1 tablet (50 mg total) by mouth every 8 (eight) hours. 90 tablet 5    lancets Misc  "To check BG 2 times daily, to use with insurance preferred meter 100 each 0    losartan-hydrochlorothiazide 50-12.5 mg (HYZAAR) 50-12.5 mg per tablet Take 1 tablet by mouth once daily. 90 tablet 3    metoprolol succinate (TOPROL-XL) 25 MG 24 hr tablet Take 0.5 tablets (12.5 mg total) by mouth once daily. 15 tablet 11    mirtazapine (REMERON) 30 MG tablet Take 1 tablet (30 mg total) by mouth every evening. (Patient taking differently: Take 30 mg by mouth nightly as needed. ) 90 tablet 3    venlafaxine (EFFEXOR-XR) 75 MG 24 hr capsule Take 1 capsule (75 mg total) by mouth once daily. (Patient taking differently: Take 75 mg by mouth daily as needed. ) 90 capsule 3    vit C/vit E ac/lut/copper/zinc (PRESERVISION LUTEIN ORAL) Take 1 tablet by mouth once daily.      vitamin D 1000 units Tab Take 1,000 Units by mouth once daily.       No current facility-administered medications for this visit.        Review of Systems   Constitutional: Negative for fever.   Respiratory: Negative for shortness of breath.    Cardiovascular: Negative for chest pain and palpitations.       Objective:   Physical Exam   Vitals:    03/18/19 1302   BP: 130/88   Pulse: 70   Temp: 98.1 °F (36.7 °C)   SpO2: 98%   Weight: 76.6 kg (168 lb 12.3 oz)   Height: 5' 11" (1.803 m)    Body mass index is 23.54 kg/m².  Weight: 76.6 kg (168 lb 12.3 oz)   Height: 5' 11" (180.3 cm)     Physical Exam   Constitutional: He is oriented to person, place, and time. He appears well-developed and well-nourished. No distress.   Eyes: EOM are normal.   Cardiovascular: Normal rate, regular rhythm and normal heart sounds.   No murmur heard.  Pulmonary/Chest: Effort normal and breath sounds normal.   Musculoskeletal: Normal range of motion. He exhibits no edema.   Neurological: He is alert and oriented to person, place, and time. Coordination normal.   Skin: Skin is warm and dry.   Psychiatric: He has a normal mood and affect. His behavior is normal. Thought content " normal.

## 2019-03-18 ENCOUNTER — OFFICE VISIT (OUTPATIENT)
Dept: FAMILY MEDICINE | Facility: CLINIC | Age: 79
End: 2019-03-18
Payer: MEDICARE

## 2019-03-18 VITALS
TEMPERATURE: 98 F | DIASTOLIC BLOOD PRESSURE: 88 MMHG | WEIGHT: 168.75 LBS | HEART RATE: 70 BPM | BODY MASS INDEX: 23.62 KG/M2 | SYSTOLIC BLOOD PRESSURE: 130 MMHG | OXYGEN SATURATION: 98 % | HEIGHT: 71 IN

## 2019-03-18 DIAGNOSIS — I21.4 NON-STEMI (NON-ST ELEVATED MYOCARDIAL INFARCTION): Primary | ICD-10-CM

## 2019-03-18 DIAGNOSIS — F41.9 ANXIETY: ICD-10-CM

## 2019-03-18 DIAGNOSIS — C61 PROSTATE CANCER: ICD-10-CM

## 2019-03-18 DIAGNOSIS — I25.118 CORONARY ARTERY DISEASE OF NATIVE ARTERY OF NATIVE HEART WITH STABLE ANGINA PECTORIS: ICD-10-CM

## 2019-03-18 PROCEDURE — 99499 RISK ADDL DX/OHS AUDIT: ICD-10-PCS | Mod: HCNC,S$GLB,, | Performed by: INTERNAL MEDICINE

## 2019-03-18 PROCEDURE — 99999 PR PBB SHADOW E&M-EST. PATIENT-LVL III: ICD-10-PCS | Mod: PBBFAC,HCNC,, | Performed by: INTERNAL MEDICINE

## 2019-03-18 PROCEDURE — 99999 PR PBB SHADOW E&M-EST. PATIENT-LVL III: CPT | Mod: PBBFAC,HCNC,, | Performed by: INTERNAL MEDICINE

## 2019-03-18 PROCEDURE — 99499 UNLISTED E&M SERVICE: CPT | Mod: HCNC,S$GLB,, | Performed by: INTERNAL MEDICINE

## 2019-03-18 PROCEDURE — 99495 TRANSJ CARE MGMT MOD F2F 14D: CPT | Mod: HCNC,S$GLB,, | Performed by: INTERNAL MEDICINE

## 2019-03-18 PROCEDURE — 99495 TCM SERVICES (MODERATE COMPLEXITY): ICD-10-PCS | Mod: HCNC,S$GLB,, | Performed by: INTERNAL MEDICINE

## 2019-03-25 ENCOUNTER — TELEPHONE (OUTPATIENT)
Dept: ADMINISTRATIVE | Facility: CLINIC | Age: 79
End: 2019-03-25

## 2019-03-25 NOTE — TELEPHONE ENCOUNTER
Home Health SOC 03/07/2019 - 05/05/2019 with [facility] - Dr. Samir Boo. Patient received  services.

## 2019-03-29 ENCOUNTER — OFFICE VISIT (OUTPATIENT)
Dept: INFECTIOUS DISEASES | Facility: CLINIC | Age: 79
End: 2019-03-29
Payer: MEDICARE

## 2019-03-29 VITALS
BODY MASS INDEX: 23.27 KG/M2 | HEIGHT: 71 IN | DIASTOLIC BLOOD PRESSURE: 69 MMHG | WEIGHT: 166.25 LBS | HEART RATE: 88 BPM | TEMPERATURE: 99 F | SYSTOLIC BLOOD PRESSURE: 127 MMHG

## 2019-03-29 DIAGNOSIS — N41.0 ACUTE PROSTATITIS: Primary | ICD-10-CM

## 2019-03-29 DIAGNOSIS — Z51.81 THERAPEUTIC DRUG MONITORING: ICD-10-CM

## 2019-03-29 PROCEDURE — 3078F DIAST BP <80 MM HG: CPT | Mod: HCNC,CPTII,S$GLB, | Performed by: PHYSICIAN ASSISTANT

## 2019-03-29 PROCEDURE — 1101F PT FALLS ASSESS-DOCD LE1/YR: CPT | Mod: HCNC,CPTII,S$GLB, | Performed by: PHYSICIAN ASSISTANT

## 2019-03-29 PROCEDURE — 1101F PR PT FALLS ASSESS DOC 0-1 FALLS W/OUT INJ PAST YR: ICD-10-PCS | Mod: HCNC,CPTII,S$GLB, | Performed by: PHYSICIAN ASSISTANT

## 2019-03-29 PROCEDURE — 99213 PR OFFICE/OUTPT VISIT, EST, LEVL III, 20-29 MIN: ICD-10-PCS | Mod: HCNC,S$GLB,, | Performed by: PHYSICIAN ASSISTANT

## 2019-03-29 PROCEDURE — 3078F PR MOST RECENT DIASTOLIC BLOOD PRESSURE < 80 MM HG: ICD-10-PCS | Mod: HCNC,CPTII,S$GLB, | Performed by: PHYSICIAN ASSISTANT

## 2019-03-29 PROCEDURE — 99999 PR PBB SHADOW E&M-EST. PATIENT-LVL IV: CPT | Mod: PBBFAC,HCNC,, | Performed by: PHYSICIAN ASSISTANT

## 2019-03-29 PROCEDURE — 99213 OFFICE O/P EST LOW 20 MIN: CPT | Mod: HCNC,S$GLB,, | Performed by: PHYSICIAN ASSISTANT

## 2019-03-29 PROCEDURE — 99999 PR PBB SHADOW E&M-EST. PATIENT-LVL IV: ICD-10-PCS | Mod: PBBFAC,HCNC,, | Performed by: PHYSICIAN ASSISTANT

## 2019-03-29 PROCEDURE — 3074F SYST BP LT 130 MM HG: CPT | Mod: HCNC,CPTII,S$GLB, | Performed by: PHYSICIAN ASSISTANT

## 2019-03-29 PROCEDURE — 3074F PR MOST RECENT SYSTOLIC BLOOD PRESSURE < 130 MM HG: ICD-10-PCS | Mod: HCNC,CPTII,S$GLB, | Performed by: PHYSICIAN ASSISTANT

## 2019-03-29 NOTE — PROGRESS NOTES
Subjective:      Patient ID: Eric Jackson is a 78 y.o. male.    Chief Complaint:Hospital Follow Up and Follow-up      History of Present Illness    79 y/o male with CAD, DMII, HTN, underwetn transrectal ultrasound of prostate with gold fiducial marker 2/23/19 for prostate cancer who was admitted at [facility] with AMS found to have E.coli bacteremia 2/2 to UTI. He was placed on ceftriaxone for 6 weeks to treat for acute prostatitis given recent urological procedure. End date 4/13. He presents to clinic today for follow up.     No issues. picc line stable. No fever chills or sweats.     ID lab f/u 3/27  Creatinine 1.03  ESR 9  WBC 6.1  H/H 14.6/44  Platelet 185      Review of Systems   Constitution: Negative for chills, decreased appetite, fever, malaise/fatigue, night sweats, weight gain and weight loss.   HENT: Negative for congestion, ear pain, hearing loss, hoarse voice, sore throat and tinnitus.    Eyes: Negative for blurred vision, redness and visual disturbance.   Cardiovascular: Negative for chest pain, leg swelling and palpitations.   Respiratory: Negative for cough, hemoptysis, shortness of breath and sputum production.    Hematologic/Lymphatic: Negative for adenopathy. Does not bruise/bleed easily.   Skin: Negative for dry skin, itching, rash and suspicious lesions.   Musculoskeletal: Negative for back pain, joint pain, myalgias and neck pain.   Gastrointestinal: Negative for abdominal pain, constipation, diarrhea, heartburn, nausea and vomiting.   Genitourinary: Positive for urgency. Negative for dysuria, flank pain, frequency, hematuria and hesitancy.   Neurological: Positive for weakness. Negative for dizziness, headaches, numbness and paresthesias.   Psychiatric/Behavioral: Positive for depression. Negative for memory loss. The patient is nervous/anxious. The patient does not have insomnia.      Objective:   Physical Exam   Constitutional: He is oriented to person, place, and time. He  appears well-developed and well-nourished. No distress.   HENT:   Head: Normocephalic and atraumatic.   Mouth/Throat: Oropharynx is clear and moist.   Eyes: Pupils are equal, round, and reactive to light. EOM are normal.   Neck: Normal range of motion. Neck supple.   Cardiovascular: Normal rate, regular rhythm, normal heart sounds and intact distal pulses. Exam reveals no gallop and no friction rub.   No murmur heard.  Pulmonary/Chest: Effort normal and breath sounds normal. No respiratory distress. He has no wheezes. He has no rales. He exhibits no tenderness.   Abdominal: Bowel sounds are normal. He exhibits no distension and no mass. There is no tenderness. There is no guarding.   Musculoskeletal: Normal range of motion. He exhibits no edema or deformity.   Neurological: He is alert and oriented to person, place, and time.   Skin: Skin is warm and dry. No rash noted. He is not diaphoretic. No erythema.   Psychiatric: He has a normal mood and affect. His behavior is normal. Judgment and thought content normal.   Nursing note and vitals reviewed.    Assessment:       1. Acute prostatitis    2. Therapeutic drug monitoring          Plan:       1. Continue ceftriaxone until 4/13  2. F/u weekly blood work  3. F/u with ID towards end of abx therapy

## 2019-04-03 LAB
SPECIMEN SOURCE: NORMAL
VIRUS SPEC CULT: NORMAL

## 2019-04-09 ENCOUNTER — OFFICE VISIT (OUTPATIENT)
Dept: OPTOMETRY | Facility: CLINIC | Age: 79
End: 2019-04-09
Payer: MEDICARE

## 2019-04-09 DIAGNOSIS — E11.9 DIABETIC EYE EXAM: Primary | ICD-10-CM

## 2019-04-09 DIAGNOSIS — H52.7 REFRACTIVE ERROR: ICD-10-CM

## 2019-04-09 DIAGNOSIS — H25.13 NUCLEAR SCLEROSIS OF BOTH EYES: ICD-10-CM

## 2019-04-09 DIAGNOSIS — Z01.00 DIABETIC EYE EXAM: Primary | ICD-10-CM

## 2019-04-09 DIAGNOSIS — H35.3132 INTERMEDIATE STAGE NONEXUDATIVE AGE-RELATED MACULAR DEGENERATION OF BOTH EYES: ICD-10-CM

## 2019-04-09 LAB
LEFT EYE DM RETINOPATHY: NEGATIVE
RIGHT EYE DM RETINOPATHY: NEGATIVE

## 2019-04-09 PROCEDURE — 92014 COMPRE OPH EXAM EST PT 1/>: CPT | Mod: HCNC,S$GLB,, | Performed by: OPTOMETRIST

## 2019-04-09 PROCEDURE — 99499 RISK ADDL DX/OHS AUDIT: ICD-10-PCS | Mod: HCNC,S$GLB,, | Performed by: OPTOMETRIST

## 2019-04-09 PROCEDURE — 92015 PR REFRACTION: ICD-10-PCS | Mod: HCNC,S$GLB,, | Performed by: OPTOMETRIST

## 2019-04-09 PROCEDURE — 92014 PR EYE EXAM, EST PATIENT,COMPREHESV: ICD-10-PCS | Mod: HCNC,S$GLB,, | Performed by: OPTOMETRIST

## 2019-04-09 PROCEDURE — 99999 PR PBB SHADOW E&M-EST. PATIENT-LVL II: ICD-10-PCS | Mod: PBBFAC,HCNC,, | Performed by: OPTOMETRIST

## 2019-04-09 PROCEDURE — 99499 UNLISTED E&M SERVICE: CPT | Mod: HCNC,S$GLB,, | Performed by: OPTOMETRIST

## 2019-04-09 PROCEDURE — 92015 DETERMINE REFRACTIVE STATE: CPT | Mod: HCNC,S$GLB,, | Performed by: OPTOMETRIST

## 2019-04-09 PROCEDURE — 99999 PR PBB SHADOW E&M-EST. PATIENT-LVL II: CPT | Mod: PBBFAC,HCNC,, | Performed by: OPTOMETRIST

## 2019-04-09 NOTE — PROGRESS NOTES
Subjective:       Patient ID: Eric Jackson is a 78 y.o. male      Chief Complaint   Patient presents with    Diabetic Eye Exam     History of Present Illness  HPI     Dls: 3/9/18 Dr. Braden     79 y/o male presents today for diabetic eye exam.   Pt states no changes in vision. Pt wears bifocal glasses.      this am     No tearing  No itching  No burning  No pain  No ha's  + floaters  No flashes    Eye meds  None    Hemoglobin A1C       Date                     Value               Ref Range           Status             03/01/2019               6.2 (H)             4.0 - 5.6 %         Final              10/17/2018               6.1 (A)             4.0 - 6.0 %         Final              05/24/2018               6.0 (H)             4.0 - 5.6 %         Final         ----------       Assessment/Plan:     1. Diabetic eye exam  [facility] vision.    No diabetic retinopathy. Discussed with pt the effects of diabetes on vision, importance of good blood sugar control, compliance with meds, and follow up care with PCP. Return in 1 year for dilated eye exam, sooner PRN.    2. Nuclear sclerosis of both eyes  Educated pt on presence of cataracts and effects on vision. No surgery at this time. Recheck in one year.    3. Intermediate stage nonexudative age-related macular degeneration of both eyes  Stable. Continue AREDS and home amsler.     4. Refractive error  Educated patient on refractive error and discussed lens options. Dispensed updated spectacle Rx. Educated about adaptation period to new specs.    Eyeglass Final Rx     Eyeglass Final Rx       Sphere Cylinder Axis Add    Right -3.50 +1.50 140 +2.75    Left -3.25 +1.25 035 +2.75    Expiration Date:  4/9/2020                  Follow up in about 1 year (around 4/9/2020).

## 2019-04-10 ENCOUNTER — TELEPHONE (OUTPATIENT)
Dept: INFECTIOUS DISEASES | Facility: HOSPITAL | Age: 79
End: 2019-04-10

## 2019-04-10 NOTE — TELEPHONE ENCOUNTER
Infectious Disease - Lab follow up 4/3/19    Dx: E.coli bacteremia 2/2 prostatitis   Antibiotics: ceftriaxone  Estimated End Date: 4/12    WBC - 6.7  H/H -  15/45.6  Platelets - 186  Creatinine - 1.10  ESR - 6  CRP -       F/u on 4/12

## 2019-04-16 ENCOUNTER — OFFICE VISIT (OUTPATIENT)
Dept: INFECTIOUS DISEASES | Facility: CLINIC | Age: 79
End: 2019-04-16
Payer: MEDICARE

## 2019-04-16 ENCOUNTER — INFUSION (OUTPATIENT)
Dept: INFECTIOUS DISEASES | Facility: HOSPITAL | Age: 79
End: 2019-04-16
Attending: INTERNAL MEDICINE
Payer: MEDICARE

## 2019-04-16 ENCOUNTER — TELEPHONE (OUTPATIENT)
Dept: FAMILY MEDICINE | Facility: CLINIC | Age: 79
End: 2019-04-16

## 2019-04-16 VITALS
HEIGHT: 71 IN | DIASTOLIC BLOOD PRESSURE: 75 MMHG | TEMPERATURE: 99 F | SYSTOLIC BLOOD PRESSURE: 127 MMHG | HEART RATE: 71 BPM | BODY MASS INDEX: 24.29 KG/M2 | WEIGHT: 173.5 LBS

## 2019-04-16 DIAGNOSIS — Z51.81 THERAPEUTIC DRUG MONITORING: ICD-10-CM

## 2019-04-16 DIAGNOSIS — N41.0 PROSTATITIS, ACUTE: Primary | ICD-10-CM

## 2019-04-16 PROCEDURE — 99999 PR PBB SHADOW E&M-EST. PATIENT-LVL III: ICD-10-PCS | Mod: PBBFAC,HCNC,, | Performed by: PHYSICIAN ASSISTANT

## 2019-04-16 PROCEDURE — 3074F SYST BP LT 130 MM HG: CPT | Mod: HCNC,CPTII,S$GLB, | Performed by: PHYSICIAN ASSISTANT

## 2019-04-16 PROCEDURE — 99213 OFFICE O/P EST LOW 20 MIN: CPT | Mod: HCNC,S$GLB,, | Performed by: PHYSICIAN ASSISTANT

## 2019-04-16 PROCEDURE — 99213 PR OFFICE/OUTPT VISIT, EST, LEVL III, 20-29 MIN: ICD-10-PCS | Mod: HCNC,S$GLB,, | Performed by: PHYSICIAN ASSISTANT

## 2019-04-16 PROCEDURE — 3078F DIAST BP <80 MM HG: CPT | Mod: HCNC,CPTII,S$GLB, | Performed by: PHYSICIAN ASSISTANT

## 2019-04-16 PROCEDURE — 1101F PR PT FALLS ASSESS DOC 0-1 FALLS W/OUT INJ PAST YR: ICD-10-PCS | Mod: HCNC,CPTII,S$GLB, | Performed by: PHYSICIAN ASSISTANT

## 2019-04-16 PROCEDURE — 1101F PT FALLS ASSESS-DOCD LE1/YR: CPT | Mod: HCNC,CPTII,S$GLB, | Performed by: PHYSICIAN ASSISTANT

## 2019-04-16 PROCEDURE — 3078F PR MOST RECENT DIASTOLIC BLOOD PRESSURE < 80 MM HG: ICD-10-PCS | Mod: HCNC,CPTII,S$GLB, | Performed by: PHYSICIAN ASSISTANT

## 2019-04-16 PROCEDURE — 3074F PR MOST RECENT SYSTOLIC BLOOD PRESSURE < 130 MM HG: ICD-10-PCS | Mod: HCNC,CPTII,S$GLB, | Performed by: PHYSICIAN ASSISTANT

## 2019-04-16 PROCEDURE — 99999 PR PBB SHADOW E&M-EST. PATIENT-LVL III: CPT | Mod: PBBFAC,HCNC,, | Performed by: PHYSICIAN ASSISTANT

## 2019-04-16 RX ORDER — METFORMIN HYDROCHLORIDE 500 MG/1
500 TABLET ORAL 2 TIMES DAILY WITH MEALS
COMMUNITY
End: 2019-12-09 | Stop reason: SDUPTHER

## 2019-04-16 NOTE — PROGRESS NOTES
Subjective:      Patient ID: Eric Jackson is a 78 y.o. male.    Chief Complaint:Follow-up      History of Present Illness    77 y/o male with CAD, DMII, HTN, underwent transrectal ultrasound of prostate with gold fiducial marker 2/23/19 for prostate cancer who was admitted at [facility] with AMS found to have E.coli bacteremia 2/2 to UTI. He was placed on ceftriaxone for 6 weeks to treat for acute prostatitis given recent urological procedure. End date 4/13. He presents to clinic today for follow up for end of IV abx therapy     No issues. picc line stable. No fever chills or sweats. Feels good. No dysuria. Underwent radiation therapy last week. Completed sessions per patient.         Review of Systems   Constitution: Positive for malaise/fatigue. Negative for chills, decreased appetite, fever, night sweats, weight gain and weight loss.   HENT: Negative for ear pain, hearing loss, hoarse voice, sore throat and tinnitus.    Eyes: Negative for blurred vision, redness and visual disturbance.   Cardiovascular: Negative for chest pain, leg swelling and palpitations.   Respiratory: Negative for cough, hemoptysis, shortness of breath and sputum production.    Hematologic/Lymphatic: Negative for adenopathy. Does not bruise/bleed easily.   Skin: Negative for dry skin, itching, rash and suspicious lesions.   Musculoskeletal: Positive for joint pain and myalgias. Negative for back pain and neck pain.   Gastrointestinal: Positive for constipation and diarrhea. Negative for abdominal pain, heartburn, nausea and vomiting.   Genitourinary: Positive for dysuria and urgency. Negative for flank pain, frequency, hematuria and hesitancy.   Neurological: Negative for dizziness, headaches, numbness, paresthesias and weakness.   Psychiatric/Behavioral: Negative for depression and memory loss. The patient is nervous/anxious. The patient does not have insomnia.      Objective:   Physical Exam   Constitutional: He is oriented to  person, place, and time. He appears well-developed and well-nourished. No distress.   HENT:   Head: Normocephalic and atraumatic.   Mouth/Throat: Oropharynx is clear and moist.   Eyes: Pupils are equal, round, and reactive to light. EOM are normal.   Neck: Normal range of motion. Neck supple.   Cardiovascular: Normal rate, regular rhythm, normal heart sounds and intact distal pulses. Exam reveals no gallop and no friction rub.   No murmur heard.  Pulmonary/Chest: Effort normal and breath sounds normal. No respiratory distress. He has no wheezes. He has no rales. He exhibits no tenderness.   Abdominal: Bowel sounds are normal. He exhibits no distension and no mass. There is no tenderness. There is no guarding.   Musculoskeletal: Normal range of motion. He exhibits no edema or deformity.   PICC Line RUE   Neurological: He is alert and oriented to person, place, and time.   Skin: Skin is warm and dry. No rash noted. He is not diaphoretic. No erythema.   Psychiatric: He has a normal mood and affect. His behavior is normal. Judgment and thought content normal.   Nursing note and vitals reviewed.    Assessment:       1. Prostatitis, acute    2. Therapeutic drug monitoring          Plan:       1. Completed IV abx therapy. picc line pulled today   2. F/u with urolgoy as scheduled  3. F/u with ID as needed  4. Discussed with pt if with recurrent fever chills sweats or dysuria to be seen in ER.

## 2019-04-16 NOTE — TELEPHONE ENCOUNTER
Deya from the infusion center wants to know if patient has finish his round of medication. If so they will need script to d/c pic line. Order can be fax to [phone].

## 2019-04-16 NOTE — TELEPHONE ENCOUNTER
----- Message from Britney Iverson sent at 4/16/2019 12:32 PM CDT -----  Contact: Deya manning/[facility]   Deya is calling in regards to an order to pull pt's PIC line, if medication is not going to be continued.     Deya can be reached at [phone].    Thank you

## 2019-04-28 ENCOUNTER — HOSPITAL ENCOUNTER (EMERGENCY)
Facility: HOSPITAL | Age: 79
Discharge: HOME | End: 2019-04-28
Attending: EMERGENCY MEDICINE
Payer: MEDICARE

## 2019-04-28 VITALS
WEIGHT: 165 LBS | TEMPERATURE: 98 F | RESPIRATION RATE: 18 BRPM | HEIGHT: 67 IN | HEART RATE: 64 BPM | DIASTOLIC BLOOD PRESSURE: 78 MMHG | SYSTOLIC BLOOD PRESSURE: 164 MMHG | OXYGEN SATURATION: 98 % | BODY MASS INDEX: 25.9 KG/M2

## 2019-04-28 DIAGNOSIS — I82.621 ACUTE DEEP VEIN THROMBOSIS (DVT) OF RIGHT UPPER EXTREMITY, UNSPECIFIED VEIN: Primary | ICD-10-CM

## 2019-04-28 LAB
ALBUMIN SERPL BCP-MCNC: 3.9 G/DL (ref 3.5–5.2)
ALP SERPL-CCNC: 69 U/L (ref 55–135)
ALT SERPL W/O P-5'-P-CCNC: 15 U/L (ref 10–44)
ANION GAP SERPL CALC-SCNC: 7 MMOL/L (ref 8–16)
APTT BLDCRRT: 26.1 SEC (ref 21–32)
AST SERPL-CCNC: 22 U/L (ref 10–40)
BASOPHILS # BLD AUTO: 0.02 K/UL (ref 0–0.2)
BASOPHILS NFR BLD: 0.4 % (ref 0–1.9)
BILIRUB SERPL-MCNC: 0.3 MG/DL (ref 0.1–1)
BUN SERPL-MCNC: 13 MG/DL (ref 8–23)
CALCIUM SERPL-MCNC: 9.7 MG/DL (ref 8.7–10.5)
CHLORIDE SERPL-SCNC: 105 MMOL/L (ref 95–110)
CO2 SERPL-SCNC: 28 MMOL/L (ref 23–29)
CREAT SERPL-MCNC: 1.1 MG/DL (ref 0.5–1.4)
DIFFERENTIAL METHOD: ABNORMAL
EOSINOPHIL # BLD AUTO: 0.3 K/UL (ref 0–0.5)
EOSINOPHIL NFR BLD: 5.6 % (ref 0–8)
ERYTHROCYTE [DISTWIDTH] IN BLOOD BY AUTOMATED COUNT: 14.1 % (ref 11.5–14.5)
EST. GFR  (AFRICAN AMERICAN): >60 ML/MIN/1.73 M^2
EST. GFR  (NON AFRICAN AMERICAN): >60 ML/MIN/1.73 M^2
GLUCOSE SERPL-MCNC: 119 MG/DL (ref 70–110)
HCT VFR BLD AUTO: 42.3 % (ref 40–54)
HGB BLD-MCNC: 13.9 G/DL (ref 14–18)
INR PPP: 1 (ref 0.8–1.2)
LYMPHOCYTES # BLD AUTO: 1.1 K/UL (ref 1–4.8)
LYMPHOCYTES NFR BLD: 20.8 % (ref 18–48)
MCH RBC QN AUTO: 30.1 PG (ref 27–31)
MCHC RBC AUTO-ENTMCNC: 32.9 G/DL (ref 32–36)
MCV RBC AUTO: 92 FL (ref 82–98)
MONOCYTES # BLD AUTO: 0.7 K/UL (ref 0.3–1)
MONOCYTES NFR BLD: 13.8 % (ref 4–15)
NEUTROPHILS # BLD AUTO: 3.2 K/UL (ref 1.8–7.7)
NEUTROPHILS NFR BLD: 59.4 % (ref 38–73)
PLATELET # BLD AUTO: 176 K/UL (ref 150–350)
PMV BLD AUTO: 9.7 FL (ref 9.2–12.9)
POTASSIUM SERPL-SCNC: 3.8 MMOL/L (ref 3.5–5.1)
PROT SERPL-MCNC: 7.2 G/DL (ref 6–8.4)
PROTHROMBIN TIME: 10.1 SEC (ref 9–12.5)
RBC # BLD AUTO: 4.62 M/UL (ref 4.6–6.2)
SODIUM SERPL-SCNC: 140 MMOL/L (ref 136–145)
WBC # BLD AUTO: 5.38 K/UL (ref 3.9–12.7)

## 2019-04-28 PROCEDURE — 25000003 PHARM REV CODE 250: Mod: HCNC | Performed by: EMERGENCY MEDICINE

## 2019-04-28 PROCEDURE — 80053 COMPREHEN METABOLIC PANEL: CPT | Mod: HCNC

## 2019-04-28 PROCEDURE — 85730 THROMBOPLASTIN TIME PARTIAL: CPT | Mod: HCNC

## 2019-04-28 PROCEDURE — 99284 EMERGENCY DEPT VISIT MOD MDM: CPT | Mod: HCNC,25

## 2019-04-28 PROCEDURE — 85610 PROTHROMBIN TIME: CPT | Mod: HCNC

## 2019-04-28 PROCEDURE — 85025 COMPLETE CBC W/AUTO DIFF WBC: CPT | Mod: HCNC

## 2019-04-28 RX ADMIN — RIVAROXABAN 15 MG: 15 TABLET, FILM COATED ORAL at 04:04

## 2019-04-28 NOTE — ED PROVIDER NOTES
Encounter Date: 4/28/2019    SCRIBE #1 NOTE: I, Yocasta Medellin, am scribing for, and in the presence of,  Praveen Brambila MD. I have scribed the following portions of the note - Other sections scribed: HPI and ROS.       History     Chief Complaint   Patient presents with    Arm swelling     Right arm swelling that began yesterday.  PICC line to right upper arm removed 10 days ago.  Sent from Urgent Care for further eval.     CC: Arm Swelling    HPI: This 78 y.o male who has anxiety, HTN, CAD, DM, and GERD presents to the ED for an evaluation of acute onset, constant right upper extremity swelling that began yesterday.  Patient reports being evaluated at Urgent Care and reports being sent to the ED to rule out possible DVT.  Patient reports recently having a PICC line removed from his right upper extremity.  He reports the PICC line was placed to receive antibiotics after being diagnosed with sepsis.  Patient denies fever, chills, nausea, extremity numbness, extremity weakness, or any other associated symptoms.  No prior tx.  No alleviating factors.    The history is provided by the patient. No  was used.     Review of patient's allergies indicates:   Allergen Reactions    Tamsulosin     Prochlorperazine      convulsions    Statins-hmg-coa reductase inhibitors Other (See Comments)     Muscle weakness     Past Medical History:   Diagnosis Date    Anxiety     Coronary artery disease     Depression     Diabetes mellitus type II     Erectile dysfunction     Eye injuries     fb ou    GERD (gastroesophageal reflux disease)     Hypertension     Intermediate stage nonexudative age-related macular degeneration of both eyes 4/9/2019    Nuclear sclerosis of both eyes 4/9/2019     Past Surgical History:   Procedure Laterality Date    ANGIOGRAM, CORONARY, INCLUDING BYPASS GRAFT, WITH LEFT HEART CATHETERIZATION N/A 3/4/2019    [facility]    COLONOSCOPY N/A  2018    [facility] by [facility] at [facility] ENDO    CORONARY ARTERY BYPASS GRAFT      EGD (ESOPHAGOGASTRODUODENOSCOPY) Left 2012    Performed by Carlos Navarro [facility] at [facility] ENDO    ESOPHAGOGASTRODUODENOSCOPY (EGD) N/A 2018    [facility] by [facility] at [facility] ENDO    HERNIA REPAIR      Left heart cath RFA access, 4fr catheter/sheath, not before 9am Left 3/4/2019    Performed by [facility]ir [facility] at [facility] CATH LAB    ULTRASOUND, PROSTATE, RECTAL APPROACH, WITH GOLD FIDUCIAL MARKER INSERTION N/A 2019    Performed by Layne Huff MD at [facility] OR    WRIST SURGERY       Family History   Adopted: Yes   Problem Relation Age of Onset    No Known Problems Mother     No Known Problems Father     No Known Problems Maternal Grandmother     No Known Problems Maternal Grandfather     No Known Problems Paternal Grandmother     No Known Problems Paternal Grandfather     No Known Problems Sister     No Known Problems Brother     No Known Problems Maternal Aunt     No Known Problems Maternal Uncle     No Known Problems Paternal Aunt     No Known Problems Paternal Uncle     Amblyopia Neg Hx     Blindness Neg Hx     Cancer Neg Hx     Cataracts Neg Hx     Diabetes Neg Hx     Glaucoma Neg Hx     Hypertension Neg Hx     Macular degeneration Neg Hx     Retinal detachment Neg Hx     Strabismus Neg Hx     Stroke Neg Hx     Thyroid disease Neg Hx      Social History     Tobacco Use    Smoking status: Former Smoker     Packs/day: 2.00     Years: 23.00     Pack years: 46.00     Types: Cigarettes     Last attempt to quit: 1989     Years since quittin.3    Smokeless tobacco: Never Used   Substance Use Topics    Alcohol use: Yes     Alcohol/week: 0.6 oz     Types: 1 Cans of beer per week     Comment: occassional    Drug use: No     Review of Systems   Constitutional: Negative for chills and fever.   HENT: Negative for ear pain and sore throat.    Eyes:  Negative for pain.   Respiratory: Negative for cough and shortness of breath.    Cardiovascular: Negative for chest pain.   Gastrointestinal: Negative for abdominal pain, diarrhea, nausea and vomiting.   Genitourinary: Negative for dysuria.   Musculoskeletal: Negative for back pain.        (+) right arm swelling   Skin: Negative for rash.   Neurological: Negative for weakness, numbness and headaches.       Physical Exam     Initial Vitals [04/28/19 1404]   BP Pulse Resp Temp SpO2   (!) 171/85 68 18 98.3 °F (36.8 °C) 97 %      MAP       --         Physical Exam  The patient was examined specifically for the following:   General:No significant distress, Good color, Warm and dry. Head and neck:Scalp atraumatic, Neck supple. Neurological:Appropriate conversation, Gross motor deficits. Eyes:Conjugate gaze, Clear corneas. ENT: No epistaxis. Cardiac: Regular rate and rhythm, Grossly normal heart tones. Pulmonary: Wheezing, Rales. Gastrointestinal: Abdominal tenderness, Abdominal distention. Musculoskeletal: Extremity deformity, Apparent pain with range of motion of the joints. Skin: Rash.   The findings on examination were normal except for the following:  Patient has swelling of the right upper extremity.  It also has a slightly pinkish blue hue.  Patient has a good radial pulse.  There is no significant tenderness. There his a healing puncture wound on the medial aspect of the lobe right humeral arm.  ED Course   Procedures  Labs Reviewed   COMPREHENSIVE METABOLIC PANEL - Abnormal; Notable for the following components:       Result Value    Glucose 119 (*)     Anion Gap 7 (*)     All other components within normal limits   CBC W/ AUTO DIFFERENTIAL - Abnormal; Notable for the following components:    Hemoglobin 13.9 (*)     All other components within normal limits   APTT   PROTIME-INR          Imaging Results           US Upper Extremity Veins Right (Final result)  Result time 04/28/19 15:48:12    Final result by  Verna Potts MD (04/28/19 15:48:12)                 Impression:      Extensive acute appearing thrombosis of several veins of the right upper extremity, see details above.    This report was flagged in Epic as abnormal.      Electronically signed by: Verna Potts MD  Date:    04/28/2019  Time:    15:48             Narrative:    EXAMINATION:  US UPPER EXTREMITY VEINS RIGHT    CLINICAL HISTORY:  Swelling of the right upper extremity;    TECHNIQUE:  Duplex and color flow Doppler evaluation and dynamic compression was performed of the right upper extremity veins.    COMPARISON:  None    FINDINGS:  The right internal jugular vein is patent.    Partial thrombus of the right subclavian vein with floating thrombus.    The right axillary vein is completely thrombosed.    Complete thrombosis of the right basilic vein.    The cephalic vein is patent.    The proximal brachial vein is occluded, the mid and distal vein demonstrate flow within.                              Medical decision making:  This patient has extensive DVT in the left upper extremity.  There is floating thrombus in the subclavian vein.  I discussed the case with Dr. Lr, vascular surgery, who suggested the patient be discharged on Xarelto.  The patient could be followed by primary care.  I will write the prescription and emphasized the need for treatment.  This patient has a history of GI bleeding.  I feel that the risk benefit ratio of anticoagulation favors the use of anticoagulation.                Scribe Attestation:   Scribe #1: I performed the above scribed service and the documentation accurately describes the services I performed. I attest to the accuracy of the note.    Attending Attestation:           Physician Attestation for Scribe:  Physician Attestation Statement for Scribe #1: I, Praveen Brambila MD, reviewed documentation, as scribed by Yocasta Medellin in my presence, and it is both accurate and complete.                     Clinical Impression:       ICD-10-CM ICD-9-CM   1. Acute deep vein thrombosis (DVT) of right upper extremity, unspecified vein I82.621 453.82                                Praveen Brambila MD  04/28/19 1637

## 2019-04-28 NOTE — ED TRIAGE NOTES
Pt presents to ED from urgent care for further eval of (R) arm swelling; pt had PICC line removed 10 days ago; pt states that yesterday the arm was swollen and hard, hot to the touch, and red in color; pt denies any symptoms at this time; VSS; in NAD; AAO4; will continue to monitor

## 2019-04-29 ENCOUNTER — TELEPHONE (OUTPATIENT)
Dept: FAMILY MEDICINE | Facility: CLINIC | Age: 79
End: 2019-04-29

## 2019-04-29 ENCOUNTER — OFFICE VISIT (OUTPATIENT)
Dept: FAMILY MEDICINE | Facility: CLINIC | Age: 79
End: 2019-04-29
Payer: MEDICARE

## 2019-04-29 VITALS
HEIGHT: 67 IN | SYSTOLIC BLOOD PRESSURE: 120 MMHG | DIASTOLIC BLOOD PRESSURE: 60 MMHG | WEIGHT: 174.69 LBS | BODY MASS INDEX: 27.42 KG/M2 | OXYGEN SATURATION: 98 % | TEMPERATURE: 98 F | HEART RATE: 77 BPM

## 2019-04-29 DIAGNOSIS — I82.621 ACUTE DEEP VEIN THROMBOSIS (DVT) OF RIGHT UPPER EXTREMITY, UNSPECIFIED VEIN: Primary | ICD-10-CM

## 2019-04-29 DIAGNOSIS — I25.118 CORONARY ARTERY DISEASE OF NATIVE ARTERY OF NATIVE HEART WITH STABLE ANGINA PECTORIS: ICD-10-CM

## 2019-04-29 PROCEDURE — 3078F PR MOST RECENT DIASTOLIC BLOOD PRESSURE < 80 MM HG: ICD-10-PCS | Mod: HCNC,CPTII,S$GLB, | Performed by: INTERNAL MEDICINE

## 2019-04-29 PROCEDURE — 3074F SYST BP LT 130 MM HG: CPT | Mod: HCNC,CPTII,S$GLB, | Performed by: INTERNAL MEDICINE

## 2019-04-29 PROCEDURE — 99214 PR OFFICE/OUTPT VISIT, EST, LEVL IV, 30-39 MIN: ICD-10-PCS | Mod: HCNC,S$GLB,, | Performed by: INTERNAL MEDICINE

## 2019-04-29 PROCEDURE — 99499 RISK ADDL DX/OHS AUDIT: ICD-10-PCS | Mod: HCNC,S$GLB,, | Performed by: INTERNAL MEDICINE

## 2019-04-29 PROCEDURE — 1101F PR PT FALLS ASSESS DOC 0-1 FALLS W/OUT INJ PAST YR: ICD-10-PCS | Mod: HCNC,CPTII,S$GLB, | Performed by: INTERNAL MEDICINE

## 2019-04-29 PROCEDURE — 99999 PR PBB SHADOW E&M-EST. PATIENT-LVL III: ICD-10-PCS | Mod: PBBFAC,HCNC,, | Performed by: INTERNAL MEDICINE

## 2019-04-29 PROCEDURE — 99499 UNLISTED E&M SERVICE: CPT | Mod: HCNC,S$GLB,, | Performed by: INTERNAL MEDICINE

## 2019-04-29 PROCEDURE — 3078F DIAST BP <80 MM HG: CPT | Mod: HCNC,CPTII,S$GLB, | Performed by: INTERNAL MEDICINE

## 2019-04-29 PROCEDURE — 99999 PR PBB SHADOW E&M-EST. PATIENT-LVL III: CPT | Mod: PBBFAC,HCNC,, | Performed by: INTERNAL MEDICINE

## 2019-04-29 PROCEDURE — 3074F PR MOST RECENT SYSTOLIC BLOOD PRESSURE < 130 MM HG: ICD-10-PCS | Mod: HCNC,CPTII,S$GLB, | Performed by: INTERNAL MEDICINE

## 2019-04-29 PROCEDURE — 1101F PT FALLS ASSESS-DOCD LE1/YR: CPT | Mod: HCNC,CPTII,S$GLB, | Performed by: INTERNAL MEDICINE

## 2019-04-29 PROCEDURE — 99214 OFFICE O/P EST MOD 30 MIN: CPT | Mod: HCNC,S$GLB,, | Performed by: INTERNAL MEDICINE

## 2019-04-29 NOTE — PROGRESS NOTES
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; 3/2019 [facility] patent graft; Culprit likely in small Diag2 (unrevascularized) and likely demand related    Overview     3/2019 [facility]  LM: dist 50%  LAD: mid 99%, competitive flow from LIMA-LAD-OM-RPDA              Diag2 prox 90%, T2 flow (not bypassed)  LCx: prox , territory supplied by LIMA-LAD-OM-RPDA  RCA: mid , territory supplied by LIMA-LAD-OM-RPDA     LIMA-LAD-OM-RPDA patent   No SVG identified     Impression:  NSTEMI  3V CAD, normal LV fxn  Patent LIMA-LAD-OM-RPDA  Culprit likely in small Diag2 (unrevascularized) and likely demand related     Plan:  Cont med rx  ASA 81mg qd indefinitely  Plavix 75mg qd for 1 year (thru 3/2020)         Current Assessment & Plan     The current medical regimen is effective;  continue present plan and medications. Of note the patient is not on clopidogrel.  I cannot find documentation as to this being stopped.                Hematology    Acute deep vein thrombosis (DVT) of right upper extremity from PICC line 4/2019 - Primary    Overview     4/28/19 [facility] US: The right internal jugular vein is patent.  Partial thrombus of the right subclavian vein with floating thrombus.  The right axillary vein is completely thrombosed.  Complete thrombosis of the right basilic vein.  The cephalic vein is patent.  The proximal brachial vein is occluded, the mid and distal vein demonstrate flow within.         Current Assessment & Plan     Continue Xarelto.  Will have patient repeat CBC and BMP in 1 month.  Discussed monitoring for signs of bleeding at home.  Discussed with patient that at least 3 months of therapy will be needed but large clot burden so may need to consider 6 months.  Will set him up to see his PCP in 3 months to discuss potential repeat US & ultimate duration.           Relevant Medications     rivaroxaban (XARELTO) 20 mg Tab    Other Relevant Orders    CBC auto differential    Comprehensive metabolic panel            Health Maintenance reviewed, PCV deferred. Discuss at follow up.    Follow-up: Follow up in about 3 months (around 7/29/2019) for DVT follow up with PCP.    ______________________________________________________________________    Chief Complaint  Chief Complaint   Patient presents with    Hospital Follow Up     pt seen [facility]       HPI  Eric Jackson is a 78 y.o. male with multiple medical diagnoses as listed in the medical history and problem list that presents for DVT follow up.  Pt is known to me with his last appointment 3/18/2019.      Recently diagnosed with RUE DVT after having a PICC removed.  Arm was swollen and presented to  -->ED and found to have extensive clot burden.  He is taking Xarelto 15mg BID.  No BRBPR, melena, hematuria, epistaxis, abdominal pain.  Pain is a 1/10.    In discussing his bleeding risk and ER precautions for head trauma it became evident that he is not on DAPT.  Reports he was taken off of plavix.        PAST MEDICAL HISTORY:  Past Medical History:   Diagnosis Date    Anxiety     Coronary artery disease     Depression     Diabetes mellitus type II     Erectile dysfunction     Eye injuries     fb ou    GERD (gastroesophageal reflux disease)     Hypertension     Intermediate stage nonexudative age-related macular degeneration of both eyes 4/9/2019    Nuclear sclerosis of both eyes 4/9/2019       PAST SURGICAL HISTORY:  Past Surgical History:   Procedure Laterality Date    ANGIOGRAM, CORONARY, INCLUDING BYPASS GRAFT, WITH LEFT HEART CATHETERIZATION N/A 3/4/2019    [facility] CATH LAB    COLONOSCOPY N/A 2/9/2018    [facility] by [facility] ENDO    CORONARY ARTERY BYPASS GRAFT  2001    EGD (ESOPHAGOGASTRODUODENOSCOPY) Left 11/14/2012    Performed by [facility] ENDO     ESOPHAGOGASTRODUODENOSCOPY (EGD) N/A 2018    [facility] ENDO    HERNIA REPAIR      Left heart cath RFA access, 4fr catheter/sheath, not before 9am Left 3/4/2019    Performed by [facility]ir [facility] at [facility] CATH LAB    ULTRASOUND, PROSTATE, RECTAL APPROACH, WITH GOLD FIDUCIAL MARKER INSERTION N/A 2019    Performed by Layne Huff MD at [facility] OR    WRIST SURGERY         SOCIAL HISTORY:  Social History     Socioeconomic History    Marital status:      Spouse name: Not on file    Number of children: Not on file    Years of education: Not on file    Highest education level: Not on file   Occupational History    Not on file   Social Needs    Financial resource strain: Not on file    Food insecurity:     Worry: Not on file     Inability: Not on file    Transportation needs:     Medical: Not on file     Non-medical: Not on file   Tobacco Use    Smoking status: Former Smoker     Packs/day: 2.00     Years: 23.00     Pack years: 46.00     Types: Cigarettes     Last attempt to quit: 1989     Years since quittin.3    Smokeless tobacco: Never Used   Substance and Sexual Activity    Alcohol use: Yes     Alcohol/week: 0.6 oz     Types: 1 Cans of beer per week     Comment: occassional    Drug use: No    Sexual activity: Yes     Partners: Female   Lifestyle    Physical activity:     Days per week: Not on file     Minutes per session: Not on file    Stress: Not on file   Relationships    Social connections:     Talks on phone: Not on file     Gets together: Not on file     Attends Hindu service: Not on file     Active member of club or organization: Not on file     Attends meetings of clubs or organizations: Not on file     Relationship status: Not on file   Other Topics Concern    Not on file   Social History Narrative    Not on file       FAMILY HISTORY:  Family History   Adopted: Yes   Problem Relation Age of Onset    No Known Problems  Mother     No Known Problems Father     No Known Problems Maternal Grandmother     No Known Problems Maternal Grandfather     No Known Problems Paternal Grandmother     No Known Problems Paternal Grandfather     No Known Problems Sister     No Known Problems Brother     No Known Problems Maternal Aunt     No Known Problems Maternal Uncle     No Known Problems Paternal Aunt     No Known Problems Paternal Uncle     Amblyopia Neg Hx     Blindness Neg Hx     Cancer Neg Hx     Cataracts Neg Hx     Diabetes Neg Hx     Glaucoma Neg Hx     Hypertension Neg Hx     Macular degeneration Neg Hx     Retinal detachment Neg Hx     Strabismus Neg Hx     Stroke Neg Hx     Thyroid disease Neg Hx        ALLERGIES AND MEDICATIONS: updated and reviewed.  Review of patient's allergies indicates:   Allergen Reactions    Tamsulosin     Prochlorperazine      convulsions    Statins-hmg-coa reductase inhibitors Other (See Comments)     Muscle weakness     Current Outpatient Medications   Medication Sig Dispense Refill    losartan-hydrochlorothiazide 50-12.5 mg (HYZAAR) 50-12.5 mg per tablet Take 1 tablet by mouth once daily. 90 tablet 3    metFORMIN (GLUCOPHAGE) 500 MG tablet Take 500 mg by mouth 2 (two) times daily with meals.      rivaroxaban (XARELTO) 15 mg (42)- 20 mg (9) tablet dose pack Take 1 tablet (15 mg) by mouth twice daily with food for 21 days followed by 1 tablet (20 mg) by mouth once daily with food 1 Package 0    vit C/vit E ac/lut/copper/zinc (PRESERVISION LUTEIN ORAL) Take 1 tablet by mouth once daily.      vitamin D 1000 units Tab Take 1,000 Units by mouth once daily.      ALPRAZolam (XANAX) 1 MG tablet Take 1 tablet (1 mg total) by mouth nightly as needed for Anxiety. 60 tablet 2    amLODIPine (NORVASC) 10 MG tablet Take 1 tablet (10 mg total) by mouth once daily. 30 tablet 5    aspirin 81 MG Chew Take 1 tablet (81 mg total) by mouth once daily.  0    blood sugar diagnostic Strp To check  BG 2 times daily, to use with insurance preferred meter 100 each 0    blood-glucose meter kit To check BG 2 times daily, to use with insurance preferred meter 1 each 0    cefTRIAXone 2 g in dextrose 5 % 50 mL (ROCEPHIN) 2 g/50 mL PgBk IVPB Inject 50 mLs (2 g total) into the vein once daily.      clopidogrel (PLAVIX) 75 mg tablet Take 1 tablet (75 mg total) by mouth once daily. 30 tablet 5    hydrALAZINE (APRESOLINE) 50 MG tablet Take 1 tablet (50 mg total) by mouth every 8 (eight) hours. 90 tablet 5    lancets Misc To check BG 2 times daily, to use with insurance preferred meter 100 each 0    metoprolol succinate (TOPROL-XL) 25 MG 24 hr tablet Take 0.5 tablets (12.5 mg total) by mouth once daily. 15 tablet 11    mirtazapine (REMERON) 30 MG tablet Take 1 tablet (30 mg total) by mouth every evening. (Patient taking differently: Take 30 mg by mouth nightly as needed. ) 90 tablet 3    rivaroxaban (XARELTO) 20 mg Tab Take 1 tablet (20 mg total) by mouth daily with dinner or evening meal. Start after completing the 15mg tablets 90 tablet 0    venlafaxine (EFFEXOR-XR) 75 MG 24 hr capsule Take 1 capsule (75 mg total) by mouth once daily. (Patient taking differently: Take 75 mg by mouth daily as needed. ) 90 capsule 3     No current facility-administered medications for this visit.          ROS  Review of Systems   Constitutional: Negative for chills, fever and unexpected weight change.   HENT: Negative for congestion, dental problem, ear pain, hearing loss, rhinorrhea, sore throat and trouble swallowing.    Eyes: Negative for discharge, redness and visual disturbance.   Respiratory: Negative for cough, chest tightness, shortness of breath and wheezing.    Cardiovascular: Negative for chest pain, palpitations and leg swelling.   Gastrointestinal: Negative for abdominal pain, constipation, diarrhea, nausea and vomiting.   Endocrine: Negative for polydipsia, polyphagia and polyuria.   Genitourinary: Negative for  "decreased urine volume, dysuria and hematuria.   Musculoskeletal: Negative for arthralgias and myalgias.   Skin: Negative for color change and rash.   Neurological: Negative for dizziness, weakness, light-headedness and headaches.   Psychiatric/Behavioral: Negative for decreased concentration, dysphoric mood, sleep disturbance and suicidal ideas.           Physical Exam  Vitals:    04/29/19 1439   BP: 120/60   Pulse: 77   Temp: 98.3 °F (36.8 °C)   SpO2: 98%   Weight: 79.2 kg (174 lb 11.4 oz)   Height: 5' 7" (1.702 m)    Body mass index is 27.36 kg/m².  Weight: 79.2 kg (174 lb 11.4 oz)   Height: 5' 7" (170.2 cm)   Physical Exam   Constitutional: He is oriented to person, place, and time. He appears well-developed and well-nourished. No distress.   HENT:   Head: Normocephalic and atraumatic.   Eyes: Pupils are equal, round, and reactive to light. Conjunctivae, EOM and lids are normal. No scleral icterus.   Neck: Full passive range of motion without pain. Neck supple. No JVD present. Carotid bruit is not present. No thyromegaly present.   Cardiovascular: Normal rate, regular rhythm, normal heart sounds and intact distal pulses. Exam reveals no S3, no S4 and no friction rub.   No murmur heard.  Pulses:       Radial pulses are 2+ on the right side.   Pulmonary/Chest: Effort normal and breath sounds normal. He has no wheezes. He has no rhonchi. He has no rales.   Abdominal: Soft. Bowel sounds are normal. There is no tenderness.   Musculoskeletal: He exhibits edema (non-pitting edema of the RUE noted). He exhibits no tenderness.   Neurological: He is alert and oriented to person, place, and time.   Motor grossly intact.  Sensory grossly intact.  Symmetric facial movements palate elevated symmetrically tongue midline   Skin: Skin is warm and dry. No rash noted.   Psychiatric: He has a normal mood and affect. His speech is normal and behavior is normal. Thought content normal.         Health Maintenance       Date Due " Completion Date    Pneumococcal Vaccine (65+ High/Highest Risk) (2 of 2 - PPSV23) 06/15/2018 4/20/2018    Hemoglobin A1c 09/01/2019 3/1/2019    Override on 2/24/2015: Done (Dr. Arias Muñoz)    Lipid Panel 10/17/2019 10/17/2018    Override on 8/26/2016: Done ([facility]-Arias Muñoz MD/GEETHA Razo-Lipid Panel:Cholesterol 227 mg/dL, NON- mg/dL, HDL 61 mg/dL, CHol/HDL Ratio 3.7,  mg/dL, Triglyceride 101 mg/dL)    Override on 2/24/2015: Done (Dr. Arias Muñoz)    Urine Microalbumin 10/17/2019 10/17/2018    Override on 2/24/2015: Done (Dr. Arias Muñoz)    Foot Exam 01/14/2020 1/14/2019    Override on 9/9/2016: Done ([facility]ology[facility]adeem Muñ[facility] MD/GEETHA Razo-Diabetic feet examnormal by visual exam, normal pulses,, sensation intact, Monofilament exam 10/10)    Override on 3/3/2015: Done (Dr.Tilak MESSI Sky)    Eye Exam 04/09/2020 4/9/2019    Override on 4/9/2019: Done    Override on 3/9/2018: Done    Override on 1/6/2017: Done    Override on 2/10/2015: Done    Colonoscopy 02/09/2021 2/9/2018    TETANUS VACCINE 03/30/2027 3/30/2017 (Declined)    Override on 3/30/2017: Declined

## 2019-04-29 NOTE — ASSESSMENT & PLAN NOTE
The current medical regimen is effective;  continue present plan and medications. Of note the patient is not on clopidogrel.  I cannot find documentation as to this being stopped.

## 2019-04-29 NOTE — TELEPHONE ENCOUNTER
----- Message from Sailaja Rell sent at 4/29/2019 10:36 AM CDT -----  Contact: DANE GARVEY [[id number]]  Name of Who is Calling: DANE GARVEY [[id number]]    What is the request in detail: Patient went to the urgent care, he have a blood clot and need a follow up appointment.       Can the clinic reply by [facility]:no      What Number to Call Back if not in [facility]: [phone]

## 2019-04-29 NOTE — ASSESSMENT & PLAN NOTE
Continue Xarelto.  Will have patient repeat CBC and BMP in 1 month.  Discussed monitoring for signs of bleeding at home.  Discussed with patient that at least 3 months of therapy will be needed but large clot burden so may need to consider 6 months.  Will set him up to see his PCP in 3 months to discuss potential repeat US & ultimate duration.

## 2019-04-29 NOTE — Clinical Note
Looks good.  Rechecking labs in 1 month and set him up to see you in 3 months.  Of note, he is off of plavix.  Couldn't find documentation that this was stopped but he swore that it was.  Wanted to make sure this was correct from your perspective before telling him to restart it.Thanks,Jerzy

## 2019-05-01 ENCOUNTER — TELEPHONE (OUTPATIENT)
Dept: FAMILY MEDICINE | Facility: CLINIC | Age: 79
End: 2019-05-01

## 2019-05-01 NOTE — TELEPHONE ENCOUNTER
Please call pt - discussed with cardiology about his plavix and aspirin, while he is on xarelto.  They recommend taking plavix and stopping aspirin.    So re-start the plavix.

## 2019-05-21 ENCOUNTER — PES CALL (OUTPATIENT)
Dept: ADMINISTRATIVE | Facility: CLINIC | Age: 79
End: 2019-05-21

## 2019-05-27 ENCOUNTER — OFFICE VISIT (OUTPATIENT)
Dept: CARDIOLOGY | Facility: CLINIC | Age: 79
End: 2019-05-27
Payer: MEDICARE

## 2019-05-27 VITALS
HEART RATE: 78 BPM | DIASTOLIC BLOOD PRESSURE: 82 MMHG | RESPIRATION RATE: 16 BRPM | BODY MASS INDEX: 27.28 KG/M2 | OXYGEN SATURATION: 98 % | WEIGHT: 174.19 LBS | SYSTOLIC BLOOD PRESSURE: 120 MMHG

## 2019-05-27 DIAGNOSIS — E11.9 TYPE 2 DIABETES MELLITUS WITHOUT COMPLICATION, WITHOUT LONG-TERM CURRENT USE OF INSULIN: ICD-10-CM

## 2019-05-27 DIAGNOSIS — I25.10 CORONARY ARTERY DISEASE INVOLVING NATIVE CORONARY ARTERY OF NATIVE HEART WITHOUT ANGINA PECTORIS: Primary | ICD-10-CM

## 2019-05-27 DIAGNOSIS — F32.0 CURRENT MILD EPISODE OF MAJOR DEPRESSIVE DISORDER WITHOUT PRIOR EPISODE: ICD-10-CM

## 2019-05-27 DIAGNOSIS — I82.621 ACUTE DEEP VEIN THROMBOSIS (DVT) OF RIGHT UPPER EXTREMITY, UNSPECIFIED VEIN: ICD-10-CM

## 2019-05-27 DIAGNOSIS — I10 ESSENTIAL HYPERTENSION: ICD-10-CM

## 2019-05-27 PROCEDURE — 99999 PR PBB SHADOW E&M-EST. PATIENT-LVL III: ICD-10-PCS | Mod: PBBFAC,HCNC,, | Performed by: INTERNAL MEDICINE

## 2019-05-27 PROCEDURE — 99214 OFFICE O/P EST MOD 30 MIN: CPT | Mod: HCNC,S$GLB,, | Performed by: INTERNAL MEDICINE

## 2019-05-27 PROCEDURE — 3074F SYST BP LT 130 MM HG: CPT | Mod: HCNC,CPTII,S$GLB, | Performed by: INTERNAL MEDICINE

## 2019-05-27 PROCEDURE — 99214 PR OFFICE/OUTPT VISIT, EST, LEVL IV, 30-39 MIN: ICD-10-PCS | Mod: HCNC,S$GLB,, | Performed by: INTERNAL MEDICINE

## 2019-05-27 PROCEDURE — 3074F PR MOST RECENT SYSTOLIC BLOOD PRESSURE < 130 MM HG: ICD-10-PCS | Mod: HCNC,CPTII,S$GLB, | Performed by: INTERNAL MEDICINE

## 2019-05-27 PROCEDURE — 3079F PR MOST RECENT DIASTOLIC BLOOD PRESSURE 80-89 MM HG: ICD-10-PCS | Mod: HCNC,CPTII,S$GLB, | Performed by: INTERNAL MEDICINE

## 2019-05-27 PROCEDURE — 1101F PT FALLS ASSESS-DOCD LE1/YR: CPT | Mod: HCNC,CPTII,S$GLB, | Performed by: INTERNAL MEDICINE

## 2019-05-27 PROCEDURE — 1101F PR PT FALLS ASSESS DOC 0-1 FALLS W/OUT INJ PAST YR: ICD-10-PCS | Mod: HCNC,CPTII,S$GLB, | Performed by: INTERNAL MEDICINE

## 2019-05-27 PROCEDURE — 99999 PR PBB SHADOW E&M-EST. PATIENT-LVL III: CPT | Mod: PBBFAC,HCNC,, | Performed by: INTERNAL MEDICINE

## 2019-05-27 PROCEDURE — 3079F DIAST BP 80-89 MM HG: CPT | Mod: HCNC,CPTII,S$GLB, | Performed by: INTERNAL MEDICINE

## 2019-05-27 NOTE — PROGRESS NOTES
Subjective:    Patient ID:  Eric Jackson is a 78 y.o. male who presents for follow-up of Hospital Follow Up      HPI  Patient is here for follow-up of coronary artery disease.  He recently admitted to the hospital with infection and non-STEMI.  He underwent angiogram as below.  He denies any cardiopulmonary complaints since discharge.  He did develop clot at the PICC site now is on Xarelto.   He denies any PND, orthopnea or lower extremity edema.  He has not experiencing dizziness, presyncope or syncope.    * He was very agitated and wanted to reschedule initially after saying he had waited long in the waiting room.  On review of the record he did not weight more than 10 min after arriving late for his appointment.    Review of Systems   Constitution: Negative.   HENT: Negative.    Eyes: Negative.    Cardiovascular: Negative for chest pain, dyspnea on exertion, irregular heartbeat, leg swelling, near-syncope, orthopnea, palpitations, paroxysmal nocturnal dyspnea and syncope.   Respiratory: Negative for shortness of breath.    Skin: Negative.    Musculoskeletal: Negative.    Gastrointestinal: Negative for abdominal pain, constipation and diarrhea.   Genitourinary: Negative for dysuria.   Neurological: Negative for dizziness.   Psychiatric/Behavioral: Negative.         Objective:    Physical Exam   Constitutional: He is oriented to person, place, and time. He appears well-developed and well-nourished. No distress.   HENT:   Head: Normocephalic and atraumatic.   Eyes: Pupils are equal, round, and reactive to light. Conjunctivae and EOM are normal.   Neck: Normal range of motion. Neck supple. No thyromegaly present.   Cardiovascular: Normal rate, regular rhythm and normal heart sounds.   No murmur heard.  Pulmonary/Chest: Effort normal and breath sounds normal. No respiratory distress. He has no wheezes. He has no rales. He exhibits no tenderness.   Abdominal: Soft. Bowel sounds are normal.   Musculoskeletal: He  exhibits no edema.   Neurological: He is alert and oriented to person, place, and time.   Skin: Skin is warm and dry.   Psychiatric: He has a normal mood and affect. His behavior is normal.       ECHO:  5-19  · Normal left ventricular systolic function. The estimated ejection fraction is 55%    LHC:  LVEDP: 22mmHg  LVEF: 55% by echo  Wall Motion: normal by echo     Dominance: Right  LM: dist 50%  LAD: mid 99%, competitive flow from LIMA-LAD-OM-RPDA              Diag2 prox 90%, T2 flow (not bypassed)  LCx: prox , territory supplied by LIMA-LAD-OM-RPDA  RCA: mid , territory supplied by LIMA-LAD-OM-RPDA     LIMA-LAD-OM-RPDA patent     No SVG identified     Hemostasis:  RFA     Impression:  NSTEMI  3V CAD, normal LV fxn  Patent LIMA-LAD-OM-RPDA  Culprit likely in small Diag2 (unrevascularized) and likely demand related  Man compression RFA for hemostasis     Plan:  Cont med rx  ASA 81mg qd indefinitely  Plavix 75mg qd for 1 year (thru 3/2020)  BBl/ARB  Statin allergy noted  Consider PCSK9 inhibitor if statin allergy confirmed  Dispo planning appropriate  Pt to follow up with Dr. Seay after discharge    Assessment:       1. Coronary artery disease involving native coronary artery of native heart without angina pectoris    2. Essential hypertension    3. Type 2 diabetes mellitus without complication, without long-term current use of insulin    4. Acute deep vein thrombosis (DVT) of right upper extremity, unspecified vein    5. Current mild episode of major depressive disorder without prior episode         Plan:       -continue current medical therapy  -refuses any additional medicine at this point  -we discussed trial of Livalo or PCSK9 inhibitor (* he said he will follow up with his primary care physician discuss further in 1 month)  * he is aware of the risks/benefits    Return to clinic in 6 months

## 2019-05-29 ENCOUNTER — LAB VISIT (OUTPATIENT)
Dept: LAB | Facility: HOSPITAL | Age: 79
End: 2019-05-29
Attending: INTERNAL MEDICINE
Payer: MEDICARE

## 2019-05-29 ENCOUNTER — OFFICE VISIT (OUTPATIENT)
Dept: OPTOMETRY | Facility: CLINIC | Age: 79
End: 2019-05-29
Payer: MEDICARE

## 2019-05-29 DIAGNOSIS — H11.441 CONJUNCTIVAL CYST OF RIGHT EYE: Primary | ICD-10-CM

## 2019-05-29 DIAGNOSIS — I82.621 ACUTE DEEP VEIN THROMBOSIS (DVT) OF RIGHT UPPER EXTREMITY, UNSPECIFIED VEIN: ICD-10-CM

## 2019-05-29 LAB
ALBUMIN SERPL BCP-MCNC: 3.8 G/DL (ref 3.5–5.2)
ALP SERPL-CCNC: 62 U/L (ref 55–135)
ALT SERPL W/O P-5'-P-CCNC: 19 U/L (ref 10–44)
ANION GAP SERPL CALC-SCNC: 7 MMOL/L (ref 8–16)
AST SERPL-CCNC: 24 U/L (ref 10–40)
BASOPHILS # BLD AUTO: 0.04 K/UL (ref 0–0.2)
BASOPHILS NFR BLD: 0.8 % (ref 0–1.9)
BILIRUB SERPL-MCNC: 0.5 MG/DL (ref 0.1–1)
BUN SERPL-MCNC: 14 MG/DL (ref 8–23)
CALCIUM SERPL-MCNC: 9.7 MG/DL (ref 8.7–10.5)
CHLORIDE SERPL-SCNC: 105 MMOL/L (ref 95–110)
CO2 SERPL-SCNC: 27 MMOL/L (ref 23–29)
CREAT SERPL-MCNC: 1.3 MG/DL (ref 0.5–1.4)
DIFFERENTIAL METHOD: NORMAL
EOSINOPHIL # BLD AUTO: 0.3 K/UL (ref 0–0.5)
EOSINOPHIL NFR BLD: 6.9 % (ref 0–8)
ERYTHROCYTE [DISTWIDTH] IN BLOOD BY AUTOMATED COUNT: 14.3 % (ref 11.5–14.5)
EST. GFR  (AFRICAN AMERICAN): >60 ML/MIN/1.73 M^2
EST. GFR  (NON AFRICAN AMERICAN): 53 ML/MIN/1.73 M^2
GLUCOSE SERPL-MCNC: 104 MG/DL (ref 70–110)
HCT VFR BLD AUTO: 43.2 % (ref 40–54)
HGB BLD-MCNC: 14.2 G/DL (ref 14–18)
IMM GRANULOCYTES # BLD AUTO: 0.02 K/UL (ref 0–0.04)
IMM GRANULOCYTES NFR BLD AUTO: 0.4 % (ref 0–0.5)
LYMPHOCYTES # BLD AUTO: 1.3 K/UL (ref 1–4.8)
LYMPHOCYTES NFR BLD: 26.9 % (ref 18–48)
MCH RBC QN AUTO: 30.5 PG (ref 27–31)
MCHC RBC AUTO-ENTMCNC: 32.9 G/DL (ref 32–36)
MCV RBC AUTO: 93 FL (ref 82–98)
MONOCYTES # BLD AUTO: 0.7 K/UL (ref 0.3–1)
MONOCYTES NFR BLD: 15 % (ref 4–15)
NEUTROPHILS # BLD AUTO: 2.5 K/UL (ref 1.8–7.7)
NEUTROPHILS NFR BLD: 50 % (ref 38–73)
NRBC BLD-RTO: 0 /100 WBC
PLATELET # BLD AUTO: 191 K/UL (ref 150–350)
PMV BLD AUTO: 10.6 FL (ref 9.2–12.9)
POTASSIUM SERPL-SCNC: 3.9 MMOL/L (ref 3.5–5.1)
PROT SERPL-MCNC: 6.9 G/DL (ref 6–8.4)
RBC # BLD AUTO: 4.66 M/UL (ref 4.6–6.2)
SODIUM SERPL-SCNC: 139 MMOL/L (ref 136–145)
WBC # BLD AUTO: 4.94 K/UL (ref 3.9–12.7)

## 2019-05-29 PROCEDURE — 36415 COLL VENOUS BLD VENIPUNCTURE: CPT | Mod: HCNC,PO

## 2019-05-29 PROCEDURE — 92012 PR EYE EXAM, EST PATIENT,INTERMED: ICD-10-PCS | Mod: HCNC,S$GLB,, | Performed by: OPTOMETRIST

## 2019-05-29 PROCEDURE — 80053 COMPREHEN METABOLIC PANEL: CPT | Mod: HCNC

## 2019-05-29 PROCEDURE — 85025 COMPLETE CBC W/AUTO DIFF WBC: CPT | Mod: HCNC

## 2019-05-29 PROCEDURE — 99999 PR PBB SHADOW E&M-EST. PATIENT-LVL II: CPT | Mod: PBBFAC,HCNC,, | Performed by: OPTOMETRIST

## 2019-05-29 PROCEDURE — 99999 PR PBB SHADOW E&M-EST. PATIENT-LVL II: ICD-10-PCS | Mod: PBBFAC,HCNC,, | Performed by: OPTOMETRIST

## 2019-05-29 PROCEDURE — 92012 INTRM OPH EXAM EST PATIENT: CPT | Mod: HCNC,S$GLB,, | Performed by: OPTOMETRIST

## 2019-05-29 NOTE — PROGRESS NOTES
Subjective:       Patient ID: Eric Jackson is a 78 y.o. male      Chief Complaint   Patient presents with    Eye Problem     History of Present Illness  Dls: 4/9/19 Dr. Braden    79 y/o male presents today with c/o x 2 days ago woke up with fbs od no pain no tearing no mucous no photophobia no changes in vision. Pt using visine ou prn        Assessment/Plan:     1. Conjunctival cyst of right eye  Small conjunctival cyst causing FBS when pt blinks. No FB in eye, eyelid everted and swept. Recommend cool compresses and AT.     Follow up if symptoms worsen or fail to improve.

## 2019-05-30 DIAGNOSIS — I82.621 ACUTE DEEP VEIN THROMBOSIS (DVT) OF RIGHT UPPER EXTREMITY, UNSPECIFIED VEIN: Primary | ICD-10-CM

## 2019-05-30 NOTE — PROGRESS NOTES
CMP with mild elevated creatinine CrCL > 50 still no change in xarelto dose  CBC stable  Results to pt. No change in regimen.  Does not use my [facility]

## 2019-07-03 ENCOUNTER — HOSPITAL ENCOUNTER (OUTPATIENT)
Dept: RADIOLOGY | Facility: HOSPITAL | Age: 79
Discharge: HOME | End: 2019-07-03
Attending: INTERNAL MEDICINE
Payer: MEDICARE

## 2019-07-03 ENCOUNTER — TELEPHONE (OUTPATIENT)
Dept: FAMILY MEDICINE | Facility: CLINIC | Age: 79
End: 2019-07-03

## 2019-07-03 DIAGNOSIS — I82.621 ACUTE DEEP VEIN THROMBOSIS (DVT) OF RIGHT UPPER EXTREMITY, UNSPECIFIED VEIN: Primary | ICD-10-CM

## 2019-07-03 DIAGNOSIS — I82.621 ACUTE DEEP VEIN THROMBOSIS (DVT) OF RIGHT UPPER EXTREMITY, UNSPECIFIED VEIN: ICD-10-CM

## 2019-07-03 PROCEDURE — 93971 US UPPER EXTREMITY VEINS RIGHT: ICD-10-PCS | Mod: 26,HCNC,RT, | Performed by: RADIOLOGY

## 2019-07-03 PROCEDURE — 93971 EXTREMITY STUDY: CPT | Mod: TC,HCNC,RT

## 2019-07-03 PROCEDURE — 93971 EXTREMITY STUDY: CPT | Mod: 26,HCNC,RT, | Performed by: RADIOLOGY

## 2019-07-03 NOTE — PROGRESS NOTES
Residual nonocclusive thrombus in the right subclavian and basilic veins, significantly improved from the 04/28/2019 exam.  No new thrombus.    Please call pt - does not check my [facility] - ultrasound shows the blood clot is better but still there.  Stay on the blood thinner. Repeat ultrasound in about 3 months - I will put in the order.

## 2019-07-03 NOTE — TELEPHONE ENCOUNTER
----- Message from Samir Boo MD sent at 7/3/2019  8:42 AM CDT -----  Residual nonocclusive thrombus in the right subclavian and basilic veins, significantly improved from the 04/28/2019 exam.  No new thrombus.    Please call pt - does not check my [facility] - ultrasound shows the blood clot is better but still there.  Stay on the blood thinner. Repeat ultrasound in about 3 months - I will put in the order.

## 2019-07-10 NOTE — PROGRESS NOTES
This note was created by combination of typed  and Dragon dictation.  Transcription errors may be present.  If there are any questions, please contact me.    Assessment & Plan:   Leg fatigue  Chronic midline low back pain without sciatica  -seems to be coincident with starting the Xarelto.  Normal pulses do not think this is vascular.  Has episodic low back pain but symptoms do not seem to be consistent with lumbar stenosis.  Nonetheless I have asked him to challenge himself with walking extended distances and if he gets tired, to ask him to try maneuvers to relieve lumbar compression such as leaning forward and seeing if that improves it.  If it does, I would pursue evaluation with MRI lumbar spine.  Otherwise he is willing to tolerate this given that Xarelto dosing is anticipated to be temporary.  Repeat ultrasound of the arm to assess for blood clot clearance, in October.    Screening for AAA (abdominal aortic aneurysm)  -ordered AAA ultrasound to be done with right upper extremity ultrasound in October.  -     US Abdominal Aorta; Future; Expected date: 10/01/2019    Incidentally he has history of statin induced myalgias but on discussion he has never taking coenzyme Q10 concomitant with any statin therapy.  Something to consider in the future.    There are no discontinued medications.    meds sent this encounter:       Follow Up: No follow-ups on file.    Subjective:     Chief Complaint   Patient presents with    Fatigue     legs feeling tired due to the blood thinner       HPI  Eric is a 78 y.o. male, last appointment with this clinic was 4/29/2019.    I saw him mid March  NSTEMI normal LHC, plavix until 3/2020 Dr. Bills but also saw [facility].  DVT from PICC line    Saw cardiology late May; no changes in regimen. They discussed livalo or PCSK9 inhibitor    7/2019 repeat US: Residual nonocclusive thrombus in the right subclavian and basilic veins, significantly improved from the  04/28/2019 exam.  No new thrombus.    Thinks the xarelto causing achiness in the joints and leg fatigue.  He is generally very active, extensive yd work, walks long distances and sometimes his legs feel tired.  He also has episodic low back pain, but notes that when he has this, he walks weaned over because of discomfort and eventually will walk more upright as the day progresses.  The back pain seems to be worse in the mornings.  No joint swelling, just generally notes tiredness in the legs.  He is willing to live with this if this is a side effect of Xarelto that would not cause him permanent damage.    Patient Care Team:  Samir Boo MD as PCP - General (Internal Medicine)  Arias Muñoz MD as Consulting Physician (Endocrinology)  Melanie Orantes MA as Care Coordinator  René Bills MD (Cardiology)  Sheila Braden OD as Consulting Physician (Optometry)  Layne Huff MD as Consulting Physician (Urology)    Patient Active Problem List    Diagnosis Date Noted    Acute deep vein thrombosis (DVT) of right upper extremity from PICC line 4/2019 04/29/2019 4/28/19 RUE US: The right internal jugular vein is patent.  Partial thrombus of the right subclavian vein with floating thrombus.  The right axillary vein is completely thrombosed.  Complete thrombosis of the right basilic vein.  The cephalic vein is patent.  The proximal brachial vein is occluded, the mid and distal vein demonstrate flow within.    7/3/19 RUE US: Residual nonocclusive thrombus in the right subclavian and basilic veins, significantly improved from the 04/28/2019 exam.  No new thrombus.      Refractive error 04/09/2019    Intermediate stage nonexudative age-related macular degeneration of both eyes 04/09/2019    Nuclear sclerosis of both eyes 04/09/2019    Status post coronary artery bypass grafting single vessel 03/07/2019    Renal cyst, left, possible; incidental on CT 2/2019; needs renal US to characterize 03/02/2019      2/2019 CT abd/pelvis: Small left renal hypodensity which measures slightly higher than simple fluid density.  This could represent a small cyst or complex cyst.  Future nonemergent ultrasound follow-up could be obtained to ensure cystic nature of this lesion.     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; 3/2019 [facility] patent graft; Culprit likely in small Diag2 (unrevascularized) and likely demand related 03/01/2019     3/2019 [facility]  LM: dist 50%  LAD: mid 99%, competitive flow from LIMA-LAD-OM-RPDA              Diag2 prox 90%, T2 flow (not bypassed)  LCx: prox , territory supplied by LIMA-LAD-OM-RPDA  RCA: mid , territory supplied by LIMA-LAD-OM-RPDA     LIMA-LAD-OM-RPDA patent   No SVG identified     Impression:  NSTEMI  3V CAD, normal LV fxn  Patent LIMA-LAD-OM-RPDA  Culprit likely in small Diag2 (unrevascularized) and likely demand related     Plan:  Cont med rx  ASA 81mg qd indefinitely  Plavix 75mg qd for 1 year (thru 3/2020)      Prostate cancer 2/25/19 Transrectal ultrasound of prostate and gold fiducial marker placement 01/29/2019 1/7/19 biopsy showed intermediate risk Linn 3+4 PCa in 3/12 cores. Linn 4 up to 30% of one core.   2/25/19 Transrectal ultrasound of prostate and gold fiducial marker placement      Tubular adenoma of colon 2/2018 3 adenomas repeat 3 years 01/11/2019    Hypogonadism in male 01/11/2019    Slow urinary stream 12/07/2018    Iron deficiency anemia 02/09/2018    Symptomatic anemia, microcytic/hypochromic  01/15/2018    Hyponatremia 01/15/2018    Insomnia 08/10/2014    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 2/2018     Anxiety     Depression     Essential hypertension 11/14/2012    Type 2 diabetes mellitus  without complication, without long-term current use of insulin 11/14/2012       PAST MEDICAL HISTORY:  Past Medical History:   Diagnosis Date    Anxiety     Coronary artery disease     Depression     Diabetes mellitus type II     Erectile dysfunction     Eye injuries     fb ou    GERD (gastroesophageal reflux disease)     Hypertension     Intermediate stage nonexudative age-related macular degeneration of both eyes 4/9/2019    Nuclear sclerosis of both eyes 4/9/2019       PAST SURGICAL HISTORY:  Past Surgical History:   Procedure Laterality Date    ANGIOGRAM, CORONARY, INCLUDING BYPASS GRAFT, WITH LEFT HEART CATHETERIZATION N/A 3/4/2019    [facility] CATH LAB    COLONOSCOPY N/A 2/9/2018    [facility] ENDO    CORONARY ARTERY BYPASS GRAFT  2001    EGD (ESOPHAGOGASTRODUODENOSCOPY) Left 11/14/2012    [facility] Carlos [facility]arro [facility] ENDO    ESOPHAGOGASTRODUODENOSCOPY (EGD) N/A 2/9/2018    [facility] ENDO    HERNIA REPAIR      Left heart cath RFA access, 4fr catheter/sheath, not before 9am Left 3/4/2019    [facility] CATH LAB    ULTRASOUND, PROSTATE, RECTAL APPROACH, WITH GOLD FIDUCIAL MARKER INSERTION N/A 2/26/2019    [facility] Layne Huff [facility] at [facility] OR    WRIST SURGERY         SOCIAL HISTORY:  Social History     Socioeconomic History    Marital status:      Spouse name: Not on file    Number of children: Not on file    Years of education: Not on file    Highest education level: Not on file   Occupational History    Not on file   Social Needs    Financial resource strain: Not on file    Food insecurity:     Worry: Not on file     Inability: Not on file    Transportation needs:     Medical: Not on file     Non-medical: Not on file   Tobacco Use    Smoking status: Former Smoker     Packs/day: 2.00     Years: 23.00     Pack years: 46.00      Types: Cigarettes     Last attempt to quit: 1989     Years since quittin.5    Smokeless tobacco: Never Used   Substance and Sexual Activity    Alcohol use: Yes     Alcohol/week: 0.6 oz     Types: 1 Cans of beer per week     Comment: occassional    Drug use: No    Sexual activity: Yes     Partners: Female   Lifestyle    Physical activity:     Days per week: Not on file     Minutes per session: Not on file    Stress: Not on file   Relationships    Social connections:     Talks on phone: Not on file     Gets together: Not on file     Attends Worship service: Not on file     Active member of club or organization: Not on file     Attends meetings of clubs or organizations: Not on file     Relationship status: Not on file   Other Topics Concern    Not on file   Social History Narrative    Not on file       ALLERGIES AND MEDICATIONS: updated and reviewed.  Review of patient's allergies indicates:   Allergen Reactions    Tamsulosin     Prochlorperazine      convulsions    Statins-hmg-coa reductase inhibitors Other (See Comments)     Muscle weakness     Current Outpatient Medications   Medication Sig Dispense Refill    ALPRAZolam (XANAX) 1 MG tablet Take 1 tablet (1 mg total) by mouth nightly as needed for Anxiety. 60 tablet 2    amLODIPine (NORVASC) 10 MG tablet Take 1 tablet (10 mg total) by mouth once daily. 30 tablet 5    blood sugar diagnostic Strp To check BG 2 times daily, to use with insurance preferred meter 100 each 0    blood-glucose meter kit To check BG 2 times daily, to use with insurance preferred meter 1 each 0    cefTRIAXone 2 g in dextrose 5 % 50 mL (ROCEPHIN) 2 g/50 mL PgBk IVPB Inject 50 mLs (2 g total) into the vein once daily.      clopidogrel (PLAVIX) 75 mg tablet Take 1 tablet (75 mg total) by mouth once daily. 30 tablet 5    hydrALAZINE (APRESOLINE) 50 MG tablet Take 1 tablet (50 mg total) by mouth every 8 (eight) hours. 90 tablet 5    lancets Misc To check BG 2 times  "daily, to use with insurance preferred meter 100 each 0    losartan-hydrochlorothiazide 50-12.5 mg (HYZAAR) 50-12.5 mg per tablet Take 1 tablet by mouth once daily. 90 tablet 3    metFORMIN (GLUCOPHAGE) 500 MG tablet Take 500 mg by mouth 2 (two) times daily with meals.      metoprolol succinate (TOPROL-XL) 25 MG 24 hr tablet Take 0.5 tablets (12.5 mg total) by mouth once daily. 15 tablet 11    rivaroxaban (XARELTO) 15 mg (42)- 20 mg (9) tablet dose pack Take 1 tablet (15 mg) by mouth twice daily with food for 21 days followed by 1 tablet (20 mg) by mouth once daily with food 1 Package 0    rivaroxaban (XARELTO) 20 mg Tab Take 1 tablet (20 mg total) by mouth daily with dinner or evening meal. Start after completing the 15mg tablets 90 tablet 0    vit C/vit E ac/lut/copper/zinc (PRESERVISION LUTEIN ORAL) Take 1 tablet by mouth once daily.      vitamin D 1000 units Tab Take 1,000 Units by mouth once daily.      mirtazapine (REMERON) 30 MG tablet Take 1 tablet (30 mg total) by mouth every evening. (Patient taking differently: Take 30 mg by mouth nightly as needed. ) 90 tablet 3    venlafaxine (EFFEXOR-XR) 75 MG 24 hr capsule Take 1 capsule (75 mg total) by mouth once daily. (Patient taking differently: Take 75 mg by mouth daily as needed. ) 90 capsule 3     No current facility-administered medications for this visit.        Review of Systems   All other systems reviewed and are negative.      Objective:   Physical Exam   Vitals:    07/11/19 1557   BP: 112/70   BP Location: Right arm   Patient Position: Sitting   BP Method: Medium (Manual)   Pulse: 71   Temp: 97.8 °F (36.6 °C)   TempSrc: Oral   SpO2: 95%   Weight: 80.7 kg (178 lb)   Height: 5' 7" (1.702 m)    Body mass index is 27.88 kg/m².  Weight: 80.7 kg (178 lb)   Height: 5' 7" (170.2 cm)     Physical Exam   Constitutional: He is oriented to person, place, and time. He appears well-developed and well-nourished. No distress.   HENT:   Head: Normocephalic and " atraumatic.   Eyes: No scleral icterus.   Pulmonary/Chest: Effort normal.   Musculoskeletal: He exhibits no edema.   Seated straight leg raise 90° bilaterally  Posterior tibial pulses 3+, strong bilaterally  Normal gait  Knees and ankles unremarkable grossly without obvious joint effusion or joint deformity   Neurological: He is alert and oriented to person, place, and time.   Skin: Skin is warm and dry.   Psychiatric: He has a normal mood and affect. His behavior is normal. Thought content normal.

## 2019-07-11 ENCOUNTER — OFFICE VISIT (OUTPATIENT)
Dept: FAMILY MEDICINE | Facility: CLINIC | Age: 79
End: 2019-07-11
Payer: MEDICARE

## 2019-07-11 VITALS
HEIGHT: 67 IN | TEMPERATURE: 98 F | OXYGEN SATURATION: 95 % | BODY MASS INDEX: 27.94 KG/M2 | DIASTOLIC BLOOD PRESSURE: 70 MMHG | WEIGHT: 178 LBS | SYSTOLIC BLOOD PRESSURE: 112 MMHG | HEART RATE: 71 BPM

## 2019-07-11 DIAGNOSIS — Z13.6 SCREENING FOR AAA (ABDOMINAL AORTIC ANEURYSM): ICD-10-CM

## 2019-07-11 DIAGNOSIS — R29.898 LEG FATIGUE: Primary | ICD-10-CM

## 2019-07-11 DIAGNOSIS — G89.29 CHRONIC MIDLINE LOW BACK PAIN WITHOUT SCIATICA: ICD-10-CM

## 2019-07-11 DIAGNOSIS — M54.50 CHRONIC MIDLINE LOW BACK PAIN WITHOUT SCIATICA: ICD-10-CM

## 2019-07-11 PROCEDURE — 3074F PR MOST RECENT SYSTOLIC BLOOD PRESSURE < 130 MM HG: ICD-10-PCS | Mod: HCNC,CPTII,S$GLB, | Performed by: INTERNAL MEDICINE

## 2019-07-11 PROCEDURE — 99999 PR PBB SHADOW E&M-EST. PATIENT-LVL III: ICD-10-PCS | Mod: PBBFAC,HCNC,, | Performed by: INTERNAL MEDICINE

## 2019-07-11 PROCEDURE — 99213 OFFICE O/P EST LOW 20 MIN: CPT | Mod: HCNC,S$GLB,, | Performed by: INTERNAL MEDICINE

## 2019-07-11 PROCEDURE — 3078F PR MOST RECENT DIASTOLIC BLOOD PRESSURE < 80 MM HG: ICD-10-PCS | Mod: HCNC,CPTII,S$GLB, | Performed by: INTERNAL MEDICINE

## 2019-07-11 PROCEDURE — 1101F PT FALLS ASSESS-DOCD LE1/YR: CPT | Mod: HCNC,CPTII,S$GLB, | Performed by: INTERNAL MEDICINE

## 2019-07-11 PROCEDURE — 3078F DIAST BP <80 MM HG: CPT | Mod: HCNC,CPTII,S$GLB, | Performed by: INTERNAL MEDICINE

## 2019-07-11 PROCEDURE — 99999 PR PBB SHADOW E&M-EST. PATIENT-LVL III: CPT | Mod: PBBFAC,HCNC,, | Performed by: INTERNAL MEDICINE

## 2019-07-11 PROCEDURE — 99213 PR OFFICE/OUTPT VISIT, EST, LEVL III, 20-29 MIN: ICD-10-PCS | Mod: HCNC,S$GLB,, | Performed by: INTERNAL MEDICINE

## 2019-07-11 PROCEDURE — 1101F PR PT FALLS ASSESS DOC 0-1 FALLS W/OUT INJ PAST YR: ICD-10-PCS | Mod: HCNC,CPTII,S$GLB, | Performed by: INTERNAL MEDICINE

## 2019-07-11 PROCEDURE — 3074F SYST BP LT 130 MM HG: CPT | Mod: HCNC,CPTII,S$GLB, | Performed by: INTERNAL MEDICINE

## 2019-07-30 DIAGNOSIS — F41.9 ANXIETY: Primary | ICD-10-CM

## 2019-07-30 RX ORDER — VENLAFAXINE HYDROCHLORIDE 150 MG/1
150 CAPSULE, EXTENDED RELEASE ORAL DAILY
Qty: 90 CAPSULE | Refills: 1 | Status: SHIPPED | OUTPATIENT
Start: 2019-07-30 | End: 2019-12-09 | Stop reason: SDUPTHER

## 2019-07-30 NOTE — TELEPHONE ENCOUNTER
----- Message from Samir Boo MD sent at 7/30/2019 11:36 AM CDT -----  Can we call pt and see what questions he has?    ----- Message -----  From: Mabel Sánchez  Sent: 7/30/2019  10:28 AM  To: Samir Boo MD    Hi,    I called this PT to schedule an ultrasound but he had some questions about his medication so I told him I would send you a message to have the nurse call him in reference to this matter.    Thanks,    Mabel MORELAND

## 2019-07-30 NOTE — TELEPHONE ENCOUNTER
Patient stated that he has been taking Effexor for years now, going back and forth btwn 75mg and 150mg. Rather just stay on the 150mg dosage only as needed. Requesting the Rx to go to [facility].

## 2019-08-14 ENCOUNTER — PES CALL (OUTPATIENT)
Dept: ADMINISTRATIVE | Facility: CLINIC | Age: 79
End: 2019-08-14

## 2019-11-04 ENCOUNTER — PATIENT OUTREACH (OUTPATIENT)
Dept: ADMINISTRATIVE | Facility: OTHER | Age: 79
End: 2019-11-04

## 2019-11-05 ENCOUNTER — OFFICE VISIT (OUTPATIENT)
Dept: ENDOCRINOLOGY | Facility: CLINIC | Age: 79
End: 2019-11-05
Payer: MEDICARE

## 2019-11-05 VITALS
SYSTOLIC BLOOD PRESSURE: 122 MMHG | BODY MASS INDEX: 26.62 KG/M2 | DIASTOLIC BLOOD PRESSURE: 64 MMHG | HEIGHT: 67 IN | WEIGHT: 169.63 LBS

## 2019-11-05 DIAGNOSIS — Z12.5 ENCOUNTER FOR SCREENING FOR MALIGNANT NEOPLASM OF PROSTATE: ICD-10-CM

## 2019-11-05 DIAGNOSIS — E29.1 HYPOGONADISM IN MALE: ICD-10-CM

## 2019-11-05 DIAGNOSIS — E11.9 TYPE 2 DIABETES MELLITUS WITHOUT COMPLICATION, WITHOUT LONG-TERM CURRENT USE OF INSULIN: ICD-10-CM

## 2019-11-05 PROCEDURE — 3074F PR MOST RECENT SYSTOLIC BLOOD PRESSURE < 130 MM HG: ICD-10-PCS | Mod: HCNC,CPTII,S$GLB, | Performed by: INTERNAL MEDICINE

## 2019-11-05 PROCEDURE — 1101F PT FALLS ASSESS-DOCD LE1/YR: CPT | Mod: HCNC,CPTII,S$GLB, | Performed by: INTERNAL MEDICINE

## 2019-11-05 PROCEDURE — 3074F SYST BP LT 130 MM HG: CPT | Mod: HCNC,CPTII,S$GLB, | Performed by: INTERNAL MEDICINE

## 2019-11-05 PROCEDURE — 99999 PR PBB SHADOW E&M-EST. PATIENT-LVL III: ICD-10-PCS | Mod: PBBFAC,HCNC,, | Performed by: INTERNAL MEDICINE

## 2019-11-05 PROCEDURE — 1101F PR PT FALLS ASSESS DOC 0-1 FALLS W/OUT INJ PAST YR: ICD-10-PCS | Mod: HCNC,CPTII,S$GLB, | Performed by: INTERNAL MEDICINE

## 2019-11-05 PROCEDURE — 3078F DIAST BP <80 MM HG: CPT | Mod: HCNC,CPTII,S$GLB, | Performed by: INTERNAL MEDICINE

## 2019-11-05 PROCEDURE — 99204 OFFICE O/P NEW MOD 45 MIN: CPT | Mod: HCNC,S$GLB,, | Performed by: INTERNAL MEDICINE

## 2019-11-05 PROCEDURE — 3078F PR MOST RECENT DIASTOLIC BLOOD PRESSURE < 80 MM HG: ICD-10-PCS | Mod: HCNC,CPTII,S$GLB, | Performed by: INTERNAL MEDICINE

## 2019-11-05 PROCEDURE — 99204 PR OFFICE/OUTPT VISIT, NEW, LEVL IV, 45-59 MIN: ICD-10-PCS | Mod: HCNC,S$GLB,, | Performed by: INTERNAL MEDICINE

## 2019-11-05 PROCEDURE — 99999 PR PBB SHADOW E&M-EST. PATIENT-LVL III: CPT | Mod: PBBFAC,HCNC,, | Performed by: INTERNAL MEDICINE

## 2019-11-05 RX ORDER — TESTOSTERONE 16.2 MG/G
GEL TRANSDERMAL
COMMUNITY
Start: 2019-09-12 | End: 2019-11-05

## 2019-11-05 RX ORDER — LANCETS
EACH MISCELLANEOUS
Qty: 300 EACH | Refills: 3 | Status: SHIPPED | OUTPATIENT
Start: 2019-11-05

## 2019-11-05 RX ORDER — TESTOSTERONE 20.25 MG/1.25G
GEL TOPICAL
Qty: 1 BOTTLE | Refills: 5 | Status: SHIPPED | OUTPATIENT
Start: 2019-11-05 | End: 2021-01-11 | Stop reason: SDUPTHER

## 2019-11-05 RX ORDER — INSULIN PUMP SYRINGE, 3 ML
EACH MISCELLANEOUS
Qty: 1 EACH | Refills: 0 | Status: SHIPPED | OUTPATIENT
Start: 2019-11-05 | End: 2022-09-14

## 2019-11-05 NOTE — ASSESSMENT & PLAN NOTE
Currently well controlled on metformin one tablet daily   Refilled strips/lancets     No hypoglycemia     Continue

## 2019-11-05 NOTE — PROGRESS NOTES
Subjective:      Patient ID: Eric Jackson is a 78 y.o. male.    Chief Complaint:  Diabetes      History of Present Illness  Ms. Jackson is a 78 year old gentleman who is here for type 2 diabetes management, this was diagnosed 10 years ago. At the time of diagnosis, patient reported nocturia, polyuria, and fatigue.   Currently feels very well.   Needs diabetes meter and strips.     Diabetes medications include:  Metformin 500 mg one tablet daily  Exercises and eats healthy    Denies any side effects: diarrhea or abdominal pain. Lowest BG is 97.  Denies hypoglycemic episodes or symptoms.      Diabetes complications:  Last eye evaluation once yearly. (1/2019 -- Dr. Braden)  Macular degeneration right eye.   Denies numbness, burning, tingling sensation. CABG a few years ago. Intolerant to statins. Denies recent symptomatic CAD or CVD or kidney disease.    Last urine test 4/2048 - normal 8.6 mcg/mg  Denies hospitalizations for hyper- or hypo- glycemia.    Exercise: no formal exercise    ADA STANDARDS of CARE:        ACE inhibitor of angiotensin II receptor blocker:  losartan        Statin drug:  Statin intolerant         Low dose ASA: yes        Eye exam within last year: annually        Dental exam: regularly         Flu shot:        Pneumonia vaccine:        Microalbumin: 4/2018    Hypogonadism diagnosed by Dr. Shah five years ago. Underwent blood work and MRI of the pituiotary. Currently on androgel 1.62% one pump twice a day. Medical records are in the process of being sent over.   Denies any difficulties with androgel  Denies mood symptoms. Easy to anger, but has extenuating circumstances with family.   Last testosterone levels 345 ng/dl.     PSA 1.1, hemoglobin and hematocrit normal. Labs 7/2019    3/2019 sepsis following prostate procedure     Review of Systems   Constitutional: Negative for chills and fever.   HENT: Negative for congestion and sinus pressure.    Eyes: Negative for visual disturbance.  "  Respiratory: Negative for chest tightness and shortness of breath.    Cardiovascular: Negative for chest pain and palpitations.   Gastrointestinal: Negative for abdominal distention, diarrhea, nausea and vomiting.   Genitourinary: Negative for dysuria and flank pain.   Musculoskeletal: Negative for back pain.   Skin: Negative for rash.   Neurological: Negative for weakness.   Hematological: Does not bruise/bleed easily.   Psychiatric/Behavioral: Negative for sleep disturbance.       Objective:   Physical Exam   Constitutional: He is oriented to person, place, and time. He appears well-developed and well-nourished. No distress.   HENT:   Head: Normocephalic and atraumatic.   Nose: Nose normal.   Mouth/Throat: Oropharynx is clear and moist. No oropharyngeal exudate.   Eyes: Pupils are equal, round, and reactive to light. Conjunctivae and EOM are normal. No scleral icterus.   Neck: Normal range of motion. Neck supple. No tracheal deviation present. No thyromegaly present.   Cardiovascular: Normal rate, regular rhythm, normal heart sounds and intact distal pulses.   Pulmonary/Chest: Effort normal and breath sounds normal.   Abdominal: Soft. Bowel sounds are normal. He exhibits no distension. There is no tenderness.   Musculoskeletal: Normal range of motion. He exhibits no edema or tenderness.   Lymphadenopathy:     He has no cervical adenopathy.   Neurological: He is alert and oriented to person, place, and time. He has normal reflexes.   Skin: Skin is warm and dry.   Psychiatric: He has a normal mood and affect.     Protective Sensation (w/ 10 gram monofilament):  Right: Intact  Left: Intact    Vibratory sense: intact bilaterally    Visual Inspection:  Normal -  Bilateral    Pedal Pulses:   Right: Present  Left: Present    Posterior tibialis:   Right:Present  Left: Present      Vitals:    11/05/19 1326   BP: 122/64   Weight: 76.9 kg (169 lb 10.3 oz)   Height: 5' 7" (1.702 m)       BP Readings from Last 3 Encounters: "   11/05/19 122/64   07/11/19 112/70   05/27/19 120/82     Wt Readings from Last 1 Encounters:   11/05/19 1326 76.9 kg (169 lb 10.3 oz)         Body mass index is 26.57 kg/m².    Lab Review:   Lab Results   Component Value Date    HGBA1C 6.2 (H) 03/01/2019     Lab Results   Component Value Date    CHOL 265 (A) 10/17/2018    HDL 52 10/17/2018    LDLCALC 182 (A) 10/17/2018    TRIG 153 10/17/2018    CHOLHDL 21.8 01/06/2017    CHOLHDL 21.8 01/06/2017     Lab Results   Component Value Date     05/29/2019    K 3.9 05/29/2019     05/29/2019    CO2 27 05/29/2019     05/29/2019    BUN 14 05/29/2019    CREATININE 1.3 05/29/2019    CALCIUM 9.7 05/29/2019    PROT 6.9 05/29/2019    ALBUMIN 3.8 05/29/2019    BILITOT 0.5 05/29/2019    ALKPHOS 62 05/29/2019    AST 24 05/29/2019    ALT 19 05/29/2019    ANIONGAP 7 (L) 05/29/2019    ESTGFRAFRICA >60.0 05/29/2019    EGFRNONAA 53.0 (A) 05/29/2019    TSH 1.715 02/28/2019     A1c 6.0% 7/2019 - [facility]    Assessment and Plan     Type 2 diabetes mellitus without complication, without long-term current use of insulin  Currently well controlled on metformin one tablet daily   Refilled strips/lancets     No hypoglycemia     Continue     Hypogonadism in male  Continue androgel  Last labs 7/2019 was normal.   refillled today   Diagnostic blood work on its ways

## 2019-11-05 NOTE — ASSESSMENT & PLAN NOTE
Continue androgel  Last labs 7/2019 was normal.   refillled today   Diagnostic blood work on its ways

## 2019-11-08 ENCOUNTER — TELEPHONE (OUTPATIENT)
Dept: ENDOCRINOLOGY | Facility: CLINIC | Age: 79
End: 2019-11-08

## 2019-11-08 NOTE — TELEPHONE ENCOUNTER
----- Message from Heather Mcclain sent at 11/8/2019 10:01 AM CST -----  Contact: Vivek Smith [phone]  pharmacy states how many times a day should patient use testosterone (ANDROGEL) 20.25 mg/1.25 gram (1.62 %) GlPm  Please call back to discuss

## 2019-11-08 NOTE — TELEPHONE ENCOUNTER
Talk to dr barrera she confirmed patient need to take androgel twice daily, called [facility] spoke to david I confirmed direction patient take his androgel twice daily. David ask for my name end of conversation.

## 2019-11-13 DIAGNOSIS — I82.621 ACUTE DEEP VEIN THROMBOSIS (DVT) OF RIGHT UPPER EXTREMITY, UNSPECIFIED VEIN: ICD-10-CM

## 2019-11-13 DIAGNOSIS — I82.621 ARM DVT (DEEP VENOUS THROMBOEMBOLISM), ACUTE, RIGHT: Primary | ICD-10-CM

## 2019-12-09 ENCOUNTER — OFFICE VISIT (OUTPATIENT)
Dept: FAMILY MEDICINE | Facility: CLINIC | Age: 79
End: 2019-12-09
Payer: MEDICARE

## 2019-12-09 VITALS
TEMPERATURE: 98 F | HEIGHT: 67 IN | DIASTOLIC BLOOD PRESSURE: 76 MMHG | HEART RATE: 64 BPM | OXYGEN SATURATION: 97 % | WEIGHT: 169 LBS | SYSTOLIC BLOOD PRESSURE: 136 MMHG | BODY MASS INDEX: 26.53 KG/M2

## 2019-12-09 DIAGNOSIS — I10 ESSENTIAL HYPERTENSION: ICD-10-CM

## 2019-12-09 DIAGNOSIS — Z23 NEED FOR VACCINATION FOR STREP PNEUMONIAE: ICD-10-CM

## 2019-12-09 DIAGNOSIS — I82.621 ACUTE DEEP VEIN THROMBOSIS (DVT) OF RIGHT UPPER EXTREMITY, UNSPECIFIED VEIN: Primary | ICD-10-CM

## 2019-12-09 DIAGNOSIS — N52.9 MALE ERECTILE DISORDER: ICD-10-CM

## 2019-12-09 DIAGNOSIS — F51.01 PRIMARY INSOMNIA: ICD-10-CM

## 2019-12-09 DIAGNOSIS — I70.0 ABDOMINAL AORTIC ATHEROSCLEROSIS: ICD-10-CM

## 2019-12-09 DIAGNOSIS — E11.9 TYPE 2 DIABETES MELLITUS WITHOUT COMPLICATION, WITHOUT LONG-TERM CURRENT USE OF INSULIN: ICD-10-CM

## 2019-12-09 DIAGNOSIS — Z13.6 SCREENING FOR AAA (ABDOMINAL AORTIC ANEURYSM): ICD-10-CM

## 2019-12-09 DIAGNOSIS — C61 PROSTATE CANCER: ICD-10-CM

## 2019-12-09 DIAGNOSIS — F41.9 ANXIETY: ICD-10-CM

## 2019-12-09 DIAGNOSIS — F32.0 CURRENT MILD EPISODE OF MAJOR DEPRESSIVE DISORDER WITHOUT PRIOR EPISODE: ICD-10-CM

## 2019-12-09 PROCEDURE — 3075F PR MOST RECENT SYSTOLIC BLOOD PRESS GE 130-139MM HG: ICD-10-PCS | Mod: HCNC,CPTII,S$GLB, | Performed by: INTERNAL MEDICINE

## 2019-12-09 PROCEDURE — 99499 UNLISTED E&M SERVICE: CPT | Mod: HCNC,S$GLB,, | Performed by: INTERNAL MEDICINE

## 2019-12-09 PROCEDURE — 1159F MED LIST DOCD IN RCRD: CPT | Mod: HCNC,S$GLB,, | Performed by: INTERNAL MEDICINE

## 2019-12-09 PROCEDURE — 1101F PT FALLS ASSESS-DOCD LE1/YR: CPT | Mod: HCNC,CPTII,S$GLB, | Performed by: INTERNAL MEDICINE

## 2019-12-09 PROCEDURE — 3075F SYST BP GE 130 - 139MM HG: CPT | Mod: HCNC,CPTII,S$GLB, | Performed by: INTERNAL MEDICINE

## 2019-12-09 PROCEDURE — 90732 PNEUMOCOCCAL POLYSACCHARIDE VACCINE 23-VALENT =>2YO SQ IM: ICD-10-PCS | Mod: HCNC,S$GLB,, | Performed by: INTERNAL MEDICINE

## 2019-12-09 PROCEDURE — 1126F PR PAIN SEVERITY QUANTIFIED, NO PAIN PRESENT: ICD-10-PCS | Mod: HCNC,S$GLB,, | Performed by: INTERNAL MEDICINE

## 2019-12-09 PROCEDURE — 1126F AMNT PAIN NOTED NONE PRSNT: CPT | Mod: HCNC,S$GLB,, | Performed by: INTERNAL MEDICINE

## 2019-12-09 PROCEDURE — 99499 RISK ADDL DX/OHS AUDIT: ICD-10-PCS | Mod: HCNC,S$GLB,, | Performed by: INTERNAL MEDICINE

## 2019-12-09 PROCEDURE — G0009 PNEUMOCOCCAL POLYSACCHARIDE VACCINE 23-VALENT =>2YO SQ IM: ICD-10-PCS | Mod: HCNC,S$GLB,, | Performed by: INTERNAL MEDICINE

## 2019-12-09 PROCEDURE — 90732 PPSV23 VACC 2 YRS+ SUBQ/IM: CPT | Mod: HCNC,S$GLB,, | Performed by: INTERNAL MEDICINE

## 2019-12-09 PROCEDURE — 1159F PR MEDICATION LIST DOCUMENTED IN MEDICAL RECORD: ICD-10-PCS | Mod: HCNC,S$GLB,, | Performed by: INTERNAL MEDICINE

## 2019-12-09 PROCEDURE — 99999 PR PBB SHADOW E&M-EST. PATIENT-LVL IV: CPT | Mod: PBBFAC,HCNC,, | Performed by: INTERNAL MEDICINE

## 2019-12-09 PROCEDURE — 99999 PR PBB SHADOW E&M-EST. PATIENT-LVL IV: ICD-10-PCS | Mod: PBBFAC,HCNC,, | Performed by: INTERNAL MEDICINE

## 2019-12-09 PROCEDURE — 3078F PR MOST RECENT DIASTOLIC BLOOD PRESSURE < 80 MM HG: ICD-10-PCS | Mod: HCNC,CPTII,S$GLB, | Performed by: INTERNAL MEDICINE

## 2019-12-09 PROCEDURE — 3078F DIAST BP <80 MM HG: CPT | Mod: HCNC,CPTII,S$GLB, | Performed by: INTERNAL MEDICINE

## 2019-12-09 PROCEDURE — 1101F PR PT FALLS ASSESS DOC 0-1 FALLS W/OUT INJ PAST YR: ICD-10-PCS | Mod: HCNC,CPTII,S$GLB, | Performed by: INTERNAL MEDICINE

## 2019-12-09 PROCEDURE — 99214 PR OFFICE/OUTPT VISIT, EST, LEVL IV, 30-39 MIN: ICD-10-PCS | Mod: 25,HCNC,S$GLB, | Performed by: INTERNAL MEDICINE

## 2019-12-09 PROCEDURE — G0009 ADMIN PNEUMOCOCCAL VACCINE: HCPCS | Mod: HCNC,S$GLB,, | Performed by: INTERNAL MEDICINE

## 2019-12-09 PROCEDURE — 99214 OFFICE O/P EST MOD 30 MIN: CPT | Mod: 25,HCNC,S$GLB, | Performed by: INTERNAL MEDICINE

## 2019-12-09 RX ORDER — VENLAFAXINE HYDROCHLORIDE 150 MG/1
150 CAPSULE, EXTENDED RELEASE ORAL DAILY
Qty: 90 CAPSULE | Refills: 3 | Status: ON HOLD | OUTPATIENT
Start: 2019-12-09 | End: 2020-10-08 | Stop reason: HOSPADM

## 2019-12-09 RX ORDER — MIRTAZAPINE 30 MG/1
30 TABLET, FILM COATED ORAL NIGHTLY
Qty: 90 TABLET | Refills: 3 | Status: ON HOLD | OUTPATIENT
Start: 2019-12-09 | End: 2020-10-08 | Stop reason: HOSPADM

## 2019-12-09 RX ORDER — LOSARTAN POTASSIUM AND HYDROCHLOROTHIAZIDE 12.5; 5 MG/1; MG/1
1 TABLET ORAL DAILY
Qty: 90 TABLET | Refills: 3 | Status: SHIPPED | OUTPATIENT
Start: 2019-12-09 | End: 2021-01-15 | Stop reason: SDUPTHER

## 2019-12-09 RX ORDER — METFORMIN HYDROCHLORIDE 500 MG/1
500 TABLET ORAL
Qty: 90 TABLET | Refills: 3 | Status: SHIPPED | OUTPATIENT
Start: 2019-12-09 | End: 2021-01-15 | Stop reason: SDUPTHER

## 2019-12-09 RX ORDER — ALPRAZOLAM 1 MG/1
1 TABLET ORAL NIGHTLY PRN
Qty: 60 TABLET | Refills: 2 | Status: SHIPPED | OUTPATIENT
Start: 2019-12-09 | End: 2020-09-09 | Stop reason: SDUPTHER

## 2019-12-09 RX ORDER — SILDENAFIL 25 MG/1
25 TABLET, FILM COATED ORAL DAILY PRN
Qty: 10 TABLET | Refills: 0 | Status: SHIPPED | OUTPATIENT
Start: 2019-12-09 | End: 2021-08-05 | Stop reason: SDUPTHER

## 2019-12-09 NOTE — PROGRESS NOTES
This note was created by combination of typed  and M-Modal dictation.  Transcription errors may be present.  If there are any questions, please contact me.    Assessment & Plan:   Acute deep vein thrombosis (DVT) of right upper extremity, unspecified vein  -he finished out the 3 month course of Xarelto.  Needs to have follow-up imaging.  Had a DVT associated from PICC line without pulmonary embolism.  Check ultrasound.  I would be inclined not to restart medication.    Current mild episode of major depressive disorder without prior episode  Anxiety  -refilled Effexor to mail order.  Printed prescription for alprazolam.  He finds it more affordable to get 60 pills at a time rather than 30 pills at a time.  Does not use it more than once a day.  -     venlafaxine (EFFEXOR-XR) 150 MG Cp24; Take 1 capsule (150 mg total) by mouth once daily.  Dispense: 90 capsule; Refill: 3  -     ALPRAZolam (XANAX) 1 MG tablet; Take 1 tablet (1 mg total) by mouth nightly as needed for Anxiety.  Dispense: 60 tablet; Refill: 2    Essential hypertension  -stable on losartan HCT, refilled to pharmacy  -     losartan-hydrochlorothiazide 50-12.5 mg (HYZAAR) 50-12.5 mg per tablet; Take 1 tablet by mouth once daily.  Dispense: 90 tablet; Refill: 3    Primary insomnia  -stable on Remeron, refill to pharmacy  -     mirtazapine (REMERON) 30 MG tablet; Take 1 tablet (30 mg total) by mouth every evening.  Dispense: 90 tablet; Refill: 3    Abdominal aortic atherosclerosis  Screening for AAA (abdominal aortic aneurysm)  -AAA ultrasound ordered  Statin intolerant  -     US Abdominal Aorta; Future; Expected date: 12/09/2019    Male erectile disorder  -trial of viagra 25.  SE profile discussed.  [email or web address] coupon given.   If needs higher strength, contact me.  -     sildenafil (VIAGRA) 25 MG tablet; Take 1 tablet (25 mg total) by mouth daily as needed for Erectile Dysfunction.  Dispense: 10 tablet; Refill: 0    Type 2 diabetes mellitus without  complication, without long-term current use of insulin  -refilled metformin. Reports outside A1c was good. On once daily not BID  -     metFORMIN (GLUCOPHAGE) 500 MG tablet; Take 1 tablet (500 mg total) by mouth daily with breakfast.  Dispense: 90 tablet; Refill: 3    Prostate cancer 2/25/19 Transrectal ultrasound of prostate and gold fiducial marker placement  -followed by Dr. Garcia. PSA with next labs. And copy to Dr. Garcia and Dr. Evans  -     Prostate Specific Antigen, Diagnostic; Future; Expected date: 12/09/2019    Need for vaccination for Strep pneumoniae  -     (In Office Administered) Pneumococcal Polysaccharide Vaccine (23 Valent) (SQ/IM)    Reports he had flu shot at [facility]    Medications Discontinued During This Encounter   Medication Reason    rivaroxaban (XARELTO) 20 mg Tab Patient no longer taking    losartan-hydrochlorothiazide 50-12.5 mg (HYZAAR) 50-12.5 mg per tablet Reorder    metFORMIN (GLUCOPHAGE) 500 MG tablet Reorder    mirtazapine (REMERON) 30 MG tablet Reorder    venlafaxine (EFFEXOR-XR) 150 MG Cp24 Reorder    ALPRAZolam (XANAX) 1 MG tablet Reorder       meds sent this encounter:  Medications Ordered This Encounter   Medications    ALPRAZolam (XANAX) 1 MG tablet     Sig: Take 1 tablet (1 mg total) by mouth nightly as needed for Anxiety.     Dispense:  60 tablet     Refill:  2    losartan-hydrochlorothiazide 50-12.5 mg (HYZAAR) 50-12.5 mg per tablet     Sig: Take 1 tablet by mouth once daily.     Dispense:  90 tablet     Refill:  3    metFORMIN (GLUCOPHAGE) 500 MG tablet     Sig: Take 1 tablet (500 mg total) by mouth daily with breakfast.     Dispense:  90 tablet     Refill:  3    mirtazapine (REMERON) 30 MG tablet     Sig: Take 1 tablet (30 mg total) by mouth every evening.     Dispense:  90 tablet     Refill:  3    sildenafil (VIAGRA) 25 MG tablet     Sig: Take 1 tablet (25 mg total) by mouth daily as needed for Erectile Dysfunction.     Dispense:  10 tablet     Refill:  0     venlafaxine (EFFEXOR-XR) 150 MG Cp24     Sig: Take 1 capsule (150 mg total) by mouth once daily.     Dispense:  90 capsule     Refill:  3       Follow Up: No follow-ups on file. OV 6 months.  No previsit labs - is being monitored for PSA by Dr. Garcia and remainder of labs by Dr. Evans    Subjective:     Chief Complaint   Patient presents with    Erectile Dysfunction       DARRYL Reyes is a 79 y.o. male, last appointment with this clinic was 7/11/2019.    Leg pain; SE of xarelto? Or primary back issue? If worsens, consider MRI L spine.  UE DVT from PICC, on xarelto. Plan was re-image in October.  Consider rechallenge on statin with CoQ 10.    Saw endo - no changes.   Finished the Xarelto.  No issues with the arm.     Needs AAA and RUE US.  He was going to get both done, but the radiology dept did not have the order for the AAA.  I will re-submit.     He's going to contact insurance about alternative to androgel. It's expensive.    Needs RF on the alprazolam. Takes it once daily but gets better discount with 60 pills rather than 30    PSA was decreased. Had it tested about 2 months ago. Dr. Garcia. I don't have access to the lab result. I will put in order for next lab check with results to be sent to Dr. Garcia and Dr. Evans.  He reports he is no longer seeing Dr. Huff.    Needs rx for viagra.  toook many years ago; unclear the dose.  Issues with ED.     Taking metformin once daily not bid and wants to stay on that.  Morning sugar in the low 100s.     Treadmill 90 min daily.     Leg pain resolved. On its own.    Patient Care Team:  Samir Boo MD as PCP - General (Internal Medicine)  Melanie Orantes MA as Care Coordinator  René Bills MD (Cardiology)  Sheila Braden OD as Consulting Physician (Optometry)  Layne Huff MD as Consulting Physician (Urology)  Loida Evans MD as Consulting Physician (Endocrinology)  Loc Garcia MD as Consulting Physician (Radiation Oncology)    Patient Active  Problem List    Diagnosis Date Noted    Acute deep vein thrombosis (DVT) of right upper extremity from PICC line 4/2019 04/29/2019 4/28/19 RUE US: The right internal jugular vein is patent.  Partial thrombus of the right subclavian vein with floating thrombus.  The right axillary vein is completely thrombosed.  Complete thrombosis of the right basilic vein.  The cephalic vein is patent.  The proximal brachial vein is occluded, the mid and distal vein demonstrate flow within.    7/3/19 RUE US: Residual nonocclusive thrombus in the right subclavian and basilic veins, significantly improved from the 04/28/2019 exam.  No new thrombus.      Refractive error 04/09/2019    Intermediate stage nonexudative age-related macular degeneration of both eyes 04/09/2019    Nuclear sclerosis of both eyes 04/09/2019    Status post coronary artery bypass grafting single vessel 03/07/2019    Renal cyst, left, possible; incidental on CT 2/2019; needs renal US to characterize 03/02/2019 2/2019 CT abd/pelvis: Small left renal hypodensity which measures slightly higher than simple fluid density.  This could represent a small cyst or complex cyst.  Future nonemergent ultrasound follow-up could be obtained to ensure cystic nature of this lesion.     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; 3/2019 [facility] patent graft; Culprit likely in small Diag2 (unrevascularized) and likely demand related 03/01/2019     3/2019 [facility]  LM: dist 50%  LAD: mid 99%, competitive flow from LIMA-LAD-OM-RPDA              Diag2 prox 90%, T2 flow (not bypassed)  LCx: prox , territory supplied by LIMA-LAD-OM-RPDA  RCA: mid , territory supplied by LIMA-LAD-OM-RPDA     LIMA-LAD-OM-RPDA patent   No SVG  identified     Impression:  NSTEMI  3V CAD, normal LV fxn  Patent LIMA-LAD-OM-RPDA  Culprit likely in small Diag2 (unrevascularized) and likely demand related     Plan:  Cont med rx  ASA 81mg qd indefinitely  Plavix 75mg qd for 1 year (thru 3/2020)      Prostate cancer 2/25/19 Transrectal ultrasound of prostate and gold fiducial marker placement 01/29/2019 1/7/19 biopsy showed intermediate risk Antigo 3+4 PCa in 3/12 cores. Tanisha 4 up to 30% of one core.   2/25/19 Transrectal ultrasound of prostate and gold fiducial marker placement      Tubular adenoma of colon 2/2018 3 adenomas repeat 3 years 01/11/2019    Hypogonadism in male 01/11/2019    Slow urinary stream 12/07/2018    Iron deficiency anemia 02/09/2018    Symptomatic anemia, microcytic/hypochromic  01/15/2018    Hyponatremia 01/15/2018    Insomnia 08/10/2014    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 2/2018     Anxiety     Depression     Essential hypertension 11/14/2012    Type 2 diabetes mellitus without complication, without long-term current use of insulin 11/14/2012       PAST MEDICAL HISTORY:  Past Medical History:   Diagnosis Date    Anxiety     Coronary artery disease     Depression     Diabetes mellitus type II     Erectile dysfunction     Eye injuries     fb ou    GERD (gastroesophageal reflux disease)     Hypertension     Intermediate stage nonexudative age-related macular degeneration of both eyes 4/9/2019    Nuclear sclerosis of both eyes 4/9/2019       PAST SURGICAL HISTORY:  Past Surgical History:   Procedure Laterality Date    COLONOSCOPY N/A 2/9/2018    Procedure: COLONOSCOPY;  Surgeon: Mathieu Cao MD;  Location: [facility] ENDO;  Service: Endoscopy;  Laterality: N/A;    CORONARY ANGIOGRAPHY INCLUDING BYPASS GRAFTS WITH CATHETERIZATION OF LEFT HEART N/A 3/4/2019    Procedure: ANGIOGRAM, CORONARY, INCLUDING BYPASS GRAFT, WITH LEFT HEART CATHETERIZATION;  Surgeon: Jamir Nam MD;  Location: [facility] CATH  LAB;  Service: Cardiology;  Laterality: N/A;    CORONARY ARTERY BYPASS GRAFT  2001    HERNIA REPAIR      LEFT HEART CATHETERIZATION Left 3/4/2019    Procedure: Left heart cath RFA access, 4fr catheter/sheath, not before 9am;  Surgeon: Jamir Nam MD;  Location: [facility] CATH LAB;  Service: Cardiology;  Laterality: Left;    TRANSRECTAL ULTRASOUND OF PROSTATE WITH INSERTION OF GOLD FIDUCIAL MARKER N/A 2019    Procedure: ULTRASOUND, PROSTATE, RECTAL APPROACH, WITH GOLD FIDUCIAL MARKER INSERTION;  Surgeon: Layne Huff MD;  Location: [facility] OR;  Service: Urology;  Laterality: N/A;    WRIST SURGERY         SOCIAL HISTORY:  Social History     Socioeconomic History    Marital status:      Spouse name: Not on file    Number of children: Not on file    Years of education: Not on file    Highest education level: Not on file   Occupational History    Not on file   Social Needs    Financial resource strain: Not on file    Food insecurity:     Worry: Not on file     Inability: Not on file    Transportation needs:     Medical: Not on file     Non-medical: Not on file   Tobacco Use    Smoking status: Former Smoker     Packs/day: 2.00     Years: 23.00     Pack years: 46.00     Types: Cigarettes     Last attempt to quit: 1989     Years since quittin.9    Smokeless tobacco: Never Used   Substance and Sexual Activity    Alcohol use: Yes     Alcohol/week: 1.0 standard drinks     Types: 1 Cans of beer per week     Comment: occassional    Drug use: No    Sexual activity: Yes     Partners: Female   Lifestyle    Physical activity:     Days per week: Not on file     Minutes per session: Not on file    Stress: Not on file   Relationships    Social connections:     Talks on phone: Not on file     Gets together: Not on file     Attends Mosque service: Not on file     Active member of club or organization: Not on file     Attends meetings of clubs or organizations: Not on file      Relationship status: Not on file   Other Topics Concern    Not on file   Social History Narrative    Not on file       ALLERGIES AND MEDICATIONS: updated and reviewed.  Review of patient's allergies indicates:   Allergen Reactions    Tamsulosin     Prochlorperazine      convulsions    Statins-hmg-coa reductase inhibitors Other (See Comments)     Muscle weakness     Current Outpatient Medications   Medication Sig Dispense Refill    ALPRAZolam (XANAX) 1 MG tablet Take 1 tablet (1 mg total) by mouth nightly as needed for Anxiety. 60 tablet 2    amLODIPine (NORVASC) 10 MG tablet Take 1 tablet (10 mg total) by mouth once daily. 30 tablet 5    blood sugar diagnostic Strp To check BG 2 times daily, to use with insurance preferred meter 100 each 0    blood sugar diagnostic Strp To check BG three times daily, to use with insurance preferred meter 300 strip 3    blood-glucose meter kit To check BG 1 times daily, to use with insurance preferred meter 1 each 0    cefTRIAXone 2 g in dextrose 5 % 50 mL (ROCEPHIN) 2 g/50 mL PgBk IVPB Inject 50 mLs (2 g total) into the vein once daily.      clopidogrel (PLAVIX) 75 mg tablet Take 1 tablet (75 mg total) by mouth once daily. (Patient not taking: Reported on 11/5/2019) 30 tablet 5    hydrALAZINE (APRESOLINE) 50 MG tablet Take 1 tablet (50 mg total) by mouth every 8 (eight) hours. (Patient not taking: Reported on 11/5/2019) 90 tablet 5    lancets Misc To check BG 3 times daily, to use with insurance preferred meter 300 each 3    losartan-hydrochlorothiazide 50-12.5 mg (HYZAAR) 50-12.5 mg per tablet Take 1 tablet by mouth once daily. 90 tablet 3    metFORMIN (GLUCOPHAGE) 500 MG tablet Take 500 mg by mouth 2 (two) times daily with meals.      metoprolol succinate (TOPROL-XL) 25 MG 24 hr tablet Take 0.5 tablets (12.5 mg total) by mouth once daily. 15 tablet 11    mirtazapine (REMERON) 30 MG tablet Take 1 tablet (30 mg total) by mouth every evening. (Patient taking  "differently: Take 30 mg by mouth nightly as needed. ) 90 tablet 3    rivaroxaban (XARELTO) 15 mg (42)- 20 mg (9) tablet dose pack Take 1 tablet (15 mg) by mouth twice daily with food for 21 days followed by 1 tablet (20 mg) by mouth once daily with food (Patient not taking: Reported on 11/5/2019) 1 Package 0    testosterone (ANDROGEL) 20.25 mg/1.25 gram (1.62 %) GlPm One pump over each shoulder 1 Bottle 5    venlafaxine (EFFEXOR-XR) 150 MG Cp24 Take 1 capsule (150 mg total) by mouth once daily. 90 capsule 1    vit C/vit E ac/lut/copper/zinc (PRESERVISION LUTEIN ORAL) Take 1 tablet by mouth once daily.      vitamin D 1000 units Tab Take 1,000 Units by mouth once daily.       No current facility-administered medications for this visit.        Review of Systems   Constitutional: Negative for chills and fever.   Respiratory: Negative for shortness of breath.    Cardiovascular: Negative for chest pain.       Objective:   Physical Exam   Vitals:    12/09/19 1444 12/09/19 1510   BP: (!) 162/84 136/76   BP Location: Left arm Left arm   Patient Position: Sitting Sitting   BP Method: Medium (Manual) Medium (Manual)   Pulse: 64    Temp: 97.8 °F (36.6 °C)    TempSrc: Oral    SpO2: 97%    Weight: 76.7 kg (169 lb)    Height: 5' 7" (1.702 m)     Body mass index is 26.47 kg/m².  Weight: 76.7 kg (169 lb)   Height: 5' 7" (170.2 cm)     Physical Exam   Constitutional: He is oriented to person, place, and time. He appears well-developed and well-nourished. No distress.   HENT:   Head: Normocephalic and atraumatic.   Eyes: No scleral icterus.   Pulmonary/Chest: Effort normal.   Musculoskeletal: He exhibits no edema.   Neurological: He is alert and oriented to person, place, and time.   Skin: Skin is warm and dry.   Psychiatric: He has a normal mood and affect. His behavior is normal. Thought content normal.     "

## 2019-12-18 ENCOUNTER — HOSPITAL ENCOUNTER (OUTPATIENT)
Dept: RADIOLOGY | Facility: HOSPITAL | Age: 79
Discharge: HOME | End: 2019-12-18
Attending: INTERNAL MEDICINE
Payer: MEDICARE

## 2019-12-18 DIAGNOSIS — I70.0 ABDOMINAL AORTIC ATHEROSCLEROSIS: ICD-10-CM

## 2019-12-18 DIAGNOSIS — Z13.6 SCREENING FOR AAA (ABDOMINAL AORTIC ANEURYSM): ICD-10-CM

## 2019-12-18 DIAGNOSIS — I82.621 ARM DVT (DEEP VENOUS THROMBOEMBOLISM), ACUTE, RIGHT: ICD-10-CM

## 2019-12-18 PROCEDURE — 93971 EXTREMITY STUDY: CPT | Mod: TC,HCNC,RT

## 2019-12-18 PROCEDURE — 76775 US EXAM ABDO BACK WALL LIM: CPT | Mod: 26,HCNC,, | Performed by: RADIOLOGY

## 2019-12-18 PROCEDURE — 93971 EXTREMITY STUDY: CPT | Mod: 26,HCNC,RT, | Performed by: RADIOLOGY

## 2019-12-18 PROCEDURE — 76775 US EXAM ABDO BACK WALL LIM: CPT | Mod: TC,HCNC

## 2019-12-18 PROCEDURE — 93971 US UPPER EXTREMITY VEINS RIGHT: ICD-10-PCS | Mod: 26,HCNC,RT, | Performed by: RADIOLOGY

## 2019-12-18 PROCEDURE — 76775 US ABDOMINAL AORTA: ICD-10-PCS | Mod: 26,HCNC,, | Performed by: RADIOLOGY

## 2019-12-19 PROBLEM — Z86.718 HISTORY OF DVT IN ADULTHOOD: Status: ACTIVE | Noted: 2019-04-29

## 2019-12-19 NOTE — PROGRESS NOTES
No DVT in the right upper extremity.  Resolved thrombus in the right subclavian vein and mild residual chronic thrombus in the right superficial basilic vein, improved.    Results to pt via [facility]. Provoked dvt from PICC line, would not restart anticoagulation.

## 2020-01-27 ENCOUNTER — TELEPHONE (OUTPATIENT)
Dept: FAMILY MEDICINE | Facility: CLINIC | Age: 80
End: 2020-01-27

## 2020-01-27 NOTE — TELEPHONE ENCOUNTER
Notified patient of the results from US 12/18/2019, pt verbalized understanding.     Patient states that he would like to know what happened to clot? Did it move or stabilized? He wants to know what should he do about this?    Please advise.    Thanks,  Jamison

## 2020-01-27 NOTE — TELEPHONE ENCOUNTER
The clot was reabsorbed by his body.   Because it was caused by the PICC line, and it has resolved, he does not have to restart blood thinners.

## 2020-01-27 NOTE — TELEPHONE ENCOUNTER
----- Message from Flash Harrison sent at 1/27/2020 10:21 AM CST -----  Contact: Self  Type: Patient Call Back    Who called:Self    What is the request in detail:Patient is requesting results from 12/18/2019 US.Please advise    Can the clinic reply by [facility]?No    Would the patient rather a call back or a response via [facility]?Call    Best call back number:[phone]    Additional Information:n/a

## 2020-02-03 ENCOUNTER — PATIENT OUTREACH (OUTPATIENT)
Dept: ADMINISTRATIVE | Facility: OTHER | Age: 80
End: 2020-02-03

## 2020-02-03 DIAGNOSIS — E11.9 TYPE 2 DIABETES MELLITUS WITHOUT COMPLICATION, UNSPECIFIED WHETHER LONG TERM INSULIN USE: Primary | ICD-10-CM

## 2020-02-04 ENCOUNTER — OFFICE VISIT (OUTPATIENT)
Dept: PODIATRY | Facility: CLINIC | Age: 80
End: 2020-02-04
Payer: MEDICARE

## 2020-02-04 VITALS — WEIGHT: 169.06 LBS | HEIGHT: 67 IN | BODY MASS INDEX: 26.53 KG/M2

## 2020-02-04 DIAGNOSIS — M76.60 ACHILLES TENDINITIS, UNSPECIFIED LATERALITY: ICD-10-CM

## 2020-02-04 DIAGNOSIS — M79.672 LEFT FOOT PAIN: Primary | ICD-10-CM

## 2020-02-04 PROCEDURE — 99203 PR OFFICE/OUTPT VISIT, NEW, LEVL III, 30-44 MIN: ICD-10-PCS | Mod: HCNC,S$GLB,, | Performed by: PODIATRIST

## 2020-02-04 PROCEDURE — 1101F PR PT FALLS ASSESS DOC 0-1 FALLS W/OUT INJ PAST YR: ICD-10-PCS | Mod: HCNC,CPTII,S$GLB, | Performed by: PODIATRIST

## 2020-02-04 PROCEDURE — 1159F MED LIST DOCD IN RCRD: CPT | Mod: HCNC,S$GLB,, | Performed by: PODIATRIST

## 2020-02-04 PROCEDURE — 1126F PR PAIN SEVERITY QUANTIFIED, NO PAIN PRESENT: ICD-10-PCS | Mod: HCNC,S$GLB,, | Performed by: PODIATRIST

## 2020-02-04 PROCEDURE — 1126F AMNT PAIN NOTED NONE PRSNT: CPT | Mod: HCNC,S$GLB,, | Performed by: PODIATRIST

## 2020-02-04 PROCEDURE — 1159F PR MEDICATION LIST DOCUMENTED IN MEDICAL RECORD: ICD-10-PCS | Mod: HCNC,S$GLB,, | Performed by: PODIATRIST

## 2020-02-04 PROCEDURE — 99999 PR PBB SHADOW E&M-EST. PATIENT-LVL III: ICD-10-PCS | Mod: PBBFAC,HCNC,, | Performed by: PODIATRIST

## 2020-02-04 PROCEDURE — 1101F PT FALLS ASSESS-DOCD LE1/YR: CPT | Mod: HCNC,CPTII,S$GLB, | Performed by: PODIATRIST

## 2020-02-04 PROCEDURE — 99999 PR PBB SHADOW E&M-EST. PATIENT-LVL III: CPT | Mod: PBBFAC,HCNC,, | Performed by: PODIATRIST

## 2020-02-04 PROCEDURE — 99203 OFFICE O/P NEW LOW 30 MIN: CPT | Mod: HCNC,S$GLB,, | Performed by: PODIATRIST

## 2020-02-06 NOTE — PROGRESS NOTES
Subjective:      Patient ID: Eric Jackson is a 79 y.o. male.    Chief Complaint: Ankle Pain (left ankle)    Eric is a 79 y.o. male who presents to the podiatry clinic  with complaint of  left foot pain. Reports left posterior heel pain. Onset of the symptoms was several weeks ago. Precipitating event: none known. Current symptoms include: ability to bear weight, but with some pain. Aggravating factors: any weight bearing. Symptoms have progressed to a point and plateaued. Patient has had no prior foot problems. Evaluation to date: none. Treatment to date: none. Patients rates pain 2/10 on pain scale.        Review of Systems   Constitution: Negative for chills, diaphoresis and fever.   Cardiovascular: Negative for claudication, cyanosis, leg swelling and syncope.   Respiratory: Negative for cough and shortness of breath.    Skin: Negative for color change, nail changes and suspicious lesions.   Musculoskeletal: Positive for joint pain and myalgias. Negative for falls, muscle cramps and muscle weakness.   Gastrointestinal: Negative for diarrhea, nausea and vomiting.   Neurological: Negative for disturbances in coordination, numbness, paresthesias, sensory change, tremors and weakness.   Psychiatric/Behavioral: Negative for altered mental status.           Objective:      Physical Exam   Constitutional: He appears well-developed. He is cooperative.   Oriented to time, place, and person.   Cardiovascular:   DP and PT pulses are palpable bilaterally. 3 sec capillary refill time and toes and feet are warm to touch proximally .  There is  hair growth on the feet and toes b/l. There is no edema b/l. No spider veins or varicosities present b/l.      Musculoskeletal:   Equinus noted b/l ankles with < 10 deg DF noted. MMT 5/5 in DF/PF/Inv/Ev resistance with no reproduction of pain in any direction. Passive range of motion of ankle and pedal joints is painless b/l.    Decreased left  ankle joint ROM, pain with palpation  of posterior 1/3 calcaneus at region of Achilles tendon insertion. No pain with ankle joint ROM        Feet:   Right Foot:   Skin Integrity: Negative for callus or dry skin.   Left Foot:   Skin Integrity: Negative for callus or dry skin.   Lymphadenopathy:   Negative lymphadenopathy bilateral popliteal fossa and tarsal tunnel.   Neurological: He is alert.   Light touch, proprioception, and sharp/dull sensation are all intact bilaterally. Protective threshold with the Warren-Wienstein monofilament is intact bilaterally.    Skin:   No open lesions, lacerations or wounds noted.Interdigital spaces clean, dry and intact b/l. No erythema noted to b/l foot.  Nails normal color and trophic qualities.     Psychiatric: He has a normal mood and affect.             Assessment:       Encounter Diagnoses   Name Primary?    Left foot pain Yes    Achilles tendinitis, unspecified laterality          Plan:       Eric was seen today for ankle pain.    Diagnoses and all orders for this visit:    Left foot pain    Achilles tendinitis, unspecified laterality      I counseled the patient on his conditions, their implications and medical management.      - Patient will stretch the tendo achilles complex three times daily as demonstrated in the office to lengthen heel cord and increase motion at ankle offloading the load on the forefoot and MTPJ..  Literature was dispensed illustrating proper stretching technique.  - Patient will obtain over the counter arch supports and wear them in shoes whenever possible to support the arches and decrease motion at the MTPJ.      Discussed conservative treatment with shoes of adequate dimensions, material, and style to alleviate symptoms and delay or prevent surgical intervention.       Heel lifts dispensed.     RTC PRN.

## 2020-02-14 ENCOUNTER — OFFICE VISIT (OUTPATIENT)
Dept: FAMILY MEDICINE | Facility: CLINIC | Age: 80
End: 2020-02-14
Payer: MEDICARE

## 2020-02-14 VITALS
TEMPERATURE: 99 F | SYSTOLIC BLOOD PRESSURE: 134 MMHG | DIASTOLIC BLOOD PRESSURE: 82 MMHG | HEART RATE: 78 BPM | WEIGHT: 172.38 LBS | BODY MASS INDEX: 27.06 KG/M2 | OXYGEN SATURATION: 97 % | HEIGHT: 67 IN

## 2020-02-14 DIAGNOSIS — I25.118 CORONARY ARTERY DISEASE OF NATIVE ARTERY OF NATIVE HEART WITH STABLE ANGINA PECTORIS: ICD-10-CM

## 2020-02-14 DIAGNOSIS — I70.0 ABDOMINAL AORTIC ATHEROSCLEROSIS: ICD-10-CM

## 2020-02-14 DIAGNOSIS — I42.9 CARDIOMYOPATHY, UNSPECIFIED TYPE: ICD-10-CM

## 2020-02-14 DIAGNOSIS — E11.36 TYPE 2 DIABETES MELLITUS WITH DIABETIC CATARACT, WITHOUT LONG-TERM CURRENT USE OF INSULIN: ICD-10-CM

## 2020-02-14 DIAGNOSIS — K21.9 GASTROESOPHAGEAL REFLUX DISEASE, ESOPHAGITIS PRESENCE NOT SPECIFIED: Primary | ICD-10-CM

## 2020-02-14 PROBLEM — F32.0 CURRENT MILD EPISODE OF MAJOR DEPRESSIVE DISORDER WITHOUT PRIOR EPISODE: Status: ACTIVE | Noted: 2020-02-14

## 2020-02-14 PROCEDURE — 99499 UNLISTED E&M SERVICE: CPT | Mod: HCNC,S$GLB,, | Performed by: NURSE PRACTITIONER

## 2020-02-14 PROCEDURE — 99999 PR PBB SHADOW E&M-EST. PATIENT-LVL V: ICD-10-PCS | Mod: PBBFAC,HCNC,, | Performed by: NURSE PRACTITIONER

## 2020-02-14 PROCEDURE — 99499 RISK ADDL DX/OHS AUDIT: ICD-10-PCS | Mod: HCNC,S$GLB,, | Performed by: NURSE PRACTITIONER

## 2020-02-14 PROCEDURE — 1101F PR PT FALLS ASSESS DOC 0-1 FALLS W/OUT INJ PAST YR: ICD-10-PCS | Mod: HCNC,CPTII,S$GLB, | Performed by: NURSE PRACTITIONER

## 2020-02-14 PROCEDURE — 3075F PR MOST RECENT SYSTOLIC BLOOD PRESS GE 130-139MM HG: ICD-10-PCS | Mod: HCNC,CPTII,S$GLB, | Performed by: NURSE PRACTITIONER

## 2020-02-14 PROCEDURE — 3075F SYST BP GE 130 - 139MM HG: CPT | Mod: HCNC,CPTII,S$GLB, | Performed by: NURSE PRACTITIONER

## 2020-02-14 PROCEDURE — 1159F MED LIST DOCD IN RCRD: CPT | Mod: HCNC,S$GLB,, | Performed by: NURSE PRACTITIONER

## 2020-02-14 PROCEDURE — 1101F PT FALLS ASSESS-DOCD LE1/YR: CPT | Mod: HCNC,CPTII,S$GLB, | Performed by: NURSE PRACTITIONER

## 2020-02-14 PROCEDURE — 1159F PR MEDICATION LIST DOCUMENTED IN MEDICAL RECORD: ICD-10-PCS | Mod: HCNC,S$GLB,, | Performed by: NURSE PRACTITIONER

## 2020-02-14 PROCEDURE — 3079F DIAST BP 80-89 MM HG: CPT | Mod: HCNC,CPTII,S$GLB, | Performed by: NURSE PRACTITIONER

## 2020-02-14 PROCEDURE — 99214 OFFICE O/P EST MOD 30 MIN: CPT | Mod: HCNC,S$GLB,, | Performed by: NURSE PRACTITIONER

## 2020-02-14 PROCEDURE — 99214 PR OFFICE/OUTPT VISIT, EST, LEVL IV, 30-39 MIN: ICD-10-PCS | Mod: HCNC,S$GLB,, | Performed by: NURSE PRACTITIONER

## 2020-02-14 PROCEDURE — 99999 PR PBB SHADOW E&M-EST. PATIENT-LVL V: CPT | Mod: PBBFAC,HCNC,, | Performed by: NURSE PRACTITIONER

## 2020-02-14 PROCEDURE — 3079F PR MOST RECENT DIASTOLIC BLOOD PRESSURE 80-89 MM HG: ICD-10-PCS | Mod: HCNC,CPTII,S$GLB, | Performed by: NURSE PRACTITIONER

## 2020-02-14 NOTE — PROGRESS NOTES
Subjective:       Patient ID: Eric Jackson is a 79 y.o. male.    Chief Complaint: Gastroesophageal Reflux    Patient has been followed by [facility] doctors. He presents to clinic today he wants a referral to a [facility] doctor.     Gastroesophageal Reflux   He complains of abdominal pain, belching and dysphagia. He reports no chest pain, no choking, no coughing, no early satiety, no globus sensation, no heartburn, no hoarse voice, no nausea, no sore throat, no stridor or no tooth decay. This is a new problem. The current episode started in the past 7 days. The problem occurs constantly. The problem has been unchanged. The symptoms are aggravated by certain foods. Pertinent negatives include no anemia, fatigue, melena, muscle weakness, orthopnea or weight loss. There are no known risk factors. He has tried a PPI, a diet change, an antacid, ETOH reduction, medication cessation and a histamine-2 antagonist for the symptoms. The treatment provided no relief. Past procedures include an EGD.     Review of Systems   Constitutional: Negative for chills, diaphoresis, fatigue, fever and weight loss.   HENT: Negative for congestion, hoarse voice, postnasal drip, rhinorrhea, sinus pressure, sneezing and sore throat.    Respiratory: Negative for cough, choking, chest tightness and shortness of breath.    Cardiovascular: Negative for chest pain, palpitations and leg swelling.   Gastrointestinal: Positive for abdominal pain and dysphagia. Negative for diarrhea, heartburn, melena, nausea and vomiting.   Genitourinary: Negative for decreased urine volume and difficulty urinating.   Musculoskeletal: Negative for arthralgias, back pain, myalgias and muscle weakness.   Skin: Negative for color change and rash.   Neurological: Negative for dizziness, weakness, light-headedness and headaches.       Objective:      Physical Exam   Constitutional: He is oriented to person, place, and time. Vital signs are normal. He appears  well-developed and well-nourished.   HENT:   Head: Normocephalic and atraumatic.   Right Ear: External ear normal.   Left Ear: External ear normal.   Nose: Nose normal.   Mouth/Throat: Oropharynx is clear and moist. No oropharyngeal exudate.   Cardiovascular: Normal rate, regular rhythm and normal heart sounds.   Pulmonary/Chest: Effort normal and breath sounds normal.   Abdominal: Soft. Bowel sounds are normal.   Neurological: He is alert and oriented to person, place, and time.   Skin: Skin is warm, dry and intact.   Psychiatric: He has a normal mood and affect.       Assessment:       1. Gastroesophageal reflux disease, esophagitis presence not specified    2. Type 2 diabetes mellitus with diabetic cataract, without long-term current use of insulin    3. Cardiomyopathy, unspecified type    4. Coronary artery disease of native artery of native heart with stable angina pectoris    5. Abdominal aortic atherosclerosis        Plan:       Eric was seen today for gastroesophageal reflux.    Diagnoses and all orders for this visit:    Gastroesophageal reflux disease, esophagitis presence not specified  -     Ambulatory referral/consult to Gastroenterology; Future  Home care  Lifestyle changes can help reduce symptoms. If needed, medicines may be prescribed. Symptoms often improve with treatment, but if treatment is stopped, the symptoms often return after a few months. So most persons with GERD will need to continue treatment.  Lifestyle changes  · Limit or avoid fatty, fried, and spicy foods, as well as coffee, chocolate, mint, and foods with high acid content such as tomatoes and citrus fruit and juices (orange, grapefruit, lemon).  · Dont eat large meals, especially at night. Frequent, smaller meals are best. Do not lie down right after eating. And dont eat anything 3 hours before going to bed.  · Avoid drinking alcohol and smoking. As much as possible, stay away from second hand smoke.  · If you are overweight,  "losing weight will reduce symptoms.   · Avoid wearing tight clothing around your stomach area.  · If your symptoms occur during sleep, use a foam wedge to elevate your upper body (not just your head.) Or, place 4" blocks under the head of your bed.  Medicines  If needed, medicines can help relieve the symptoms of GERD and prevent damage to the esophagus. Discuss a medicine plan with your healthcare provider. This may include one or more of the following medicines:  · Antacids to help neutralize the normal acids in your stomach.  · Acid blockers (H2 blockers) to decrease acid production.  · Acid inhibitors (PPIs) to decrease acid production in a different way than the blockers. They may work better, but can take a little longer to take effect.  Take an antacid 30-60 minutes after eating and at bedtime, but not at the same time as an acid blocker.  Try not to take medicines such as ibuprofen and aspirin. If you are taking aspirin for your heart or other medical reasons, talk to your healthcare provider about stopping it.  Follow-up care  Follow up with your healthcare provider or as advised by our staff.  When to seek medical advice  Call your healthcare provider if any of the following occur:  · Stomach pain gets worse or moves to the lower right abdomen (appendix area)  · Chest pain appears or gets worse, or spreads to the back, neck, shoulder, or arm  · Frequent vomiting (cant keep down liquids)  · Blood in the stool or vomit (red or black in color)  · Feeling weak or dizzy  · Fever of 100.4ºF (38ºC) or higher, or as directed by your healthcare provider  Date Last Reviewed: 6/23/2015  © 5023-2199 [employer]. [address] [place] [address], [place] [address]. All rights reserved. This information is not intended as a substitute for professional medical care. Always follow your healthcare professional's instructions.        Type 2 diabetes mellitus with diabetic cataract, without long-term current use of " insulin  The current medical regimen is effective;  continue present plan and medications.        Cardiomyopathy, unspecified type  The current medical regimen is effective;  continue present plan and medications.      Coronary artery disease of native artery of native heart with stable angina pectoris  The current medical regimen is effective;  continue present plan and medications.    Abdominal aortic atherosclerosis  Stable, asymptomatic, monitor. Encouraged continued compliance with directives, diet and lifestyle modifications as recommended

## 2020-02-14 NOTE — PATIENT INSTRUCTIONS

## 2020-03-12 ENCOUNTER — PATIENT OUTREACH (OUTPATIENT)
Dept: ADMINISTRATIVE | Facility: OTHER | Age: 80
End: 2020-03-12

## 2020-03-18 ENCOUNTER — PATIENT MESSAGE (OUTPATIENT)
Dept: GASTROENTEROLOGY | Facility: CLINIC | Age: 80
End: 2020-03-18

## 2020-03-18 ENCOUNTER — PATIENT OUTREACH (OUTPATIENT)
Dept: ADMINISTRATIVE | Facility: OTHER | Age: 80
End: 2020-03-18

## 2020-03-19 ENCOUNTER — OFFICE VISIT (OUTPATIENT)
Dept: GASTROENTEROLOGY | Facility: CLINIC | Age: 80
End: 2020-03-19
Payer: MEDICARE

## 2020-03-19 VITALS — WEIGHT: 172.63 LBS | BODY MASS INDEX: 27.04 KG/M2

## 2020-03-19 DIAGNOSIS — K22.70 BARRETT'S ESOPHAGUS WITHOUT DYSPLASIA: ICD-10-CM

## 2020-03-19 DIAGNOSIS — Z86.010 HISTORY OF COLON POLYPS: ICD-10-CM

## 2020-03-19 DIAGNOSIS — K21.9 GASTROESOPHAGEAL REFLUX DISEASE, ESOPHAGITIS PRESENCE NOT SPECIFIED: Primary | ICD-10-CM

## 2020-03-19 PROCEDURE — 99999 PR PBB SHADOW E&M-EST. PATIENT-LVL V: CPT | Mod: PBBFAC,HCNC,, | Performed by: INTERNAL MEDICINE

## 2020-03-19 PROCEDURE — 1126F PR PAIN SEVERITY QUANTIFIED, NO PAIN PRESENT: ICD-10-PCS | Mod: HCNC,S$GLB,, | Performed by: INTERNAL MEDICINE

## 2020-03-19 PROCEDURE — 1159F PR MEDICATION LIST DOCUMENTED IN MEDICAL RECORD: ICD-10-PCS | Mod: HCNC,S$GLB,, | Performed by: INTERNAL MEDICINE

## 2020-03-19 PROCEDURE — 99999 PR PBB SHADOW E&M-EST. PATIENT-LVL V: ICD-10-PCS | Mod: PBBFAC,HCNC,, | Performed by: INTERNAL MEDICINE

## 2020-03-19 PROCEDURE — 1159F MED LIST DOCD IN RCRD: CPT | Mod: HCNC,S$GLB,, | Performed by: INTERNAL MEDICINE

## 2020-03-19 PROCEDURE — 1101F PR PT FALLS ASSESS DOC 0-1 FALLS W/OUT INJ PAST YR: ICD-10-PCS | Mod: HCNC,CPTII,S$GLB, | Performed by: INTERNAL MEDICINE

## 2020-03-19 PROCEDURE — 1101F PT FALLS ASSESS-DOCD LE1/YR: CPT | Mod: HCNC,CPTII,S$GLB, | Performed by: INTERNAL MEDICINE

## 2020-03-19 PROCEDURE — 1126F AMNT PAIN NOTED NONE PRSNT: CPT | Mod: HCNC,S$GLB,, | Performed by: INTERNAL MEDICINE

## 2020-03-19 PROCEDURE — 99204 OFFICE O/P NEW MOD 45 MIN: CPT | Mod: HCNC,S$GLB,, | Performed by: INTERNAL MEDICINE

## 2020-03-19 PROCEDURE — 99204 PR OFFICE/OUTPT VISIT, NEW, LEVL IV, 45-59 MIN: ICD-10-PCS | Mod: HCNC,S$GLB,, | Performed by: INTERNAL MEDICINE

## 2020-03-19 NOTE — LETTER
March 19, 2020      Madison Patel, JANEE  [address]  [place] [address]           [facility]  [address]  [facility] [address]  Phone: [phone]          Patient: Eric Jackson   MR Number: [id number]   YOB: 1940   Date of Visit: 3/19/2020       Dear Madison Patel:    Thank you for referring Eric Jackson to me for evaluation. Attached you will find relevant portions of my assessment and plan of care.    If you have questions, please do not hesitate to call me. I look forward to following Eric Jackson along with you.    Sincerely,    Bharath Herrera MD    Enclosure  CC:  No Recipients    If you would like to receive this communication electronically, please contact [email or web address] or [phone] to request more information on [employer] access.    For providers and/or their staff who would like to refer a patient to [facility], please contact us through our one-stop-shop provider referral line, [facility] , at [phone].    If you feel you have received this communication in error or would no longer like to receive these types of communications, please e-mail [email or web address]

## 2020-03-19 NOTE — PROGRESS NOTES
Subjective:       Patient ID: Eric Jackson is a 79 y.o. male.    Chief Complaint: Gastroesophageal Reflux    Patient here today to establish care with aforementioned complaints.  Patient has chronic history of GERD complicated by Archer's esophagus.  Patient was previously managed by doctors Julius and Mya at [facility].  Patient has previously been on multiple PPIs which generally work temporarily and ultimately lose their effect.  He underwent Stretta in 2015.  He recalls it did help some however only lasted for about 4-5 months.  He was offered anti reflux surgery by doctor in [facility] and by his account the procedure was scheduled.  However, on the morning of the procedure he apparently had a reaction with an antianxiety the medication which caused convulsions.  The procedure was canceled and never rescheduled.  He has since moved all as care of at [facility] and would be interested in a referral to at [facility] surgeon for evaluation.    Currently patient denies any dysphagia or odynophagia.  He does report daily symptoms however treats them with lifestyle modification of raising the head of the bed, avoiding trigger foods, as well as eating late in the evening.    Outside records scanned in to media.  Most recent pertinent procedures summarized below    Colonoscopy 02/2018.  For subcentimeter adenomas removed.  Diverticulosis in sigmoid.  Internal hemorrhoids.  EGD 02/2018.  Long segment Archer's esophagus.  Hiatal hernia, small  EGD 03/2015, with Stretta/Confocal endomicroscopy. No evidence of HGD        Past Medical History:   Diagnosis Date    Anxiety     Coronary artery disease     Depression     Diabetes mellitus type II     Erectile dysfunction     Eye injuries     fb ou    GERD (gastroesophageal reflux disease)     Hypertension     Intermediate stage nonexudative age-related macular degeneration of both eyes 4/9/2019    Nuclear sclerosis of both eyes 4/9/2019       Past Surgical History:    Procedure Laterality Date    COLONOSCOPY N/A 2018    Procedure: COLONOSCOPY;  Surgeon: Mathieu Cao MD;  Location: [facility] ENDO;  Service: Endoscopy;  Laterality: N/A;    CORONARY ANGIOGRAPHY INCLUDING BYPASS GRAFTS WITH CATHETERIZATION OF LEFT HEART N/A 3/4/2019    Procedure: ANGIOGRAM, CORONARY, INCLUDING BYPASS GRAFT, WITH LEFT HEART CATHETERIZATION;  Surgeon: Jamir Nam MD;  Location: [facility] CATH LAB;  Service: Cardiology;  Laterality: N/A;    CORONARY ARTERY BYPASS GRAFT  2001    HERNIA REPAIR      LEFT HEART CATHETERIZATION Left 3/4/2019    Procedure: Left heart cath RFA access, 4fr catheter/sheath, not before 9am;  Surgeon: Jamir Nam MD;  Location: [facility] CATH LAB;  Service: Cardiology;  Laterality: Left;    TRANSRECTAL ULTRASOUND OF PROSTATE WITH INSERTION OF GOLD FIDUCIAL MARKER N/A 2019    Procedure: ULTRASOUND, PROSTATE, RECTAL APPROACH, WITH GOLD FIDUCIAL MARKER INSERTION;  Surgeon: Layne Huff MD;  Location: [facility] OR;  Service: Urology;  Laterality: N/A;    WRIST SURGERY         Social History  Social History     Tobacco Use    Smoking status: Former Smoker     Packs/day: 2.00     Years: 23.00     Pack years: 46.00     Types: Cigarettes     Last attempt to quit: 1989     Years since quittin.2    Smokeless tobacco: Never Used   Substance Use Topics    Alcohol use: Yes     Alcohol/week: 1.0 standard drinks     Types: 1 Cans of beer per week     Comment: occassional    Drug use: No       Family History   Adopted: Yes   Problem Relation Age of Onset    No Known Problems Mother     No Known Problems Father     No Known Problems Maternal Grandmother     No Known Problems Maternal Grandfather     No Known Problems Paternal Grandmother     No Known Problems Paternal Grandfather     No Known Problems Sister     No Known Problems Brother     No Known Problems Maternal Aunt     No Known Problems Maternal Uncle     No Known Problems Paternal Aunt      No Known Problems Paternal Uncle     Amblyopia Neg Hx     Blindness Neg Hx     Cancer Neg Hx     Cataracts Neg Hx     Diabetes Neg Hx     Glaucoma Neg Hx     Hypertension Neg Hx     Macular degeneration Neg Hx     Retinal detachment Neg Hx     Strabismus Neg Hx     Stroke Neg Hx     Thyroid disease Neg Hx        Review of Systems   Constitutional: Negative for chills, fever and unexpected weight change.   HENT: Negative for congestion and trouble swallowing.    Eyes: Negative for photophobia and visual disturbance.   Respiratory: Negative for cough and shortness of breath.    Cardiovascular: Positive for chest pain. Negative for leg swelling.   Gastrointestinal: Positive for abdominal pain. Negative for abdominal distention, blood in stool, constipation, diarrhea, nausea and vomiting.   Genitourinary: Negative for dysuria and hematuria.   Musculoskeletal: Negative for arthralgias and myalgias.   Skin: Negative for color change and rash.   Neurological: Negative for dizziness, light-headedness and numbness.   Psychiatric/Behavioral: Negative for agitation and confusion.           Objective:      Physical Exam   Constitutional: He is oriented to person, place, and time. He appears well-developed and well-nourished.   HENT:   Head: Normocephalic and atraumatic.   Eyes: Pupils are equal, round, and reactive to light. EOM are normal. No scleral icterus.   Neck: Normal range of motion. Neck supple.   Cardiovascular: Normal rate, regular rhythm, normal heart sounds and intact distal pulses.   Pulmonary/Chest: Effort normal and breath sounds normal. No respiratory distress.   Abdominal: Soft. Bowel sounds are normal. He exhibits no distension. There is no tenderness.   Musculoskeletal: Normal range of motion. He exhibits no edema.   Neurological: He is alert and oriented to person, place, and time.   No asterixis   Skin: Skin is warm and dry. No rash noted. No erythema.   Psychiatric: He has a normal mood  and affect. His behavior is normal.   Nursing note and vitals reviewed.        Pertinent labs and imaging studies reviewed    Assessment:       1. Gastroesophageal reflux disease, esophagitis presence not specified    2. 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 2/2018    3. History of colon polyps        Plan:       Will repeat EGD.  Can screen for dysplasia at that time  Patient does need to be on a PPI even though they do not seem to help him symptomatically to slow the progression of Archer's/dysplasia.  Patient would prefer to wait until after repeat endoscopy before beginning any other medications  Refer patient for anti reflux surgery  Patient is up-to-date with colon cancer screening.  Repeat colonoscopy in 2021    (Portions of this note were dictated using voice recognition software and may contain dictation related errors in spelling/grammar/syntax not found on text review)

## 2020-03-19 NOTE — PATIENT INSTRUCTIONS
EGD Prep Instructions    [facility] [address] Clinic Office [phone]  Endoscopy Lab [phone]    You are scheduled for an EGD with Dr. Herrera on 3/25/2020 at [facility].  You will check in at Admit on the first floor of the hospital. Please contact the office two days before procedure date to reschedule if needed.    You may not have anything to eat after 7pm and nothing to drink after midnight.  You can drink clear liquids between 7pm and midnight.    You MAY take your blood pressure, heart, and seizure medication on the morning of the procedure, with a SIP of water.  Hold ALL other medications until after the procedure.    If you are on blood thinners THAT YOU HAVE BEEN INSTRUCTED TO HOLD BY YOUR DOCTOR FOR THIS PROCEDURE, then do NOT take this the morning of your EGD.  Do NOT stop these medications on your own, they must be approved to be held by your doctor.  Your EGD can NOT be done if you are on these medications.  Examples of blood thinners include: Coumadin, Aggrenox, Plavix, Pradaxa, Reapro, Pletal, Xarelto, Ticagrelor, Brilinta, Eliquis, and high dose aspirin (325 mg).  You do not have to stop baby aspirin 81 mg.

## 2020-03-23 ENCOUNTER — TELEPHONE (OUTPATIENT)
Dept: GASTROENTEROLOGY | Facility: CLINIC | Age: 80
End: 2020-03-23

## 2020-03-23 NOTE — TELEPHONE ENCOUNTER
Spoke with patient about rescheduling his procedure. Patient stated he will reschedule to a later date due to COVID-19.

## 2020-04-30 ENCOUNTER — TELEPHONE (OUTPATIENT)
Dept: GASTROENTEROLOGY | Facility: CLINIC | Age: 80
End: 2020-04-30

## 2020-04-30 DIAGNOSIS — K22.719 BARRETT'S ESOPHAGUS WITH DYSPLASIA: Primary | ICD-10-CM

## 2020-05-07 ENCOUNTER — TELEPHONE (OUTPATIENT)
Dept: GASTROENTEROLOGY | Facility: CLINIC | Age: 80
End: 2020-05-07

## 2020-05-07 ENCOUNTER — TELEPHONE (OUTPATIENT)
Dept: SURGERY | Facility: CLINIC | Age: 80
End: 2020-05-07

## 2020-05-07 NOTE — TELEPHONE ENCOUNTER
----- Message from Roseanne Alexandre sent at 5/7/2020 11:05 AM CDT -----  Contact: Patient  Type:  Patient Returning Call    Who Called:Eric  Who Left Message for Patient:Office  Does the patient know what this is regarding?:scheduling a procedure  Would the patient rather a call back or a response via [facility]? call  Best Call Back Number: [phone]  Additional Information:

## 2020-05-07 NOTE — TELEPHONE ENCOUNTER
Attempted to contact patient to schedule his procedure with Dr. Herrera. Left patient a message to give office a call back.

## 2020-05-07 NOTE — TELEPHONE ENCOUNTER
Spoke with patient about rescheduling his procedure. Patient stated he will give office a call back at a later date.

## 2020-05-07 NOTE — TELEPHONE ENCOUNTER
5/7/20  1:26pm  Attempted to reach patient regarding need to reschedule appointment scheduled on 5/12/20. No answer. Message left via voicemail requesting call back.

## 2020-05-28 ENCOUNTER — TELEPHONE (OUTPATIENT)
Dept: ENDOCRINOLOGY | Facility: CLINIC | Age: 80
End: 2020-05-28

## 2020-05-28 ENCOUNTER — PATIENT MESSAGE (OUTPATIENT)
Dept: ENDOCRINOLOGY | Facility: CLINIC | Age: 80
End: 2020-05-28

## 2020-05-28 NOTE — TELEPHONE ENCOUNTER
----- Message from Elodia Diggs MA sent at 5/27/2020  4:45 PM CDT -----  Contact: self      ----- Message -----  From: Xvaier Snow  Sent: 5/27/2020   4:38 PM CDT  To: [facility] Staff    Pt is asking for a call back to have an order placed for pt's blood work [place] [facility] [place]. Also, pt needs an appt, no availability shown at the moment.     Contact Info [phone] (home) [phone] (work)

## 2020-05-28 NOTE — TELEPHONE ENCOUNTER
Left a message for pt to callback I will scheduled him for lab work but dr cancino is booked have to call back or go on [facility] June 1st at 8am to scheduled his follow up

## 2020-06-01 ENCOUNTER — TELEPHONE (OUTPATIENT)
Dept: FAMILY MEDICINE | Facility: CLINIC | Age: 80
End: 2020-06-01

## 2020-06-01 ENCOUNTER — NURSE TRIAGE (OUTPATIENT)
Dept: ADMINISTRATIVE | Facility: CLINIC | Age: 80
End: 2020-06-01

## 2020-06-01 NOTE — TELEPHONE ENCOUNTER
"Sob with exertion. Periodically spitting up yellowish brown sputum 3-4 times a day. Weakness, joint pain, muscle pain and sob. At rest O2 sat be at 92-93% at rest he take deep breaths and it goes up he says. Pt stated valerio gr been having this sob with exertion for about 3-4 weeks. Pt stated he does not have sob at rest or with walking but when he do an activity he does. Care advice recommend pt come into office now. No appointments available. Pt refused a virtual visit. Pt is also asking about testing for coivd 19 like an antibody test or swab test. Please call and advise pt. Pt is awaiting a return call [phone]     Reason for Disposition   Patient wants to be seen    Additional Information   Negative: Breathing stopped and hasn't returned   Negative: Choking on something   Negative: SEVERE difficulty breathing (e.g., struggling for each breath, speaks in single words, pulse > 120)   Negative: Bluish (or gray) lips or face   Negative: Difficult to awaken or acting confused (e.g., disoriented, slurred speech)   Negative: Passed out (i.e., fainted, collapsed and was not responding)   Negative: Wheezing started suddenly after medicine, an allergic food, or bee sting   Negative: Stridor   Negative: Slow, shallow and weak breathing   Negative: Sounds like a life-threatening emergency to the triager   Negative: Chest pain   Negative: MODERATE difficulty breathing (e.g., speaks in phrases, SOB even at rest, pulse 100-120) of new onset or worse than normal   Negative: Wheezing can be heard across the room   Negative: Drooling or spitting out saliva (because can't swallow)   Negative: Any history of prior "blood clot" in leg or lungs   Negative: Recent illness requiring prolonged bedrest (i.e., immobilization)   Negative: Hip or leg fracture in past 2 months (e.g., or had cast on leg or ankle)   Negative: Major surgery in the past month   Negative: Recent long-distance travel with prolonged time in " "car, bus, plane, or train (i.e., within past 2 weeks; 6 or  more hours duration)   Negative: Extra heart beats OR irregular heart beating   (i.e., "palpitations")   Negative: Fever > 103 F (39.4 C)   Negative: Fever > 101 F (38.3 C) and over 60 years of age   Negative: Fever > 100.0 F (37.8 C) and bedridden (e.g., nursing home patient, stroke, chronic illness, recovering from surgery)   Negative: Fever > 100.0 F (37.8 C) and diabetes mellitus or weak immune system (e.g., HIV positive, cancer chemo, splenectomy, organ transplant, chronic steroids)   Negative: Periods where breathing stops and then resumes normally and bedridden (e.g., nursing home patient, CVA)   Negative: Pregnant or postpartum (< 1 month since delivery)   Negative: Patient sounds very sick or weak to the triager   Negative: MILD difficulty breathing (e.g., minimal/no SOB at rest, SOB with walking, pulse < 100) of new onset or worse than normal   Negative: Longstanding difficulty breathing (e.g., CHF, COPD, emphysema) and worse than normal   Negative: Longstanding difficulty breathing and not responding to usual therapy   Negative: Continuous (nonstop) coughing    Protocols used: BREATHING DIFFICULTY-A-OH      "

## 2020-06-01 NOTE — TELEPHONE ENCOUNTER
----- Message from Becky George LPN sent at 6/1/2020 10:59 AM CDT -----  Check to see if patient returned call today

## 2020-06-01 NOTE — TELEPHONE ENCOUNTER
His O2 sat is a change from his baseline.  No known hx of COPD    Hx of DVT in the arm 12/2019    I recommend urgent evaluation.  Would recommend ER

## 2020-06-01 NOTE — TELEPHONE ENCOUNTER
Spoke with pt informed him on providers recommendations.He states he can not go to ER for few days because he has things to do and will be busy.Informed pt of the importance of providers recommendations.He states he's fine and doesn't feel he needs to go to ER and it will cost to much be seen.After further discussion pt agreed he would go to ER on tomorrow after his lab appt.

## 2020-06-02 ENCOUNTER — LAB VISIT (OUTPATIENT)
Dept: LAB | Facility: HOSPITAL | Age: 80
End: 2020-06-02
Attending: INTERNAL MEDICINE
Payer: MEDICARE

## 2020-06-02 ENCOUNTER — LAB VISIT (OUTPATIENT)
Dept: FAMILY MEDICINE | Facility: CLINIC | Age: 80
End: 2020-06-02
Payer: MEDICARE

## 2020-06-02 DIAGNOSIS — E11.9 TYPE 2 DIABETES MELLITUS WITHOUT COMPLICATION, WITHOUT LONG-TERM CURRENT USE OF INSULIN: ICD-10-CM

## 2020-06-02 DIAGNOSIS — K22.719 BARRETT'S ESOPHAGUS WITH DYSPLASIA: ICD-10-CM

## 2020-06-02 DIAGNOSIS — Z12.5 ENCOUNTER FOR SCREENING FOR MALIGNANT NEOPLASM OF PROSTATE: ICD-10-CM

## 2020-06-02 DIAGNOSIS — E29.1 HYPOGONADISM IN MALE: ICD-10-CM

## 2020-06-02 LAB
ALBUMIN SERPL BCP-MCNC: 4.1 G/DL (ref 3.5–5.2)
ALP SERPL-CCNC: 61 U/L (ref 55–135)
ALT SERPL W/O P-5'-P-CCNC: 23 U/L (ref 10–44)
ANION GAP SERPL CALC-SCNC: 10 MMOL/L (ref 8–16)
AST SERPL-CCNC: 26 U/L (ref 10–40)
BASOPHILS # BLD AUTO: 0.03 K/UL (ref 0–0.2)
BASOPHILS NFR BLD: 0.5 % (ref 0–1.9)
BILIRUB SERPL-MCNC: 0.3 MG/DL (ref 0.1–1)
BUN SERPL-MCNC: 12 MG/DL (ref 8–23)
CALCIUM SERPL-MCNC: 9.6 MG/DL (ref 8.7–10.5)
CHLORIDE SERPL-SCNC: 101 MMOL/L (ref 95–110)
CO2 SERPL-SCNC: 28 MMOL/L (ref 23–29)
COMPLEXED PSA SERPL-MCNC: 0.29 NG/ML (ref 0–4)
CREAT SERPL-MCNC: 1.3 MG/DL (ref 0.5–1.4)
DIFFERENTIAL METHOD: NORMAL
EOSINOPHIL # BLD AUTO: 0.3 K/UL (ref 0–0.5)
EOSINOPHIL NFR BLD: 4.6 % (ref 0–8)
ERYTHROCYTE [DISTWIDTH] IN BLOOD BY AUTOMATED COUNT: 13.3 % (ref 11.5–14.5)
EST. GFR  (AFRICAN AMERICAN): >60 ML/MIN/1.73 M^2
EST. GFR  (NON AFRICAN AMERICAN): 52.6 ML/MIN/1.73 M^2
ESTIMATED AVG GLUCOSE: 123 MG/DL (ref 68–131)
GLUCOSE SERPL-MCNC: 129 MG/DL (ref 70–110)
HBA1C MFR BLD HPLC: 5.9 % (ref 4–5.6)
HCT VFR BLD AUTO: 46 % (ref 40–54)
HGB BLD-MCNC: 14.9 G/DL (ref 14–18)
IMM GRANULOCYTES # BLD AUTO: 0.01 K/UL (ref 0–0.04)
IMM GRANULOCYTES NFR BLD AUTO: 0.2 % (ref 0–0.5)
LYMPHOCYTES # BLD AUTO: 1.6 K/UL (ref 1–4.8)
LYMPHOCYTES NFR BLD: 24.8 % (ref 18–48)
MCH RBC QN AUTO: 30.3 PG (ref 27–31)
MCHC RBC AUTO-ENTMCNC: 32.4 G/DL (ref 32–36)
MCV RBC AUTO: 94 FL (ref 82–98)
MONOCYTES # BLD AUTO: 0.7 K/UL (ref 0.3–1)
MONOCYTES NFR BLD: 10 % (ref 4–15)
NEUTROPHILS # BLD AUTO: 3.9 K/UL (ref 1.8–7.7)
NEUTROPHILS NFR BLD: 59.9 % (ref 38–73)
NRBC BLD-RTO: 0 /100 WBC
PLATELET # BLD AUTO: 217 K/UL (ref 150–350)
PMV BLD AUTO: 9.9 FL (ref 9.2–12.9)
POTASSIUM SERPL-SCNC: 4.3 MMOL/L (ref 3.5–5.1)
PROT SERPL-MCNC: 7.3 G/DL (ref 6–8.4)
RBC # BLD AUTO: 4.92 M/UL (ref 4.6–6.2)
SODIUM SERPL-SCNC: 139 MMOL/L (ref 136–145)
TESTOST SERPL-MCNC: 418 NG/DL (ref 304–1227)
WBC # BLD AUTO: 6.49 K/UL (ref 3.9–12.7)

## 2020-06-02 PROCEDURE — 80053 COMPREHEN METABOLIC PANEL: CPT | Mod: HCNC

## 2020-06-02 PROCEDURE — 84153 ASSAY OF PSA TOTAL: CPT | Mod: HCNC

## 2020-06-02 PROCEDURE — 85025 COMPLETE CBC W/AUTO DIFF WBC: CPT | Mod: HCNC

## 2020-06-02 PROCEDURE — 36415 COLL VENOUS BLD VENIPUNCTURE: CPT | Mod: HCNC,PO

## 2020-06-02 PROCEDURE — 84403 ASSAY OF TOTAL TESTOSTERONE: CPT | Mod: HCNC

## 2020-06-02 PROCEDURE — U0003 INFECTIOUS AGENT DETECTION BY NUCLEIC ACID (DNA OR RNA); SEVERE ACUTE RESPIRATORY SYNDROME CORONAVIRUS 2 (SARS-COV-2) (CORONAVIRUS DISEASE [COVID-19]), AMPLIFIED PROBE TECHNIQUE, MAKING USE OF HIGH THROUGHPUT TECHNOLOGIES AS DESCRIBED BY CMS-2020-01-R: HCPCS | Mod: HCNC

## 2020-06-02 PROCEDURE — 83036 HEMOGLOBIN GLYCOSYLATED A1C: CPT | Mod: HCNC

## 2020-06-03 LAB — SARS-COV-2 RNA RESP QL NAA+PROBE: NOT DETECTED

## 2020-06-04 ENCOUNTER — TELEPHONE (OUTPATIENT)
Dept: ENDOCRINOLOGY | Facility: CLINIC | Age: 80
End: 2020-06-04

## 2020-06-04 NOTE — TELEPHONE ENCOUNTER
----- Message from Xavier Snow sent at 6/4/2020 10:46 AM CDT -----  Contact: self  Pt does not want to wait 2 mos to be seen and wait to get his medication. Pt is requesting a call back, in regards to seeing a prescription can be mailed. testosterone (ANDROGEL) 20.25 mg/1.25 gram (1.62 %) [facility]      Contact Info [phone] (home) [phone] (work)

## 2020-06-10 NOTE — TELEPHONE ENCOUNTER
Follow up with patient ln R/T call in on June1  Patient states he is fine did not go to urgent care false alarm

## 2020-06-17 ENCOUNTER — TELEPHONE (OUTPATIENT)
Dept: FAMILY MEDICINE | Facility: CLINIC | Age: 80
End: 2020-06-17

## 2020-06-17 DIAGNOSIS — Z20.822 EXPOSURE TO COVID-19 VIRUS: Primary | ICD-10-CM

## 2020-06-17 NOTE — TELEPHONE ENCOUNTER
----- Message from Lolita Harrison sent at 6/17/2020  2:37 PM CDT -----  Type: Patient Call Back    Who called: Self     What is the request in detail: patient would like to know if he can do covid 19 antibodies test.     Can the clinic reply by [facility]? No     Would the patient rather a call back or a response via [facility]?  Call     Best call back number: [phone]

## 2020-06-18 NOTE — TELEPHONE ENCOUNTER
Patient needs to be scheduled for antibody testing, order is in, left vm to call office back and schedule.

## 2020-06-19 ENCOUNTER — LAB VISIT (OUTPATIENT)
Dept: LAB | Facility: HOSPITAL | Age: 80
End: 2020-06-19
Attending: INTERNAL MEDICINE
Payer: MEDICARE

## 2020-06-19 DIAGNOSIS — Z20.822 EXPOSURE TO COVID-19 VIRUS: ICD-10-CM

## 2020-06-19 LAB — SARS-COV-2 IGG SERPLBLD QL IA.RAPID: NEGATIVE

## 2020-06-19 PROCEDURE — 36415 COLL VENOUS BLD VENIPUNCTURE: CPT | Mod: HCNC,PO

## 2020-06-19 PROCEDURE — 86769 SARS-COV-2 COVID-19 ANTIBODY: CPT | Mod: HCNC

## 2020-06-21 NOTE — PROGRESS NOTES
COVID-19 antibody negative.  Patient had requested antibody testing.  Results to patient via my chart.

## 2020-07-14 ENCOUNTER — OFFICE VISIT (OUTPATIENT)
Dept: FAMILY MEDICINE | Facility: CLINIC | Age: 80
End: 2020-07-14
Payer: MEDICARE

## 2020-07-14 VITALS
HEIGHT: 67 IN | SYSTOLIC BLOOD PRESSURE: 136 MMHG | DIASTOLIC BLOOD PRESSURE: 82 MMHG | BODY MASS INDEX: 27.12 KG/M2 | OXYGEN SATURATION: 95 % | TEMPERATURE: 97 F | HEART RATE: 74 BPM | WEIGHT: 172.81 LBS

## 2020-07-14 DIAGNOSIS — F32.0 CURRENT MILD EPISODE OF MAJOR DEPRESSIVE DISORDER WITHOUT PRIOR EPISODE: ICD-10-CM

## 2020-07-14 DIAGNOSIS — Z00.00 ENCOUNTER FOR PREVENTIVE HEALTH EXAMINATION: Primary | ICD-10-CM

## 2020-07-14 DIAGNOSIS — I21.4 NON-STEMI (NON-ST ELEVATED MYOCARDIAL INFARCTION): ICD-10-CM

## 2020-07-14 DIAGNOSIS — E11.36 TYPE 2 DIABETES MELLITUS WITH DIABETIC CATARACT, WITHOUT LONG-TERM CURRENT USE OF INSULIN: ICD-10-CM

## 2020-07-14 DIAGNOSIS — C61 PROSTATE CANCER: ICD-10-CM

## 2020-07-14 DIAGNOSIS — I25.118 CORONARY ARTERY DISEASE OF NATIVE ARTERY OF NATIVE HEART WITH STABLE ANGINA PECTORIS: ICD-10-CM

## 2020-07-14 DIAGNOSIS — E11.9 TYPE 2 DIABETES MELLITUS WITHOUT COMPLICATION, WITHOUT LONG-TERM CURRENT USE OF INSULIN: ICD-10-CM

## 2020-07-14 DIAGNOSIS — I42.9 CARDIOMYOPATHY, UNSPECIFIED TYPE: ICD-10-CM

## 2020-07-14 DIAGNOSIS — I70.0 ABDOMINAL AORTIC ATHEROSCLEROSIS: ICD-10-CM

## 2020-07-14 PROCEDURE — G0439 PPPS, SUBSEQ VISIT: HCPCS | Mod: HCNC,S$GLB,, | Performed by: NURSE PRACTITIONER

## 2020-07-14 PROCEDURE — 3075F SYST BP GE 130 - 139MM HG: CPT | Mod: HCNC,CPTII,S$GLB, | Performed by: NURSE PRACTITIONER

## 2020-07-14 PROCEDURE — 3079F DIAST BP 80-89 MM HG: CPT | Mod: HCNC,CPTII,S$GLB, | Performed by: NURSE PRACTITIONER

## 2020-07-14 PROCEDURE — 99999 PR PBB SHADOW E&M-EST. PATIENT-LVL IV: ICD-10-PCS | Mod: PBBFAC,HCNC,, | Performed by: NURSE PRACTITIONER

## 2020-07-14 PROCEDURE — G0439 PR MEDICARE ANNUAL WELLNESS SUBSEQUENT VISIT: ICD-10-PCS | Mod: HCNC,S$GLB,, | Performed by: NURSE PRACTITIONER

## 2020-07-14 PROCEDURE — 3075F PR MOST RECENT SYSTOLIC BLOOD PRESS GE 130-139MM HG: ICD-10-PCS | Mod: HCNC,CPTII,S$GLB, | Performed by: NURSE PRACTITIONER

## 2020-07-14 PROCEDURE — 99499 UNLISTED E&M SERVICE: CPT | Mod: HCNC,S$GLB,, | Performed by: NURSE PRACTITIONER

## 2020-07-14 PROCEDURE — 99999 PR PBB SHADOW E&M-EST. PATIENT-LVL IV: CPT | Mod: PBBFAC,HCNC,, | Performed by: NURSE PRACTITIONER

## 2020-07-14 PROCEDURE — 3079F PR MOST RECENT DIASTOLIC BLOOD PRESSURE 80-89 MM HG: ICD-10-PCS | Mod: HCNC,CPTII,S$GLB, | Performed by: NURSE PRACTITIONER

## 2020-07-14 PROCEDURE — 99499 RISK ADDL DX/OHS AUDIT: ICD-10-PCS | Mod: HCNC,S$GLB,, | Performed by: NURSE PRACTITIONER

## 2020-07-14 NOTE — PATIENT INSTRUCTIONS
Counseling and Referral of Other Preventative  (Italic type indicates deductible and co-insurance are waived)    Patient Name: Eric Jackson  Today's Date: 7/14/2020    Health Maintenance       Date Due Completion Date    Shingles Vaccine (1 of 2) 11/20/1992 Patient aware of recommendation for shingles vaccine.     Urine Microalbumin 10/17/2019 10/17/2018, Patient aware of recommendation for updated urine microalbumin.     Override on 2/24/2015: Done (Dr. Arias Muñoz)    Eye Exam 05/29/2020 5/29/2019, Patient aware of recommendation for updated eye exam.     Override on 4/9/2019: Done    Override on 3/9/2018: Done    Override on 1/6/2017: Done    Override on 2/10/2015: Done    Lipid Panel 07/22/2020 7/22/2019    Override on 8/26/2016: Done ([facility]ology[facility]/GEETHA Razo-Lipid Panel:Cholesterol 227 mg/dL, NON- mg/dL, HDL 61 mg/dL, CHol/HDL Ratio 3.7,  mg/dL, Triglyceride 101 mg/dL)    Override on 2/24/2015: Done (Dr. Arias Muñoz)    Influenza Vaccine (1) 09/01/2020 10/1/2019    Override on 12/7/2018: Done    Override on 4/23/2015: Declined    Foot Exam 11/05/2020 11/5/2019    Override on 9/9/2016: Done ([facility]ologyKade[facility] Muñ[facility] MD/GEETHA Razo-Diabetic feet examnormal by visual exam, normal pulses,, sensation intact, Monofilament exam 10/10)    Override on 3/3/2015: Done (Dr.Tilak MESSI Sky)    Hemoglobin A1c 12/02/2020 6/2/2020    Override on 2/24/2015: Done (Dr. Arias Muñoz)    Colonoscopy 02/09/2021 2/9/2018    TETANUS VACCINE 03/30/2027 3/30/2017 (Declined)    Override on 3/30/2017: Declined        No orders of the defined types were placed in this encounter.    The following information is provided to all patients.  This information is to help you find resources for any of the problems found today that may be affecting your health:                Living healthy guide:  www.[facility].[place].[facility]      Understanding Diabetes: www.diabetes.org      Eating healthy: www.cdc.gov/healthyweight      CDC home safety checklist: www.cdc.gov/steadi/patient.html      Agency on Aging: www.goea.[place].[facility]      Alcoholics anonymous (AA): www.aa.org      Physical Activity: www.lorenza.nih.gov/sn2dusd      Tobacco use: www.quitwithusla.org

## 2020-07-14 NOTE — PROGRESS NOTES
"  Eric Jackson presented for a  Medicare AWV and comprehensive Health Risk Assessment today. The following components were reviewed and updated:    · Medical history  · Family History  · Social history  · Allergies and Current Medications  · Health Risk Assessment  · Health Maintenance  · Care Team     ** See Completed Assessments for Annual Wellness Visit within the encounter summary.**       The following assessments were completed:  · Living Situation  · CAGE  · Depression Screening  · Timed Get Up and Go  · Whisper Test  · Cognitive Function Screening  ·   ·   · Nutrition Screening  · ADL Screening  · PAQ Screening    Vitals:    07/14/20 0715   BP: 136/82   BP Location: Left arm   Patient Position: Sitting   BP Method: Large (Manual)   Pulse: 74   Temp: 97.1 °F (36.2 °C)   TempSrc: Temporal   SpO2: 95%   Weight: 78.4 kg (172 lb 13.5 oz)   Height: 5' 7" (1.702 m)     Body mass index is 27.07 kg/m².  Physical Exam  Constitutional:       Appearance: Normal appearance.   Cardiovascular:      Rate and Rhythm: Normal rate and regular rhythm.   Pulmonary:      Effort: Pulmonary effort is normal. No respiratory distress.      Breath sounds: Normal breath sounds.   Musculoskeletal: Normal range of motion.         General: No swelling.   Skin:     General: Skin is warm.      Capillary Refill: Capillary refill takes less than 2 seconds.   Neurological:      Mental Status: He is alert.   Psychiatric:         Mood and Affect: Mood normal.         Thought Content: Thought content normal.           Diagnoses and health risks identified today and associated recommendations/orders:    1. Encounter for preventive health examination  Education provided about preventive health examinations and procedures; addressed and discussed patient's health concerns. Additionally, reviewed medical record for risk factors and documented the results during this encounter.    2. Prostate cancer 2/25/19 Transrectal ultrasound of prostate and " gold fiducial marker placement  Stable and monitored. Continue current treatment plan as previously prescribed.     3. Abdominal aortic atherosclerosis  Stable and monitored. Continue current treatment plan as previously prescribed.     4. Non-STEMI (non-ST elevated myocardial infarction) 3/2019 from sepsis; Culprit likely in small Diag2 (unrevascularized) and likely demand related  Stable, patient evaluated/monitored by [facility] cardiology dept; continue as advised.     5. Cardiomyopathy, unspecified type  Stable, patient evaluated/monitored by [facility] cardiology dept; continue as advised.     6. Type 2 diabetes mellitus with diabetic cataract, without long-term current use of insulin  Education provided about diabetes, management of blood glucose with diet and activities, monitoring for worsening effects of diabetes.  Reviewed most recent Ha1c and informed patient of complications associated with uncontrolled diabetes.     7. Type 2 diabetes mellitus without complication, without long-term current use of insulin  Education provided about diabetes, management of blood glucose with diet and activities, monitoring for worsening effects of diabetes.  Reviewed most recent Ha1c and informed patient of complications associated with uncontrolled diabetes.     8. 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     9. Current mild episode of major depressive disorder without prior episode   Denies homicidal or suicidal ideation, we discussed family and social dynamics, stress relieving activities. Continue as advised.     Reviewed health maintenance, educated about recommended examinations, procedures (labs & images), and immunizations.     Provided Eric with a 5-10 year written screening  schedule and personal prevention plan. Recommendations were developed using the USPSTF age appropriate recommendations. Education, counseling, and referrals were provided as needed. After Visit Summary printed and given to patient which includes a list of additional screenings\tests needed.    Follow up in about 1 year (around 7/14/2021) for assessment .    Timothy Ramirez Jr, NP  I offered to discuss end of life issues, including information on how to make advance directives that the patient could use to name someone who would make medical decisions on their behalf if they became too ill to make themselves.    ___Patient declined  _X_Patient is interested, I provided paper work and offered to discuss.

## 2020-07-29 DIAGNOSIS — E11.9 TYPE 2 DIABETES MELLITUS WITHOUT COMPLICATION, WITHOUT LONG-TERM CURRENT USE OF INSULIN: ICD-10-CM

## 2020-07-29 NOTE — TELEPHONE ENCOUNTER
----- Message from Karen Mabel sent at 7/29/2020  3:48 PM CDT -----  Caller is checking on his new testing strip according to [employer] .   Testing 3-6 times a day.   Using the [employer] [facility].   Needs to renew it.     Wants it for 1 full year.    Pls  call.

## 2020-08-14 DIAGNOSIS — Z11.59 NEED FOR HEPATITIS C SCREENING TEST: ICD-10-CM

## 2020-09-09 DIAGNOSIS — F41.9 ANXIETY: ICD-10-CM

## 2020-09-09 RX ORDER — ALPRAZOLAM 1 MG/1
1 TABLET ORAL NIGHTLY PRN
Qty: 60 TABLET | Refills: 0 | Status: SHIPPED | OUTPATIENT
Start: 2020-09-09 | End: 2021-01-15 | Stop reason: SDUPTHER

## 2020-09-09 NOTE — TELEPHONE ENCOUNTER
----- Message from Lolita Harrison sent at 9/9/2020  3:08 PM CDT -----  Type: RX Refill Request    Who Called:  Self     Have you contacted your pharmacy: no     Refill or New Rx: Refill     RX Name and Strength: ALPRAZolam (XANAX) 1 MG tablet      Preferred Pharmacy with phone number:[facility]/pharmacy #[id number] - [place] - [address] [phone] (Phone)  [phone] (Fax)        Local or Mail Order: Local     Ordering Provider: Dr. Boo     Would the patient rather a call back or a response via [facility]?  Call   Best Call Back Number:[phone]

## 2020-09-14 ENCOUNTER — TELEPHONE (OUTPATIENT)
Dept: GASTROENTEROLOGY | Facility: CLINIC | Age: 80
End: 2020-09-14

## 2020-09-14 ENCOUNTER — PATIENT OUTREACH (OUTPATIENT)
Dept: ADMINISTRATIVE | Facility: OTHER | Age: 80
End: 2020-09-14

## 2020-09-14 DIAGNOSIS — Z01.812 PRE-PROCEDURE LAB EXAM: ICD-10-CM

## 2020-09-15 ENCOUNTER — OFFICE VISIT (OUTPATIENT)
Dept: OPTOMETRY | Facility: CLINIC | Age: 80
End: 2020-09-15
Payer: MEDICARE

## 2020-09-15 DIAGNOSIS — H25.13 NUCLEAR SCLEROSIS OF BOTH EYES: ICD-10-CM

## 2020-09-15 DIAGNOSIS — H52.7 REFRACTIVE ERROR: ICD-10-CM

## 2020-09-15 DIAGNOSIS — H35.373 EPIRETINAL MEMBRANE (ERM) OF BOTH EYES: ICD-10-CM

## 2020-09-15 DIAGNOSIS — E11.36 TYPE 2 DIABETES MELLITUS WITH DIABETIC CATARACT, WITHOUT LONG-TERM CURRENT USE OF INSULIN: Primary | ICD-10-CM

## 2020-09-15 DIAGNOSIS — H35.3132 INTERMEDIATE STAGE NONEXUDATIVE AGE-RELATED MACULAR DEGENERATION OF BOTH EYES: ICD-10-CM

## 2020-09-15 PROCEDURE — 92014 PR EYE EXAM, EST PATIENT,COMPREHESV: ICD-10-PCS | Mod: HCNC,S$GLB,, | Performed by: OPTOMETRIST

## 2020-09-15 PROCEDURE — 92134 CPTRZ OPH DX IMG PST SGM RTA: CPT | Mod: HCNC,S$GLB,, | Performed by: OPTOMETRIST

## 2020-09-15 PROCEDURE — 92134 POSTERIOR SEGMENT OCT RETINA (OCULAR COHERENCE TOMOGRAPHY)-BOTH EYES: ICD-10-PCS | Mod: HCNC,S$GLB,, | Performed by: OPTOMETRIST

## 2020-09-15 PROCEDURE — 99999 PR PBB SHADOW E&M-EST. PATIENT-LVL III: CPT | Mod: PBBFAC,HCNC,, | Performed by: OPTOMETRIST

## 2020-09-15 PROCEDURE — 92014 COMPRE OPH EXAM EST PT 1/>: CPT | Mod: HCNC,S$GLB,, | Performed by: OPTOMETRIST

## 2020-09-15 PROCEDURE — 99499 RISK ADDL DX/OHS AUDIT: ICD-10-PCS | Mod: HCNC,S$GLB,, | Performed by: OPTOMETRIST

## 2020-09-15 PROCEDURE — 99499 UNLISTED E&M SERVICE: CPT | Mod: HCNC,S$GLB,, | Performed by: OPTOMETRIST

## 2020-09-15 PROCEDURE — 92015 PR REFRACTION: ICD-10-PCS | Mod: HCNC,S$GLB,, | Performed by: OPTOMETRIST

## 2020-09-15 PROCEDURE — 99999 PR PBB SHADOW E&M-EST. PATIENT-LVL III: ICD-10-PCS | Mod: PBBFAC,HCNC,, | Performed by: OPTOMETRIST

## 2020-09-15 PROCEDURE — 92015 DETERMINE REFRACTIVE STATE: CPT | Mod: HCNC,S$GLB,, | Performed by: OPTOMETRIST

## 2020-09-15 NOTE — PROGRESS NOTES
Subjective:       Patient ID: Eric Jackson is a 79 y.o. male      Chief Complaint   Patient presents with    Concerns About Ocular Health    Macular Degeneration    Diabetic Eye Exam     History of Present Illness  HPI     Dls: 5/29/19 Dr. Braden     78 y/o male presents today for diabetic eye exam.   Pt states no changes in vision. Pt wears bifocal glasses.     LBS ?     No tearing  No itching  No burning  No pain  No ha's  No floaters  No flashes    Eye meds  otc gtts ou prn     Hemoglobin A1C       Date                     Value               Ref Range             Status                06/02/2020               5.9 (H)             4.0 - 5.6 %           Final                  03/01/2019               6.2 (H)             4.0 - 5.6 %           Final                 10/17/2018               6.1 (A)             4.0 - 6.0 %           Final            ----------      Last edited by Sheila Braden, OD on 9/15/2020  4:15 PM. (History)      Assessment/Plan:     1. Type 2 diabetes mellitus with diabetic cataract, without long-term current use of insulin  No diabetic retinopathy. Discussed with pt the effects of diabetes on vision, importance of good blood sugar control, compliance with meds, and follow up care with PCP. Return in 1 year for dilated eye exam, sooner PRN.    2. Nuclear sclerosis of both eyes  Educated pt on presence of cataracts and effects on vision. No surgery at this time. Recheck in one year.    3. Intermediate stage nonexudative age-related macular degeneration of both eyes  Stable. Monitor. Pt using home amsler and taking eye vitamins.   - Posterior Segment OCT Retina-Both eyes    4. Epiretinal membrane (ERM) of both eyes  Stable. Monitor.    5. Refractive error  Educated patient on refractive error and discussed lens options. Dispensed updated spectacle Rx. Educated about adaptation period to new specs.    Eyeglass Final Rx     Eyeglass Final Rx       Sphere Cylinder Axis Add    Right -3.75 +1.50 150 +2.75     Left -2.75 +1.25 040 +2.75    Expiration Date: 9/16/2021                  Follow up in about 1 year (around 9/15/2021).

## 2020-09-26 NOTE — NURSING
Bedside Report given to night nurse. Walking rounds completed. Visualized and assessed patient NAD noted. Safety precautions maintained and call light within reach.    Chart check completed.  
Discharge instructions given to pt. Pt given copy and verbalized understanding of all instructions. Education provided on medication changes and where to  medication. Family at bedside for ride home. Pt stable. VSS.   
Dr Santos notified of elevated temp, mild chills. Patient states he feels much better than last night. Tylenol order received.  
Pt discharged off unit to home. Pt in stable condition.   
Report received from night nurse. Visualized patient and assessed patient's overall condition and appearance. NAD noted. Will continue to monitor.  
18

## 2020-09-29 ENCOUNTER — PATIENT MESSAGE (OUTPATIENT)
Dept: OTHER | Facility: OTHER | Age: 80
End: 2020-09-29

## 2020-10-06 ENCOUNTER — TELEPHONE (OUTPATIENT)
Dept: ENDOSCOPY | Facility: HOSPITAL | Age: 80
End: 2020-10-06

## 2020-10-06 NOTE — TELEPHONE ENCOUNTER
Left message instructing patient to call dept @ [phone] between 8am-4pm.    Arrival time to be given @ 2562  EGD  (Message sent via My [facility] portal)

## 2020-10-07 ENCOUNTER — TELEPHONE (OUTPATIENT)
Dept: ENDOSCOPY | Facility: HOSPITAL | Age: 80
End: 2020-10-07

## 2020-10-07 NOTE — TELEPHONE ENCOUNTER
Left message instructing patient to call dept @ [phone] between 8am-4pm.    Arrival time to be given @ 8719

## 2020-10-07 NOTE — TELEPHONE ENCOUNTER
Spoke with patient about arrival time @ 3450.   Covid test = needs rapid    NPO status reviewed: Patient must have nothing to eat after midnight.  Medications: Do not take Insulin or oral diabetic medications the day of the procedure.  Take as prescribed: heart, seizure and blood pressure medication in the morning with a sip of water (less than an ounce).  Take any breathing medications and bring inhalers to hospital with you Leave all valuables and jewelry at home.     Wear comfortable clothes to procedure to change into hospital gown You cannot drive for 24 hours after your procedure because you will receive sedation for your procedure to make you comfortable.  A ride must be provided at discharge.

## 2020-10-08 ENCOUNTER — ANESTHESIA EVENT (OUTPATIENT)
Dept: ENDOSCOPY | Facility: HOSPITAL | Age: 80
End: 2020-10-08
Payer: MEDICARE

## 2020-10-08 ENCOUNTER — ANESTHESIA (OUTPATIENT)
Dept: ENDOSCOPY | Facility: HOSPITAL | Age: 80
End: 2020-10-08
Payer: MEDICARE

## 2020-10-08 ENCOUNTER — HOSPITAL ENCOUNTER (OUTPATIENT)
Facility: HOSPITAL | Age: 80
Discharge: HOME | End: 2020-10-08
Attending: INTERNAL MEDICINE | Admitting: INTERNAL MEDICINE
Payer: MEDICARE

## 2020-10-08 VITALS
TEMPERATURE: 98 F | RESPIRATION RATE: 18 BRPM | BODY MASS INDEX: 25.9 KG/M2 | HEART RATE: 62 BPM | HEIGHT: 67 IN | OXYGEN SATURATION: 97 % | WEIGHT: 165 LBS | DIASTOLIC BLOOD PRESSURE: 77 MMHG | SYSTOLIC BLOOD PRESSURE: 140 MMHG

## 2020-10-08 DIAGNOSIS — K22.70 BARRETT'S ESOPHAGUS WITHOUT DYSPLASIA: Primary | ICD-10-CM

## 2020-10-08 LAB
GLUCOSE SERPL-MCNC: 110 MG/DL (ref 70–110)
POCT GLUCOSE: 110 MG/DL (ref 70–110)
SARS-COV-2 RDRP RESP QL NAA+PROBE: NEGATIVE

## 2020-10-08 PROCEDURE — 37000008 HC ANESTHESIA 1ST 15 MINUTES: Mod: HCNC | Performed by: INTERNAL MEDICINE

## 2020-10-08 PROCEDURE — 63600175 PHARM REV CODE 636 W HCPCS: Mod: HCNC | Performed by: NURSE ANESTHETIST, CERTIFIED REGISTERED

## 2020-10-08 PROCEDURE — 37000009 HC ANESTHESIA EA ADD 15 MINS: Mod: HCNC | Performed by: INTERNAL MEDICINE

## 2020-10-08 PROCEDURE — 43235 PR EGD, FLEX, DIAGNOSTIC: ICD-10-PCS | Mod: HCNC,,, | Performed by: INTERNAL MEDICINE

## 2020-10-08 PROCEDURE — 43235 EGD DIAGNOSTIC BRUSH WASH: CPT | Mod: HCNC,,, | Performed by: INTERNAL MEDICINE

## 2020-10-08 PROCEDURE — 25000003 PHARM REV CODE 250: Mod: HCNC | Performed by: NURSE ANESTHETIST, CERTIFIED REGISTERED

## 2020-10-08 PROCEDURE — U0002 COVID-19 LAB TEST NON-CDC: HCPCS | Mod: HCNC

## 2020-10-08 PROCEDURE — 82962 GLUCOSE BLOOD TEST: CPT | Mod: HCNC | Performed by: INTERNAL MEDICINE

## 2020-10-08 PROCEDURE — 43235 EGD DIAGNOSTIC BRUSH WASH: CPT | Mod: HCNC | Performed by: INTERNAL MEDICINE

## 2020-10-08 RX ORDER — SODIUM CHLORIDE 0.9 % (FLUSH) 0.9 %
10 SYRINGE (ML) INJECTION
Status: DISCONTINUED | OUTPATIENT
Start: 2020-10-08 | End: 2020-10-08 | Stop reason: HOSPADM

## 2020-10-08 RX ORDER — PROPOFOL 10 MG/ML
VIAL (ML) INTRAVENOUS CONTINUOUS PRN
Status: DISCONTINUED | OUTPATIENT
Start: 2020-10-08 | End: 2020-10-08

## 2020-10-08 RX ORDER — PROPOFOL 10 MG/ML
VIAL (ML) INTRAVENOUS
Status: DISCONTINUED | OUTPATIENT
Start: 2020-10-08 | End: 2020-10-08

## 2020-10-08 RX ORDER — LIDOCAINE HCL/PF 100 MG/5ML
SYRINGE (ML) INTRAVENOUS
Status: DISCONTINUED | OUTPATIENT
Start: 2020-10-08 | End: 2020-10-08

## 2020-10-08 RX ORDER — SODIUM CHLORIDE 9 MG/ML
INJECTION, SOLUTION INTRAVENOUS CONTINUOUS PRN
Status: DISCONTINUED | OUTPATIENT
Start: 2020-10-08 | End: 2020-10-08

## 2020-10-08 RX ADMIN — SODIUM CHLORIDE: 0.9 INJECTION, SOLUTION INTRAVENOUS at 02:10

## 2020-10-08 RX ADMIN — PROPOFOL 60 MG: 10 INJECTION, EMULSION INTRAVENOUS at 03:10

## 2020-10-08 RX ADMIN — LIDOCAINE HYDROCHLORIDE 100 MG: 20 INJECTION, SOLUTION INTRAVENOUS at 03:10

## 2020-10-08 RX ADMIN — PROPOFOL 175 MCG/KG/MIN: 10 INJECTION, EMULSION INTRAVENOUS at 03:10

## 2020-10-08 NOTE — ANESTHESIA PREPROCEDURE EVALUATION
10/08/2020  Eric Jackson is a 79 y.o., male for EGD under MAC    Past Medical History:   Diagnosis Date    Anemia     Angina pectoris     Anxiety     Cancer     Coronary artery disease     Depression     Diabetes mellitus type II     Disorder of kidney and ureter     Erectile dysfunction     Eye injuries     fb ou    GERD (gastroesophageal reflux disease)     Hypertension     Intermediate stage nonexudative age-related macular degeneration of both eyes 4/9/2019    Myocardial infarction     Nuclear sclerosis of both eyes 4/9/2019    Prostate cancer     Trouble in sleeping     Type 2 diabetes mellitus     Type 2 diabetes mellitus with ophthalmic manifestations      Past Surgical History:   Procedure Laterality Date    COLONOSCOPY N/A 2/9/2018    Procedure: COLONOSCOPY;  Surgeon: Mathieu Cao MD;  Location: [facility] ENDO;  Service: Endoscopy;  Laterality: N/A;    CORONARY ANGIOGRAPHY INCLUDING BYPASS GRAFTS WITH CATHETERIZATION OF LEFT HEART N/A 3/4/2019    Procedure: ANGIOGRAM, CORONARY, INCLUDING BYPASS GRAFT, WITH LEFT HEART CATHETERIZATION;  Surgeon: Jamir Nam MD;  Location: [facility] CATH LAB;  Service: Cardiology;  Laterality: N/A;    CORONARY ARTERY BYPASS GRAFT  2001    HERNIA REPAIR      LEFT HEART CATHETERIZATION Left 3/4/2019    Procedure: Left heart cath RFA access, 4fr catheter/sheath, not before 9am;  Surgeon: Jamir Nam MD;  Location: [facility] CATH LAB;  Service: Cardiology;  Laterality: Left;    TRANSRECTAL ULTRASOUND OF PROSTATE WITH INSERTION OF GOLD FIDUCIAL MARKER N/A 2/26/2019    Procedure: ULTRASOUND, PROSTATE, RECTAL APPROACH, WITH GOLD FIDUCIAL MARKER INSERTION;  Surgeon: Layne Huff MD;  Location: [facility] OR;  Service: Urology;  Laterality: N/A;    WRIST SURGERY           Anesthesia Evaluation    I have reviewed the Patient Summary Reports.    I have reviewed the NPO Status.   I have reviewed the Medications.     Review of Systems  Social:  Former Smoker        Physical Exam  General:  Well nourished    Airway/Jaw/Neck:  Airway Findings: Mallampati: II      Chest/Lungs:  Chest/Lungs Clear    Heart/Vascular:  Heart Findings: Normal            Anesthesia Plan  Type of Anesthesia, risks & benefits discussed:  Anesthesia Type:  MAC  Patient's Preference:   Intra-op Monitoring Plan: standard ASA monitors  Intra-op Monitoring Plan Comments:   Post Op Pain Control Plan:   Post Op Pain Control Plan Comments:   Induction:    Beta Blocker:  Patient is not currently on a Beta-Blocker (No further documentation required).       Informed Consent: Patient understands risks and agrees with Anesthesia plan.  Questions answered. Anesthesia consent signed with patient.  ASA Score: 3     Day of Surgery Review of History & Physical:            Ready For Surgery From Anesthesia Perspective.

## 2020-10-08 NOTE — PROVATION PATIENT INSTRUCTIONS
Discharge Summary/Instructions after an Endoscopic Procedure  Patient Name: Eric Jackson  Patient MRN: [id number]  Patient YOB: 1940 Thursday, October 8, 2020  Bharath Herrera MD  Your health is very important to us during the Covid Crisis. Following your   procedure today, you will receive a daily text for 2 weeks asking about   signs or symptoms of Covid 19.  Please respond to this text when you   receive it so we can follow up and keep you as safe as possible.   RESTRICTIONS:  During your procedure today, you received medications for sedation.  These   medications may affect your judgment, balance and coordination.  Therefore,   for 24 hours, you have the following restrictions:   - DO NOT drive a car, operate machinery, make legal/financial decisions,   sign important papers or drink alcohol.    ACTIVITY:  Today: no heavy lifting, straining or running due to procedural   sedation/anesthesia.  The following day: return to full activity including work.  DIET:  Eat and drink normally unless instructed otherwise.     TREATMENT FOR COMMON SIDE EFFECTS:  - Mild abdominal pain, nausea, belching, bloating or excessive gas:  rest,   eat lightly and use a heating pad.  - Sore Throat: treat with throat lozenges and/or gargle with warm salt   water.  - Because air was used during the procedure, expelling large amounts of air   from your rectum or belching is normal.  - If a bowel prep was taken, you may not have a bowel movement for 1-3 days.    This is normal.  SYMPTOMS TO WATCH FOR AND REPORT TO YOUR PHYSICIAN:  1. Abdominal pain or bloating, other than gas cramps.  2. Chest pain.  3. Back pain.  4. Signs of infection such as: chills or fever occurring within 24 hours   after the procedure.  5. Rectal bleeding, which would show as bright red, maroon, or black stools.   (A tablespoon of blood from the rectum is not serious, especially if   hemorrhoids are present.)  6. Vomiting.  7. Weakness or  dizziness.  GO DIRECTLY TO THE NEAREST EMERGENCY ROOM IF YOU HAVE ANY OF THE FOLLOWING:      Difficulty breathing              Chills and/or fever over 101 F   Persistent vomiting and/or vomiting blood   Severe abdominal pain   Severe chest pain   Black, tarry stools   Bleeding- more than one tablespoon   Any other symptom or condition that you feel may need urgent attention  Your doctor recommends these additional instructions:  If any biopsies were taken, your doctors clinic will contact you in 1 to 2   weeks with any results.  - Discharge patient to home.   - Resume previous diet.   - Continue present medications.   - Await pathology results.   - Repeat upper endoscopy after studies are complete for surveillance of   Archer's esophagus.   - Patient has a contact number available for emergencies.  The signs and   symptoms of potential delayed complications were discussed with the   patient.  Return to normal activities tomorrow.  Written discharge   instructions were provided to the patient.  For questions, problems or results please call your physician - Bharath Herrera MD.  EMERGENCY PHONE NUMBER: [phone],  LAB RESULTS: [phone]  IF A COMPLICATION OR EMERGENCY SITUATION ARISES AND YOU ARE UNABLE TO REACH   YOUR PHYSICIAN - GO DIRECTLY TO THE EMERGENCY ROOM.  Bharath Herrera MD  10/8/2020 3:33:12 PM  This report has been verified and signed electronically.  [employer]

## 2020-10-08 NOTE — TRANSFER OF CARE
"Anesthesia Transfer of Care Note    Patient: Eric Jackson    Procedure(s) Performed: Procedure(s) (LRB):  EGD (ESOPHAGOGASTRODUODENOSCOPY) (N/A)    Patient location: GI    Anesthesia Type: MAC    Transport from OR: Transported from OR on room air with adequate spontaneous ventilation    Post pain: adequate analgesia    Post assessment: no apparent anesthetic complications and tolerated procedure well    Post vital signs: stable    Level of consciousness: awake, alert and oriented    Nausea/Vomiting: no nausea/vomiting    Complications: none    Transfer of care protocol was followed      Last vitals:   Visit Vitals  BP (!) 156/65 (BP Location: Left arm, Patient Position: Lying)   Pulse 69   Temp 36.6 °C (97.9 °F) (Temporal)   Resp 18   Ht 5' 7" (1.702 m)   Wt 74.8 kg (165 lb)   SpO2 (!) 94%   BMI 25.84 kg/m²     "

## 2020-10-08 NOTE — H&P
Short Stay Endoscopy History and Physical    PCP - Samir Boo MD    Procedure - EGD  ASA - III  Mallampati - per anesthesia  History of Anesthesia problems - no  Family history Anesthesia problems - no     HPI:  This is a 79 y.o. male here for evaluation of : BE s D      ROS:  Constitutional: No fevers, chills, No weight loss  ENT: No allergies  CV: No chest pain  Pulm: No shortness of breath  GI: see HPI  Derm: No rash    Medical History:  has a past medical history of Anemia, Angina pectoris, Anxiety, Cancer, Coronary artery disease, Depression, Diabetes mellitus type II, Disorder of kidney and ureter, Erectile dysfunction, Eye injuries, GERD (gastroesophageal reflux disease), Hypertension, Intermediate stage nonexudative age-related macular degeneration of both eyes (4/9/2019), Myocardial infarction, Nuclear sclerosis of both eyes (4/9/2019), Prostate cancer, Trouble in sleeping, Type 2 diabetes mellitus, and Type 2 diabetes mellitus with ophthalmic manifestations.    Surgical History:  has a past surgical history that includes Hernia repair; Wrist surgery; Coronary artery bypass graft (2001); Colonoscopy (N/A, 2/9/2018); Transrectal ultrasound of prostate with insertion of gold fiducial marker (N/A, 2/26/2019); Left heart catheterization (Left, 3/4/2019); and Coronary angiography including bypass grafts with catheterization of left heart (N/A, 3/4/2019).    Family History: family history includes No Known Problems in his brother, father, maternal aunt, maternal grandfather, maternal grandmother, maternal uncle, mother, paternal aunt, paternal grandfather, paternal grandmother, paternal uncle, and sister. He was adopted.. Otherwise no colon cancer, inflammatory bowel disease, or GI malignancies.    Social History:  reports that he quit smoking about 31 years ago. His smoking use included cigarettes. He has a 46.00 pack-year smoking history. He has never used smokeless tobacco. He reports current alcohol use  of about 1.0 standard drinks of alcohol per week. He reports that he does not use drugs.    Review of patient's allergies indicates:   Allergen Reactions    Tamsulosin     Prochlorperazine      convulsions    Statins-hmg-coa reductase inhibitors Other (See Comments)     Muscle weakness       Medications:   Medications Prior to Admission   Medication Sig Dispense Refill Last Dose    losartan-hydrochlorothiazide 50-12.5 mg (HYZAAR) 50-12.5 mg per tablet Take 1 tablet by mouth once daily. 90 tablet 3 10/7/2020 at Unknown time    metFORMIN (GLUCOPHAGE) 500 MG tablet Take 1 tablet (500 mg total) by mouth daily with breakfast. 90 tablet 3 10/7/2020 at Unknown time    testosterone (ANDROGEL) 20.25 mg/1.25 gram (1.62 %) GlPm One pump over each shoulder 1 Bottle 5 10/7/2020 at Unknown time    vit C/vit E ac/lut/copper/zinc (PRESERVISION LUTEIN ORAL) Take 1 tablet by mouth once daily.   10/7/2020 at Unknown time    vitamin D 1000 units Tab Take 1,000 Units by mouth once daily.   10/7/2020 at Unknown time    ALPRAZolam (XANAX) 1 MG tablet Take 1 tablet (1 mg total) by mouth nightly as needed for Anxiety. 60 tablet 0 10/3/2020    blood sugar diagnostic Strp To check BG 2 times daily, to use with insurance preferred meter 100 each 0     blood sugar diagnostic Strp To check BG three times daily, to use with insurance preferred meter 300 strip 3     blood-glucose meter kit To check BG 1 times daily, to use with insurance preferred meter 1 each 0     cefTRIAXone 2 g in dextrose 5 % 50 mL (ROCEPHIN) 2 g/50 mL PgBk IVPB Inject 50 mLs (2 g total) into the vein once daily.       hydrALAZINE (APRESOLINE) 50 MG tablet Take 1 tablet (50 mg total) by mouth every 8 (eight) hours. 90 tablet 5     lancets Misc To check BG 3 times daily, to use with insurance preferred meter 300 each 3     metoprolol succinate (TOPROL-XL) 25 MG 24 hr tablet Take 0.5 tablets (12.5 mg total) by mouth once daily. 15 tablet 11     sildenafil  (VIAGRA) 25 MG tablet Take 1 tablet (25 mg total) by mouth daily as needed for Erectile Dysfunction. 10 tablet 0     [DISCONTINUED] clopidogrel (PLAVIX) 75 mg tablet Take 1 tablet (75 mg total) by mouth once daily. 30 tablet 5     [DISCONTINUED] mirtazapine (REMERON) 30 MG tablet Take 1 tablet (30 mg total) by mouth every evening. 90 tablet 3     [DISCONTINUED] rivaroxaban (XARELTO) 15 mg (42)- 20 mg (9) tablet dose pack Take 1 tablet (15 mg) by mouth twice daily with food for 21 days followed by 1 tablet (20 mg) by mouth once daily with food 1 Package 0     [DISCONTINUED] venlafaxine (EFFEXOR-XR) 150 MG Cp24 Take 1 capsule (150 mg total) by mouth once daily. 90 capsule 3 More than a month at Unknown time         Objective Findings:    Vital Signs: see nursing notes  Physical Exam:  General Appearance: Well appearing in no acute distress  Eyes:    No scleral icterus  ENT: Neck supple  Lungs: CTA anteriorly  Heart:  S1, S2 normal, no murmurs heard  Abdomen: Soft, non tender, non distended with positive bowel sounds. No hepatosplenomegaly, ascites, or mass  Extremities: no edema  Skin: No rash      Labs:  Lab Results   Component Value Date    WBC 6.49 06/02/2020    HGB 14.9 06/02/2020    HCT 46.0 06/02/2020     06/02/2020    CHOL 265 (A) 10/17/2018    TRIG 153 10/17/2018    HDL 52 10/17/2018    ALT 23 06/02/2020    AST 26 06/02/2020     06/02/2020    K 4.3 06/02/2020     06/02/2020    CREATININE 1.3 06/02/2020    BUN 12 06/02/2020    CO2 28 06/02/2020    TSH 1.715 02/28/2019    PSA 0.29 06/02/2020    INR 1.0 04/28/2019    HGBA1C 5.9 (H) 06/02/2020       I have explained the risks and benefits of endoscopy procedures to the patient including but not limited to bleeding, perforation, infection, and death.    Bharath Herrera MD

## 2020-10-16 ENCOUNTER — TELEPHONE (OUTPATIENT)
Dept: ENDOCRINOLOGY | Facility: CLINIC | Age: 80
End: 2020-10-16

## 2020-10-16 NOTE — TELEPHONE ENCOUNTER
----- Message from Xavier Snow sent at 10/16/2020 12:27 PM CDT -----  Pt is requesting a call back, he wants an appt. Pt states he would like to transfer providers if possible.         Contact [employer] [phone] (home) [phone] (work)

## 2020-10-20 ENCOUNTER — TELEPHONE (OUTPATIENT)
Dept: GASTROENTEROLOGY | Facility: CLINIC | Age: 80
End: 2020-10-20

## 2020-10-20 RX ORDER — FLUCONAZOLE 200 MG/1
200 TABLET ORAL DAILY
Qty: 15 TABLET | Refills: 0 | Status: SHIPPED | OUTPATIENT
Start: 2020-10-20 | End: 2020-11-03

## 2020-10-20 NOTE — TELEPHONE ENCOUNTER
Wats Brushing performed on 10/8/20 reviewed (report to be scanned in 'Media')    Final diagnosis  - Archer's esophagus with inflammation  - No dysplasia present  - Squamous epithelium with active inflammation  - Fungal organisms consistent with Leanne present    Plan  - Prescribe fluconazole  - Repeat EGD for dysplasia screening in 3 years  - Continue PPI

## 2020-10-21 ENCOUNTER — TELEPHONE (OUTPATIENT)
Dept: GASTROENTEROLOGY | Facility: CLINIC | Age: 80
End: 2020-10-21

## 2020-10-21 NOTE — TELEPHONE ENCOUNTER
----- Message from Sandy Wesley sent at 10/21/2020 11:28 AM CDT -----  Contact: self [phone]  Patient is returning your call. Please call

## 2020-10-25 ENCOUNTER — PATIENT OUTREACH (OUTPATIENT)
Dept: ADMINISTRATIVE | Facility: OTHER | Age: 80
End: 2020-10-25

## 2020-10-26 NOTE — PROGRESS NOTES
Requested updates within Care Everywhere.  Patient's chart was reviewed for overdue SREEKAN[facility] topics.  Immunizations reconciled.    Orders placed:n/a  Tasked appts:n/a  Labs Linked:n/a

## 2020-11-03 ENCOUNTER — OFFICE VISIT (OUTPATIENT)
Dept: GASTROENTEROLOGY | Facility: CLINIC | Age: 80
End: 2020-11-03
Payer: MEDICARE

## 2020-11-03 VITALS
DIASTOLIC BLOOD PRESSURE: 75 MMHG | SYSTOLIC BLOOD PRESSURE: 153 MMHG | WEIGHT: 168.44 LBS | BODY MASS INDEX: 26.38 KG/M2 | HEART RATE: 69 BPM

## 2020-11-03 DIAGNOSIS — K44.9 HIATAL HERNIA: ICD-10-CM

## 2020-11-03 DIAGNOSIS — D12.6 TUBULAR ADENOMA OF COLON: ICD-10-CM

## 2020-11-03 DIAGNOSIS — K21.9 GASTROESOPHAGEAL REFLUX DISEASE, UNSPECIFIED WHETHER ESOPHAGITIS PRESENT: ICD-10-CM

## 2020-11-03 DIAGNOSIS — K22.70 BARRETT'S ESOPHAGUS WITHOUT DYSPLASIA: Primary | ICD-10-CM

## 2020-11-03 PROCEDURE — 99999 PR PBB SHADOW E&M-EST. PATIENT-LVL II: ICD-10-PCS | Mod: PBBFAC,HCNC,, | Performed by: INTERNAL MEDICINE

## 2020-11-03 PROCEDURE — 1159F PR MEDICATION LIST DOCUMENTED IN MEDICAL RECORD: ICD-10-PCS | Mod: HCNC,S$GLB,, | Performed by: INTERNAL MEDICINE

## 2020-11-03 PROCEDURE — 99214 PR OFFICE/OUTPT VISIT, EST, LEVL IV, 30-39 MIN: ICD-10-PCS | Mod: HCNC,S$GLB,, | Performed by: INTERNAL MEDICINE

## 2020-11-03 PROCEDURE — 1101F PR PT FALLS ASSESS DOC 0-1 FALLS W/OUT INJ PAST YR: ICD-10-PCS | Mod: HCNC,CPTII,S$GLB, | Performed by: INTERNAL MEDICINE

## 2020-11-03 PROCEDURE — 3078F DIAST BP <80 MM HG: CPT | Mod: HCNC,CPTII,S$GLB, | Performed by: INTERNAL MEDICINE

## 2020-11-03 PROCEDURE — 99214 OFFICE O/P EST MOD 30 MIN: CPT | Mod: HCNC,S$GLB,, | Performed by: INTERNAL MEDICINE

## 2020-11-03 PROCEDURE — 3077F PR MOST RECENT SYSTOLIC BLOOD PRESSURE >= 140 MM HG: ICD-10-PCS | Mod: HCNC,CPTII,S$GLB, | Performed by: INTERNAL MEDICINE

## 2020-11-03 PROCEDURE — 1101F PT FALLS ASSESS-DOCD LE1/YR: CPT | Mod: HCNC,CPTII,S$GLB, | Performed by: INTERNAL MEDICINE

## 2020-11-03 PROCEDURE — 3077F SYST BP >= 140 MM HG: CPT | Mod: HCNC,CPTII,S$GLB, | Performed by: INTERNAL MEDICINE

## 2020-11-03 PROCEDURE — 3078F PR MOST RECENT DIASTOLIC BLOOD PRESSURE < 80 MM HG: ICD-10-PCS | Mod: HCNC,CPTII,S$GLB, | Performed by: INTERNAL MEDICINE

## 2020-11-03 PROCEDURE — 1159F MED LIST DOCD IN RCRD: CPT | Mod: HCNC,S$GLB,, | Performed by: INTERNAL MEDICINE

## 2020-11-03 PROCEDURE — 99999 PR PBB SHADOW E&M-EST. PATIENT-LVL II: CPT | Mod: PBBFAC,HCNC,, | Performed by: INTERNAL MEDICINE

## 2020-11-03 RX ORDER — PANTOPRAZOLE SODIUM 40 MG/1
40 TABLET, DELAYED RELEASE ORAL 2 TIMES DAILY
Qty: 60 TABLET | Refills: 4 | Status: SHIPPED | OUTPATIENT
Start: 2020-11-03 | End: 2022-04-22

## 2020-11-03 NOTE — PROGRESS NOTES
Subjective:       Patient ID: Eric Jackson is a 79 y.o. male.    Chief Complaint: Follow-up (test results), Gastroesophageal Reflux, and Heartburn    Patient here today to follow-up with aforementioned complaints.  Patient underwent EGD in October for surveillance of Archer's.  He was found to have a long segment Archer's (C10-M12 per [place] criteria).  WATS brushing was performed without evidence of dysplasia.  Brushing performed was suspicious for active inflammation attributed to Candida.  Patient was subsequently prescribed a course of Diflucan which he finishes seen.  Today patient reports doing well.  Denies dysphagia or odynophagia.  He does report a continued mucus in his throat which has not improved since starting antifungals.  He is currently not on PPI.  Apparently he was previously on 1 but it did not work.  He remains interested in fundoplication.      Review of Systems   Constitutional: Negative for chills, fever and unexpected weight change.   HENT: Negative for congestion and trouble swallowing.    Respiratory: Negative for cough and shortness of breath.    Cardiovascular: Positive for chest pain. Negative for palpitations.   Gastrointestinal: Positive for abdominal pain. Negative for abdominal distention and blood in stool.       The following portions of the patient's history were reviewed and updated as appropriate: allergies, current medications, past family history, past medical history, past social history, past surgical history and problem list.    Objective:      Physical Exam  Vitals signs and nursing note reviewed.   Constitutional:       Appearance: He is well-developed.   HENT:      Head: Normocephalic and atraumatic.   Eyes:      General: No scleral icterus.     Pupils: Pupils are equal, round, and reactive to light.   Cardiovascular:      Rate and Rhythm: Normal rate and regular rhythm.      Heart sounds: Normal heart sounds.   Pulmonary:      Effort: Pulmonary effort is  normal. No respiratory distress.      Breath sounds: Normal breath sounds.   Abdominal:      General: Bowel sounds are normal. There is no distension.      Palpations: Abdomen is soft.      Tenderness: There is no abdominal tenderness.   Musculoskeletal: Normal range of motion.   Neurological:      Mental Status: He is alert and oriented to person, place, and time.      Comments: No asterixis   Psychiatric:         Behavior: Behavior normal.           Pertinent labs and imaging studies reviewed    Assessment:       1. 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 2/2018; 10/2020 EGD with suspicion for barretts    2. Tubular adenoma of colon 2/2018 3 adenomas repeat 3 years    3. Gastroesophageal reflux disease, unspecified whether esophagitis present    4. Hiatal hernia        Plan:       Start on PPI.  Consider referral to forgut surgeon for hiatal hernia repair  Patient is up-to-date with colon cancer screening.  Repeat colonoscopy for surveillance of colon polyps next year  Complete Diflucan    26 minutes were spent in coordination of patient's care, record review and counseling.  More than 50% of the time was face-to-face.    (Portions of this note were dictated using voice recognition software and may contain dictation related errors in spelling/grammar/syntax not found on text review)

## 2020-11-07 PROBLEM — D50.9 IRON DEFICIENCY ANEMIA: Status: RESOLVED | Noted: 2018-02-09 | Resolved: 2020-11-07

## 2020-11-07 PROBLEM — E87.1 HYPONATREMIA: Status: RESOLVED | Noted: 2018-01-15 | Resolved: 2020-11-07

## 2020-11-07 PROBLEM — D64.9 SYMPTOMATIC ANEMIA: Status: RESOLVED | Noted: 2018-01-15 | Resolved: 2020-11-07

## 2020-11-07 PROBLEM — N41.0 ACUTE PROSTATITIS: Status: RESOLVED | Noted: 2019-03-01 | Resolved: 2020-11-07

## 2020-11-07 PROBLEM — I25.2 HISTORY OF NON-ST ELEVATION MYOCARDIAL INFARCTION (NSTEMI): Status: ACTIVE | Noted: 2019-03-01

## 2020-11-07 PROBLEM — I42.9 CARDIOMYOPATHY: Status: RESOLVED | Noted: 2020-02-14 | Resolved: 2020-11-07

## 2020-11-07 NOTE — PROGRESS NOTES
This note was created by combination of typed  and M-Modal dictation.  Transcription errors may be present.  If there are any questions, please contact me.    Assessment and Plan:   Normal physical exam  Tubular adenoma of colon 2/2018 3 adenomas repeat 3 years  -colonoscopy due next year 2021  -check PSA    Type 2 diabetes mellitus with diabetic cataract, without long-term current use of insulin  Type 2 diabetes mellitus without complication, without long-term current use of insulin  -on metformin, check future labs  -     Hemoglobin A1C; Future; Expected date: 11/10/2020    Essential hypertension  -BP better on repeat.  On losartan HCTZ.  Not on metoprolol and not on hydralazine.  BP is good today.    Coronary artery disease 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; 3/2019 [facility] patent graft; Culprit likely in small Diag2 (unrevascularized) and likely demand related  Abdominal aortic atherosclerosis  Status post coronary artery bypass grafting single vessel  -intolerant of statin therapy.  -     Lipid Panel; Future; Expected date: 11/10/2020    Current mild episode of major depressive disorder without prior episode  Anxiety  -takes Effexor XR but not regularly  Takes alprazolam but not on a scheduled basis.  Stable on current regimen.  -     venlafaxine (EFFEXOR-XR) 150 MG Cp24; Take 1 capsule (150 mg total) by mouth once daily.  Dispense: 90 capsule; Refill: 3      Prostate cancer 2/25/19 Transrectal ultrasound of prostate and gold fiducial marker placement  -check future PSA    Hypogonadism in male  -check future testosterone.  On testosterone therapy.  Has been monitored and prescribed by endocrinology.  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  On PPI.    Hip pain  Chronic pain of both knees  -check x-rays of the hips and the knees.  If mild to moderate, self-directed home physical therapy/exercise.  If severe, referral to Orthopedics.  -     X-Ray Hips Bilateral 2 View Inc AP Pelvis; Future; Expected date: 11/09/2020  -     X-Ray Knee AP Standing Bilateral; Future; Expected date: 11/09/2020    Kidney lesion  -seen incidentally on previous CT imaging when hospitalized for sepsis.  Check renal ultrasound  -     US Retroperitoneal Complete; Future; Expected date: 11/09/2020    Need for shingles vaccine   -prescription sent to pharmacy  -     varicella-zoster gE-AS01B, PF, (SHINGRIX, PF,) 50 mcg/0.5 mL injection; Inject 0.5 mLs into the muscle once. Repeat in 2 months for 1 dose  Dispense: 2 each; Refill: 0      Medications Discontinued During This Encounter   Medication Reason    blood sugar diagnostic Strp Therapy completed    cefTRIAXone 2 g in dextrose 5 % 50 mL (ROCEPHIN) 2 g/50 mL PgBk IVPB Therapy completed    hydrALAZINE (APRESOLINE) 50 MG tablet Therapy completed    metoprolol succinate (TOPROL-XL) 25 MG 24 hr tablet Therapy completed       meds sent this encounter:  Medications Ordered This Encounter   Medications    varicella-zoster gE-AS01B, PF, (SHINGRIX, PF,) 50 mcg/0.5 mL injection     Sig: Inject 0.5 mLs into the muscle once. Repeat in 2 months for 1 dose     Dispense:  2 each     Refill:  0    venlafaxine (EFFEXOR-XR) 150 MG Cp24     Sig: Take 1 capsule (150 mg total) by mouth once daily.     Dispense:  90 capsule     Refill:  3       Follow Up: No follow-ups on file.    Subjective:     Chief Complaint   Patient presents with    Annual Exam       DARRYL Reyes is a 79 y.o. male, last appointment with this clinic was 7/14/2020.    Last visit  with me 12/2019  DVT from PICC line  DM2, endo  Dipp; HTN; lipid, statin in tolerant  Hx of prostate CA. Dr. Garcia  Low testosterone.  Archer's followed by GI    6/2/2020 a1c 5.9    Reflux.  It sounds like he had previously gone in for what sounds like a Nissen fundoplication and had a reaction anesthesia and did not further pursue.  He had his repeat EGD and was told that he could repeat the EGD in about 3 years.  So he is glad that he did not actually pursue the surgery.  He was worried that he may have esophageal cancer.    I reviewed the results of his previous CT abdomen pelvis from February 2019 which showed a questionable lesion of kidney with need for further imaging with renal ultrasound and I will order that.    Hypertension, blood pressure is good on repeat.  Not taking metoprolol or hydralazine.  He is taking losartan HCT    Diabetes, taking metformin.    hypogonad, taking testosterone gel.    Notes aches and pains especially in the hips and the knees.  Wonders if this is anything other than just age-related osteoarthritis.  He does use the treadmill daily and takes a bit of time to warm up and loosen up.  We talked about possibility that this could represent osteoarthritis.  He is not interested in injections if that will not result in meaningful long-term improvement.    He would like to synchronize all of his medicines to be the liver to all at once and I told him that this may be an issue with his insurance company.  He will start with them 1st    Home BP readings were good by self report with blood pressures in the 130/80 or less range.    Off effexor x 6 months.  Would like to have it remain on the medication list in case he were to restarted.  He finds that if he takes a for couple of weeks it really helps him out and then he can stop it.    Patient Care Team:  Samir Boo MD as PCP - General (Internal Medicine)  Melanie Orantes MA as Care Coordinator  René Bills MD  (Cardiology)  Sheila Braden, ABBY as Consulting Physician (Optometry)  Layne Huff MD as Consulting Physician (Urology)  Loida Evans MD as Consulting Physician (Endocrinology)  Loc Garcia MD as Consulting Physician (Radiation Oncology)    Patient Active Problem List    Diagnosis Date Noted    Epiretinal membrane (ERM) of both eyes 09/15/2020    Type 2 diabetes mellitus with diabetic cataract, without long-term current use of insulin 02/14/2020    Current mild episode of major depressive disorder without prior episode 02/14/2020    Abdominal aortic atherosclerosis 12/09/2019    History of DVT from PICC line right upper extremity 4/2019 anticoag x 3 months then stopped 04/29/2019 4/28/19 RUE US: The right internal jugular vein is patent.  Partial thrombus of the right subclavian vein with floating thrombus.  The right axillary vein is completely thrombosed.  Complete thrombosis of the right basilic vein.  The cephalic vein is patent.  The proximal brachial vein is occluded, the mid and distal vein demonstrate flow within.    7/3/19 RUE US: Residual nonocclusive thrombus in the right subclavian and basilic veins, significantly improved from the 04/28/2019 exam.  No new thrombus.  12/19/19 RUE US: No DVT in the right upper extremity.  Resolved thrombus in the right subclavian vein and mild residual chronic thrombus in the right superficial basilic vein, improved.      Refractive error 04/09/2019    Intermediate stage nonexudative age-related macular degeneration of both eyes 04/09/2019    Nuclear sclerosis of both eyes 04/09/2019    Status post coronary artery bypass grafting single vessel 03/07/2019    Renal cyst, left, possible; incidental on CT 2/2019; needs renal US to characterize 03/02/2019 2/2019 CT abd/pelvis: Small left renal hypodensity which measures slightly higher than simple fluid density.  This could represent a small cyst or complex cyst.  Future nonemergent ultrasound  follow-up could be obtained to ensure cystic nature of this lesion.     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; 3/2019 [facility] patent graft; Culprit likely in small Diag2 (unrevascularized) and likely demand related 03/01/2019     3/2019 [facility]  LM: dist 50%  LAD: mid 99%, competitive flow from LIMA-LAD-OM-RPDA              Diag2 prox 90%, T2 flow (not bypassed)  LCx: prox , territory supplied by LIMA-LAD-OM-RPDA  RCA: mid , territory supplied by LIMA-LAD-OM-RPDA     LIMA-LAD-OM-RPDA patent   No SVG identified     Impression:  NSTEMI  3V CAD, normal LV fxn  Patent LIMA-LAD-OM-RPDA  Culprit likely in small Diag2 (unrevascularized) and likely demand related     Plan:  Cont med rx  ASA 81mg qd indefinitely  Plavix 75mg qd for 1 year (thru 3/2020)      Prostate cancer 2/25/19 Transrectal ultrasound of prostate and gold fiducial marker placement 01/29/2019 1/7/19 biopsy showed intermediate risk [place]anish[place] 3+4 PCa in 3/12 cores. [place] 4 up to 30% of one core.   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 WATS-3D brush biopsy specimens obtained      Anxiety     Depression     Essential hypertension 11/14/2012    Type 2 diabetes mellitus without complication, without long-term current use of insulin 11/14/2012       PAST MEDICAL HISTORY:  Past  Medical History:   Diagnosis Date    Anemia     Angina pectoris     Anxiety     Cancer     Coronary artery disease     Depression     Diabetes mellitus type II     Disorder of kidney and ureter     Erectile dysfunction     Eye injuries     fb ou    GERD (gastroesophageal reflux disease)     Hypertension     Intermediate stage nonexudative age-related macular degeneration of both eyes 4/9/2019    Myocardial infarction     Nuclear sclerosis of both eyes 4/9/2019    Prostate cancer     Trouble in sleeping     Type 2 diabetes mellitus     Type 2 diabetes mellitus with ophthalmic manifestations        PAST SURGICAL HISTORY:  Past Surgical History:   Procedure Laterality Date    COLONOSCOPY N/A 2/9/2018    Procedure: COLONOSCOPY;  Surgeon: Mathieu Cao MD;  Location: [facility] ENDO;  Service: Endoscopy;  Laterality: N/A;    CORONARY ANGIOGRAPHY INCLUDING BYPASS GRAFTS WITH CATHETERIZATION OF LEFT HEART N/A 3/4/2019    Procedure: ANGIOGRAM, CORONARY, INCLUDING BYPASS GRAFT, WITH LEFT HEART CATHETERIZATION;  Surgeon: Jamir Nam MD;  Location: [facility] CATH LAB;  Service: Cardiology;  Laterality: N/A;    CORONARY ARTERY BYPASS GRAFT  2001    ESOPHAGOGASTRODUODENOSCOPY N/A 10/8/2020    Procedure: EGD (ESOPHAGOGASTRODUODENOSCOPY);  Surgeon: Bharath Herrera MD;  Location: [facility] ENDO;  Service: Endoscopy;  Laterality: N/A;    HERNIA REPAIR      LEFT HEART CATHETERIZATION Left 3/4/2019    Procedure: Left heart cath RFA access, 4fr catheter/sheath, not before 9am;  Surgeon: Jamir Nam MD;  Location: [facility] CATH LAB;  Service: Cardiology;  Laterality: Left;    TRANSRECTAL ULTRASOUND OF PROSTATE WITH INSERTION OF GOLD FIDUCIAL MARKER N/A 2/26/2019    Procedure: ULTRASOUND, PROSTATE, RECTAL APPROACH, WITH GOLD FIDUCIAL MARKER INSERTION;  Surgeon: Layne Huff MD;  Location: [facility] OR;  Service: Urology;  Laterality: N/A;    UPPER GASTROINTESTINAL ENDOSCOPY      WRIST SURGERY          SOCIAL HISTORY:  Social History     Socioeconomic History    Marital status:      Spouse name: Not on file    Number of children: Not on file    Years of education: Not on file    Highest education level: Not on file   Occupational History    Not on file   Social Needs    Financial resource strain: Not on file    Food insecurity     Worry: Not on file     Inability: Not on file    Transportation needs     Medical: Not on file     Non-medical: Not on file   Tobacco Use    Smoking status: Former Smoker     Packs/day: 2.00     Years: 23.00     Pack years: 46.00     Types: Cigarettes     Quit date: 1989     Years since quittin.8    Smokeless tobacco: Never Used   Substance and Sexual Activity    Alcohol use: Yes     Alcohol/week: 1.0 standard drinks     Types: 1 Cans of beer per week     Comment: occassional    Drug use: No    Sexual activity: Yes     Partners: Female   Lifestyle    Physical activity     Days per week: Not on file     Minutes per session: Not on file    Stress: Not on file   Relationships    Social connections     Talks on phone: Not on file     Gets together: Not on file     Attends Jehovah's witness service: Not on file     Active member of club or organization: Not on file     Attends meetings of clubs or organizations: Not on file     Relationship status: Not on file   Other Topics Concern    Not on file   Social History Narrative    Not on file       ALLERGIES AND MEDICATIONS: updated and reviewed.  Review of patient's allergies indicates:   Allergen Reactions    Tamsulosin     Prochlorperazine      convulsions    Statins-hmg-coa reductase inhibitors Other (See Comments)     Muscle weakness       Medication List with Changes/Refills   Current Medications    ALPRAZOLAM (XANAX) 1 MG TABLET    Take 1 tablet (1 mg total) by mouth nightly as needed for Anxiety.    BLOOD SUGAR DIAGNOSTIC STRP    To check BG 2 times daily, to use with insurance preferred meter    BLOOD  SUGAR DIAGNOSTIC STRP    To check BG three times daily, to use with insurance preferred meter    BLOOD-GLUCOSE METER KIT    To check BG 1 times daily, to use with insurance preferred meter    CEFTRIAXONE 2 G IN DEXTROSE 5 % 50 ML (ROCEPHIN) 2 G/50 ML PGBK IVPB    Inject 50 mLs (2 g total) into the vein once daily.    HYDRALAZINE (APRESOLINE) 50 MG TABLET    Take 1 tablet (50 mg total) by mouth every 8 (eight) hours.    LANCETS MISC    To check BG 3 times daily, to use with insurance preferred meter    LOSARTAN-HYDROCHLOROTHIAZIDE 50-12.5 MG (HYZAAR) 50-12.5 MG PER TABLET    Take 1 tablet by mouth once daily.    METFORMIN (GLUCOPHAGE) 500 MG TABLET    Take 1 tablet (500 mg total) by mouth daily with breakfast.    METOPROLOL SUCCINATE (TOPROL-XL) 25 MG 24 HR TABLET    Take 0.5 tablets (12.5 mg total) by mouth once daily.    PANTOPRAZOLE (PROTONIX) 40 MG TABLET    Take 1 tablet (40 mg total) by mouth 2 (two) times daily.    SILDENAFIL (VIAGRA) 25 MG TABLET    Take 1 tablet (25 mg total) by mouth daily as needed for Erectile Dysfunction.    TESTOSTERONE (ANDROGEL) 20.25 MG/1.25 GRAM (1.62 %) GLPM    One pump over each shoulder    VIT C/VIT E AC/LUT/COPPER/ZINC (PRESERVISION LUTEIN ORAL)    Take 1 tablet by mouth once daily.    VITAMIN D 1000 UNITS TAB    Take 1,000 Units by mouth once daily.        Review of Systems   Constitutional: Negative for fever, malaise/fatigue and weight loss.   HENT: Negative for congestion.    Eyes: Negative for blurred vision and pain.   Respiratory: Negative for shortness of breath and wheezing.    Cardiovascular: Negative for chest pain, palpitations and leg swelling.   Gastrointestinal: Negative for abdominal pain and diarrhea.   Genitourinary: Negative for dysuria.   Musculoskeletal: Positive for joint pain.   Neurological: Negative for tingling, focal weakness, weakness and headaches.       Objective:   Physical Exam   Vitals:    11/09/20 1531 11/09/20 1722   BP: (!) 152/80 130/80   BP  "Location: Right arm Left arm   Patient Position: Sitting Sitting   BP Method: Medium (Manual) Medium (Manual)   Pulse: 72    Temp: 97.7 °F (36.5 °C)    TempSrc: Temporal    SpO2: 98%    Weight: 74.8 kg (165 lb)    Height: 5' 7" (1.702 m)     Body mass index is 25.84 kg/m².  Weight: 74.8 kg (165 lb)   Height: 5' 7" (170.2 cm)     Physical Exam  Constitutional:       General: He is not in acute distress.     Appearance: He is well-developed.   Cardiovascular:      Rate and Rhythm: Normal rate and regular rhythm.      Heart sounds: Normal heart sounds. No murmur.   Pulmonary:      Effort: Pulmonary effort is normal.      Breath sounds: Normal breath sounds.   Musculoskeletal: Normal range of motion.      Right lower leg: No edema.      Left lower leg: No edema.      Comments: Knees are unremarkable without effusion, no crepitus, no deformity   Skin:     General: Skin is warm and dry.   Neurological:      Mental Status: He is alert and oriented to person, place, and time.      Coordination: Coordination normal.   Psychiatric:         Behavior: Behavior normal.         Thought Content: Thought content normal.         "

## 2020-11-09 ENCOUNTER — OFFICE VISIT (OUTPATIENT)
Dept: FAMILY MEDICINE | Facility: CLINIC | Age: 80
End: 2020-11-09
Payer: MEDICARE

## 2020-11-09 VITALS
HEART RATE: 72 BPM | SYSTOLIC BLOOD PRESSURE: 130 MMHG | WEIGHT: 165 LBS | OXYGEN SATURATION: 98 % | TEMPERATURE: 98 F | DIASTOLIC BLOOD PRESSURE: 80 MMHG | BODY MASS INDEX: 25.9 KG/M2 | HEIGHT: 67 IN

## 2020-11-09 DIAGNOSIS — I70.0 ABDOMINAL AORTIC ATHEROSCLEROSIS: ICD-10-CM

## 2020-11-09 DIAGNOSIS — I10 ESSENTIAL HYPERTENSION: ICD-10-CM

## 2020-11-09 DIAGNOSIS — D12.6 TUBULAR ADENOMA OF COLON: ICD-10-CM

## 2020-11-09 DIAGNOSIS — I25.118 CORONARY ARTERY DISEASE OF NATIVE ARTERY OF NATIVE HEART WITH STABLE ANGINA PECTORIS: ICD-10-CM

## 2020-11-09 DIAGNOSIS — M25.559 HIP PAIN: ICD-10-CM

## 2020-11-09 DIAGNOSIS — E29.1 HYPOGONADISM IN MALE: ICD-10-CM

## 2020-11-09 DIAGNOSIS — F32.0 CURRENT MILD EPISODE OF MAJOR DEPRESSIVE DISORDER WITHOUT PRIOR EPISODE: ICD-10-CM

## 2020-11-09 DIAGNOSIS — M25.561 CHRONIC PAIN OF BOTH KNEES: ICD-10-CM

## 2020-11-09 DIAGNOSIS — C61 PROSTATE CANCER: ICD-10-CM

## 2020-11-09 DIAGNOSIS — Z00.00 NORMAL PHYSICAL EXAM: Primary | ICD-10-CM

## 2020-11-09 DIAGNOSIS — F41.9 ANXIETY: ICD-10-CM

## 2020-11-09 DIAGNOSIS — N28.9 KIDNEY LESION: ICD-10-CM

## 2020-11-09 DIAGNOSIS — M25.562 CHRONIC PAIN OF BOTH KNEES: ICD-10-CM

## 2020-11-09 DIAGNOSIS — G89.29 CHRONIC PAIN OF BOTH KNEES: ICD-10-CM

## 2020-11-09 DIAGNOSIS — Z23 NEED FOR SHINGLES VACCINE: ICD-10-CM

## 2020-11-09 DIAGNOSIS — E11.36 TYPE 2 DIABETES MELLITUS WITH DIABETIC CATARACT, WITHOUT LONG-TERM CURRENT USE OF INSULIN: ICD-10-CM

## 2020-11-09 DIAGNOSIS — K22.70 BARRETT'S ESOPHAGUS WITHOUT DYSPLASIA: ICD-10-CM

## 2020-11-09 DIAGNOSIS — Z95.1 STATUS POST CORONARY ARTERY BYPASS GRAFTING: ICD-10-CM

## 2020-11-09 DIAGNOSIS — E11.9 TYPE 2 DIABETES MELLITUS WITHOUT COMPLICATION, WITHOUT LONG-TERM CURRENT USE OF INSULIN: ICD-10-CM

## 2020-11-09 DIAGNOSIS — Z86.718 HISTORY OF DVT IN ADULTHOOD: ICD-10-CM

## 2020-11-09 PROCEDURE — 3075F SYST BP GE 130 - 139MM HG: CPT | Mod: HCNC,CPTII,S$GLB, | Performed by: INTERNAL MEDICINE

## 2020-11-09 PROCEDURE — 99499 RISK ADDL DX/OHS AUDIT: ICD-10-PCS | Mod: HCNC,S$GLB,, | Performed by: INTERNAL MEDICINE

## 2020-11-09 PROCEDURE — 1101F PT FALLS ASSESS-DOCD LE1/YR: CPT | Mod: HCNC,CPTII,S$GLB, | Performed by: INTERNAL MEDICINE

## 2020-11-09 PROCEDURE — 1159F MED LIST DOCD IN RCRD: CPT | Mod: HCNC,S$GLB,, | Performed by: INTERNAL MEDICINE

## 2020-11-09 PROCEDURE — 1126F PR PAIN SEVERITY QUANTIFIED, NO PAIN PRESENT: ICD-10-PCS | Mod: HCNC,S$GLB,, | Performed by: INTERNAL MEDICINE

## 2020-11-09 PROCEDURE — 99214 PR OFFICE/OUTPT VISIT, EST, LEVL IV, 30-39 MIN: ICD-10-PCS | Mod: HCNC,S$GLB,, | Performed by: INTERNAL MEDICINE

## 2020-11-09 PROCEDURE — 3079F DIAST BP 80-89 MM HG: CPT | Mod: HCNC,CPTII,S$GLB, | Performed by: INTERNAL MEDICINE

## 2020-11-09 PROCEDURE — 99999 PR PBB SHADOW E&M-EST. PATIENT-LVL V: ICD-10-PCS | Mod: PBBFAC,HCNC,, | Performed by: INTERNAL MEDICINE

## 2020-11-09 PROCEDURE — 1159F PR MEDICATION LIST DOCUMENTED IN MEDICAL RECORD: ICD-10-PCS | Mod: HCNC,S$GLB,, | Performed by: INTERNAL MEDICINE

## 2020-11-09 PROCEDURE — 1126F AMNT PAIN NOTED NONE PRSNT: CPT | Mod: HCNC,S$GLB,, | Performed by: INTERNAL MEDICINE

## 2020-11-09 PROCEDURE — 3075F PR MOST RECENT SYSTOLIC BLOOD PRESS GE 130-139MM HG: ICD-10-PCS | Mod: HCNC,CPTII,S$GLB, | Performed by: INTERNAL MEDICINE

## 2020-11-09 PROCEDURE — 1101F PR PT FALLS ASSESS DOC 0-1 FALLS W/OUT INJ PAST YR: ICD-10-PCS | Mod: HCNC,CPTII,S$GLB, | Performed by: INTERNAL MEDICINE

## 2020-11-09 PROCEDURE — 3079F PR MOST RECENT DIASTOLIC BLOOD PRESSURE 80-89 MM HG: ICD-10-PCS | Mod: HCNC,CPTII,S$GLB, | Performed by: INTERNAL MEDICINE

## 2020-11-09 PROCEDURE — 99214 OFFICE O/P EST MOD 30 MIN: CPT | Mod: HCNC,S$GLB,, | Performed by: INTERNAL MEDICINE

## 2020-11-09 PROCEDURE — 99999 PR PBB SHADOW E&M-EST. PATIENT-LVL V: CPT | Mod: PBBFAC,HCNC,, | Performed by: INTERNAL MEDICINE

## 2020-11-09 PROCEDURE — 99499 UNLISTED E&M SERVICE: CPT | Mod: HCNC,S$GLB,, | Performed by: INTERNAL MEDICINE

## 2020-11-09 RX ORDER — ZOSTER VACCINE RECOMBINANT, ADJUVANTED 50 MCG/0.5
0.5 KIT INTRAMUSCULAR ONCE
Qty: 2 EACH | Refills: 0 | Status: SHIPPED | OUTPATIENT
Start: 2020-11-09 | End: 2020-11-09

## 2020-11-09 RX ORDER — VENLAFAXINE HYDROCHLORIDE 150 MG/1
150 CAPSULE, EXTENDED RELEASE ORAL DAILY
Qty: 90 CAPSULE | Refills: 3
Start: 2020-11-09 | End: 2021-01-15 | Stop reason: SDUPTHER

## 2020-11-16 ENCOUNTER — HOSPITAL ENCOUNTER (OUTPATIENT)
Dept: RADIOLOGY | Facility: HOSPITAL | Age: 80
Discharge: HOME | End: 2020-11-16
Attending: INTERNAL MEDICINE
Payer: MEDICARE

## 2020-11-16 DIAGNOSIS — N28.9 KIDNEY LESION: ICD-10-CM

## 2020-11-16 PROCEDURE — 76770 US EXAM ABDO BACK WALL COMP: CPT | Mod: TC,HCNC

## 2020-11-16 PROCEDURE — 76770 US RETROPERITONEAL COMPLETE: ICD-10-PCS | Mod: 26,HCNC,, | Performed by: RADIOLOGY

## 2020-11-16 PROCEDURE — 76770 US EXAM ABDO BACK WALL COMP: CPT | Mod: 26,HCNC,, | Performed by: RADIOLOGY

## 2020-11-17 ENCOUNTER — HOSPITAL ENCOUNTER (OUTPATIENT)
Dept: RADIOLOGY | Facility: HOSPITAL | Age: 80
Discharge: HOME | End: 2020-11-17
Attending: INTERNAL MEDICINE
Payer: MEDICARE

## 2020-11-17 DIAGNOSIS — M25.562 CHRONIC PAIN OF BOTH KNEES: ICD-10-CM

## 2020-11-17 DIAGNOSIS — M25.561 CHRONIC PAIN OF BOTH KNEES: ICD-10-CM

## 2020-11-17 DIAGNOSIS — G89.29 CHRONIC PAIN OF BOTH KNEES: ICD-10-CM

## 2020-11-17 DIAGNOSIS — M25.559 HIP PAIN: ICD-10-CM

## 2020-11-17 PROCEDURE — 73521 X-RAY EXAM HIPS BI 2 VIEWS: CPT | Mod: TC,HCNC,FY,PO

## 2020-11-17 PROCEDURE — 73565 X-RAY EXAM OF KNEES: CPT | Mod: TC,HCNC,FY,PO

## 2020-11-17 PROCEDURE — 73521 X-RAY EXAM HIPS BI 2 VIEWS: CPT | Mod: 26,HCNC,, | Performed by: RADIOLOGY

## 2020-11-17 PROCEDURE — 73521 XR HIPS BILATERAL 2 VIEW INCL AP PELVIS: ICD-10-PCS | Mod: 26,HCNC,, | Performed by: RADIOLOGY

## 2020-11-17 PROCEDURE — 73565 X-RAY EXAM OF KNEES: CPT | Mod: 26,HCNC,, | Performed by: RADIOLOGY

## 2020-11-17 PROCEDURE — 73565 XR KNEE AP STANDING BILATERAL: ICD-10-PCS | Mod: 26,HCNC,, | Performed by: RADIOLOGY

## 2020-11-18 ENCOUNTER — PATIENT MESSAGE (OUTPATIENT)
Dept: FAMILY MEDICINE | Facility: CLINIC | Age: 80
End: 2020-11-18

## 2020-11-18 DIAGNOSIS — I25.118 CORONARY ARTERY DISEASE OF NATIVE ARTERY OF NATIVE HEART WITH STABLE ANGINA PECTORIS: Primary | ICD-10-CM

## 2020-11-18 DIAGNOSIS — M16.0 PRIMARY OSTEOARTHRITIS OF BOTH HIPS: ICD-10-CM

## 2020-11-18 DIAGNOSIS — Z78.9 STATIN INTOLERANCE: ICD-10-CM

## 2020-11-18 DIAGNOSIS — E78.5 DYSLIPIDEMIA: ICD-10-CM

## 2020-11-18 RX ORDER — EZETIMIBE 10 MG/1
10 TABLET ORAL DAILY
Qty: 90 TABLET | Refills: 3 | Status: SHIPPED | OUTPATIENT
Start: 2020-11-18 | End: 2021-01-15 | Stop reason: SDUPTHER

## 2020-11-18 NOTE — PROGRESS NOTES
Mild moderate changes of the knees.  Referred to orthopedics for the hip which shows moderate to severe changes.

## 2020-12-11 ENCOUNTER — PATIENT MESSAGE (OUTPATIENT)
Dept: OTHER | Facility: OTHER | Age: 80
End: 2020-12-11

## 2020-12-28 ENCOUNTER — OFFICE VISIT (OUTPATIENT)
Dept: ORTHOPEDICS | Facility: CLINIC | Age: 80
End: 2020-12-28
Payer: MEDICARE

## 2020-12-28 VITALS
RESPIRATION RATE: 18 BRPM | WEIGHT: 177.25 LBS | HEIGHT: 67 IN | OXYGEN SATURATION: 95 % | BODY MASS INDEX: 27.82 KG/M2 | SYSTOLIC BLOOD PRESSURE: 138 MMHG | DIASTOLIC BLOOD PRESSURE: 80 MMHG | HEART RATE: 86 BPM

## 2020-12-28 DIAGNOSIS — M16.0 PRIMARY OSTEOARTHRITIS OF BOTH HIPS: ICD-10-CM

## 2020-12-28 PROCEDURE — 99999 PR PBB SHADOW E&M-EST. PATIENT-LVL IV: ICD-10-PCS | Mod: PBBFAC,HCNC,, | Performed by: ORTHOPAEDIC SURGERY

## 2020-12-28 PROCEDURE — 99499 UNLISTED E&M SERVICE: CPT | Mod: S$GLB,,, | Performed by: ORTHOPAEDIC SURGERY

## 2020-12-28 PROCEDURE — 99203 OFFICE O/P NEW LOW 30 MIN: CPT | Mod: HCNC,S$GLB,, | Performed by: ORTHOPAEDIC SURGERY

## 2020-12-28 PROCEDURE — 3288F FALL RISK ASSESSMENT DOCD: CPT | Mod: HCNC,CPTII,S$GLB, | Performed by: ORTHOPAEDIC SURGERY

## 2020-12-28 PROCEDURE — 1159F PR MEDICATION LIST DOCUMENTED IN MEDICAL RECORD: ICD-10-PCS | Mod: HCNC,S$GLB,, | Performed by: ORTHOPAEDIC SURGERY

## 2020-12-28 PROCEDURE — 1126F AMNT PAIN NOTED NONE PRSNT: CPT | Mod: HCNC,S$GLB,, | Performed by: ORTHOPAEDIC SURGERY

## 2020-12-28 PROCEDURE — 1101F PT FALLS ASSESS-DOCD LE1/YR: CPT | Mod: HCNC,CPTII,S$GLB, | Performed by: ORTHOPAEDIC SURGERY

## 2020-12-28 PROCEDURE — 3075F PR MOST RECENT SYSTOLIC BLOOD PRESS GE 130-139MM HG: ICD-10-PCS | Mod: HCNC,CPTII,S$GLB, | Performed by: ORTHOPAEDIC SURGERY

## 2020-12-28 PROCEDURE — 3079F PR MOST RECENT DIASTOLIC BLOOD PRESSURE 80-89 MM HG: ICD-10-PCS | Mod: HCNC,CPTII,S$GLB, | Performed by: ORTHOPAEDIC SURGERY

## 2020-12-28 PROCEDURE — 1159F MED LIST DOCD IN RCRD: CPT | Mod: HCNC,S$GLB,, | Performed by: ORTHOPAEDIC SURGERY

## 2020-12-28 PROCEDURE — 1126F PR PAIN SEVERITY QUANTIFIED, NO PAIN PRESENT: ICD-10-PCS | Mod: HCNC,S$GLB,, | Performed by: ORTHOPAEDIC SURGERY

## 2020-12-28 PROCEDURE — 99999 PR PBB SHADOW E&M-EST. PATIENT-LVL IV: CPT | Mod: PBBFAC,HCNC,, | Performed by: ORTHOPAEDIC SURGERY

## 2020-12-28 PROCEDURE — 3075F SYST BP GE 130 - 139MM HG: CPT | Mod: HCNC,CPTII,S$GLB, | Performed by: ORTHOPAEDIC SURGERY

## 2020-12-28 PROCEDURE — 1101F PR PT FALLS ASSESS DOC 0-1 FALLS W/OUT INJ PAST YR: ICD-10-PCS | Mod: HCNC,CPTII,S$GLB, | Performed by: ORTHOPAEDIC SURGERY

## 2020-12-28 PROCEDURE — 99203 PR OFFICE/OUTPT VISIT, NEW, LEVL III, 30-44 MIN: ICD-10-PCS | Mod: HCNC,S$GLB,, | Performed by: ORTHOPAEDIC SURGERY

## 2020-12-28 PROCEDURE — 99499 RISK ADDL DX/OHS AUDIT: ICD-10-PCS | Mod: S$GLB,,, | Performed by: ORTHOPAEDIC SURGERY

## 2020-12-28 PROCEDURE — 3079F DIAST BP 80-89 MM HG: CPT | Mod: HCNC,CPTII,S$GLB, | Performed by: ORTHOPAEDIC SURGERY

## 2020-12-28 PROCEDURE — 3288F PR FALLS RISK ASSESSMENT DOCUMENTED: ICD-10-PCS | Mod: HCNC,CPTII,S$GLB, | Performed by: ORTHOPAEDIC SURGERY

## 2020-12-28 NOTE — PROGRESS NOTES
Chief Complaint   Patient presents with    Right Hip - Pain    Left Hip - Pain     This patient was seen in consultation at the request of Dr. Samir Boo     Initial visit (12/28/2020): Eric Jackson is a 80 y.o. male who presents today complaining of Pain of the Right Hip and Pain of the Left Hip    Duration of symptoms:  Several months  Trauma or new activity: no  Pain is intermittent  Aggravating factors: Pain when he sits for a few hours- better when he walks for a few minutes  Radicular symptoms: no numbness, paresthesias   Associated symptoms:  Stiffness   Prior treatment:  None   Does not bother him much     Pain does not interfere with activities of daily living .    This is the extent of the patient's complaints at this time.     Review of Systems   All other systems reviewed and are negative.        Review of patient's allergies indicates:   Allergen Reactions    Tamsulosin     Prochlorperazine      convulsions    Statins-hmg-coa reductase inhibitors Other (See Comments)     Muscle weakness         Current Outpatient Medications:     ALPRAZolam (XANAX) 1 MG tablet, Take 1 tablet (1 mg total) by mouth nightly as needed for Anxiety., Disp: 60 tablet, Rfl: 0    blood sugar diagnostic Strp, To check BG three times daily, to use with insurance preferred meter, Disp: 300 strip, Rfl: 3    blood-glucose meter kit, To check BG 1 times daily, to use with insurance preferred meter, Disp: 1 each, Rfl: 0    ezetimibe (ZETIA) 10 mg tablet, Take 1 tablet (10 mg total) by mouth once daily., Disp: 90 tablet, Rfl: 3    lancets Misc, To check BG 3 times daily, to use with insurance preferred meter, Disp: 300 each, Rfl: 3    losartan-hydrochlorothiazide 50-12.5 mg (HYZAAR) 50-12.5 mg per tablet, Take 1 tablet by mouth once daily., Disp: 90 tablet, Rfl: 3    metFORMIN (GLUCOPHAGE) 500 MG tablet, Take 1 tablet (500 mg total) by mouth daily with breakfast., Disp: 90 tablet, Rfl: 3    pantoprazole (PROTONIX)  40 MG tablet, Take 1 tablet (40 mg total) by mouth 2 (two) times daily., Disp: 60 tablet, Rfl: 4    sildenafil (VIAGRA) 25 MG tablet, Take 1 tablet (25 mg total) by mouth daily as needed for Erectile Dysfunction., Disp: 10 tablet, Rfl: 0    testosterone (ANDROGEL) 20.25 mg/1.25 gram (1.62 %) GlPm, One pump over each shoulder, Disp: 1 Bottle, Rfl: 5    venlafaxine (EFFEXOR-XR) 150 MG Cp24, Take 1 capsule (150 mg total) by mouth once daily., Disp: 90 capsule, Rfl: 3    vit C/vit E ac/lut/copper/zinc (PRESERVISION LUTEIN ORAL), Take 1 tablet by mouth once daily., Disp: , Rfl:     vitamin D 1000 units Tab, Take 1,000 Units by mouth once daily., Disp: , Rfl:     Past Medical History:   Diagnosis Date    Anemia     Angina pectoris     Anxiety     Cancer     Coronary artery disease     Depression     Diabetes mellitus type II     Disorder of kidney and ureter     Erectile dysfunction     Eye injuries     fb ou    GERD (gastroesophageal reflux disease)     Hypertension     Intermediate stage nonexudative age-related macular degeneration of both eyes 4/9/2019    Myocardial infarction     Nuclear sclerosis of both eyes 4/9/2019    Prostate cancer     Trouble in sleeping     Type 2 diabetes mellitus     Type 2 diabetes mellitus with ophthalmic manifestations        Patient Active Problem List   Diagnosis    Essential hypertension    Type 2 diabetes mellitus without complication, without long-term current use of insulin    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 2/2018; 10/2020 EGD with suspicion for barretts    Anxiety    Depression    Insomnia    Slow urinary stream    Tubular adenoma of colon 2/2018 3 adenomas repeat 3 years    Hypogonadism in male    Prostate cancer 2/25/19 Transrectal ultrasound of prostate and gold fiducial marker placement    History of Non-STEMI (non-ST elevated myocardial infarction) 3/2019 from sepsis; Culprit likely in small Diag2 (unrevascularized) and likely  demand related    Coronary artery disease 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; 3/2019 [facility] patent graft; Culprit likely in small Diag2 (unrevascularized) and likely demand related    Renal cyst, left, possible; incidental on CT 2/2019; needs renal US to characterize    Status post coronary artery bypass grafting single vessel    Refractive error    Intermediate stage nonexudative age-related macular degeneration of both eyes    Nuclear sclerosis of both eyes    History of DVT from PICC line right upper extremity 4/2019 anticoag x 3 months then stopped    Abdominal aortic atherosclerosis    Type 2 diabetes mellitus with diabetic cataract, without long-term current use of insulin    Current mild episode of major depressive disorder without prior episode    Epiretinal membrane (ERM) of both eyes         Past Surgical History:   Procedure Laterality Date    COLONOSCOPY N/A 2/9/2018    Procedure: COLONOSCOPY;  Surgeon: Mathieu Cao MD;  [facility];  Service: Endoscopy;  Laterality: N/A;    CORONARY ANGIOGRAPHY INCLUDING BYPASS GRAFTS WITH CATHETERIZATION OF LEFT HEART N/A 3/4/2019    Procedure: ANGIOGRAM, CORONARY, INCLUDING BYPASS GRAFT, WITH LEFT HEART CATHETERIZATION;  Surgeon: Jamir Nam MD;  Location: [facility] CATH LAB;  Service: Cardiology;  Laterality: N/A;    CORONARY ARTERY BYPASS GRAFT  2001    ESOPHAGOGASTRODUODENOSCOPY N/A 10/8/2020    Procedure: EGD (ESOPHAGOGASTRODUODENOSCOPY);  Surgeon: Bharath Herrera MD;  Location[facility];  Service: Endoscopy;  Laterality: N/A;    HERNIA REPAIR      LEFT HEART CATHETERIZATION Left 3/4/2019    Procedure: Left heart cath RFA access, 4fr catheter/sheath, not before 9am;  Surgeon: Jamir Nam MD;  Location: [facility] CATH LAB;  Service: Cardiology;  Laterality: Left;    TRANSRECTAL ULTRASOUND OF PROSTATE WITH INSERTION OF GOLD FIDUCIAL MARKER N/A 2/26/2019    Procedure: ULTRASOUND, PROSTATE,  "RECTAL APPROACH, WITH GOLD FIDUCIAL MARKER INSERTION;  Surgeon: Layne Huff MD;  Location: [facility] OR;  Service: Urology;  Laterality: N/A;    UPPER GASTROINTESTINAL ENDOSCOPY      WRIST SURGERY         Social History     Tobacco Use    Smoking status: Former Smoker     Packs/day: 2.00     Years: 23.00     Pack years: 46.00     Types: Cigarettes     Quit date: 1989     Years since quittin.0    Smokeless tobacco: Never Used   Substance Use Topics    Alcohol use: Yes     Alcohol/week: 1.0 standard drinks     Types: 1 Cans of beer per week     Comment: occassional    Drug use: No       Family History   Adopted: Yes   Problem Relation Age of Onset    No Known Problems Mother     No Known Problems Father     No Known Problems Maternal Grandmother     No Known Problems Maternal Grandfather     No Known Problems Paternal Grandmother     No Known Problems Paternal Grandfather     No Known Problems Sister     No Known Problems Brother     No Known Problems Maternal Aunt     No Known Problems Maternal Uncle     No Known Problems Paternal Aunt     No Known Problems Paternal Uncle     Amblyopia Neg Hx     Blindness Neg Hx     Cancer Neg Hx     Cataracts Neg Hx     Diabetes Neg Hx     Glaucoma Neg Hx     Hypertension Neg Hx     Macular degeneration Neg Hx     Retinal detachment Neg Hx     Strabismus Neg Hx     Stroke Neg Hx     Thyroid disease Neg Hx        Physical Exam:   Vitals:    20 1433   BP: 138/80   Pulse: 86   Resp: 18   SpO2: 95%   Weight: 80.4 kg (177 lb 4 oz)   Height: 5' 7" (1.702 m)   PainSc: 0-No pain   PainLoc: Hip       General:   Body mass index is 27.76 kg/m².   Patient is alert, awake and oriented to time, place and person. Mood and affect are appropriate.  Patient does not appear to be in any distress, denies any constitutional symptoms and appears stated age.   HEENT:  Pupils are equal and round, sclera are not injected. External examination of ears and " nose reveals no abnormalities. Cranial nerves II-X are grossly intact  Skin:  no rashes, abrasions or open wounds on the affected extremity   Resp:  No respiratory distress or audible wheezing   CV: 2+  pulses, all extremities warm and well perfused   Left and Right Hips  NTTP over  PSIS  Mildly tender greater trochanter   PROM  R Flexion: 110  ER: 40  IR: 10  L Flexion: 110  ER: 50  IR: 20  [facility]  Log roll neg  Ltsi s/s/sp/dp/t  + ehl/fhl/ta/gs  2+ DP     Imagin views of the bilateral hips show joint space narrowing worse on right than left consistent with mild hip OA    I personally reviewed and interpreted the patient's imaging obtained today in clinic     Assessment: 80 y.o. male with bilateral hip pain, minimally symptomatic     I explained my diagnostic impression and the reasoning behind it in detail, using layman's terms.  Models and/or pictures were used to help in the explanation.  We discussed non operative and operative treatment modalities -- He would like to start with non-operative treatment in the form of observation, occasional meds.     Plan:   - OTC meds for pain  - Return to clinic PRN    All questions were answered in detail. The patient is in full agreement with the treatment plan and will proceed accordingly.    A note notifying Dr. Samir Boo of my findings was sent via the electronic medical record     This note was created by combination of typed  and M-Modal dictation. Transcription and phonetic errors may be present.  If there are any questions, please contact me.        Current Outpatient Medications:     ALPRAZolam (XANAX) 1 MG tablet, Take 1 tablet (1 mg total) by mouth nightly as needed for Anxiety., Disp: 60 tablet, Rfl: 0    blood sugar diagnostic Strp, To check BG three times daily, to use with insurance preferred meter, Disp: 300 strip, Rfl: 3    blood-glucose meter kit, To check BG 1 times daily, to use with insurance preferred meter, Disp: 1 each,  Rfl: 0    ezetimibe (ZETIA) 10 mg tablet, Take 1 tablet (10 mg total) by mouth once daily., Disp: 90 tablet, Rfl: 3    lancets Misc, To check BG 3 times daily, to use with insurance preferred meter, Disp: 300 each, Rfl: 3    losartan-hydrochlorothiazide 50-12.5 mg (HYZAAR) 50-12.5 mg per tablet, Take 1 tablet by mouth once daily., Disp: 90 tablet, Rfl: 3    metFORMIN (GLUCOPHAGE) 500 MG tablet, Take 1 tablet (500 mg total) by mouth daily with breakfast., Disp: 90 tablet, Rfl: 3    pantoprazole (PROTONIX) 40 MG tablet, Take 1 tablet (40 mg total) by mouth 2 (two) times daily., Disp: 60 tablet, Rfl: 4    sildenafil (VIAGRA) 25 MG tablet, Take 1 tablet (25 mg total) by mouth daily as needed for Erectile Dysfunction., Disp: 10 tablet, Rfl: 0    testosterone (ANDROGEL) 20.25 mg/1.25 gram (1.62 %) GlPm, One pump over each shoulder, Disp: 1 Bottle, Rfl: 5    venlafaxine (EFFEXOR-XR) 150 MG Cp24, Take 1 capsule (150 mg total) by mouth once daily., Disp: 90 capsule, Rfl: 3    vit C/vit E ac/lut/copper/zinc (PRESERVISION LUTEIN ORAL), Take 1 tablet by mouth once daily., Disp: , Rfl:     vitamin D 1000 units Tab, Take 1,000 Units by mouth once daily., Disp: , Rfl:

## 2020-12-28 NOTE — LETTER
December 28, 2020      Samir Boo MD  [address]  [place] [address]           [facility]  [address] [facility], [address]  [place] [address]  Phone: [phone]          Patient: Eric Jackson   MR Number: [id number]   YOB: 1940   Date of Visit: 12/28/2020       Dear Dr. Samir Boo:    Thank you for referring Eric Jackson to me for evaluation. Attached you will find relevant portions of my assessment and plan of care.    If you have questions, please do not hesitate to call me. I look forward to following Eric Jackson along with you.    Sincerely,    Katja Verdin MD    Enclosure  CC:  No Recipients    If you would like to receive this communication electronically, please contact [email or web address] or [phone] to request more information on "[employer] Link access.    For providers and/or their staff who would like to refer a patient to [facility], please contact us through our one-stop-shop provider referral line, [facility], at [phone].    If you feel you have received this communication in error or would no longer like to receive these types of communications, please e-mail [email or web address]

## 2021-01-11 DIAGNOSIS — E11.9 TYPE 2 DIABETES MELLITUS WITHOUT COMPLICATION, WITHOUT LONG-TERM CURRENT USE OF INSULIN: ICD-10-CM

## 2021-01-11 DIAGNOSIS — E29.1 HYPOGONADISM IN MALE: ICD-10-CM

## 2021-01-11 RX ORDER — TESTOSTERONE 16.2 MG/G
GEL TRANSDERMAL
Qty: 1 BOTTLE | Refills: 3 | Status: SHIPPED | OUTPATIENT
Start: 2021-01-11 | End: 2023-04-24

## 2021-01-15 ENCOUNTER — OFFICE VISIT (OUTPATIENT)
Dept: FAMILY MEDICINE | Facility: CLINIC | Age: 81
End: 2021-01-15
Payer: MEDICARE

## 2021-01-15 VITALS
TEMPERATURE: 98 F | SYSTOLIC BLOOD PRESSURE: 140 MMHG | WEIGHT: 176 LBS | OXYGEN SATURATION: 98 % | DIASTOLIC BLOOD PRESSURE: 78 MMHG | HEIGHT: 67 IN | HEART RATE: 60 BPM | BODY MASS INDEX: 27.62 KG/M2

## 2021-01-15 DIAGNOSIS — E78.5 DYSLIPIDEMIA: ICD-10-CM

## 2021-01-15 DIAGNOSIS — E11.9 TYPE 2 DIABETES MELLITUS WITHOUT COMPLICATION, WITHOUT LONG-TERM CURRENT USE OF INSULIN: ICD-10-CM

## 2021-01-15 DIAGNOSIS — E29.1 HYPOGONADISM IN MALE: ICD-10-CM

## 2021-01-15 DIAGNOSIS — C61 PROSTATE CANCER: ICD-10-CM

## 2021-01-15 DIAGNOSIS — F32.0 CURRENT MILD EPISODE OF MAJOR DEPRESSIVE DISORDER WITHOUT PRIOR EPISODE: Primary | ICD-10-CM

## 2021-01-15 DIAGNOSIS — N28.1 RENAL CYST, LEFT: ICD-10-CM

## 2021-01-15 DIAGNOSIS — I70.0 ABDOMINAL AORTIC ATHEROSCLEROSIS: ICD-10-CM

## 2021-01-15 DIAGNOSIS — E11.36 TYPE 2 DIABETES MELLITUS WITH DIABETIC CATARACT, WITHOUT LONG-TERM CURRENT USE OF INSULIN: ICD-10-CM

## 2021-01-15 DIAGNOSIS — I25.118 CORONARY ARTERY DISEASE OF NATIVE ARTERY OF NATIVE HEART WITH STABLE ANGINA PECTORIS: ICD-10-CM

## 2021-01-15 DIAGNOSIS — F41.9 ANXIETY: ICD-10-CM

## 2021-01-15 DIAGNOSIS — K22.70 BARRETT'S ESOPHAGUS WITHOUT DYSPLASIA: ICD-10-CM

## 2021-01-15 DIAGNOSIS — I10 ESSENTIAL HYPERTENSION: ICD-10-CM

## 2021-01-15 DIAGNOSIS — F51.01 PRIMARY INSOMNIA: ICD-10-CM

## 2021-01-15 DIAGNOSIS — Z78.9 STATIN INTOLERANCE: ICD-10-CM

## 2021-01-15 DIAGNOSIS — N18.31 STAGE 3A CHRONIC KIDNEY DISEASE: ICD-10-CM

## 2021-01-15 PROCEDURE — 99499 UNLISTED E&M SERVICE: CPT | Mod: S$GLB,,, | Performed by: INTERNAL MEDICINE

## 2021-01-15 PROCEDURE — 99999 PR PBB SHADOW E&M-EST. PATIENT-LVL IV: ICD-10-PCS | Mod: PBBFAC,HCNC,, | Performed by: INTERNAL MEDICINE

## 2021-01-15 PROCEDURE — 1126F PR PAIN SEVERITY QUANTIFIED, NO PAIN PRESENT: ICD-10-PCS | Mod: HCNC,S$GLB,, | Performed by: INTERNAL MEDICINE

## 2021-01-15 PROCEDURE — 3078F PR MOST RECENT DIASTOLIC BLOOD PRESSURE < 80 MM HG: ICD-10-PCS | Mod: HCNC,CPTII,S$GLB, | Performed by: INTERNAL MEDICINE

## 2021-01-15 PROCEDURE — 99213 OFFICE O/P EST LOW 20 MIN: CPT | Mod: HCNC,S$GLB,, | Performed by: INTERNAL MEDICINE

## 2021-01-15 PROCEDURE — 3078F DIAST BP <80 MM HG: CPT | Mod: HCNC,CPTII,S$GLB, | Performed by: INTERNAL MEDICINE

## 2021-01-15 PROCEDURE — 1159F MED LIST DOCD IN RCRD: CPT | Mod: HCNC,S$GLB,, | Performed by: INTERNAL MEDICINE

## 2021-01-15 PROCEDURE — 99999 PR PBB SHADOW E&M-EST. PATIENT-LVL IV: CPT | Mod: PBBFAC,HCNC,, | Performed by: INTERNAL MEDICINE

## 2021-01-15 PROCEDURE — 3077F PR MOST RECENT SYSTOLIC BLOOD PRESSURE >= 140 MM HG: ICD-10-PCS | Mod: HCNC,CPTII,S$GLB, | Performed by: INTERNAL MEDICINE

## 2021-01-15 PROCEDURE — 3077F SYST BP >= 140 MM HG: CPT | Mod: HCNC,CPTII,S$GLB, | Performed by: INTERNAL MEDICINE

## 2021-01-15 PROCEDURE — 1126F AMNT PAIN NOTED NONE PRSNT: CPT | Mod: HCNC,S$GLB,, | Performed by: INTERNAL MEDICINE

## 2021-01-15 PROCEDURE — 1159F PR MEDICATION LIST DOCUMENTED IN MEDICAL RECORD: ICD-10-PCS | Mod: HCNC,S$GLB,, | Performed by: INTERNAL MEDICINE

## 2021-01-15 PROCEDURE — 99213 PR OFFICE/OUTPT VISIT, EST, LEVL III, 20-29 MIN: ICD-10-PCS | Mod: HCNC,S$GLB,, | Performed by: INTERNAL MEDICINE

## 2021-01-15 PROCEDURE — 99499 RISK ADDL DX/OHS AUDIT: ICD-10-PCS | Mod: S$GLB,,, | Performed by: INTERNAL MEDICINE

## 2021-01-15 RX ORDER — MIRTAZAPINE 30 MG/1
30 TABLET, FILM COATED ORAL NIGHTLY
Qty: 90 TABLET | Refills: 3 | Status: SHIPPED | OUTPATIENT
Start: 2021-01-15 | End: 2022-03-18

## 2021-01-15 RX ORDER — VENLAFAXINE HYDROCHLORIDE 150 MG/1
150 CAPSULE, EXTENDED RELEASE ORAL DAILY
Qty: 90 CAPSULE | Refills: 3 | Status: SHIPPED | OUTPATIENT
Start: 2021-01-15 | End: 2022-04-22

## 2021-01-15 RX ORDER — LOSARTAN POTASSIUM AND HYDROCHLOROTHIAZIDE 12.5; 5 MG/1; MG/1
1 TABLET ORAL DAILY
Qty: 90 TABLET | Refills: 3 | Status: SHIPPED | OUTPATIENT
Start: 2021-01-15 | End: 2022-03-17

## 2021-01-15 RX ORDER — METFORMIN HYDROCHLORIDE 500 MG/1
500 TABLET ORAL
Qty: 90 TABLET | Refills: 3 | Status: SHIPPED | OUTPATIENT
Start: 2021-01-15 | End: 2022-03-17

## 2021-01-15 RX ORDER — EZETIMIBE 10 MG/1
10 TABLET ORAL DAILY
Qty: 90 TABLET | Refills: 3 | Status: SHIPPED | OUTPATIENT
Start: 2021-01-15 | End: 2022-04-22 | Stop reason: SINTOL

## 2021-01-15 RX ORDER — ALPRAZOLAM 1 MG/1
1 TABLET ORAL NIGHTLY PRN
Qty: 30 TABLET | Refills: 0 | Status: SHIPPED | OUTPATIENT
Start: 2021-01-15 | End: 2021-08-05 | Stop reason: SDUPTHER

## 2021-01-22 ENCOUNTER — PATIENT MESSAGE (OUTPATIENT)
Dept: ADMINISTRATIVE | Facility: OTHER | Age: 81
End: 2021-01-22

## 2021-02-26 ENCOUNTER — IMMUNIZATION (OUTPATIENT)
Dept: PHARMACY | Facility: CLINIC | Age: 81
End: 2021-02-26
Payer: MEDICARE

## 2021-02-26 DIAGNOSIS — Z23 NEED FOR VACCINATION: Primary | ICD-10-CM

## 2021-03-12 ENCOUNTER — OFFICE VISIT (OUTPATIENT)
Dept: UROLOGY | Facility: CLINIC | Age: 81
End: 2021-03-12
Payer: MEDICARE

## 2021-03-12 ENCOUNTER — LAB VISIT (OUTPATIENT)
Dept: LAB | Facility: HOSPITAL | Age: 81
End: 2021-03-12
Attending: STUDENT IN AN ORGANIZED HEALTH CARE EDUCATION/TRAINING PROGRAM
Payer: MEDICARE

## 2021-03-12 VITALS — HEIGHT: 67 IN | WEIGHT: 174.19 LBS | BODY MASS INDEX: 27.34 KG/M2

## 2021-03-12 DIAGNOSIS — R31.9 HEMATURIA OF UNKNOWN CAUSE: ICD-10-CM

## 2021-03-12 LAB
ANION GAP SERPL CALC-SCNC: 12 MMOL/L (ref 8–16)
BILIRUB SERPL-MCNC: ABNORMAL MG/DL
BLOOD URINE, POC: ABNORMAL
BUN SERPL-MCNC: 12 MG/DL (ref 8–23)
CALCIUM SERPL-MCNC: 9.4 MG/DL (ref 8.7–10.5)
CHLORIDE SERPL-SCNC: 103 MMOL/L (ref 95–110)
CO2 SERPL-SCNC: 27 MMOL/L (ref 23–29)
COLOR, POC UA: YELLOW
CREAT SERPL-MCNC: 1.2 MG/DL (ref 0.5–1.4)
EST. GFR  (AFRICAN AMERICAN): >60 ML/MIN/1.73 M^2
EST. GFR  (NON AFRICAN AMERICAN): 58 ML/MIN/1.73 M^2
GLUCOSE SERPL-MCNC: 151 MG/DL (ref 70–110)
GLUCOSE UR QL STRIP: 50
KETONES UR QL STRIP: ABNORMAL
LEUKOCYTE ESTERASE URINE, POC: ABNORMAL
NITRITE, POC UA: POSITIVE
PH, POC UA: 8
POC RESIDUAL URINE VOLUME: 21 ML (ref 0–100)
POTASSIUM SERPL-SCNC: 3.7 MMOL/L (ref 3.5–5.1)
PROTEIN, POC: ABNORMAL
SODIUM SERPL-SCNC: 142 MMOL/L (ref 136–145)
SPECIFIC GRAVITY, POC UA: 1020
UROBILINOGEN, POC UA: NORMAL

## 2021-03-12 PROCEDURE — 1126F AMNT PAIN NOTED NONE PRSNT: CPT | Mod: S$GLB,,, | Performed by: STUDENT IN AN ORGANIZED HEALTH CARE EDUCATION/TRAINING PROGRAM

## 2021-03-12 PROCEDURE — 1159F MED LIST DOCD IN RCRD: CPT | Mod: S$GLB,,, | Performed by: STUDENT IN AN ORGANIZED HEALTH CARE EDUCATION/TRAINING PROGRAM

## 2021-03-12 PROCEDURE — 99999 PR PBB SHADOW E&M-EST. PATIENT-LVL III: CPT | Mod: PBBFAC,,, | Performed by: STUDENT IN AN ORGANIZED HEALTH CARE EDUCATION/TRAINING PROGRAM

## 2021-03-12 PROCEDURE — 87086 URINE CULTURE/COLONY COUNT: CPT | Performed by: STUDENT IN AN ORGANIZED HEALTH CARE EDUCATION/TRAINING PROGRAM

## 2021-03-12 PROCEDURE — 51798 US URINE CAPACITY MEASURE: CPT | Mod: S$GLB,,, | Performed by: STUDENT IN AN ORGANIZED HEALTH CARE EDUCATION/TRAINING PROGRAM

## 2021-03-12 PROCEDURE — 80048 BASIC METABOLIC PNL TOTAL CA: CPT | Performed by: STUDENT IN AN ORGANIZED HEALTH CARE EDUCATION/TRAINING PROGRAM

## 2021-03-12 PROCEDURE — 1159F PR MEDICATION LIST DOCUMENTED IN MEDICAL RECORD: ICD-10-PCS | Mod: S$GLB,,, | Performed by: STUDENT IN AN ORGANIZED HEALTH CARE EDUCATION/TRAINING PROGRAM

## 2021-03-12 PROCEDURE — 3288F FALL RISK ASSESSMENT DOCD: CPT | Mod: CPTII,S$GLB,, | Performed by: STUDENT IN AN ORGANIZED HEALTH CARE EDUCATION/TRAINING PROGRAM

## 2021-03-12 PROCEDURE — 1126F PR PAIN SEVERITY QUANTIFIED, NO PAIN PRESENT: ICD-10-PCS | Mod: S$GLB,,, | Performed by: STUDENT IN AN ORGANIZED HEALTH CARE EDUCATION/TRAINING PROGRAM

## 2021-03-12 PROCEDURE — 81001 POCT URINALYSIS, DIPSTICK OR TABLET REAGENT, AUTOMATED, WITH MICROSCOP: ICD-10-PCS | Mod: S$GLB,,, | Performed by: STUDENT IN AN ORGANIZED HEALTH CARE EDUCATION/TRAINING PROGRAM

## 2021-03-12 PROCEDURE — 99214 PR OFFICE/OUTPT VISIT, EST, LEVL IV, 30-39 MIN: ICD-10-PCS | Mod: 25,S$GLB,, | Performed by: STUDENT IN AN ORGANIZED HEALTH CARE EDUCATION/TRAINING PROGRAM

## 2021-03-12 PROCEDURE — 51798 POCT BLADDER SCAN: ICD-10-PCS | Mod: S$GLB,,, | Performed by: STUDENT IN AN ORGANIZED HEALTH CARE EDUCATION/TRAINING PROGRAM

## 2021-03-12 PROCEDURE — 3288F PR FALLS RISK ASSESSMENT DOCUMENTED: ICD-10-PCS | Mod: CPTII,S$GLB,, | Performed by: STUDENT IN AN ORGANIZED HEALTH CARE EDUCATION/TRAINING PROGRAM

## 2021-03-12 PROCEDURE — 99214 OFFICE O/P EST MOD 30 MIN: CPT | Mod: 25,S$GLB,, | Performed by: STUDENT IN AN ORGANIZED HEALTH CARE EDUCATION/TRAINING PROGRAM

## 2021-03-12 PROCEDURE — 1101F PT FALLS ASSESS-DOCD LE1/YR: CPT | Mod: CPTII,S$GLB,, | Performed by: STUDENT IN AN ORGANIZED HEALTH CARE EDUCATION/TRAINING PROGRAM

## 2021-03-12 PROCEDURE — 1101F PR PT FALLS ASSESS DOC 0-1 FALLS W/OUT INJ PAST YR: ICD-10-PCS | Mod: CPTII,S$GLB,, | Performed by: STUDENT IN AN ORGANIZED HEALTH CARE EDUCATION/TRAINING PROGRAM

## 2021-03-12 PROCEDURE — 81001 URINALYSIS AUTO W/SCOPE: CPT | Mod: S$GLB,,, | Performed by: STUDENT IN AN ORGANIZED HEALTH CARE EDUCATION/TRAINING PROGRAM

## 2021-03-12 PROCEDURE — 36415 COLL VENOUS BLD VENIPUNCTURE: CPT | Performed by: STUDENT IN AN ORGANIZED HEALTH CARE EDUCATION/TRAINING PROGRAM

## 2021-03-12 PROCEDURE — 99999 PR PBB SHADOW E&M-EST. PATIENT-LVL III: ICD-10-PCS | Mod: PBBFAC,,, | Performed by: STUDENT IN AN ORGANIZED HEALTH CARE EDUCATION/TRAINING PROGRAM

## 2021-03-14 LAB — BACTERIA UR CULT: NO GROWTH

## 2021-03-15 ENCOUNTER — HOSPITAL ENCOUNTER (OUTPATIENT)
Dept: RADIOLOGY | Facility: HOSPITAL | Age: 81
Discharge: HOME | End: 2021-03-15
Attending: STUDENT IN AN ORGANIZED HEALTH CARE EDUCATION/TRAINING PROGRAM
Payer: MEDICARE

## 2021-03-15 DIAGNOSIS — R31.9 HEMATURIA OF UNKNOWN CAUSE: ICD-10-CM

## 2021-03-15 PROCEDURE — 74178 CT ABD&PLV WO CNTR FLWD CNTR: CPT | Mod: 26,,, | Performed by: RADIOLOGY

## 2021-03-15 PROCEDURE — 25500020 PHARM REV CODE 255: Performed by: STUDENT IN AN ORGANIZED HEALTH CARE EDUCATION/TRAINING PROGRAM

## 2021-03-15 PROCEDURE — 74178 CT UROGRAM ABD PELVIS W WO: ICD-10-PCS | Mod: 26,,, | Performed by: RADIOLOGY

## 2021-03-15 PROCEDURE — 74178 CT ABD&PLV WO CNTR FLWD CNTR: CPT | Mod: TC

## 2021-03-15 RX ADMIN — IOHEXOL 125 ML: 350 INJECTION, SOLUTION INTRAVENOUS at 11:03

## 2021-03-26 ENCOUNTER — IMMUNIZATION (OUTPATIENT)
Dept: PHARMACY | Facility: CLINIC | Age: 81
End: 2021-03-26
Payer: MEDICARE

## 2021-03-26 DIAGNOSIS — Z23 NEED FOR VACCINATION: Primary | ICD-10-CM

## 2021-04-21 ENCOUNTER — PROCEDURE VISIT (OUTPATIENT)
Dept: UROLOGY | Facility: CLINIC | Age: 81
End: 2021-04-21
Payer: MEDICARE

## 2021-04-21 DIAGNOSIS — R31.9 HEMATURIA OF UNKNOWN CAUSE: Primary | ICD-10-CM

## 2021-04-21 PROCEDURE — 52000 CYSTOURETHROSCOPY: CPT | Mod: S$GLB,,, | Performed by: STUDENT IN AN ORGANIZED HEALTH CARE EDUCATION/TRAINING PROGRAM

## 2021-04-21 PROCEDURE — 52000 CYSTOSCOPY: ICD-10-PCS | Mod: S$GLB,,, | Performed by: STUDENT IN AN ORGANIZED HEALTH CARE EDUCATION/TRAINING PROGRAM

## 2021-04-21 RX ORDER — CEFDINIR 300 MG/1
300 CAPSULE ORAL EVERY 12 HOURS
Qty: 6 CAPSULE | Refills: 0 | Status: SHIPPED | OUTPATIENT
Start: 2021-04-21 | End: 2021-04-21

## 2021-04-21 RX ORDER — CEFDINIR 300 MG/1
300 CAPSULE ORAL EVERY 12 HOURS
Qty: 6 CAPSULE | Refills: 0 | Status: SHIPPED | OUTPATIENT
Start: 2021-04-21 | End: 2021-04-24

## 2021-04-22 ENCOUNTER — PATIENT MESSAGE (OUTPATIENT)
Dept: FAMILY MEDICINE | Facility: CLINIC | Age: 81
End: 2021-04-22

## 2021-04-22 DIAGNOSIS — I70.0 ABDOMINAL AORTIC ATHEROSCLEROSIS: Primary | ICD-10-CM

## 2021-04-22 RX ORDER — ASPIRIN 81 MG/1
81 TABLET ORAL DAILY
Qty: 90 TABLET | Refills: 3 | Status: SHIPPED | OUTPATIENT
Start: 2021-04-22 | End: 2022-04-22

## 2021-05-20 ENCOUNTER — LAB VISIT (OUTPATIENT)
Dept: LAB | Facility: HOSPITAL | Age: 81
End: 2021-05-20
Attending: INTERNAL MEDICINE
Payer: MEDICARE

## 2021-05-20 ENCOUNTER — OFFICE VISIT (OUTPATIENT)
Dept: ENDOCRINOLOGY | Facility: CLINIC | Age: 81
End: 2021-05-20
Payer: MEDICARE

## 2021-05-20 VITALS
WEIGHT: 171.06 LBS | SYSTOLIC BLOOD PRESSURE: 153 MMHG | OXYGEN SATURATION: 97 % | DIASTOLIC BLOOD PRESSURE: 80 MMHG | BODY MASS INDEX: 26.85 KG/M2 | HEART RATE: 67 BPM | HEIGHT: 67 IN

## 2021-05-20 DIAGNOSIS — I10 ESSENTIAL HYPERTENSION: ICD-10-CM

## 2021-05-20 DIAGNOSIS — E11.9 TYPE 2 DIABETES MELLITUS WITHOUT COMPLICATION, WITHOUT LONG-TERM CURRENT USE OF INSULIN: ICD-10-CM

## 2021-05-20 LAB
ESTIMATED AVG GLUCOSE: 128 MG/DL (ref 68–131)
HBA1C MFR BLD: 6.1 % (ref 4–5.6)

## 2021-05-20 PROCEDURE — 36415 COLL VENOUS BLD VENIPUNCTURE: CPT | Performed by: INTERNAL MEDICINE

## 2021-05-20 PROCEDURE — 1159F MED LIST DOCD IN RCRD: CPT | Mod: S$GLB,,, | Performed by: INTERNAL MEDICINE

## 2021-05-20 PROCEDURE — 3288F PR FALLS RISK ASSESSMENT DOCUMENTED: ICD-10-PCS | Mod: CPTII,S$GLB,, | Performed by: INTERNAL MEDICINE

## 2021-05-20 PROCEDURE — 99499 UNLISTED E&M SERVICE: CPT | Mod: S$GLB,,, | Performed by: INTERNAL MEDICINE

## 2021-05-20 PROCEDURE — 3077F PR MOST RECENT SYSTOLIC BLOOD PRESSURE >= 140 MM HG: ICD-10-PCS | Mod: CPTII,S$GLB,, | Performed by: INTERNAL MEDICINE

## 2021-05-20 PROCEDURE — 99499 RISK ADDL DX/OHS AUDIT: ICD-10-PCS | Mod: S$GLB,,, | Performed by: INTERNAL MEDICINE

## 2021-05-20 PROCEDURE — 1126F AMNT PAIN NOTED NONE PRSNT: CPT | Mod: S$GLB,,, | Performed by: INTERNAL MEDICINE

## 2021-05-20 PROCEDURE — 3079F PR MOST RECENT DIASTOLIC BLOOD PRESSURE 80-89 MM HG: ICD-10-PCS | Mod: CPTII,S$GLB,, | Performed by: INTERNAL MEDICINE

## 2021-05-20 PROCEDURE — 99214 OFFICE O/P EST MOD 30 MIN: CPT | Mod: S$GLB,,, | Performed by: INTERNAL MEDICINE

## 2021-05-20 PROCEDURE — 83036 HEMOGLOBIN GLYCOSYLATED A1C: CPT | Performed by: INTERNAL MEDICINE

## 2021-05-20 PROCEDURE — 1159F PR MEDICATION LIST DOCUMENTED IN MEDICAL RECORD: ICD-10-PCS | Mod: S$GLB,,, | Performed by: INTERNAL MEDICINE

## 2021-05-20 PROCEDURE — 3079F DIAST BP 80-89 MM HG: CPT | Mod: CPTII,S$GLB,, | Performed by: INTERNAL MEDICINE

## 2021-05-20 PROCEDURE — 99999 PR PBB SHADOW E&M-EST. PATIENT-LVL III: ICD-10-PCS | Mod: PBBFAC,,, | Performed by: INTERNAL MEDICINE

## 2021-05-20 PROCEDURE — 1126F PR PAIN SEVERITY QUANTIFIED, NO PAIN PRESENT: ICD-10-PCS | Mod: S$GLB,,, | Performed by: INTERNAL MEDICINE

## 2021-05-20 PROCEDURE — 1101F PT FALLS ASSESS-DOCD LE1/YR: CPT | Mod: CPTII,S$GLB,, | Performed by: INTERNAL MEDICINE

## 2021-05-20 PROCEDURE — 99214 PR OFFICE/OUTPT VISIT, EST, LEVL IV, 30-39 MIN: ICD-10-PCS | Mod: S$GLB,,, | Performed by: INTERNAL MEDICINE

## 2021-05-20 PROCEDURE — 99999 PR PBB SHADOW E&M-EST. PATIENT-LVL III: CPT | Mod: PBBFAC,,, | Performed by: INTERNAL MEDICINE

## 2021-05-20 PROCEDURE — 3077F SYST BP >= 140 MM HG: CPT | Mod: CPTII,S$GLB,, | Performed by: INTERNAL MEDICINE

## 2021-05-20 PROCEDURE — 3288F FALL RISK ASSESSMENT DOCD: CPT | Mod: CPTII,S$GLB,, | Performed by: INTERNAL MEDICINE

## 2021-05-20 PROCEDURE — 1101F PR PT FALLS ASSESS DOC 0-1 FALLS W/OUT INJ PAST YR: ICD-10-PCS | Mod: CPTII,S$GLB,, | Performed by: INTERNAL MEDICINE

## 2021-05-20 RX ORDER — IOHEXOL 350 MG/ML
INJECTION, SOLUTION INTRAVENOUS
COMMUNITY
Start: 2021-03-15 | End: 2022-04-22

## 2021-06-07 ENCOUNTER — PATIENT MESSAGE (OUTPATIENT)
Dept: ORTHOPEDICS | Facility: CLINIC | Age: 81
End: 2021-06-07

## 2021-06-07 DIAGNOSIS — M25.512 LEFT SHOULDER PAIN, UNSPECIFIED CHRONICITY: Primary | ICD-10-CM

## 2021-06-08 ENCOUNTER — APPOINTMENT (OUTPATIENT)
Dept: RADIOLOGY | Facility: HOSPITAL | Age: 81
End: 2021-06-08
Attending: ORTHOPAEDIC SURGERY
Payer: MEDICARE

## 2021-06-08 ENCOUNTER — OFFICE VISIT (OUTPATIENT)
Dept: ORTHOPEDICS | Facility: CLINIC | Age: 81
End: 2021-06-08
Attending: ORTHOPAEDIC SURGERY
Payer: MEDICARE

## 2021-06-08 VITALS
WEIGHT: 172.38 LBS | DIASTOLIC BLOOD PRESSURE: 70 MMHG | HEIGHT: 67 IN | HEART RATE: 74 BPM | RESPIRATION RATE: 18 BRPM | OXYGEN SATURATION: 96 % | SYSTOLIC BLOOD PRESSURE: 132 MMHG | BODY MASS INDEX: 27.06 KG/M2

## 2021-06-08 DIAGNOSIS — M25.512 LEFT SHOULDER PAIN, UNSPECIFIED CHRONICITY: ICD-10-CM

## 2021-06-08 DIAGNOSIS — M75.102 NONTRAUMATIC TEAR OF LEFT ROTATOR CUFF, UNSPECIFIED TEAR EXTENT: Primary | ICD-10-CM

## 2021-06-08 PROCEDURE — 73030 X-RAY EXAM OF SHOULDER: CPT | Mod: TC,FY,PN,LT

## 2021-06-08 PROCEDURE — 3288F FALL RISK ASSESSMENT DOCD: CPT | Mod: CPTII,S$GLB,, | Performed by: ORTHOPAEDIC SURGERY

## 2021-06-08 PROCEDURE — 1159F PR MEDICATION LIST DOCUMENTED IN MEDICAL RECORD: ICD-10-PCS | Mod: S$GLB,,, | Performed by: ORTHOPAEDIC SURGERY

## 2021-06-08 PROCEDURE — 1125F PR PAIN SEVERITY QUANTIFIED, PAIN PRESENT: ICD-10-PCS | Mod: S$GLB,,, | Performed by: ORTHOPAEDIC SURGERY

## 2021-06-08 PROCEDURE — 99999 PR PBB SHADOW E&M-EST. PATIENT-LVL IV: CPT | Mod: PBBFAC,,, | Performed by: ORTHOPAEDIC SURGERY

## 2021-06-08 PROCEDURE — 73030 XR SHOULDER TRAUMA 3 VIEW LEFT: ICD-10-PCS | Mod: 26,LT,, | Performed by: RADIOLOGY

## 2021-06-08 PROCEDURE — 99213 OFFICE O/P EST LOW 20 MIN: CPT | Mod: S$GLB,,, | Performed by: ORTHOPAEDIC SURGERY

## 2021-06-08 PROCEDURE — 73030 X-RAY EXAM OF SHOULDER: CPT | Mod: 26,LT,, | Performed by: RADIOLOGY

## 2021-06-08 PROCEDURE — 1159F MED LIST DOCD IN RCRD: CPT | Mod: S$GLB,,, | Performed by: ORTHOPAEDIC SURGERY

## 2021-06-08 PROCEDURE — 99213 PR OFFICE/OUTPT VISIT, EST, LEVL III, 20-29 MIN: ICD-10-PCS | Mod: S$GLB,,, | Performed by: ORTHOPAEDIC SURGERY

## 2021-06-08 PROCEDURE — 1101F PR PT FALLS ASSESS DOC 0-1 FALLS W/OUT INJ PAST YR: ICD-10-PCS | Mod: CPTII,S$GLB,, | Performed by: ORTHOPAEDIC SURGERY

## 2021-06-08 PROCEDURE — 99999 PR PBB SHADOW E&M-EST. PATIENT-LVL IV: ICD-10-PCS | Mod: PBBFAC,,, | Performed by: ORTHOPAEDIC SURGERY

## 2021-06-08 PROCEDURE — 1125F AMNT PAIN NOTED PAIN PRSNT: CPT | Mod: S$GLB,,, | Performed by: ORTHOPAEDIC SURGERY

## 2021-06-08 PROCEDURE — 1101F PT FALLS ASSESS-DOCD LE1/YR: CPT | Mod: CPTII,S$GLB,, | Performed by: ORTHOPAEDIC SURGERY

## 2021-06-08 PROCEDURE — 3288F PR FALLS RISK ASSESSMENT DOCUMENTED: ICD-10-PCS | Mod: CPTII,S$GLB,, | Performed by: ORTHOPAEDIC SURGERY

## 2021-06-09 ENCOUNTER — PES CALL (OUTPATIENT)
Dept: ADMINISTRATIVE | Facility: CLINIC | Age: 81
End: 2021-06-09

## 2021-06-11 ENCOUNTER — TELEPHONE (OUTPATIENT)
Dept: ORTHOPEDICS | Facility: CLINIC | Age: 81
End: 2021-06-11

## 2021-06-11 RX ORDER — NAPROXEN 500 MG/1
500 TABLET ORAL 2 TIMES DAILY WITH MEALS
Qty: 14 TABLET | Refills: 0 | Status: SHIPPED | OUTPATIENT
Start: 2021-06-11 | End: 2022-04-22

## 2021-06-28 ENCOUNTER — OFFICE VISIT (OUTPATIENT)
Dept: VASCULAR SURGERY | Facility: CLINIC | Age: 81
End: 2021-06-28
Payer: MEDICARE

## 2021-06-28 ENCOUNTER — PATIENT OUTREACH (OUTPATIENT)
Dept: ADMINISTRATIVE | Facility: OTHER | Age: 81
End: 2021-06-28

## 2021-06-28 VITALS
BODY MASS INDEX: 26.92 KG/M2 | HEIGHT: 67 IN | DIASTOLIC BLOOD PRESSURE: 62 MMHG | SYSTOLIC BLOOD PRESSURE: 116 MMHG | WEIGHT: 171.5 LBS

## 2021-06-28 DIAGNOSIS — I70.0 ABDOMINAL AORTIC ATHEROSCLEROSIS: Primary | ICD-10-CM

## 2021-06-28 PROCEDURE — 99999 PR PBB SHADOW E&M-EST. PATIENT-LVL IV: ICD-10-PCS | Mod: PBBFAC,,, | Performed by: SURGERY

## 2021-06-28 PROCEDURE — 1126F AMNT PAIN NOTED NONE PRSNT: CPT | Mod: S$GLB,,, | Performed by: SURGERY

## 2021-06-28 PROCEDURE — 3288F FALL RISK ASSESSMENT DOCD: CPT | Mod: CPTII,S$GLB,, | Performed by: SURGERY

## 2021-06-28 PROCEDURE — 99204 PR OFFICE/OUTPT VISIT, NEW, LEVL IV, 45-59 MIN: ICD-10-PCS | Mod: S$GLB,,, | Performed by: SURGERY

## 2021-06-28 PROCEDURE — 99204 OFFICE O/P NEW MOD 45 MIN: CPT | Mod: S$GLB,,, | Performed by: SURGERY

## 2021-06-28 PROCEDURE — 99999 PR PBB SHADOW E&M-EST. PATIENT-LVL IV: CPT | Mod: PBBFAC,,, | Performed by: SURGERY

## 2021-06-28 PROCEDURE — 1159F MED LIST DOCD IN RCRD: CPT | Mod: S$GLB,,, | Performed by: SURGERY

## 2021-06-28 PROCEDURE — 1101F PT FALLS ASSESS-DOCD LE1/YR: CPT | Mod: CPTII,S$GLB,, | Performed by: SURGERY

## 2021-06-28 PROCEDURE — 1101F PR PT FALLS ASSESS DOC 0-1 FALLS W/OUT INJ PAST YR: ICD-10-PCS | Mod: CPTII,S$GLB,, | Performed by: SURGERY

## 2021-06-28 PROCEDURE — 3288F PR FALLS RISK ASSESSMENT DOCUMENTED: ICD-10-PCS | Mod: CPTII,S$GLB,, | Performed by: SURGERY

## 2021-06-28 PROCEDURE — 1126F PR PAIN SEVERITY QUANTIFIED, NO PAIN PRESENT: ICD-10-PCS | Mod: S$GLB,,, | Performed by: SURGERY

## 2021-06-28 PROCEDURE — 1159F PR MEDICATION LIST DOCUMENTED IN MEDICAL RECORD: ICD-10-PCS | Mod: S$GLB,,, | Performed by: SURGERY

## 2021-07-19 ENCOUNTER — OFFICE VISIT (OUTPATIENT)
Dept: ORTHOPEDICS | Facility: CLINIC | Age: 81
End: 2021-07-19
Payer: MEDICARE

## 2021-07-19 VITALS
HEIGHT: 67 IN | RESPIRATION RATE: 18 BRPM | HEART RATE: 75 BPM | TEMPERATURE: 98 F | WEIGHT: 171.5 LBS | OXYGEN SATURATION: 96 % | BODY MASS INDEX: 26.92 KG/M2

## 2021-07-19 DIAGNOSIS — G89.29 CHRONIC LEFT SHOULDER PAIN: Primary | ICD-10-CM

## 2021-07-19 DIAGNOSIS — M25.512 CHRONIC LEFT SHOULDER PAIN: Primary | ICD-10-CM

## 2021-07-19 PROCEDURE — 1125F PR PAIN SEVERITY QUANTIFIED, PAIN PRESENT: ICD-10-PCS | Mod: CPTII,S$GLB,, | Performed by: ORTHOPAEDIC SURGERY

## 2021-07-19 PROCEDURE — 99213 OFFICE O/P EST LOW 20 MIN: CPT | Mod: S$GLB,,, | Performed by: ORTHOPAEDIC SURGERY

## 2021-07-19 PROCEDURE — 1159F MED LIST DOCD IN RCRD: CPT | Mod: CPTII,S$GLB,, | Performed by: ORTHOPAEDIC SURGERY

## 2021-07-19 PROCEDURE — 1159F PR MEDICATION LIST DOCUMENTED IN MEDICAL RECORD: ICD-10-PCS | Mod: CPTII,S$GLB,, | Performed by: ORTHOPAEDIC SURGERY

## 2021-07-19 PROCEDURE — 3288F PR FALLS RISK ASSESSMENT DOCUMENTED: ICD-10-PCS | Mod: CPTII,S$GLB,, | Performed by: ORTHOPAEDIC SURGERY

## 2021-07-19 PROCEDURE — 3288F FALL RISK ASSESSMENT DOCD: CPT | Mod: CPTII,S$GLB,, | Performed by: ORTHOPAEDIC SURGERY

## 2021-07-19 PROCEDURE — 99213 PR OFFICE/OUTPT VISIT, EST, LEVL III, 20-29 MIN: ICD-10-PCS | Mod: S$GLB,,, | Performed by: ORTHOPAEDIC SURGERY

## 2021-07-19 PROCEDURE — 1125F AMNT PAIN NOTED PAIN PRSNT: CPT | Mod: CPTII,S$GLB,, | Performed by: ORTHOPAEDIC SURGERY

## 2021-07-19 PROCEDURE — 1101F PR PT FALLS ASSESS DOC 0-1 FALLS W/OUT INJ PAST YR: ICD-10-PCS | Mod: CPTII,S$GLB,, | Performed by: ORTHOPAEDIC SURGERY

## 2021-07-19 PROCEDURE — 99999 PR PBB SHADOW E&M-EST. PATIENT-LVL V: ICD-10-PCS | Mod: PBBFAC,,, | Performed by: ORTHOPAEDIC SURGERY

## 2021-07-19 PROCEDURE — 99999 PR PBB SHADOW E&M-EST. PATIENT-LVL V: CPT | Mod: PBBFAC,,, | Performed by: ORTHOPAEDIC SURGERY

## 2021-07-19 PROCEDURE — 1101F PT FALLS ASSESS-DOCD LE1/YR: CPT | Mod: CPTII,S$GLB,, | Performed by: ORTHOPAEDIC SURGERY

## 2021-07-21 ENCOUNTER — CLINICAL SUPPORT (OUTPATIENT)
Dept: REHABILITATION | Facility: HOSPITAL | Age: 81
End: 2021-07-21
Attending: ORTHOPAEDIC SURGERY
Payer: MEDICARE

## 2021-07-21 DIAGNOSIS — G89.29 CHRONIC LEFT SHOULDER PAIN: ICD-10-CM

## 2021-07-21 DIAGNOSIS — M25.612 DECREASED RANGE OF MOTION OF LEFT SHOULDER: ICD-10-CM

## 2021-07-21 DIAGNOSIS — M25.512 CHRONIC LEFT SHOULDER PAIN: ICD-10-CM

## 2021-07-21 DIAGNOSIS — R29.898 SHOULDER WEAKNESS: ICD-10-CM

## 2021-07-21 PROCEDURE — 97161 PT EVAL LOW COMPLEX 20 MIN: CPT | Mod: PN

## 2021-07-21 PROCEDURE — 97110 THERAPEUTIC EXERCISES: CPT | Mod: PN

## 2021-07-23 ENCOUNTER — HOSPITAL ENCOUNTER (OUTPATIENT)
Dept: RADIOLOGY | Facility: HOSPITAL | Age: 81
Discharge: HOME | End: 2021-07-23
Attending: ORTHOPAEDIC SURGERY
Payer: MEDICARE

## 2021-07-23 DIAGNOSIS — G89.29 CHRONIC LEFT SHOULDER PAIN: ICD-10-CM

## 2021-07-23 DIAGNOSIS — M25.512 CHRONIC LEFT SHOULDER PAIN: ICD-10-CM

## 2021-07-23 PROCEDURE — 73221 MRI JOINT UPR EXTREM W/O DYE: CPT | Mod: TC,LT

## 2021-07-23 PROCEDURE — 73221 MRI SHOULDER WITHOUT CONTRAST LEFT: ICD-10-PCS | Mod: 26,LT,, | Performed by: RADIOLOGY

## 2021-07-23 PROCEDURE — 73221 MRI JOINT UPR EXTREM W/O DYE: CPT | Mod: 26,LT,, | Performed by: RADIOLOGY

## 2021-07-27 ENCOUNTER — CLINICAL SUPPORT (OUTPATIENT)
Dept: REHABILITATION | Facility: HOSPITAL | Age: 81
End: 2021-07-27
Attending: ORTHOPAEDIC SURGERY
Payer: MEDICARE

## 2021-07-27 ENCOUNTER — TELEPHONE (OUTPATIENT)
Dept: ORTHOPEDICS | Facility: CLINIC | Age: 81
End: 2021-07-27

## 2021-07-27 ENCOUNTER — PATIENT MESSAGE (OUTPATIENT)
Dept: ORTHOPEDICS | Facility: CLINIC | Age: 81
End: 2021-07-27

## 2021-07-27 DIAGNOSIS — G89.29 CHRONIC LEFT SHOULDER PAIN: ICD-10-CM

## 2021-07-27 DIAGNOSIS — M25.512 CHRONIC LEFT SHOULDER PAIN: ICD-10-CM

## 2021-07-27 DIAGNOSIS — M25.612 DECREASED RANGE OF MOTION OF LEFT SHOULDER: ICD-10-CM

## 2021-07-27 DIAGNOSIS — R29.898 SHOULDER WEAKNESS: ICD-10-CM

## 2021-07-27 PROCEDURE — 97110 THERAPEUTIC EXERCISES: CPT | Mod: PN

## 2021-08-05 ENCOUNTER — PATIENT MESSAGE (OUTPATIENT)
Dept: FAMILY MEDICINE | Facility: CLINIC | Age: 81
End: 2021-08-05

## 2021-08-05 DIAGNOSIS — N52.9 MALE ERECTILE DISORDER: ICD-10-CM

## 2021-08-05 DIAGNOSIS — F41.9 ANXIETY: ICD-10-CM

## 2021-08-05 RX ORDER — ALPRAZOLAM 1 MG/1
1 TABLET ORAL NIGHTLY PRN
Qty: 30 TABLET | Refills: 0 | Status: SHIPPED | OUTPATIENT
Start: 2021-08-05 | End: 2021-10-15 | Stop reason: SDUPTHER

## 2021-08-05 RX ORDER — SILDENAFIL 25 MG/1
25 TABLET, FILM COATED ORAL DAILY PRN
Qty: 10 TABLET | Refills: 0 | Status: SHIPPED | OUTPATIENT
Start: 2021-08-05 | End: 2022-08-05

## 2021-08-20 ENCOUNTER — OFFICE VISIT (OUTPATIENT)
Dept: ORTHOPEDICS | Facility: CLINIC | Age: 81
End: 2021-08-20
Payer: MEDICARE

## 2021-08-20 VITALS
BODY MASS INDEX: 27.64 KG/M2 | HEIGHT: 67 IN | RESPIRATION RATE: 18 BRPM | SYSTOLIC BLOOD PRESSURE: 140 MMHG | WEIGHT: 176.13 LBS | DIASTOLIC BLOOD PRESSURE: 80 MMHG | OXYGEN SATURATION: 94 % | HEART RATE: 86 BPM

## 2021-08-20 DIAGNOSIS — M75.102 NONTRAUMATIC TEAR OF LEFT ROTATOR CUFF, UNSPECIFIED TEAR EXTENT: Primary | ICD-10-CM

## 2021-08-20 PROCEDURE — 3288F FALL RISK ASSESSMENT DOCD: CPT | Mod: CPTII,S$GLB,, | Performed by: ORTHOPAEDIC SURGERY

## 2021-08-20 PROCEDURE — 3077F SYST BP >= 140 MM HG: CPT | Mod: CPTII,S$GLB,, | Performed by: ORTHOPAEDIC SURGERY

## 2021-08-20 PROCEDURE — 1101F PR PT FALLS ASSESS DOC 0-1 FALLS W/OUT INJ PAST YR: ICD-10-PCS | Mod: CPTII,S$GLB,, | Performed by: ORTHOPAEDIC SURGERY

## 2021-08-20 PROCEDURE — 99999 PR PBB SHADOW E&M-EST. PATIENT-LVL IV: CPT | Mod: PBBFAC,,, | Performed by: ORTHOPAEDIC SURGERY

## 2021-08-20 PROCEDURE — 99213 OFFICE O/P EST LOW 20 MIN: CPT | Mod: S$GLB,,, | Performed by: ORTHOPAEDIC SURGERY

## 2021-08-20 PROCEDURE — 99999 PR PBB SHADOW E&M-EST. PATIENT-LVL IV: ICD-10-PCS | Mod: PBBFAC,,, | Performed by: ORTHOPAEDIC SURGERY

## 2021-08-20 PROCEDURE — 3079F PR MOST RECENT DIASTOLIC BLOOD PRESSURE 80-89 MM HG: ICD-10-PCS | Mod: CPTII,S$GLB,, | Performed by: ORTHOPAEDIC SURGERY

## 2021-08-20 PROCEDURE — 3288F PR FALLS RISK ASSESSMENT DOCUMENTED: ICD-10-PCS | Mod: CPTII,S$GLB,, | Performed by: ORTHOPAEDIC SURGERY

## 2021-08-20 PROCEDURE — 1101F PT FALLS ASSESS-DOCD LE1/YR: CPT | Mod: CPTII,S$GLB,, | Performed by: ORTHOPAEDIC SURGERY

## 2021-08-20 PROCEDURE — 1160F RVW MEDS BY RX/DR IN RCRD: CPT | Mod: CPTII,S$GLB,, | Performed by: ORTHOPAEDIC SURGERY

## 2021-08-20 PROCEDURE — 1159F MED LIST DOCD IN RCRD: CPT | Mod: CPTII,S$GLB,, | Performed by: ORTHOPAEDIC SURGERY

## 2021-08-20 PROCEDURE — 1125F AMNT PAIN NOTED PAIN PRSNT: CPT | Mod: CPTII,S$GLB,, | Performed by: ORTHOPAEDIC SURGERY

## 2021-08-20 PROCEDURE — 99213 PR OFFICE/OUTPT VISIT, EST, LEVL III, 20-29 MIN: ICD-10-PCS | Mod: S$GLB,,, | Performed by: ORTHOPAEDIC SURGERY

## 2021-08-20 PROCEDURE — 1159F PR MEDICATION LIST DOCUMENTED IN MEDICAL RECORD: ICD-10-PCS | Mod: CPTII,S$GLB,, | Performed by: ORTHOPAEDIC SURGERY

## 2021-08-20 PROCEDURE — 3077F PR MOST RECENT SYSTOLIC BLOOD PRESSURE >= 140 MM HG: ICD-10-PCS | Mod: CPTII,S$GLB,, | Performed by: ORTHOPAEDIC SURGERY

## 2021-08-20 PROCEDURE — 1125F PR PAIN SEVERITY QUANTIFIED, PAIN PRESENT: ICD-10-PCS | Mod: CPTII,S$GLB,, | Performed by: ORTHOPAEDIC SURGERY

## 2021-08-20 PROCEDURE — 1160F PR REVIEW ALL MEDS BY PRESCRIBER/CLIN PHARMACIST DOCUMENTED: ICD-10-PCS | Mod: CPTII,S$GLB,, | Performed by: ORTHOPAEDIC SURGERY

## 2021-08-20 PROCEDURE — 3079F DIAST BP 80-89 MM HG: CPT | Mod: CPTII,S$GLB,, | Performed by: ORTHOPAEDIC SURGERY

## 2021-10-15 ENCOUNTER — LAB VISIT (OUTPATIENT)
Dept: LAB | Facility: HOSPITAL | Age: 81
End: 2021-10-15
Attending: INTERNAL MEDICINE
Payer: MEDICARE

## 2021-10-15 ENCOUNTER — OFFICE VISIT (OUTPATIENT)
Dept: FAMILY MEDICINE | Facility: CLINIC | Age: 81
End: 2021-10-15
Payer: MEDICARE

## 2021-10-15 VITALS
HEART RATE: 84 BPM | WEIGHT: 166.31 LBS | TEMPERATURE: 98 F | OXYGEN SATURATION: 96 % | HEIGHT: 67 IN | DIASTOLIC BLOOD PRESSURE: 78 MMHG | SYSTOLIC BLOOD PRESSURE: 132 MMHG | BODY MASS INDEX: 26.1 KG/M2

## 2021-10-15 DIAGNOSIS — E11.9 TYPE 2 DIABETES MELLITUS WITHOUT COMPLICATION, WITHOUT LONG-TERM CURRENT USE OF INSULIN: ICD-10-CM

## 2021-10-15 DIAGNOSIS — N18.31 STAGE 3A CHRONIC KIDNEY DISEASE: ICD-10-CM

## 2021-10-15 DIAGNOSIS — I25.118 CORONARY ARTERY DISEASE OF NATIVE ARTERY OF NATIVE HEART WITH STABLE ANGINA PECTORIS: ICD-10-CM

## 2021-10-15 DIAGNOSIS — E11.36 TYPE 2 DIABETES MELLITUS WITH DIABETIC CATARACT, WITHOUT LONG-TERM CURRENT USE OF INSULIN: ICD-10-CM

## 2021-10-15 DIAGNOSIS — I10 ESSENTIAL HYPERTENSION: ICD-10-CM

## 2021-10-15 DIAGNOSIS — E29.1 HYPOGONADISM IN MALE: ICD-10-CM

## 2021-10-15 DIAGNOSIS — F41.9 ANXIETY: ICD-10-CM

## 2021-10-15 DIAGNOSIS — C61 PROSTATE CANCER: ICD-10-CM

## 2021-10-15 DIAGNOSIS — Z23 NEEDS FLU SHOT: ICD-10-CM

## 2021-10-15 DIAGNOSIS — F32.0 CURRENT MILD EPISODE OF MAJOR DEPRESSIVE DISORDER WITHOUT PRIOR EPISODE: ICD-10-CM

## 2021-10-15 DIAGNOSIS — E11.36 TYPE 2 DIABETES MELLITUS WITH DIABETIC CATARACT, WITHOUT LONG-TERM CURRENT USE OF INSULIN: Primary | ICD-10-CM

## 2021-10-15 LAB
25(OH)D3+25(OH)D2 SERPL-MCNC: 95 NG/ML (ref 30–96)
ALBUMIN SERPL BCP-MCNC: 4.3 G/DL (ref 3.5–5.2)
ALP SERPL-CCNC: 67 U/L (ref 55–135)
ALT SERPL W/O P-5'-P-CCNC: 19 U/L (ref 10–44)
ANION GAP SERPL CALC-SCNC: 9 MMOL/L (ref 8–16)
AST SERPL-CCNC: 22 U/L (ref 10–40)
BASOPHILS # BLD AUTO: 0.02 K/UL (ref 0–0.2)
BASOPHILS NFR BLD: 0.3 % (ref 0–1.9)
BILIRUB SERPL-MCNC: 0.5 MG/DL (ref 0.1–1)
BUN SERPL-MCNC: 13 MG/DL (ref 8–23)
CALCIUM SERPL-MCNC: 10.1 MG/DL (ref 8.7–10.5)
CHLORIDE SERPL-SCNC: 104 MMOL/L (ref 95–110)
CO2 SERPL-SCNC: 29 MMOL/L (ref 23–29)
CREAT SERPL-MCNC: 1.1 MG/DL (ref 0.5–1.4)
DIFFERENTIAL METHOD: NORMAL
EOSINOPHIL # BLD AUTO: 0.2 K/UL (ref 0–0.5)
EOSINOPHIL NFR BLD: 2.4 % (ref 0–8)
ERYTHROCYTE [DISTWIDTH] IN BLOOD BY AUTOMATED COUNT: 13.4 % (ref 11.5–14.5)
EST. GFR  (AFRICAN AMERICAN): >60 ML/MIN/1.73 M^2
EST. GFR  (NON AFRICAN AMERICAN): >60 ML/MIN/1.73 M^2
ESTIMATED AVG GLUCOSE: 137 MG/DL (ref 68–131)
GLUCOSE SERPL-MCNC: 98 MG/DL (ref 70–110)
HBA1C MFR BLD: 6.4 % (ref 4–5.6)
HCT VFR BLD AUTO: 47.6 % (ref 40–54)
HGB BLD-MCNC: 15.5 G/DL (ref 14–18)
IMM GRANULOCYTES # BLD AUTO: 0.02 K/UL (ref 0–0.04)
IMM GRANULOCYTES NFR BLD AUTO: 0.3 % (ref 0–0.5)
LYMPHOCYTES # BLD AUTO: 1.5 K/UL (ref 1–4.8)
LYMPHOCYTES NFR BLD: 22.9 % (ref 18–48)
MCH RBC QN AUTO: 29.8 PG (ref 27–31)
MCHC RBC AUTO-ENTMCNC: 32.6 G/DL (ref 32–36)
MCV RBC AUTO: 92 FL (ref 82–98)
MONOCYTES # BLD AUTO: 0.6 K/UL (ref 0.3–1)
MONOCYTES NFR BLD: 9.6 % (ref 4–15)
NEUTROPHILS # BLD AUTO: 4.3 K/UL (ref 1.8–7.7)
NEUTROPHILS NFR BLD: 64.5 % (ref 38–73)
NRBC BLD-RTO: 0 /100 WBC
PLATELET # BLD AUTO: 229 K/UL (ref 150–450)
PMV BLD AUTO: 10.3 FL (ref 9.2–12.9)
POTASSIUM SERPL-SCNC: 4 MMOL/L (ref 3.5–5.1)
PROT SERPL-MCNC: 7.6 G/DL (ref 6–8.4)
PTH-INTACT SERPL-MCNC: 49.7 PG/ML (ref 9–77)
RBC # BLD AUTO: 5.2 M/UL (ref 4.6–6.2)
SODIUM SERPL-SCNC: 142 MMOL/L (ref 136–145)
WBC # BLD AUTO: 6.68 K/UL (ref 3.9–12.7)

## 2021-10-15 PROCEDURE — 1126F AMNT PAIN NOTED NONE PRSNT: CPT | Mod: HCNC,CPTII,S$GLB, | Performed by: INTERNAL MEDICINE

## 2021-10-15 PROCEDURE — 3075F PR MOST RECENT SYSTOLIC BLOOD PRESS GE 130-139MM HG: ICD-10-PCS | Mod: HCNC,CPTII,S$GLB, | Performed by: INTERNAL MEDICINE

## 2021-10-15 PROCEDURE — 3288F FALL RISK ASSESSMENT DOCD: CPT | Mod: HCNC,CPTII,S$GLB, | Performed by: INTERNAL MEDICINE

## 2021-10-15 PROCEDURE — 1159F MED LIST DOCD IN RCRD: CPT | Mod: HCNC,CPTII,S$GLB, | Performed by: INTERNAL MEDICINE

## 2021-10-15 PROCEDURE — 99214 PR OFFICE/OUTPT VISIT, EST, LEVL IV, 30-39 MIN: ICD-10-PCS | Mod: 25,HCNC,S$GLB, | Performed by: INTERNAL MEDICINE

## 2021-10-15 PROCEDURE — 80053 COMPREHEN METABOLIC PANEL: CPT | Mod: HCNC | Performed by: INTERNAL MEDICINE

## 2021-10-15 PROCEDURE — 1160F PR REVIEW ALL MEDS BY PRESCRIBER/CLIN PHARMACIST DOCUMENTED: ICD-10-PCS | Mod: HCNC,CPTII,S$GLB, | Performed by: INTERNAL MEDICINE

## 2021-10-15 PROCEDURE — 83970 ASSAY OF PARATHORMONE: CPT | Mod: HCNC | Performed by: INTERNAL MEDICINE

## 2021-10-15 PROCEDURE — 99999 PR PBB SHADOW E&M-EST. PATIENT-LVL IV: ICD-10-PCS | Mod: PBBFAC,HCNC,, | Performed by: INTERNAL MEDICINE

## 2021-10-15 PROCEDURE — 3288F PR FALLS RISK ASSESSMENT DOCUMENTED: ICD-10-PCS | Mod: HCNC,CPTII,S$GLB, | Performed by: INTERNAL MEDICINE

## 2021-10-15 PROCEDURE — 82306 VITAMIN D 25 HYDROXY: CPT | Mod: HCNC | Performed by: INTERNAL MEDICINE

## 2021-10-15 PROCEDURE — 1101F PR PT FALLS ASSESS DOC 0-1 FALLS W/OUT INJ PAST YR: ICD-10-PCS | Mod: HCNC,CPTII,S$GLB, | Performed by: INTERNAL MEDICINE

## 2021-10-15 PROCEDURE — 90694 FLU VACCINE - QUADRIVALENT - ADJUVANTED: ICD-10-PCS | Mod: HCNC,S$GLB,, | Performed by: INTERNAL MEDICINE

## 2021-10-15 PROCEDURE — 99499 RISK ADDL DX/OHS AUDIT: ICD-10-PCS | Mod: S$GLB,,, | Performed by: INTERNAL MEDICINE

## 2021-10-15 PROCEDURE — 3078F PR MOST RECENT DIASTOLIC BLOOD PRESSURE < 80 MM HG: ICD-10-PCS | Mod: HCNC,CPTII,S$GLB, | Performed by: INTERNAL MEDICINE

## 2021-10-15 PROCEDURE — 3078F DIAST BP <80 MM HG: CPT | Mod: HCNC,CPTII,S$GLB, | Performed by: INTERNAL MEDICINE

## 2021-10-15 PROCEDURE — 1101F PT FALLS ASSESS-DOCD LE1/YR: CPT | Mod: HCNC,CPTII,S$GLB, | Performed by: INTERNAL MEDICINE

## 2021-10-15 PROCEDURE — 99999 PR PBB SHADOW E&M-EST. PATIENT-LVL IV: CPT | Mod: PBBFAC,HCNC,, | Performed by: INTERNAL MEDICINE

## 2021-10-15 PROCEDURE — G0008 FLU VACCINE - QUADRIVALENT - ADJUVANTED: ICD-10-PCS | Mod: HCNC,S$GLB,, | Performed by: INTERNAL MEDICINE

## 2021-10-15 PROCEDURE — 1126F PR PAIN SEVERITY QUANTIFIED, NO PAIN PRESENT: ICD-10-PCS | Mod: HCNC,CPTII,S$GLB, | Performed by: INTERNAL MEDICINE

## 2021-10-15 PROCEDURE — 1159F PR MEDICATION LIST DOCUMENTED IN MEDICAL RECORD: ICD-10-PCS | Mod: HCNC,CPTII,S$GLB, | Performed by: INTERNAL MEDICINE

## 2021-10-15 PROCEDURE — 36415 COLL VENOUS BLD VENIPUNCTURE: CPT | Mod: HCNC,PO | Performed by: INTERNAL MEDICINE

## 2021-10-15 PROCEDURE — 3075F SYST BP GE 130 - 139MM HG: CPT | Mod: HCNC,CPTII,S$GLB, | Performed by: INTERNAL MEDICINE

## 2021-10-15 PROCEDURE — 1160F RVW MEDS BY RX/DR IN RCRD: CPT | Mod: HCNC,CPTII,S$GLB, | Performed by: INTERNAL MEDICINE

## 2021-10-15 PROCEDURE — 99499 UNLISTED E&M SERVICE: CPT | Mod: S$GLB,,, | Performed by: INTERNAL MEDICINE

## 2021-10-15 PROCEDURE — 83036 HEMOGLOBIN GLYCOSYLATED A1C: CPT | Mod: HCNC | Performed by: INTERNAL MEDICINE

## 2021-10-15 PROCEDURE — 85025 COMPLETE CBC W/AUTO DIFF WBC: CPT | Mod: HCNC | Performed by: INTERNAL MEDICINE

## 2021-10-15 PROCEDURE — 99214 OFFICE O/P EST MOD 30 MIN: CPT | Mod: 25,HCNC,S$GLB, | Performed by: INTERNAL MEDICINE

## 2021-10-15 PROCEDURE — 90694 VACC AIIV4 NO PRSRV 0.5ML IM: CPT | Mod: HCNC,S$GLB,, | Performed by: INTERNAL MEDICINE

## 2021-10-15 PROCEDURE — G0008 ADMIN INFLUENZA VIRUS VAC: HCPCS | Mod: HCNC,S$GLB,, | Performed by: INTERNAL MEDICINE

## 2021-10-15 RX ORDER — ALPRAZOLAM 1 MG/1
1 TABLET ORAL NIGHTLY PRN
Qty: 30 TABLET | Refills: 2 | Status: SHIPPED | OUTPATIENT
Start: 2021-10-15 | End: 2022-04-22 | Stop reason: SDUPTHER

## 2021-11-05 ENCOUNTER — IMMUNIZATION (OUTPATIENT)
Dept: PHARMACY | Facility: CLINIC | Age: 81
End: 2021-11-05
Payer: MEDICARE

## 2021-11-05 DIAGNOSIS — Z23 NEED FOR VACCINATION: Primary | ICD-10-CM

## 2021-11-22 ENCOUNTER — TELEPHONE (OUTPATIENT)
Dept: OPTOMETRY | Facility: CLINIC | Age: 81
End: 2021-11-22
Payer: MEDICARE

## 2021-11-23 ENCOUNTER — TELEPHONE (OUTPATIENT)
Dept: OPTOMETRY | Facility: CLINIC | Age: 81
End: 2021-11-23
Payer: MEDICARE

## 2021-11-26 ENCOUNTER — OFFICE VISIT (OUTPATIENT)
Dept: OPTOMETRY | Facility: CLINIC | Age: 81
End: 2021-11-26
Payer: MEDICARE

## 2021-11-26 DIAGNOSIS — H52.7 REFRACTIVE ERROR: ICD-10-CM

## 2021-11-26 DIAGNOSIS — H25.13 NUCLEAR SCLEROSIS OF BOTH EYES: ICD-10-CM

## 2021-11-26 DIAGNOSIS — E11.36 TYPE 2 DIABETES MELLITUS WITH DIABETIC CATARACT, WITHOUT LONG-TERM CURRENT USE OF INSULIN: Primary | ICD-10-CM

## 2021-11-26 DIAGNOSIS — H35.3132 INTERMEDIATE STAGE NONEXUDATIVE AGE-RELATED MACULAR DEGENERATION OF BOTH EYES: ICD-10-CM

## 2021-11-26 PROCEDURE — 92015 DETERMINE REFRACTIVE STATE: CPT | Mod: HCNC,S$GLB,, | Performed by: OPTOMETRIST

## 2021-11-26 PROCEDURE — 99999 PR PBB SHADOW E&M-EST. PATIENT-LVL III: ICD-10-PCS | Mod: PBBFAC,HCNC,, | Performed by: OPTOMETRIST

## 2021-11-26 PROCEDURE — 92014 COMPRE OPH EXAM EST PT 1/>: CPT | Mod: HCNC,S$GLB,, | Performed by: OPTOMETRIST

## 2021-11-26 PROCEDURE — 99999 PR PBB SHADOW E&M-EST. PATIENT-LVL III: CPT | Mod: PBBFAC,HCNC,, | Performed by: OPTOMETRIST

## 2021-11-26 PROCEDURE — 92014 PR EYE EXAM, EST PATIENT,COMPREHESV: ICD-10-PCS | Mod: HCNC,S$GLB,, | Performed by: OPTOMETRIST

## 2021-11-26 PROCEDURE — 92015 PR REFRACTION: ICD-10-PCS | Mod: HCNC,S$GLB,, | Performed by: OPTOMETRIST

## 2021-12-15 ENCOUNTER — TELEPHONE (OUTPATIENT)
Dept: FAMILY MEDICINE | Facility: CLINIC | Age: 81
End: 2021-12-15

## 2021-12-15 ENCOUNTER — PATIENT MESSAGE (OUTPATIENT)
Dept: FAMILY MEDICINE | Facility: CLINIC | Age: 81
End: 2021-12-15
Payer: MEDICARE

## 2022-03-09 DIAGNOSIS — E11.36 TYPE 2 DIABETES MELLITUS WITH DIABETIC CATARACT, WITHOUT LONG-TERM CURRENT USE OF INSULIN: ICD-10-CM

## 2022-03-09 DIAGNOSIS — I10 ESSENTIAL HYPERTENSION: ICD-10-CM

## 2022-03-09 DIAGNOSIS — F41.9 ANXIETY: ICD-10-CM

## 2022-03-09 DIAGNOSIS — F51.01 PRIMARY INSOMNIA: ICD-10-CM

## 2022-03-10 NOTE — TELEPHONE ENCOUNTER
Care Due:                  Date            Visit Type   Department     Provider  --------------------------------------------------------------------------------                                [facility]                              PRIMARY      MED/ INTERNAL  Last Visit: 10-      CARE ([facility])   MED/ [facility]      Samir Boo                              [facility]                              PRIMARY      MED/ INTERNAL  Next Visit: 04-      CARE ([facility])   MED/ PEDS      Samir Boo                                                            Last  Test          Frequency    Reason                     Performed    Due Date  --------------------------------------------------------------------------------    HBA1C.......  6 months...  metFORMIN................  10-   04-    Powered by [employer]. Reference number: [id number].   3/09/2022 9:08:16 PM CST

## 2022-03-17 RX ORDER — METFORMIN HYDROCHLORIDE 500 MG/1
500 TABLET ORAL
Qty: 90 TABLET | Refills: 0 | Status: SHIPPED | OUTPATIENT
Start: 2022-03-17 | End: 2022-06-17

## 2022-03-17 RX ORDER — LOSARTAN POTASSIUM AND HYDROCHLOROTHIAZIDE 12.5; 5 MG/1; MG/1
TABLET ORAL
Qty: 90 TABLET | Refills: 1 | Status: SHIPPED | OUTPATIENT
Start: 2022-03-17 | End: 2022-09-26 | Stop reason: SDUPTHER

## 2022-03-17 NOTE — TELEPHONE ENCOUNTER
Refill Routing Note   Medication(s) are not appropriate for processing by [facility] for the following reason(s):      - Required laboratory values are outdated    [facility] action(s):  Defer  Approve       Medication Therapy Plan: defer mirtazapine, approve losartan with hctz and metformin  --->Care Gap information included in message below if applicable.   Medication reconciliation completed: No   Automatic Epic Generated Protocol Data:        Requested Prescriptions   Pending Prescriptions Disp Refills    metFORMIN (GLUCOPHAGE) 500 MG tablet [Pharmacy Med Name: METFORMIN HYDROCHLORIDE 500 MG Tablet] 90 tablet 1     Sig: TAKE 1 TABLET (500 MG TOTAL) BY MOUTH DAILY WITH BREAKFAST.       Endocrinology:  Diabetes - Biguanides Passed - 3/17/2022  2:14 PM        Passed - Patient is at least 18 years old        Passed - Valid encounter within last 15 months     Recent Visits  Date Type Provider Dept   10/15/21 Office Visit MD [facility] Internal Med/ Peds   01/15/21 Office Visit MD [facility]/ Peds   11/09/20 Office Visit Samir Boo MD [facility]/ Internal Med/ Peds   Showing recent visits within past 720 days and meeting all other requirements  Future Appointments  No visits were found meeting these conditions.  Showing future appointments within next 150 days and meeting all other requirements      Future Appointments              In 3 weeks MD [facility] - Dermatology [address], [place]    In 1 month LAB, [address] [place] [facility], [place]    In 1 month SPECIMEN, [place]    In 1 month Samir Boo MD [facility], [place]                Passed - Cr is 1.39 or below and within 360 days     Lab Results   Component Value Date    CREATININE 1.1 10/15/2021    CREATININE 1.2 03/12/2021    CREATININE 1.2 11/17/2020              Passed - HBA1C within 180 days     Lab Results   Component Value Date     HGBA1C 6.4 (H) 10/15/2021    HGBA1C 6.1 (H) 05/20/2021    HGBA1C 6.3 (H) 11/17/2020              Passed - eGFR is 45 or above and within 360 days     Lab Results   Component Value Date    EGFRNONAA >60.0 10/15/2021    EGFRNONAA 58 (A) 03/12/2021    EGFRNONAA 58.0 (A) 11/17/2020                  mirtazapine (REMERON) 30 MG tablet [Pharmacy Med Name: MIRTAZAPINE 30 MG Tablet] 90 tablet 3     Sig: TAKE 1 TABLET (30 MG TOTAL) BY MOUTH EVERY EVENING.       Psychiatry: Antidepressants - mirtazapine Failed - 3/17/2022  2:15 PM        Failed - Triglycerides within 360 days     Lab Results   Component Value Date    TRIG 300 (H) 11/17/2020    TRIG 153 10/17/2018    TRIG 169 (H) 01/06/2017    TRIG 169 (H) 01/06/2017              Failed - Total Cholesterol within 360 days     Lab Results   Component Value Date    CHOL 285 (H) 11/17/2020    CHOL 265 (A) 10/17/2018    CHOL 271 (H) 01/06/2017    CHOL 271 (H) 01/06/2017              Passed - Patient is at least 18 years old        Passed - Valid encounter within last 15 months     Recent Visits  Date Type Provider Dept   10/15/21 Office Visit Samir Boo MD [facility]/ Internal Med/ Peds   01/15/21 Office Visit Samir Boo MD [facility]/ Internal Med/ Peds   11/09/20 Office Visit Samir Boo MD [facility]/ Internal Med/ Peds   Showing recent visits within past 720 days and meeting all other requirements  Future Appointments  No visits were found meeting these conditions.  Showing future appointments within next 150 days and meeting all other requirements      Future Appointments              In 3 weeks MD Yusuf Angelo - Dermatology [address], [place]    In 1 month LAB, [place] [facility], [place]    In 1 month SPECIMEN, [place]    In 1 month MD [facility] - [facility]                Passed - ALT is 131 or below and within 360 days     ALT   Date Value Ref Range Status   10/15/2021 19 10  - 44 U/L Final   11/17/2020 24 10 - 44 U/L Final   06/02/2020 23 10 - 44 U/L Final              Passed - WBC within 360 days     WBC   Date Value Ref Range Status   10/15/2021 6.68 3.90 - 12.70 K/uL Final   11/17/2020 5.00 3.90 - 12.70 K/uL Final   06/02/2020 6.49 3.90 - 12.70 K/uL Final                losartan-hydrochlorothiazide 50-12.5 mg (HYZAAR) 50-12.5 mg per tablet [Pharmacy Med Name: LOSARTAN POTASSIUM/HYDROCHLOROTHIAZIDE 50-12.5 MG Tablet] 90 tablet 1     Sig: TAKE 1 TABLET EVERY DAY       Cardiovascular: ARB + Diuretic Combos Passed - 3/17/2022  2:15 PM        Passed - Patient is at least 18 years old        Passed - Last BP in normal range within 360 days     BP Readings from Last 3 Encounters:   10/15/21 132/78   08/20/21 (!) 140/80   06/28/21 116/62               Passed - Valid encounter within last 15 months     Recent Visits  Date Type Provider Dept   10/15/21 Office Visit Samir Boo MD [facility]/ Internal Med/ Peds   01/15/21 Office Visit Samir Boo MD [facility]/ Internal Med/ Peds   11/09/20 Office Visit Samir Boo MD [facility] Family [facility]/ Internal Med/ Peds   Showing recent visits within past 720 days and meeting all other requirements  Future Appointments  No visits were found meeting these conditions.  Showing future appointments within next 150 days and meeting all other requirements      Future Appointments              In 3 weeks MD [facility]us[facility] Angelo - Dermatology [address], [place]    In 1 month LAB, [place] [facility], [facility]    In 1 month SPECIMEN, [place]    In 1 month MD [facility] - [facility]                Passed - K in normal range and within 360 days     Potassium   Date Value Ref Range Status   10/15/2021 4.0 3.5 - 5.1 mmol/L Final   03/12/2021 3.7 3.5 - 5.1 mmol/L Final   11/17/2020 3.8 3.5 - 5.1 mmol/L Final              Passed - Na is between 130 and 148 and within 360 days     Sodium   Date  Value Ref Range Status   10/15/2021 142 136 - 145 mmol/L Final   03/12/2021 142 136 - 145 mmol/L Final   11/17/2020 141 136 - 145 mmol/L Final              Passed - Cr is 1.39 or below and within 360 days     Lab Results   Component Value Date    CREATININE 1.1 10/15/2021    CREATININE 1.2 03/12/2021    CREATININE 1.2 11/17/2020              Passed - eGFR within 360 days     Lab Results   Component Value Date    EGFRNONAA >60.0 10/15/2021    EGFRNONAA 58 (A) 03/12/2021    EGFRNONAA 58.0 (A) 11/17/2020                      Appointments  past 12m or future 3m with PCP    Date Provider   Last Visit   10/15/2021 Samir Boo MD   Next Visit   4/22/2022 Samir Boo MD   ED visits in past 90 days: 0        Note composed:2:25 PM 03/17/2022

## 2022-03-18 RX ORDER — MIRTAZAPINE 30 MG/1
30 TABLET, FILM COATED ORAL NIGHTLY
Qty: 90 TABLET | Refills: 3 | Status: SHIPPED | OUTPATIENT
Start: 2022-03-18 | End: 2022-04-22

## 2022-03-29 ENCOUNTER — TELEPHONE (OUTPATIENT)
Dept: DERMATOLOGY | Facility: CLINIC | Age: 82
End: 2022-03-29
Payer: MEDICARE

## 2022-03-29 NOTE — TELEPHONE ENCOUNTER
Attempted call without success to contact this patient by phone Voicemessage left. Patient rescheduled.

## 2022-04-16 ENCOUNTER — LAB VISIT (OUTPATIENT)
Dept: LAB | Facility: HOSPITAL | Age: 82
End: 2022-04-16
Attending: INTERNAL MEDICINE
Payer: MEDICARE

## 2022-04-16 DIAGNOSIS — E29.1 HYPOGONADISM IN MALE: ICD-10-CM

## 2022-04-16 DIAGNOSIS — N18.31 STAGE 3A CHRONIC KIDNEY DISEASE: ICD-10-CM

## 2022-04-16 DIAGNOSIS — E11.36 TYPE 2 DIABETES MELLITUS WITH DIABETIC CATARACT, WITHOUT LONG-TERM CURRENT USE OF INSULIN: ICD-10-CM

## 2022-04-16 LAB
ALBUMIN SERPL BCP-MCNC: 4 G/DL (ref 3.5–5.2)
ALP SERPL-CCNC: 67 U/L (ref 55–135)
ALT SERPL W/O P-5'-P-CCNC: 21 U/L (ref 10–44)
ANION GAP SERPL CALC-SCNC: 11 MMOL/L (ref 8–16)
AST SERPL-CCNC: 25 U/L (ref 10–40)
BASOPHILS # BLD AUTO: 0.03 K/UL (ref 0–0.2)
BASOPHILS NFR BLD: 0.5 % (ref 0–1.9)
BILIRUB SERPL-MCNC: 0.5 MG/DL (ref 0.1–1)
BUN SERPL-MCNC: 18 MG/DL (ref 8–23)
CALCIUM SERPL-MCNC: 9.6 MG/DL (ref 8.7–10.5)
CHLORIDE SERPL-SCNC: 102 MMOL/L (ref 95–110)
CHOLEST SERPL-MCNC: 236 MG/DL (ref 120–199)
CHOLEST/HDLC SERPL: 4.6 {RATIO} (ref 2–5)
CO2 SERPL-SCNC: 26 MMOL/L (ref 23–29)
CREAT SERPL-MCNC: 1.1 MG/DL (ref 0.5–1.4)
DIFFERENTIAL METHOD: NORMAL
EOSINOPHIL # BLD AUTO: 0.4 K/UL (ref 0–0.5)
EOSINOPHIL NFR BLD: 5.9 % (ref 0–8)
ERYTHROCYTE [DISTWIDTH] IN BLOOD BY AUTOMATED COUNT: 13.7 % (ref 11.5–14.5)
EST. GFR  (AFRICAN AMERICAN): >60 ML/MIN/1.73 M^2
EST. GFR  (NON AFRICAN AMERICAN): >60 ML/MIN/1.73 M^2
ESTIMATED AVG GLUCOSE: 143 MG/DL (ref 68–131)
GLUCOSE SERPL-MCNC: 120 MG/DL (ref 70–110)
HBA1C MFR BLD: 6.6 % (ref 4–5.6)
HCT VFR BLD AUTO: 46.1 % (ref 40–54)
HDLC SERPL-MCNC: 51 MG/DL (ref 40–75)
HDLC SERPL: 21.6 % (ref 20–50)
HGB BLD-MCNC: 14.8 G/DL (ref 14–18)
IMM GRANULOCYTES # BLD AUTO: 0.01 K/UL (ref 0–0.04)
IMM GRANULOCYTES NFR BLD AUTO: 0.2 % (ref 0–0.5)
LDLC SERPL CALC-MCNC: 140.6 MG/DL (ref 63–159)
LYMPHOCYTES # BLD AUTO: 1.9 K/UL (ref 1–4.8)
LYMPHOCYTES NFR BLD: 30.4 % (ref 18–48)
MCH RBC QN AUTO: 29.7 PG (ref 27–31)
MCHC RBC AUTO-ENTMCNC: 32.1 G/DL (ref 32–36)
MCV RBC AUTO: 93 FL (ref 82–98)
MONOCYTES # BLD AUTO: 0.6 K/UL (ref 0.3–1)
MONOCYTES NFR BLD: 9 % (ref 4–15)
NEUTROPHILS # BLD AUTO: 3.4 K/UL (ref 1.8–7.7)
NEUTROPHILS NFR BLD: 54 % (ref 38–73)
NONHDLC SERPL-MCNC: 185 MG/DL
NRBC BLD-RTO: 0 /100 WBC
PLATELET # BLD AUTO: 205 K/UL (ref 150–450)
PMV BLD AUTO: 10.1 FL (ref 9.2–12.9)
POTASSIUM SERPL-SCNC: 4.1 MMOL/L (ref 3.5–5.1)
PROT SERPL-MCNC: 7.2 G/DL (ref 6–8.4)
RBC # BLD AUTO: 4.98 M/UL (ref 4.6–6.2)
SODIUM SERPL-SCNC: 139 MMOL/L (ref 136–145)
TESTOST SERPL-MCNC: 383 NG/DL (ref 304–1227)
TRIGL SERPL-MCNC: 222 MG/DL (ref 30–150)
WBC # BLD AUTO: 6.31 K/UL (ref 3.9–12.7)

## 2022-04-16 PROCEDURE — 80053 COMPREHEN METABOLIC PANEL: CPT | Performed by: INTERNAL MEDICINE

## 2022-04-16 PROCEDURE — 80061 LIPID PANEL: CPT | Performed by: INTERNAL MEDICINE

## 2022-04-16 PROCEDURE — 85025 COMPLETE CBC W/AUTO DIFF WBC: CPT | Performed by: INTERNAL MEDICINE

## 2022-04-16 PROCEDURE — 84403 ASSAY OF TOTAL TESTOSTERONE: CPT | Performed by: INTERNAL MEDICINE

## 2022-04-16 PROCEDURE — 36415 COLL VENOUS BLD VENIPUNCTURE: CPT | Mod: PO | Performed by: INTERNAL MEDICINE

## 2022-04-16 PROCEDURE — 83036 HEMOGLOBIN GLYCOSYLATED A1C: CPT | Performed by: INTERNAL MEDICINE

## 2022-04-21 NOTE — PROGRESS NOTES
"This note was created by combination of typed  and M-Modal dictation.  Transcription errors may be present.  If there are any questions, please contact me.    Assessment and Plan:   Essential hypertension  Stage 3a chronic kidney disease  -BP stable.  Cr WNL today. No changes.  -     CBC Auto Differential; Future; Expected date: 10/19/2022    Anxiety  Current mild episode of major depressive disorder without prior episode  -effexor 150 too strong.  May take for a week at a time but any longer, seems to make him feel "dead inside". Alprazolam prn.  Aware of high risk nature of this med  Estimates 60 pills in 180 days  Recent higher stress with wife hospitalized  Refilled alprazolam #30  If refill < 60 days would restart effexor at lower dose start with 37.5  -     ALPRAZolam (XANAX) 1 MG tablet; Take 1 tablet (1 mg total) by mouth nightly as needed for Anxiety.  Dispense: 30 tablet; Refill: 0    Coronary artery disease 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; 3/2019 [facility] patent graft; Culprit likely in small Diag2 (unrevascularized) and likely demand related  Abdominal aortic atherosclerosis  Status post coronary artery bypass grafting single vessel  -intolerant of statins and zetia  Referral to cardiology - candidate for praluent?  -     Ambulatory referral/consult to Cardiology; Future; Expected date: 04/29/2022    Type 2 diabetes mellitus with diabetic cataract, without long-term current use of insulin  Type 2 diabetes mellitus without complication, without long-term current use of insulin  -on metformin, a1c ok, he notes he can work on tightening up habits, will do so.  -     Comprehensive Metabolic Panel; Future; Expected date: 10/19/2022  -     Hemoglobin A1C; Future; Expected date: 10/19/2022  -     CBC Auto Differential; Future; Expected date: 10/19/2022    Prostate cancer 2/25/19 Transrectal ultrasound of prostate and gold fiducial marker placement  -check PSA  Messaged " urology staff to assist in arranging f/u  -     Prostate Specific Antigen, Diagnostic; Future; Expected date: 2022    Renal cyst, left, simple 1st seen 2019 CT; verified on renal US 2020  -stable.    Hypogonadism in male  -pre test testosterone WNL, not on replacement.    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 Last done 10/2018.  -     pantoprazole (PROTONIX) 40 MG tablet; Take 1 tablet (40 mg total) by mouth once daily.  Dispense: 90 tablet; Refill: 3      Medications Discontinued During This Encounter   Medication Reason    OMNIPAQUE 350 350 mg iodine/mL Soln injection     naproxen (NAPROSYN) 500 MG tablet     pantoprazole (PROTONIX) 40 MG tablet     ezetimibe (ZETIA) 10 mg tablet Side effects    venlafaxine (EFFEXOR-XR) 150 MG Cp24     mirtazapine (REMERON) 30 MG tablet     ALPRAZolam (XANAX) 1 MG tablet Reorder       meds sent this encounter:  Medications Ordered This Encounter   Medications    ALPRAZolam (XANAX) 1 MG tablet     Sig: Take 1 tablet (1 mg total) by mouth nightly as needed for Anxiety.     Dispense:  30 tablet     Refill:  0    pantoprazole (PROTONIX) 40 MG tablet     Sig: Take 1 tablet (40 mg total) by mouth once daily.     Dispense:  90 tablet     Refill:  3       Follow Up: No follow-ups on file. OV 6 months with labs    Subjective:     Chief Complaint   Patient presents with    Hypertension    Anxiety       DARRYL Reyes is a 81 y.o. male, last appointment with this clinic was Visit date not found.  Social History     Tobacco Use    Smoking status: Former Smoker     Packs/day: 2.00     Years: 23.00     Pack years: 46.00     Types: Cigarettes     Quit date: 1989     Years since quittin.3    Smokeless tobacco: Never Used   Substance Use Topics    Alcohol use: Yes     Alcohol/week: 1.0 standard drink     Types: 1 Cans of beer per week      "Comment: occassional          Social History     Social History Narrative    Not on file     Last visit me in October  Diabetes on metformin  CKD stage IIIA, hypertension  Anxiety alprazolam p.r.n.  Prostate cancer  Coronary artery disease, Zetia with side effects.  Has tried 4 different statins with side effects.  We talked about seeing Cardiology for risk stratification and alternative such as praluent.  He deferred referral at that time.  Hypogonadism, self discontinued testosterone replacement therapy.  Archer's esophagus PPI, followed by GI  History of DVT from PICC line not on chronic anticoagulation.  Incidental renal mass on CT scan  Subsequent ultrasound showing simple left renal cyst.  He saw vascular surgery in late June 2021.  Aortic atherosclerosis with possible ulcerated plaque.  He reviewed imaging.  No vascular surgical intervention indicated.  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  Will cont to monitor aorta with repeat US in 1 year.    CBC, CMP WNL  PTH WNL done for CKD  Vit D high normal would hold any vitamin D supplement  Testosterone and PSA ordered not drawn    Pre visit labs  CBC normal  CMP normal  Lipid profile high  A1c 6.6 from 6.4  Testosterone normal    Needs follow-up with Urology    Wife hospitalized.  Had been losing weight.  Hospitalized for SBO. Went for ex lap and partial resection. Incisional hernia.  Poor wound healing. Just had a surgery. Mesh temporary placed.     Not taking PPI.  No symptoms.  But hx of Archer's. Encouraged to restart.  Will discuss with GI about next EGD timing.    Not taking the remeron and effexor  With wife in hospital taking xanax every 3 days or so.   effexor he would take for a few weeks at a time and deaden everything. And then when he stopped it he would feel "normal". Discussed that effexor 150 may be too strong.  Discussed high risk nature of xanax.  He estimates 60 pills in 180 days.  Will refill but " if using < 60 days would restart effexor at lower dose, 37.5    Needs f/u with urology.  Need to test PSA    Patient Care Team:  Samir Boo MD as PCP - General (Internal Medicine)  Melanie Orantes MA as Care Coordinator  René Bills MD (Cardiology)  Sheila Braden OD as Consulting Physician (Optometry)  Layne Huff MD as Consulting Physician (Urology)  Loida Evans MD as Consulting Physician (Endocrinology)  Loc Garcia MD as Consulting Physician (Radiation Oncology)    Patient Active Problem List    Diagnosis Date Noted    Chronic left shoulder pain 07/21/2021    Decreased range of motion of left shoulder 07/21/2021    Shoulder weakness 07/21/2021    Stage 3a chronic kidney disease 01/15/2021    Epiretinal membrane (ERM) of both eyes 09/15/2020    Type 2 diabetes mellitus with diabetic cataract, without long-term current use of insulin 02/14/2020    Current mild episode of major depressive disorder without prior episode 02/14/2020    Abdominal aortic atherosclerosis 12/09/2019 4/2021 CT abd: There is prominent atherosclerotic plaquing of the abdominal aorta with probable eccentric mixed plaque with ulcerated plaque to be considered specifically axial image 68 series 4 with the aorta measuring 2.5 cm in diameter at this level      History of DVT from PICC line right upper extremity 4/2019 anticoag x 3 months then stopped 04/29/2019 4/28/19 RUE US: The right internal jugular vein is patent.  Partial thrombus of the right subclavian vein with floating thrombus.  The right axillary vein is completely thrombosed.  Complete thrombosis of the right basilic vein.  The cephalic vein is patent.  The proximal brachial vein is occluded, the mid and distal vein demonstrate flow within.    7/3/19 RUE US: Residual nonocclusive thrombus in the right subclavian and basilic veins, significantly improved from the 04/28/2019 exam.  No new thrombus.  12/19/19 RUE US: No DVT in the right  upper extremity.  Resolved thrombus in the right subclavian vein and mild residual chronic thrombus in the right superficial basilic vein, improved.      Refractive error 04/09/2019    Intermediate stage nonexudative age-related macular degeneration of both eyes 04/09/2019    Nuclear sclerosis of both eyes 04/09/2019    Status post coronary artery bypass grafting single vessel 03/07/2019    Renal cyst, left, simple 1st seen 2/2019 CT; verified on renal US 11/2020 03/02/2019 2/2019 CT abd/pelvis: Small left renal hypodensity which measures slightly higher than simple fluid density.  This could represent a small cyst or complex cyst.  Future nonemergent ultrasound follow-up could be obtained to ensure cystic nature of this lesion.  11/16/20 renal US Simple left renal cyst.     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; 3/2019 [facility] patent graft; Culprit likely in small Diag2 (unrevascularized) and likely demand related 03/01/2019     3/2019 [facility]  LM: dist 50%  LAD: mid 99%, competitive flow from LIMA-LAD-OM-RPDA              Diag2 prox 90%, T2 flow (not bypassed)  LCx: prox , territory supplied by LIMA-LAD-OM-RPDA  RCA: mid , territory supplied by LIMA-LAD-OM-RPDA     LIMA-LAD-OM-RPDA patent   No SVG identified     Impression:  NSTEMI  3V CAD, normal LV fxn  Patent LIMA-LAD-OM-RPDA  Culprit likely in small Diag2 (unrevascularized) and likely demand related     Plan:  Cont med rx  ASA 81mg qd indefinitely  Plavix 75mg qd for 1 year (thru 3/2020)      Prostate cancer 2/25/19 Transrectal ultrasound of prostate and gold fiducial marker placement 01/29/2019 1/7/19 biopsy showed intermediate risk [place] 3+4 PCa in 3/12 cores. [place] 4 up to 30% of one core.   4/2019- Dr. Gurjit Garcia  treated patient with External beam, radiation therapy- [facility]      Tubular adenoma of colon 2/2018 3 adenomas repeat 3 years 01/11/2019    Hypogonadism in male 01/11/2019    Slow urinary stream 12/07/2018    Insomnia 08/10/2014    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 2/2018; 10/2020 EGD with suspicion for barretts      10/8/2020 EGD Esophageal mucosal changes suggestive of long-segment Archer's esophagus, classified as Archer's stage C10-M12 per [place] criteria. WATS-3D brush biopsy specimens obtained      Anxiety     Depression     Essential hypertension 11/14/2012    Type 2 diabetes mellitus without complication, without long-term current use of insulin 11/14/2012       PAST MEDICAL PROBLEMS, PAST SURGICAL HISTORY: please see relevant portions of the electronic medical record    ALLERGIES AND MEDICATIONS: updated and reviewed.  Review of patient's allergies indicates:   Allergen Reactions    Tamsulosin     Prochlorperazine      convulsions    Statins-hmg-coa reductase inhibitors Other (See Comments)     Muscle weakness       Medication List with Changes/Refills   Current Medications    ACCU-CHEK THERON PLUS TEST STRP STRP    TEST BLOOD SUGAR THREE TIMES DAILY    ALPRAZOLAM (XANAX) 1 MG TABLET    Take 1 tablet (1 mg total) by mouth nightly as needed for Anxiety.    ANDROGEL 20.25 MG/1.25 GRAM (1.62 %) GLPM    One pump over each shoulder    ASPIRIN (ECOTRIN) 81 MG EC TABLET    Take 1 tablet (81 mg total) by mouth once daily.    BLOOD-GLUCOSE METER KIT    To check BG 1 times daily, to use with insurance preferred meter    EZETIMIBE (ZETIA) 10 MG TABLET    Take 1 tablet (10 mg total) by mouth once daily.    LANCETS MISC    To check BG 3 times daily, to use with insurance preferred meter    LOSARTAN-HYDROCHLOROTHIAZIDE 50-12.5 MG (HYZAAR) 50-12.5 MG PER TABLET    TAKE 1 TABLET EVERY DAY    METFORMIN (GLUCOPHAGE) 500 MG TABLET    TAKE 1 TABLET (500 MG TOTAL) BY MOUTH DAILY WITH BREAKFAST.     "MIRTAZAPINE (REMERON) 30 MG TABLET    TAKE 1 TABLET (30 MG TOTAL) BY MOUTH EVERY EVENING.    NAPROXEN (NAPROSYN) 500 MG TABLET    Take 1 tablet (500 mg total) by mouth 2 (two) times daily with meals.    OMNIPAQUE 350 350 MG IODINE/ML SOLN INJECTION        PANTOPRAZOLE (PROTONIX) 40 MG TABLET    Take 1 tablet (40 mg total) by mouth 2 (two) times daily.    SILDENAFIL (VIAGRA) 25 MG TABLET    Take 1 tablet (25 mg total) by mouth daily as needed for Erectile Dysfunction.    VENLAFAXINE (EFFEXOR-XR) 150 MG CP24    Take 1 capsule (150 mg total) by mouth once daily.    VIT C/VIT E AC/LUT/COPPER/ZINC (PRESERVISION LUTEIN ORAL)    Take 1 tablet by mouth once daily.    VITAMIN D 1000 UNITS TAB    Take 1,000 Units by mouth once daily.        Objective:   Physical Exam   Vitals:    04/22/22 1336 04/22/22 1408   BP: (!) 140/70 130/64   BP Location:  Left arm   Patient Position:  Sitting   BP Method:  Large (Manual)   Pulse: 78    Temp: 98.2 °F (36.8 °C)    TempSrc: Core Rectal    SpO2: 97%    Weight: 75.4 kg (166 lb 3.6 oz)    Height: 5' 7" (1.702 m)     Body mass index is 26.03 kg/m².  Weight: 75.4 kg (166 lb 3.6 oz)   Height: 5' 7" (170.2 cm)     Physical Exam  Constitutional:       General: He is not in acute distress.     Appearance: He is well-developed.   Cardiovascular:      Rate and Rhythm: Normal rate and regular rhythm.      Heart sounds: Normal heart sounds. No murmur heard.  Pulmonary:      Effort: Pulmonary effort is normal.      Breath sounds: Normal breath sounds.   Musculoskeletal:         General: Normal range of motion.      Right lower leg: No edema.      Left lower leg: No edema.   Skin:     General: Skin is warm and dry.   Neurological:      Mental Status: He is alert and oriented to person, place, and time.      Coordination: Coordination normal.   Psychiatric:         Behavior: Behavior normal.         Thought Content: Thought content normal.         Component      Latest Ref Rng & Units 4/16/2022 " 10/15/2021   WBC      3.90 - 12.70 K/uL 6.31 6.68   RBC      4.60 - 6.20 M/uL 4.98 5.20   Hemoglobin      14.0 - 18.0 g/dL 14.8 15.5   Hematocrit      40.0 - 54.0 % 46.1 47.6   MCV      82 - 98 fL 93 92   MCH      27.0 - 31.0 pg 29.7 29.8   MCHC      32.0 - 36.0 g/dL 32.1 32.6   RDW      11.5 - 14.5 % 13.7 13.4   Platelets      150 - 450 K/uL 205 229   MPV      9.2 - 12.9 fL 10.1 10.3   Immature Granulocytes      0.0 - 0.5 % 0.2 0.3   Gran # (ANC)      1.8 - 7.7 K/uL 3.4 4.3   Immature Grans (Abs)      0.00 - 0.04 K/uL 0.01 0.02   Lymph #      1.0 - 4.8 K/uL 1.9 1.5   Mono #      0.3 - 1.0 K/uL 0.6 0.6   Eos #      0.0 - 0.5 K/uL 0.4 0.2   Baso #      0.00 - 0.20 K/uL 0.03 0.02   nRBC      0 /100 WBC 0 0   Gran %      38.0 - 73.0 % 54.0 64.5   Lymph %      18.0 - 48.0 % 30.4 22.9   Mono %      4.0 - 15.0 % 9.0 9.6   Eosinophil %      0.0 - 8.0 % 5.9 2.4   Basophil %      0.0 - 1.9 % 0.5 0.3   Differential Method       Automated Automated   Sodium      136 - 145 mmol/L 139 142   Potassium      3.5 - 5.1 mmol/L 4.1 4.0   Chloride      95 - 110 mmol/L 102 104   CO2      23 - 29 mmol/L 26 29   Glucose      70 - 110 mg/dL 120 (H) 98   BUN      8 - 23 mg/dL 18 13   Creatinine      0.5 - 1.4 mg/dL 1.1 1.1   Calcium      8.7 - 10.5 mg/dL 9.6 10.1   PROTEIN TOTAL      6.0 - 8.4 g/dL 7.2 7.6   Albumin      3.5 - 5.2 g/dL 4.0 4.3   BILIRUBIN TOTAL      0.1 - 1.0 mg/dL 0.5 0.5   Alkaline Phosphatase      55 - 135 U/L 67 67   AST      10 - 40 U/L 25 22   ALT      10 - 44 U/L 21 19   Anion Gap      8 - 16 mmol/L 11 9   eGFR if African American      >60 mL/min/1.73 m:2 >60.0 >60.0   eGFR if non African American      >60 mL/min/1.73 m:2 >60.0 >60.0   Cholesterol      120 - 199 mg/dL 236 (H)    Triglycerides      30 - 150 mg/dL 222 (H)    HDL      40 - 75 mg/dL 51    LDL Cholesterol External      63.0 - 159.0 mg/dL 140.6    HDL/Cholesterol Ratio      20.0 - 50.0 % 21.6    Total Cholesterol/HDL Ratio      2.0 - 5.0 4.6    Non-HDL  Cholesterol      mg/dL 185    Microalbumin, Urine      ug/mL 8.0    Creatinine, Urine      23.0 - 375.0 mg/dL 72.0    MICROALB/CREAT RATIO      0.0 - 30.0 ug/mg 11.1    Hemoglobin A1C External      4.0 - 5.6 % 6.6 (H) 6.4 (H)   Estimated Avg Glucose      68 - 131 mg/dL 143 (H) 137 (H)   Vit D, 25-Hydroxy      30 - 96 ng/mL  95   PTH      9.0 - 77.0 pg/mL  49.7   Testosterone, Total      304 - 1227 ng/dL 383

## 2022-04-22 ENCOUNTER — LAB VISIT (OUTPATIENT)
Dept: LAB | Facility: HOSPITAL | Age: 82
End: 2022-04-22
Attending: INTERNAL MEDICINE
Payer: MEDICARE

## 2022-04-22 ENCOUNTER — OFFICE VISIT (OUTPATIENT)
Dept: FAMILY MEDICINE | Facility: CLINIC | Age: 82
End: 2022-04-22
Payer: MEDICARE

## 2022-04-22 VITALS
HEIGHT: 67 IN | OXYGEN SATURATION: 97 % | WEIGHT: 166.25 LBS | BODY MASS INDEX: 26.09 KG/M2 | HEART RATE: 78 BPM | TEMPERATURE: 98 F | SYSTOLIC BLOOD PRESSURE: 130 MMHG | DIASTOLIC BLOOD PRESSURE: 64 MMHG

## 2022-04-22 DIAGNOSIS — E11.9 TYPE 2 DIABETES MELLITUS WITHOUT COMPLICATION, WITHOUT LONG-TERM CURRENT USE OF INSULIN: ICD-10-CM

## 2022-04-22 DIAGNOSIS — F32.0 CURRENT MILD EPISODE OF MAJOR DEPRESSIVE DISORDER WITHOUT PRIOR EPISODE: ICD-10-CM

## 2022-04-22 DIAGNOSIS — E11.36 TYPE 2 DIABETES MELLITUS WITH DIABETIC CATARACT, WITHOUT LONG-TERM CURRENT USE OF INSULIN: ICD-10-CM

## 2022-04-22 DIAGNOSIS — F41.9 ANXIETY: ICD-10-CM

## 2022-04-22 DIAGNOSIS — N18.31 STAGE 3A CHRONIC KIDNEY DISEASE: ICD-10-CM

## 2022-04-22 DIAGNOSIS — E29.1 HYPOGONADISM IN MALE: ICD-10-CM

## 2022-04-22 DIAGNOSIS — I70.0 ABDOMINAL AORTIC ATHEROSCLEROSIS: ICD-10-CM

## 2022-04-22 DIAGNOSIS — I25.118 CORONARY ARTERY DISEASE OF NATIVE ARTERY OF NATIVE HEART WITH STABLE ANGINA PECTORIS: ICD-10-CM

## 2022-04-22 DIAGNOSIS — I10 ESSENTIAL HYPERTENSION: Primary | ICD-10-CM

## 2022-04-22 DIAGNOSIS — N28.1 RENAL CYST, LEFT: ICD-10-CM

## 2022-04-22 DIAGNOSIS — Z95.1 STATUS POST CORONARY ARTERY BYPASS GRAFTING: ICD-10-CM

## 2022-04-22 DIAGNOSIS — C61 PROSTATE CANCER: ICD-10-CM

## 2022-04-22 DIAGNOSIS — K22.70 BARRETT'S ESOPHAGUS WITHOUT DYSPLASIA: ICD-10-CM

## 2022-04-22 LAB — COMPLEXED PSA SERPL-MCNC: 0.3 NG/ML (ref 0–4)

## 2022-04-22 PROCEDURE — 3288F FALL RISK ASSESSMENT DOCD: CPT | Mod: CPTII,S$GLB,, | Performed by: INTERNAL MEDICINE

## 2022-04-22 PROCEDURE — 1159F MED LIST DOCD IN RCRD: CPT | Mod: CPTII,S$GLB,, | Performed by: INTERNAL MEDICINE

## 2022-04-22 PROCEDURE — 1160F PR REVIEW ALL MEDS BY PRESCRIBER/CLIN PHARMACIST DOCUMENTED: ICD-10-PCS | Mod: CPTII,S$GLB,, | Performed by: INTERNAL MEDICINE

## 2022-04-22 PROCEDURE — 99499 RISK ADDL DX/OHS AUDIT: ICD-10-PCS | Mod: S$GLB,,, | Performed by: INTERNAL MEDICINE

## 2022-04-22 PROCEDURE — 1101F PR PT FALLS ASSESS DOC 0-1 FALLS W/OUT INJ PAST YR: ICD-10-PCS | Mod: CPTII,S$GLB,, | Performed by: INTERNAL MEDICINE

## 2022-04-22 PROCEDURE — 1160F RVW MEDS BY RX/DR IN RCRD: CPT | Mod: CPTII,S$GLB,, | Performed by: INTERNAL MEDICINE

## 2022-04-22 PROCEDURE — 99999 PR PBB SHADOW E&M-EST. PATIENT-LVL IV: CPT | Mod: PBBFAC,,, | Performed by: INTERNAL MEDICINE

## 2022-04-22 PROCEDURE — 1126F PR PAIN SEVERITY QUANTIFIED, NO PAIN PRESENT: ICD-10-PCS | Mod: CPTII,S$GLB,, | Performed by: INTERNAL MEDICINE

## 2022-04-22 PROCEDURE — 99999 PR PBB SHADOW E&M-EST. PATIENT-LVL IV: ICD-10-PCS | Mod: PBBFAC,,, | Performed by: INTERNAL MEDICINE

## 2022-04-22 PROCEDURE — 1101F PT FALLS ASSESS-DOCD LE1/YR: CPT | Mod: CPTII,S$GLB,, | Performed by: INTERNAL MEDICINE

## 2022-04-22 PROCEDURE — 99214 PR OFFICE/OUTPT VISIT, EST, LEVL IV, 30-39 MIN: ICD-10-PCS | Mod: S$GLB,,, | Performed by: INTERNAL MEDICINE

## 2022-04-22 PROCEDURE — 3078F DIAST BP <80 MM HG: CPT | Mod: CPTII,S$GLB,, | Performed by: INTERNAL MEDICINE

## 2022-04-22 PROCEDURE — 3075F SYST BP GE 130 - 139MM HG: CPT | Mod: CPTII,S$GLB,, | Performed by: INTERNAL MEDICINE

## 2022-04-22 PROCEDURE — 1126F AMNT PAIN NOTED NONE PRSNT: CPT | Mod: CPTII,S$GLB,, | Performed by: INTERNAL MEDICINE

## 2022-04-22 PROCEDURE — 1159F PR MEDICATION LIST DOCUMENTED IN MEDICAL RECORD: ICD-10-PCS | Mod: CPTII,S$GLB,, | Performed by: INTERNAL MEDICINE

## 2022-04-22 PROCEDURE — 84153 ASSAY OF PSA TOTAL: CPT | Performed by: INTERNAL MEDICINE

## 2022-04-22 PROCEDURE — 3075F PR MOST RECENT SYSTOLIC BLOOD PRESS GE 130-139MM HG: ICD-10-PCS | Mod: CPTII,S$GLB,, | Performed by: INTERNAL MEDICINE

## 2022-04-22 PROCEDURE — 3078F PR MOST RECENT DIASTOLIC BLOOD PRESSURE < 80 MM HG: ICD-10-PCS | Mod: CPTII,S$GLB,, | Performed by: INTERNAL MEDICINE

## 2022-04-22 PROCEDURE — 36415 COLL VENOUS BLD VENIPUNCTURE: CPT | Mod: PO | Performed by: INTERNAL MEDICINE

## 2022-04-22 PROCEDURE — 3288F PR FALLS RISK ASSESSMENT DOCUMENTED: ICD-10-PCS | Mod: CPTII,S$GLB,, | Performed by: INTERNAL MEDICINE

## 2022-04-22 PROCEDURE — 99499 UNLISTED E&M SERVICE: CPT | Mod: S$GLB,,, | Performed by: INTERNAL MEDICINE

## 2022-04-22 PROCEDURE — 99214 OFFICE O/P EST MOD 30 MIN: CPT | Mod: S$GLB,,, | Performed by: INTERNAL MEDICINE

## 2022-04-22 RX ORDER — ALPRAZOLAM 1 MG/1
1 TABLET ORAL NIGHTLY PRN
Qty: 30 TABLET | Refills: 0 | Status: SHIPPED | OUTPATIENT
Start: 2022-04-22 | End: 2022-07-27 | Stop reason: SDUPTHER

## 2022-04-22 RX ORDER — PANTOPRAZOLE SODIUM 40 MG/1
40 TABLET, DELAYED RELEASE ORAL DAILY
Qty: 90 TABLET | Refills: 3 | Status: SHIPPED | OUTPATIENT
Start: 2022-04-22 | End: 2022-10-24 | Stop reason: ALTCHOICE

## 2022-04-23 NOTE — PROGRESS NOTES
PSA 0.3  Status post radiation treatment for prostate cancer  Has follow-up with Urology upcoming.  Keep the appointment

## 2022-04-25 ENCOUNTER — PATIENT MESSAGE (OUTPATIENT)
Dept: FAMILY MEDICINE | Facility: CLINIC | Age: 82
End: 2022-04-25
Payer: MEDICARE

## 2022-06-17 DIAGNOSIS — E11.36 TYPE 2 DIABETES MELLITUS WITH DIABETIC CATARACT, WITHOUT LONG-TERM CURRENT USE OF INSULIN: ICD-10-CM

## 2022-06-17 RX ORDER — METFORMIN HYDROCHLORIDE 500 MG/1
TABLET ORAL
Qty: 90 TABLET | Refills: 1 | Status: SHIPPED | OUTPATIENT
Start: 2022-06-17 | End: 2023-08-11

## 2022-06-17 NOTE — TELEPHONE ENCOUNTER
Refill Decision Note   Eric Jackson  is requesting a refill authorization.  Brief Assessment and Rationale for Refill:  Approve     Medication Therapy Plan:       Medication Reconciliation Completed: No   Comments:     No Care Gaps recommended.     Note composed:5:59 PM 06/17/2022

## 2022-06-17 NOTE — TELEPHONE ENCOUNTER
No new care gaps identified.  [facility] Embedded Care Gaps. Reference number: [id number]. 6/17/2022   5:41:31 PM CDT

## 2022-07-27 ENCOUNTER — PATIENT MESSAGE (OUTPATIENT)
Dept: FAMILY MEDICINE | Facility: CLINIC | Age: 82
End: 2022-07-27
Payer: MEDICARE

## 2022-07-27 DIAGNOSIS — F41.9 ANXIETY: ICD-10-CM

## 2022-07-27 RX ORDER — ALPRAZOLAM 1 MG/1
1 TABLET ORAL NIGHTLY PRN
Qty: 30 TABLET | Refills: 0 | Status: SHIPPED | OUTPATIENT
Start: 2022-07-27 | End: 2022-10-24 | Stop reason: SDUPTHER

## 2022-07-27 NOTE — TELEPHONE ENCOUNTER
Care Due:                  Date            Visit Type   Department     Provider  --------------------------------------------------------------------------------                                [facility]                              PRIMARY      MED/ INTERNAL  Last Visit: 04-      CARE ([facility])   MED/ DEBBIE Boo                              [facility]                              PRIMARY      MED/ INTERNAL  Next Visit: 10-      CARE ([facility])   MED/ DEBBIE Boo                                                            Last  Test          Frequency    Reason                     Performed    Due Date  --------------------------------------------------------------------------------    HBA1C.......  6 months...  metFORMIN................  04-   10-    [facility] Embedded Care Gaps. Reference number: [id number]. 7/27/2022   7:13:29 AM CDT

## 2022-07-30 ENCOUNTER — PATIENT MESSAGE (OUTPATIENT)
Dept: FAMILY MEDICINE | Facility: CLINIC | Age: 82
End: 2022-07-30
Payer: MEDICARE

## 2022-08-29 ENCOUNTER — PES CALL (OUTPATIENT)
Dept: ADMINISTRATIVE | Facility: CLINIC | Age: 82
End: 2022-08-29
Payer: MEDICARE

## 2022-09-26 DIAGNOSIS — I10 ESSENTIAL HYPERTENSION: ICD-10-CM

## 2022-09-26 RX ORDER — LOSARTAN POTASSIUM AND HYDROCHLOROTHIAZIDE 12.5; 5 MG/1; MG/1
1 TABLET ORAL DAILY
Qty: 90 TABLET | Refills: 3 | Status: SHIPPED | OUTPATIENT
Start: 2022-09-26 | End: 2023-02-10

## 2022-09-26 NOTE — TELEPHONE ENCOUNTER
Care Due:                  Date            Visit Type   Department     Provider  --------------------------------------------------------------------------------                                [facility]                              PRIMARY      MED/ INTERNAL  Last Visit: 04-      CARE ([facility])   MED/ DEBBIE Boo                              [facility]                              PRIMARY      MED/ INTERNAL  Next Visit: 10-      CARE ([facility])   MED/ PEDNABEEL Boo                                                            Last  Test          Frequency    Reason                     Performed    Due Date  --------------------------------------------------------------------------------    HBA1C.......  6 months...  metFORMIN................  04-   10-    Health [facility] Embedded Care Gaps. Reference number: [id number]. 9/26/2022   11:36:18 AM CDT

## 2022-10-17 ENCOUNTER — LAB VISIT (OUTPATIENT)
Dept: LAB | Facility: HOSPITAL | Age: 82
End: 2022-10-17
Attending: INTERNAL MEDICINE
Payer: MEDICARE

## 2022-10-17 DIAGNOSIS — E11.36 TYPE 2 DIABETES MELLITUS WITH DIABETIC CATARACT, WITHOUT LONG-TERM CURRENT USE OF INSULIN: ICD-10-CM

## 2022-10-17 DIAGNOSIS — N18.31 STAGE 3A CHRONIC KIDNEY DISEASE: ICD-10-CM

## 2022-10-17 DIAGNOSIS — E29.1 HYPOGONADISM IN MALE: ICD-10-CM

## 2022-10-17 LAB
ALBUMIN SERPL BCP-MCNC: 4 G/DL (ref 3.5–5.2)
ALP SERPL-CCNC: 66 U/L (ref 55–135)
ALT SERPL W/O P-5'-P-CCNC: 26 U/L (ref 10–44)
ANION GAP SERPL CALC-SCNC: 8 MMOL/L (ref 8–16)
AST SERPL-CCNC: 25 U/L (ref 10–40)
BASOPHILS # BLD AUTO: 0.02 K/UL (ref 0–0.2)
BASOPHILS NFR BLD: 0.3 % (ref 0–1.9)
BILIRUB SERPL-MCNC: 0.6 MG/DL (ref 0.1–1)
BUN SERPL-MCNC: 13 MG/DL (ref 8–23)
CALCIUM SERPL-MCNC: 9.4 MG/DL (ref 8.7–10.5)
CHLORIDE SERPL-SCNC: 101 MMOL/L (ref 95–110)
CO2 SERPL-SCNC: 29 MMOL/L (ref 23–29)
CREAT SERPL-MCNC: 1.1 MG/DL (ref 0.5–1.4)
DIFFERENTIAL METHOD: NORMAL
EOSINOPHIL # BLD AUTO: 0.5 K/UL (ref 0–0.5)
EOSINOPHIL NFR BLD: 8 % (ref 0–8)
ERYTHROCYTE [DISTWIDTH] IN BLOOD BY AUTOMATED COUNT: 13.4 % (ref 11.5–14.5)
EST. GFR  (NO RACE VARIABLE): >60 ML/MIN/1.73 M^2
ESTIMATED AVG GLUCOSE: 137 MG/DL (ref 68–131)
GLUCOSE SERPL-MCNC: 116 MG/DL (ref 70–110)
HBA1C MFR BLD: 6.4 % (ref 4–5.6)
HCT VFR BLD AUTO: 46.9 % (ref 40–54)
HGB BLD-MCNC: 15.4 G/DL (ref 14–18)
IMM GRANULOCYTES # BLD AUTO: 0.01 K/UL (ref 0–0.04)
IMM GRANULOCYTES NFR BLD AUTO: 0.2 % (ref 0–0.5)
LYMPHOCYTES # BLD AUTO: 1.5 K/UL (ref 1–4.8)
LYMPHOCYTES NFR BLD: 26.2 % (ref 18–48)
MCH RBC QN AUTO: 30.6 PG (ref 27–31)
MCHC RBC AUTO-ENTMCNC: 32.8 G/DL (ref 32–36)
MCV RBC AUTO: 93 FL (ref 82–98)
MONOCYTES # BLD AUTO: 0.6 K/UL (ref 0.3–1)
MONOCYTES NFR BLD: 10.7 % (ref 4–15)
NEUTROPHILS # BLD AUTO: 3.2 K/UL (ref 1.8–7.7)
NEUTROPHILS NFR BLD: 54.6 % (ref 38–73)
NRBC BLD-RTO: 0 /100 WBC
PLATELET # BLD AUTO: 195 K/UL (ref 150–450)
PMV BLD AUTO: 10.3 FL (ref 9.2–12.9)
POTASSIUM SERPL-SCNC: 4.2 MMOL/L (ref 3.5–5.1)
PROT SERPL-MCNC: 7 G/DL (ref 6–8.4)
RBC # BLD AUTO: 5.03 M/UL (ref 4.6–6.2)
SODIUM SERPL-SCNC: 138 MMOL/L (ref 136–145)
TESTOST SERPL-MCNC: 297 NG/DL (ref 304–1227)
WBC # BLD AUTO: 5.88 K/UL (ref 3.9–12.7)

## 2022-10-17 PROCEDURE — 85025 COMPLETE CBC W/AUTO DIFF WBC: CPT | Performed by: INTERNAL MEDICINE

## 2022-10-17 PROCEDURE — 36415 COLL VENOUS BLD VENIPUNCTURE: CPT | Mod: PO | Performed by: INTERNAL MEDICINE

## 2022-10-17 PROCEDURE — 84403 ASSAY OF TOTAL TESTOSTERONE: CPT | Performed by: INTERNAL MEDICINE

## 2022-10-17 PROCEDURE — 83036 HEMOGLOBIN GLYCOSYLATED A1C: CPT | Performed by: INTERNAL MEDICINE

## 2022-10-17 PROCEDURE — 80053 COMPREHEN METABOLIC PANEL: CPT | Performed by: INTERNAL MEDICINE

## 2022-10-22 NOTE — PROGRESS NOTES
This note was created by combination of typed  and M-Modal dictation.  Transcription errors may be present.  If there are any questions, please contact me.    Assessment and Plan:   Type 2 diabetes mellitus with diabetic cataract, without long-term current use of insulin  Type 2 diabetes mellitus without complication, without long-term current use of insulin  -pre visit labs stable.  Working on TLC. On metformin. On ARB. Not on statin - intolerant.  -     Comprehensive Metabolic Panel; Future; Expected date: 04/22/2023  -     Lipid Panel; Future; Expected date: 04/22/2023  -     Hemoglobin A1C; Future; Expected date: 04/22/2023  -     Microalbumin/Creatinine Ratio, Urine; Future; Expected date: 04/22/2023  -     CBC Auto Differential; Future; Expected date: 04/22/2023    Essential hypertension  Stage 3a chronic kidney disease  -BP stable. Labs creatinine stable. On ARB    Coronary artery disease 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; 3/2019 [facility] patent graft; Culprit likely in small Diag2 (unrevascularized) and likely demand related  Status post coronary artery bypass grafting single vessel  Abdominal aortic atherosclerosis  -intolerant to statins and zetia.  Needs to f/u with cardiology.    Prostate cancer 2/25/19 Transrectal ultrasound of prostate and gold fiducial marker placement  -needs to f/u with urology.  Check future surveillance PSA.   -     Prostate Specific Antigen, Diagnostic; Future; Expected date: 04/24/2023    Hypogonadism in male  -has not been taking androgel, restarted when he saw lab results.  F/u with endocrinology.    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 2/2018; 10/2020 EGD with suspicion for barretts; repeat 2023  -interestingly thinks that pantoprazole seems to worsen symptoms. Trial of alternate PPI.  Due for eGD 2023.  -     omeprazole (PRILOSEC) 40 MG capsule; Take 1 capsule (40 mg total) by mouth once daily.  Dispense: 90 capsule; Refill:  3    Anxiety  -prn use of alprazolam.  Refills about every 60 days.  Refilled to pharmacy.  Stable.  Wife recovering from SBO with resultant surgical resection.    queried no aberrancy  -     ALPRAZolam (XANAX) 1 MG tablet; Take 1 tablet (1 mg total) by mouth nightly as needed for Anxiety.  Dispense: 30 tablet; Refill: 2    HPV (human papilloma virus) infection  -hx of imiquimod and cryotherapy. Requesting referral to derm for evaluation and requesting RF in the interim.  -     imiquimod (ALDARA) 5 % cream; Apply topically 3 (three) times a week.  Dispense: 24 packet; Refill: 2  -     Ambulatory referral/consult to Dermatology; Future; Expected date: 10/31/2022    Eczema, unspecified type  -requesting referral to derm.  Hx of clobetasol in the past.   -     Ambulatory referral/consult to Dermatology; Future; Expected date: 10/31/2022    He'll get vaccines at pharmacy - gets a discount.    Medications Discontinued During This Encounter   Medication Reason    pantoprazole (PROTONIX) 40 MG tablet Alternate therapy    ALPRAZolam (XANAX) 1 MG tablet Reorder       meds sent this encounter:  Medications Ordered This Encounter   Medications    ALPRAZolam (XANAX) 1 MG tablet     Sig: Take 1 tablet (1 mg total) by mouth nightly as needed for Anxiety.     Dispense:  30 tablet     Refill:  2    imiquimod (ALDARA) 5 % cream     Sig: Apply topically 3 (three) times a week.     Dispense:  24 packet     Refill:  2    omeprazole (PRILOSEC) 40 MG capsule     Sig: Take 1 capsule (40 mg total) by mouth once daily.     Dispense:  90 capsule     Refill:  3     Instead of pantoprazole       Follow Up: No follow-ups on file. OV 6 months with labs    Subjective:     Chief Complaint   Patient presents with    Follow-up     6 months        HPI  Eric is a 81 y.o. male, last appointment with this clinic was Visit date not found.  Social History     Tobacco Use    Smoking status: Former     Packs/day: 2.00     Years: 23.00     Pack years:  46.00     Types: Cigarettes     Quit date: 1989     Years since quittin.8    Smokeless tobacco: Never   Substance Use Topics    Alcohol use: Yes     Alcohol/week: 1.0 standard drink     Types: 1 Cans of beer per week     Comment: occassional          Social History     Social History Narrative    Not on file       Last visit 2022  HTN, CKD stage 3a stable  Anxiety, MDD, hx effexor a week at a time, if takes for longer, too strong. Alprazolam prn. If increased frequency restart low dose effexor 37.5  CAD intolerant of statins and zetia. Referred back to cardiology  DM2, stable, needs to work on TLC  Prostate CA needs to f/u with urology  Low testosterone not on replacement. Labs 2022 testo wnl off meds  Up's restart PPI. Repeat EGD   Left kidney simple cyst on workup  Aortic atherosclerosis with possible ulcerated plaque.  seen by vascular. No vascular surgical intervention indicated.    Has not seen cards or urology since last OV; no showed cards. Canceled urology    Pre visit labs  CBC WNL  CMP WNL  A1c 6.4 from 6.6  Testosterone low 297    2022 lipid high    Took the PPI for a few weeks but found that it worsened his reflux actually.  The pantoprazole 40 mg. Discussed findings of up's.  Would try alternate PPI and see if it is specific to pantoprazole. Due for EGD .     Had been more lax with diet and physical activity but restarted when he review his lab results online. Weights and treadmill.     Xanax fills on average every 2 months or so.  Sometimes will go a few days without taking and then mind gets very active.  Tries to refrain from taking it.    Caretaker for his wife.  She had SBO requiring resection.  Was losing weight with low appetite for a while and finally developed abd pain, went in for dx, dx'd with what sounds like SBO, underwent partial bowel resection.  Sounds like she had some ischemia and so they left the wound open, re-explored post resection with improvement  in blood flow/resolution of ischemia.  Had to heal by secondary intention.  Ultimately required skin graft.  Was a long slow recovery process.  Is only now just starting to recover.  Surgery was done in march.    Thinks that during all this he came as close as he might have ever gotten to getting nervous breakdown  Corresponds with increased refill frequency   Gets tearful in recounting the circumstances  His mother  of SBO    Requesting referral to dermatology.  Hx of HPV in the  area. Hx of cryotherapy and phodophylline.  Took about 8 years without relief. Was then on a job in [place] - was up there and was rx'd imiquimod and found that the imiquimod helped better. Requesting to see derm as he started noticing lesion about 6 months ago. But requesting RF on imiquimod in the interim.    And hx of topical steroid for skin rash on the lower legs.  Hx of topical steroid in the past.  Eczema?     Was not taking the topical androgen.  Saw the labs and so restarted taking it.  Initial symptoms of working out with fatigue/inadequate recovery.   Has 3 bottles at home.     Home BP readings are good he reports.  BP good on intake.     Patient Care Team:  Samir Boo MD as PCP - General (Internal Medicine)  Melanie Orantes MA as Care Coordinator  René Bills MD (Cardiology)  Sheila Braden OD as Consulting Physician (Optometry)  Layne Huff MD as Consulting Physician (Urology)  Loida Evans MD as Consulting Physician (Endocrinology)  Loc Garcia MD as Consulting Physician (Radiation Oncology)    Patient Active Problem List    Diagnosis Date Noted    Chronic left shoulder pain 2021    Decreased range of motion of left shoulder 2021    Shoulder weakness 2021    Stage 3a chronic kidney disease 01/15/2021    Epiretinal membrane (ERM) of both eyes 09/15/2020    Type 2 diabetes mellitus with diabetic cataract, without long-term current use of insulin 2020    Current mild  episode of major depressive disorder without prior episode 02/14/2020    Abdominal aortic atherosclerosis 12/09/2019 4/2021 CT abd: There is prominent atherosclerotic plaquing of the abdominal aorta with probable eccentric mixed plaque with ulcerated plaque to be considered specifically axial image 68 series 4 with the aorta measuring 2.5 cm in diameter at this level      History of DVT from PICC line right upper extremity 4/2019 anticoag x 3 months then stopped 04/29/2019 4/28/19 RUE US: The right internal jugular vein is patent.  Partial thrombus of the right subclavian vein with floating thrombus.  The right axillary vein is completely thrombosed.  Complete thrombosis of the right basilic vein.  The cephalic vein is patent.  The proximal brachial vein is occluded, the mid and distal vein demonstrate flow within.    7/3/19 RUE US: Residual nonocclusive thrombus in the right subclavian and basilic veins, significantly improved from the 04/28/2019 exam.  No new thrombus.  12/19/19 RUE US: No DVT in the right upper extremity.  Resolved thrombus in the right subclavian vein and mild residual chronic thrombus in the right superficial basilic vein, improved.      Refractive error 04/09/2019    Intermediate stage nonexudative age-related macular degeneration of both eyes 04/09/2019    Nuclear sclerosis of both eyes 04/09/2019    Status post coronary artery bypass grafting single vessel 03/07/2019    Renal cyst, left, simple 1st seen 2/2019 CT; verified on renal US 11/2020 03/02/2019 2/2019 CT abd/pelvis: Small left renal hypodensity which measures slightly higher than simple fluid density.  This could represent a small cyst or complex cyst.  Future nonemergent ultrasound follow-up could be obtained to ensure cystic nature of this lesion.  11/16/20 renal US Simple left renal cyst.      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; 3/2019 [facility] patent graft; Culprit likely in small Diag2 (unrevascularized) and likely demand related 03/01/2019     3/2019 [facility]  LM: dist 50%  LAD: mid 99%, competitive flow from LIMA-LAD-OM-RPDA              Diag2 prox 90%, T2 flow (not bypassed)  LCx: prox , territory supplied by LIMA-LAD-OM-RPDA  RCA: mid , territory supplied by LIMA-LAD-OM-RPDA     LIMA-LAD-OM-RPDA patent   No SVG identified     Impression:  NSTEMI  3V CAD, normal LV fxn  Patent LIMA-LAD-OM-RPDA  Culprit likely in small Diag2 (unrevascularized) and likely demand related     Plan:  Cont med rx  ASA 81mg qd indefinitely  Plavix 75mg qd for 1 year (thru 3/2020)      Prostate cancer 2/25/19 Transrectal ultrasound of prostate and gold fiducial marker placement 01/29/2019 1/7/19 biopsy showed intermediate risk Tanish[place] 3+4 PCa in 3/12 cores. [place] 4 up to 30% of one core.   4/2019- Dr. Gurjit Garcai treated patient with External beam, radiation therapy- [facility]      Tubular adenoma of colon 2/2018 3 adenomas repeat 3 years 01/11/2019    Hypogonadism in male 01/11/2019    Slow urinary stream 12/07/2018    Insomnia 08/10/2014    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 2/2018; 10/2020 EGD with suspicion for barretts; repeat 2023      10/8/2020 EGD Esophageal mucosal changes suggestive of long-segment Archer's esophagus, classified as Archer's stage C10-M12 per [place] criteria. WATS-3D brush biopsy specimens obtained      Anxiety     Depression     Essential hypertension 11/14/2012    Type 2 diabetes mellitus without complication, without long-term current use of insulin 11/14/2012       PAST MEDICAL PROBLEMS, PAST SURGICAL HISTORY: please see relevant portions of the electronic medical record    ALLERGIES AND MEDICATIONS: updated and reviewed.  Review of patient's allergies indicates:  "  Allergen Reactions    Tamsulosin     Prochlorperazine      convulsions    Statins-hmg-coa reductase inhibitors Other (See Comments)     Muscle weakness       Medication List with Changes/Refills   Current Medications    ALPRAZOLAM (XANAX) 1 MG TABLET    Take 1 tablet (1 mg total) by mouth nightly as needed for Anxiety.    ANDROGEL 20.25 MG/1.25 GRAM (1.62 %) GLPM    One pump over each shoulder    ASPIRIN (ECOTRIN) 81 MG EC TABLET    Take 1 tablet (81 mg total) by mouth once daily.    BLOOD GLUCOSE CONTROL, LOW (TRUE METRIX LEVEL 1) SOLN    To use with blood glucose meter    BLOOD SUGAR DIAGNOSTIC (TRUE METRIX GLUCOSE TEST STRIP) STRP    For two times daily checking    BLOOD-GLUCOSE METER (TRUE METRIX GLUCOSE METER) KIT    For twice daily checking    LANCETS (TRUEPLUS LANCETS) 33 GAUGE MISC    For twice daily checking    LANCETS MISC    To check BG 3 times daily, to use with insurance preferred meter    LOSARTAN-HYDROCHLOROTHIAZIDE 50-12.5 MG (HYZAAR) 50-12.5 MG PER TABLET    Take 1 tablet by mouth once daily.    METFORMIN (GLUCOPHAGE) 500 MG TABLET    TAKE 1 TABLET EVERY DAY WITH BREAKFAST    PANTOPRAZOLE (PROTONIX) 40 MG TABLET    Take 1 tablet (40 mg total) by mouth once daily.    SILDENAFIL (VIAGRA) 25 MG TABLET    Take 1 tablet (25 mg total) by mouth daily as needed for Erectile Dysfunction.    VIT C/VIT E AC/LUT/COPPER/ZINC (PRESERVISION LUTEIN ORAL)    Take 1 tablet by mouth once daily.    VITAMIN D 1000 UNITS TAB    Take 1,000 Units by mouth once daily.        Objective:   Physical Exam   Vitals:    10/24/22 0940   BP: 120/78   BP Location: Left arm   Patient Position: Sitting   BP Method: Medium (Manual)   Pulse: 82   Temp: 97.8 °F (36.6 °C)   TempSrc: Oral   SpO2: 95%   Weight: 78.5 kg (173 lb 2.7 oz)   Height: 5' 7" (1.702 m)    Body mass index is 27.12 kg/m².  Weight: 78.5 kg (173 lb 2.7 oz)   Height: 5' 7" (170.2 cm)     Physical Exam  Constitutional:       General: He is not in acute distress.     " Appearance: He is well-developed.   Eyes:      Extraocular Movements: Extraocular movements intact.   Cardiovascular:      Rate and Rhythm: Normal rate and regular rhythm.      Heart sounds: Normal heart sounds. No murmur heard.  Pulmonary:      Effort: Pulmonary effort is normal.      Breath sounds: Normal breath sounds.   Musculoskeletal:         General: Normal range of motion.   Skin:     General: Skin is warm and dry.   Neurological:      Mental Status: He is alert and oriented to person, place, and time.      Coordination: Coordination normal.   Psychiatric:         Behavior: Behavior normal.         Thought Content: Thought content normal.       Component      Latest Ref Rng & Units 10/17/2022 4/22/2022 4/16/2022   WBC      3.90 - 12.70 K/uL 5.88  6.31   RBC      4.60 - 6.20 M/uL 5.03  4.98   Hemoglobin      14.0 - 18.0 g/dL 15.4  14.8   Hematocrit      40.0 - 54.0 % 46.9  46.1   MCV      82 - 98 fL 93  93   MCH      27.0 - 31.0 pg 30.6  29.7   MCHC      32.0 - 36.0 g/dL 32.8  32.1   RDW      11.5 - 14.5 % 13.4  13.7   Platelets      150 - 450 K/uL 195  205   MPV      9.2 - 12.9 fL 10.3  10.1   Immature Granulocytes      0.0 - 0.5 % 0.2  0.2   Gran # (ANC)      1.8 - 7.7 K/uL 3.2  3.4   Immature Grans (Abs)      0.00 - 0.04 K/uL 0.01  0.01   Lymph #      1.0 - 4.8 K/uL 1.5  1.9   Mono #      0.3 - 1.0 K/uL 0.6  0.6   Eos #      0.0 - 0.5 K/uL 0.5  0.4   Baso #      0.00 - 0.20 K/uL 0.02  0.03   nRBC      0 /100 WBC 0  0   Gran %      38.0 - 73.0 % 54.6  54.0   Lymph %      18.0 - 48.0 % 26.2  30.4   Mono %      4.0 - 15.0 % 10.7  9.0   Eosinophil %      0.0 - 8.0 % 8.0  5.9   Basophil %      0.0 - 1.9 % 0.3  0.5   Differential Method       Automated  Automated   Sodium      136 - 145 mmol/L 138  139   Potassium      3.5 - 5.1 mmol/L 4.2  4.1   Chloride      95 - 110 mmol/L 101  102   CO2      23 - 29 mmol/L 29  26   Glucose      70 - 110 mg/dL 116 (H)  120 (H)   BUN      8 - 23 mg/dL 13  18   Creatinine       0.5 - 1.4 mg/dL 1.1  1.1   Calcium      8.7 - 10.5 mg/dL 9.4  9.6   PROTEIN TOTAL      6.0 - 8.4 g/dL 7.0  7.2   Albumin      3.5 - 5.2 g/dL 4.0  4.0   BILIRUBIN TOTAL      0.1 - 1.0 mg/dL 0.6  0.5   Alkaline Phosphatase      55 - 135 U/L 66  67   AST      10 - 40 U/L 25  25   ALT      10 - 44 U/L 26  21   Anion Gap      8 - 16 mmol/L 8  11   eGFR if African American      >60 mL/min/1.73 m:2   >60.0   eGFR if non African American      >60 mL/min/1.73 m:2   >60.0   eGFR      >60 mL/min/1.73 m:2 >60.0     Cholesterol      120 - 199 mg/dL   236 (H)   Triglycerides      30 - 150 mg/dL   222 (H)   HDL      40 - 75 mg/dL   51   LDL Cholesterol External      63.0 - 159.0 mg/dL   140.6   HDL/Cholesterol Ratio      20.0 - 50.0 %   21.6   Total Cholesterol/HDL Ratio      2.0 - 5.0   4.6   Non-HDL Cholesterol      mg/dL   185   Microalbumin, Urine      ug/mL   8.0   Creatinine, Urine      23.0 - 375.0 mg/dL   72.0   MICROALB/CREAT RATIO      0.0 - 30.0 ug/mg   11.1   Hemoglobin A1C External      4.0 - 5.6 % 6.4 (H)  6.6 (H)   Estimated Avg Glucose      68 - 131 mg/dL 137 (H)  143 (H)   Testosterone, Total      304 - 1227 ng/dL 297 (L)  383   PSA Diagnostic      0.00 - 4.00 ng/mL  0.30

## 2022-10-24 ENCOUNTER — OFFICE VISIT (OUTPATIENT)
Dept: FAMILY MEDICINE | Facility: CLINIC | Age: 82
End: 2022-10-24
Payer: MEDICARE

## 2022-10-24 VITALS
BODY MASS INDEX: 27.18 KG/M2 | DIASTOLIC BLOOD PRESSURE: 78 MMHG | WEIGHT: 173.19 LBS | HEART RATE: 82 BPM | HEIGHT: 67 IN | TEMPERATURE: 98 F | SYSTOLIC BLOOD PRESSURE: 120 MMHG | OXYGEN SATURATION: 95 %

## 2022-10-24 DIAGNOSIS — E11.36 TYPE 2 DIABETES MELLITUS WITH DIABETIC CATARACT, WITHOUT LONG-TERM CURRENT USE OF INSULIN: Primary | ICD-10-CM

## 2022-10-24 DIAGNOSIS — E11.9 TYPE 2 DIABETES MELLITUS WITHOUT COMPLICATION, WITHOUT LONG-TERM CURRENT USE OF INSULIN: ICD-10-CM

## 2022-10-24 DIAGNOSIS — Z95.1 STATUS POST CORONARY ARTERY BYPASS GRAFTING: ICD-10-CM

## 2022-10-24 DIAGNOSIS — I70.0 ABDOMINAL AORTIC ATHEROSCLEROSIS: ICD-10-CM

## 2022-10-24 DIAGNOSIS — B97.7 HPV (HUMAN PAPILLOMA VIRUS) INFECTION: ICD-10-CM

## 2022-10-24 DIAGNOSIS — C61 PROSTATE CANCER: ICD-10-CM

## 2022-10-24 DIAGNOSIS — E29.1 HYPOGONADISM IN MALE: ICD-10-CM

## 2022-10-24 DIAGNOSIS — I10 ESSENTIAL HYPERTENSION: ICD-10-CM

## 2022-10-24 DIAGNOSIS — N18.31 STAGE 3A CHRONIC KIDNEY DISEASE: ICD-10-CM

## 2022-10-24 DIAGNOSIS — I25.118 CORONARY ARTERY DISEASE OF NATIVE ARTERY OF NATIVE HEART WITH STABLE ANGINA PECTORIS: ICD-10-CM

## 2022-10-24 DIAGNOSIS — F41.9 ANXIETY: ICD-10-CM

## 2022-10-24 DIAGNOSIS — L30.9 ECZEMA, UNSPECIFIED TYPE: ICD-10-CM

## 2022-10-24 DIAGNOSIS — K22.70 BARRETT'S ESOPHAGUS WITHOUT DYSPLASIA: ICD-10-CM

## 2022-10-24 PROCEDURE — 99499 RISK ADDL DX/OHS AUDIT: ICD-10-PCS | Mod: S$GLB,,, | Performed by: INTERNAL MEDICINE

## 2022-10-24 PROCEDURE — 1126F AMNT PAIN NOTED NONE PRSNT: CPT | Mod: CPTII,S$GLB,, | Performed by: INTERNAL MEDICINE

## 2022-10-24 PROCEDURE — 99214 PR OFFICE/OUTPT VISIT, EST, LEVL IV, 30-39 MIN: ICD-10-PCS | Mod: S$GLB,,, | Performed by: INTERNAL MEDICINE

## 2022-10-24 PROCEDURE — 3078F PR MOST RECENT DIASTOLIC BLOOD PRESSURE < 80 MM HG: ICD-10-PCS | Mod: CPTII,S$GLB,, | Performed by: INTERNAL MEDICINE

## 2022-10-24 PROCEDURE — 3288F FALL RISK ASSESSMENT DOCD: CPT | Mod: CPTII,S$GLB,, | Performed by: INTERNAL MEDICINE

## 2022-10-24 PROCEDURE — 1159F MED LIST DOCD IN RCRD: CPT | Mod: CPTII,S$GLB,, | Performed by: INTERNAL MEDICINE

## 2022-10-24 PROCEDURE — 99999 PR PBB SHADOW E&M-EST. PATIENT-LVL IV: ICD-10-PCS | Mod: PBBFAC,,, | Performed by: INTERNAL MEDICINE

## 2022-10-24 PROCEDURE — 3288F PR FALLS RISK ASSESSMENT DOCUMENTED: ICD-10-PCS | Mod: CPTII,S$GLB,, | Performed by: INTERNAL MEDICINE

## 2022-10-24 PROCEDURE — 99999 PR PBB SHADOW E&M-EST. PATIENT-LVL IV: CPT | Mod: PBBFAC,,, | Performed by: INTERNAL MEDICINE

## 2022-10-24 PROCEDURE — 99214 OFFICE O/P EST MOD 30 MIN: CPT | Mod: S$GLB,,, | Performed by: INTERNAL MEDICINE

## 2022-10-24 PROCEDURE — 3078F DIAST BP <80 MM HG: CPT | Mod: CPTII,S$GLB,, | Performed by: INTERNAL MEDICINE

## 2022-10-24 PROCEDURE — 3074F SYST BP LT 130 MM HG: CPT | Mod: CPTII,S$GLB,, | Performed by: INTERNAL MEDICINE

## 2022-10-24 PROCEDURE — 1101F PR PT FALLS ASSESS DOC 0-1 FALLS W/OUT INJ PAST YR: ICD-10-PCS | Mod: CPTII,S$GLB,, | Performed by: INTERNAL MEDICINE

## 2022-10-24 PROCEDURE — 3074F PR MOST RECENT SYSTOLIC BLOOD PRESSURE < 130 MM HG: ICD-10-PCS | Mod: CPTII,S$GLB,, | Performed by: INTERNAL MEDICINE

## 2022-10-24 PROCEDURE — 99499 UNLISTED E&M SERVICE: CPT | Mod: S$GLB,,, | Performed by: INTERNAL MEDICINE

## 2022-10-24 PROCEDURE — 1159F PR MEDICATION LIST DOCUMENTED IN MEDICAL RECORD: ICD-10-PCS | Mod: CPTII,S$GLB,, | Performed by: INTERNAL MEDICINE

## 2022-10-24 PROCEDURE — 1126F PR PAIN SEVERITY QUANTIFIED, NO PAIN PRESENT: ICD-10-PCS | Mod: CPTII,S$GLB,, | Performed by: INTERNAL MEDICINE

## 2022-10-24 PROCEDURE — 1101F PT FALLS ASSESS-DOCD LE1/YR: CPT | Mod: CPTII,S$GLB,, | Performed by: INTERNAL MEDICINE

## 2022-10-24 PROCEDURE — 1160F PR REVIEW ALL MEDS BY PRESCRIBER/CLIN PHARMACIST DOCUMENTED: ICD-10-PCS | Mod: CPTII,S$GLB,, | Performed by: INTERNAL MEDICINE

## 2022-10-24 PROCEDURE — 1160F RVW MEDS BY RX/DR IN RCRD: CPT | Mod: CPTII,S$GLB,, | Performed by: INTERNAL MEDICINE

## 2022-10-24 RX ORDER — IMIQUIMOD 12.5 MG/.25G
CREAM TOPICAL
Qty: 24 PACKET | Refills: 2 | Status: SHIPPED | OUTPATIENT
Start: 2022-10-24 | End: 2023-11-08

## 2022-10-24 RX ORDER — ALPRAZOLAM 1 MG/1
1 TABLET ORAL NIGHTLY PRN
Qty: 30 TABLET | Refills: 2 | Status: SHIPPED | OUTPATIENT
Start: 2022-10-24 | End: 2023-04-24 | Stop reason: SDUPTHER

## 2022-10-24 RX ORDER — OMEPRAZOLE 40 MG/1
40 CAPSULE, DELAYED RELEASE ORAL DAILY
Qty: 90 CAPSULE | Refills: 3 | Status: SHIPPED | OUTPATIENT
Start: 2022-10-24 | End: 2023-09-25 | Stop reason: SDUPTHER

## 2022-11-28 ENCOUNTER — OFFICE VISIT (OUTPATIENT)
Dept: OPTOMETRY | Facility: CLINIC | Age: 82
End: 2022-11-28
Payer: COMMERCIAL

## 2022-11-28 DIAGNOSIS — H52.7 REFRACTIVE ERROR: ICD-10-CM

## 2022-11-28 DIAGNOSIS — E11.9 DIABETIC EYE EXAM: Primary | ICD-10-CM

## 2022-11-28 DIAGNOSIS — H35.3132 INTERMEDIATE STAGE NONEXUDATIVE AGE-RELATED MACULAR DEGENERATION OF BOTH EYES: ICD-10-CM

## 2022-11-28 DIAGNOSIS — H35.373 EPIRETINAL MEMBRANE (ERM) OF BOTH EYES: ICD-10-CM

## 2022-11-28 DIAGNOSIS — H25.13 NUCLEAR SCLEROSIS OF BOTH EYES: ICD-10-CM

## 2022-11-28 DIAGNOSIS — Z01.00 DIABETIC EYE EXAM: Primary | ICD-10-CM

## 2022-11-28 PROCEDURE — 92134 POSTERIOR SEGMENT OCT RETINA (OCULAR COHERENCE TOMOGRAPHY)-BOTH EYES: ICD-10-PCS | Mod: S$GLB,,, | Performed by: OPTOMETRIST

## 2022-11-28 PROCEDURE — 92015 PR REFRACTION: ICD-10-PCS | Mod: S$GLB,,, | Performed by: OPTOMETRIST

## 2022-11-28 PROCEDURE — 92014 PR EYE EXAM, EST PATIENT,COMPREHESV: ICD-10-PCS | Mod: S$GLB,,, | Performed by: OPTOMETRIST

## 2022-11-28 PROCEDURE — 92014 COMPRE OPH EXAM EST PT 1/>: CPT | Mod: S$GLB,,, | Performed by: OPTOMETRIST

## 2022-11-28 PROCEDURE — 92015 DETERMINE REFRACTIVE STATE: CPT | Mod: S$GLB,,, | Performed by: OPTOMETRIST

## 2022-11-28 PROCEDURE — 99999 PR PBB SHADOW E&M-EST. PATIENT-LVL III: ICD-10-PCS | Mod: PBBFAC,,, | Performed by: OPTOMETRIST

## 2022-11-28 PROCEDURE — 99999 PR PBB SHADOW E&M-EST. PATIENT-LVL III: CPT | Mod: PBBFAC,,, | Performed by: OPTOMETRIST

## 2022-11-28 PROCEDURE — 92134 CPTRZ OPH DX IMG PST SGM RTA: CPT | Mod: S$GLB,,, | Performed by: OPTOMETRIST

## 2022-11-28 NOTE — PROGRESS NOTES
Subjective:       Patient ID: Eric Jackson is a 82 y.o. male      Chief Complaint   Patient presents with    Concerns About Ocular Health    Diabetic Eye Exam     History of Present Illness  Dls: 11/26/21 Dr. Braden     81 y/o male presents today for diabetic eye exam.   Pt c/o blurry vision at distance ou. Pt wears bifocal glasses.     today     No tearing  No itching  No burning  No pain  No ha's  + ou off/on floaters  No flashes    Eye meds  None    Hemoglobin A1C       Date                     Value               Ref Range             Status                10/17/2022               6.4 (H)             4.0 - 5.6 %           Final                  04/16/2022               6.6 (H)             4.0 - 5.6 %           Final                  10/15/2021               6.4 (H)             4.0 - 5.6 %           Final                  Assessment/Plan:     1. Diabetic eye exam  No diabetic retinopathy. Discussed with pt the effects of diabetes on vision, importance of good blood sugar control, compliance with meds, and follow up care with PCP. Return in 1 year for dilated eye exam, sooner PRN.    2. Nuclear sclerosis of both eyes  Educated pt on presence of cataracts and effects on vision. No surgery at this time. Recheck in one year, sooner PRN.    3. Intermediate stage nonexudative age-related macular degeneration of both eyes  Stable. Pt does home amsler. RTC ASAP if any changes in vision.   - Posterior Segment OCT Retina-Both eyes    4. Epiretinal membrane (ERM) of both eyes  NVS per pt. Monitor.    5. Refractive error  Educated patient on refractive error and discussed lens options. Dispensed updated spectacle Rx. Educated about adaptation period to new specs.    Eyeglass Final Rx       Eyeglass Final Rx         Sphere Cylinder Axis Add    Right -3.50 +1.25 175 +2.50    Left -2.50 +1.00 045 +2.50      Expiration Date: 11/28/2023                      Follow up in about 1 year (around 11/28/2023), or if symptoms  worsen or fail to improve.

## 2023-02-07 ENCOUNTER — NURSE TRIAGE (OUTPATIENT)
Dept: ADMINISTRATIVE | Facility: CLINIC | Age: 83
End: 2023-02-07
Payer: MEDICARE

## 2023-02-07 DIAGNOSIS — Z00.00 ENCOUNTER FOR MEDICARE ANNUAL WELLNESS EXAM: ICD-10-CM

## 2023-02-07 NOTE — TELEPHONE ENCOUNTER
OOC RN  Patient calling  93% oxygen rate. Just taken,   Pains muscles and bones.  For years. Intermittent.Had an episode of dizziness last week and would like to see the dr.  Care advise is patient wants to see the doctor.  For any new or worsening call back OO CRN   Number given And VU.   No appts at [facility].  Offered UC/ED, Refused UC, RR, OAC,    Reason for Disposition   Patient wants to be seen    Additional Information   Negative: SEVERE difficulty breathing (e.g., struggling for each breath, speaks in single words)   Negative: Shock suspected (e.g., cold/pale/clammy skin, too weak to stand, low BP, rapid pulse)   Negative: Difficult to awaken or acting confused (e.g., disoriented, slurred speech)   Negative: Fainted, and still feels dizzy afterwards   Negative: Overdose (accidental or intentional) of medications   Negative: New neurologic deficit that is present now: * Weakness of the face, arm, or leg on one side of the body * Numbness of the face, arm, or leg on one side of the body * Loss of speech or garbled speech   Negative: Heart beating < 50 beats per minute OR > 140 beats per minute   Negative: Sounds like a life-threatening emergency to the triager   Negative: SEVERE dizziness (e.g., unable to stand, requires support to walk, feels like passing out now)   Negative: SEVERE headache or neck pain   Negative: Spinning or tilting sensation (vertigo) present now and one or more stroke risk factors (i.e., hypertension, diabetes mellitus, prior stroke/TIA, heart attack, age over 60) (Exception: prior physician evaluation for this AND no different/worse than usual)   Negative: Neurologic deficit that was brief (now gone), ANY of the following:* Weakness of the face, arm, or leg on one side of the body* Numbness of the face, arm, or leg on one side of the body* Loss of speech or garbled speech   Negative: Loss of vision or double vision  (Exception: Similar to previous migraines.)   Negative: Extra heart beats OR  irregular heart beating (i.e., 'palpitations')   Negative: Difficulty breathing   Negative: Drinking very little and has signs of dehydration (e.g., no urine > 12 hours, very dry mouth, very lightheaded)   Negative: Follows bleeding (e.g., stomach, rectum, vagina)  (Exception: Became dizzy from sight of small amount blood.)   Negative: Patient sounds very sick or weak to the triager   Negative: Lightheadedness (dizziness) present now, after 2 hours of rest and fluids   Negative: Spinning or tilting sensation (vertigo) present now   Negative: Fever > 103 F (39.4 C)   Negative: Fever > 100.0 F (37.8 C) and has diabetes mellitus or a weak immune system (e.g., HIV positive, cancer chemotherapy, organ transplant, splenectomy, chronic steroids)   Negative: MODERATE dizziness (e.g., interferes with normal activities) (Exception: dizziness caused by heat exposure, sudden standing, or poor fluid intake)   Negative: Vomiting occurs with dizziness    Protocols used: Dizziness-A-OH

## 2023-02-08 ENCOUNTER — OFFICE VISIT (OUTPATIENT)
Dept: FAMILY MEDICINE | Facility: CLINIC | Age: 83
End: 2023-02-08
Payer: MEDICARE

## 2023-02-08 VITALS
BODY MASS INDEX: 26.89 KG/M2 | SYSTOLIC BLOOD PRESSURE: 159 MMHG | DIASTOLIC BLOOD PRESSURE: 85 MMHG | RESPIRATION RATE: 18 BRPM | TEMPERATURE: 98 F | WEIGHT: 171.31 LBS | HEIGHT: 67 IN | HEART RATE: 78 BPM | OXYGEN SATURATION: 96 %

## 2023-02-08 DIAGNOSIS — R42 DIZZINESS: Primary | ICD-10-CM

## 2023-02-08 DIAGNOSIS — I10 ESSENTIAL HYPERTENSION: ICD-10-CM

## 2023-02-08 DIAGNOSIS — I25.118 CORONARY ARTERY DISEASE OF NATIVE ARTERY OF NATIVE HEART WITH STABLE ANGINA PECTORIS: ICD-10-CM

## 2023-02-08 DIAGNOSIS — I25.2 HISTORY OF NON-ST ELEVATION MYOCARDIAL INFARCTION (NSTEMI): ICD-10-CM

## 2023-02-08 DIAGNOSIS — F41.9 ANXIETY: ICD-10-CM

## 2023-02-08 PROCEDURE — 1101F PR PT FALLS ASSESS DOC 0-1 FALLS W/OUT INJ PAST YR: ICD-10-PCS | Mod: HCNC,CPTII,S$GLB, | Performed by: NURSE PRACTITIONER

## 2023-02-08 PROCEDURE — 1101F PT FALLS ASSESS-DOCD LE1/YR: CPT | Mod: HCNC,CPTII,S$GLB, | Performed by: NURSE PRACTITIONER

## 2023-02-08 PROCEDURE — 1125F PR PAIN SEVERITY QUANTIFIED, PAIN PRESENT: ICD-10-PCS | Mod: HCNC,CPTII,S$GLB, | Performed by: NURSE PRACTITIONER

## 2023-02-08 PROCEDURE — 99999 PR PBB SHADOW E&M-EST. PATIENT-LVL V: ICD-10-PCS | Mod: PBBFAC,HCNC,, | Performed by: NURSE PRACTITIONER

## 2023-02-08 PROCEDURE — 1159F PR MEDICATION LIST DOCUMENTED IN MEDICAL RECORD: ICD-10-PCS | Mod: HCNC,CPTII,S$GLB, | Performed by: NURSE PRACTITIONER

## 2023-02-08 PROCEDURE — 99499 UNLISTED E&M SERVICE: CPT | Mod: S$GLB,,, | Performed by: NURSE PRACTITIONER

## 2023-02-08 PROCEDURE — 99214 PR OFFICE/OUTPT VISIT, EST, LEVL IV, 30-39 MIN: ICD-10-PCS | Mod: HCNC,S$GLB,, | Performed by: NURSE PRACTITIONER

## 2023-02-08 PROCEDURE — 99214 OFFICE O/P EST MOD 30 MIN: CPT | Mod: HCNC,S$GLB,, | Performed by: NURSE PRACTITIONER

## 2023-02-08 PROCEDURE — 93005 EKG 12-LEAD: ICD-10-PCS | Mod: HCNC,S$GLB,, | Performed by: NURSE PRACTITIONER

## 2023-02-08 PROCEDURE — 1159F MED LIST DOCD IN RCRD: CPT | Mod: HCNC,CPTII,S$GLB, | Performed by: NURSE PRACTITIONER

## 2023-02-08 PROCEDURE — 3079F DIAST BP 80-89 MM HG: CPT | Mod: HCNC,CPTII,S$GLB, | Performed by: NURSE PRACTITIONER

## 2023-02-08 PROCEDURE — 3288F FALL RISK ASSESSMENT DOCD: CPT | Mod: HCNC,CPTII,S$GLB, | Performed by: NURSE PRACTITIONER

## 2023-02-08 PROCEDURE — 1125F AMNT PAIN NOTED PAIN PRSNT: CPT | Mod: HCNC,CPTII,S$GLB, | Performed by: NURSE PRACTITIONER

## 2023-02-08 PROCEDURE — 3079F PR MOST RECENT DIASTOLIC BLOOD PRESSURE 80-89 MM HG: ICD-10-PCS | Mod: HCNC,CPTII,S$GLB, | Performed by: NURSE PRACTITIONER

## 2023-02-08 PROCEDURE — 3077F SYST BP >= 140 MM HG: CPT | Mod: HCNC,CPTII,S$GLB, | Performed by: NURSE PRACTITIONER

## 2023-02-08 PROCEDURE — 93010 ELECTROCARDIOGRAM REPORT: CPT | Mod: HCNC,S$GLB,, | Performed by: INTERNAL MEDICINE

## 2023-02-08 PROCEDURE — 3077F PR MOST RECENT SYSTOLIC BLOOD PRESSURE >= 140 MM HG: ICD-10-PCS | Mod: HCNC,CPTII,S$GLB, | Performed by: NURSE PRACTITIONER

## 2023-02-08 PROCEDURE — 93010 EKG 12-LEAD: ICD-10-PCS | Mod: HCNC,S$GLB,, | Performed by: INTERNAL MEDICINE

## 2023-02-08 PROCEDURE — 3288F PR FALLS RISK ASSESSMENT DOCUMENTED: ICD-10-PCS | Mod: HCNC,CPTII,S$GLB, | Performed by: NURSE PRACTITIONER

## 2023-02-08 PROCEDURE — 99999 PR PBB SHADOW E&M-EST. PATIENT-LVL V: CPT | Mod: PBBFAC,HCNC,, | Performed by: NURSE PRACTITIONER

## 2023-02-08 PROCEDURE — 93005 ELECTROCARDIOGRAM TRACING: CPT | Mod: HCNC,S$GLB,, | Performed by: NURSE PRACTITIONER

## 2023-02-08 NOTE — PROGRESS NOTES
Chief Complaint  Chief Complaint   Patient presents with    Dizziness       HPI    HPI   Mr. Eric Jackson is a 82 y.o. male with medical problems as listed below. The patient presents to clinic with c/o dizziness. He states that his episode of dizziness happened 2 days ago. He states that he was having dizzines all day. He states he noticed that his oxygen was low at 88% but that after a couple of minutes it went back up to 96-98%. He also states it was associated with a feeling of lead feet. The dizziness was intermittent.      Denies any dizziness today. Denies any CP today or when the dizziness occurred.. He does endorse SOB but usually when he is being physical active an not at rest.    Had tripple bypass. Has not been to cardiology since 2019.    PAST MEDICAL HISTORY:  Past Medical History:   Diagnosis Date    Anemia     Angina pectoris     Anxiety     Cancer     Coronary artery disease     Depression     Diabetes mellitus type II     Disorder of kidney and ureter     Erectile dysfunction     Eye injuries     fb ou    GERD (gastroesophageal reflux disease)     Hypertension     Intermediate stage nonexudative age-related macular degeneration of both eyes 4/9/2019    Myocardial infarction     Nuclear sclerosis of both eyes 4/9/2019    Prostate cancer     Trouble in sleeping     Type 2 diabetes mellitus     Type 2 diabetes mellitus with ophthalmic manifestations        PAST SURGICAL HISTORY:  Past Surgical History:   Procedure Laterality Date    COLONOSCOPY N/A 2/9/2018    Procedure: COLONOSCOPY;  Surgeon: Mathieu Cao MD;  Location: [facility] ENDO;  Service: Endoscopy;  Laterality: N/A;    CORONARY ANGIOGRAPHY INCLUDING BYPASS GRAFTS WITH CATHETERIZATION OF LEFT HEART N/A 3/4/2019    Procedure: ANGIOGRAM, CORONARY, INCLUDING BYPASS GRAFT, WITH LEFT HEART CATHETERIZATION;  Surgeon: Jamir Nam MD;  Location: [facility] CATH LAB;  Service: Cardiology;  Laterality: N/A;    CORONARY ARTERY BYPASS GRAFT  2001     ESOPHAGOGASTRODUODENOSCOPY N/A 10/8/2020    Procedure: EGD (ESOPHAGOGASTRODUODENOSCOPY);  Surgeon: Bharath Herrera MD;  Location: [facility] ENDO;  Service: Endoscopy;  Laterality: N/A;    HERNIA REPAIR      LEFT HEART CATHETERIZATION Left 3/4/2019    Procedure: Left heart cath RFA access, 4fr catheter/sheath, not before 9am;  Surgeon: Jamir Nam MD;  Location: [facility] CATH LAB;  Service: Cardiology;  Laterality: Left;    TRANSRECTAL ULTRASOUND OF PROSTATE WITH INSERTION OF GOLD FIDUCIAL MARKER N/A 2019    Procedure: ULTRASOUND, PROSTATE, RECTAL APPROACH, WITH GOLD FIDUCIAL MARKER INSERTION;  Surgeon: Layne Huff MD;  Location: [facility] OR;  Service: Urology;  Laterality: N/A;    UPPER GASTROINTESTINAL ENDOSCOPY      WRIST SURGERY         SOCIAL HISTORY:  Social History     Socioeconomic History    Marital status:    Tobacco Use    Smoking status: Former     Packs/day: 2.00     Years: 23.00     Pack years: 46.00     Types: Cigarettes     Quit date: 1989     Years since quittin.1    Smokeless tobacco: Never   Substance and Sexual Activity    Alcohol use: Yes     Alcohol/week: 1.0 standard drink     Types: 1 Cans of beer per week     Comment: occassional    Drug use: No    Sexual activity: Yes     Partners: Female       FAMILY HISTORY:  Family History   Adopted: Yes   Problem Relation Age of Onset    No Known Problems Mother     No Known Problems Father     No Known Problems Sister     No Known Problems Brother     No Known Problems Maternal Aunt     No Known Problems Maternal Uncle     No Known Problems Paternal Aunt     No Known Problems Paternal Uncle     No Known Problems Maternal Grandmother     No Known Problems Maternal Grandfather     No Known Problems Paternal Grandmother     No Known Problems Paternal Grandfather     No Known Problems Other     Amblyopia Neg Hx     Blindness Neg Hx     Cancer Neg Hx     Cataracts Neg Hx     Diabetes Neg Hx     Glaucoma Neg Hx      Hypertension Neg Hx     Macular degeneration Neg Hx     Retinal detachment Neg Hx     Strabismus Neg Hx     Stroke Neg Hx     Thyroid disease Neg Hx        ALLERGIES AND MEDICATIONS: updated and reviewed.  Review of patient's allergies indicates:   Allergen Reactions    Tamsulosin     Prochlorperazine      convulsions    Statins-hmg-coa reductase inhibitors Other (See Comments)     Muscle weakness     Current Outpatient Medications   Medication Sig Dispense Refill    ALPRAZolam (XANAX) 1 MG tablet Take 1 tablet (1 mg total) by mouth nightly as needed for Anxiety. 30 tablet 2    ANDROGEL 20.25 mg/1.25 gram (1.62 %) GlPm One pump over each shoulder 1 Bottle 3    blood glucose control, low (TRUE METRIX LEVEL 1) Soln To use with blood glucose meter 1 each 0    blood sugar diagnostic (TRUE METRIX GLUCOSE TEST STRIP) Strp For two times daily checking 250 strip 3    blood-glucose meter (TRUE METRIX GLUCOSE METER) kit For twice daily checking 1 each 0    imiquimod (ALDARA) 5 % cream Apply topically 3 (three) times a week. 24 packet 2    lancets (TRUEPLUS LANCETS) 33 gauge Misc For twice daily checking 250 each 3    lancets Misc To check BG 3 times daily, to use with insurance preferred meter 300 each 3    losartan-hydrochlorothiazide 50-12.5 mg (HYZAAR) 50-12.5 mg per tablet Take 1 tablet by mouth once daily. 90 tablet 3    metFORMIN (GLUCOPHAGE) 500 MG tablet TAKE 1 TABLET EVERY DAY WITH BREAKFAST 90 tablet 1    vit C/vit E ac/lut/copper/zinc (PRESERVISION LUTEIN ORAL) Take 1 tablet by mouth once daily.      vitamin D 1000 units Tab Take 1,000 Units by mouth once daily.      aspirin (ECOTRIN) 81 MG EC tablet Take 1 tablet (81 mg total) by mouth once daily. (Patient not taking: No sig reported) 90 tablet 3    omeprazole (PRILOSEC) 40 MG capsule Take 1 capsule (40 mg total) by mouth once daily. (Patient not taking: Reported on 2/8/2023) 90 capsule 3    sildenafiL (VIAGRA) 25 MG tablet Take 1 tablet (25 mg total) by mouth  "daily as needed for Erectile Dysfunction. (Patient not taking: No sig reported) 10 tablet 0     No current facility-administered medications for this visit.       Patient Care Team:  Samir Boo MD as PCP - General (Internal Medicine)  Melanie Orantes MA as Care Coordinator  René Bills MD (Cardiology)  Sheila Braden OD as Consulting Physician (Optometry)  Layne Huff MD as Consulting Physician (Urology)  Loida Evans MD as Consulting Physician (Endocrinology)  Loc Garcia MD as Consulting Physician (Radiation Oncology)    ROS  Review of Systems   Constitutional:  Negative for chills, fatigue, fever and unexpected weight change.   HENT:  Negative for congestion, ear pain, sore throat and voice change.    Eyes:  Negative for photophobia, pain, discharge and visual disturbance.   Respiratory:  Positive for shortness of breath (with exertion). Negative for cough and wheezing.    Cardiovascular:  Negative for chest pain, palpitations and leg swelling.   Gastrointestinal:  Negative for abdominal pain, blood in stool, constipation, diarrhea, nausea and vomiting.   Genitourinary:  Negative for dysuria and frequency.   Musculoskeletal:  Negative for gait problem, joint swelling and neck stiffness.   Skin:  Negative for color change and rash.   Neurological:  Positive for dizziness. Negative for seizures, weakness and headaches.   Hematological:  Negative for adenopathy. Does not bruise/bleed easily.   Psychiatric/Behavioral:  Negative for behavioral problems and dysphoric mood. The patient is not nervous/anxious.          Physical Exam  Vitals:    02/08/23 0717 02/08/23 0724   BP: (!) 142/80 (!) 159/85   BP Location: Left arm Left arm   Patient Position: Sitting Sitting   BP Method: Small (Manual) Small (Automatic)   Pulse: 78    Resp: 18    Temp: 98 °F (36.7 °C)    TempSrc: Oral    SpO2: 96%    Weight: 77.7 kg (171 lb 4.8 oz)    Height: 5' 7" (1.702 m)     Body mass index is 26.83 " "kg/m².  Weight: 77.7 kg (171 lb 4.8 oz)   Height: 5' 7" (170.2 cm)     Physical Exam  Constitutional:       Appearance: He is well-developed.   HENT:      Head: Normocephalic and atraumatic.   Eyes:      Conjunctiva/sclera: Conjunctivae normal.      Pupils: Pupils are equal, round, and reactive to light.   Cardiovascular:      Rate and Rhythm: Normal rate and regular rhythm.   Pulmonary:      Effort: Pulmonary effort is normal.      Breath sounds: Normal breath sounds.   Musculoskeletal:         General: Normal range of motion.      Cervical back: Normal range of motion.   Skin:     General: Skin is warm and dry.   Neurological:      Mental Status: He is alert and oriented to person, place, and time.   Psychiatric:         Behavior: Behavior normal.         Thought Content: Thought content normal.         Judgment: Judgment normal.           Health Maintenance         Date Due Completion Date    Aspirin/Antiplatelet Therapy Never done ---    Shingles Vaccine (1 of 2) Never done ---    COVID-19 Vaccine (5 - Booster for Moderna series) 12/31/2021 11/5/2021    Influenza Vaccine (1) 09/01/2022 10/15/2021    Override on 12/7/2018: Done    Override on 4/23/2015: Declined    Diabetes Urine Screening 04/16/2023 4/16/2022    Lipid Panel 04/16/2023 4/16/2022    Override on 8/26/2016: Done ([facility]-Arias Muñoz MD/GEETHA Razo-Lipid Panel:Cholesterol 227 mg/dL, NON- mg/dL, HDL 61 mg/dL, CHol/HDL Ratio 3.7,  mg/dL, Triglyceride 101 mg/dL)    Override on 2/24/2015: Done (Dr. Arias Muñoz)    Hemoglobin A1c 04/17/2023 10/17/2022    Override on 2/24/2015: Done (Dr. Arias Muñoz)    Eye Exam 11/28/2023 11/28/2022    Override on 1/6/2017: Done    Override on 2/10/2015: Done    TETANUS VACCINE 03/30/2027 3/30/2017 (Declined)    Override on 3/30/2017: Declined          Health maintenance reviewed at this time.    Assessment & Plan  Dizziness  -     IN OFFICE EKG " 12-LEAD (to Jackson)  -     Ambulatory referral/consult to Cardiology; Future; Expected date: 02/15/2023    Due to history and symptoms will refer to cardiology for further evaluation. Had some EKG from previous EKG. Denies any dizzness, CP or SOB at this time.    Advised the patient to go to the ED if he has anymore dizziness and/or SOB. Patient verbalized understanding.    Essential hypertension  Elevated in clinic. Most likely due to high anxiety state.  Patient to monitor at home.    History of Non-STEMI (non-ST elevated myocardial infarction) 3/2019 from sepsis; Culprit likely in small Diag2 (unrevascularized) and likely demand related/Coronary artery disease 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; 3/2019 [facility] patent graft; Culprit likely in small Diag2 (unrevascularized) and likely demand related  -     Ambulatory referral/consult to Cardiology; Future; Expected date: 02/15/2023    Anxiety  The current medical regimen is effective;  continue present plan and medications.           Follow-up: Follow up if symptoms worsen or fail to improve.

## 2023-02-09 DIAGNOSIS — Z00.00 ENCOUNTER FOR MEDICARE ANNUAL WELLNESS EXAM: ICD-10-CM

## 2023-02-10 ENCOUNTER — OFFICE VISIT (OUTPATIENT)
Dept: CARDIOLOGY | Facility: CLINIC | Age: 83
End: 2023-02-10
Payer: MEDICARE

## 2023-02-10 ENCOUNTER — PATIENT OUTREACH (OUTPATIENT)
Dept: ADMINISTRATIVE | Facility: HOSPITAL | Age: 83
End: 2023-02-10
Payer: MEDICARE

## 2023-02-10 VITALS
HEART RATE: 60 BPM | OXYGEN SATURATION: 98 % | SYSTOLIC BLOOD PRESSURE: 178 MMHG | DIASTOLIC BLOOD PRESSURE: 82 MMHG | WEIGHT: 170.06 LBS | BODY MASS INDEX: 26.69 KG/M2 | HEIGHT: 67 IN | RESPIRATION RATE: 18 BRPM

## 2023-02-10 DIAGNOSIS — N18.31 STAGE 3A CHRONIC KIDNEY DISEASE: ICD-10-CM

## 2023-02-10 DIAGNOSIS — I25.118 CORONARY ARTERY DISEASE OF NATIVE ARTERY OF NATIVE HEART WITH STABLE ANGINA PECTORIS: ICD-10-CM

## 2023-02-10 DIAGNOSIS — R07.9 CHEST PAIN, UNSPECIFIED TYPE: Primary | ICD-10-CM

## 2023-02-10 DIAGNOSIS — H25.13 NUCLEAR SCLEROSIS OF BOTH EYES: ICD-10-CM

## 2023-02-10 DIAGNOSIS — I10 ESSENTIAL HYPERTENSION: ICD-10-CM

## 2023-02-10 DIAGNOSIS — I70.0 ABDOMINAL AORTIC ATHEROSCLEROSIS: ICD-10-CM

## 2023-02-10 DIAGNOSIS — R42 DIZZINESS: ICD-10-CM

## 2023-02-10 DIAGNOSIS — E11.36 TYPE 2 DIABETES MELLITUS WITH DIABETIC CATARACT, WITHOUT LONG-TERM CURRENT USE OF INSULIN: ICD-10-CM

## 2023-02-10 DIAGNOSIS — F41.9 ANXIETY: ICD-10-CM

## 2023-02-10 DIAGNOSIS — I25.2 HISTORY OF NON-ST ELEVATION MYOCARDIAL INFARCTION (NSTEMI): ICD-10-CM

## 2023-02-10 DIAGNOSIS — K22.70 BARRETT'S ESOPHAGUS WITHOUT DYSPLASIA: ICD-10-CM

## 2023-02-10 DIAGNOSIS — Z86.718 HISTORY OF DVT IN ADULTHOOD: ICD-10-CM

## 2023-02-10 DIAGNOSIS — E11.9 TYPE 2 DIABETES MELLITUS WITHOUT COMPLICATION, WITHOUT LONG-TERM CURRENT USE OF INSULIN: ICD-10-CM

## 2023-02-10 PROCEDURE — 3077F PR MOST RECENT SYSTOLIC BLOOD PRESSURE >= 140 MM HG: ICD-10-PCS | Mod: HCNC,CPTII,S$GLB, | Performed by: INTERNAL MEDICINE

## 2023-02-10 PROCEDURE — 3079F DIAST BP 80-89 MM HG: CPT | Mod: HCNC,CPTII,S$GLB, | Performed by: INTERNAL MEDICINE

## 2023-02-10 PROCEDURE — 99204 OFFICE O/P NEW MOD 45 MIN: CPT | Mod: HCNC,S$GLB,, | Performed by: INTERNAL MEDICINE

## 2023-02-10 PROCEDURE — 1101F PR PT FALLS ASSESS DOC 0-1 FALLS W/OUT INJ PAST YR: ICD-10-PCS | Mod: HCNC,CPTII,S$GLB, | Performed by: INTERNAL MEDICINE

## 2023-02-10 PROCEDURE — 1126F PR PAIN SEVERITY QUANTIFIED, NO PAIN PRESENT: ICD-10-PCS | Mod: HCNC,CPTII,S$GLB, | Performed by: INTERNAL MEDICINE

## 2023-02-10 PROCEDURE — 1159F MED LIST DOCD IN RCRD: CPT | Mod: HCNC,CPTII,S$GLB, | Performed by: INTERNAL MEDICINE

## 2023-02-10 PROCEDURE — 1160F RVW MEDS BY RX/DR IN RCRD: CPT | Mod: HCNC,CPTII,S$GLB, | Performed by: INTERNAL MEDICINE

## 2023-02-10 PROCEDURE — 99999 PR PBB SHADOW E&M-EST. PATIENT-LVL V: ICD-10-PCS | Mod: PBBFAC,HCNC,, | Performed by: INTERNAL MEDICINE

## 2023-02-10 PROCEDURE — 99999 PR PBB SHADOW E&M-EST. PATIENT-LVL V: CPT | Mod: PBBFAC,HCNC,, | Performed by: INTERNAL MEDICINE

## 2023-02-10 PROCEDURE — 3079F PR MOST RECENT DIASTOLIC BLOOD PRESSURE 80-89 MM HG: ICD-10-PCS | Mod: HCNC,CPTII,S$GLB, | Performed by: INTERNAL MEDICINE

## 2023-02-10 PROCEDURE — 1126F AMNT PAIN NOTED NONE PRSNT: CPT | Mod: HCNC,CPTII,S$GLB, | Performed by: INTERNAL MEDICINE

## 2023-02-10 PROCEDURE — 3077F SYST BP >= 140 MM HG: CPT | Mod: HCNC,CPTII,S$GLB, | Performed by: INTERNAL MEDICINE

## 2023-02-10 PROCEDURE — 1160F PR REVIEW ALL MEDS BY PRESCRIBER/CLIN PHARMACIST DOCUMENTED: ICD-10-PCS | Mod: HCNC,CPTII,S$GLB, | Performed by: INTERNAL MEDICINE

## 2023-02-10 PROCEDURE — 3288F PR FALLS RISK ASSESSMENT DOCUMENTED: ICD-10-PCS | Mod: HCNC,CPTII,S$GLB, | Performed by: INTERNAL MEDICINE

## 2023-02-10 PROCEDURE — 1101F PT FALLS ASSESS-DOCD LE1/YR: CPT | Mod: HCNC,CPTII,S$GLB, | Performed by: INTERNAL MEDICINE

## 2023-02-10 PROCEDURE — 3288F FALL RISK ASSESSMENT DOCD: CPT | Mod: HCNC,CPTII,S$GLB, | Performed by: INTERNAL MEDICINE

## 2023-02-10 PROCEDURE — 1159F PR MEDICATION LIST DOCUMENTED IN MEDICAL RECORD: ICD-10-PCS | Mod: HCNC,CPTII,S$GLB, | Performed by: INTERNAL MEDICINE

## 2023-02-10 PROCEDURE — 99204 PR OFFICE/OUTPT VISIT, NEW, LEVL IV, 45-59 MIN: ICD-10-PCS | Mod: HCNC,S$GLB,, | Performed by: INTERNAL MEDICINE

## 2023-02-10 RX ORDER — LOSARTAN POTASSIUM AND HYDROCHLOROTHIAZIDE 25; 100 MG/1; MG/1
1 TABLET ORAL DAILY
Qty: 90 TABLET | Refills: 3 | Status: SHIPPED | OUTPATIENT
Start: 2023-02-10 | End: 2024-02-10

## 2023-02-10 NOTE — PROGRESS NOTES
CARDIOVASCULAR CONSULTATION    REASON FOR CONSULT:   Eric Jackson is a 82 y.o. male who presents for  evaluation     HISTORY OF PRESENT ILLNESS:       Evaluation patient is a pleasant 82-year-old man.  Past medical history of coronary artery disease status post CABG.  Used to follow with Dr. Soto it, has not seen him since 2019.  Now wants to reestablish care with Cardiology.  Last angiogram had revealed patent bypass grafts.  Last angiogram was done in 2019.  Patient states that for the past few months he has been experiencing chest heaviness.  No particular aggravating or relieving factors.  Also occasional dizziness.  No particular aggravating or relieving factors for the dizziness.  Not on statins and refuses to be on statins, Zetia or Repatha.      2019:      LVEDP: 22mmHg  LVEF: 55% by echo  Wall Motion: normal by echo     Dominance: Right  LM: dist 50%  LAD: mid 99%, competitive flow from LIMA-LAD-OM-RPDA              Diag2 prox 90%, T2 flow (not bypassed)  LCx: prox , territory supplied by LIMA-LAD-OM-RPDA  RCA: mid , territory supplied by LIMA-LAD-OM-RPDA     LIMA-LAD-OM-RPDA patent     No SVG identified     Hemostasis:  RFA     Impression:  NSTEMI  3V CAD, normal LV fxn  Patent LIMA-LAD-OM-RPDA  Culprit likely in small Diag2 (unrevascularized) and likely demand related  Man compression RFA for hemostasis     Plan:  Cont med rx  ASA 81mg qd indefinitely  Plavix 75mg qd for 1 year (thru 3/2020)  BBl/ARB  Statin allergy noted  Consider PCSK9 inhibitor if statin allergy confirmed  Dispo planning appropriate  Pt to follow up with Dr. Seay after discharge                     PAST MEDICAL HISTORY:     Past Medical History:   Diagnosis Date    Anemia     Angina pectoris     Anxiety     Cancer     Coronary artery disease     Depression     Diabetes mellitus type II     Disorder of kidney and ureter     Erectile dysfunction     Eye injuries     fb ou    GERD (gastroesophageal reflux disease)      Hypertension     Intermediate stage nonexudative age-related macular degeneration of both eyes 4/9/2019    Myocardial infarction     Nuclear sclerosis of both eyes 4/9/2019    Prostate cancer     Trouble in sleeping     Type 2 diabetes mellitus     Type 2 diabetes mellitus with ophthalmic manifestations        PAST SURGICAL HISTORY:     Past Surgical History:   Procedure Laterality Date    COLONOSCOPY N/A 2/9/2018    Procedure: COLONOSCOPY;  Surgeon: Mathieu Cao MD;  Location: [facility] ENDO;  Service: Endoscopy;  Laterality: N/A;    CORONARY ANGIOGRAPHY INCLUDING BYPASS GRAFTS WITH CATHETERIZATION OF LEFT HEART N/A 3/4/2019    Procedure: ANGIOGRAM, CORONARY, INCLUDING BYPASS GRAFT, WITH LEFT HEART CATHETERIZATION;  Surgeon: Jamir Nam MD;  Location: [facility] CATH LAB;  Service: Cardiology;  Laterality: N/A;    CORONARY ARTERY BYPASS GRAFT  2001    ESOPHAGOGASTRODUODENOSCOPY N/A 10/8/2020    Procedure: EGD (ESOPHAGOGASTRODUODENOSCOPY);  Surgeon: Bharath Herrera MD;  Location: [facility] ENDO;  Service: Endoscopy;  Laterality: N/A;    HERNIA REPAIR      LEFT HEART CATHETERIZATION Left 3/4/2019    Procedure: Left heart cath RFA access, 4fr catheter/sheath, not before 9am;  Surgeon: Jamir Nam MD;  Location: [facility] CATH LAB;  Service: Cardiology;  Laterality: Left;    TRANSRECTAL ULTRASOUND OF PROSTATE WITH INSERTION OF GOLD FIDUCIAL MARKER N/A 2/26/2019    Procedure: ULTRASOUND, PROSTATE, RECTAL APPROACH, WITH GOLD FIDUCIAL MARKER INSERTION;  Surgeon: Layne Huff MD;  Location: [facility] OR;  Service: Urology;  Laterality: N/A;    UPPER GASTROINTESTINAL ENDOSCOPY      WRIST SURGERY         ALLERGIES AND MEDICATION:     Review of patient's allergies indicates:   Allergen Reactions    Tamsulosin     Prochlorperazine      convulsions    Statins-hmg-coa reductase inhibitors Other (See Comments)     Muscle weakness        Medication List            Accurate as of February 10, 2023  8:40 AM. If you have  any questions, ask your nurse or doctor.                CONTINUE taking these medications      ALPRAZolam 1 MG tablet  Commonly known as: XANAX  Take 1 tablet (1 mg total) by mouth nightly as needed for Anxiety.     AndroGeL 20.25 mg/1.25 gram (1.62 %) Glpm  Generic drug: testosterone  One pump over each shoulder     aspirin 81 MG EC tablet  Commonly known as: ECOTRIN  Take 1 tablet (81 mg total) by mouth once daily.     blood sugar diagnostic Strp  Commonly known as: TRUE METRIX GLUCOSE TEST STRIP  For two times daily checking     blood-glucose meter kit  Commonly known as: TRUE METRIX GLUCOSE METER  For twice daily checking     imiquimod 5 % cream  Commonly known as: ALDARA  Apply topically 3 (three) times a week.     * lancets Misc  To check BG 3 times daily, to use with insurance preferred meter     * lancets 33 gauge Misc  Commonly known as: TRUEPLUS LANCETS  For twice daily checking     losartan-hydrochlorothiazide 50-12.5 mg 50-12.5 mg per tablet  Commonly known as: HYZAAR  Take 1 tablet by mouth once daily.     metFORMIN 500 MG tablet  Commonly known as: GLUCOPHAGE  TAKE 1 TABLET EVERY DAY WITH BREAKFAST     omeprazole 40 MG capsule  Commonly known as: PRILOSEC  Take 1 capsule (40 mg total) by mouth once daily.     PRESERVISION LUTEIN ORAL     sildenafiL 25 MG tablet  Commonly known as: VIAGRA  Take 1 tablet (25 mg total) by mouth daily as needed for Erectile Dysfunction.     TRUE METRIX LEVEL 1 Soln  Generic drug: blood glucose control, low  To use with blood glucose meter     vitamin D 1000 units Tab  Commonly known as: VITAMIN D3           * This list has 2 medication(s) that are the same as other medications prescribed for you. Read the directions carefully, and ask your doctor or other care provider to review them with you.                  SOCIAL HISTORY:     Social History     Socioeconomic History    Marital status:    Tobacco Use    Smoking status: Former     Packs/day: 2.00     Years: 23.00  "    Pack years: 46.00     Types: Cigarettes     Quit date: 1989     Years since quittin.1    Smokeless tobacco: Never   Substance and Sexual Activity    Alcohol use: Yes     Alcohol/week: 1.0 standard drink     Types: 1 Cans of beer per week     Comment: occassional    Drug use: No    Sexual activity: Yes     Partners: Female       FAMILY HISTORY:     Family History   Adopted: Yes   Problem Relation Age of Onset    No Known Problems Mother     No Known Problems Father     No Known Problems Sister     No Known Problems Brother     No Known Problems Maternal Aunt     No Known Problems Maternal Uncle     No Known Problems Paternal Aunt     No Known Problems Paternal Uncle     No Known Problems Maternal Grandmother     No Known Problems Maternal Grandfather     No Known Problems Paternal Grandmother     No Known Problems Paternal Grandfather     No Known Problems Other     Amblyopia Neg Hx     Blindness Neg Hx     Cancer Neg Hx     Cataracts Neg Hx     Diabetes Neg Hx     Glaucoma Neg Hx     Hypertension Neg Hx     Macular degeneration Neg Hx     Retinal detachment Neg Hx     Strabismus Neg Hx     Stroke Neg Hx     Thyroid disease Neg Hx        REVIEW OF SYSTEMS:   Review of Systems   Constitutional: Negative.   HENT: Negative.     Eyes: Negative.    Cardiovascular:  Positive for chest pain.   Respiratory: Negative.     Endocrine: Negative.    Hematologic/Lymphatic: Negative.    Skin: Negative.    Musculoskeletal: Negative.    Gastrointestinal: Negative.    Genitourinary: Negative.    Neurological: Negative.    Psychiatric/Behavioral: Negative.     Allergic/Immunologic: Negative.      A 10 point review of systems was performed and all the pertinent positives have been mentioned. Rest of review of systems was negative.        PHYSICAL EXAM:     Vitals:    02/10/23 0813   BP: (!) 178/82   Pulse: 60   Resp: 18    Body mass index is 26.64 kg/m².  Weight: 77.2 kg (170 lb 1.4 oz)   Height: 5' 7" (170.2 cm) "     Physical Exam  Vitals reviewed.   Constitutional:       Appearance: He is well-developed.   HENT:      Head: Normocephalic.   Eyes:      Conjunctiva/sclera: Conjunctivae normal.      Pupils: Pupils are equal, round, and reactive to light.   Cardiovascular:      Rate and Rhythm: Normal rate and regular rhythm.      Heart sounds: Normal heart sounds.   Pulmonary:      Effort: Pulmonary effort is normal.      Breath sounds: Normal breath sounds.   Abdominal:      General: Bowel sounds are normal.      Palpations: Abdomen is soft.   Musculoskeletal:      Cervical back: Normal range of motion and neck supple.   Skin:     General: Skin is warm.   Neurological:      Mental Status: He is alert and oriented to person, place, and time.         DATA:     Laboratory:  CBC:  Recent Labs   Lab 10/15/21  1420 04/16/22  0807 10/17/22  0750   WBC 6.68 6.31 5.88   Hemoglobin 15.5 14.8 15.4   Hematocrit 47.6 46.1 46.9   Platelets 229 205 195       CHEMISTRIES:  Recent Labs   Lab 03/12/21  1355 10/15/21  1420 04/16/22  0807 10/17/22  0750   Glucose 151 H 98 120 H 116 H   Sodium 142 142 139 138   Potassium 3.7 4.0 4.1 4.2   BUN 12 13 18 13   Creatinine 1.2 1.1 1.1 1.1   eGFR if African American >60 >60.0 >60.0  --    eGFR if non  58 A >60.0 >60.0  --    Calcium 9.4 10.1 9.6 9.4       CARDIAC BIOMARKERS:        COAGS:        LIPIDS/LFTS:  Recent Labs   Lab 11/17/20  0903 10/15/21  1420 04/16/22  0807 10/17/22  0750   Cholesterol 285 H  --  236 H  --    Triglycerides 300 H  --  222 H  --    HDL 51  --  51  --    LDL Cholesterol 174.0 H  --  140.6  --    Non-HDL Cholesterol 234  --  185  --    AST 25 22 25 25   ALT 24 19 21 26       Hemoglobin A1C   Date Value Ref Range Status   10/17/2022 6.4 (H) 4.0 - 5.6 % Final     Comment:     ADA Screening Guidelines:  5.7-6.4%  Consistent with prediabetes  >or=6.5%  Consistent with diabetes    High levels of fetal hemoglobin interfere with the HbA1C  assay. Heterozygous  hemoglobin variants (HbS, HgC, etc)do  not significantly interfere with this assay.   However, presence of multiple variants may affect accuracy.     04/16/2022 6.6 (H) 4.0 - 5.6 % Final     Comment:     ADA Screening Guidelines:  5.7-6.4%  Consistent with prediabetes  >or=6.5%  Consistent with diabetes    High levels of fetal hemoglobin interfere with the HbA1C  assay. Heterozygous hemoglobin variants (HbS, HgC, etc)do  not significantly interfere with this assay.   However, presence of multiple variants may affect accuracy.     10/15/2021 6.4 (H) 4.0 - 5.6 % Final     Comment:     ADA Screening Guidelines:  5.7-6.4%  Consistent with prediabetes  >or=6.5%  Consistent with diabetes    High levels of fetal hemoglobin interfere with the HbA1C  assay. Heterozygous hemoglobin variants (HbS, HgC, etc)do  not significantly interfere with this assay.   However, presence of multiple variants may affect accuracy.         TSH        The ASCVD Risk score (Pratibha LARA, et al., 2019) failed to calculate for the following reasons:    The 2019 ASCVD risk score is only valid for ages 40 to 79             ASSESSMENT AND PLAN     Patient Active Problem List   Diagnosis    Essential hypertension    Type 2 diabetes mellitus without complication, without long-term current use of insulin    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 2/2018; 10/2020 EGD with suspicion for barretts; repeat 2023    Anxiety    Depression    Insomnia    Slow urinary stream    Tubular adenoma of colon 2/2018 3 adenomas repeat 3 years    Hypogonadism in male    Prostate cancer 2/25/19 Transrectal ultrasound of prostate and gold fiducial marker placement    History of Non-STEMI (non-ST elevated myocardial infarction) 3/2019 from sepsis; Culprit likely in small Diag2 (unrevascularized) and likely demand related    Coronary artery disease 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; 3/2019 [facility] patent graft; Culprit likely in small Diag2  (unrevascularized) and likely demand related    Renal cyst, left, simple 1st seen 2/2019 CT; verified on renal US 11/2020    Status post coronary artery bypass grafting single vessel    Refractive error    Intermediate stage nonexudative age-related macular degeneration of both eyes    Nuclear sclerosis of both eyes    History of DVT from PICC line right upper extremity 4/2019 anticoag x 3 months then stopped    Abdominal aortic atherosclerosis    Type 2 diabetes mellitus with diabetic cataract, without long-term current use of insulin    Current mild episode of major depressive disorder without prior episode    Epiretinal membrane (ERM) of both eyes    Stage 3a chronic kidney disease    Chronic left shoulder pain    Decreased range of motion of left shoulder    Shoulder weakness     Patient with coronary artery disease status post CABG.  Last angiogram in 2019 revealed patent bypass grafts with severe underlying coronary artery disease.  Now having chest pain again.  Check stress test and 2D echocardiogram     Uncontrolled dyslipidemia:  Patient could not tolerate statins in the past.  Refusing Zetia or Repatha.    Dizziness:  Check Holter     Uncontrolled hypertension.  Double the dose of Hyzaar.  If continues to be uncontrolled after that, then add Norvasc    Follow-up after testing            Thank you very much for involving me in the care of your patient.  Please do not hesitate to contact me if there are any questions.      Milly Cadena MD, FACC, [facility]  Interventional Cardiologist, [facility].           This note was dictated with the help of speech recognition software.  There might be un-intended errors and/or substitutions.

## 2023-02-16 ENCOUNTER — PES CALL (OUTPATIENT)
Dept: ADMINISTRATIVE | Facility: CLINIC | Age: 83
End: 2023-02-16
Payer: MEDICARE

## 2023-03-01 ENCOUNTER — HOSPITAL ENCOUNTER (OUTPATIENT)
Dept: CARDIOLOGY | Facility: HOSPITAL | Age: 83
Discharge: HOME | End: 2023-03-01
Attending: INTERNAL MEDICINE
Payer: MEDICARE

## 2023-03-01 ENCOUNTER — HOSPITAL ENCOUNTER (OUTPATIENT)
Dept: RADIOLOGY | Facility: HOSPITAL | Age: 83
Discharge: HOME | End: 2023-03-01
Attending: INTERNAL MEDICINE
Payer: MEDICARE

## 2023-03-01 DIAGNOSIS — R42 DIZZINESS: ICD-10-CM

## 2023-03-01 DIAGNOSIS — I25.2 HISTORY OF NON-ST ELEVATION MYOCARDIAL INFARCTION (NSTEMI): ICD-10-CM

## 2023-03-01 DIAGNOSIS — R07.9 CHEST PAIN, UNSPECIFIED TYPE: ICD-10-CM

## 2023-03-01 LAB
ASCENDING AORTA: 3.14 CM
AV INDEX (PROSTH): 0.42
AV MEAN GRADIENT: 6 MMHG
AV PEAK GRADIENT: 9 MMHG
AV VALVE AREA: 1.6 CM2
AV VELOCITY RATIO: 0.42
CV ECHO LV RWT: 0.69 CM
CV STRESS BASE HR: 47 BPM
DIASTOLIC BLOOD PRESSURE: 78 MMHG
DOP CALC AO PEAK VEL: 1.51 M/S
DOP CALC AO VTI: 36.9 CM
DOP CALC LVOT AREA: 3.8 CM2
DOP CALC LVOT DIAMETER: 2.2 CM
DOP CALC LVOT PEAK VEL: 0.63 M/S
DOP CALC LVOT STROKE VOLUME: 58.89 CM3
DOP CALCLVOT PEAK VEL VTI: 15.5 CM
E WAVE DECELERATION TIME: 321.66 MSEC
E/A RATIO: 0.85
E/E' RATIO: 12.8 M/S
ECHO LV POSTERIOR WALL: 1.23 CM (ref 0.6–1.1)
EJECTION FRACTION: 50 %
FRACTIONAL SHORTENING: 31 % (ref 28–44)
INTERVENTRICULAR SEPTUM: 1.16 CM (ref 0.6–1.1)
IVC DIAMETER: 15 CM
IVRT: 103.81 MSEC
LA MAJOR: 4.62 CM
LA MINOR: 4.98 CM
LA WIDTH: 3.8 CM
LEFT ATRIUM SIZE: 4.11 CM
LEFT ATRIUM VOLUME: 63.63 CM3
LEFT INTERNAL DIMENSION IN SYSTOLE: 2.48 CM (ref 2.1–4)
LEFT VENTRICLE DIASTOLIC VOLUME: 54.07 ML
LEFT VENTRICLE SYSTOLIC VOLUME: 21.8 ML
LEFT VENTRICULAR INTERNAL DIMENSION IN DIASTOLE: 3.59 CM (ref 3.5–6)
LEFT VENTRICULAR MASS: 140.03 G
LV LATERAL E/E' RATIO: 10.67 M/S
LV SEPTAL E/E' RATIO: 16 M/S
LVOT MG: 1.13 MMHG
LVOT MV: 0.51 CM/S
MV PEAK A VEL: 0.75 M/S
MV PEAK E VEL: 0.64 M/S
MV STENOSIS PRESSURE HALF TIME: 93.28 MS
MV VALVE AREA P 1/2 METHOD: 2.36 CM2
NUC STRESS DIASTOLIC VOLUME INDEX: 72
NUC STRESS EJECTION FRACTION: 57 %
NUC STRESS SYSTOLIC VOLUME INDEX: 31
OHS CV CPX 85 PERCENT MAX PREDICTED HEART RATE MALE: 119
OHS CV CPX MAX PREDICTED HEART RATE: 140
OHS CV CPX PATIENT IS FEMALE: 0
OHS CV CPX PATIENT IS MALE: 1
OHS CV CPX PEAK DIASTOLIC BLOOD PRESSURE: 74 MMHG
OHS CV CPX PEAK HEAR RATE: 73 BPM
OHS CV CPX PEAK RATE PRESSURE PRODUCT: 8760
OHS CV CPX PEAK SYSTOLIC BLOOD PRESSURE: 120 MMHG
OHS CV CPX PERCENT MAX PREDICTED HEART RATE ACHIEVED: 52
OHS CV CPX RATE PRESSURE PRODUCT PRESENTING: 6110
PISA TR MAX VEL: 2.36 M/S
PULM VEIN S/D RATIO: 1.23
PV PEAK D VEL: 0.35 M/S
PV PEAK S VEL: 0.43 M/S
PV PEAK VELOCITY: 0.99 CM/S
RA MAJOR: 4.03 CM
RA PRESSURE: 8 MMHG
RA WIDTH: 4.5 CM
RIGHT VENTRICULAR END-DIASTOLIC DIMENSION: 4.26 CM
SINUS: 3.5 CM
STJ: 2.45 CM
SYSTOLIC BLOOD PRESSURE: 130 MMHG
TDI LATERAL: 0.06 M/S
TDI SEPTAL: 0.04 M/S
TDI: 0.05 M/S
TR MAX PG: 22 MMHG
TRICUSPID ANNULAR PLANE SYSTOLIC EXCURSION: 1.6 CM
TV PEAK GRADIENT: 1.04 MMHG
TV REST PULMONARY ARTERY PRESSURE: 30 MMHG

## 2023-03-01 PROCEDURE — A9502 TC99M TETROFOSMIN: HCPCS | Mod: HCNC

## 2023-03-01 PROCEDURE — 93018 NUCLEAR STRESS - CARDIOLOGY INTERPRETED (CUPID ONLY): ICD-10-PCS | Mod: HCNC,,, | Performed by: INTERNAL MEDICINE

## 2023-03-01 PROCEDURE — 78452 NUCLEAR STRESS - CARDIOLOGY INTERPRETED (CUPID ONLY): ICD-10-PCS | Mod: 26,HCNC,, | Performed by: INTERNAL MEDICINE

## 2023-03-01 PROCEDURE — 78452 HT MUSCLE IMAGE SPECT MULT: CPT | Mod: HCNC

## 2023-03-01 PROCEDURE — 78452 HT MUSCLE IMAGE SPECT MULT: CPT | Mod: 26,HCNC,, | Performed by: INTERNAL MEDICINE

## 2023-03-01 PROCEDURE — 93016 NUCLEAR STRESS - CARDIOLOGY INTERPRETED (CUPID ONLY): ICD-10-PCS | Mod: HCNC,,, | Performed by: INTERNAL MEDICINE

## 2023-03-01 PROCEDURE — 93306 TTE W/DOPPLER COMPLETE: CPT | Mod: 26,HCNC,, | Performed by: INTERNAL MEDICINE

## 2023-03-01 PROCEDURE — 93018 CV STRESS TEST I&R ONLY: CPT | Mod: HCNC,,, | Performed by: INTERNAL MEDICINE

## 2023-03-01 PROCEDURE — 63600175 PHARM REV CODE 636 W HCPCS: Mod: HCNC | Performed by: INTERNAL MEDICINE

## 2023-03-01 PROCEDURE — 93227 XTRNL ECG REC<48 HR R&I: CPT | Mod: HCNC,,, | Performed by: INTERNAL MEDICINE

## 2023-03-01 PROCEDURE — 93306 ECHO (CUPID ONLY): ICD-10-PCS | Mod: 26,HCNC,, | Performed by: INTERNAL MEDICINE

## 2023-03-01 PROCEDURE — 93016 CV STRESS TEST SUPVJ ONLY: CPT | Mod: HCNC,,, | Performed by: INTERNAL MEDICINE

## 2023-03-01 PROCEDURE — 93226 XTRNL ECG REC<48 HR SCAN A/R: CPT | Mod: HCNC

## 2023-03-01 PROCEDURE — 93227 HOLTER MONITOR - 48 HOUR (CUPID ONLY): ICD-10-PCS | Mod: HCNC,,, | Performed by: INTERNAL MEDICINE

## 2023-03-01 PROCEDURE — 93306 TTE W/DOPPLER COMPLETE: CPT | Mod: HCNC

## 2023-03-01 PROCEDURE — 93017 CV STRESS TEST TRACING ONLY: CPT | Mod: HCNC

## 2023-03-01 RX ORDER — REGADENOSON 0.08 MG/ML
0.4 INJECTION, SOLUTION INTRAVENOUS ONCE
Status: COMPLETED | OUTPATIENT
Start: 2023-03-01 | End: 2023-03-01

## 2023-03-01 RX ADMIN — REGADENOSON 0.4 MG: 0.08 INJECTION, SOLUTION INTRAVENOUS at 08:03

## 2023-03-03 LAB
OHS CV EVENT MONITOR DAY: 0
OHS CV HOLTER LENGTH DECIMAL HOURS: 46.3
OHS CV HOLTER LENGTH HOURS: 46
OHS CV HOLTER LENGTH MINUTES: 18
OHS CV HOLTER SINUS AVERAGE HR: 73
OHS CV HOLTER SINUS MAX HR: 124
OHS CV HOLTER SINUS MIN HR: 45

## 2023-03-13 ENCOUNTER — OFFICE VISIT (OUTPATIENT)
Dept: CARDIOLOGY | Facility: CLINIC | Age: 83
End: 2023-03-13
Payer: MEDICARE

## 2023-03-13 ENCOUNTER — TELEPHONE (OUTPATIENT)
Dept: CARDIOLOGY | Facility: CLINIC | Age: 83
End: 2023-03-13

## 2023-03-13 VITALS
WEIGHT: 168.31 LBS | OXYGEN SATURATION: 97 % | RESPIRATION RATE: 15 BRPM | BODY MASS INDEX: 26.42 KG/M2 | HEART RATE: 65 BPM | HEIGHT: 67 IN | SYSTOLIC BLOOD PRESSURE: 144 MMHG | DIASTOLIC BLOOD PRESSURE: 59 MMHG

## 2023-03-13 DIAGNOSIS — F41.9 ANXIETY: ICD-10-CM

## 2023-03-13 DIAGNOSIS — I25.118 CORONARY ARTERY DISEASE OF NATIVE ARTERY OF NATIVE HEART WITH STABLE ANGINA PECTORIS: ICD-10-CM

## 2023-03-13 DIAGNOSIS — Z86.718 HISTORY OF DVT IN ADULTHOOD: ICD-10-CM

## 2023-03-13 DIAGNOSIS — E11.36 TYPE 2 DIABETES MELLITUS WITH DIABETIC CATARACT, WITHOUT LONG-TERM CURRENT USE OF INSULIN: ICD-10-CM

## 2023-03-13 DIAGNOSIS — I25.2 HISTORY OF NON-ST ELEVATION MYOCARDIAL INFARCTION (NSTEMI): ICD-10-CM

## 2023-03-13 DIAGNOSIS — I10 ESSENTIAL HYPERTENSION: Primary | ICD-10-CM

## 2023-03-13 DIAGNOSIS — F32.0 CURRENT MILD EPISODE OF MAJOR DEPRESSIVE DISORDER WITHOUT PRIOR EPISODE: ICD-10-CM

## 2023-03-13 DIAGNOSIS — E11.9 TYPE 2 DIABETES MELLITUS WITHOUT COMPLICATION, WITHOUT LONG-TERM CURRENT USE OF INSULIN: ICD-10-CM

## 2023-03-13 DIAGNOSIS — N18.31 STAGE 3A CHRONIC KIDNEY DISEASE: ICD-10-CM

## 2023-03-13 DIAGNOSIS — I70.0 ABDOMINAL AORTIC ATHEROSCLEROSIS: ICD-10-CM

## 2023-03-13 PROCEDURE — 3077F PR MOST RECENT SYSTOLIC BLOOD PRESSURE >= 140 MM HG: ICD-10-PCS | Mod: HCNC,CPTII,S$GLB, | Performed by: INTERNAL MEDICINE

## 2023-03-13 PROCEDURE — 3288F FALL RISK ASSESSMENT DOCD: CPT | Mod: HCNC,CPTII,S$GLB, | Performed by: INTERNAL MEDICINE

## 2023-03-13 PROCEDURE — 3078F DIAST BP <80 MM HG: CPT | Mod: HCNC,CPTII,S$GLB, | Performed by: INTERNAL MEDICINE

## 2023-03-13 PROCEDURE — 1101F PT FALLS ASSESS-DOCD LE1/YR: CPT | Mod: HCNC,CPTII,S$GLB, | Performed by: INTERNAL MEDICINE

## 2023-03-13 PROCEDURE — 1126F PR PAIN SEVERITY QUANTIFIED, NO PAIN PRESENT: ICD-10-PCS | Mod: HCNC,CPTII,S$GLB, | Performed by: INTERNAL MEDICINE

## 2023-03-13 PROCEDURE — 3077F SYST BP >= 140 MM HG: CPT | Mod: HCNC,CPTII,S$GLB, | Performed by: INTERNAL MEDICINE

## 2023-03-13 PROCEDURE — 99214 PR OFFICE/OUTPT VISIT, EST, LEVL IV, 30-39 MIN: ICD-10-PCS | Mod: HCNC,S$GLB,, | Performed by: INTERNAL MEDICINE

## 2023-03-13 PROCEDURE — 1159F PR MEDICATION LIST DOCUMENTED IN MEDICAL RECORD: ICD-10-PCS | Mod: HCNC,CPTII,S$GLB, | Performed by: INTERNAL MEDICINE

## 2023-03-13 PROCEDURE — 3288F PR FALLS RISK ASSESSMENT DOCUMENTED: ICD-10-PCS | Mod: HCNC,CPTII,S$GLB, | Performed by: INTERNAL MEDICINE

## 2023-03-13 PROCEDURE — 1159F MED LIST DOCD IN RCRD: CPT | Mod: HCNC,CPTII,S$GLB, | Performed by: INTERNAL MEDICINE

## 2023-03-13 PROCEDURE — 99999 PR PBB SHADOW E&M-EST. PATIENT-LVL V: CPT | Mod: PBBFAC,HCNC,, | Performed by: INTERNAL MEDICINE

## 2023-03-13 PROCEDURE — 99999 PR PBB SHADOW E&M-EST. PATIENT-LVL V: ICD-10-PCS | Mod: PBBFAC,HCNC,, | Performed by: INTERNAL MEDICINE

## 2023-03-13 PROCEDURE — 1160F PR REVIEW ALL MEDS BY PRESCRIBER/CLIN PHARMACIST DOCUMENTED: ICD-10-PCS | Mod: HCNC,CPTII,S$GLB, | Performed by: INTERNAL MEDICINE

## 2023-03-13 PROCEDURE — 1126F AMNT PAIN NOTED NONE PRSNT: CPT | Mod: HCNC,CPTII,S$GLB, | Performed by: INTERNAL MEDICINE

## 2023-03-13 PROCEDURE — 1101F PR PT FALLS ASSESS DOC 0-1 FALLS W/OUT INJ PAST YR: ICD-10-PCS | Mod: HCNC,CPTII,S$GLB, | Performed by: INTERNAL MEDICINE

## 2023-03-13 PROCEDURE — 3078F PR MOST RECENT DIASTOLIC BLOOD PRESSURE < 80 MM HG: ICD-10-PCS | Mod: HCNC,CPTII,S$GLB, | Performed by: INTERNAL MEDICINE

## 2023-03-13 PROCEDURE — 99214 OFFICE O/P EST MOD 30 MIN: CPT | Mod: HCNC,S$GLB,, | Performed by: INTERNAL MEDICINE

## 2023-03-13 PROCEDURE — 1160F RVW MEDS BY RX/DR IN RCRD: CPT | Mod: HCNC,CPTII,S$GLB, | Performed by: INTERNAL MEDICINE

## 2023-03-13 RX ORDER — METOPROLOL SUCCINATE 25 MG/1
12.5 TABLET, EXTENDED RELEASE ORAL DAILY
Qty: 90 TABLET | Refills: 3 | Status: CANCELLED | OUTPATIENT
Start: 2023-03-13

## 2023-03-13 RX ORDER — METOPROLOL SUCCINATE 25 MG/1
12.5 TABLET, EXTENDED RELEASE ORAL DAILY
Qty: 90 TABLET | Refills: 3 | Status: SHIPPED | OUTPATIENT
Start: 2023-03-13

## 2023-04-17 ENCOUNTER — LAB VISIT (OUTPATIENT)
Dept: LAB | Facility: HOSPITAL | Age: 83
End: 2023-04-17
Attending: INTERNAL MEDICINE
Payer: MEDICARE

## 2023-04-17 DIAGNOSIS — E11.9 TYPE 2 DIABETES MELLITUS WITHOUT COMPLICATION, WITHOUT LONG-TERM CURRENT USE OF INSULIN: ICD-10-CM

## 2023-04-17 DIAGNOSIS — C61 PROSTATE CANCER: ICD-10-CM

## 2023-04-17 DIAGNOSIS — E11.36 TYPE 2 DIABETES MELLITUS WITH DIABETIC CATARACT, WITHOUT LONG-TERM CURRENT USE OF INSULIN: ICD-10-CM

## 2023-04-17 LAB
ALBUMIN SERPL BCP-MCNC: 4.1 G/DL (ref 3.5–5.2)
ALP SERPL-CCNC: 69 U/L (ref 55–135)
ALT SERPL W/O P-5'-P-CCNC: 26 U/L (ref 10–44)
ANION GAP SERPL CALC-SCNC: 9 MMOL/L (ref 8–16)
AST SERPL-CCNC: 23 U/L (ref 10–40)
BASOPHILS # BLD AUTO: 0.03 K/UL (ref 0–0.2)
BASOPHILS NFR BLD: 0.4 % (ref 0–1.9)
BILIRUB SERPL-MCNC: 0.4 MG/DL (ref 0.1–1)
BUN SERPL-MCNC: 17 MG/DL (ref 8–23)
CALCIUM SERPL-MCNC: 9.7 MG/DL (ref 8.7–10.5)
CHLORIDE SERPL-SCNC: 103 MMOL/L (ref 95–110)
CHOLEST SERPL-MCNC: 288 MG/DL (ref 120–199)
CHOLEST/HDLC SERPL: 6.1 {RATIO} (ref 2–5)
CO2 SERPL-SCNC: 31 MMOL/L (ref 23–29)
COMPLEXED PSA SERPL-MCNC: 0.36 NG/ML (ref 0–4)
CREAT SERPL-MCNC: 1.2 MG/DL (ref 0.5–1.4)
DIFFERENTIAL METHOD: ABNORMAL
EOSINOPHIL # BLD AUTO: 0.3 K/UL (ref 0–0.5)
EOSINOPHIL NFR BLD: 4.3 % (ref 0–8)
ERYTHROCYTE [DISTWIDTH] IN BLOOD BY AUTOMATED COUNT: 13.4 % (ref 11.5–14.5)
EST. GFR  (NO RACE VARIABLE): >60 ML/MIN/1.73 M^2
ESTIMATED AVG GLUCOSE: 131 MG/DL (ref 68–131)
GLUCOSE SERPL-MCNC: 120 MG/DL (ref 70–110)
HBA1C MFR BLD: 6.2 % (ref 4–5.6)
HCT VFR BLD AUTO: 48.8 % (ref 40–54)
HDLC SERPL-MCNC: 47 MG/DL (ref 40–75)
HDLC SERPL: 16.3 % (ref 20–50)
HGB BLD-MCNC: 15.5 G/DL (ref 14–18)
IMM GRANULOCYTES # BLD AUTO: 0.02 K/UL (ref 0–0.04)
IMM GRANULOCYTES NFR BLD AUTO: 0.3 % (ref 0–0.5)
LDLC SERPL CALC-MCNC: 179.6 MG/DL (ref 63–159)
LYMPHOCYTES # BLD AUTO: 1.4 K/UL (ref 1–4.8)
LYMPHOCYTES NFR BLD: 20.5 % (ref 18–48)
MCH RBC QN AUTO: 30 PG (ref 27–31)
MCHC RBC AUTO-ENTMCNC: 31.8 G/DL (ref 32–36)
MCV RBC AUTO: 95 FL (ref 82–98)
MONOCYTES # BLD AUTO: 0.6 K/UL (ref 0.3–1)
MONOCYTES NFR BLD: 9.1 % (ref 4–15)
NEUTROPHILS # BLD AUTO: 4.6 K/UL (ref 1.8–7.7)
NEUTROPHILS NFR BLD: 65.4 % (ref 38–73)
NONHDLC SERPL-MCNC: 241 MG/DL
NRBC BLD-RTO: 0 /100 WBC
PLATELET # BLD AUTO: 208 K/UL (ref 150–450)
PMV BLD AUTO: 10.6 FL (ref 9.2–12.9)
POTASSIUM SERPL-SCNC: 4.1 MMOL/L (ref 3.5–5.1)
PROT SERPL-MCNC: 7.4 G/DL (ref 6–8.4)
RBC # BLD AUTO: 5.16 M/UL (ref 4.6–6.2)
SODIUM SERPL-SCNC: 143 MMOL/L (ref 136–145)
TRIGL SERPL-MCNC: 307 MG/DL (ref 30–150)
WBC # BLD AUTO: 7.02 K/UL (ref 3.9–12.7)

## 2023-04-17 PROCEDURE — 36415 COLL VENOUS BLD VENIPUNCTURE: CPT | Mod: HCNC,PO | Performed by: INTERNAL MEDICINE

## 2023-04-17 PROCEDURE — 84153 ASSAY OF PSA TOTAL: CPT | Mod: HCNC | Performed by: INTERNAL MEDICINE

## 2023-04-17 PROCEDURE — 80061 LIPID PANEL: CPT | Mod: HCNC | Performed by: INTERNAL MEDICINE

## 2023-04-17 PROCEDURE — 85025 COMPLETE CBC W/AUTO DIFF WBC: CPT | Mod: HCNC | Performed by: INTERNAL MEDICINE

## 2023-04-17 PROCEDURE — 80053 COMPREHEN METABOLIC PANEL: CPT | Mod: HCNC | Performed by: INTERNAL MEDICINE

## 2023-04-17 PROCEDURE — 83036 HEMOGLOBIN GLYCOSYLATED A1C: CPT | Mod: HCNC | Performed by: INTERNAL MEDICINE

## 2023-04-22 PROBLEM — I49.3 PVC (PREMATURE VENTRICULAR CONTRACTION): Status: ACTIVE | Noted: 2023-04-22

## 2023-04-22 NOTE — PROGRESS NOTES
This note was created by combination of typed  and M-Modal dictation.  Transcription errors may be present.  If there are any questions, please contact me.    Assessment and Plan:   Assessment and Plan   Constipation, unspecified constipation type  -notes in the past bowel prep has helped. Has not tried miralax  Rx for golytely  In the future can try miralax prn  If constipation persists, if abd pain escalates - check CT abd/pelvis look for diverticulitis    History of colonoscopy 2018 showing TIAs.  Will  need to discuss next visit about the risks and benefits of updating colonoscopy after the age of 77.  -     polyethylene glycol (COLYTE) 240-22.72-6.72 -5.84 gram SolR; Take 4,000 mLs by mouth once. for 1 dose  Dispense: 4000 mL; Refill: 0    HPV in male  -aldara effective but also has benefitted from cryotherapy. Sees [facility] and requesting referral.  -     Ambulatory referral/consult to Dermatology; Future; Expected date: 05/01/2023    Anxiety  - is aware that alprazolam is a high-risk medication.  We talked about alternatives and he would be interested in trial of BuSpar.  Does not take the Xanax nightly.  Can go several days without taking it.    Trial of BuSpar 15 mg nightly, take it scheduled for the next month.  If he finds it effective, and not taking alprazolam, he can try reducing it to p.r.n.   In the interim refill alprazolam  -     ALPRAZolam (XANAX) 1 MG tablet; Take 1 tablet (1 mg total) by mouth nightly as needed for Anxiety.  Dispense: 30 tablet; Refill: 2  -     busPIRone (BUSPAR) 15 MG tablet; Take 1 tablet (15 mg total) by mouth every evening. Take with 1/2 xanax  Dispense: 90 tablet; Refill: 3    Type 2 diabetes mellitus with diabetic cataract, without long-term current use of insulin  Type 2 diabetes mellitus without complication, without long-term current use of insulin  - pre visit labs A1c stable.  On metformin.  No changes.  -     Comprehensive Metabolic Panel; Future;  Expected date: 10/19/2023  -     Lipid Panel; Future; Expected date: 10/19/2023  -     Hemoglobin A1C; Future; Expected date: 10/19/2023  -     Microalbumin/Creatinine Ratio, Urine; Future; Expected date: 10/19/2023  -     CBC Without Differential; Future; Expected date: 10/22/2023    Prostate cancer  Hypogonadism in male  -  History of prostate cancer status post radiation therapy.  Lost to follow-up with Urology.  I have asked him to schedule follow-up.    Pre visit PSA stable.  Status post XRT 2019  Has not been taking testosterone in the past couple of years.  Discussed that testosterone can accelerate prostate cancer.  I would ask him to discuss risks and benefits with Urology.  Had previously been getting this prescribed with endocrinology.  But I think this was prior to his diagnosis.  -     Prostate Specific Antigen, Diagnostic; Future; Expected date: 10/22/2023      Essential hypertension  PVC (premature ventricular contraction)  Stage 3a chronic kidney disease  - pre visit labs stable creatinine good, hovering between stage II and stage IIIA.  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  Most recent stress testing negative.  But he is considering Repatha.  He had previously discussed with Cardiology in previously declined but he is considering it.        Medications Discontinued During This Encounter   Medication Reason    ANDROGEL 20.25 mg/1.25 gram (1.62 %) GlPm     ALPRAZolam (XANAX) 1  MG tablet Reorder       meds sent this encounter:  Medications Ordered This Encounter   Medications    ALPRAZolam (XANAX) 1 MG tablet     Sig: Take 1 tablet (1 mg total) by mouth nightly as needed for Anxiety.     Dispense:  30 tablet     Refill:  2    polyethylene glycol (COLYTE) 240-22.72-6.72 -5.84 gram SolR     Sig: Take 4,000 mLs by mouth once. for 1 dose     Dispense:  4000 mL     Refill:  0       Follow Up:   Future Appointments   Date Time Provider Department Center   2023  9:00 AM Milly Cadena MD [facility] CARDIO [facility]   10/16/2023  7:00 AM SPECIMEN, MORR[facility] SPECLAB [facility]   10/16/2023  7:15 AM LAB, [facility]   10/24/2023 10:20 AM Samir Boo MD [facility] MED [facility]          Subjective:   Subjective   Chief Complaint   Patient presents with    Hypertension     Follow up       DARRYL Reyes is a 82 y.o. male.     Social History     Tobacco Use    Smoking status: Former     Packs/day: 2.00     Years: 23.00     Pack years: 46.00     Types: Cigarettes     Quit date: 1989     Years since quittin.3    Smokeless tobacco: Never   Substance Use Topics    Alcohol use: Yes     Alcohol/week: 1.0 standard drink     Types: 1 Cans of beer per week     Comment: occassional          Social History     Social History Narrative    Not on file       Last appointment with this clinic was Visit date not found. Last visit with me 10/24/2022   To summarize last visit and events leading up to today:  Diabetes   Hypertension with CKD stage IIIA  Coronary artery disease, abdominal aortic atherosclerosis, intolerant to statins and to Zetia.  He declined trial of repatha. Followed by Cardiology.  Prostate cancer followed with Urology.  Surveillance PSA.    Low testosterone followed by Endocrinology.  Lost to follow-up.  No recent fills on testosterone  Archer's esophagus Protonix without relief.  Trial of alternate, omeprazole  Anxiety p.r.n. use of alprazolam  HPV infection, referral to  derm  Aortic atherosclerosis with possible ulcerated plaque.  seen by vascular. No vascular surgical intervention indicated.    Saw cardiology in February.  Increased his losartan HCT.  03/01/2023 TTE LV normal size with mild concentric hypertrophy and low normal systolic function LVEF 50%.  Mild aortic valve stenosis.  03/01/2023 nuclear stress test negative for ischemia.  03/01/2023 48 hour Holter monitor PVCs and PACs  Subsequently added metoprolol    Pre visit labs  CMP elevated glucose  Lipid high  A1c 6.2 from 6.4    Due for follow-up with Urology      Today's visit:  Please note: Chronic medical problems that are stable but are being addressed at this visit may not be listed/documented here, but may be addressed in more detail in the Assessment and Plan section.   Below are salient features of chronic medical conditions, or new/acute conditions and their details.    Constipation  The last time he had this, seemed to improve with golytely  Diet is good, adequate fruits and vegetables.   Past 5 days  Has taken dulcolax x 4 days  And bisacodyl suppository  Gets some low back pain he attributes this to constipation.   Does squatting in the mornings to try to get things going  Has not tried miralax  Notes some lower abd discomfort.    Is considering repatha.   He will discuss with cardiology.     Quit testosterone 2 years ago. Originally helped with muscle recovery after working out. Exercising regularly with weight resistance but notes it takes 2-3 days to recover.  Thinks that testosterone used to help.  Advised to f/u with urology to discuss risks and benefits given his history of prostate cancer.    Not taking the PPI.  Hx of suspicious Archer's.  Advised to restart the omeprazole.      The aldara found it helpful. But requesting referral to [facility] - found them helpful.  History of Aldara combined with cryotherapy.    The xanax - takes PRN.  Can go several days without using. We talked about high risk  nature of the medication. Will try him with buspar.     Patient Care Team:  Samir Boo MD as PCP - General (Internal Medicine)  Melanie Orantes MA as Care Coordinator  René Bills MD (Cardiology)  Sheila Braden OD as Consulting Physician (Optometry)  Layne Huff MD as Consulting Physician (Urology)  Loida Evans MD as Consulting Physician (Endocrinology)  Loc Garcia MD as Consulting Physician (Radiation Oncology)    Patient Active Problem List    Diagnosis Date Noted    PVC (premature ventricular contraction) 04/22/2023    Chronic left shoulder pain 07/21/2021    Decreased range of motion of left shoulder 07/21/2021    Shoulder weakness 07/21/2021    Stage 3a chronic kidney disease 01/15/2021    Epiretinal membrane (ERM) of both eyes 09/15/2020    Type 2 diabetes mellitus with diabetic cataract, without long-term current use of insulin 02/14/2020    Current mild episode of major depressive disorder without prior episode 02/14/2020    Abdominal aortic atherosclerosis 12/09/2019 4/2021 CT abd: There is prominent atherosclerotic plaquing of the abdominal aorta with probable eccentric mixed plaque with ulcerated plaque to be considered specifically axial image 68 series 4 with the aorta measuring 2.5 cm in diameter at this level        History of DVT from PICC line right upper extremity 4/2019 anticoag x 3 months then stopped 04/29/2019 4/28/19 RUE US: The right internal jugular vein is patent.  Partial thrombus of the right subclavian vein with floating thrombus.  The right axillary vein is completely thrombosed.  Complete thrombosis of the right basilic vein.  The cephalic vein is patent.  The proximal brachial vein is occluded, the mid and distal vein demonstrate flow within.    7/3/19 RUE US: Residual nonocclusive thrombus in the right subclavian and basilic veins, significantly improved from the 04/28/2019 exam.  No new thrombus.  12/19/19 RUE US: No DVT in the right upper  extremity.  Resolved thrombus in the right subclavian vein and mild residual chronic thrombus in the right superficial basilic vein, improved.        Refractive error 04/09/2019    Intermediate stage nonexudative age-related macular degeneration of both eyes 04/09/2019    Nuclear sclerosis of both eyes 04/09/2019    Status post coronary artery bypass grafting single vessel 03/07/2019    Renal cyst, left, simple 1st seen 2/2019 CT; verified on renal US 11/2020 03/02/2019 2/2019 CT abd/pelvis: Small left renal hypodensity which measures slightly higher than simple fluid density.  This could represent a small cyst or complex cyst.  Future nonemergent ultrasound follow-up could be obtained to ensure cystic nature of this lesion.  11/16/20 renal US Simple left renal cyst.        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; 3/2019 [facility] patent graft; Culprit likely in small Diag2 (unrevascularized) and likely demand related 03/01/2019     3/2019 [facility]  LM: dist 50%  LAD: mid 99%, competitive flow from LIMA-LAD-OM-RPDA              Diag2 prox 90%, T2 flow (not bypassed)  LCx: prox , territory supplied by LIMA-LAD-OM-RPDA  RCA: mid , territory supplied by LIMA-LAD-OM-RPDA     LIMA-LAD-OM-RPDA patent   No SVG identified     Impression:  NSTEMI  3V CAD, normal LV fxn  Patent LIMA-LAD-OM-RPDA  Culprit likely in small Diag2 (unrevascularized) and likely demand related     Plan:  Cont med rx  ASA 81mg qd indefinitely  Plavix 75mg qd for 1 year (thru 3/2020)    03/01/2023 TTE LV normal size with mild concentric hypertrophy and low normal systolic function LVEF 50%.  Mild aortic valve stenosis.  03/01/2023 nuclear stress test negative for ischemia.  03/01/2023 48 hour Holter monitor PVCs and PACs       Prostate cancer 2/25/19 Transrectal ultrasound of prostate and gold fiducial marker placement 01/29/2019 1/7/19 biopsy showed intermediate risk Tanisha 3+4 PCa in 3/12 cores. Canalou 4 up to 30% of one core.   4/2019- Dr. Gurjit Garcia treated patient with External beam, radiation therapy- [facility]        Tubular adenoma of colon 2/2018 3 adenomas repeat 3 years 01/11/2019    Hypogonadism in male 01/11/2019    Slow urinary stream 12/07/2018    Insomnia 08/10/2014    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 2/2018; 10/2020 EGD with suspicion for barretts; repeat 2023      10/8/2020 EGD Esophageal mucosal changes suggestive of long-segment Archer's esophagus, classified as Archer's stage C10-M12 per Draper criteria. WATS-3D brush biopsy specimens obtained        Anxiety     Depression     Essential hypertension 11/14/2012 03/01/2023 TTE LV normal size with mild concentric hypertrophy and low normal systolic function LVEF 50%.  Mild aortic valve stenosis.  03/01/2023 48 hour Holter monitor PVCs and PACs      Type 2 diabetes mellitus without complication, without long-term current use of insulin 11/14/2012       PAST MEDICAL PROBLEMS, PAST SURGICAL HISTORY: please see relevant portions of the electronic medical record    ALLERGIES AND MEDICATIONS: updated and reviewed.  Review of patient's allergies indicates:   Allergen Reactions    Tamsulosin     Prochlorperazine      convulsions    Statins-hmg-coa reductase inhibitors Other (See Comments)     Muscle weakness       Medication List with Changes/Refills   Current Medications    ALPRAZOLAM (XANAX) 1 MG TABLET    Take 1 tablet (1 mg total) by mouth nightly as needed for Anxiety.    ANDROGEL 20.25 MG/1.25 GRAM (1.62 %) GLPM    One pump over each shoulder    ASPIRIN (ECOTRIN) 81 MG EC TABLET    Take 1 tablet (81 mg total) by mouth once daily.    BLOOD GLUCOSE CONTROL, LOW (TRUE METRIX LEVEL 1) SOLN    To use with blood glucose meter    BLOOD SUGAR DIAGNOSTIC  "(TRUE METRIX GLUCOSE TEST STRIP) STRP    For two times daily checking    BLOOD-GLUCOSE METER (TRUE METRIX GLUCOSE METER) KIT    For twice daily checking    IMIQUIMOD (ALDARA) 5 % CREAM    Apply topically 3 (three) times a week.    LANCETS (TRUEPLUS LANCETS) 33 GAUGE MISC    For twice daily checking    LANCETS MISC    To check BG 3 times daily, to use with insurance preferred meter    LOSARTAN-HYDROCHLOROTHIAZIDE 100-25 MG (HYZAAR) 100-25 MG PER TABLET    Take 1 tablet by mouth once daily.    METFORMIN (GLUCOPHAGE) 500 MG TABLET    TAKE 1 TABLET EVERY DAY WITH BREAKFAST    METOPROLOL SUCCINATE (TOPROL-XL) 25 MG 24 HR TABLET    Take 0.5 tablets (12.5 mg total) by mouth once daily.    OMEPRAZOLE (PRILOSEC) 40 MG CAPSULE    Take 1 capsule (40 mg total) by mouth once daily.    SILDENAFIL (VIAGRA) 25 MG TABLET    Take 1 tablet (25 mg total) by mouth daily as needed for Erectile Dysfunction.    VIT C/VIT E AC/LUT/COPPER/ZINC (PRESERVISION LUTEIN ORAL)    Take 1 tablet by mouth once daily.    VITAMIN D 1000 UNITS TAB    Take 1,000 Units by mouth once daily.         Objective:   Objective   Physical Exam   Vitals:    04/24/23 1047 04/24/23 1123   BP: (!) 140/74 138/80   BP Location:  Left arm   Patient Position:  Sitting   BP Method:  Medium (Manual)   Pulse: 70    Temp: 97.6 °F (36.4 °C)    TempSrc: Oral    SpO2: 97%    Weight: 75.7 kg (166 lb 14.2 oz)    Height: 5' 7" (1.702 m)     Body mass index is 26.14 kg/m².  Weight: 75.7 kg (166 lb 14.2 oz)   Height: 5' 7" (170.2 cm)     Physical Exam  Constitutional:       General: He is not in acute distress.     Appearance: He is well-developed.   Eyes:      Extraocular Movements: Extraocular movements intact.   Cardiovascular:      Rate and Rhythm: Normal rate and regular rhythm.      Heart sounds: Normal heart sounds. No murmur heard.  Pulmonary:      Effort: Pulmonary effort is normal.      Breath sounds: Normal breath sounds.   Abdominal:      General: There is no distension. "      Palpations: There is no mass.      Tenderness: There is no abdominal tenderness. There is no guarding.      Comments: No TTP; bowel sounds normal timbre   Musculoskeletal:         General: Normal range of motion.      Right lower leg: No edema.      Left lower leg: No edema.   Skin:     General: Skin is warm and dry.   Neurological:      Mental Status: He is alert and oriented to person, place, and time.      Coordination: Coordination normal.   Psychiatric:         Behavior: Behavior normal.         Thought Content: Thought content normal.

## 2023-04-24 ENCOUNTER — OFFICE VISIT (OUTPATIENT)
Dept: FAMILY MEDICINE | Facility: CLINIC | Age: 83
End: 2023-04-24
Payer: MEDICARE

## 2023-04-24 VITALS
SYSTOLIC BLOOD PRESSURE: 138 MMHG | DIASTOLIC BLOOD PRESSURE: 80 MMHG | BODY MASS INDEX: 26.19 KG/M2 | HEIGHT: 67 IN | WEIGHT: 166.88 LBS | HEART RATE: 70 BPM | OXYGEN SATURATION: 97 % | TEMPERATURE: 98 F

## 2023-04-24 DIAGNOSIS — K59.00 CONSTIPATION, UNSPECIFIED CONSTIPATION TYPE: Primary | ICD-10-CM

## 2023-04-24 DIAGNOSIS — F41.9 ANXIETY: ICD-10-CM

## 2023-04-24 DIAGNOSIS — E11.9 TYPE 2 DIABETES MELLITUS WITHOUT COMPLICATION, WITHOUT LONG-TERM CURRENT USE OF INSULIN: ICD-10-CM

## 2023-04-24 DIAGNOSIS — E11.36 TYPE 2 DIABETES MELLITUS WITH DIABETIC CATARACT, WITHOUT LONG-TERM CURRENT USE OF INSULIN: ICD-10-CM

## 2023-04-24 DIAGNOSIS — I25.118 CORONARY ARTERY DISEASE OF NATIVE ARTERY OF NATIVE HEART WITH STABLE ANGINA PECTORIS: ICD-10-CM

## 2023-04-24 DIAGNOSIS — C61 PROSTATE CANCER: ICD-10-CM

## 2023-04-24 DIAGNOSIS — I70.0 ABDOMINAL AORTIC ATHEROSCLEROSIS: ICD-10-CM

## 2023-04-24 DIAGNOSIS — K22.70 BARRETT'S ESOPHAGUS WITHOUT DYSPLASIA: ICD-10-CM

## 2023-04-24 DIAGNOSIS — E29.1 HYPOGONADISM IN MALE: ICD-10-CM

## 2023-04-24 DIAGNOSIS — I49.3 PVC (PREMATURE VENTRICULAR CONTRACTION): ICD-10-CM

## 2023-04-24 DIAGNOSIS — N18.31 STAGE 3A CHRONIC KIDNEY DISEASE: ICD-10-CM

## 2023-04-24 DIAGNOSIS — I10 ESSENTIAL HYPERTENSION: ICD-10-CM

## 2023-04-24 DIAGNOSIS — B97.7 HPV IN MALE: ICD-10-CM

## 2023-04-24 PROCEDURE — 1125F PR PAIN SEVERITY QUANTIFIED, PAIN PRESENT: ICD-10-PCS | Mod: HCNC,CPTII,S$GLB, | Performed by: INTERNAL MEDICINE

## 2023-04-24 PROCEDURE — 1125F AMNT PAIN NOTED PAIN PRSNT: CPT | Mod: HCNC,CPTII,S$GLB, | Performed by: INTERNAL MEDICINE

## 2023-04-24 PROCEDURE — 99214 OFFICE O/P EST MOD 30 MIN: CPT | Mod: HCNC,S$GLB,, | Performed by: INTERNAL MEDICINE

## 2023-04-24 PROCEDURE — 99999 PR PBB SHADOW E&M-EST. PATIENT-LVL V: CPT | Mod: PBBFAC,HCNC,, | Performed by: INTERNAL MEDICINE

## 2023-04-24 PROCEDURE — 1159F MED LIST DOCD IN RCRD: CPT | Mod: HCNC,CPTII,S$GLB, | Performed by: INTERNAL MEDICINE

## 2023-04-24 PROCEDURE — 1160F RVW MEDS BY RX/DR IN RCRD: CPT | Mod: HCNC,CPTII,S$GLB, | Performed by: INTERNAL MEDICINE

## 2023-04-24 PROCEDURE — 3075F PR MOST RECENT SYSTOLIC BLOOD PRESS GE 130-139MM HG: ICD-10-PCS | Mod: HCNC,CPTII,S$GLB, | Performed by: INTERNAL MEDICINE

## 2023-04-24 PROCEDURE — 1101F PR PT FALLS ASSESS DOC 0-1 FALLS W/OUT INJ PAST YR: ICD-10-PCS | Mod: HCNC,CPTII,S$GLB, | Performed by: INTERNAL MEDICINE

## 2023-04-24 PROCEDURE — 99214 PR OFFICE/OUTPT VISIT, EST, LEVL IV, 30-39 MIN: ICD-10-PCS | Mod: HCNC,S$GLB,, | Performed by: INTERNAL MEDICINE

## 2023-04-24 PROCEDURE — 3288F PR FALLS RISK ASSESSMENT DOCUMENTED: ICD-10-PCS | Mod: HCNC,CPTII,S$GLB, | Performed by: INTERNAL MEDICINE

## 2023-04-24 PROCEDURE — 3075F SYST BP GE 130 - 139MM HG: CPT | Mod: HCNC,CPTII,S$GLB, | Performed by: INTERNAL MEDICINE

## 2023-04-24 PROCEDURE — 1159F PR MEDICATION LIST DOCUMENTED IN MEDICAL RECORD: ICD-10-PCS | Mod: HCNC,CPTII,S$GLB, | Performed by: INTERNAL MEDICINE

## 2023-04-24 PROCEDURE — 3079F PR MOST RECENT DIASTOLIC BLOOD PRESSURE 80-89 MM HG: ICD-10-PCS | Mod: HCNC,CPTII,S$GLB, | Performed by: INTERNAL MEDICINE

## 2023-04-24 PROCEDURE — 99999 PR PBB SHADOW E&M-EST. PATIENT-LVL V: ICD-10-PCS | Mod: PBBFAC,HCNC,, | Performed by: INTERNAL MEDICINE

## 2023-04-24 PROCEDURE — 3288F FALL RISK ASSESSMENT DOCD: CPT | Mod: HCNC,CPTII,S$GLB, | Performed by: INTERNAL MEDICINE

## 2023-04-24 PROCEDURE — 1160F PR REVIEW ALL MEDS BY PRESCRIBER/CLIN PHARMACIST DOCUMENTED: ICD-10-PCS | Mod: HCNC,CPTII,S$GLB, | Performed by: INTERNAL MEDICINE

## 2023-04-24 PROCEDURE — 1101F PT FALLS ASSESS-DOCD LE1/YR: CPT | Mod: HCNC,CPTII,S$GLB, | Performed by: INTERNAL MEDICINE

## 2023-04-24 PROCEDURE — 3079F DIAST BP 80-89 MM HG: CPT | Mod: HCNC,CPTII,S$GLB, | Performed by: INTERNAL MEDICINE

## 2023-04-24 RX ORDER — ALPRAZOLAM 1 MG/1
1 TABLET ORAL NIGHTLY PRN
Qty: 30 TABLET | Refills: 2 | Status: SHIPPED | OUTPATIENT
Start: 2023-04-24 | End: 2023-10-24 | Stop reason: SDUPTHER

## 2023-04-24 RX ORDER — BUSPIRONE HYDROCHLORIDE 15 MG/1
15 TABLET ORAL NIGHTLY
Qty: 90 TABLET | Refills: 3 | Status: SHIPPED | OUTPATIENT
Start: 2023-04-24 | End: 2023-05-02 | Stop reason: SDUPTHER

## 2023-05-02 ENCOUNTER — TELEPHONE (OUTPATIENT)
Dept: FAMILY MEDICINE | Facility: CLINIC | Age: 83
End: 2023-05-02
Payer: MEDICARE

## 2023-05-02 DIAGNOSIS — F41.9 ANXIETY: ICD-10-CM

## 2023-05-02 RX ORDER — BUSPIRONE HYDROCHLORIDE 15 MG/1
TABLET ORAL
Qty: 90 TABLET | Refills: 0 | OUTPATIENT
Start: 2023-05-02

## 2023-05-02 RX ORDER — BUSPIRONE HYDROCHLORIDE 15 MG/1
15 TABLET ORAL NIGHTLY
Qty: 90 TABLET | Refills: 3 | Status: SHIPPED | OUTPATIENT
Start: 2023-05-02 | End: 2023-10-24

## 2023-05-02 NOTE — TELEPHONE ENCOUNTER
"Clarification on buspar on pts medication direction it states " Route: Take 1 tablet (15 mg total) by mouth every evening. Take with 1/2 xanax - Oral"    [employer] needs clarification for the directions   "

## 2023-05-02 NOTE — TELEPHONE ENCOUNTER
Refill Decision Note   Eric Jackson  is requesting a refill authorization.  Brief Assessment and Rationale for Refill:  Quick Discontinue     Medication Therapy Plan:  E-Prescribing Status: Receipt confirmed by pharmacy (5/2/2023  3:15 PM CDT) Pharmacy is requesting new scripts for the following medications without required information, (sig/ frequency/qty/etc)        Pharmacist review requested: Yes     Comments: Pharmacies have been requesting medications for patients without required information, (sig, frequency, qty, etc.). In addition, requests are sent for medication(s) pt. are currently not taking, and medications patients have never taken.    We have spoken to the pharmacies about these request types and advised their teams previously that we are unable to assess these New Script requests and require all details for these requests. This is a known issue and has been reported.     Note composed:4:00 PM 05/02/2023

## 2023-05-02 NOTE — TELEPHONE ENCOUNTER
----- Message from Tony Davis MA sent at 5/2/2023  9:22 AM CDT -----  Type: Patient Call Back    Who called: kin Mancilla    What is the request in detail: needs to clarify instructions for the medication ..     busPIRone (BUSPAR) 15 MG tablet    Can the clinic reply by [facility]?No    Would the patient rather a call back or a response via [facility]? yes    Best call back number: [phone]

## 2023-05-02 NOTE — TELEPHONE ENCOUNTER
Refill Routing Note   Medication(s) are not appropriate for processing by [facility] for the following reason(s):           [facility] action(s):  Quick Discontinue      Medication Therapy Plan: E-Prescribing Status: Receipt confirmed by pharmacy (5/2/2023  3:15 PM CDT)    Appointments  past 12m or future 3m with PCP    Date Provider   Last Visit   4/24/2023 Samir Boo MD   Next Visit   10/24/2023 Samir Boo MD   ED visits in past 90 days: 0        Note composed:3:58 PM 05/02/2023

## 2023-05-02 NOTE — TELEPHONE ENCOUNTER
It looks like this was meant to be taken daily with 1/2 Xanax. I removed the part about Xanax in the new rx.

## 2023-05-05 ENCOUNTER — TELEPHONE (OUTPATIENT)
Dept: FAMILY MEDICINE | Facility: CLINIC | Age: 83
End: 2023-05-05
Payer: MEDICARE

## 2023-05-09 ENCOUNTER — PES CALL (OUTPATIENT)
Dept: ADMINISTRATIVE | Facility: CLINIC | Age: 83
End: 2023-05-09
Payer: MEDICARE

## 2023-05-19 ENCOUNTER — OFFICE VISIT (OUTPATIENT)
Dept: UROLOGY | Facility: CLINIC | Age: 83
End: 2023-05-19
Payer: MEDICARE

## 2023-05-19 VITALS — WEIGHT: 164.25 LBS | BODY MASS INDEX: 25.72 KG/M2

## 2023-05-19 DIAGNOSIS — Z85.46 HISTORY OF PROSTATE CANCER: ICD-10-CM

## 2023-05-19 DIAGNOSIS — E29.1 HYPOGONADISM IN MALE: Primary | ICD-10-CM

## 2023-05-19 PROCEDURE — 99999 PR PBB SHADOW E&M-EST. PATIENT-LVL III: ICD-10-PCS | Mod: PBBFAC,,, | Performed by: STUDENT IN AN ORGANIZED HEALTH CARE EDUCATION/TRAINING PROGRAM

## 2023-05-19 PROCEDURE — 1159F MED LIST DOCD IN RCRD: CPT | Mod: CPTII,,, | Performed by: STUDENT IN AN ORGANIZED HEALTH CARE EDUCATION/TRAINING PROGRAM

## 2023-05-19 PROCEDURE — 1101F PT FALLS ASSESS-DOCD LE1/YR: CPT | Mod: CPTII,,, | Performed by: STUDENT IN AN ORGANIZED HEALTH CARE EDUCATION/TRAINING PROGRAM

## 2023-05-19 PROCEDURE — 99214 PR OFFICE/OUTPT VISIT, EST, LEVL IV, 30-39 MIN: ICD-10-PCS | Mod: ,,, | Performed by: STUDENT IN AN ORGANIZED HEALTH CARE EDUCATION/TRAINING PROGRAM

## 2023-05-19 PROCEDURE — 1101F PR PT FALLS ASSESS DOC 0-1 FALLS W/OUT INJ PAST YR: ICD-10-PCS | Mod: CPTII,,, | Performed by: STUDENT IN AN ORGANIZED HEALTH CARE EDUCATION/TRAINING PROGRAM

## 2023-05-19 PROCEDURE — 99213 OFFICE O/P EST LOW 20 MIN: CPT | Performed by: STUDENT IN AN ORGANIZED HEALTH CARE EDUCATION/TRAINING PROGRAM

## 2023-05-19 PROCEDURE — 3288F FALL RISK ASSESSMENT DOCD: CPT | Mod: CPTII,,, | Performed by: STUDENT IN AN ORGANIZED HEALTH CARE EDUCATION/TRAINING PROGRAM

## 2023-05-19 PROCEDURE — 1159F PR MEDICATION LIST DOCUMENTED IN MEDICAL RECORD: ICD-10-PCS | Mod: CPTII,,, | Performed by: STUDENT IN AN ORGANIZED HEALTH CARE EDUCATION/TRAINING PROGRAM

## 2023-05-19 PROCEDURE — 99999 PR PBB SHADOW E&M-EST. PATIENT-LVL III: CPT | Mod: PBBFAC,,, | Performed by: STUDENT IN AN ORGANIZED HEALTH CARE EDUCATION/TRAINING PROGRAM

## 2023-05-19 PROCEDURE — 99214 OFFICE O/P EST MOD 30 MIN: CPT | Mod: ,,, | Performed by: STUDENT IN AN ORGANIZED HEALTH CARE EDUCATION/TRAINING PROGRAM

## 2023-05-19 PROCEDURE — 3288F PR FALLS RISK ASSESSMENT DOCUMENTED: ICD-10-PCS | Mod: CPTII,,, | Performed by: STUDENT IN AN ORGANIZED HEALTH CARE EDUCATION/TRAINING PROGRAM

## 2023-05-19 RX ORDER — TESTOSTERONE 20.25 MG/1.25G
1.25 GEL TOPICAL DAILY
Qty: 75 G | Refills: 3 | Status: SHIPPED | OUTPATIENT
Start: 2023-05-19 | End: 2023-11-01 | Stop reason: SDUPTHER

## 2023-05-19 NOTE — PROGRESS NOTES
Patient ID: Eric Jackson is a 82 y.o. male.    Chief Complaint: Low Testosterone      HPI  82 y.o. who presents to the Urology clinic for evaluation of low T, previously managed by endocrinology. Symptoms low T include muscle mass weakness, low energy. Patient w/ hx of prostate cancer s/p radiation therapy 2019, cT1c, IM risk, last PSA 0.36, parisa 0.29 ( 2020)  Prior neg hematuria work up.   T levels below        Medically Necessary ROS documented in HPI    Past Medical History  Active Ambulatory Problems     Diagnosis Date Noted    Essential hypertension 11/14/2012    Type 2 diabetes mellitus without complication, without long-term current use of insulin 11/14/2012    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 2/2018; 10/2020 EGD with suspicion for barretts; repeat 2023     Anxiety     Depression     Insomnia 08/10/2014    Slow urinary stream 12/07/2018    Tubular adenoma of colon 2/2018 3 adenomas repeat 3 years 01/11/2019    Hypogonadism in male 01/11/2019    Prostate cancer 2/25/19 TRUS of prostate and gold fiducial marker placement; 4/2019 s/p XRT 01/29/2019    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; 3/2019 [facility] patent graft; Culprit likely in small Diag2 (unrevascularized) and likely demand related 03/01/2019    Renal cyst, left, simple 1st seen 2/2019 CT; verified on renal US 11/2020 03/02/2019    Status post coronary artery bypass grafting single vessel 03/07/2019    Refractive error 04/09/2019    Intermediate stage nonexudative age-related macular degeneration of both eyes 04/09/2019    Nuclear sclerosis of both eyes 04/09/2019    History of DVT from PICC line right upper extremity 4/2019 anticoag x 3 months then stopped 04/29/2019    Abdominal aortic atherosclerosis 12/09/2019     Type 2 diabetes mellitus with diabetic cataract, without long-term current use of insulin 02/14/2020    Current mild episode of major depressive disorder without prior episode 02/14/2020    Epiretinal membrane (ERM) of both eyes 09/15/2020    Stage 3a chronic kidney disease 01/15/2021    Chronic left shoulder pain 07/21/2021    Decreased range of motion of left shoulder 07/21/2021    Shoulder weakness 07/21/2021    PVC (premature ventricular contraction) 04/22/2023     Resolved Ambulatory Problems     Diagnosis Date Noted    Acute constipation 08/25/2012    GI bleed 11/14/2012    Acute post-hemorrhagic anemia 11/14/2012    Chest pain 02/03/2014    Other and unspecified angina pectoris 12/30/2014    Type II or unspecified type diabetes mellitus without mention of complication, uncontrolled 12/30/2014    Symptomatic anemia, microcytic/hypochromic  01/15/2018    Left lower lobe pneumonia 01/15/2018    Hyponatremia 01/15/2018    Transaminitis 01/15/2018    Iron deficiency anemia 02/09/2018    Elevated PSA 12/07/2018    Fever 106 degrees F or over 02/28/2019    Sepsis secondary to UTI 03/01/2019    Infectious encephalopathy 03/01/2019    Acute prostatitis 03/01/2019    Urinary retention 03/02/2019    NSTEMI (non-ST elevated myocardial infarction) 03/04/2019    Cardiomyopathy 02/14/2020     Past Medical History:   Diagnosis Date    Anemia     Angina pectoris     Cancer     Coronary artery disease     Diabetes mellitus type II     Disorder of kidney and ureter     Erectile dysfunction     Eye injuries     GERD (gastroesophageal reflux disease)     Hypertension     Myocardial infarction     Trouble in sleeping     Type 2 diabetes mellitus     Type 2 diabetes mellitus with ophthalmic manifestations          Past Surgical History  Past Surgical History:   Procedure Laterality Date    COLONOSCOPY N/A 2/9/2018    Procedure: COLONOSCOPY;  Surgeon: Mathieu Cao MD;  Location: [facility];  Service: Endoscopy;  Laterality: N/A;     CORONARY ANGIOGRAPHY INCLUDING BYPASS GRAFTS WITH CATHETERIZATION OF LEFT HEART N/A 3/4/2019    Procedure: ANGIOGRAM, CORONARY, INCLUDING BYPASS GRAFT, WITH LEFT HEART CATHETERIZATION;  Surgeon: Jamir Nam MD;  Location: [facility] CATH LAB;  Service: Cardiology;  Laterality: N/A;    CORONARY ARTERY BYPASS GRAFT  2001    ESOPHAGOGASTRODUODENOSCOPY N/A 10/8/2020    Procedure: EGD (ESOPHAGOGASTRODUODENOSCOPY);  Surgeon: Bharath Herrera MD;  Location: [facility] ENDO;  Service: Endoscopy;  Laterality: N/A;    HERNIA REPAIR      LEFT HEART CATHETERIZATION Left 3/4/2019    Procedure: Left heart cath RFA access, 4fr catheter/sheath, not before 9am;  Surgeon: Jamir Nam MD;  Location: [facility] CATH LAB;  Service: Cardiology;  Laterality: Left;    TRANSRECTAL ULTRASOUND OF PROSTATE WITH INSERTION OF GOLD FIDUCIAL MARKER N/A 2/26/2019    Procedure: ULTRASOUND, PROSTATE, RECTAL APPROACH, WITH GOLD FIDUCIAL MARKER INSERTION;  Surgeon: Layne Huff MD;  Location: [facility] OR;  Service: Urology;  Laterality: N/A;    UPPER GASTROINTESTINAL ENDOSCOPY      WRIST SURGERY         Social History  Social Connections: Not on file       Medications    Current Outpatient Medications:     ALPRAZolam (XANAX) 1 MG tablet, Take 1 tablet (1 mg total) by mouth nightly as needed for Anxiety., Disp: 30 tablet, Rfl: 2    aspirin (ECOTRIN) 81 MG EC tablet, Take 1 tablet (81 mg total) by mouth once daily. (Patient not taking: Reported on 11/26/2021), Disp: 90 tablet, Rfl: 3    blood glucose control, low (TRUE METRIX LEVEL 1) Soln, To use with blood glucose meter, Disp: 1 each, Rfl: 0    blood sugar diagnostic (TRUE METRIX GLUCOSE TEST STRIP) Strp, For two times daily checking, Disp: 250 strip, Rfl: 3    blood-glucose meter (TRUE METRIX GLUCOSE METER) kit, For twice daily checking, Disp: 1 each, Rfl: 0    busPIRone (BUSPAR) 15 MG tablet, Take 1 tablet (15 mg total) by mouth every evening., Disp: 90 tablet, Rfl: 3    imiquimod  (ALDARA) 5 % cream, Apply topically 3 (three) times a week., Disp: 24 packet, Rfl: 2    lancets (TRUEPLUS LANCETS) 33 gauge Misc, For twice daily checking, Disp: 250 each, Rfl: 3    lancets Misc, To check BG 3 times daily, to use with insurance preferred meter, Disp: 300 each, Rfl: 3    losartan-hydrochlorothiazide 100-25 mg (HYZAAR) 100-25 mg per tablet, Take 1 tablet by mouth once daily., Disp: 90 tablet, Rfl: 3    metFORMIN (GLUCOPHAGE) 500 MG tablet, TAKE 1 TABLET EVERY DAY WITH BREAKFAST, Disp: 90 tablet, Rfl: 1    metoprolol succinate (TOPROL-XL) 25 MG 24 hr tablet, Take 0.5 tablets (12.5 mg total) by mouth once daily., Disp: 90 tablet, Rfl: 3    omeprazole (PRILOSEC) 40 MG capsule, Take 1 capsule (40 mg total) by mouth once daily. (Patient not taking: Reported on 2/8/2023), Disp: 90 capsule, Rfl: 3    sildenafiL (VIAGRA) 25 MG tablet, Take 1 tablet (25 mg total) by mouth daily as needed for Erectile Dysfunction. (Patient not taking: Reported on 4/22/2022), Disp: 10 tablet, Rfl: 0    vit C/vit E ac/lut/copper/zinc (PRESERVISION LUTEIN ORAL), Take 1 tablet by mouth once daily., Disp: , Rfl:     vitamin D 1000 units Tab, Take 1,000 Units by mouth once daily., Disp: , Rfl:     Allergies  Review of patient's allergies indicates:   Allergen Reactions    Tamsulosin     Prochlorperazine      convulsions    Statins-hmg-coa reductase inhibitors Other (See Comments)     Muscle weakness       Patient's PMH, FH, Social hx, Medications, allergies reviewed and updated as pertinent to today's visit    Objective:      Physical Exam  Constitutional:       General: He is not in acute distress.     Appearance: He is well-developed. He is not ill-appearing, toxic-appearing or diaphoretic.   HENT:      Head: Normocephalic and atraumatic.      Mouth/Throat:      Mouth: Mucous membranes are moist.   Eyes:      Conjunctiva/sclera: Conjunctivae normal.   Pulmonary:      Effort: Pulmonary effort is normal. No respiratory distress.    Abdominal:      General: Abdomen is flat. There is no distension.      Palpations: Abdomen is soft. There is no mass.      Tenderness: There is no abdominal tenderness. There is no right CVA tenderness, left CVA tenderness or guarding.   Musculoskeletal:         General: No swelling or deformity.      Cervical back: Neck supple.   Skin:     Findings: No rash.   Neurological:      Mental Status: He is alert and oriented to person, place, and time.      Gait: Gait normal.   Psychiatric:         Mood and Affect: Mood normal.         Thought Content: Thought content normal.         Judgment: Judgment normal.           Lab Results   Component Value Date    PSADIAG 0.36 04/17/2023    PSADIAG 0.30 04/22/2022     Assessment:       1. Hypogonadism in male    2. History of prostate cancer        Plan:         Discussed T replacement in setting of hx of prostate cancer, will require close PSA monitoring  Repeat T, last T level in Oct 2022  Repeat  labs in 3 months with close monitoring of PSA    Patient accepting of risk of rise of PSA on T replacement, risks/benefits reviewed  Patient VU    Androgel Rx provided to be started after repeat T obtained

## 2023-05-22 ENCOUNTER — LAB VISIT (OUTPATIENT)
Dept: LAB | Facility: HOSPITAL | Age: 83
End: 2023-05-22
Attending: STUDENT IN AN ORGANIZED HEALTH CARE EDUCATION/TRAINING PROGRAM
Payer: MEDICARE

## 2023-05-22 DIAGNOSIS — Z85.46 HISTORY OF PROSTATE CANCER: ICD-10-CM

## 2023-05-22 DIAGNOSIS — E29.1 HYPOGONADISM IN MALE: ICD-10-CM

## 2023-05-22 LAB
BASOPHILS # BLD AUTO: 0.03 K/UL (ref 0–0.2)
BASOPHILS NFR BLD: 0.5 % (ref 0–1.9)
COMPLEXED PSA SERPL-MCNC: 0.43 NG/ML (ref 0–4)
DIFFERENTIAL METHOD: ABNORMAL
EOSINOPHIL # BLD AUTO: 0.5 K/UL (ref 0–0.5)
EOSINOPHIL NFR BLD: 8.6 % (ref 0–8)
ERYTHROCYTE [DISTWIDTH] IN BLOOD BY AUTOMATED COUNT: 13 % (ref 11.5–14.5)
HCT VFR BLD AUTO: 45.4 % (ref 40–54)
HGB BLD-MCNC: 14.7 G/DL (ref 14–18)
IMM GRANULOCYTES # BLD AUTO: 0.01 K/UL (ref 0–0.04)
IMM GRANULOCYTES NFR BLD AUTO: 0.2 % (ref 0–0.5)
LYMPHOCYTES # BLD AUTO: 1.8 K/UL (ref 1–4.8)
LYMPHOCYTES NFR BLD: 28.7 % (ref 18–48)
MCH RBC QN AUTO: 30.2 PG (ref 27–31)
MCHC RBC AUTO-ENTMCNC: 32.4 G/DL (ref 32–36)
MCV RBC AUTO: 93 FL (ref 82–98)
MONOCYTES # BLD AUTO: 0.8 K/UL (ref 0.3–1)
MONOCYTES NFR BLD: 13 % (ref 4–15)
NEUTROPHILS # BLD AUTO: 3 K/UL (ref 1.8–7.7)
NEUTROPHILS NFR BLD: 49 % (ref 38–73)
NRBC BLD-RTO: 0 /100 WBC
PLATELET # BLD AUTO: 216 K/UL (ref 150–450)
PMV BLD AUTO: 10.5 FL (ref 9.2–12.9)
RBC # BLD AUTO: 4.87 M/UL (ref 4.6–6.2)
WBC # BLD AUTO: 6.17 K/UL (ref 3.9–12.7)

## 2023-05-22 PROCEDURE — 84270 ASSAY OF SEX HORMONE GLOBUL: CPT | Performed by: STUDENT IN AN ORGANIZED HEALTH CARE EDUCATION/TRAINING PROGRAM

## 2023-05-22 PROCEDURE — 85025 COMPLETE CBC W/AUTO DIFF WBC: CPT | Performed by: STUDENT IN AN ORGANIZED HEALTH CARE EDUCATION/TRAINING PROGRAM

## 2023-05-22 PROCEDURE — 84153 ASSAY OF PSA TOTAL: CPT | Performed by: STUDENT IN AN ORGANIZED HEALTH CARE EDUCATION/TRAINING PROGRAM

## 2023-05-22 PROCEDURE — 36415 COLL VENOUS BLD VENIPUNCTURE: CPT | Mod: PO | Performed by: STUDENT IN AN ORGANIZED HEALTH CARE EDUCATION/TRAINING PROGRAM

## 2023-05-22 PROCEDURE — 84403 ASSAY OF TOTAL TESTOSTERONE: CPT | Performed by: STUDENT IN AN ORGANIZED HEALTH CARE EDUCATION/TRAINING PROGRAM

## 2023-05-30 LAB
ALBUMIN SERPL-MCNC: 4.2 G/DL (ref 3.6–5.1)
ALBUMIN SERPL-MCNC: 4.2 G/DL (ref 3.6–5.1)
SHBG SERPL-SCNC: 25 NMOL/L (ref 22–77)
SHBG SERPL-SCNC: 25 NMOL/L (ref 22–77)
TESTOST FREE SERPL-MCNC: 50.3 PG/ML (ref 6–73)
TESTOST FREE SERPL-MCNC: 50.3 PG/ML (ref 6–73)
TESTOST SERPL-MCNC: 311 NG/DL (ref 250–1100)
TESTOST SERPL-MCNC: 311 NG/DL (ref 250–1100)
TESTOSTERONE.FREE+WB SERPL-MCNC: 96.9 NG/DL (ref 15–150)
TESTOSTERONE.FREE+WB SERPL-MCNC: 96.9 NG/DL (ref 15–150)

## 2023-07-15 NOTE — PROGRESS NOTES
CARDIOVASCULAR CONSULTATION    REASON FOR CONSULT:   Eric Jackson is a 82 y.o. male who presents for  evaluation     HISTORY OF PRESENT ILLNESS:       Evaluation patient is a pleasant 82-year-old man.  Past medical history of coronary artery disease status post CABG.  Used to follow with Dr. Soto it, has not seen him since 2019.  Now wants to reestablish care with Cardiology.  Last angiogram had revealed patent bypass grafts.  Last angiogram was done in 2019.  Patient states that for the past few months he has been experiencing chest heaviness.  No particular aggravating or relieving factors.  Also occasional dizziness.  No particular aggravating or relieving factors for the dizziness.  Not on statins and refuses to be on statins, Zetia or Repatha.      2019:      LVEDP: 22mmHg  LVEF: 55% by echo  Wall Motion: normal by echo     Dominance: Right  LM: dist 50%  LAD: mid 99%, competitive flow from LIMA-LAD-OM-RPDA              Diag2 prox 90%, T2 flow (not bypassed)  LCx: prox , territory supplied by LIMA-LAD-OM-RPDA  RCA: mid , territory supplied by LIMA-LAD-OM-RPDA     LIMA-LAD-OM-RPDA patent     No SVG identified     Hemostasis:  RFA     Impression:  NSTEMI  3V CAD, normal LV fxn  Patent LIMA-LAD-OM-RPDA  Culprit likely in small Diag2 (unrevascularized) and likely demand related  Man compression RFA for hemostasis     Plan:  Cont med rx  ASA 81mg qd indefinitely  Plavix 75mg qd for 1 year (thru 3/2020)  BBl/ARB  Statin allergy noted  Consider PCSK9 inhibitor if statin allergy confirmed  Dispo planning appropriate  Pt to follow up with Dr. Seay after discharge           Mar 13 23: doing fine. No ischemia on stress test.  patient states that his symptoms of chest pains went away after we were able to control his blood pressure by increasing his medications on last clinic visit.      Sinus rhythm with heart rates varying between 45 and 124 BPM with an average of 73BPM. Total time in sinus rhythm was  approximately 48 hours.  There were occasional PVCs totalling 768 and averaging 16.59 per hour. There were 10 couplets. There were 3 bigeminal cycles.  There were occasional PACs totalling 866 and averaging 18.7 per hour.        The left ventricle is normal in size with mild concentric hypertrophy and low normal systolic function.  The estimated ejection fraction is 50%.  Normal right ventricular size with low normal right ventricular systolic function.  There is mild aortic valve stenosis.  Aortic valve area is 1.60 cm2; peak velocity is 1.51 m/s; mean gradient is 6 mmHg.  Mild tricuspid regurgitation.  The estimated PA systolic pressure is 30 mmHg.          Normal myocardial perfusion scan. There is no evidence of myocardial ischemia or infarction.    The gated perfusion images showed an ejection fraction of 57% post stress.    There is normal wall motion post stress.    PAST MEDICAL HISTORY:     Past Medical History:   Diagnosis Date    Anemia     Angina pectoris     Anxiety     Cancer     Coronary artery disease     Depression     Diabetes mellitus type II     Disorder of kidney and ureter     Erectile dysfunction     Eye injuries     fb ou    GERD (gastroesophageal reflux disease)     Hypertension     Intermediate stage nonexudative age-related macular degeneration of both eyes 4/9/2019    Myocardial infarction     Nuclear sclerosis of both eyes 4/9/2019    Prostate cancer     Trouble in sleeping     Type 2 diabetes mellitus     Type 2 diabetes mellitus with ophthalmic manifestations        PAST SURGICAL HISTORY:     Past Surgical History:   Procedure Laterality Date    COLONOSCOPY N/A 2/9/2018    Procedure: COLONOSCOPY;  Surgeon: Mathieu Cao MD;  Location: [facility];  Service: Endoscopy;  Laterality: N/A;    CORONARY ANGIOGRAPHY INCLUDING BYPASS GRAFTS WITH CATHETERIZATION OF LEFT HEART N/A 3/4/2019    Procedure: ANGIOGRAM, CORONARY, INCLUDING BYPASS GRAFT, WITH LEFT HEART CATHETERIZATION;  Surgeon:  Jamir Nam MD;  Location: [facility] CATH LAB;  Service: Cardiology;  Laterality: N/A;    CORONARY ARTERY BYPASS GRAFT  2001    ESOPHAGOGASTRODUODENOSCOPY N/A 10/8/2020    Procedure: EGD (ESOPHAGOGASTRODUODENOSCOPY);  Surgeon: Bharath Herrera MD;  Location: [facility] ENDO;  Service: Endoscopy;  Laterality: N/A;    HERNIA REPAIR      LEFT HEART CATHETERIZATION Left 3/4/2019    Procedure: Left heart cath RFA access, 4fr catheter/sheath, not before 9am;  Surgeon: Jamir Nam MD;  Location: [facility] CATH LAB;  Service: Cardiology;  Laterality: Left;    TRANSRECTAL ULTRASOUND OF PROSTATE WITH INSERTION OF GOLD FIDUCIAL MARKER N/A 2/26/2019    Procedure: ULTRASOUND, PROSTATE, RECTAL APPROACH, WITH GOLD FIDUCIAL MARKER INSERTION;  Surgeon: Layne Huff MD;  Location: [facility] OR;  Service: Urology;  Laterality: N/A;    UPPER GASTROINTESTINAL ENDOSCOPY      WRIST SURGERY         ALLERGIES AND MEDICATION:     Review of patient's allergies indicates:   Allergen Reactions    Tamsulosin     Prochlorperazine      convulsions    Statins-hmg-coa reductase inhibitors Other (See Comments)     Muscle weakness        Medication List            Accurate as of March 13, 2023  8:50 AM. If you have any questions, ask your nurse or doctor.                CONTINUE taking these medications      ALPRAZolam 1 MG tablet  Commonly known as: XANAX  Take 1 tablet (1 mg total) by mouth nightly as needed for Anxiety.     AndroGeL 20.25 mg/1.25 gram (1.62 %) Glpm  Generic drug: testosterone  One pump over each shoulder     aspirin 81 MG EC tablet  Commonly known as: ECOTRIN  Take 1 tablet (81 mg total) by mouth once daily.     blood sugar diagnostic Strp  Commonly known as: TRUE METRIX GLUCOSE TEST STRIP  For two times daily checking     blood-glucose meter kit  Commonly known as: TRUE METRIX GLUCOSE METER  For twice daily checking     imiquimod 5 % cream  Commonly known as: ALDARA  Apply topically 3 (three) times a week.     *  lancets Misc  To check BG 3 times daily, to use with insurance preferred meter     * lancets 33 gauge Misc  Commonly known as: TRUEPLUS LANCETS  For twice daily checking     losartan-hydrochlorothiazide 100-25 mg 100-25 mg per tablet  Commonly known as: HYZAAR  Take 1 tablet by mouth once daily.     metFORMIN 500 MG tablet  Commonly known as: GLUCOPHAGE  TAKE 1 TABLET EVERY DAY WITH BREAKFAST     omeprazole 40 MG capsule  Commonly known as: PRILOSEC  Take 1 capsule (40 mg total) by mouth once daily.     PRESERVISION LUTEIN ORAL     sildenafiL 25 MG tablet  Commonly known as: VIAGRA  Take 1 tablet (25 mg total) by mouth daily as needed for Erectile Dysfunction.     TRUE METRIX LEVEL 1 Soln  Generic drug: blood glucose control, low  To use with blood glucose meter     vitamin D 1000 units Tab  Commonly known as: VITAMIN D3           * This list has 2 medication(s) that are the same as other medications prescribed for you. Read the directions carefully, and ask your doctor or other care provider to review them with you.                  SOCIAL HISTORY:     Social History     Socioeconomic History    Marital status:    Tobacco Use    Smoking status: Former     Packs/day: 2.00     Years: 23.00     Pack years: 46.00     Types: Cigarettes     Quit date: 1989     Years since quittin.2    Smokeless tobacco: Never   Substance and Sexual Activity    Alcohol use: Yes     Alcohol/week: 1.0 standard drink     Types: 1 Cans of beer per week     Comment: occassional    Drug use: No    Sexual activity: Yes     Partners: Female       FAMILY HISTORY:     Family History   Adopted: Yes   Problem Relation Age of Onset    No Known Problems Mother     No Known Problems Father     No Known Problems Sister     No Known Problems Brother     No Known Problems Maternal Aunt     No Known Problems Maternal Uncle     No Known Problems Paternal Aunt     No Known Problems Paternal Uncle     No Known Problems Maternal Grandmother      "No Known Problems Maternal Grandfather     No Known Problems Paternal Grandmother     No Known Problems Paternal Grandfather     No Known Problems Other     Amblyopia Neg Hx     Blindness Neg Hx     Cancer Neg Hx     Cataracts Neg Hx     Diabetes Neg Hx     Glaucoma Neg Hx     Hypertension Neg Hx     Macular degeneration Neg Hx     Retinal detachment Neg Hx     Strabismus Neg Hx     Stroke Neg Hx     Thyroid disease Neg Hx        REVIEW OF SYSTEMS:   Review of Systems   Constitutional: Negative.   HENT: Negative.     Eyes: Negative.    Respiratory: Negative.     Endocrine: Negative.    Hematologic/Lymphatic: Negative.    Skin: Negative.    Musculoskeletal: Negative.    Gastrointestinal: Negative.    Genitourinary: Negative.    Neurological: Negative.    Psychiatric/Behavioral: Negative.     Allergic/Immunologic: Negative.      A 10 point review of systems was performed and all the pertinent positives have been mentioned. Rest of review of systems was negative.        PHYSICAL EXAM:     Vitals:    03/13/23 0814   BP: (!) 144/59   Pulse: 65   Resp: 15    Body mass index is 26.36 kg/m².  Weight: 76.4 kg (168 lb 5.1 oz)   Height: 5' 7" (170.2 cm)     Physical Exam  Vitals reviewed.   Constitutional:       Appearance: He is well-developed.   HENT:      Head: Normocephalic.   Eyes:      Conjunctiva/sclera: Conjunctivae normal.      Pupils: Pupils are equal, round, and reactive to light.   Cardiovascular:      Rate and Rhythm: Normal rate and regular rhythm.      Heart sounds: Normal heart sounds.   Pulmonary:      Effort: Pulmonary effort is normal.      Breath sounds: Normal breath sounds.   Abdominal:      General: Bowel sounds are normal.      Palpations: Abdomen is soft.   Musculoskeletal:      Cervical back: Normal range of motion and neck supple.   Skin:     General: Skin is warm.   Neurological:      Mental Status: He is alert and oriented to person, place, and time.         DATA:     Laboratory:  CBC:  Recent Labs "   Lab 10/15/21  1420 04/16/22  0807 10/17/22  0750   WBC 6.68 6.31 5.88   Hemoglobin 15.5 14.8 15.4   Hematocrit 47.6 46.1 46.9   Platelets 229 205 195         CHEMISTRIES:  Recent Labs   Lab 03/12/21  1355 10/15/21  1420 04/16/22  0807 10/17/22  0750   Glucose 151 H 98 120 H 116 H   Sodium 142 142 139 138   Potassium 3.7 4.0 4.1 4.2   BUN 12 13 18 13   Creatinine 1.2 1.1 1.1 1.1   eGFR if African American >60 >60.0 >60.0  --    eGFR if non  58 A >60.0 >60.0  --    Calcium 9.4 10.1 9.6 9.4         CARDIAC BIOMARKERS:        COAGS:        LIPIDS/LFTS:  Recent Labs   Lab 11/17/20  0903 10/15/21  1420 04/16/22  0807 10/17/22  0750   Cholesterol 285 H  --  236 H  --    Triglycerides 300 H  --  222 H  --    HDL 51  --  51  --    LDL Cholesterol 174.0 H  --  140.6  --    Non-HDL Cholesterol 234  --  185  --    AST 25 22 25 25   ALT 24 19 21 26         Hemoglobin A1C   Date Value Ref Range Status   10/17/2022 6.4 (H) 4.0 - 5.6 % Final     Comment:     ADA Screening Guidelines:  5.7-6.4%  Consistent with prediabetes  >or=6.5%  Consistent with diabetes    High levels of fetal hemoglobin interfere with the HbA1C  assay. Heterozygous hemoglobin variants (HbS, HgC, etc)do  not significantly interfere with this assay.   However, presence of multiple variants may affect accuracy.     04/16/2022 6.6 (H) 4.0 - 5.6 % Final     Comment:     ADA Screening Guidelines:  5.7-6.4%  Consistent with prediabetes  >or=6.5%  Consistent with diabetes    High levels of fetal hemoglobin interfere with the HbA1C  assay. Heterozygous hemoglobin variants (HbS, HgC, etc)do  not significantly interfere with this assay.   However, presence of multiple variants may affect accuracy.     10/15/2021 6.4 (H) 4.0 - 5.6 % Final     Comment:     ADA Screening Guidelines:  5.7-6.4%  Consistent with prediabetes  >or=6.5%  Consistent with diabetes    High levels of fetal hemoglobin interfere with the HbA1C  assay. Heterozygous hemoglobin variants  (HbS, HgC, etc)do  not significantly interfere with this assay.   However, presence of multiple variants may affect accuracy.         TSH        The ASCVD Risk score (Pratibha DK, et al., 2019) failed to calculate for the following reasons:    The 2019 ASCVD risk score is only valid for ages 40 to 79    The patient has a prior MI or stroke diagnosis             ASSESSMENT AND PLAN     Patient Active Problem List   Diagnosis    Essential hypertension    Type 2 diabetes mellitus without complication, without long-term current use of insulin    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 2/2018; 10/2020 EGD with suspicion for barretts; repeat 2023    Anxiety    Depression    Insomnia    Slow urinary stream    Tubular adenoma of colon 2/2018 3 adenomas repeat 3 years    Hypogonadism in male    Prostate cancer 2/25/19 Transrectal ultrasound of prostate and gold fiducial marker placement    History of Non-STEMI (non-ST elevated myocardial infarction) 3/2019 from sepsis; Culprit likely in small Diag2 (unrevascularized) and likely demand related    Coronary artery disease 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; 3/2019 [facility] patent graft; Culprit likely in small Diag2 (unrevascularized) and likely demand related    Renal cyst, left, simple 1st seen 2/2019 CT; verified on renal US 11/2020    Status post coronary artery bypass grafting single vessel    Refractive error    Intermediate stage nonexudative age-related macular degeneration of both eyes    Nuclear sclerosis of both eyes    History of DVT from PICC line right upper extremity 4/2019 anticoag x 3 months then stopped    Abdominal aortic atherosclerosis    Type 2 diabetes mellitus with diabetic cataract, without long-term current use of insulin    Current mild episode of major depressive disorder without prior episode    Epiretinal membrane (ERM) of both eyes    Stage 3a chronic kidney disease    Chronic left shoulder pain    Decreased range  of motion of left shoulder    Shoulder weakness     Patient with coronary artery disease status post CABG.  Last angiogram in 2019 revealed patent bypass grafts with severe underlying coronary artery disease.  Stress test done in 2023 did not show any significant ischemia.  Patient's symptoms of chest pain went away after control of blood pressure    Uncontrolled dyslipidemia:  Patient could not tolerate statins in the past.  Refusing Zetia or Repatha.    Dizziness:  Resolved.  Holter revealed PVCs.  Add Toprol-XL 12.5 mg daily, titrate as needed    Hypertension:  Well controlled.  At home stays around 120-130 mmHg as per patient    Follow-up after 6 m            Thank you very much for involving me in the care of your patient.  Please do not hesitate to contact me if there are any questions.      Milly Cadena MD, FACC, [facility]  Interventional Cardiologist, [facility].           This note was dictated with the help of speech recognition software.  There might be un-intended errors and/or substitutions.                   Temples.../Clavicles.../Shoulders...

## 2023-07-17 ENCOUNTER — TELEPHONE (OUTPATIENT)
Dept: ADMINISTRATIVE | Facility: CLINIC | Age: 83
End: 2023-07-17
Payer: MEDICARE

## 2023-07-19 ENCOUNTER — OFFICE VISIT (OUTPATIENT)
Dept: FAMILY MEDICINE | Facility: CLINIC | Age: 83
End: 2023-07-19
Payer: MEDICARE

## 2023-07-19 VITALS
RESPIRATION RATE: 16 BRPM | DIASTOLIC BLOOD PRESSURE: 68 MMHG | SYSTOLIC BLOOD PRESSURE: 120 MMHG | WEIGHT: 164 LBS | BODY MASS INDEX: 25.74 KG/M2 | HEART RATE: 70 BPM | OXYGEN SATURATION: 96 % | HEIGHT: 67 IN | TEMPERATURE: 98 F

## 2023-07-19 DIAGNOSIS — I70.0 ABDOMINAL AORTIC ATHEROSCLEROSIS: ICD-10-CM

## 2023-07-19 DIAGNOSIS — Z00.00 ENCOUNTER FOR PREVENTIVE HEALTH EXAMINATION: ICD-10-CM

## 2023-07-19 DIAGNOSIS — E11.9 TYPE 2 DIABETES MELLITUS WITHOUT COMPLICATION, WITHOUT LONG-TERM CURRENT USE OF INSULIN: ICD-10-CM

## 2023-07-19 DIAGNOSIS — Z00.00 ENCOUNTER FOR MEDICARE ANNUAL WELLNESS EXAM: Primary | ICD-10-CM

## 2023-07-19 PROCEDURE — 1159F PR MEDICATION LIST DOCUMENTED IN MEDICAL RECORD: ICD-10-PCS | Mod: HCNC,CPTII,S$GLB, | Performed by: NURSE PRACTITIONER

## 2023-07-19 PROCEDURE — 1126F PR PAIN SEVERITY QUANTIFIED, NO PAIN PRESENT: ICD-10-PCS | Mod: HCNC,CPTII,S$GLB, | Performed by: NURSE PRACTITIONER

## 2023-07-19 PROCEDURE — G0439 PR MEDICARE ANNUAL WELLNESS SUBSEQUENT VISIT: ICD-10-PCS | Mod: HCNC,S$GLB,, | Performed by: NURSE PRACTITIONER

## 2023-07-19 PROCEDURE — 3078F PR MOST RECENT DIASTOLIC BLOOD PRESSURE < 80 MM HG: ICD-10-PCS | Mod: HCNC,CPTII,S$GLB, | Performed by: NURSE PRACTITIONER

## 2023-07-19 PROCEDURE — 1101F PR PT FALLS ASSESS DOC 0-1 FALLS W/OUT INJ PAST YR: ICD-10-PCS | Mod: HCNC,CPTII,S$GLB, | Performed by: NURSE PRACTITIONER

## 2023-07-19 PROCEDURE — 3074F SYST BP LT 130 MM HG: CPT | Mod: HCNC,CPTII,S$GLB, | Performed by: NURSE PRACTITIONER

## 2023-07-19 PROCEDURE — 1170F FXNL STATUS ASSESSED: CPT | Mod: HCNC,CPTII,S$GLB, | Performed by: NURSE PRACTITIONER

## 2023-07-19 PROCEDURE — 1101F PT FALLS ASSESS-DOCD LE1/YR: CPT | Mod: HCNC,CPTII,S$GLB, | Performed by: NURSE PRACTITIONER

## 2023-07-19 PROCEDURE — 3288F FALL RISK ASSESSMENT DOCD: CPT | Mod: HCNC,CPTII,S$GLB, | Performed by: NURSE PRACTITIONER

## 2023-07-19 PROCEDURE — 1160F RVW MEDS BY RX/DR IN RCRD: CPT | Mod: HCNC,CPTII,S$GLB, | Performed by: NURSE PRACTITIONER

## 2023-07-19 PROCEDURE — 1159F MED LIST DOCD IN RCRD: CPT | Mod: HCNC,CPTII,S$GLB, | Performed by: NURSE PRACTITIONER

## 2023-07-19 PROCEDURE — 99999 PR PBB SHADOW E&M-EST. PATIENT-LVL V: ICD-10-PCS | Mod: PBBFAC,HCNC,, | Performed by: NURSE PRACTITIONER

## 2023-07-19 PROCEDURE — G0439 PPPS, SUBSEQ VISIT: HCPCS | Mod: HCNC,S$GLB,, | Performed by: NURSE PRACTITIONER

## 2023-07-19 PROCEDURE — 1160F PR REVIEW ALL MEDS BY PRESCRIBER/CLIN PHARMACIST DOCUMENTED: ICD-10-PCS | Mod: HCNC,CPTII,S$GLB, | Performed by: NURSE PRACTITIONER

## 2023-07-19 PROCEDURE — 1170F PR FUNCTIONAL STATUS ASSESSED: ICD-10-PCS | Mod: HCNC,CPTII,S$GLB, | Performed by: NURSE PRACTITIONER

## 2023-07-19 PROCEDURE — 99999 PR PBB SHADOW E&M-EST. PATIENT-LVL V: CPT | Mod: PBBFAC,HCNC,, | Performed by: NURSE PRACTITIONER

## 2023-07-19 PROCEDURE — 3288F PR FALLS RISK ASSESSMENT DOCUMENTED: ICD-10-PCS | Mod: HCNC,CPTII,S$GLB, | Performed by: NURSE PRACTITIONER

## 2023-07-19 PROCEDURE — 3074F PR MOST RECENT SYSTOLIC BLOOD PRESSURE < 130 MM HG: ICD-10-PCS | Mod: HCNC,CPTII,S$GLB, | Performed by: NURSE PRACTITIONER

## 2023-07-19 PROCEDURE — 1126F AMNT PAIN NOTED NONE PRSNT: CPT | Mod: HCNC,CPTII,S$GLB, | Performed by: NURSE PRACTITIONER

## 2023-07-19 PROCEDURE — 3078F DIAST BP <80 MM HG: CPT | Mod: HCNC,CPTII,S$GLB, | Performed by: NURSE PRACTITIONER

## 2023-07-19 NOTE — PATIENT INSTRUCTIONS
Counseling and Referral of Other Preventative  (Italic type indicates deductible and co-insurance are waived)    Patient Name: Eric Jackson  Today's Date: 7/19/2023    Health Maintenance       Date Due Completion Date    Aspirin/Antiplatelet Therapy Never done ---    Shingles Vaccine (1 of 2) Never done ---    COVID-19 Vaccine (6 - Moderna series) 12/22/2022 10/27/2022    Influenza Vaccine (1) 09/01/2023 2/10/2023 (Declined)    Override on 2/10/2023: Declined (Declined for season)    Override on 12/7/2018: Done    Override on 4/23/2015: Declined    Hemoglobin A1c 10/17/2023 4/17/2023    Override on 2/24/2015: Done (Dr. Arias Muñoz)    Eye Exam 11/28/2023 11/28/2022    Override on 1/6/2017: Done    Override on 2/10/2015: Done    Lipid Panel 04/17/2024 4/17/2023    Override on 8/26/2016: Done ([facility]-Arias Muñoz MD/GEETHA Razo-Lipid Panel:Cholesterol 227 mg/dL, NON- mg/dL, HDL 61 mg/dL, CHol/HDL Ratio 3.7,  mg/dL, Triglyceride 101 mg/dL)    Override on 2/24/2015: Done (Dr. Arias Muñoz)    Diabetes Urine Screening 05/22/2024 5/22/2023    TETANUS VACCINE 03/30/2027 3/30/2017 (Declined)    Override on 3/30/2017: Declined        No orders of the defined types were placed in this encounter.      The following information is provided to all patients.  This information is to help you find resources for any of the problems found today that may be affecting your health:                Living healthy guide: www.[facility].[place].gov      Understanding Diabetes: www.diabetes.org      Eating healthy: www.cdc.gov/healthyweight      CDC home safety checklist: www.cdc.gov/steadi/patient.html      Agency on Aging: www.goea.[place].gov      Alcoholics anonymous (AA): www.aa.org      Physical Activity: www.lorenza.nih.gov/pk5wyng      Tobacco use: www.quitwithusla.org

## 2023-07-19 NOTE — PROGRESS NOTES
"  Eric Jackson presented for a  Medicare AWV and comprehensive Health Risk Assessment today. The following components were reviewed and updated:    Medical history  Family History  Social history  Allergies and Current Medications  Health Risk Assessment  Health Maintenance  Care Team         ** See Completed Assessments for Annual Wellness Visit within the encounter summary.**         The following assessments were completed:  Living Situation  CAGE  Depression Screening  Timed Get Up and Go  Whisper Test  Cognitive Function Screening  Nutrition Screening  ADL Screening  PAQ Screening        Vitals:    07/19/23 1307 07/19/23 1330   BP: (!) 142/70 120/68   BP Location: Left arm Left arm   Patient Position: Sitting Sitting   BP Method: Large (Manual) Medium (Manual)   Pulse: 70    Resp: 16    Temp: 98 °F (36.7 °C)    TempSrc: Oral    SpO2: 96%    Weight: 74.4 kg (164 lb 0.4 oz)    Height: 5' 7" (1.702 m)      Body mass index is 25.69 kg/m².  Physical Exam  Vitals reviewed.   Constitutional:       General: He is not in acute distress.     Appearance: Normal appearance. He is not ill-appearing, toxic-appearing or diaphoretic.   HENT:      Head: Normocephalic and atraumatic.   Cardiovascular:      Pulses: Normal pulses.   Pulmonary:      Effort: Pulmonary effort is normal. No respiratory distress.      Breath sounds: No wheezing.   Skin:     General: Skin is warm and dry.   Neurological:      General: No focal deficit present.      Mental Status: He is alert and oriented to person, place, and time.   Psychiatric:         Mood and Affect: Mood normal.         Behavior: Behavior normal.               Diagnoses and health risks identified today and associated recommendations/orders:    1. Encounter for Medicare annual wellness exam  The patient was seen today for an annual Medicare wellness exam.  Health maintenance and screening topics were discussed.  Proper diet and exercise recommendations were reviewed.  - " Ambulatory Referral/Consult to Enhanced Annual Wellness Visit (eAWV)    2. Encounter for preventive health examination  as Above.    3. Type 2 diabetes mellitus without complication, without long-term current use of insulin  No acute concerns.    4. Abdominal aortic atherosclerosis  No acute concerns.    Review current opioid prescriptions: NA  Screen for potential Substance Use Disorders: NA    Provided Eric with a 5-10 year written screening schedule and personal prevention plan. Recommendations were developed using the USPSTF age appropriate recommendations. Education, counseling, and referrals were provided as needed. After Visit Summary printed and given to patient which includes a list of additional screenings\tests needed.    Follow up in about 1 year (around 7/19/2024) for AWV.    Jaxon Carrera, NP  I offered to discuss advanced care planning, including how to pick a person who would make decisions for you if you were unable to make them for yourself, called a health care power of , and what kind of decisions you might make such as use of life sustaining treatments such as ventilators and tube feeding when faced with a life limiting illness recorded on a living will that they will need to know. (How you want to be cared for as you near the end of your natural life)     X Patient is interested in learning more about how to make advanced directives.  I provided them paperwork and offered to discuss this with them.

## 2023-08-11 DIAGNOSIS — E11.36 TYPE 2 DIABETES MELLITUS WITH DIABETIC CATARACT, WITHOUT LONG-TERM CURRENT USE OF INSULIN: ICD-10-CM

## 2023-08-11 RX ORDER — METFORMIN HYDROCHLORIDE 500 MG/1
TABLET ORAL
Qty: 90 TABLET | Refills: 0 | Status: SHIPPED | OUTPATIENT
Start: 2023-08-11 | End: 2023-10-24 | Stop reason: SDUPTHER

## 2023-08-11 NOTE — TELEPHONE ENCOUNTER
Care Due:                  Date            Visit Type   Department     Provider  --------------------------------------------------------------------------------                                [facility]                              PRIMARY      MED/ INTERNAL  Last Visit: 04-      CARE ([facility])   MED/ DEBBIE Boo                              [facility]                              PRIMARY      MED/ INTERNAL  Next Visit: 10-      CARE ([facility])   MED/ PEDNABEEL Boo                                                            Last  Test          Frequency    Reason                     Performed    Due Date  --------------------------------------------------------------------------------    HBA1C.......  6 months...  metFORMIN................  04-   10-    [facility] Embedded Care Due Messages. Reference number: [id number].   8/11/2023 2:38:55 PM CDT

## 2023-08-12 NOTE — TELEPHONE ENCOUNTER
Refill Decision Note   Eric Jackson  is requesting a refill authorization.  Brief Assessment and Rationale for Refill:  Approve     Medication Therapy Plan:  FLOS      Comments:     Note composed:7:21 PM 08/11/2023

## 2023-08-21 ENCOUNTER — TELEPHONE (OUTPATIENT)
Dept: GASTROENTEROLOGY | Facility: CLINIC | Age: 83
End: 2023-08-21
Payer: MEDICARE

## 2023-08-21 ENCOUNTER — TELEPHONE (OUTPATIENT)
Dept: FAMILY MEDICINE | Facility: CLINIC | Age: 83
End: 2023-08-21
Payer: MEDICARE

## 2023-08-21 DIAGNOSIS — K22.70 BARRETT'S ESOPHAGUS WITHOUT DYSPLASIA: Primary | ICD-10-CM

## 2023-08-21 NOTE — TELEPHONE ENCOUNTER
----- Message from Valeria Renee sent at 8/21/2023 11:29 AM CDT -----  Regarding: Pt called to schedule a new pt appt for a procedure from a referral in the system for reflux and pt would like a call back today due to not being able to keep anything down  Appointment Access Request:    Pt called to schedule a new pt appt for a procedure from a referral in the system for reflux and pt would like a call back today due to not being able to keep anything down    Pt would like a call back today and can be reached at [phone].

## 2023-08-21 NOTE — TELEPHONE ENCOUNTER
----- Message from Suzie Coronado sent at 8/21/2023 10:27 AM CDT -----  Regarding: self [phone]  Type:  Patient Requesting Referral    Who Called: self     Referral to What Specialty: ENT    Reason for Referral: acid reflux     Does the patient want the referral with a specific physician?: N/A    [facility]    Would the patient rather a call back or a response via [facility]? Call back    Best Call Back Number:[phone]    Additional Information: pt would like call back in regards to referral

## 2023-09-25 ENCOUNTER — OFFICE VISIT (OUTPATIENT)
Dept: GASTROENTEROLOGY | Facility: CLINIC | Age: 83
End: 2023-09-25
Payer: MEDICARE

## 2023-09-25 VITALS — WEIGHT: 161.63 LBS | HEIGHT: 67 IN | BODY MASS INDEX: 25.37 KG/M2

## 2023-09-25 DIAGNOSIS — K22.70 BARRETT'S ESOPHAGUS WITHOUT DYSPLASIA: ICD-10-CM

## 2023-09-25 PROCEDURE — 99999 PR PBB SHADOW E&M-EST. PATIENT-LVL IV: CPT | Mod: PBBFAC,HCNC,, | Performed by: INTERNAL MEDICINE

## 2023-09-25 PROCEDURE — 99999 PR PBB SHADOW E&M-EST. PATIENT-LVL IV: ICD-10-PCS | Mod: PBBFAC,HCNC,, | Performed by: INTERNAL MEDICINE

## 2023-09-25 PROCEDURE — 99214 PR OFFICE/OUTPT VISIT, EST, LEVL IV, 30-39 MIN: ICD-10-PCS | Mod: HCNC,S$GLB,, | Performed by: INTERNAL MEDICINE

## 2023-09-25 PROCEDURE — 1101F PR PT FALLS ASSESS DOC 0-1 FALLS W/OUT INJ PAST YR: ICD-10-PCS | Mod: HCNC,CPTII,S$GLB, | Performed by: INTERNAL MEDICINE

## 2023-09-25 PROCEDURE — 3288F PR FALLS RISK ASSESSMENT DOCUMENTED: ICD-10-PCS | Mod: HCNC,CPTII,S$GLB, | Performed by: INTERNAL MEDICINE

## 2023-09-25 PROCEDURE — 99214 OFFICE O/P EST MOD 30 MIN: CPT | Mod: HCNC,S$GLB,, | Performed by: INTERNAL MEDICINE

## 2023-09-25 PROCEDURE — 3288F FALL RISK ASSESSMENT DOCD: CPT | Mod: HCNC,CPTII,S$GLB, | Performed by: INTERNAL MEDICINE

## 2023-09-25 PROCEDURE — 1159F PR MEDICATION LIST DOCUMENTED IN MEDICAL RECORD: ICD-10-PCS | Mod: HCNC,CPTII,S$GLB, | Performed by: INTERNAL MEDICINE

## 2023-09-25 PROCEDURE — 1125F PR PAIN SEVERITY QUANTIFIED, PAIN PRESENT: ICD-10-PCS | Mod: HCNC,CPTII,S$GLB, | Performed by: INTERNAL MEDICINE

## 2023-09-25 PROCEDURE — 1101F PT FALLS ASSESS-DOCD LE1/YR: CPT | Mod: HCNC,CPTII,S$GLB, | Performed by: INTERNAL MEDICINE

## 2023-09-25 PROCEDURE — 1159F MED LIST DOCD IN RCRD: CPT | Mod: HCNC,CPTII,S$GLB, | Performed by: INTERNAL MEDICINE

## 2023-09-25 PROCEDURE — 1125F AMNT PAIN NOTED PAIN PRSNT: CPT | Mod: HCNC,CPTII,S$GLB, | Performed by: INTERNAL MEDICINE

## 2023-09-25 RX ORDER — OMEPRAZOLE 40 MG/1
40 CAPSULE, DELAYED RELEASE ORAL
Qty: 180 CAPSULE | Refills: 3 | Status: SHIPPED | OUTPATIENT
Start: 2023-09-25 | End: 2024-09-24

## 2023-09-25 NOTE — PATIENT INSTRUCTIONS
EGD Instructions    [facility] [address]  Clinic Office [phone]  Endoscopy Lab [phone]    You are scheduled for an EGD with Dr. Herrera  on  11/07/2023 at [facility] in [place].    Check in at the Hospital -1st floor, Information desk.   Call [phone] to reschedule.    You cannot have anything to eat or drink after Midnight. You can brush your teeth with a sip of water.     An adult friend/family member must come with you to drive you home.  You cannot drive, take a taxi, Uber/Lyft or bus to leave the Endoscopy Center alone.  If you do not have someone to drive you home, your test will be cancelled.     Please follow the directions of your doctor if you take any pills that thin your blood. If you take these meds: Aggrenox, Brilinta, Effient, Eliquis, Lovenox, Plavix, Pletal, Pradaxa, Ticilid, Xarelto or Coumadin, let the doctor's office know.    Please hold any GLP-1 medications prior to the procedure: Dulaglutide Trulicity(hold week prior), Exenatide Byetta (hold the morning of procedure), Semaglutide Ozempic (hold week prior), Liraglutide Victoza, Saxenda(hold week prior), Lixisenatide Adlyxin (hold the morning of procedure), Semaglutide Rybelsus (hold the morning of procedure), Tirzepatide Mounjaro (hold week prior)     DON'T: On the morning of the test do not take insulin or pills for diabetes.     DO: On the morning of the test, do take any pills for blood pressure, heart, anti-rejection and or seizures with a small sip of water. Bring any inhalers with you.    Leave all valuables and jewelry at home. You will be at the hospital for 2-4 hours.    Call the Endoscopy department at [phone] with any questions about your procedure.    Thank you for choosing [facility].

## 2023-09-25 NOTE — PROGRESS NOTES
Subjective:       Patient ID: Eric Jackson is a 82 y.o. male.    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  He was previously seen by me in 2020 in follow-up of Barretts and hiatal hernia.  He was instructed to continue PPI and was referred to foregut surgeon for repair of hernia.  Repeat colonoscopy was also discussed for the following year however do not see that was performed.  Today patient reports doing well until about a month ago when he began experiencing significant acid reflux.  His breakthrough almost prompted to presents the emergency department however it did ultimately improve over the course of the next few days.  Still he reports a baseline burning never completely resolves.      Review of Systems   Cardiovascular:  Positive for chest pain.   Gastrointestinal:  Positive for abdominal pain.         The following portions of the patient's history were reviewed and updated as appropriate: allergies, current medications, past family history, past medical history, past social history, past surgical history and problem list.    Objective:      Physical Exam  Constitutional:       Appearance: He is well-developed.   HENT:      Head: Normocephalic and atraumatic.   Eyes:      Conjunctiva/sclera: Conjunctivae normal.   Pulmonary:      Effort: Pulmonary effort is normal. No respiratory distress.   Neurological:      Mental Status: He is alert and oriented to person, place, and time.   Psychiatric:         Behavior: Behavior normal.         Thought Content: Thought content normal.         Judgment: Judgment normal.           Pertinent labs and imaging studies reviewed    Assessment:       1. 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   Repeat EGD      (Portions of this note were dictated using voice recognition  software and may contain dictation related errors in spelling/grammar/syntax not found on text review)

## 2023-10-16 ENCOUNTER — LAB VISIT (OUTPATIENT)
Dept: LAB | Facility: HOSPITAL | Age: 83
End: 2023-10-16
Attending: INTERNAL MEDICINE
Payer: MEDICARE

## 2023-10-16 DIAGNOSIS — E11.9 TYPE 2 DIABETES MELLITUS WITHOUT COMPLICATION, WITHOUT LONG-TERM CURRENT USE OF INSULIN: ICD-10-CM

## 2023-10-16 DIAGNOSIS — N18.31 STAGE 3A CHRONIC KIDNEY DISEASE: ICD-10-CM

## 2023-10-16 DIAGNOSIS — I10 ESSENTIAL HYPERTENSION: ICD-10-CM

## 2023-10-16 DIAGNOSIS — E11.36 TYPE 2 DIABETES MELLITUS WITH DIABETIC CATARACT, WITHOUT LONG-TERM CURRENT USE OF INSULIN: ICD-10-CM

## 2023-10-16 DIAGNOSIS — C61 PROSTATE CANCER: ICD-10-CM

## 2023-10-16 LAB
ALBUMIN SERPL BCP-MCNC: 4 G/DL (ref 3.5–5.2)
ALP SERPL-CCNC: 78 U/L (ref 55–135)
ALT SERPL W/O P-5'-P-CCNC: 19 U/L (ref 10–44)
ANION GAP SERPL CALC-SCNC: 12 MMOL/L (ref 8–16)
AST SERPL-CCNC: 19 U/L (ref 10–40)
BILIRUB SERPL-MCNC: 0.5 MG/DL (ref 0.1–1)
BUN SERPL-MCNC: 16 MG/DL (ref 8–23)
CALCIUM SERPL-MCNC: 9.9 MG/DL (ref 8.7–10.5)
CHLORIDE SERPL-SCNC: 101 MMOL/L (ref 95–110)
CHOLEST SERPL-MCNC: 281 MG/DL (ref 120–199)
CHOLEST/HDLC SERPL: 7.6 {RATIO} (ref 2–5)
CO2 SERPL-SCNC: 29 MMOL/L (ref 23–29)
COMPLEXED PSA SERPL-MCNC: 0.47 NG/ML (ref 0–4)
CREAT SERPL-MCNC: 1.2 MG/DL (ref 0.5–1.4)
ERYTHROCYTE [DISTWIDTH] IN BLOOD BY AUTOMATED COUNT: 13.4 % (ref 11.5–14.5)
EST. GFR  (NO RACE VARIABLE): >60 ML/MIN/1.73 M^2
ESTIMATED AVG GLUCOSE: 128 MG/DL (ref 68–131)
GLUCOSE SERPL-MCNC: 103 MG/DL (ref 70–110)
HBA1C MFR BLD: 6.1 % (ref 4–5.6)
HCT VFR BLD AUTO: 47.2 % (ref 40–54)
HDLC SERPL-MCNC: 37 MG/DL (ref 40–75)
HDLC SERPL: 13.2 % (ref 20–50)
HGB BLD-MCNC: 15.3 G/DL (ref 14–18)
LDLC SERPL CALC-MCNC: ABNORMAL MG/DL (ref 63–159)
MCH RBC QN AUTO: 30.7 PG (ref 27–31)
MCHC RBC AUTO-ENTMCNC: 32.4 G/DL (ref 32–36)
MCV RBC AUTO: 95 FL (ref 82–98)
NONHDLC SERPL-MCNC: 244 MG/DL
PLATELET # BLD AUTO: 219 K/UL (ref 150–450)
PMV BLD AUTO: 10.5 FL (ref 9.2–12.9)
POTASSIUM SERPL-SCNC: 4.3 MMOL/L (ref 3.5–5.1)
PROT SERPL-MCNC: 7.6 G/DL (ref 6–8.4)
RBC # BLD AUTO: 4.98 M/UL (ref 4.6–6.2)
SODIUM SERPL-SCNC: 142 MMOL/L (ref 136–145)
TRIGL SERPL-MCNC: 522 MG/DL (ref 30–150)
WBC # BLD AUTO: 6.37 K/UL (ref 3.9–12.7)

## 2023-10-16 PROCEDURE — 80053 COMPREHEN METABOLIC PANEL: CPT | Performed by: INTERNAL MEDICINE

## 2023-10-16 PROCEDURE — 84153 ASSAY OF PSA TOTAL: CPT | Performed by: INTERNAL MEDICINE

## 2023-10-16 PROCEDURE — 83036 HEMOGLOBIN GLYCOSYLATED A1C: CPT | Performed by: INTERNAL MEDICINE

## 2023-10-16 PROCEDURE — 80061 LIPID PANEL: CPT | Performed by: INTERNAL MEDICINE

## 2023-10-16 PROCEDURE — 36415 COLL VENOUS BLD VENIPUNCTURE: CPT | Mod: PO | Performed by: INTERNAL MEDICINE

## 2023-10-16 PROCEDURE — 85027 COMPLETE CBC AUTOMATED: CPT | Performed by: INTERNAL MEDICINE

## 2023-10-23 NOTE — PROGRESS NOTES
This note was created by combination of typed  and M-Modal dictation.  Transcription errors may be present.  If there are any questions, please contact me.    Assessment and Plan:   Assessment and Plan   Anxiety  -had previously tried to substitute BuSpar.  He found it effective for the 1st few weeks and then developed tolerance to it.    Overall has been taking alprazolam longstanding.  Tells me that he does not take it every day but takes it as needed.  Remote history of Effexor.  Subsequently developed tolerance to that.  He is aware of the high risk nature of this medication.  Exhibits no aberrant behavior.    No changes, refilled alprazolam.  -     ALPRAZolam (XANAX) 1 MG tablet; Take 1 tablet (1 mg total) by mouth nightly as needed for Anxiety.  Dispense: 30 tablet; Refill: 2    Type 2 diabetes mellitus without complication, without long-term current use of insulin  Type 2 diabetes mellitus with diabetic cataract, without long-term current use of insulin  -pre visit labs A1c good.  On metformin.  No changes.  -     Comprehensive Metabolic Panel; Future; Expected date: 04/20/2024  -     Lipid Panel; Future; Expected date: 04/20/2024  -     Hemoglobin A1C; Future; Expected date: 04/20/2024  -     Microalbumin/Creatinine Ratio, Urine; Future; Expected date: 04/20/2024  -     CBC Without Differential; Future; Expected date: 04/23/2024  -     metFORMIN (GLUCOPHAGE) 500 MG tablet; TAKE 1 TABLET EVERY DAY WITH BREAKFAST  Dispense: 90 tablet; Refill: 3    Coronary artery disease 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; 3/2019 [facility] patent graft; Culprit likely in small Diag2 (unrevascularized) and likely demand related  -he is resistant to the idea of trial of Repatha.  Is due for follow-up with Cardiology.  Had most recent left heart catheterization showing patent bypass vessels.    Essential hypertension  Stage 3a chronic kidney disease  -blood pressure today is good.  Pre visit  labs stable.    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 2018; 10/2020 EGD with suspicion for barretts; repeat   -upcoming EGD.  On PPI.    Hypogonadism in male  -managed by Urology.    Needs flu shot  -     Influenza (FLUAD) - Quadrivalent (Adjuvanted) *Preferred* (65+) (PF)             Medications Discontinued During This Encounter   Medication Reason    lancets (TRUEPLUS LANCETS) 33 gauge Misc Patient no longer taking       meds sent this encounter:     Medications Ordered This Encounter   Medications    ALPRAZolam (XANAX) 1 MG tablet     Sig: Take 1 tablet (1 mg total) by mouth nightly as needed for Anxiety.     Dispense:  30 tablet     Refill:  2    metFORMIN (GLUCOPHAGE) 500 MG tablet     Sig: TAKE 1 TABLET EVERY DAY WITH BREAKFAST     Dispense:  90 tablet     Refill:  3        Follow Up: OV 6 months with pre visit labs.   Future Appointments   Date Time Provider Department Center   2023  3:00 PM Nathan Odonnell MD [facility]          Subjective:   Subjective   Chief Complaint   Patient presents with    Follow-up       HPI  Eric is a 82 y.o. male.     Social History     Tobacco Use    Smoking status: Former     Current packs/day: 0.00     Average packs/day: 2.0 packs/day for 23.0 years (46.0 ttl pk-yrs)     Types: Cigarettes     Start date: 1966     Quit date: 1989     Years since quittin.8    Smokeless tobacco: Never   Substance Use Topics    Alcohol use: Yes     Alcohol/week: 1.0 standard drink of alcohol     Types: 1 Cans of beer per week     Comment: occassional          Social History     Social History Narrative    Not on file       Last appointment with this clinic was Visit date not found. Last visit with me 2023   To summarize last visit and events leading up to today:   Constipation, GoLYTELY.  If constipation persists and if abdominal pain persists, check CT abdomen pelvis.  History of colonoscopy in 2018 with TA.  Consider repeat   Anxiety.  Trial of  alternative to Xanax.  Trial of BuSpar.  Typically does not take the Xanax nightly anyway.  Plan was BuSpar scheduled nightly for a month and if effective can reduced p.r.n.   Diabetes on metformin   History of prostate cancer status post XRT.  Lost to follow up with Urology.  Hypogonadism had not been taking testosterone for the past couple of years.   Subsequently seen by Urology 5/19, restart testosterone replacement, close PSA monitoring.  Hypertension, CKD stage IIIA stable   Archer's esophagus was not taking PPI scheduled.  Recommended that he do so.    Saw Gastroenterology 9/25.  Plan is increase PPI to b.I.d. and repeat EGD  Coronary artery disease status post CABG.  Intolerant of statins and intolerant of Zetia.  Considering Repatha.  Followed by Cardiology  Aortic atherosclerosis with possible ulcerated plaque.  seen by vascular. No vascular surgical intervention indicated.  Saw cardiology in February.  Increased his losartan HCT.  03/01/2023 TTE LV normal size with mild concentric hypertrophy and low normal systolic function LVEF 50%.  Mild aortic valve stenosis.  03/01/2023 nuclear stress test negative for ischemia.  03/01/2023 48 hour Holter monitor PVCs and PACs  Subsequently added metoprolol     Pre visit labs   CBC normal   CMP normal   Lipid profile high   A1c 6.1 from 6.2   PSA stable 0.47 from previous 0.43     Upcoming EGD scheduled  Today's visit:    Has been eating better  And exercising  When he has low mood, takes a lot of mental energy to do the things that he needs to do.     Is taking buspar   But still taking the xanax  Feels like the buspar he developed tolerance after a month.   Used to take Marjorie's wort sporadically but read that it was ineffective  Used to take effexor but developed tolerance.   Recalls hx of SI, and even called a suicide hotline.  This was decades ago.  Got out of it by taking xanax.  Since then takes Xanax never gets fits of anxiety.  Feels like xanax he'll take  "for several days when he has low mood/anxiety  And then may skip for several days.    Estimates 3 pills in the last week.  Is aware of the high risk nature of the medicine  Will put a pill by his bedside table "just in case"   Daughter hx of BZD addiction.  So he is aware of the high risk nature of this medication  Sometimes with anxiety he'll try treadmill first and when that does not work, then xanax.      9/11, 7/4, 5/4, 2/11    Reviewed the remainder of his labs.  He's resistant to starting repatha.       Patient Care Team:  Samir Boo MD as PCP - General (Internal Medicine)  Melanie Orantes MA as Care Coordinator  René Bills MD (Cardiology)  Sheila Braden OD as Consulting Physician (Optometry)  Layne Huff MD as Consulting Physician (Urology)  Loida Evans MD as Consulting Physician (Endocrinology)  Loc Garcia MD as Consulting Physician (Radiation Oncology)  Milly Cadena MD as Consulting Physician (Cardiology)  Nathan Odonnell MD as 1st Call (Urology)    Patient Active Problem List    Diagnosis Date Noted    PVC (premature ventricular contraction) 04/22/2023    Chronic left shoulder pain 07/21/2021    Decreased range of motion of left shoulder 07/21/2021    Shoulder weakness 07/21/2021    Stage 3a chronic kidney disease 01/15/2021    Epiretinal membrane (ERM) of both eyes 09/15/2020    Type 2 diabetes mellitus with diabetic cataract, without long-term current use of insulin 02/14/2020    Current mild episode of major depressive disorder without prior episode 02/14/2020    Abdominal aortic atherosclerosis 12/09/2019 4/2021 CT abd: There is prominent atherosclerotic plaquing of the abdominal aorta with probable eccentric mixed plaque with ulcerated plaque to be considered specifically axial image 68 series 4 with the aorta measuring 2.5 cm in diameter at this level      History of DVT from PICC line right upper extremity 4/2019 anticoag x 3 months then " stopped 04/29/2019 4/28/19 RUE US: The right internal jugular vein is patent.  Partial thrombus of the right subclavian vein with floating thrombus.  The right axillary vein is completely thrombosed.  Complete thrombosis of the right basilic vein.  The cephalic vein is patent.  The proximal brachial vein is occluded, the mid and distal vein demonstrate flow within.    7/3/19 RUE US: Residual nonocclusive thrombus in the right subclavian and basilic veins, significantly improved from the 04/28/2019 exam.  No new thrombus.  12/19/19 RUE US: No DVT in the right upper extremity.  Resolved thrombus in the right subclavian vein and mild residual chronic thrombus in the right superficial basilic vein, improved.      Refractive error 04/09/2019    Intermediate stage nonexudative age-related macular degeneration of both eyes 04/09/2019    Nuclear sclerosis of both eyes 04/09/2019    Status post coronary artery bypass grafting single vessel 03/07/2019    Renal cyst, left, simple 1st seen 2/2019 CT; verified on renal US 11/2020 03/02/2019 2/2019 CT abd/pelvis: Small left renal hypodensity which measures slightly higher than simple fluid density.  This could represent a small cyst or complex cyst.  Future nonemergent ultrasound follow-up could be obtained to ensure cystic nature of this lesion.  11/16/20 renal US Simple left renal cyst.      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; 3/2019 [facility] patent graft; Culprit likely in small Diag2 (unrevascularized) and likely demand related 03/01/2019     3/2019 [facility]  LM: dist 50%  LAD: mid 99%, competitive flow from LIMA-LAD-OM-RPDA              Diag2 prox 90%, T2 flow (not bypassed)  LCx: prox , territory supplied by LIMA-LAD-OM-RPDA  RCA: mid ,  territory supplied by LIMA-LAD-OM-RPDA     LIMA-LAD-OM-RPDA patent   No SVG identified     Impression:  NSTEMI  3V CAD, normal LV fxn  Patent LIMA-LAD-OM-RPDA  Culprit likely in small Diag2 (unrevascularized) and likely demand related     Plan:  Cont med rx  ASA 81mg qd indefinitely  Plavix 75mg qd for 1 year (thru 3/2020)    03/01/2023 TTE LV normal size with mild concentric hypertrophy and low normal systolic function LVEF 50%.  Mild aortic valve stenosis.  03/01/2023 nuclear stress test negative for ischemia.  03/01/2023 48 hour Holter monitor PVCs and PACs      Prostate cancer 2/25/19 TRUS of prostate and gold fiducial marker placement; 4/2019 s/p XRT 01/29/2019 1/7/19 biopsy showed intermediate risk Tanisha 3+4 PCa in 3/12 cores. Tanisha 4 up to 30% of one core.   4/2019- Dr. Gurjit Garcia treated patient with External beam, radiation therapy- [facility]      Tubular adenoma of colon 2/2018 3 adenomas repeat 3 years 01/11/2019    Hypogonadism in male 01/11/2019    Slow urinary stream 12/07/2018    Insomnia 08/10/2014    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 2/2018; 10/2020 EGD with suspicion for barretts; repeat 2023      10/8/2020 EGD Esophageal mucosal changes suggestive of long-segment Archer's esophagus, classified as Archer's stage C10-M12 per [place] criteria. WATS-3D brush biopsy specimens obtained      Anxiety     Depression     Essential hypertension 11/14/2012 03/01/2023 TTE LV normal size with mild concentric hypertrophy and low normal systolic function LVEF 50%.  Mild aortic valve stenosis.  03/01/2023 48 hour Holter monitor PVCs and PACs      Type 2 diabetes mellitus without complication, without long-term current use of insulin 11/14/2012       PAST MEDICAL PROBLEMS, PAST SURGICAL HISTORY: please see relevant portions of the electronic medical record    ALLERGIES AND MEDICATIONS: updated and reviewed.  Medication List with Changes/Refills   Current Medications    ALPRAZOLAM (XANAX) 1  "MG TABLET    Take 1 tablet (1 mg total) by mouth nightly as needed for Anxiety.    ASPIRIN (ECOTRIN) 81 MG EC TABLET    Take 1 tablet (81 mg total) by mouth once daily.    BLOOD GLUCOSE CONTROL, LOW (TRUE METRIX LEVEL 1) SOLN    To use with blood glucose meter    BLOOD SUGAR DIAGNOSTIC (TRUE METRIX GLUCOSE TEST STRIP) STRP    For two times daily checking    BLOOD-GLUCOSE METER (TRUE METRIX GLUCOSE METER) KIT    For twice daily checking    BUSPIRONE (BUSPAR) 15 MG TABLET    Take 1 tablet (15 mg total) by mouth every evening.    IMIQUIMOD (ALDARA) 5 % CREAM    Apply topically 3 (three) times a week.    LANCETS MISC    To check BG 3 times daily, to use with insurance preferred meter    LOSARTAN-HYDROCHLOROTHIAZIDE 100-25 MG (HYZAAR) 100-25 MG PER TABLET    Take 1 tablet by mouth once daily.    METFORMIN (GLUCOPHAGE) 500 MG TABLET    TAKE 1 TABLET EVERY DAY WITH BREAKFAST    METOPROLOL SUCCINATE (TOPROL-XL) 25 MG 24 HR TABLET    Take 0.5 tablets (12.5 mg total) by mouth once daily.    OMEPRAZOLE (PRILOSEC) 40 MG CAPSULE    Take 1 capsule (40 mg total) by mouth 2 (two) times daily before meals.    SILDENAFIL (VIAGRA) 25 MG TABLET    Take 1 tablet (25 mg total) by mouth daily as needed for Erectile Dysfunction.    TESTOSTERONE (ANDROGEL) 20.25 MG/1.25 GRAM (1.62 %) GLPM    Place 1.25 g onto the skin once daily.    VIT C/VIT E AC/LUT/COPPER/ZINC (PRESERVISION LUTEIN ORAL)    Take 1 tablet by mouth once daily.    VITAMIN D 1000 UNITS TAB    Take 1,000 Units by mouth once daily.   Discontinued Medications    LANCETS (TRUEPLUS LANCETS) 33 GAUGE MISC    For twice daily checking         Objective:   Objective   Physical Exam   Vitals:    10/24/23 1011   BP: 126/70   Pulse: 70   Temp: 97.8 °F (36.6 °C)   SpO2: 96%   Weight: 75.1 kg (165 lb 10.8 oz)   Height: 5' 7" (1.702 m)    Body mass index is 25.95 kg/m².  Weight: 75.1 kg (165 lb 10.8 oz)   Height: 5' 7" (170.2 cm)     Physical Exam  Constitutional:       General: He is not " in acute distress.     Appearance: He is well-developed.   HENT:      Head: Normocephalic and atraumatic.   Eyes:      General: No scleral icterus.  Pulmonary:      Effort: Pulmonary effort is normal.   Skin:     General: Skin is warm and dry.   Neurological:      Mental Status: He is alert and oriented to person, place, and time.   Psychiatric:         Behavior: Behavior normal.         Thought Content: Thought content normal.         Component      Latest Ref Rng 4/17/2023 5/22/2023 10/16/2023   WBC      3.90 - 12.70 K/uL 7.02   6.37    RBC      4.60 - 6.20 M/uL 5.16   4.98    Hemoglobin      14.0 - 18.0 g/dL 15.5   15.3    Hematocrit      40.0 - 54.0 % 48.8   47.2    MCV      82 - 98 fL 95   95    MCH      27.0 - 31.0 pg 30.0   30.7    MCHC      32.0 - 36.0 g/dL 31.8 (L)   32.4    RDW      11.5 - 14.5 % 13.4   13.4    Platelet Count      150 - 450 K/uL 208   219    MPV      9.2 - 12.9 fL 10.6   10.5    Immature Granulocytes      0.0 - 0.5 % 0.3      Gran # (ANC)      1.8 - 7.7 K/uL 4.6      Immature Grans (Abs)      0.00 - 0.04 K/uL 0.02      Lymph #      1.0 - 4.8 K/uL 1.4      Mono #      0.3 - 1.0 K/uL 0.6      Eos #      0.0 - 0.5 K/uL 0.3      Baso #      0.00 - 0.20 K/uL 0.03      nRBC      0 /100 WBC 0      Gran %      38.0 - 73.0 % 65.4      Lymph %      18.0 - 48.0 % 20.5      Mono %      4.0 - 15.0 % 9.1      Eosinophil %      0.0 - 8.0 % 4.3      Basophil %      0.0 - 1.9 % 0.4      Differential Method Automated      Sodium      136 - 145 mmol/L 143   142    Potassium      3.5 - 5.1 mmol/L 4.1   4.3    Chloride      95 - 110 mmol/L 103   101    CO2      23 - 29 mmol/L 31 (H)   29    Glucose      70 - 110 mg/dL 120 (H)   103    BUN      8 - 23 mg/dL 17   16    Creatinine      0.5 - 1.4 mg/dL 1.2   1.2    Calcium      8.7 - 10.5 mg/dL 9.7   9.9    PROTEIN TOTAL      6.0 - 8.4 g/dL 7.4   7.6    Albumin      3.5 - 5.2 g/dL 4.1   4.0    BILIRUBIN TOTAL      0.1 - 1.0 mg/dL 0.4   0.5    ALP      55 - 135 U/L  69   78    AST      10 - 40 U/L 23   19    ALT      10 - 44 U/L 26   19    Anion Gap      8 - 16 mmol/L 9   12    eGFR      >60 mL/min/1.73 m^2 >60.0   >60.0    Cholesterol Total      120 - 199 mg/dL 288 (H)   281 (H)    Triglycerides      30 - 150 mg/dL 307 (H)   522 (H)    HDL      40 - 75 mg/dL 47   37 (L)    LDL Cholesterol External      63.0 - 159.0 mg/dL 179.6 (H)   Invalid, Trig>400.0    HDL/Cholesterol Ratio      20.0 - 50.0 % 16.3 (L)   13.2 (L)    Total Cholesterol/HDL Ratio      2.0 - 5.0  6.1 (H)   7.6 (H)    Non-HDL Cholesterol      mg/dL 241   244    Testosterone Total      250 - 1100 ng/dL  311     Testosterone, Free      6.0 - 73.0 pg/mL  50.3     Testosterone, Bioavailable      15.0 - 150.0 ng/dL  96.9     Sex Hormone Binding Globulin      22 - 77 nmol/L  25     Albumin      3.6 - 5.1 g/dL  4.2     Urine Microalbumin      ug/mL   6.0    Creatinine, Urine      23.0 - 375.0 mg/dL   58.0    MICROALB/CREAT RATIO      0.0 - 30.0 ug/mg   10.3    Hemoglobin A1C External      4.0 - 5.6 % 6.2 (H)   6.1 (H)    Estimated Avg Glucose      68 - 131 mg/dL 131   128    PSA Diagnostic      0.00 - 4.00 ng/mL 0.36  0.43  0.47       Legend:  (L) Low  (H) High

## 2023-10-24 ENCOUNTER — OFFICE VISIT (OUTPATIENT)
Dept: FAMILY MEDICINE | Facility: CLINIC | Age: 83
End: 2023-10-24
Payer: MEDICARE

## 2023-10-24 VITALS
HEART RATE: 70 BPM | BODY MASS INDEX: 26.01 KG/M2 | TEMPERATURE: 98 F | DIASTOLIC BLOOD PRESSURE: 70 MMHG | SYSTOLIC BLOOD PRESSURE: 126 MMHG | WEIGHT: 165.69 LBS | OXYGEN SATURATION: 96 % | HEIGHT: 67 IN

## 2023-10-24 DIAGNOSIS — E11.36 TYPE 2 DIABETES MELLITUS WITH DIABETIC CATARACT, WITHOUT LONG-TERM CURRENT USE OF INSULIN: ICD-10-CM

## 2023-10-24 DIAGNOSIS — F41.9 ANXIETY: Primary | ICD-10-CM

## 2023-10-24 DIAGNOSIS — K22.70 BARRETT'S ESOPHAGUS WITHOUT DYSPLASIA: ICD-10-CM

## 2023-10-24 DIAGNOSIS — E29.1 HYPOGONADISM IN MALE: ICD-10-CM

## 2023-10-24 DIAGNOSIS — I25.118 CORONARY ARTERY DISEASE OF NATIVE ARTERY OF NATIVE HEART WITH STABLE ANGINA PECTORIS: ICD-10-CM

## 2023-10-24 DIAGNOSIS — I10 ESSENTIAL HYPERTENSION: ICD-10-CM

## 2023-10-24 DIAGNOSIS — E11.9 TYPE 2 DIABETES MELLITUS WITHOUT COMPLICATION, WITHOUT LONG-TERM CURRENT USE OF INSULIN: ICD-10-CM

## 2023-10-24 DIAGNOSIS — Z23 NEEDS FLU SHOT: ICD-10-CM

## 2023-10-24 DIAGNOSIS — N18.31 STAGE 3A CHRONIC KIDNEY DISEASE: ICD-10-CM

## 2023-10-24 PROCEDURE — 3074F SYST BP LT 130 MM HG: CPT | Mod: CPTII,S$GLB,, | Performed by: INTERNAL MEDICINE

## 2023-10-24 PROCEDURE — 3288F FALL RISK ASSESSMENT DOCD: CPT | Mod: CPTII,S$GLB,, | Performed by: INTERNAL MEDICINE

## 2023-10-24 PROCEDURE — 3074F PR MOST RECENT SYSTOLIC BLOOD PRESSURE < 130 MM HG: ICD-10-PCS | Mod: CPTII,S$GLB,, | Performed by: INTERNAL MEDICINE

## 2023-10-24 PROCEDURE — 1159F PR MEDICATION LIST DOCUMENTED IN MEDICAL RECORD: ICD-10-PCS | Mod: CPTII,S$GLB,, | Performed by: INTERNAL MEDICINE

## 2023-10-24 PROCEDURE — G0008 ADMIN INFLUENZA VIRUS VAC: HCPCS | Mod: S$GLB,,, | Performed by: INTERNAL MEDICINE

## 2023-10-24 PROCEDURE — 1160F PR REVIEW ALL MEDS BY PRESCRIBER/CLIN PHARMACIST DOCUMENTED: ICD-10-PCS | Mod: CPTII,S$GLB,, | Performed by: INTERNAL MEDICINE

## 2023-10-24 PROCEDURE — 1159F MED LIST DOCD IN RCRD: CPT | Mod: CPTII,S$GLB,, | Performed by: INTERNAL MEDICINE

## 2023-10-24 PROCEDURE — 1160F RVW MEDS BY RX/DR IN RCRD: CPT | Mod: CPTII,S$GLB,, | Performed by: INTERNAL MEDICINE

## 2023-10-24 PROCEDURE — 3288F PR FALLS RISK ASSESSMENT DOCUMENTED: ICD-10-PCS | Mod: CPTII,S$GLB,, | Performed by: INTERNAL MEDICINE

## 2023-10-24 PROCEDURE — 99999 PR PBB SHADOW E&M-EST. PATIENT-LVL IV: CPT | Mod: PBBFAC,,, | Performed by: INTERNAL MEDICINE

## 2023-10-24 PROCEDURE — 1126F PR PAIN SEVERITY QUANTIFIED, NO PAIN PRESENT: ICD-10-PCS | Mod: CPTII,S$GLB,, | Performed by: INTERNAL MEDICINE

## 2023-10-24 PROCEDURE — 90694 VACC AIIV4 NO PRSRV 0.5ML IM: CPT | Mod: S$GLB,,, | Performed by: INTERNAL MEDICINE

## 2023-10-24 PROCEDURE — 3078F PR MOST RECENT DIASTOLIC BLOOD PRESSURE < 80 MM HG: ICD-10-PCS | Mod: CPTII,S$GLB,, | Performed by: INTERNAL MEDICINE

## 2023-10-24 PROCEDURE — 99214 OFFICE O/P EST MOD 30 MIN: CPT | Mod: 25,S$GLB,, | Performed by: INTERNAL MEDICINE

## 2023-10-24 PROCEDURE — 1126F AMNT PAIN NOTED NONE PRSNT: CPT | Mod: CPTII,S$GLB,, | Performed by: INTERNAL MEDICINE

## 2023-10-24 PROCEDURE — 99999 PR PBB SHADOW E&M-EST. PATIENT-LVL IV: ICD-10-PCS | Mod: PBBFAC,,, | Performed by: INTERNAL MEDICINE

## 2023-10-24 PROCEDURE — 3078F DIAST BP <80 MM HG: CPT | Mod: CPTII,S$GLB,, | Performed by: INTERNAL MEDICINE

## 2023-10-24 PROCEDURE — G0008 FLU VACCINE - QUADRIVALENT - ADJUVANTED: ICD-10-PCS | Mod: S$GLB,,, | Performed by: INTERNAL MEDICINE

## 2023-10-24 PROCEDURE — 99214 PR OFFICE/OUTPT VISIT, EST, LEVL IV, 30-39 MIN: ICD-10-PCS | Mod: 25,S$GLB,, | Performed by: INTERNAL MEDICINE

## 2023-10-24 PROCEDURE — 90694 FLU VACCINE - QUADRIVALENT - ADJUVANTED: ICD-10-PCS | Mod: S$GLB,,, | Performed by: INTERNAL MEDICINE

## 2023-10-24 PROCEDURE — 1101F PR PT FALLS ASSESS DOC 0-1 FALLS W/OUT INJ PAST YR: ICD-10-PCS | Mod: CPTII,S$GLB,, | Performed by: INTERNAL MEDICINE

## 2023-10-24 PROCEDURE — 1101F PT FALLS ASSESS-DOCD LE1/YR: CPT | Mod: CPTII,S$GLB,, | Performed by: INTERNAL MEDICINE

## 2023-10-24 RX ORDER — ALPRAZOLAM 1 MG/1
1 TABLET ORAL NIGHTLY PRN
Qty: 30 TABLET | Refills: 2 | Status: SHIPPED | OUTPATIENT
Start: 2023-10-24

## 2023-10-24 RX ORDER — METFORMIN HYDROCHLORIDE 500 MG/1
TABLET ORAL
Qty: 90 TABLET | Refills: 3 | Status: SHIPPED | OUTPATIENT
Start: 2023-10-24

## 2023-11-01 ENCOUNTER — TELEPHONE (OUTPATIENT)
Dept: UROLOGY | Facility: CLINIC | Age: 83
End: 2023-11-01
Payer: MEDICARE

## 2023-11-01 ENCOUNTER — OFFICE VISIT (OUTPATIENT)
Dept: UROLOGY | Facility: CLINIC | Age: 83
End: 2023-11-01
Payer: MEDICARE

## 2023-11-01 VITALS — BODY MASS INDEX: 26 KG/M2 | WEIGHT: 166 LBS

## 2023-11-01 DIAGNOSIS — Z85.46 HISTORY OF PROSTATE CANCER: ICD-10-CM

## 2023-11-01 DIAGNOSIS — E29.1 HYPOGONADISM IN MALE: Primary | ICD-10-CM

## 2023-11-01 PROCEDURE — 1160F RVW MEDS BY RX/DR IN RCRD: CPT | Mod: CPTII,S$GLB,, | Performed by: STUDENT IN AN ORGANIZED HEALTH CARE EDUCATION/TRAINING PROGRAM

## 2023-11-01 PROCEDURE — 3288F FALL RISK ASSESSMENT DOCD: CPT | Mod: CPTII,S$GLB,, | Performed by: STUDENT IN AN ORGANIZED HEALTH CARE EDUCATION/TRAINING PROGRAM

## 2023-11-01 PROCEDURE — 1159F PR MEDICATION LIST DOCUMENTED IN MEDICAL RECORD: ICD-10-PCS | Mod: CPTII,S$GLB,, | Performed by: STUDENT IN AN ORGANIZED HEALTH CARE EDUCATION/TRAINING PROGRAM

## 2023-11-01 PROCEDURE — 1126F PR PAIN SEVERITY QUANTIFIED, NO PAIN PRESENT: ICD-10-PCS | Mod: CPTII,S$GLB,, | Performed by: STUDENT IN AN ORGANIZED HEALTH CARE EDUCATION/TRAINING PROGRAM

## 2023-11-01 PROCEDURE — 99999 PR PBB SHADOW E&M-EST. PATIENT-LVL III: ICD-10-PCS | Mod: PBBFAC,,, | Performed by: STUDENT IN AN ORGANIZED HEALTH CARE EDUCATION/TRAINING PROGRAM

## 2023-11-01 PROCEDURE — 1101F PR PT FALLS ASSESS DOC 0-1 FALLS W/OUT INJ PAST YR: ICD-10-PCS | Mod: CPTII,S$GLB,, | Performed by: STUDENT IN AN ORGANIZED HEALTH CARE EDUCATION/TRAINING PROGRAM

## 2023-11-01 PROCEDURE — 1101F PT FALLS ASSESS-DOCD LE1/YR: CPT | Mod: CPTII,S$GLB,, | Performed by: STUDENT IN AN ORGANIZED HEALTH CARE EDUCATION/TRAINING PROGRAM

## 2023-11-01 PROCEDURE — 99999 PR PBB SHADOW E&M-EST. PATIENT-LVL III: CPT | Mod: PBBFAC,,, | Performed by: STUDENT IN AN ORGANIZED HEALTH CARE EDUCATION/TRAINING PROGRAM

## 2023-11-01 PROCEDURE — 1160F PR REVIEW ALL MEDS BY PRESCRIBER/CLIN PHARMACIST DOCUMENTED: ICD-10-PCS | Mod: CPTII,S$GLB,, | Performed by: STUDENT IN AN ORGANIZED HEALTH CARE EDUCATION/TRAINING PROGRAM

## 2023-11-01 PROCEDURE — 99214 OFFICE O/P EST MOD 30 MIN: CPT | Mod: S$GLB,,, | Performed by: STUDENT IN AN ORGANIZED HEALTH CARE EDUCATION/TRAINING PROGRAM

## 2023-11-01 PROCEDURE — 1126F AMNT PAIN NOTED NONE PRSNT: CPT | Mod: CPTII,S$GLB,, | Performed by: STUDENT IN AN ORGANIZED HEALTH CARE EDUCATION/TRAINING PROGRAM

## 2023-11-01 PROCEDURE — 3288F PR FALLS RISK ASSESSMENT DOCUMENTED: ICD-10-PCS | Mod: CPTII,S$GLB,, | Performed by: STUDENT IN AN ORGANIZED HEALTH CARE EDUCATION/TRAINING PROGRAM

## 2023-11-01 PROCEDURE — 99214 PR OFFICE/OUTPT VISIT, EST, LEVL IV, 30-39 MIN: ICD-10-PCS | Mod: S$GLB,,, | Performed by: STUDENT IN AN ORGANIZED HEALTH CARE EDUCATION/TRAINING PROGRAM

## 2023-11-01 PROCEDURE — 1159F MED LIST DOCD IN RCRD: CPT | Mod: CPTII,S$GLB,, | Performed by: STUDENT IN AN ORGANIZED HEALTH CARE EDUCATION/TRAINING PROGRAM

## 2023-11-01 RX ORDER — TESTOSTERONE 20.25 MG/1.25G
1.25 GEL TOPICAL DAILY
Qty: 75 G | Refills: 3 | Status: SHIPPED | OUTPATIENT
Start: 2023-11-01 | End: 2024-04-29

## 2023-11-01 NOTE — PROGRESS NOTES
Patient ID: Eric Jackson is a 82 y.o. male.    Chief Complaint: No chief complaint on file.    Referral: No referring provider defined for this encounter.     HPI  82 y.o. who presents to the Urology clinic for evaluation of low T. Has not received T from insurance company despite approvals.   Notes depression.   Hx of prostate cancer  Hx of low T, previously managed by endocrinology. Symptoms low T include muscle mass weakness, low energy. Patient w/ hx of prostate cancer s/p radiation therapy 2019, cT1c, IM risk, last PSA 0.36, parisa 0.29 ( 2020)  Prior neg hematuria work up.   T levels below             Medically Necessary ROS documented in HPI    Past Medical History  Active Ambulatory Problems     Diagnosis Date Noted    Essential hypertension 11/14/2012    Type 2 diabetes mellitus without complication, without long-term current use of insulin 11/14/2012    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 2/2018; 10/2020 EGD with suspicion for barretts; repeat 2023     Anxiety     Depression     Insomnia 08/10/2014    Slow urinary stream 12/07/2018    Tubular adenoma of colon 2/2018 3 adenomas repeat 3 years 01/11/2019    Hypogonadism in male 01/11/2019    Prostate cancer 2/25/19 TRUS of prostate and gold fiducial marker placement; 4/2019 s/p XRT 01/29/2019    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; 3/2019 [facility] patent graft; Culprit likely in small Diag2 (unrevascularized) and likely demand related 03/01/2019    Renal cyst, left, simple 1st seen 2/2019 CT; verified on renal US 11/2020 03/02/2019    Status post coronary artery bypass grafting single vessel 03/07/2019    Refractive error 04/09/2019    Intermediate stage nonexudative age-related macular degeneration of both eyes 04/09/2019     Nuclear sclerosis of both eyes 04/09/2019    History of DVT from PICC line right upper extremity 4/2019 anticoag x 3 months then stopped 04/29/2019    Abdominal aortic atherosclerosis 12/09/2019    Type 2 diabetes mellitus with diabetic cataract, without long-term current use of insulin 02/14/2020    Current mild episode of major depressive disorder without prior episode 02/14/2020    Epiretinal membrane (ERM) of both eyes 09/15/2020    Stage 3a chronic kidney disease 01/15/2021    Chronic left shoulder pain 07/21/2021    Decreased range of motion of left shoulder 07/21/2021    Shoulder weakness 07/21/2021    PVC (premature ventricular contraction) 04/22/2023     Resolved Ambulatory Problems     Diagnosis Date Noted    Acute constipation 08/25/2012    GI bleed 11/14/2012    Acute post-hemorrhagic anemia 11/14/2012    Chest pain 02/03/2014    Other and unspecified angina pectoris 12/30/2014    Type II or unspecified type diabetes mellitus without mention of complication, uncontrolled 12/30/2014    Symptomatic anemia, microcytic/hypochromic  01/15/2018    Left lower lobe pneumonia 01/15/2018    Hyponatremia 01/15/2018    Transaminitis 01/15/2018    Iron deficiency anemia 02/09/2018    Elevated PSA 12/07/2018    Fever 106 degrees F or over 02/28/2019    Sepsis secondary to UTI 03/01/2019    Infectious encephalopathy 03/01/2019    Acute prostatitis 03/01/2019    Urinary retention 03/02/2019    NSTEMI (non-ST elevated myocardial infarction) 03/04/2019    Cardiomyopathy 02/14/2020     Past Medical History:   Diagnosis Date    Anemia     Angina pectoris     Cancer     Coronary artery disease     Diabetes mellitus type II     Disorder of kidney and ureter     Erectile dysfunction     Eye injuries     GERD (gastroesophageal reflux disease)     Hypertension     Myocardial infarction     Trouble in sleeping     Type 2 diabetes mellitus     Type 2 diabetes mellitus with ophthalmic manifestations          Past Surgical  History  Past Surgical History:   Procedure Laterality Date    COLONOSCOPY N/A 2/9/2018    Procedure: COLONOSCOPY;  Surgeon: Mathieu Cao MD;  Location: [facility] ENDO;  Service: Endoscopy;  Laterality: N/A;    CORONARY ANGIOGRAPHY INCLUDING BYPASS GRAFTS WITH CATHETERIZATION OF LEFT HEART N/A 3/4/2019    Procedure: ANGIOGRAM, CORONARY, INCLUDING BYPASS GRAFT, WITH LEFT HEART CATHETERIZATION;  Surgeon: Jamir Nam MD;  Location: [facility] CATH LAB;  Service: Cardiology;  Laterality: N/A;    CORONARY ARTERY BYPASS GRAFT  2001    ESOPHAGOGASTRODUODENOSCOPY N/A 10/8/2020    Procedure: EGD (ESOPHAGOGASTRODUODENOSCOPY);  Surgeon: Bharath Herrera MD;  Location: [facility] ENDO;  Service: Endoscopy;  Laterality: N/A;    HERNIA REPAIR      LEFT HEART CATHETERIZATION Left 3/4/2019    Procedure: Left heart cath RFA access, 4fr catheter/sheath, not before 9am;  Surgeon: Jamir Nam MD;  Location: [facility] CATH LAB;  Service: Cardiology;  Laterality: Left;    TRANSRECTAL ULTRASOUND OF PROSTATE WITH INSERTION OF GOLD FIDUCIAL MARKER N/A 2/26/2019    Procedure: ULTRASOUND, PROSTATE, RECTAL APPROACH, WITH GOLD FIDUCIAL MARKER INSERTION;  Surgeon: Layne Huff MD;  Location: [facility] OR;  Service: Urology;  Laterality: N/A;    UPPER GASTROINTESTINAL ENDOSCOPY      WRIST SURGERY         Social History  Social Connections: Moderately Integrated (7/19/2023)    Social Connection and Isolation Panel [NHANES]     Frequency of Communication with Friends and Family: Twice a week     Frequency of Social Gatherings with Friends and Family: Once a week     Attends Gnosticist Services: Never     Active Member of Clubs or Organizations: Yes     Attends Club or Organization Meetings: Never     Marital Status:        Medications    Current Outpatient Medications:     ALPRAZolam (XANAX) 1 MG tablet, Take 1 tablet (1 mg total) by mouth nightly as needed for Anxiety., Disp: 30 tablet, Rfl: 2    aspirin (ECOTRIN) 81 MG EC tablet,  Take 1 tablet (81 mg total) by mouth once daily. (Patient not taking: Reported on 11/26/2021), Disp: 90 tablet, Rfl: 3    blood glucose control, low (TRUE METRIX LEVEL 1) Soln, To use with blood glucose meter, Disp: 1 each, Rfl: 0    blood sugar diagnostic (TRUE METRIX GLUCOSE TEST STRIP) Strp, For two times daily checking, Disp: 250 strip, Rfl: 3    blood-glucose meter (TRUE METRIX GLUCOSE METER) kit, For twice daily checking, Disp: 1 each, Rfl: 0    imiquimod (ALDARA) 5 % cream, Apply topically 3 (three) times a week., Disp: 24 packet, Rfl: 2    lancets Misc, To check BG 3 times daily, to use with insurance preferred meter, Disp: 300 each, Rfl: 3    losartan-hydrochlorothiazide 100-25 mg (HYZAAR) 100-25 mg per tablet, Take 1 tablet by mouth once daily., Disp: 90 tablet, Rfl: 3    metFORMIN (GLUCOPHAGE) 500 MG tablet, TAKE 1 TABLET EVERY DAY WITH BREAKFAST, Disp: 90 tablet, Rfl: 3    metoprolol succinate (TOPROL-XL) 25 MG 24 hr tablet, Take 0.5 tablets (12.5 mg total) by mouth once daily., Disp: 90 tablet, Rfl: 3    omeprazole (PRILOSEC) 40 MG capsule, Take 1 capsule (40 mg total) by mouth 2 (two) times daily before meals., Disp: 180 capsule, Rfl: 3    sildenafiL (VIAGRA) 25 MG tablet, Take 1 tablet (25 mg total) by mouth daily as needed for Erectile Dysfunction. (Patient not taking: Reported on 4/22/2022), Disp: 10 tablet, Rfl: 0    testosterone (ANDROGEL) 20.25 mg/1.25 gram (1.62 %) GlPm, Place 1.25 g onto the skin once daily., Disp: 75 g, Rfl: 3    vit C/vit E ac/lut/copper/zinc (PRESERVISION LUTEIN ORAL), Take 1 tablet by mouth once daily., Disp: , Rfl:     vitamin D 1000 units Tab, Take 1,000 Units by mouth once daily., Disp: , Rfl:     Allergies  Review of patient's allergies indicates:   Allergen Reactions    Tamsulosin     Prochlorperazine      convulsions    Statins-hmg-coa reductase inhibitors Other (See Comments)     Muscle weakness       Patient's PMH, FH, Social hx, Medications, allergies reviewed and  updated as pertinent to today's visit    Objective:      Physical Exam  Constitutional:       General: He is not in acute distress.     Appearance: He is well-developed. He is not ill-appearing, toxic-appearing or diaphoretic.   HENT:      Head: Normocephalic and atraumatic.      Mouth/Throat:      Mouth: Mucous membranes are moist.   Eyes:      Conjunctiva/sclera: Conjunctivae normal.   Pulmonary:      Effort: Pulmonary effort is normal. No respiratory distress.   Abdominal:      General: Abdomen is flat. There is no distension.   Musculoskeletal:         General: No swelling or deformity.   Skin:     Findings: No rash.   Neurological:      Mental Status: He is alert and oriented to person, place, and time.   Psychiatric:         Mood and Affect: Mood normal.         Thought Content: Thought content normal.         Judgment: Judgment normal.             Lab Results   Component Value Date    PSADIAG 0.47 10/16/2023    PSADIAG 0.43 05/22/2023    PSADIAG 0.36 04/17/2023      Assessment:       1. Hypogonadism in male    2. History of prostate cancer        Plan:       Discussed goodrx if insurance unable to approve T replacement  Labs after replacement initiated

## 2023-11-02 ENCOUNTER — TELEPHONE (OUTPATIENT)
Dept: ENDOSCOPY | Facility: HOSPITAL | Age: 83
End: 2023-11-02
Payer: MEDICARE

## 2023-11-02 NOTE — TELEPHONE ENCOUNTER
Spoke with patient about arrival time @ 0745.   EGD    NPO status reviewed: Patient must have nothing to eat after midnight.      Medications: Do not take Insulin or oral diabetic medications the day of the procedure.  Take as prescribed: heart, seizure and blood pressure medication in the morning with a sip of water (less than an ounce).  Take any breathing medications and bring inhalers to hospital with you Leave all valuables and jewelry at home.     Wear comfortable clothes to procedure to change into hospital gown You cannot drive for 24 hours after your procedure because you will receive sedation for your procedure to make you comfortable.  A ride must be provided at discharge.

## 2023-11-02 NOTE — TELEPHONE ENCOUNTER
Left message instructing patient to call dept @ [phone] between 8am-3pm.    Arrival time to be given @ 3074  EGD --  Inst in AVS 09/25)  (Message sent via [facility] portal)

## 2023-11-07 ENCOUNTER — ANESTHESIA (OUTPATIENT)
Dept: ENDOSCOPY | Facility: HOSPITAL | Age: 83
End: 2023-11-07
Payer: MEDICARE

## 2023-11-07 ENCOUNTER — ANESTHESIA EVENT (OUTPATIENT)
Dept: ENDOSCOPY | Facility: HOSPITAL | Age: 83
End: 2023-11-07
Payer: MEDICARE

## 2023-11-07 ENCOUNTER — HOSPITAL ENCOUNTER (OUTPATIENT)
Facility: HOSPITAL | Age: 83
Discharge: HOME | End: 2023-11-07
Attending: INTERNAL MEDICINE | Admitting: INTERNAL MEDICINE
Payer: MEDICARE

## 2023-11-07 VITALS
DIASTOLIC BLOOD PRESSURE: 73 MMHG | SYSTOLIC BLOOD PRESSURE: 172 MMHG | OXYGEN SATURATION: 98 % | RESPIRATION RATE: 13 BRPM | TEMPERATURE: 98 F | HEIGHT: 67 IN | BODY MASS INDEX: 25.9 KG/M2 | HEART RATE: 51 BPM | WEIGHT: 165 LBS

## 2023-11-07 DIAGNOSIS — K22.70 BARRETT'S ESOPHAGUS: ICD-10-CM

## 2023-11-07 PROCEDURE — 25000003 PHARM REV CODE 250: Performed by: INTERNAL MEDICINE

## 2023-11-07 PROCEDURE — 27201012 HC FORCEPS, HOT/COLD, DISP: Performed by: INTERNAL MEDICINE

## 2023-11-07 PROCEDURE — 63600175 PHARM REV CODE 636 W HCPCS: Performed by: NURSE ANESTHETIST, CERTIFIED REGISTERED

## 2023-11-07 PROCEDURE — 88305 TISSUE EXAM BY PATHOLOGIST: CPT | Mod: 26,HCNC,, | Performed by: PATHOLOGY

## 2023-11-07 PROCEDURE — D9220A PRA ANESTHESIA: Mod: ANES,,, | Performed by: STUDENT IN AN ORGANIZED HEALTH CARE EDUCATION/TRAINING PROGRAM

## 2023-11-07 PROCEDURE — D9220A PRA ANESTHESIA: ICD-10-PCS | Mod: ANES,,, | Performed by: STUDENT IN AN ORGANIZED HEALTH CARE EDUCATION/TRAINING PROGRAM

## 2023-11-07 PROCEDURE — 43239 EGD BIOPSY SINGLE/MULTIPLE: CPT | Mod: HCNC | Performed by: INTERNAL MEDICINE

## 2023-11-07 PROCEDURE — 88305 TISSUE EXAM BY PATHOLOGIST: ICD-10-PCS | Mod: 26,HCNC,, | Performed by: PATHOLOGY

## 2023-11-07 PROCEDURE — 37000008 HC ANESTHESIA 1ST 15 MINUTES: Performed by: INTERNAL MEDICINE

## 2023-11-07 PROCEDURE — D9220A PRA ANESTHESIA: Mod: CRNA,,, | Performed by: NURSE ANESTHETIST, CERTIFIED REGISTERED

## 2023-11-07 PROCEDURE — 37000009 HC ANESTHESIA EA ADD 15 MINS: Performed by: INTERNAL MEDICINE

## 2023-11-07 PROCEDURE — 88305 TISSUE EXAM BY PATHOLOGIST: CPT | Mod: 59,HCNC | Performed by: PATHOLOGY

## 2023-11-07 PROCEDURE — D9220A PRA ANESTHESIA: ICD-10-PCS | Mod: CRNA,,, | Performed by: NURSE ANESTHETIST, CERTIFIED REGISTERED

## 2023-11-07 PROCEDURE — 25000003 PHARM REV CODE 250: Performed by: NURSE ANESTHETIST, CERTIFIED REGISTERED

## 2023-11-07 PROCEDURE — 43239 EGD BIOPSY SINGLE/MULTIPLE: CPT | Mod: HCNC,,, | Performed by: INTERNAL MEDICINE

## 2023-11-07 PROCEDURE — 43239 PR EGD, FLEX, W/BIOPSY, SGL/MULTI: ICD-10-PCS | Mod: HCNC,,, | Performed by: INTERNAL MEDICINE

## 2023-11-07 RX ORDER — LIDOCAINE HYDROCHLORIDE 20 MG/ML
INJECTION INTRAVENOUS
Status: DISCONTINUED | OUTPATIENT
Start: 2023-11-07 | End: 2023-11-07

## 2023-11-07 RX ORDER — SODIUM CHLORIDE 9 MG/ML
INJECTION, SOLUTION INTRAVENOUS CONTINUOUS
Status: DISCONTINUED | OUTPATIENT
Start: 2023-11-07 | End: 2023-11-07 | Stop reason: HOSPADM

## 2023-11-07 RX ORDER — PROPOFOL 10 MG/ML
VIAL (ML) INTRAVENOUS
Status: DISCONTINUED | OUTPATIENT
Start: 2023-11-07 | End: 2023-11-07

## 2023-11-07 RX ADMIN — PROPOFOL 50 MG: 10 INJECTION, EMULSION INTRAVENOUS at 08:11

## 2023-11-07 RX ADMIN — PROPOFOL 20 MG: 10 INJECTION, EMULSION INTRAVENOUS at 08:11

## 2023-11-07 RX ADMIN — LIDOCAINE HYDROCHLORIDE 75 MG: 20 INJECTION, SOLUTION INTRAVENOUS at 08:11

## 2023-11-07 RX ADMIN — PROPOFOL 30 MG: 10 INJECTION, EMULSION INTRAVENOUS at 08:11

## 2023-11-07 RX ADMIN — SODIUM CHLORIDE: 9 INJECTION, SOLUTION INTRAVENOUS at 07:11

## 2023-11-07 NOTE — H&P
Short Stay Endoscopy History and Physical    PCP - Samir Boo MD    Procedure - EGD  ASA - III  Mallampati - per anesthesia  History of Anesthesia problems - no  Family history Anesthesia problems - no     HPI:  This is a 82 y.o. male here for evaluation of : LSBE    ROS:  Constitutional: No fevers, chills, No weight loss  ENT: No allergies  CV: No chest pain  Pulm: No shortness of breath  GI: see HPI  Derm: No rash    Medical History:  has a past medical history of Anemia, Angina pectoris, Anxiety, Cancer, Coronary artery disease, Depression, Diabetes mellitus type II, Disorder of kidney and ureter, Erectile dysfunction, Eye injuries, GERD (gastroesophageal reflux disease), Hypertension, Intermediate stage nonexudative age-related macular degeneration of both eyes (4/9/2019), Myocardial infarction, Nuclear sclerosis of both eyes (4/9/2019), Prostate cancer, Trouble in sleeping, Type 2 diabetes mellitus, and Type 2 diabetes mellitus with ophthalmic manifestations.    Surgical History:  has a past surgical history that includes Hernia repair; Wrist surgery; Coronary artery bypass graft (2001); Colonoscopy (N/A, 2/9/2018); Transrectal ultrasound of prostate with insertion of gold fiducial marker (N/A, 2/26/2019); Left heart catheterization (Left, 3/4/2019); Coronary angiography including bypass grafts with catheterization of left heart (N/A, 3/4/2019); Esophagogastroduodenoscopy (N/A, 10/8/2020); and Upper gastrointestinal endoscopy.    Family History: family history includes No Known Problems in his brother, father, maternal aunt, maternal grandfather, maternal grandmother, maternal uncle, mother, paternal aunt, paternal grandfather, paternal grandmother, paternal uncle, sister, and another family member. He was adopted.. Otherwise no colon cancer, inflammatory bowel disease, or GI malignancies.    Social History:  reports that he quit smoking about 34 years ago. His smoking use included cigarettes. He started  smoking about 57 years ago. He has a 46.0 pack-year smoking history. He has never used smokeless tobacco. He reports current alcohol use of about 1.0 standard drink of alcohol per week. He reports that he does not use drugs.    Review of patient's allergies indicates:   Allergen Reactions    Tamsulosin     Prochlorperazine      convulsions    Statins-hmg-coa reductase inhibitors Other (See Comments)     Muscle weakness       Medications:   Medications Prior to Admission   Medication Sig Dispense Refill Last Dose    ALPRAZolam (XANAX) 1 MG tablet Take 1 tablet (1 mg total) by mouth nightly as needed for Anxiety. 30 tablet 2 Past Week    blood glucose control, low (TRUE METRIX LEVEL 1) Soln To use with blood glucose meter 1 each 0 Past Month    blood sugar diagnostic (TRUE METRIX GLUCOSE TEST STRIP) Strp For two times daily checking 250 strip 3 Past Month    blood-glucose meter (TRUE METRIX GLUCOSE METER) kit For twice daily checking 1 each 0 Past Month    imiquimod (ALDARA) 5 % cream Apply topically 3 (three) times a week. 24 packet 2 Past Week    lancets Misc To check BG 3 times daily, to use with insurance preferred meter 300 each 3 Past Month    losartan-hydrochlorothiazide 100-25 mg (HYZAAR) 100-25 mg per tablet Take 1 tablet by mouth once daily. 90 tablet 3 11/6/2023    metFORMIN (GLUCOPHAGE) 500 MG tablet TAKE 1 TABLET EVERY DAY WITH BREAKFAST 90 tablet 3 11/6/2023    metoprolol succinate (TOPROL-XL) 25 MG 24 hr tablet Take 0.5 tablets (12.5 mg total) by mouth once daily. 90 tablet 3 11/6/2023    omeprazole (PRILOSEC) 40 MG capsule Take 1 capsule (40 mg total) by mouth 2 (two) times daily before meals. 180 capsule 3 Past Month    testosterone (ANDROGEL) 20.25 mg/1.25 gram (1.62 %) GlPm Place 1.25 g onto the skin once daily. 75 g 3 11/6/2023    vit C/vit E ac/lut/copper/zinc (PRESERVISION LUTEIN ORAL) Take 1 tablet by mouth once daily.   11/6/2023    vitamin D 1000 units Tab Take 1,000 Units by mouth once daily.    11/6/2023    aspirin (ECOTRIN) 81 MG EC tablet Take 1 tablet (81 mg total) by mouth once daily. (Patient not taking: Reported on 11/26/2021) 90 tablet 3     sildenafiL (VIAGRA) 25 MG tablet Take 1 tablet (25 mg total) by mouth daily as needed for Erectile Dysfunction. (Patient not taking: Reported on 4/22/2022) 10 tablet 0          Objective Findings:    Vital Signs: see nursing notes  Physical Exam:  General Appearance: Well appearing in no acute distress  Eyes:    No scleral icterus  ENT: Neck supple  Lungs: CTA anteriorly  Heart:  S1, S2 normal, no murmurs heard  Abdomen: Soft, non tender, non distended with positive bowel sounds. No hepatosplenomegaly, ascites, or mass  Extremities: no edema  Skin: No rash      Labs:  Lab Results   Component Value Date    WBC 6.37 10/16/2023    HGB 15.3 10/16/2023    HCT 47.2 10/16/2023     10/16/2023    CHOL 281 (H) 10/16/2023    TRIG 522 (H) 10/16/2023    HDL 37 (L) 10/16/2023    ALT 19 10/16/2023    AST 19 10/16/2023     10/16/2023    K 4.3 10/16/2023     10/16/2023    CREATININE 1.2 10/16/2023    BUN 16 10/16/2023    CO2 29 10/16/2023    TSH 1.715 02/28/2019    PSA 0.29 06/02/2020    INR 1.0 04/28/2019    HGBA1C 6.1 (H) 10/16/2023       I have explained the risks and benefits of endoscopy procedures to the patient including but not limited to bleeding, perforation, infection, and death.    Bharath Herrera MD

## 2023-11-07 NOTE — ANESTHESIA POSTPROCEDURE EVALUATION
Anesthesia Post Evaluation    Patient: Eric Jackson    Procedure(s) Performed: Procedure(s) (LRB):  EGD (ESOPHAGOGASTRODUODENOSCOPY) (N/A)    Final Anesthesia Type: general      Patient location during evaluation: PACU  Patient participation: Yes- Able to Participate  Level of consciousness: awake and alert  Post-procedure vital signs: reviewed and stable  Pain management: adequate  Airway patency: patent    PONV status at discharge: No PONV  Anesthetic complications: no      Cardiovascular status: stable  Respiratory status: room air  Hydration status: euvolemic  Follow-up not needed.          Vitals Value Taken Time   /73 11/07/23 0930   Temp 36.6 °C (97.9 °F) 11/07/23 0903   Pulse 51 11/07/23 0930   Resp 13 11/07/23 0930   SpO2 98 % 11/07/23 0930         Event Time   Out of Recovery 09:39:49         Pain/Edith Score: Edith Score: 10 (11/7/2023  9:30 AM)

## 2023-11-07 NOTE — PROVATION PATIENT INSTRUCTIONS
Discharge Summary/Instructions after an Endoscopic Procedure  Patient Name: Eric Jackson  Patient MRN: [id number]  Patient YOB: 1940 Tuesday, November 7, 2023  Bharath Herrera MD  Dear patient,  As a result of recent federal legislation (The Federal Cures Act), you may   receive lab or pathology results from your procedure in your [facility]   account before your physician is able to contact you. Your physician or   their representative will relay the results to you with their   recommendations at their soonest availability.  Thank you,  Your health is very important to us during the Covid Crisis. Following your   procedure today, you will receive a daily text for 2 weeks asking about   signs or symptoms of Covid 19.  Please respond to this text when you   receive it so we can follow up and keep you as safe as possible.   RESTRICTIONS:  During your procedure today, you received medications for sedation.  These   medications may affect your judgment, balance and coordination.  Therefore,   for 24 hours, you have the following restrictions:   - DO NOT drive a car, operate machinery, make legal/financial decisions,   sign important papers or drink alcohol.    ACTIVITY:  Today: no heavy lifting, straining or running due to procedural   sedation/anesthesia.  The following day: return to full activity including work.  DIET:  Eat and drink normally unless instructed otherwise.     TREATMENT FOR COMMON SIDE EFFECTS:  - Mild abdominal pain, nausea, belching, bloating or excessive gas:  rest,   eat lightly and use a heating pad.  - Sore Throat: treat with throat lozenges and/or gargle with warm salt   water.  - Because air was used during the procedure, expelling large amounts of air   from your rectum or belching is normal.  - If a bowel prep was taken, you may not have a bowel movement for 1-3 days.    This is normal.  SYMPTOMS TO WATCH FOR AND REPORT TO YOUR PHYSICIAN:  1. Abdominal pain or bloating,  other than gas cramps.  2. Chest pain.  3. Back pain.  4. Signs of infection such as: chills or fever occurring within 24 hours   after the procedure.  5. Rectal bleeding, which would show as bright red, maroon, or black stools.   (A tablespoon of blood from the rectum is not serious, especially if   hemorrhoids are present.)  6. Vomiting.  7. Weakness or dizziness.  GO DIRECTLY TO THE NEAREST EMERGENCY ROOM IF YOU HAVE ANY OF THE FOLLOWING:      Difficulty breathing              Chills and/or fever over 101 F   Persistent vomiting and/or vomiting blood   Severe abdominal pain   Severe chest pain   Black, tarry stools   Bleeding- more than one tablespoon   Any other symptom or condition that you feel may need urgent attention  Your doctor recommends these additional instructions:  If any biopsies were taken, your doctors clinic will contact you in 1 to 2   weeks with any results.  - Discharge patient to home.   - Resume previous diet.   - Continue present medications.   - Await pathology results.   - Repeat upper endoscopy in 3 - 5 years for surveillance of Archer's   esophagus depending on clinical status.   - Patient has a contact number available for emergencies.  The signs and   symptoms of potential delayed complications were discussed with the   patient.  Return to normal activities tomorrow.  Written discharge   instructions were provided to the patient.  For questions, problems or results please call your physician - Bharath Herrera MD.  EMERGENCY PHONE NUMBER: [phone],  LAB RESULTS: [phone]  IF A COMPLICATION OR EMERGENCY SITUATION ARISES AND YOU ARE UNABLE TO REACH   YOUR PHYSICIAN - GO DIRECTLY TO THE EMERGENCY ROOM.  Bharath Herrera MD  11/7/2023 9:02:17 AM  This report has been verified and signed electronically.  Dear patient,  As a result of recent federal legislation (The Federal Cures Act), you may   receive lab or pathology results from your procedure in your  [facility]   account before your physician is able to contact you. Your physician or   their representative will relay the results to you with their   recommendations at their soonest availability.  Thank you,  PROVATION

## 2023-11-07 NOTE — ANESTHESIA PREPROCEDURE EVALUATION
11/07/2023  Eric Jackson is a 82 y.o., male.      Pre-op Assessment    I have reviewed the Patient Summary Reports.    I have reviewed the NPO Status.   I have reviewed the Medications.     Review of Systems  Anesthesia Hx:  No problems with previous Anesthesia               Denies Personal Hx of Anesthesia complications.                    Social:  Non-Smoker       Cardiovascular:     Hypertension   CAD   CABG/stent                                     Pulmonary:  Pulmonary Normal                       Renal/:  Renal/ Normal                 Hepatic/GI:     GERD             Neurological:  Neurology Normal                                      Endocrine:  Diabetes           Psych:  Psychiatric History                  Physical Exam  General: Well nourished and Cooperative    Airway:  Mallampati: II   Mouth Opening: Normal  TM Distance: Normal  Tongue: Normal  Neck ROM: Normal ROM    Dental:  Intact        Anesthesia Plan  Type of Anesthesia, risks & benefits discussed:    Anesthesia Type: Gen Natural Airway  Intra-op Monitoring Plan: Standard ASA Monitors  Post Op Pain Control Plan: multimodal analgesia  Induction:  IV  Informed Consent: Informed consent signed with the Patient and all parties understand the risks and agree with anesthesia plan.  All questions answered.   ASA Score: 3  Day of Surgery Review of History & Physical: H&P Update referred to the surgeon/provider.    Ready For Surgery From Anesthesia Perspective.     .

## 2023-11-07 NOTE — TRANSFER OF CARE
"Anesthesia Transfer of Care Note    Patient: Eric Jackson    Procedure(s) Performed: Procedure(s) (LRB):  EGD (ESOPHAGOGASTRODUODENOSCOPY) (N/A)    Patient location: GI    Anesthesia Type: general    Transport from OR: Transported from OR on room air with adequate spontaneous ventilation    Post pain: adequate analgesia    Post assessment: no apparent anesthetic complications and tolerated procedure well    Post vital signs: stable    Level of consciousness: responds to stimulation and sedated    Nausea/Vomiting: no nausea/vomiting    Complications: none    Transfer of care protocol was followed      Last vitals: Visit Vitals  BP (!) 175/80 (BP Location: Left arm, Patient Position: Lying)   Pulse (!) 55   Temp 36.2 °C (97.2 °F) (Temporal)   Resp 16   Ht 5' 7" (1.702 m)   Wt 74.8 kg (165 lb)   SpO2 97%   BMI 25.84 kg/m²     "

## 2023-11-08 DIAGNOSIS — B97.7 HPV (HUMAN PAPILLOMA VIRUS) INFECTION: ICD-10-CM

## 2023-11-08 RX ORDER — IMIQUIMOD 12.5 MG/.25G
CREAM TOPICAL
Qty: 24 PACKET | Refills: 3 | Status: SHIPPED | OUTPATIENT
Start: 2023-11-08

## 2023-11-09 ENCOUNTER — OFFICE VISIT (OUTPATIENT)
Dept: OPTOMETRY | Facility: CLINIC | Age: 83
End: 2023-11-09
Payer: MEDICARE

## 2023-11-09 DIAGNOSIS — H52.7 REFRACTIVE ERROR: ICD-10-CM

## 2023-11-09 DIAGNOSIS — E11.36 TYPE 2 DIABETES MELLITUS WITH DIABETIC CATARACT, WITHOUT LONG-TERM CURRENT USE OF INSULIN: Primary | ICD-10-CM

## 2023-11-09 DIAGNOSIS — H35.373 EPIRETINAL MEMBRANE (ERM) OF BOTH EYES: ICD-10-CM

## 2023-11-09 DIAGNOSIS — H25.13 NUCLEAR SCLEROSIS OF BOTH EYES: ICD-10-CM

## 2023-11-09 DIAGNOSIS — H35.3132 INTERMEDIATE STAGE NONEXUDATIVE AGE-RELATED MACULAR DEGENERATION OF BOTH EYES: ICD-10-CM

## 2023-11-09 PROCEDURE — 1101F PR PT FALLS ASSESS DOC 0-1 FALLS W/OUT INJ PAST YR: ICD-10-PCS | Mod: HCNC,CPTII,S$GLB, | Performed by: OPTOMETRIST

## 2023-11-09 PROCEDURE — 92134 POSTERIOR SEGMENT OCT RETINA (OCULAR COHERENCE TOMOGRAPHY)-BOTH EYES: ICD-10-PCS | Mod: HCNC,S$GLB,, | Performed by: OPTOMETRIST

## 2023-11-09 PROCEDURE — 2023F PR DILATED RETINAL EXAM W/O EVID OF RETINOPATHY: ICD-10-PCS | Mod: HCNC,CPTII,S$GLB, | Performed by: OPTOMETRIST

## 2023-11-09 PROCEDURE — 3288F PR FALLS RISK ASSESSMENT DOCUMENTED: ICD-10-PCS | Mod: HCNC,CPTII,S$GLB, | Performed by: OPTOMETRIST

## 2023-11-09 PROCEDURE — 92015 PR REFRACTION: ICD-10-PCS | Mod: HCNC,S$GLB,, | Performed by: OPTOMETRIST

## 2023-11-09 PROCEDURE — 2023F DILAT RTA XM W/O RTNOPTHY: CPT | Mod: HCNC,CPTII,S$GLB, | Performed by: OPTOMETRIST

## 2023-11-09 PROCEDURE — 1159F MED LIST DOCD IN RCRD: CPT | Mod: HCNC,CPTII,S$GLB, | Performed by: OPTOMETRIST

## 2023-11-09 PROCEDURE — 1160F PR REVIEW ALL MEDS BY PRESCRIBER/CLIN PHARMACIST DOCUMENTED: ICD-10-PCS | Mod: HCNC,CPTII,S$GLB, | Performed by: OPTOMETRIST

## 2023-11-09 PROCEDURE — 3288F FALL RISK ASSESSMENT DOCD: CPT | Mod: HCNC,CPTII,S$GLB, | Performed by: OPTOMETRIST

## 2023-11-09 PROCEDURE — 99214 PR OFFICE/OUTPT VISIT, EST, LEVL IV, 30-39 MIN: ICD-10-PCS | Mod: HCNC,S$GLB,, | Performed by: OPTOMETRIST

## 2023-11-09 PROCEDURE — 92015 DETERMINE REFRACTIVE STATE: CPT | Mod: HCNC,S$GLB,, | Performed by: OPTOMETRIST

## 2023-11-09 PROCEDURE — 99214 OFFICE O/P EST MOD 30 MIN: CPT | Mod: HCNC,S$GLB,, | Performed by: OPTOMETRIST

## 2023-11-09 PROCEDURE — 1126F PR PAIN SEVERITY QUANTIFIED, NO PAIN PRESENT: ICD-10-PCS | Mod: HCNC,CPTII,S$GLB, | Performed by: OPTOMETRIST

## 2023-11-09 PROCEDURE — 1126F AMNT PAIN NOTED NONE PRSNT: CPT | Mod: HCNC,CPTII,S$GLB, | Performed by: OPTOMETRIST

## 2023-11-09 PROCEDURE — 99999 PR PBB SHADOW E&M-EST. PATIENT-LVL III: ICD-10-PCS | Mod: PBBFAC,HCNC,, | Performed by: OPTOMETRIST

## 2023-11-09 PROCEDURE — 92134 CPTRZ OPH DX IMG PST SGM RTA: CPT | Mod: HCNC,S$GLB,, | Performed by: OPTOMETRIST

## 2023-11-09 PROCEDURE — 1160F RVW MEDS BY RX/DR IN RCRD: CPT | Mod: HCNC,CPTII,S$GLB, | Performed by: OPTOMETRIST

## 2023-11-09 PROCEDURE — 99999 PR PBB SHADOW E&M-EST. PATIENT-LVL III: CPT | Mod: PBBFAC,HCNC,, | Performed by: OPTOMETRIST

## 2023-11-09 PROCEDURE — 1101F PT FALLS ASSESS-DOCD LE1/YR: CPT | Mod: HCNC,CPTII,S$GLB, | Performed by: OPTOMETRIST

## 2023-11-09 PROCEDURE — 1159F PR MEDICATION LIST DOCUMENTED IN MEDICAL RECORD: ICD-10-PCS | Mod: HCNC,CPTII,S$GLB, | Performed by: OPTOMETRIST

## 2023-11-09 NOTE — PROGRESS NOTES
Subjective:       Patient ID: Eric Jackson is a 82 y.o. male      Chief Complaint   Patient presents with    Concerns About Ocular Health     History of Present Illness  Dls: 11/28/22 Dr. Braden     83 y/o male presents today for diabetic eye exam.   Pt c/o dry eyes ou. Pt states seems like MD is slowing getting worse.   Pt wears bifocal glasses.     LBS ?     No tearing  + ou off/on burning  No pain  + off/on ha's  + ou off/on  floaters  No flashes    Eye meds  Otc gtts ou prn     Pohx:   None    Fohx:  None    Hemoglobin A1C       Date                     Value               Ref Range             Status                10/16/2023               6.1 (H)             4.0 - 5.6 %           Final                  04/17/2023               6.2 (H)             4.0 - 5.6 %           Final                   10/17/2022               6.4 (H)             4.0 - 5.6 %           Final                    Assessment/Plan:     1. Type 2 diabetes mellitus with diabetic cataract, without long-term current use of insulin  No diabetic retinopathy. Discussed with pt the effects of diabetes on vision, importance of good blood sugar control, compliance with meds, and follow up care with PCP. Return in 1 year for dilated eye exam, sooner PRN.    2. Intermediate stage nonexudative age-related macular degeneration of both eyes  Stable. Previously seen by retina. Continue home amsler. Yearly DFE, sooner PRN.     3. Epiretinal membrane (ERM) of both eyes  Previously seen by retina. OD > OS. Pt does not want surgical consult at this time. Monitor.   - Posterior Segment OCT Retina-Both eyes    4. Nuclear sclerosis of both eyes  NVS per pt. Educated pt on presence of cataracts and effects on vision. No surgery at this time. Recheck in one year, sooner PRN.    5. Refractive error  Educated patient on refractive error and discussed lens options. Dispensed updated spectacle Rx. Educated about adaptation period to new specs.    Eyeglass Final Rx        Eyeglass Final Rx         Sphere Cylinder Axis Add    Right -4.25 +1.25 155 +2.75    Left -2.50 +0.75 040 +2.75      Expiration Date: 11/9/2024                      Follow up in about 1 year (around 11/9/2024) for Diabetic Eye Exam, Cataract check.

## 2023-11-15 LAB
FINAL PATHOLOGIC DIAGNOSIS: NORMAL
Lab: NORMAL

## 2023-11-21 ENCOUNTER — TELEPHONE (OUTPATIENT)
Dept: GASTROENTEROLOGY | Facility: CLINIC | Age: 83
End: 2023-11-21
Payer: MEDICARE

## 2023-12-14 DIAGNOSIS — E11.9 TYPE 2 DIABETES MELLITUS WITHOUT COMPLICATION, WITHOUT LONG-TERM CURRENT USE OF INSULIN: ICD-10-CM

## 2023-12-14 RX ORDER — CALCIUM CITRATE/VITAMIN D3 200MG-6.25
TABLET ORAL
Qty: 200 STRIP | Refills: 0 | Status: SHIPPED | OUTPATIENT
Start: 2023-12-14

## 2024-01-02 ENCOUNTER — NURSE TRIAGE (OUTPATIENT)
Dept: ADMINISTRATIVE | Facility: CLINIC | Age: 84
End: 2024-01-02
Payer: MEDICARE

## 2024-01-02 NOTE — TELEPHONE ENCOUNTER
Pt called for c/o cough and he said that he has been battling it for a couple of weeks denies anival fever but he said that he had a lot of phlegm when he coughs. Pt triaged and care advice to see MD within 4 hours and no appt available and pt told this information and he will go to the  for a visit. Pt told to reach out if needs an appt for follow up with provider,                     Reason for Disposition   [1] MILD difficulty breathing (e.g., minimal/no SOB at rest, SOB with walking, pulse <100) AND [2] still present when not coughing    Additional Information   Negative: SEVERE difficulty breathing (e.g., struggling for each breath, speaks in single words)   Negative: Bluish (or gray) lips or face now   Negative: [1] Difficulty breathing AND [2] exposure to flames, smoke, or fumes   Negative: [1] Stridor AND [2] difficulty breathing   Negative: Sounds like a life-threatening emergency to the triager   Negative: [1] MODERATE difficulty breathing (e.g., speaks in phrases, SOB even at rest, pulse 100-120) AND [2] still present when not coughing   Negative: Chest pain  (Exception: MILD central chest pain, present only when coughing.)   Negative: Patient sounds very sick or weak to the triager    Protocols used: Cough - Acute [id number]-DI

## 2024-01-04 ENCOUNTER — OFFICE VISIT (OUTPATIENT)
Dept: URGENT CARE | Facility: CLINIC | Age: 84
End: 2024-01-04
Payer: MEDICARE

## 2024-01-04 VITALS
SYSTOLIC BLOOD PRESSURE: 117 MMHG | TEMPERATURE: 96 F | HEIGHT: 67 IN | BODY MASS INDEX: 25.9 KG/M2 | HEART RATE: 57 BPM | RESPIRATION RATE: 14 BRPM | WEIGHT: 165 LBS | DIASTOLIC BLOOD PRESSURE: 68 MMHG | OXYGEN SATURATION: 95 %

## 2024-01-04 DIAGNOSIS — J18.9 PNEUMONIA DUE TO INFECTIOUS ORGANISM, UNSPECIFIED LATERALITY, UNSPECIFIED PART OF LUNG: ICD-10-CM

## 2024-01-04 DIAGNOSIS — R05.3 CHRONIC COUGH: Primary | ICD-10-CM

## 2024-01-04 PROCEDURE — 99213 OFFICE O/P EST LOW 20 MIN: CPT | Mod: S$GLB,,,

## 2024-01-04 RX ORDER — AZITHROMYCIN 250 MG/1
TABLET, FILM COATED ORAL
Qty: 6 TABLET | Refills: 0 | Status: SHIPPED | OUTPATIENT
Start: 2024-01-04 | End: 2024-01-09

## 2024-01-04 RX ORDER — AMOXICILLIN AND CLAVULANATE POTASSIUM 875; 125 MG/1; MG/1
1 TABLET, FILM COATED ORAL 2 TIMES DAILY
Qty: 14 TABLET | Refills: 0 | Status: SHIPPED | OUTPATIENT
Start: 2024-01-04 | End: 2024-01-11

## 2024-01-05 NOTE — PROGRESS NOTES
"Subjective:      Patient ID: Eric Jackson is a 83 y.o. male.    Vitals:  height is 5' 7" (1.702 m) and weight is 74.8 kg (165 lb). His tympanic temperature is 96.4 °F (35.8 °C). His blood pressure is 117/68 and his pulse is 57 (abnormal). His respiration is 14 and oxygen saturation is 95%.     Chief Complaint: Cough (/)    Patient is an 83-year-old male with history of hypertension, T2DM, NSTEMI, stage IIIA CKD, prostate cancer, DVT, Archer's esophagus presenting for coughing for at least the past 2-3 months.  Coughing is typically nonproductive, but after multiple lots of coughing he coughs up thick white phlegm.  He does notice that he has experienced increased dyspnea on exertion lately.  No dyspnea at rest.  Still able to run about 20 minutes on the treadmill. He checks is O2 at home and it remains >98%. No history of asthma or COPD.  Quit smoking in 1989.  Denies fever, chills, sore throat, dizziness, chest pain, leg pain or swelling, hemoptysis, palpitations, nausea, vomiting, abdominal pain, wheezing    Cough  This is a chronic problem. The current episode started more than 1 month ago. The problem has been unchanged. The problem occurs constantly. The cough is Productive of sputum. Pertinent negatives include no chest pain, chills, ear pain, fever, headaches, heartburn, hemoptysis, myalgias, nasal congestion, postnasal drip, rash, rhinorrhea, sore throat, weight loss or wheezing. Nothing aggravates the symptoms. He has tried nothing for the symptoms. There is no history of asthma or COPD.       Constitution: Negative for chills and fever.   HENT:  Negative for ear pain, congestion, postnasal drip and sore throat.    Neck: Negative for neck pain.   Cardiovascular:  Positive for sob on exertion. Negative for chest pain.   Respiratory:  Positive for cough and sputum production. Negative for bloody sputum, COPD, wheezing and asthma.    Gastrointestinal:  Negative for abdominal pain, nausea, vomiting, " diarrhea and heartburn.   Genitourinary:  Negative for dysuria.   Musculoskeletal:  Negative for muscle ache.   Skin:  Negative for rash.   Allergic/Immunologic: Negative for asthma and sneezing.   Neurological:  Negative for dizziness and headaches.      Objective:     Physical Exam   Constitutional: He is oriented to person, place, and time. He appears well-developed.   HENT:   Head: Normocephalic and atraumatic.   Ears:   Right Ear: External ear normal.   Left Ear: External ear normal.   Nose: Nose normal.   Mouth/Throat: Oropharynx is clear and moist.   Eyes: Conjunctivae, EOM and lids are normal. Pupils are equal, round, and reactive to light.   Neck: Trachea normal and phonation normal. Neck supple.   Cardiovascular: Normal rate, regular rhythm, normal heart sounds and normal pulses.   Pulmonary/Chest: Effort normal. No respiratory distress. He has rhonchi. He has rales in the left lower field.   Abdominal: He exhibits no distension. Soft. There is no abdominal tenderness.   Musculoskeletal: Normal range of motion.         General: Normal range of motion.      Right lower leg: No edema.      Left lower leg: No edema.   Neurological: He is alert and oriented to person, place, and time.   Skin: Skin is warm, dry and intact. Capillary refill takes less than 2 seconds.   Psychiatric: His speech is normal and behavior is normal. Judgment and thought content normal.   Nursing note and vitals reviewed.      Assessment:     1. Chronic cough    2. Pneumonia due to infectious organism, unspecified laterality, unspecified part of lung        Plan:     Chronic cough  -     Cancel: SARS Coronavirus 2 Antigen, POCT Manual Read  -     XR CHEST PA AND LATERAL; Future; Expected date: 01/04/2024    Pneumonia due to infectious organism, unspecified laterality, unspecified part of lung  -     amoxicillin-clavulanate 875-125mg (AUGMENTIN) 875-125 mg per tablet; Take 1 tablet by mouth 2 (two) times daily. for 7 days  Dispense: 14  tablet; Refill: 0  -     azithromycin (Z-ASHLEY) 250 MG tablet; Take 2 tablets by mouth on day 1; Take 1 tablet by mouth on days 2-5  Dispense: 6 tablet; Refill: 0      Patient is an 83-year-old male with history of hypertension, T2DM, NSTEMI, stage IIIA CKD, prostate cancer, DVT, Archer's esophagus presenting for coughing for at least the past 2-3 months.  Mildly bradycardic at 59.  Other vital signs are within normal limits.  On exam, patient is nontoxic and afebrile.  No respiratory distress.  There is scattered rhonchi as well as rales left lower lung field.  RRR.  No lower leg edema or pain.  Soft nontender abdomen.  Differential diagnosis includes but not limited to bronchitis, coughing secondary to GERD, COPD, pneumonia; considered but less likely ACS, PE.  Will treat for pneumonia, rales on exam.  X-rays unavailable today clinic.  Will place order and have patient return tomorrow for chest x-ray. ED precautions discussed.  Follow up with PCP.  Patient verbalized understanding agrees with plan.       Additional MDM:     Heart Failure Score:   COPD = No          Patient Instructions   - Take antibiotics as prescribed     - Follow up with your PCP or specialty clinic as directed in the next 1-2 weeks if not improved or as needed.  You can call [phone] to schedule an appointment with the appropriate provider.    - Go to the ER or seek medical attention immediately if you develop new or worsening symptoms.    - You must understand that you have received an Urgent Care treatment only and that you may be released before all of your medical problems are known or treated.   - You, the patient, will arrange for follow up care as instructed.   - If your condition worsens or fails to improve we recommend that you receive another evaluation at the ER immediately or contact your PCP to discuss your concerns or return here.

## 2024-01-05 NOTE — PATIENT INSTRUCTIONS
- Take antibiotics as prescribed     - Follow up with your PCP or specialty clinic as directed in the next 1-2 weeks if not improved or as needed.  You can call [phone] to schedule an appointment with the appropriate provider.    - Go to the ER or seek medical attention immediately if you develop new or worsening symptoms.    - You must understand that you have received an Urgent Care treatment only and that you may be released before all of your medical problems are known or treated.   - You, the patient, will arrange for follow up care as instructed.   - If your condition worsens or fails to improve we recommend that you receive another evaluation at the ER immediately or contact your PCP to discuss your concerns or return here.

## 2024-02-10 RX ORDER — LOSARTAN POTASSIUM AND HYDROCHLOROTHIAZIDE 25; 100 MG/1; MG/1
1 TABLET ORAL
Qty: 90 TABLET | Refills: 0 | Status: SHIPPED | OUTPATIENT
Start: 2024-02-10 | End: 2024-04-30 | Stop reason: SDUPTHER

## 2024-02-23 NOTE — TELEPHONE ENCOUNTER
GRETEL RN Note: pt continues to sleep comfortably, resps reg/unlabored, moving freely during sleep, pending [facility] pre-adolescent teams re-evaluation of case and recommendation regarding admission, mother to return this morning to accompany pt on ambulance or sign discharge papers. Pt endorsed to day tour for continued enhanced supervision.  Breakfast tray ordered. Please see the attached refill request.

## 2024-04-23 ENCOUNTER — LAB VISIT (OUTPATIENT)
Dept: LAB | Facility: HOSPITAL | Age: 84
End: 2024-04-23
Attending: INTERNAL MEDICINE
Payer: MEDICARE

## 2024-04-23 DIAGNOSIS — E11.9 TYPE 2 DIABETES MELLITUS WITHOUT COMPLICATION, WITHOUT LONG-TERM CURRENT USE OF INSULIN: ICD-10-CM

## 2024-04-23 LAB
ALBUMIN SERPL BCP-MCNC: 3.9 G/DL (ref 3.5–5.2)
ALP SERPL-CCNC: 57 U/L (ref 55–135)
ALT SERPL W/O P-5'-P-CCNC: 13 U/L (ref 10–44)
ANION GAP SERPL CALC-SCNC: 9 MMOL/L (ref 8–16)
AST SERPL-CCNC: 20 U/L (ref 10–40)
BILIRUB SERPL-MCNC: 0.7 MG/DL (ref 0.1–1)
BUN SERPL-MCNC: 25 MG/DL (ref 8–23)
CALCIUM SERPL-MCNC: 9.8 MG/DL (ref 8.7–10.5)
CHLORIDE SERPL-SCNC: 100 MMOL/L (ref 95–110)
CHOLEST SERPL-MCNC: 245 MG/DL (ref 120–199)
CHOLEST/HDLC SERPL: 5 {RATIO} (ref 2–5)
CO2 SERPL-SCNC: 31 MMOL/L (ref 23–29)
CREAT SERPL-MCNC: 1.3 MG/DL (ref 0.5–1.4)
ERYTHROCYTE [DISTWIDTH] IN BLOOD BY AUTOMATED COUNT: 13.3 % (ref 11.5–14.5)
EST. GFR  (NO RACE VARIABLE): 55.2 ML/MIN/1.73 M^2
ESTIMATED AVG GLUCOSE: 128 MG/DL (ref 68–131)
GLUCOSE SERPL-MCNC: 123 MG/DL (ref 70–110)
HBA1C MFR BLD: 6.1 % (ref 4–5.6)
HCT VFR BLD AUTO: 45.4 % (ref 40–54)
HDLC SERPL-MCNC: 49 MG/DL (ref 40–75)
HDLC SERPL: 20 % (ref 20–50)
HGB BLD-MCNC: 15 G/DL (ref 14–18)
LDLC SERPL CALC-MCNC: 160 MG/DL (ref 63–159)
MCH RBC QN AUTO: 31.4 PG (ref 27–31)
MCHC RBC AUTO-ENTMCNC: 33 G/DL (ref 32–36)
MCV RBC AUTO: 95 FL (ref 82–98)
NONHDLC SERPL-MCNC: 196 MG/DL
PLATELET # BLD AUTO: 202 K/UL (ref 150–450)
PMV BLD AUTO: 10.9 FL (ref 9.2–12.9)
POTASSIUM SERPL-SCNC: 3.6 MMOL/L (ref 3.5–5.1)
PROT SERPL-MCNC: 7.1 G/DL (ref 6–8.4)
RBC # BLD AUTO: 4.78 M/UL (ref 4.6–6.2)
SODIUM SERPL-SCNC: 140 MMOL/L (ref 136–145)
TRIGL SERPL-MCNC: 180 MG/DL (ref 30–150)
WBC # BLD AUTO: 7.69 K/UL (ref 3.9–12.7)

## 2024-04-23 PROCEDURE — 80061 LIPID PANEL: CPT | Performed by: INTERNAL MEDICINE

## 2024-04-23 PROCEDURE — 80053 COMPREHEN METABOLIC PANEL: CPT | Performed by: INTERNAL MEDICINE

## 2024-04-23 PROCEDURE — 85027 COMPLETE CBC AUTOMATED: CPT | Performed by: INTERNAL MEDICINE

## 2024-04-23 PROCEDURE — 36415 COLL VENOUS BLD VENIPUNCTURE: CPT | Mod: PO | Performed by: INTERNAL MEDICINE

## 2024-04-23 PROCEDURE — 83036 HEMOGLOBIN GLYCOSYLATED A1C: CPT | Performed by: INTERNAL MEDICINE

## 2024-04-29 NOTE — PROGRESS NOTES
This note was created by combination of typed  and M-Modal dictation.  Transcription errors may be present.  If there are any questions, please contact me.    Assessment and Plan:   Assessment and Plan   Type 2 diabetes mellitus with diabetic cataract, without long-term current use of insulin  Type 2 diabetes mellitus without complication, without long-term current use of insulin  -pre visit labs stable.  Despite some dietary indiscretions.  On metformin no changes  -     Comprehensive Metabolic Panel; Future; Expected date: 10/26/2024  -     Lipid Panel; Future; Expected date: 10/26/2024  -     Hemoglobin A1C; Future; Expected date: 10/26/2024  -     Microalbumin/Creatinine Ratio, Urine; Future; Expected date: 10/26/2024  -     CBC Auto Differential; Future; Expected date: 10/30/2024    Essential hypertension  Stage 3a chronic kidney disease  -blood pressure stable on losartan HCT, metoprolol  -     losartan-hydrochlorothiazide 100-25 mg (HYZAAR) 100-25 mg per tablet; Take 1 tablet by mouth once daily.  Dispense: 90 tablet; Refill: 3  -     metoprolol succinate (TOPROL-XL) 25 MG 24 hr tablet; Take 0.5 tablets (12.5 mg total) by mouth once daily.  Dispense: 90 tablet; Refill: 3  -     CBC Auto Differential; Future; Expected date: 10/30/2024    Throat pain in adult  Coronary artery disease of native artery of native heart with stable angina pectoris  Abdominal aortic atherosclerosis  -initially he was concerned about throat pain and irritation that seemed to occur with episodes of reflux but on further discussion it sounds like he gets episodes of shortness a breath that can last for several days.  Possibly triggered by GERD?  But he has a known history of coronary artery disease.  Had stress test done 03/2020 3-for ischemia however symptoms seem to have really escalated after that.  Lost to follow up with Cardiology, our staff to assist him in arranging  Has been intolerant of number of different statins  as well as Zetia.  Remains wary of medications such as Repatha  -     aspirin (ECOTRIN) 81 MG EC tablet; Take 1 tablet (81 mg total) by mouth once daily.  -     metoprolol succinate (TOPROL-XL) 25 MG 24 hr tablet; Take 0.5 tablets (12.5 mg total) by mouth once daily.  Dispense: 90 tablet; Refill: 3    Current mild episode of major depressive disorder without prior episode  Anxiety  -needs refill on alprazolam for p.r.n. use.  Last refill was in February and prior to that was mid December.  -     ALPRAZolam (XANAX) 1 MG tablet; Take 1 tablet (1 mg total) by mouth nightly as needed for Anxiety.  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  Encouraged to take twice daily.  Hopefully this may improve his throat irritation sensation    Medications Discontinued During This Encounter   Medication Reason    aspirin (ECOTRIN) 81 MG EC tablet Reorder    metoprolol succinate (TOPROL-XL) 25 MG 24 hr tablet Reorder    ALPRAZolam (XANAX) 1 MG tablet Reorder    losartan-hydrochlorothiazide 100-25 mg (HYZAAR) 100-25 mg per tablet Reorder       meds sent this encounter:     Medications Ordered This Encounter   Medications    ALPRAZolam (XANAX) 1 MG tablet     Sig: Take 1 tablet (1 mg total) by mouth nightly as needed for Anxiety.     Dispense:  30 tablet     Refill:  2    aspirin (ECOTRIN) 81 MG EC tablet     Sig: Take 1 tablet (81 mg total) by mouth once daily.    losartan-hydrochlorothiazide 100-25 mg (HYZAAR) 100-25 mg per tablet     Sig: Take 1 tablet by mouth once daily.     Dispense:  90 tablet     Refill:  3     .    metoprolol succinate (TOPROL-XL) 25 MG 24 hr tablet     Sig: Take 0.5 tablets (12.5 mg total) by mouth once daily.     Dispense:  90 tablet     Refill:  3     .        Follow Up:  Follow-up 6 months with labs  Future  Appointments   Date Time Provider Department Center   2024  8:45 AM [facility], [facility]   2024 10:00 AM Nathan Odonnell MD [facility] URO [facility]   2024  9:40 AM Milly Cadena MD [facility] CARDIO [facility]   10/30/2024  7:00 AM LAB, [facility]   10/30/2024  7:15 AM SPECIMEN, [facility]   2024 10:40 AM Samir Boo MD [facility]            Subjective:   Subjective   Chief Complaint   Patient presents with    Follow-up       HPI  Eric is a 83 y.o. male.     Social History     Tobacco Use    Smoking status: Former     Current packs/day: 0.00     Average packs/day: 2.0 packs/day for 23.0 years (46.0 ttl pk-yrs)     Types: Cigarettes     Start date: 1966     Quit date: 1989     Years since quittin.3    Smokeless tobacco: Never   Substance Use Topics    Alcohol use: Yes     Alcohol/week: 1.0 standard drink of alcohol     Types: 1 Cans of beer per week     Comment: occassional          Social History     Social History Narrative    Not on file       Last appointment with this clinic was Visit date not found. Last visit with me 10/24/2023   To summarize last visit and events leading up to today:  Diabetes on metformin  Hypertension, CKD stage IIIA stable   Coronary artery disease status post CABG.  Intolerant of statins and intolerant of Zetia.  Considering Repatha.  Followed by Cardiology  Aortic atherosclerosis with possible ulcerated plaque.  seen by vascular. No vascular surgical intervention indicated.  2023 TTE LV normal size with mild concentric hypertrophy and low normal systolic function LVEF 50%.  Mild aortic valve stenosis.  2023 nuclear stress test negative for ischemia.  2023 48 hour Holter monitor PVCs and PACs  Cardiology adjusted losartan HCT and subsequently added metoprolol  Archer's esophagus, plan is PPI BID, repeat EGD  Constipation, GoLYTELY.  If constipation persists and if abdominal pain  persists, check CT abdomen pelvis.  History of colonoscopy in 2018 with TA.  Consider repeat   Anxiety.  Trial of alternative to Xanax.  Trial of BuSpar.  Typically does not take the Xanax nightly anyway.  Plan was BuSpar scheduled nightly for a month and if effective can reduced p.r.n.    History of prostate cancer status post XRT.  Lost to follow up with Urology.  Hypogonadism had not been taking testosterone for the past couple of years.   Subsequently seen by Urology, restart testosterone replacement, close PSA monitoring.    Last visit with me in October was taking BuSpar but still was taking Xanax, took it for several days when he was low mood or anxiety and then skips it for several days.  He is aware of the high risk nature of the benzodiazepine.  His daughter has a history of benzodiazepine dependence.  He was resistant to the idea of Repatha.  Recommended follow up with Cardiology    11/7/23 EGD Archer's stage C10-M11, biopsied. 4 cm HH path metaplasia;  repeat 3-5 years for Archer's    Had appointment with Cardiology scheduled for September, canceled    Pre visit labs  CBC normal   CMP creatinine 1.3 from 1.2, CKD stage IIIA  Lipid profile high  A1c 6.1 from 6.1  Microalbumin normal    Needs follow-up with Cardiology    Today's visit:    Diet overall ok  Needs more physical activity by self assessment  Last RF xanax 2/9/24    Remote history of simvastatin with SE of difficult recovery  Has tried a number of different statins with side effects.    Diet more indiscretions  A1c remaining stable.     GERD type symptoms  On PPI  Usually attributed to trigger foods  Gets irritation in the throat.  These episodes can last for several days.  He initially wanted to see ENT to make sure it was not a separate throat issue.    But on further discussion it sounds like when he gets these episodes, it makes him feel short of breath.  Hard for him to more the lawn or walk on a treadmill.  He will check his O2 sats and  his O2 sats will be normal.  It has been going on for the past year or so.    Last stress test 3/2023 negative.  But sounds like this started after the stress test.    1/2024 CXR negative.     Taking PPI only once daily.rx'd for BID.  Takes it fasting in the AM currently.     Patient Care Team:  Samir Boo MD as PCP - General (Internal Medicine)  Melanie Orantes MA as Care Coordinator  René Bills MD (Cardiology)  Sheila Braden OD as Consulting Physician (Optometry)  Layne Huff MD as Consulting Physician (Urology)  Loida Evans MD as Consulting Physician (Endocrinology)  Loc Garcia MD as Consulting Physician (Radiation Oncology)  Milly Cadena MD as Consulting Physician (Cardiology)  Nathan Odonnell MD as 1st Call (Urology)    Patient Active Problem List    Diagnosis Date Noted    PVC (premature ventricular contraction) 04/22/2023    Chronic left shoulder pain 07/21/2021    Decreased range of motion of left shoulder 07/21/2021    Shoulder weakness 07/21/2021    Stage 3a chronic kidney disease 01/15/2021    Epiretinal membrane (ERM) of both eyes 09/15/2020    Type 2 diabetes mellitus with diabetic cataract, without long-term current use of insulin 02/14/2020    Current mild episode of major depressive disorder without prior episode 02/14/2020    Abdominal aortic atherosclerosis 12/09/2019 4/2021 CT abd: There is prominent atherosclerotic plaquing of the abdominal aorta with probable eccentric mixed plaque with ulcerated plaque to be considered specifically axial image 68 series 4 with the aorta measuring 2.5 cm in diameter at this level      History of DVT from PICC line right upper extremity 4/2019 anticoag x 3 months then stopped 04/29/2019 4/28/19 RUE US: The right internal jugular vein is patent.  Partial thrombus of the right subclavian vein with floating thrombus.  The right axillary vein is completely thrombosed.  Complete thrombosis of the right basilic  vein.  The cephalic vein is patent.  The proximal brachial vein is occluded, the mid and distal vein demonstrate flow within.    7/3/19 RUE US: Residual nonocclusive thrombus in the right subclavian and basilic veins, significantly improved from the 04/28/2019 exam.  No new thrombus.  12/19/19 RUE US: No DVT in the right upper extremity.  Resolved thrombus in the right subclavian vein and mild residual chronic thrombus in the right superficial basilic vein, improved.      Refractive error 04/09/2019    Intermediate stage nonexudative age-related macular degeneration of both eyes 04/09/2019    Nuclear sclerosis of both eyes 04/09/2019    Status post coronary artery bypass grafting single vessel 03/07/2019    Renal cyst, left, simple 1st seen 2/2019 CT; verified on renal US 11/2020 03/02/2019 2/2019 CT abd/pelvis: Small left renal hypodensity which measures slightly higher than simple fluid density.  This could represent a small cyst or complex cyst.  Future nonemergent ultrasound follow-up could be obtained to ensure cystic nature of this lesion.  11/16/20 renal US Simple left renal cyst.      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; 3/2019 [facility] patent graft; Culprit likely in small Diag2 (unrevascularized) and likely demand related 03/01/2019     3/2019 [facility]  LM: dist 50%  LAD: mid 99%, competitive flow from LIMA-LAD-OM-RPDA              Diag2 prox 90%, T2 flow (not bypassed)  LCx: prox , territory supplied by LIMA-LAD-OM-RPDA  RCA: mid , territory supplied by LIMA-LAD-OM-RPDA     LIMA-LAD-OM-RPDA patent   No SVG identified     Impression:  NSTEMI  3V CAD, normal LV fxn  Patent LIMA-LAD-OM-RPDA  Culprit likely in small Diag2 (unrevascularized) and likely demand related     Plan:  Cont med  rx  ASA 81mg qd indefinitely  Plavix 75mg qd for 1 year (thru 3/2020)    03/01/2023 TTE LV normal size with mild concentric hypertrophy and low normal systolic function LVEF 50%.  Mild aortic valve stenosis.  03/01/2023 nuclear stress test negative for ischemia.  03/01/2023 48 hour Holter monitor PVCs and PACs      Prostate cancer 2/25/19 TRUS of prostate and gold fiducial marker placement; 4/2019 s/p XRT 01/29/2019 1/7/19 biopsy showed intermediate risk [place] 3+4 PCa in 3/12 cores. Tanisha 4 up to 30% of one core.   4/2019- Dr. Gurjit Garcia treated patient with External beam, radiation therapy- [facility]      Tubular adenoma of colon 2/2018 3 adenomas repeat 3 years 01/11/2019    Hypogonadism in male 01/11/2019    Slow urinary stream 12/07/2018    Insomnia 08/10/2014    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 2/2018; 11/7/23 EGD Archer's stage C10-M11, biopsied. 4 cm HH path metaplasia;  repeat 3-5 years for Archer's      10/8/2020 EGD Esophageal mucosal changes suggestive of long-segment Archer's esophagus, classified as Archer's stage C10-M12 per [place] criteria. WATS-3D brush biopsy specimens obtained  11/7/23 EGD Archer's stage C10-M11, biopsied. 4 cm HH path metaplasia;  repeat 3-5 years for Archer's      Anxiety     Depression     Essential hypertension 11/14/2012 03/01/2023 TTE LV normal size with mild concentric hypertrophy and low normal systolic function LVEF 50%.  Mild aortic valve stenosis.  03/01/2023 48 hour Holter monitor PVCs and PACs      Type 2 diabetes mellitus without complication, without long-term current use of insulin 11/14/2012       PAST MEDICAL PROBLEMS, PAST SURGICAL HISTORY: please see relevant portions of the electronic medical record    ALLERGIES AND MEDICATIONS: updated and reviewed.  Medication List with Changes/Refills   Current Medications    ALPRAZOLAM (XANAX) 1 MG TABLET    Take 1 tablet (1 mg total) by mouth nightly as needed for Anxiety.    ASPIRIN (ECOTRIN)  81 MG EC TABLET    Take 1 tablet (81 mg total) by mouth once daily.    BLOOD GLUCOSE CONTROL, LOW (TRUE METRIX LEVEL 1) SOLN    To use with blood glucose meter    BLOOD-GLUCOSE METER (TRUE METRIX GLUCOSE METER) KIT    For twice daily checking    IMIQUIMOD (ALDARA) 5 % CREAM    APPLY TOPICALLY 3 (THREE) TIMES A WEEK.    LANCETS MISC    To check BG 3 times daily, to use with insurance preferred meter    LOSARTAN-HYDROCHLOROTHIAZIDE 100-25 MG (HYZAAR) 100-25 MG PER TABLET    TAKE 1 TABLET EVERY DAY    METFORMIN (GLUCOPHAGE) 500 MG TABLET    TAKE 1 TABLET EVERY DAY WITH BREAKFAST    METOPROLOL SUCCINATE (TOPROL-XL) 25 MG 24 HR TABLET    Take 0.5 tablets (12.5 mg total) by mouth once daily.    OMEPRAZOLE (PRILOSEC) 40 MG CAPSULE    Take 1 capsule (40 mg total) by mouth 2 (two) times daily before meals.    SILDENAFIL (VIAGRA) 25 MG TABLET    Take 1 tablet (25 mg total) by mouth daily as needed for Erectile Dysfunction.    TESTOSTERONE (ANDROGEL) 20.25 MG/1.25 GRAM (1.62 %) GLPM    Place 1.25 g onto the skin once daily.    TRUE METRIX GLUCOSE TEST STRIP STRP    CHECK TWO TIMES DAILY    VIT C/VIT E AC/LUT/COPPER/ZINC (PRESERVISION LUTEIN ORAL)    Take 1 tablet by mouth once daily.    VITAMIN D 1000 UNITS TAB    Take 1,000 Units by mouth once daily.         Objective:   Objective   Physical Exam   Vitals:    04/30/24 1038   BP: 122/60   Pulse: 73   Temp: 97.9 °F (36.6 °C)   TempSrc: Oral   SpO2: 95%   Weight: 75.6 kg (166 lb 10.7 oz)    Body mass index is 26.1 kg/m².  Weight: 75.6 kg (166 lb 10.7 oz)         Physical Exam  Constitutional:       General: He is not in acute distress.     Appearance: He is well-developed.   Eyes:      Extraocular Movements: Extraocular movements intact.   Cardiovascular:      Rate and Rhythm: Normal rate and regular rhythm.      Heart sounds: Normal heart sounds. No murmur heard.  Pulmonary:      Effort: Pulmonary effort is normal.      Breath sounds: Normal breath sounds.   Musculoskeletal:          General: Normal range of motion.      Right lower leg: No edema.      Left lower leg: No edema.   Skin:     General: Skin is warm and dry.   Neurological:      Mental Status: He is alert and oriented to person, place, and time.      Coordination: Coordination normal.   Psychiatric:         Behavior: Behavior normal.         Thought Content: Thought content normal.         Component      Latest Ref Rng 10/16/2023 4/23/2024   Sodium      136 - 145 mmol/L 142  140    Potassium      3.5 - 5.1 mmol/L 4.3  3.6    Chloride      95 - 110 mmol/L 101  100    CO2      23 - 29 mmol/L 29  31 (H)    Glucose      70 - 110 mg/dL 103  123 (H)    BUN      8 - 23 mg/dL 16  25 (H)    Creatinine      0.5 - 1.4 mg/dL 1.2  1.3    Calcium      8.7 - 10.5 mg/dL 9.9  9.8    PROTEIN TOTAL      6.0 - 8.4 g/dL 7.6  7.1    Albumin      3.5 - 5.2 g/dL 4.0  3.9    BILIRUBIN TOTAL      0.1 - 1.0 mg/dL 0.5  0.7    ALP      55 - 135 U/L 78  57    AST      10 - 40 U/L 19  20    ALT      10 - 44 U/L 19  13    eGFR      >60 mL/min/1.73 m^2 >60.0  55.2 !    Anion Gap      8 - 16 mmol/L 12  9    WBC      3.90 - 12.70 K/uL 6.37  7.69    RBC      4.60 - 6.20 M/uL 4.98  4.78    Hemoglobin      14.0 - 18.0 g/dL 15.3  15.0    Hematocrit      40.0 - 54.0 % 47.2  45.4    MCV      82 - 98 fL 95  95    MCH      27.0 - 31.0 pg 30.7  31.4 (H)    MCHC      32.0 - 36.0 g/dL 32.4  33.0    RDW      11.5 - 14.5 % 13.4  13.3    Platelet Count      150 - 450 K/uL 219  202    MPV      9.2 - 12.9 fL 10.5  10.9    Cholesterol Total      120 - 199 mg/dL 281 (H)  245 (H)    Triglycerides      30 - 150 mg/dL 522 (H)  180 (H)    HDL      40 - 75 mg/dL 37 (L)  49    LDL Cholesterol      63.0 - 159.0 mg/dL Invalid, Trig>400.0  160.0 (H)    HDL/Cholesterol Ratio      20.0 - 50.0 % 13.2 (L)  20.0    Total Cholesterol/HDL Ratio      2.0 - 5.0  7.6 (H)  5.0    Non-HDL Cholesterol      mg/dL 244  196    Urine Microalbumin      ug/mL 6.0  15.0    Urine Creatinine      23.0 - 375.0  mg/dL 58.0  144.0    MICROALB/CREAT RATIO      0.0 - 30.0 ug/mg 10.3  10.4    Hemoglobin A1C External      4.0 - 5.6 % 6.1 (H)  6.1 (H)    Estimated Avg Glucose      68 - 131 mg/dL 128  128    PSA Diagnostic      0.00 - 4.00 ng/mL 0.47        Legend:  (H) High  (L) Low  ! Abnormal

## 2024-04-30 ENCOUNTER — OFFICE VISIT (OUTPATIENT)
Dept: FAMILY MEDICINE | Facility: CLINIC | Age: 84
End: 2024-04-30
Payer: MEDICARE

## 2024-04-30 VITALS
HEART RATE: 73 BPM | DIASTOLIC BLOOD PRESSURE: 60 MMHG | WEIGHT: 166.69 LBS | TEMPERATURE: 98 F | BODY MASS INDEX: 26.1 KG/M2 | SYSTOLIC BLOOD PRESSURE: 122 MMHG | OXYGEN SATURATION: 95 %

## 2024-04-30 DIAGNOSIS — R07.0 THROAT PAIN IN ADULT: ICD-10-CM

## 2024-04-30 DIAGNOSIS — E11.9 TYPE 2 DIABETES MELLITUS WITHOUT COMPLICATION, WITHOUT LONG-TERM CURRENT USE OF INSULIN: ICD-10-CM

## 2024-04-30 DIAGNOSIS — F41.9 ANXIETY: ICD-10-CM

## 2024-04-30 DIAGNOSIS — I10 ESSENTIAL HYPERTENSION: ICD-10-CM

## 2024-04-30 DIAGNOSIS — I25.118 CORONARY ARTERY DISEASE OF NATIVE ARTERY OF NATIVE HEART WITH STABLE ANGINA PECTORIS: ICD-10-CM

## 2024-04-30 DIAGNOSIS — I70.0 ABDOMINAL AORTIC ATHEROSCLEROSIS: ICD-10-CM

## 2024-04-30 DIAGNOSIS — E11.36 TYPE 2 DIABETES MELLITUS WITH DIABETIC CATARACT, WITHOUT LONG-TERM CURRENT USE OF INSULIN: Primary | ICD-10-CM

## 2024-04-30 DIAGNOSIS — C61 PROSTATE CANCER: ICD-10-CM

## 2024-04-30 DIAGNOSIS — F32.0 CURRENT MILD EPISODE OF MAJOR DEPRESSIVE DISORDER WITHOUT PRIOR EPISODE: ICD-10-CM

## 2024-04-30 DIAGNOSIS — K22.70 BARRETT'S ESOPHAGUS WITHOUT DYSPLASIA: ICD-10-CM

## 2024-04-30 DIAGNOSIS — N18.31 STAGE 3A CHRONIC KIDNEY DISEASE: ICD-10-CM

## 2024-04-30 PROCEDURE — 1101F PT FALLS ASSESS-DOCD LE1/YR: CPT | Mod: CPTII,S$GLB,, | Performed by: INTERNAL MEDICINE

## 2024-04-30 PROCEDURE — 3074F SYST BP LT 130 MM HG: CPT | Mod: CPTII,S$GLB,, | Performed by: INTERNAL MEDICINE

## 2024-04-30 PROCEDURE — 99999 PR PBB SHADOW E&M-EST. PATIENT-LVL IV: CPT | Mod: PBBFAC,,, | Performed by: INTERNAL MEDICINE

## 2024-04-30 PROCEDURE — 1126F AMNT PAIN NOTED NONE PRSNT: CPT | Mod: CPTII,S$GLB,, | Performed by: INTERNAL MEDICINE

## 2024-04-30 PROCEDURE — 99214 OFFICE O/P EST MOD 30 MIN: CPT | Mod: S$GLB,,, | Performed by: INTERNAL MEDICINE

## 2024-04-30 PROCEDURE — 1160F RVW MEDS BY RX/DR IN RCRD: CPT | Mod: CPTII,S$GLB,, | Performed by: INTERNAL MEDICINE

## 2024-04-30 PROCEDURE — 3288F FALL RISK ASSESSMENT DOCD: CPT | Mod: CPTII,S$GLB,, | Performed by: INTERNAL MEDICINE

## 2024-04-30 PROCEDURE — 3072F LOW RISK FOR RETINOPATHY: CPT | Mod: CPTII,S$GLB,, | Performed by: INTERNAL MEDICINE

## 2024-04-30 PROCEDURE — 3078F DIAST BP <80 MM HG: CPT | Mod: CPTII,S$GLB,, | Performed by: INTERNAL MEDICINE

## 2024-04-30 PROCEDURE — 1159F MED LIST DOCD IN RCRD: CPT | Mod: CPTII,S$GLB,, | Performed by: INTERNAL MEDICINE

## 2024-04-30 RX ORDER — ASPIRIN 81 MG/1
81 TABLET ORAL DAILY
COMMUNITY
Start: 2024-04-30

## 2024-04-30 RX ORDER — METOPROLOL SUCCINATE 25 MG/1
12.5 TABLET, EXTENDED RELEASE ORAL DAILY
Qty: 90 TABLET | Refills: 3 | Status: SHIPPED | OUTPATIENT
Start: 2024-04-30

## 2024-04-30 RX ORDER — ALPRAZOLAM 1 MG/1
1 TABLET ORAL NIGHTLY PRN
Qty: 30 TABLET | Refills: 2 | Status: SHIPPED | OUTPATIENT
Start: 2024-04-30

## 2024-04-30 RX ORDER — LOSARTAN POTASSIUM AND HYDROCHLOROTHIAZIDE 25; 100 MG/1; MG/1
1 TABLET ORAL DAILY
Qty: 90 TABLET | Refills: 3 | Status: SHIPPED | OUTPATIENT
Start: 2024-04-30

## 2024-05-07 ENCOUNTER — LAB VISIT (OUTPATIENT)
Dept: LAB | Facility: HOSPITAL | Age: 84
End: 2024-05-07
Payer: MEDICARE

## 2024-05-07 DIAGNOSIS — Z85.46 HISTORY OF PROSTATE CANCER: ICD-10-CM

## 2024-05-07 DIAGNOSIS — E29.1 HYPOGONADISM IN MALE: ICD-10-CM

## 2024-05-07 LAB — COMPLEXED PSA SERPL-MCNC: 0.5 NG/ML (ref 0–4)

## 2024-05-07 PROCEDURE — 84153 ASSAY OF PSA TOTAL: CPT | Mod: HCNC | Performed by: STUDENT IN AN ORGANIZED HEALTH CARE EDUCATION/TRAINING PROGRAM

## 2024-05-07 PROCEDURE — 82040 ASSAY OF SERUM ALBUMIN: CPT | Mod: HCNC | Performed by: STUDENT IN AN ORGANIZED HEALTH CARE EDUCATION/TRAINING PROGRAM

## 2024-05-15 LAB
ALBUMIN SERPL-MCNC: 4 G/DL (ref 3.6–5.1)
SHBG SERPL-SCNC: 26 NMOL/L (ref 22–77)
TESTOST FREE SERPL-MCNC: 57.9 PG/ML (ref 6–73)
TESTOST SERPL-MCNC: 357 NG/DL (ref 250–1100)
TESTOSTERONE.FREE+WB SERPL-MCNC: 106.4 NG/DL (ref 15–150)

## 2024-05-17 ENCOUNTER — OFFICE VISIT (OUTPATIENT)
Dept: UROLOGY | Facility: CLINIC | Age: 84
End: 2024-05-17
Payer: MEDICARE

## 2024-05-17 VITALS — WEIGHT: 165.44 LBS | BODY MASS INDEX: 25.91 KG/M2

## 2024-05-17 DIAGNOSIS — Z85.46 HISTORY OF PROSTATE CANCER: ICD-10-CM

## 2024-05-17 DIAGNOSIS — E29.1 HYPOGONADISM IN MALE: ICD-10-CM

## 2024-05-17 PROCEDURE — 1101F PT FALLS ASSESS-DOCD LE1/YR: CPT | Mod: HCNC,CPTII,S$GLB, | Performed by: STUDENT IN AN ORGANIZED HEALTH CARE EDUCATION/TRAINING PROGRAM

## 2024-05-17 PROCEDURE — 99999 PR PBB SHADOW E&M-EST. PATIENT-LVL III: CPT | Mod: PBBFAC,HCNC,, | Performed by: STUDENT IN AN ORGANIZED HEALTH CARE EDUCATION/TRAINING PROGRAM

## 2024-05-17 PROCEDURE — 3072F LOW RISK FOR RETINOPATHY: CPT | Mod: HCNC,CPTII,S$GLB, | Performed by: STUDENT IN AN ORGANIZED HEALTH CARE EDUCATION/TRAINING PROGRAM

## 2024-05-17 PROCEDURE — 3288F FALL RISK ASSESSMENT DOCD: CPT | Mod: HCNC,CPTII,S$GLB, | Performed by: STUDENT IN AN ORGANIZED HEALTH CARE EDUCATION/TRAINING PROGRAM

## 2024-05-17 PROCEDURE — 1159F MED LIST DOCD IN RCRD: CPT | Mod: HCNC,CPTII,S$GLB, | Performed by: STUDENT IN AN ORGANIZED HEALTH CARE EDUCATION/TRAINING PROGRAM

## 2024-05-17 PROCEDURE — 99213 OFFICE O/P EST LOW 20 MIN: CPT | Mod: HCNC,S$GLB,, | Performed by: STUDENT IN AN ORGANIZED HEALTH CARE EDUCATION/TRAINING PROGRAM

## 2024-05-17 PROCEDURE — 1126F AMNT PAIN NOTED NONE PRSNT: CPT | Mod: HCNC,CPTII,S$GLB, | Performed by: STUDENT IN AN ORGANIZED HEALTH CARE EDUCATION/TRAINING PROGRAM

## 2024-05-17 PROCEDURE — 1160F RVW MEDS BY RX/DR IN RCRD: CPT | Mod: HCNC,CPTII,S$GLB, | Performed by: STUDENT IN AN ORGANIZED HEALTH CARE EDUCATION/TRAINING PROGRAM

## 2024-05-17 RX ORDER — TESTOSTERONE 20.25 MG/1.25G
1.25 GEL TOPICAL DAILY
Qty: 75 G | Refills: 3 | Status: SHIPPED | OUTPATIENT
Start: 2024-05-17 | End: 2024-11-13

## 2024-05-17 NOTE — PROGRESS NOTES
Patient ID: Eric Jackson is a 83 y.o. male.    Chief Complaint: Low T follow up     Referral: No referring provider defined for this encounter.     HPI  83 y.o. who presents to the Urology clinic for evaluation of low T. Has hx of prostate cancer. He notes his symptoms resolve when he is on testosterone gel. Patient denies voiding dysfunction, unexplained weight loss, hematuria.     Medically Necessary ROS documented in HPI    Past Medical History  Active Ambulatory Problems     Diagnosis Date Noted    Essential hypertension 11/14/2012    Type 2 diabetes mellitus without complication, without long-term current use of insulin 11/14/2012    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 2/2018; 11/7/23 EGD Archer's stage C10-M11, biopsied. 4 cm HH path metaplasia;  repeat 3-5 years for Archer's     Anxiety     Depression     Insomnia 08/10/2014    Slow urinary stream 12/07/2018    Tubular adenoma of colon 2/2018 3 adenomas repeat 3 years 01/11/2019    Hypogonadism in male 01/11/2019    Prostate cancer 2/25/19 TRUS of prostate and gold fiducial marker placement; 4/2019 s/p XRT 01/29/2019    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; 3/2019 [facility] patent graft; Culprit likely in small Diag2 (unrevascularized) and likely demand related 03/01/2019    Renal cyst, left, simple 1st seen 2/2019 CT; verified on renal US 11/2020 03/02/2019    Status post coronary artery bypass grafting single vessel 03/07/2019    Refractive error 04/09/2019    Intermediate stage nonexudative age-related macular degeneration of both eyes 04/09/2019    Nuclear sclerosis of both eyes 04/09/2019    History of DVT from PICC line right upper extremity 4/2019 anticoag x 3 months then stopped 04/29/2019    Abdominal aortic  atherosclerosis 12/09/2019    Type 2 diabetes mellitus with diabetic cataract, without long-term current use of insulin 02/14/2020    Current mild episode of major depressive disorder without prior episode 02/14/2020    Epiretinal membrane (ERM) of both eyes 09/15/2020    Stage 3a chronic kidney disease 01/15/2021    Chronic left shoulder pain 07/21/2021    Decreased range of motion of left shoulder 07/21/2021    Shoulder weakness 07/21/2021    PVC (premature ventricular contraction) 04/22/2023     Resolved Ambulatory Problems     Diagnosis Date Noted    Acute constipation 08/25/2012    GI bleed 11/14/2012    Acute post-hemorrhagic anemia 11/14/2012    Chest pain 02/03/2014    Other and unspecified angina pectoris 12/30/2014    Type II or unspecified type diabetes mellitus without mention of complication, uncontrolled 12/30/2014    Symptomatic anemia, microcytic/hypochromic  01/15/2018    Left lower lobe pneumonia 01/15/2018    Hyponatremia 01/15/2018    Transaminitis 01/15/2018    Iron deficiency anemia 02/09/2018    Elevated PSA 12/07/2018    Fever 106 degrees F or over 02/28/2019    Sepsis secondary to UTI 03/01/2019    Infectious encephalopathy 03/01/2019    Acute prostatitis 03/01/2019    Urinary retention 03/02/2019    NSTEMI (non-ST elevated myocardial infarction) 03/04/2019    Cardiomyopathy 02/14/2020     Past Medical History:   Diagnosis Date    Anemia     Angina pectoris     Cancer     Coronary artery disease     Diabetes mellitus type II     Disorder of kidney and ureter     Erectile dysfunction     Eye injuries     GERD (gastroesophageal reflux disease)     Hypertension     Myocardial infarction     Trouble in sleeping     Type 2 diabetes mellitus     Type 2 diabetes mellitus with ophthalmic manifestations          Past Surgical History  Past Surgical History:   Procedure Laterality Date    COLONOSCOPY N/A 2/9/2018    Procedure: COLONOSCOPY;  Surgeon: Mathieu Cao MD;  Location: [facility];   Service: Endoscopy;  Laterality: N/A;    CORONARY ANGIOGRAPHY INCLUDING BYPASS GRAFTS WITH CATHETERIZATION OF LEFT HEART N/A 3/4/2019    Procedure: ANGIOGRAM, CORONARY, INCLUDING BYPASS GRAFT, WITH LEFT HEART CATHETERIZATION;  Surgeon: Jamir Nam MD;  Location: [facility] CATH LAB;  Service: Cardiology;  Laterality: N/A;    CORONARY ARTERY BYPASS GRAFT  2001    ESOPHAGOGASTRODUODENOSCOPY N/A 10/8/2020    Procedure: EGD (ESOPHAGOGASTRODUODENOSCOPY);  Surgeon: Bharath Herrera MD;  Location: [facility] ENDO;  Service: Endoscopy;  Laterality: N/A;    ESOPHAGOGASTRODUODENOSCOPY N/A 11/7/2023    Procedure: EGD (ESOPHAGOGASTRODUODENOSCOPY);  Surgeon: Bharath Herrera MD;  Location: [facility] ENDO;  Service: Endoscopy;  Laterality: N/A;    HERNIA REPAIR      LEFT HEART CATHETERIZATION Left 3/4/2019    Procedure: Left heart cath RFA access, 4fr catheter/sheath, not before 9am;  Surgeon: Jamir Nam MD;  Location: [facility] CATH LAB;  Service: Cardiology;  Laterality: Left;    TRANSRECTAL ULTRASOUND OF PROSTATE WITH INSERTION OF GOLD FIDUCIAL MARKER N/A 2/26/2019    Procedure: ULTRASOUND, PROSTATE, RECTAL APPROACH, WITH GOLD FIDUCIAL MARKER INSERTION;  Surgeon: Layne Huff MD;  Location: [facility] OR;  Service: Urology;  Laterality: N/A;    UPPER GASTROINTESTINAL ENDOSCOPY      WRIST SURGERY         Social History       Medications    Current Outpatient Medications:     ALPRAZolam (XANAX) 1 MG tablet, Take 1 tablet (1 mg total) by mouth nightly as needed for Anxiety., Disp: 30 tablet, Rfl: 2    aspirin (ECOTRIN) 81 MG EC tablet, Take 1 tablet (81 mg total) by mouth once daily., Disp: , Rfl:     blood glucose control, low (TRUE METRIX LEVEL 1) Soln, To use with blood glucose meter, Disp: 1 each, Rfl: 0    blood-glucose meter (TRUE METRIX GLUCOSE METER) kit, For twice daily checking, Disp: 1 each, Rfl: 0    imiquimod (ALDARA) 5 % cream, APPLY TOPICALLY 3 (THREE) TIMES A WEEK., Disp: 24 packet, Rfl: 3     lancets Misc, To check BG 3 times daily, to use with insurance preferred meter, Disp: 300 each, Rfl: 3    losartan-hydrochlorothiazide 100-25 mg (HYZAAR) 100-25 mg per tablet, Take 1 tablet by mouth once daily., Disp: 90 tablet, Rfl: 3    metFORMIN (GLUCOPHAGE) 500 MG tablet, TAKE 1 TABLET EVERY DAY WITH BREAKFAST, Disp: 90 tablet, Rfl: 3    metoprolol succinate (TOPROL-XL) 25 MG 24 hr tablet, Take 0.5 tablets (12.5 mg total) by mouth once daily., Disp: 90 tablet, Rfl: 3    omeprazole (PRILOSEC) 40 MG capsule, Take 1 capsule (40 mg total) by mouth 2 (two) times daily before meals., Disp: 180 capsule, Rfl: 3    sildenafiL (VIAGRA) 25 MG tablet, Take 1 tablet (25 mg total) by mouth daily as needed for Erectile Dysfunction. (Patient not taking: Reported on 4/22/2022), Disp: 10 tablet, Rfl: 0    TRUE METRIX GLUCOSE TEST STRIP Strp, CHECK TWO TIMES DAILY, Disp: 200 strip, Rfl: 0    vit C/vit E ac/lut/copper/zinc (PRESERVISION LUTEIN ORAL), Take 1 tablet by mouth once daily., Disp: , Rfl:     vitamin D 1000 units Tab, Take 1,000 Units by mouth once daily., Disp: , Rfl:     Allergies  Review of patient's allergies indicates:   Allergen Reactions    Tamsulosin     Prochlorperazine      convulsions    Statins-hmg-coa reductase inhibitors Other (See Comments)     Muscle weakness       Patient's PMH, FH, Social hx, Medications, allergies reviewed and updated as pertinent to today's visit    Objective:      Physical Exam        Lab Results   Component Value Date    PSADIAG 0.50 05/07/2024    PSADIAG 0.47 10/16/2023    PSADIAG 0.43 05/22/2023      Assessment:       1. Hypogonadism in male    2. History of prostate cancer        Plan:       For hx of prostate cancer, no increased psa velocity  Continue T given labs are within acceptable lvels  CBC due  Refills provided  RTC 6 months

## 2024-08-27 ENCOUNTER — OFFICE VISIT (OUTPATIENT)
Dept: CARDIOLOGY | Facility: CLINIC | Age: 84
End: 2024-08-27
Payer: MEDICARE

## 2024-08-27 VITALS
RESPIRATION RATE: 18 BRPM | HEART RATE: 77 BPM | DIASTOLIC BLOOD PRESSURE: 80 MMHG | OXYGEN SATURATION: 93 % | BODY MASS INDEX: 25.87 KG/M2 | SYSTOLIC BLOOD PRESSURE: 142 MMHG | WEIGHT: 164.81 LBS | HEIGHT: 67 IN

## 2024-08-27 DIAGNOSIS — E11.9 TYPE 2 DIABETES MELLITUS WITHOUT COMPLICATION, WITHOUT LONG-TERM CURRENT USE OF INSULIN: ICD-10-CM

## 2024-08-27 DIAGNOSIS — E11.36 TYPE 2 DIABETES MELLITUS WITH DIABETIC CATARACT, WITHOUT LONG-TERM CURRENT USE OF INSULIN: ICD-10-CM

## 2024-08-27 DIAGNOSIS — Z86.718 HISTORY OF DVT IN ADULTHOOD: ICD-10-CM

## 2024-08-27 DIAGNOSIS — I25.2 HISTORY OF NON-ST ELEVATION MYOCARDIAL INFARCTION (NSTEMI): ICD-10-CM

## 2024-08-27 DIAGNOSIS — I70.0 ABDOMINAL AORTIC ATHEROSCLEROSIS: ICD-10-CM

## 2024-08-27 DIAGNOSIS — N18.31 STAGE 3A CHRONIC KIDNEY DISEASE: ICD-10-CM

## 2024-08-27 DIAGNOSIS — I25.118 CORONARY ARTERY DISEASE OF NATIVE ARTERY OF NATIVE HEART WITH STABLE ANGINA PECTORIS: ICD-10-CM

## 2024-08-27 DIAGNOSIS — R07.9 CHEST PAIN, UNSPECIFIED TYPE: ICD-10-CM

## 2024-08-27 DIAGNOSIS — I10 ESSENTIAL HYPERTENSION: Primary | ICD-10-CM

## 2024-08-27 PROCEDURE — 93000 ELECTROCARDIOGRAM COMPLETE: CPT | Mod: S$GLB,,, | Performed by: INTERNAL MEDICINE

## 2024-08-27 PROCEDURE — 99999 PR PBB SHADOW E&M-EST. PATIENT-LVL IV: CPT | Mod: PBBFAC,HCNC,, | Performed by: INTERNAL MEDICINE

## 2024-08-27 NOTE — PROGRESS NOTES
CARDIOVASCULAR CONSULTATION    REASON FOR CONSULT:   Eric Jackson is a 83 y.o. male who presents for  evaluation     HISTORY OF PRESENT ILLNESS:       Evaluation patient is a pleasant 82-year-old man.  Past medical history of coronary artery disease status post CABG.  Used to follow with Dr. Soto it, has not seen him since 2019.  Now wants to reestablish care with Cardiology.  Last angiogram had revealed patent bypass grafts.  Last angiogram was done in 2019.  Patient states that for the past few months he has been experiencing chest heaviness.  No particular aggravating or relieving factors.  Also occasional dizziness.  No particular aggravating or relieving factors for the dizziness.  Not on statins and refuses to be on statins, Zetia or Repatha.      2019:      LVEDP: 22mmHg  LVEF: 55% by echo  Wall Motion: normal by echo     Dominance: Right  LM: dist 50%  LAD: mid 99%, competitive flow from LIMA-LAD-OM-RPDA              Diag2 prox 90%, T2 flow (not bypassed)  LCx: prox , territory supplied by LIMA-LAD-OM-RPDA  RCA: mid , territory supplied by LIMA-LAD-OM-RPDA     LIMA-LAD-OM-RPDA patent     No SVG identified     Hemostasis:  RFA     Impression:  NSTEMI  3V CAD, normal LV fxn  Patent LIMA-LAD-OM-RPDA  Culprit likely in small Diag2 (unrevascularized) and likely demand related  Man compression RFA for hemostasis     Plan:  Cont med rx  ASA 81mg qd indefinitely  Plavix 75mg qd for 1 year (thru 3/2020)  BBl/ARB  Statin allergy noted  Consider PCSK9 inhibitor if statin allergy confirmed  Dispo planning appropriate  Pt to follow up with Dr. Seay after discharge           Mar 13 23: doing fine. No ischemia on stress test.  patient states that his symptoms of chest pains went away after we were able to control his blood pressure by increasing his medications on last clinic visit.      Sinus rhythm with heart rates varying between 45 and 124 BPM with an average of 73BPM. Total time in sinus rhythm was  approximately 48 hours.  There were occasional PVCs totalling 768 and averaging 16.59 per hour. There were 10 couplets. There were 3 bigeminal cycles.  There were occasional PACs totalling 866 and averaging 18.7 per hour.        The left ventricle is normal in size with mild concentric hypertrophy and low normal systolic function.  The estimated ejection fraction is 50%.  Normal right ventricular size with low normal right ventricular systolic function.  There is mild aortic valve stenosis.  Aortic valve area is 1.60 cm2; peak velocity is 1.51 m/s; mean gradient is 6 mmHg.  Mild tricuspid regurgitation.  The estimated PA systolic pressure is 30 mmHg.          Normal myocardial perfusion scan. There is no evidence of myocardial ischemia or infarction.    The gated perfusion images showed an ejection fraction of 57% post stress.    There is normal wall motion post stress.    Notes from August 27, 2024: Patient here for follow-up.  When he walks on the treadmill for more than 7 minutes he gets short of breath.  And some chest tightness        PAST MEDICAL HISTORY:     Past Medical History:   Diagnosis Date    Anemia     Angina pectoris     Anxiety     Cancer     Coronary artery disease     Depression     Diabetes mellitus type II     Disorder of kidney and ureter     Erectile dysfunction     Eye injuries     fb ou    GERD (gastroesophageal reflux disease)     Hypertension     Intermediate stage nonexudative age-related macular degeneration of both eyes 4/9/2019    Myocardial infarction     Nuclear sclerosis of both eyes 4/9/2019    Prostate cancer     Trouble in sleeping     Type 2 diabetes mellitus     Type 2 diabetes mellitus with ophthalmic manifestations        PAST SURGICAL HISTORY:     Past Surgical History:   Procedure Laterality Date    COLONOSCOPY N/A 2/9/2018    Procedure: COLONOSCOPY;  Surgeon: Mathieu Cao MD;  Location: [facility];  Service: Endoscopy;  Laterality: N/A;    CORONARY ANGIOGRAPHY  INCLUDING BYPASS GRAFTS WITH CATHETERIZATION OF LEFT HEART N/A 3/4/2019    Procedure: ANGIOGRAM, CORONARY, INCLUDING BYPASS GRAFT, WITH LEFT HEART CATHETERIZATION;  Surgeon: Jamir Nam MD;  Location: [facility] CATH LAB;  Service: Cardiology;  Laterality: N/A;    CORONARY ARTERY BYPASS GRAFT  2001    ESOPHAGOGASTRODUODENOSCOPY N/A 10/8/2020    Procedure: EGD (ESOPHAGOGASTRODUODENOSCOPY);  Surgeon: Bharath Herrera MD;  Location: [facility] ENDO;  Service: Endoscopy;  Laterality: N/A;    ESOPHAGOGASTRODUODENOSCOPY N/A 11/7/2023    Procedure: EGD (ESOPHAGOGASTRODUODENOSCOPY);  Surgeon: Bharath Herrera MD;  Location: [facility] ENDO;  Service: Endoscopy;  Laterality: N/A;    HERNIA REPAIR      LEFT HEART CATHETERIZATION Left 3/4/2019    Procedure: Left heart cath RFA access, 4fr catheter/sheath, not before 9am;  Surgeon: Jamir Nam MD;  Location: [facility] CATH LAB;  Service: Cardiology;  Laterality: Left;    TRANSRECTAL ULTRASOUND OF PROSTATE WITH INSERTION OF GOLD FIDUCIAL MARKER N/A 2/26/2019    Procedure: ULTRASOUND, PROSTATE, RECTAL APPROACH, WITH GOLD FIDUCIAL MARKER INSERTION;  Surgeon: Layne Huff MD;  Location: [facility] OR;  Service: Urology;  Laterality: N/A;    UPPER GASTROINTESTINAL ENDOSCOPY      WRIST SURGERY         ALLERGIES AND MEDICATION:     Review of patient's allergies indicates:   Allergen Reactions    Tamsulosin     Prochlorperazine      convulsions    Statins-hmg-coa reductase inhibitors Other (See Comments)     Muscle weakness        Medication List            Accurate as of August 27, 2024  3:41 PM. If you have any questions, ask your nurse or doctor.                CONTINUE taking these medications      ALPRAZolam 1 MG tablet  Commonly known as: XANAX  Take 1 tablet (1 mg total) by mouth nightly as needed for Anxiety.     aspirin 81 MG EC tablet  Commonly known as: ECOTRIN  Take 1 tablet (81 mg total) by mouth once daily.     blood-glucose meter kit  Commonly known as: TRUE  METRIX GLUCOSE METER  For twice daily checking     imiquimod 5 % cream  Commonly known as: ALDARA  APPLY TOPICALLY 3 (THREE) TIMES A WEEK.     lancets Misc  To check BG 3 times daily, to use with insurance preferred meter     losartan-hydrochlorothiazide 100-25 mg 100-25 mg per tablet  Commonly known as: HYZAAR  Take 1 tablet by mouth once daily.     metFORMIN 500 MG tablet  Commonly known as: GLUCOPHAGE  TAKE 1 TABLET EVERY DAY WITH BREAKFAST     metoprolol succinate 25 MG 24 hr tablet  Commonly known as: TOPROL-XL  Take 0.5 tablets (12.5 mg total) by mouth once daily.     omeprazole 40 MG capsule  Commonly known as: PRILOSEC  Take 1 capsule (40 mg total) by mouth 2 (two) times daily before meals.     PRESERVISION LUTEIN ORAL     testosterone 20.25 mg/1.25 gram (1.62 %) Glpm  Commonly known as: AndroGeL  Place 1.25 g onto the skin once daily.     TRUE METRIX GLUCOSE TEST STRIP Strp  Generic drug: blood sugar diagnostic  CHECK TWO TIMES DAILY     TRUE METRIX LEVEL 1 Soln  Generic drug: blood glucose control, low  To use with blood glucose meter     vitamin D 1000 units Tab  Commonly known as: VITAMIN D3              SOCIAL HISTORY:     Social History     Socioeconomic History    Marital status:    Tobacco Use    Smoking status: Former     Current packs/day: 0.00     Average packs/day: 2.0 packs/day for 23.0 years (46.0 ttl pk-yrs)     Types: Cigarettes     Start date: 1966     Quit date: 1989     Years since quittin.6    Smokeless tobacco: Never   Substance and Sexual Activity    Alcohol use: Yes     Alcohol/week: 1.0 standard drink of alcohol     Types: 1 Cans of beer per week     Comment: occassional    Drug use: No    Sexual activity: Yes     Partners: Female     Social Determinants of Health     Financial Resource Strain: Low Risk  (2023)    Overall Financial Resource Strain (CARDIA)     Difficulty of Paying Living Expenses: Not hard at all   Food Insecurity: Unknown (2023)     Hunger Vital Sign     Ran Out of Food in the Last Year: Never true   Transportation Needs: No Transportation Needs (7/19/2023)    PRAPARE - Transportation     Lack of Transportation (Medical): No     Lack of Transportation (Non-Medical): No   Physical Activity: Insufficiently Active (7/19/2023)    Exercise Vital Sign     Days of Exercise per Week: 3 days     Minutes of Exercise per Session: 40 min   Stress: No Stress Concern Present (7/19/2023)    [employer] - Occupational Stress Questionnaire     Feeling of Stress : Only a little   Housing Stability: Low Risk  (7/19/2023)    Housing Stability Vital Sign     Unable to Pay for Housing in the Last Year: No     Number of Places Lived in the Last Year: 1     Unstable Housing in the Last Year: No       FAMILY HISTORY:     Family History   Adopted: Yes   Problem Relation Name Age of Onset    No Known Problems Mother      No Known Problems Father      No Known Problems Sister      No Known Problems Brother      No Known Problems Maternal Aunt      No Known Problems Maternal Uncle      No Known Problems Paternal Aunt      No Known Problems Paternal Uncle      No Known Problems Maternal Grandmother      No Known Problems Maternal Grandfather      No Known Problems Paternal Grandmother      No Known Problems Paternal Grandfather      No Known Problems Other      Amblyopia Neg Hx      Blindness Neg Hx      Cancer Neg Hx      Cataracts Neg Hx      Diabetes Neg Hx      Glaucoma Neg Hx      Hypertension Neg Hx      Macular degeneration Neg Hx      Retinal detachment Neg Hx      Strabismus Neg Hx      Stroke Neg Hx      Thyroid disease Neg Hx         REVIEW OF SYSTEMS:   Review of Systems   Constitutional: Negative.   HENT: Negative.     Eyes: Negative.    Respiratory: Negative.     Endocrine: Negative.    Hematologic/Lymphatic: Negative.    Skin: Negative.    Musculoskeletal: Negative.    Gastrointestinal: Negative.    Genitourinary: Negative.   "  Neurological: Negative.    Psychiatric/Behavioral: Negative.     Allergic/Immunologic: Negative.        A 10 point review of systems was performed and all the pertinent positives have been mentioned. Rest of review of systems was negative.        PHYSICAL EXAM:     Vitals:    08/27/24 1459   BP: (!) 142/80   Pulse: 77   Resp: 18    Body mass index is 25.81 kg/m².  Weight: 74.7 kg (164 lb 12.7 oz)   Height: 5' 7" (170.2 cm)     Physical Exam  Vitals reviewed.   Constitutional:       Appearance: He is well-developed.   HENT:      Head: Normocephalic.   Eyes:      Conjunctiva/sclera: Conjunctivae normal.      Pupils: Pupils are equal, round, and reactive to light.   Cardiovascular:      Rate and Rhythm: Normal rate and regular rhythm.      Heart sounds: Normal heart sounds.   Pulmonary:      Effort: Pulmonary effort is normal.      Breath sounds: Normal breath sounds.   Abdominal:      General: Bowel sounds are normal.      Palpations: Abdomen is soft.   Musculoskeletal:      Cervical back: Normal range of motion and neck supple.   Skin:     General: Skin is warm.   Neurological:      Mental Status: He is alert and oriented to person, place, and time.           DATA:     Laboratory:  CBC:  Recent Labs   Lab 05/22/23  0750 10/16/23  0655 04/23/24  0750   WBC 6.17 6.37 7.69   Hemoglobin 14.7 15.3 15.0   Hematocrit 45.4 47.2 45.4   Platelets 216 219 202       CHEMISTRIES:  Recent Labs   Lab 10/15/21  1420 04/16/22  0807 10/17/22  0750 04/17/23  0822 10/16/23  0655 04/23/24  0750   Glucose 98 120 H   < > 120 H 103 123 H   Sodium 142 139   < > 143 142 140   Potassium 4.0 4.1   < > 4.1 4.3 3.6   BUN 13 18   < > 17 16 25 H   Creatinine 1.1 1.1   < > 1.2 1.2 1.3   eGFR if  >60.0 >60.0  --   --   --   --    eGFR if non African American >60.0 >60.0  --   --   --   --    Calcium 10.1 9.6   < > 9.7 9.9 9.8    < > = values in this interval not displayed.       CARDIAC BIOMARKERS:        COAGS:    "     LIPIDS/LFTS:  Recent Labs   Lab 04/17/23  0822 10/16/23  0655 04/23/24  0750   Cholesterol 288 H 281 H 245 H   Triglycerides 307 H 522 H 180 H   HDL 47 37 L 49   LDL Cholesterol 179.6 H Invalid, Trig>400.0 160.0 H   Non-HDL Cholesterol 241 244 196   AST 23 19 20   ALT 26 19 13       Hemoglobin A1C   Date Value Ref Range Status   04/23/2024 6.1 (H) 4.0 - 5.6 % Final     Comment:     ADA Screening Guidelines:  5.7-6.4%  Consistent with prediabetes  >or=6.5%  Consistent with diabetes    High levels of fetal hemoglobin interfere with the HbA1C  assay. Heterozygous hemoglobin variants (HbS, HgC, etc)do  not significantly interfere with this assay.   However, presence of multiple variants may affect accuracy.     10/16/2023 6.1 (H) 4.0 - 5.6 % Final     Comment:     ADA Screening Guidelines:  5.7-6.4%  Consistent with prediabetes  >or=6.5%  Consistent with diabetes    High levels of fetal hemoglobin interfere with the HbA1C  assay. Heterozygous hemoglobin variants (HbS, HgC, etc)do  not significantly interfere with this assay.   However, presence of multiple variants may affect accuracy.     04/17/2023 6.2 (H) 4.0 - 5.6 % Final     Comment:     ADA Screening Guidelines:  5.7-6.4%  Consistent with prediabetes  >or=6.5%  Consistent with diabetes    High levels of fetal hemoglobin interfere with the HbA1C  assay. Heterozygous hemoglobin variants (HbS, HgC, etc)do  not significantly interfere with this assay.   However, presence of multiple variants may affect accuracy.         TSH        The ASCVD Risk score ([place] DK, et al., 2019) failed to calculate for the following reasons:    The 2019 ASCVD risk score is only valid for ages 40 to 79             ASSESSMENT AND PLAN     Patient Active Problem List   Diagnosis    Essential hypertension    Type 2 diabetes mellitus without complication, without long-term current use of insulin    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 2/2018; 11/7/23 EGD Archer's stage C10-M11,  biopsied. 4 cm HH path metaplasia;  repeat 3-5 years for Archer's    Anxiety    Depression    Insomnia    Slow urinary stream    Tubular adenoma of colon 2/2018 3 adenomas repeat 3 years    Hypogonadism in male    Prostate cancer 2/25/19 TRUS of prostate and gold fiducial marker placement; 4/2019 s/p XRT    History of Non-STEMI (non-ST elevated myocardial infarction) 3/2019 from sepsis; Culprit likely in small Diag2 (unrevascularized) and likely demand related    Coronary artery disease 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; 3/2019 C patent graft; Culprit likely in small Diag2 (unrevascularized) and likely demand related    Renal cyst, left, simple 1st seen 2/2019 CT; verified on renal US 11/2020    Status post coronary artery bypass grafting single vessel    Refractive error    Intermediate stage nonexudative age-related macular degeneration of both eyes    Nuclear sclerosis of both eyes    History of DVT from PICC line right upper extremity 4/2019 anticoag x 3 months then stopped    Abdominal aortic atherosclerosis    Type 2 diabetes mellitus with diabetic cataract, without long-term current use of insulin    Current mild episode of major depressive disorder without prior episode    Epiretinal membrane (ERM) of both eyes    Stage 3a chronic kidney disease    Chronic left shoulder pain    Decreased range of motion of left shoulder    Shoulder weakness    PVC (premature ventricular contraction)     Patient with coronary artery disease status post CABG.  Last angiogram in 2019 revealed patent bypass grafts with severe underlying coronary artery disease.  Exercise induced chest pain and shortness of breath.  Check stress test and echo    Uncontrolled dyslipidemia:  Patient could not tolerate statins in the past.  Refusing Zetia or Repatha / leqvio    Hypertension:  Well controlled.  At home stays around 120-130 mmHg as per patient    Follow-up after testing            Thank you very  much for involving me in the care of your patient.  Please do not hesitate to contact me if there are any questions.      Milly Cadena MD, FACC, [facility]  Interventional Cardiologist, [facility].           This note was dictated with the help of speech recognition software.  There might be un-intended errors and/or substitutions.

## 2024-08-28 LAB
OHS QRS DURATION: 98 MS
OHS QTC CALCULATION: 404 MS

## 2024-08-31 NOTE — PROGRESS NOTES
PICC LINE removal to right upper arm, tip intact, removed without any difficulty, no redness/swelling/bleeding/drainage to site, arm cleaned with saline and gauze, Vaseline gauze to site, 2x2 followed with coban to secure, pt tolerated without any difficulty, pt instructed to keep dressing in place for twenty four hours, pt instructed to call with any questions/concerns, pt verbalized understanding, pt moniotred 30 minutes post removal of PICC, no reactions/discomfort noted  
No
<-- Click to add NO pertinent Past Medical History

## 2024-09-17 ENCOUNTER — HOSPITAL ENCOUNTER (OUTPATIENT)
Dept: RADIOLOGY | Facility: HOSPITAL | Age: 84
Discharge: HOME | End: 2024-09-17
Attending: INTERNAL MEDICINE
Payer: MEDICARE

## 2024-09-17 ENCOUNTER — HOSPITAL ENCOUNTER (OUTPATIENT)
Dept: CARDIOLOGY | Facility: HOSPITAL | Age: 84
Discharge: HOME | End: 2024-09-17
Attending: INTERNAL MEDICINE
Payer: MEDICARE

## 2024-09-17 DIAGNOSIS — R07.9 CHEST PAIN, UNSPECIFIED TYPE: ICD-10-CM

## 2024-09-17 LAB
APICAL FOUR CHAMBER EJECTION FRACTION: 62 %
APICAL TWO CHAMBER EJECTION FRACTION: 63 %
ASCENDING AORTA: 3.49 CM
AV INDEX (PROSTH): 0.4
AV MEAN GRADIENT: 6 MMHG
AV PEAK GRADIENT: 10 MMHG
AV VALVE AREA BY VELOCITY RATIO: 1.26 CM²
AV VALVE AREA: 1.21 CM²
AV VELOCITY RATIO: 0.42
CV ECHO LV RWT: 0.52 CM
CV STRESS BASE HR: 69 BPM
DIASTOLIC BLOOD PRESSURE: 87 MMHG
DOP CALC AO PEAK VEL: 1.6 M/S
DOP CALC AO VTI: 36.5 CM
DOP CALC LVOT AREA: 3 CM2
DOP CALC LVOT DIAMETER: 1.96 CM
DOP CALC LVOT PEAK VEL: 0.67 M/S
DOP CALC LVOT STROKE VOLUME: 44.33 CM3
DOP CALCLVOT PEAK VEL VTI: 14.7 CM
E WAVE DECELERATION TIME: 279.17 MSEC
E/A RATIO: 0.76
E/E' RATIO: 10.91 M/S
ECHO LV POSTERIOR WALL: 1.11 CM (ref 0.6–1.1)
FRACTIONAL SHORTENING: 29 % (ref 28–44)
INTERVENTRICULAR SEPTUM: 1.03 CM (ref 0.6–1.1)
IVC DIAMETER: 1.24 CM
LA MAJOR: 3.82 CM
LA MINOR: 3.86 CM
LA WIDTH: 4.1 CM
LEFT ATRIUM SIZE: 3.67 CM
LEFT ATRIUM VOLUME: 49.11 CM3
LEFT INTERNAL DIMENSION IN SYSTOLE: 3.05 CM (ref 2.1–4)
LEFT VENTRICLE DIASTOLIC VOLUME: 83.54 ML
LEFT VENTRICLE END DIASTOLIC VOLUME APICAL 2 CHAMBER: 61.07 ML
LEFT VENTRICLE END DIASTOLIC VOLUME APICAL 4 CHAMBER: 68.42 ML
LEFT VENTRICLE SYSTOLIC VOLUME: 36.49 ML
LEFT VENTRICULAR INTERNAL DIMENSION IN DIASTOLE: 4.31 CM (ref 3.5–6)
LEFT VENTRICULAR MASS: 157.24 G
LV LATERAL E/E' RATIO: 10 M/S
LV SEPTAL E/E' RATIO: 12 M/S
LVED V (TEICH): 83.54 ML
LVES V (TEICH): 36.49 ML
LVOT MG: 1 MMHG
LVOT MV: 0.47 CM/S
MV PEAK A VEL: 0.79 M/S
MV PEAK E VEL: 0.6 M/S
MV STENOSIS PRESSURE HALF TIME: 80.96 MS
MV VALVE AREA P 1/2 METHOD: 2.72 CM2
NUC STRESS DIASTOLIC VOLUME INDEX: 51
NUC STRESS EJECTION FRACTION: 55 %
NUC STRESS SYSTOLIC VOLUME INDEX: 23
OHS CV CPX 1 MINUTE RECOVERY HEART RATE: 111 BPM
OHS CV CPX 85 PERCENT MAX PREDICTED HEART RATE MALE: 118
OHS CV CPX ESTIMATED METS: 12
OHS CV CPX MAX PREDICTED HEART RATE: 139
OHS CV CPX PATIENT IS FEMALE: 0
OHS CV CPX PATIENT IS MALE: 1
OHS CV CPX PEAK DIASTOLIC BLOOD PRESSURE: 79 MMHG
OHS CV CPX PEAK HEAR RATE: 127 BPM
OHS CV CPX PEAK RATE PRESSURE PRODUCT: NORMAL
OHS CV CPX PEAK SYSTOLIC BLOOD PRESSURE: 156 MMHG
OHS CV CPX PERCENT MAX PREDICTED HEART RATE ACHIEVED: 91
OHS CV CPX RATE PRESSURE PRODUCT PRESENTING: 9315
OHS CV RV/LV RATIO: 0.74 CM
OHS LV EJECTION FRACTION SIMPSONS BIPLANE MOD: 64 %
PISA TR MAX VEL: 2.44 M/S
PV PEAK GRADIENT: 3 MMHG
PV PEAK VELOCITY: 0.89 M/S
RA MAJOR: 3.5 CM
RA PRESSURE ESTIMATED: 3 MMHG
RA WIDTH: 4 CM
RIGHT VENTRICLE DIASTOLIC BASEL DIMENSION: 3.2 CM
RIGHT VENTRICULAR END-DIASTOLIC DIMENSION: 3.17 CM
RV TB RVSP: 5 MMHG
RV TISSUE DOPPLER FREE WALL SYSTOLIC VELOCITY 1 (APICAL 4 CHAMBER VIEW): 7.72 CM/S
SINUS: 3.35 CM
STJ: 3.42 CM
STRESS ECHO POST EXERCISE DUR MIN: 10 MINUTES
STRESS ECHO POST EXERCISE DUR SEC: 23 SECONDS
STRESS ST DEPRESSION: 4 MM
SYSTOLIC BLOOD PRESSURE: 135 MMHG
TDI LATERAL: 0.06 M/S
TDI SEPTAL: 0.05 M/S
TDI: 0.06 M/S
TR MAX PG: 24 MMHG
TRICUSPID ANNULAR PLANE SYSTOLIC EXCURSION: 1.67 CM
TV REST PULMONARY ARTERY PRESSURE: 27 MMHG

## 2024-09-17 PROCEDURE — 93018 CV STRESS TEST I&R ONLY: CPT | Mod: HCNC,,, | Performed by: INTERNAL MEDICINE

## 2024-09-17 PROCEDURE — 78452 HT MUSCLE IMAGE SPECT MULT: CPT | Mod: 26,HCNC,, | Performed by: INTERNAL MEDICINE

## 2024-09-17 PROCEDURE — A9502 TC99M TETROFOSMIN: HCPCS | Mod: HCNC | Performed by: INTERNAL MEDICINE

## 2024-09-17 PROCEDURE — 93306 TTE W/DOPPLER COMPLETE: CPT | Mod: 26,HCNC,, | Performed by: INTERNAL MEDICINE

## 2024-09-17 PROCEDURE — 93306 TTE W/DOPPLER COMPLETE: CPT | Mod: HCNC

## 2024-09-17 PROCEDURE — 93017 CV STRESS TEST TRACING ONLY: CPT | Mod: HCNC

## 2024-09-17 PROCEDURE — 93016 CV STRESS TEST SUPVJ ONLY: CPT | Mod: HCNC,,, | Performed by: INTERNAL MEDICINE

## 2024-09-17 PROCEDURE — 78452 HT MUSCLE IMAGE SPECT MULT: CPT | Mod: HCNC

## 2024-09-17 RX ADMIN — TETROFOSMIN 30.6 MILLICURIE: 1.38 INJECTION, POWDER, LYOPHILIZED, FOR SOLUTION INTRAVENOUS at 08:09

## 2024-09-17 RX ADMIN — TETROFOSMIN 10.8 MILLICURIE: 1.38 INJECTION, POWDER, LYOPHILIZED, FOR SOLUTION INTRAVENOUS at 07:09

## 2024-09-27 ENCOUNTER — OFFICE VISIT (OUTPATIENT)
Dept: CARDIOLOGY | Facility: CLINIC | Age: 84
End: 2024-09-27
Payer: MEDICARE

## 2024-09-27 VITALS
DIASTOLIC BLOOD PRESSURE: 62 MMHG | HEART RATE: 65 BPM | BODY MASS INDEX: 26.02 KG/M2 | SYSTOLIC BLOOD PRESSURE: 116 MMHG | RESPIRATION RATE: 18 BRPM | OXYGEN SATURATION: 95 % | HEIGHT: 67 IN | WEIGHT: 165.81 LBS

## 2024-09-27 DIAGNOSIS — I25.118 CORONARY ARTERY DISEASE OF NATIVE ARTERY OF NATIVE HEART WITH STABLE ANGINA PECTORIS: ICD-10-CM

## 2024-09-27 DIAGNOSIS — E11.9 TYPE 2 DIABETES MELLITUS WITHOUT COMPLICATION, WITHOUT LONG-TERM CURRENT USE OF INSULIN: ICD-10-CM

## 2024-09-27 DIAGNOSIS — I70.0 ABDOMINAL AORTIC ATHEROSCLEROSIS: ICD-10-CM

## 2024-09-27 DIAGNOSIS — I25.10 CORONARY ARTERY DISEASE INVOLVING NATIVE CORONARY ARTERY OF NATIVE HEART WITHOUT ANGINA PECTORIS: Primary | ICD-10-CM

## 2024-09-27 DIAGNOSIS — N18.31 STAGE 3A CHRONIC KIDNEY DISEASE: ICD-10-CM

## 2024-09-27 DIAGNOSIS — I25.2 HISTORY OF NON-ST ELEVATION MYOCARDIAL INFARCTION (NSTEMI): ICD-10-CM

## 2024-09-27 DIAGNOSIS — I10 ESSENTIAL HYPERTENSION: ICD-10-CM

## 2024-09-27 DIAGNOSIS — E11.36 TYPE 2 DIABETES MELLITUS WITH DIABETIC CATARACT, WITHOUT LONG-TERM CURRENT USE OF INSULIN: ICD-10-CM

## 2024-09-27 DIAGNOSIS — R07.9 CHEST PAIN, UNSPECIFIED TYPE: ICD-10-CM

## 2024-09-27 DIAGNOSIS — I25.10 CAD (CORONARY ARTERY DISEASE): ICD-10-CM

## 2024-09-27 PROCEDURE — 99999 PR PBB SHADOW E&M-EST. PATIENT-LVL IV: CPT | Mod: PBBFAC,HCNC,, | Performed by: INTERNAL MEDICINE

## 2024-09-27 RX ORDER — EZETIMIBE 10 MG/1
10 TABLET ORAL DAILY
Qty: 90 TABLET | Refills: 3 | Status: SHIPPED | OUTPATIENT
Start: 2024-09-27

## 2024-09-27 RX ORDER — NAPROXEN SODIUM 220 MG/1
81 TABLET, FILM COATED ORAL DAILY
Qty: 90 TABLET | Refills: 3 | Status: SHIPPED | OUTPATIENT
Start: 2024-09-27 | End: 2025-09-27

## 2024-09-27 RX ORDER — SODIUM CHLORIDE 9 MG/ML
INJECTION, SOLUTION INTRAVENOUS CONTINUOUS
OUTPATIENT
Start: 2024-09-27

## 2024-09-27 RX ORDER — DIPHENHYDRAMINE HCL 50 MG
50 CAPSULE ORAL ONCE
OUTPATIENT
Start: 2024-09-27 | End: 2024-09-27

## 2024-09-27 RX ORDER — CLOPIDOGREL BISULFATE 75 MG/1
75 TABLET ORAL DAILY
Qty: 30 TABLET | Refills: 11 | Status: SHIPPED | OUTPATIENT
Start: 2024-09-27 | End: 2025-09-22

## 2024-09-27 NOTE — PROGRESS NOTES
CARDIOVASCULAR CONSULTATION    REASON FOR CONSULT:   Eric Jackson is a 83 y.o. male who presents for  evaluation     HISTORY OF PRESENT ILLNESS:       Evaluation patient is a pleasant 82-year-old man.  Past medical history of coronary artery disease status post CABG.  Used to follow with Dr. Soto it, has not seen him since 2019.  Now wants to reestablish care with Cardiology.  Last angiogram had revealed patent bypass grafts.  Last angiogram was done in 2019.  Patient states that for the past few months he has been experiencing chest heaviness.  No particular aggravating or relieving factors.  Also occasional dizziness.  No particular aggravating or relieving factors for the dizziness.  Not on statins and refuses to be on statins, Zetia or Repatha.      2019:      LVEDP: 22mmHg  LVEF: 55% by echo  Wall Motion: normal by echo     Dominance: Right  LM: dist 50%  LAD: mid 99%, competitive flow from LIMA-LAD-OM-RPDA              Diag2 prox 90%, T2 flow (not bypassed)  LCx: prox , territory supplied by LIMA-LAD-OM-RPDA  RCA: mid , territory supplied by LIMA-LAD-OM-RPDA     LIMA-LAD-OM-RPDA patent     No SVG identified     Hemostasis:  RFA     Impression:  NSTEMI  3V CAD, normal LV fxn  Patent LIMA-LAD-OM-RPDA  Culprit likely in small Diag2 (unrevascularized) and likely demand related  Man compression RFA for hemostasis     Plan:  Cont med rx  ASA 81mg qd indefinitely  Plavix 75mg qd for 1 year (thru 3/2020)  BBl/ARB  Statin allergy noted  Consider PCSK9 inhibitor if statin allergy confirmed  Dispo planning appropriate  Pt to follow up with Dr. Seay after discharge           Mar 13 23: doing fine. No ischemia on stress test.  patient states that his symptoms of chest pains went away after we were able to control his blood pressure by increasing his medications on last clinic visit.      Sinus rhythm with heart rates varying between 45 and 124 BPM with an average of 73BPM. Total time in sinus rhythm was  approximately 48 hours.  There were occasional PVCs totalling 768 and averaging 16.59 per hour. There were 10 couplets. There were 3 bigeminal cycles.  There were occasional PACs totalling 866 and averaging 18.7 per hour.        The left ventricle is normal in size with mild concentric hypertrophy and low normal systolic function.  The estimated ejection fraction is 50%.  Normal right ventricular size with low normal right ventricular systolic function.  There is mild aortic valve stenosis.  Aortic valve area is 1.60 cm2; peak velocity is 1.51 m/s; mean gradient is 6 mmHg.  Mild tricuspid regurgitation.  The estimated PA systolic pressure is 30 mmHg.          Normal myocardial perfusion scan. There is no evidence of myocardial ischemia or infarction.    The gated perfusion images showed an ejection fraction of 57% post stress.    There is normal wall motion post stress.    Notes from August 27, 2024: Patient here for follow-up.  When he walks on the treadmill for more than 7 minutes he gets short of breath.  And some chest tightness    Notes from September 20, 2024:  Patient here for follow-up.  Continues having dyspnea on exertion on minimal exertion.  Stress test shows that the EKG portion of the stress test was markedly abnormal, nuclear portion was normal.  Patient is concerned that his symptoms are lifestyle limiting.  After detailed discussion decided to proceed with coronary angiography for further evaluation    Results for orders placed or performed in visit on 08/27/24   IN OFFICE EKG 12-LEAD (to [place])    Collection Time: 08/27/24  3:08 PM   Result Value Ref Range    QRS Duration 98 ms    [facility] QTC Calculation 404 ms    Narrative    Test Reason : I10,    Vent. Rate : 071 BPM     Atrial Rate : 071 BPM     P-R Int : 228 ms          QRS Dur : 098 ms      QT Int : 372 ms       P-R-T Axes : 055 043 -51 degrees     QTc Int : 404 ms    Sinus rhythm with 1st degree A-V block  Possible Anterior infarct (cited on or  before 27-AUG-2024)  ST and T wave abnormality, consider inferior ischemia  Abnormal ECG  When compared with ECG of 01-MAR-2023 09:35,  Previous ECG has undetermined rhythm, needs review  Serial changes of Anterior infarct Present  Confirmed by Odilon Klein MD ([id number]) on 8/28/2024 3:20:49 PM    Referred By:  RUBIO           Confirmed By:Odilon Klein MD       Results for orders placed during the hospital encounter of 09/17/24    Echo    Interpretation Summary    Left Ventricle: The left ventricle is normal in size. Mildly increased wall thickness. There is mild concentric hypertrophy. There is normal systolic function with a visually estimated ejection fraction of 55 - 60%. Grade I diastolic dysfunction.    Right Ventricle: Normal right ventricular cavity size. Systolic function is normal.    Aortic Valve: The aortic valve is a trileaflet valve. There is moderate aortic valve sclerosis. Mildly restricted motion. There is mild stenosis. Aortic valve area by VTI is 1.21 cm². Aortic valve peak velocity is 1.60 m/s. Mean gradient is 6 mmHg. The dimensionless index is 0.40.    Tricuspid Valve: There is mild regurgitation.    Pulmonary Artery: The estimated pulmonary artery systolic pressure is 27 mmHg.      Results for orders placed during the hospital encounter of 09/17/24    Nuclear Stress - Cardiology Interpreted    Interpretation Summary    The patient exercised for 10 minutes 23 seconds on a Gonzalo protocol, corresponding to a functional capacity of 12METS, achieving a peak heart rate of 127 bpm, which is 91% of the age predicted maximum heart rate. The patient reported shortness of breath during the stress test.    Normal myocardial perfusion scan. There is no evidence of myocardial ischemia or infarction.    The gated perfusion images showed an ejection fraction of 55% post stress.    There is normal wall motion at post-stress.    The ECG portion of the study is positive for ischemia.  4mm horizontal ST depression at  peak stress.    The patient reported no chest pain during the stress test.      Results for orders placed during the hospital encounter of 02/28/19    Cardiac catheterization    Conclusion  LVEDP: 22mmHg  LVEF: 55% by echo  Wall Motion: normal by echo    Dominance: Right  LM: dist 50%  LAD: mid 99%, competitive flow from LIMA-LAD-OM-RPDA  Diag2 prox 90%, T2 flow (not bypassed)  LCx: prox , territory supplied by LIMA-LAD-OM-RPDA  RCA: mid , territory supplied by LIMA-LAD-OM-RPDA    LIMA-LAD-OM-RPDA patent    No SVG identified    Hemostasis:  RFA          PAST MEDICAL HISTORY:     Past Medical History:   Diagnosis Date    Anemia     Angina pectoris     Anxiety     Cancer     Coronary artery disease     Depression     Diabetes mellitus type II     Disorder of kidney and ureter     Erectile dysfunction     Eye injuries     fb ou    GERD (gastroesophageal reflux disease)     Hypertension     Intermediate stage nonexudative age-related macular degeneration of both eyes 4/9/2019    Myocardial infarction     Nuclear sclerosis of both eyes 4/9/2019    Prostate cancer     Trouble in sleeping     Type 2 diabetes mellitus     Type 2 diabetes mellitus with ophthalmic manifestations        PAST SURGICAL HISTORY:     Past Surgical History:   Procedure Laterality Date    COLONOSCOPY N/A 2/9/2018    Procedure: COLONOSCOPY;  Surgeon: Mathieu Cao MD;  Location: [facility];  Service: Endoscopy;  Laterality: N/A;    CORONARY ANGIOGRAPHY INCLUDING BYPASS GRAFTS WITH CATHETERIZATION OF LEFT HEART N/A 3/4/2019    Procedure: ANGIOGRAM, CORONARY, INCLUDING BYPASS GRAFT, WITH LEFT HEART CATHETERIZATION;  Surgeon: Jamir Nam MD;  Location: [facility] CATH LAB;  Service: Cardiology;  Laterality: N/A;    CORONARY ARTERY BYPASS GRAFT  2001    ESOPHAGOGASTRODUODENOSCOPY N/A 10/8/2020    Procedure: EGD (ESOPHAGOGASTRODUODENOSCOPY);  Surgeon: Bharath Herrera MD;  Location: [facility];  Service: Endoscopy;  Laterality: N/A;     ESOPHAGOGASTRODUODENOSCOPY N/A 11/7/2023    Procedure: EGD (ESOPHAGOGASTRODUODENOSCOPY);  Surgeon: Bharath Herrera MD;  Location: [facility] ENDO;  Service: Endoscopy;  Laterality: N/A;    HERNIA REPAIR      LEFT HEART CATHETERIZATION Left 3/4/2019    Procedure: Left heart cath RFA access, 4fr catheter/sheath, not before 9am;  Surgeon: Jamir Nam MD;  Location: [facility] CATH LAB;  Service: Cardiology;  Laterality: Left;    TRANSRECTAL ULTRASOUND OF PROSTATE WITH INSERTION OF GOLD FIDUCIAL MARKER N/A 2/26/2019    Procedure: ULTRASOUND, PROSTATE, RECTAL APPROACH, WITH GOLD FIDUCIAL MARKER INSERTION;  Surgeon: Layne Huff MD;  Location: [facility] OR;  Service: Urology;  Laterality: N/A;    UPPER GASTROINTESTINAL ENDOSCOPY      WRIST SURGERY         ALLERGIES AND MEDICATION:     Review of patient's allergies indicates:   Allergen Reactions    Tamsulosin     Prochlorperazine      convulsions    Statins-hmg-coa reductase inhibitors Other (See Comments)     Muscle weakness        Medication List            Accurate as of September 27, 2024  3:12 PM. If you have any questions, ask your nurse or doctor.                CONTINUE taking these medications      ALPRAZolam 1 MG tablet  Commonly known as: XANAX  Take 1 tablet (1 mg total) by mouth nightly as needed for Anxiety.     aspirin 81 MG EC tablet  Commonly known as: ECOTRIN  Take 1 tablet (81 mg total) by mouth once daily.     blood-glucose meter kit  Commonly known as: TRUE METRIX GLUCOSE METER  For twice daily checking     imiquimod 5 % cream  Commonly known as: ALDARA  APPLY TOPICALLY 3 (THREE) TIMES A WEEK.     lancets [facility] [place] [facility]  To check BG 3 times daily, to use with insurance preferred meter     losartan-hydrochlorothiazide 100-25 mg 100-25 mg per tablet  Commonly known as: HYZAAR  Take 1 tablet by mouth once daily.     metFORMIN 500 MG tablet  Commonly known as: GLUCOPHAGE  TAKE 1 TABLET EVERY DAY WITH BREAKFAST     metoprolol succinate 25 MG 24 hr  tablet  Commonly known as: TOPROL-XL  Take 0.5 tablets (12.5 mg total) by mouth once daily.     omeprazole 40 MG capsule  Commonly known as: PRILOSEC  Take 1 capsule (40 mg total) by mouth 2 (two) times daily before meals.     PRESERVISION LUTEIN ORAL     testosterone 20.25 mg/1.25 gram (1.62 %) Glpm  Commonly known as: AndroGeL  Place 1.25 g onto the skin once daily.     TRUE METRIX GLUCOSE TEST STRIP Strp  Generic drug: blood sugar diagnostic  CHECK TWO TIMES DAILY     TRUE METRIX LEVEL 1 Soln  Generic drug: blood glucose control, low  To use with blood glucose meter     vitamin D 1000 units Tab  Commonly known as: VITAMIN D3              SOCIAL HISTORY:     Social History     Socioeconomic History    Marital status:    Tobacco Use    Smoking status: Former     Current packs/day: 0.00     Average packs/day: 2.0 packs/day for 23.0 years (46.0 ttl pk-yrs)     Types: Cigarettes     Start date: 1966     Quit date: 1989     Years since quittin.7    Smokeless tobacco: Never   Substance and Sexual Activity    Alcohol use: Yes     Alcohol/week: 1.0 standard drink of alcohol     Types: 1 Cans of beer per week     Comment: occassional    Drug use: No    Sexual activity: Yes     Partners: Female     Social Determinants of Health     Financial Resource Strain: Low Risk  (2023)    Overall Financial Resource Strain (CARDIA)     Difficulty of Paying Living Expenses: Not hard at all   Food Insecurity: Unknown (2023)    Hunger Vital Sign     Ran Out of Food in the Last Year: Never true   Transportation Needs: No Transportation Needs (2023)    PRAPARE - Transportation     Lack of Transportation (Medical): No     Lack of Transportation (Non-Medical): No   Physical Activity: Insufficiently Active (2023)    Exercise Vital Sign     Days of Exercise per Week: 3 days     Minutes of Exercise per Session: 40 min   Stress: No Stress Concern Present (2023)    [employer] [place] [employer]  "Health - Occupational Stress Questionnaire     Feeling of Stress : Only a little   Housing Stability: Low Risk  (7/19/2023)    Housing Stability Vital Sign     Unable to Pay for Housing in the Last Year: No     Number of Places Lived in the Last Year: 1     Unstable Housing in the Last Year: No       FAMILY HISTORY:     Family History   Adopted: Yes   Problem Relation Name Age of Onset    No Known Problems Mother      No Known Problems Father      No Known Problems Sister      No Known Problems Brother      No Known Problems Maternal Aunt      No Known Problems Maternal Uncle      No Known Problems Paternal Aunt      No Known Problems Paternal Uncle      No Known Problems Maternal Grandmother      No Known Problems Maternal Grandfather      No Known Problems Paternal Grandmother      No Known Problems Paternal Grandfather      No Known Problems Other      Amblyopia Neg Hx      Blindness Neg Hx      Cancer Neg Hx      Cataracts Neg Hx      Diabetes Neg Hx      Glaucoma Neg Hx      Hypertension Neg Hx      Macular degeneration Neg Hx      Retinal detachment Neg Hx      Strabismus Neg Hx      Stroke Neg Hx      Thyroid disease Neg Hx         REVIEW OF SYSTEMS:   Review of Systems   Constitutional: Negative.   HENT: Negative.     Eyes: Negative.    Respiratory: Negative.     Endocrine: Negative.    Hematologic/Lymphatic: Negative.    Skin: Negative.    Musculoskeletal: Negative.    Gastrointestinal: Negative.    Genitourinary: Negative.    Neurological: Negative.    Psychiatric/Behavioral: Negative.     Allergic/Immunologic: Negative.        A 10 point review of systems was performed and all the pertinent positives have been mentioned. Rest of review of systems was negative.        PHYSICAL EXAM:     Vitals:    09/27/24 1455   BP: 116/62   Pulse: 65   Resp: 18    Body mass index is 25.97 kg/m².  Weight: 75.2 kg (165 lb 12.6 oz)   Height: 5' 7" (170.2 cm)     Physical Exam  Vitals reviewed.   Constitutional:       " Appearance: He is well-developed.   HENT:      Head: Normocephalic.   Eyes:      Conjunctiva/sclera: Conjunctivae normal.      Pupils: Pupils are equal, round, and reactive to light.   Cardiovascular:      Rate and Rhythm: Normal rate and regular rhythm.      Heart sounds: Normal heart sounds.   Pulmonary:      Effort: Pulmonary effort is normal.      Breath sounds: Normal breath sounds.   Abdominal:      General: Bowel sounds are normal.      Palpations: Abdomen is soft.   Musculoskeletal:      Cervical back: Normal range of motion and neck supple.   Skin:     General: Skin is warm.   Neurological:      Mental Status: He is alert and oriented to person, place, and time.           DATA:     Laboratory:  CBC:  Recent Labs   Lab 05/22/23  0750 10/16/23  0655 04/23/24  0750   WBC 6.17 6.37 7.69   Hemoglobin 14.7 15.3 15.0   Hematocrit 45.4 47.2 45.4   Platelets 216 219 202       CHEMISTRIES:  Recent Labs   Lab 10/15/21  1420 04/16/22  0807 10/17/22  0750 04/17/23  0822 10/16/23  0655 04/23/24  0750   Glucose 98 120 H   < > 120 H 103 123 H   Sodium 142 139   < > 143 142 140   Potassium 4.0 4.1   < > 4.1 4.3 3.6   BUN 13 18   < > 17 16 25 H   Creatinine 1.1 1.1   < > 1.2 1.2 1.3   eGFR if  >60.0 >60.0  --   --   --   --    eGFR if non African American >60.0 >60.0  --   --   --   --    Calcium 10.1 9.6   < > 9.7 9.9 9.8    < > = values in this interval not displayed.       CARDIAC BIOMARKERS:        COAGS:        LIPIDS/LFTS:  Recent Labs   Lab 04/17/23  0822 10/16/23  0655 04/23/24  0750   Cholesterol 288 H 281 H 245 H   Triglycerides 307 H 522 H 180 H   HDL 47 37 L 49   LDL Cholesterol 179.6 H Invalid, Trig>400.0 160.0 H   Non-HDL Cholesterol 241 244 196   AST 23 19 20   ALT 26 19 13       Hemoglobin A1C   Date Value Ref Range Status   04/23/2024 6.1 (H) 4.0 - 5.6 % Final     Comment:     ADA Screening Guidelines:  5.7-6.4%  Consistent with prediabetes  >or=6.5%  Consistent with diabetes    High levels  of fetal hemoglobin interfere with the HbA1C  assay. Heterozygous hemoglobin variants (HbS, HgC, etc)do  not significantly interfere with this assay.   However, presence of multiple variants may affect accuracy.     10/16/2023 6.1 (H) 4.0 - 5.6 % Final     Comment:     ADA Screening Guidelines:  5.7-6.4%  Consistent with prediabetes  >or=6.5%  Consistent with diabetes    High levels of fetal hemoglobin interfere with the HbA1C  assay. Heterozygous hemoglobin variants (HbS, HgC, etc)do  not significantly interfere with this assay.   However, presence of multiple variants may affect accuracy.     04/17/2023 6.2 (H) 4.0 - 5.6 % Final     Comment:     ADA Screening Guidelines:  5.7-6.4%  Consistent with prediabetes  >or=6.5%  Consistent with diabetes    High levels of fetal hemoglobin interfere with the HbA1C  assay. Heterozygous hemoglobin variants (HbS, HgC, etc)do  not significantly interfere with this assay.   However, presence of multiple variants may affect accuracy.         TSH        The ASCVD Risk score (Pratibha LARA, et al., 2019) failed to calculate for the following reasons:    The 2019 ASCVD risk score is only valid for ages 40 to 79    The patient has a prior MI or stroke diagnosis             ASSESSMENT AND PLAN     Patient Active Problem List   Diagnosis    Essential hypertension    Type 2 diabetes mellitus without complication, without long-term current use of insulin    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 2/2018; 11/7/23 EGD Archer's stage C10-M11, biopsied. 4 cm HH path metaplasia;  repeat 3-5 years for Archer's    Anxiety    Depression    Insomnia    Slow urinary stream    Tubular adenoma of colon 2/2018 3 adenomas repeat 3 years    Hypogonadism in male    Prostate cancer 2/25/19 TRUS of prostate and gold fiducial marker placement; 4/2019 s/p XRT    History of Non-STEMI (non-ST elevated myocardial infarction) 3/2019 from sepsis; Culprit likely in small Diag2 (unrevascularized) and likely  demand related    Coronary artery disease 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; 3/2019 [facility] patent graft; Culprit likely in small Diag2 (unrevascularized) and likely demand related    Renal cyst, left, simple 1st seen 2/2019 CT; verified on renal US 11/2020    Status post coronary artery bypass grafting single vessel    Refractive error    Intermediate stage nonexudative age-related macular degeneration of both eyes    Nuclear sclerosis of both eyes    History of DVT from PICC line right upper extremity 4/2019 anticoag x 3 months then stopped    Abdominal aortic atherosclerosis    Type 2 diabetes mellitus with diabetic cataract, without long-term current use of insulin    Current mild episode of major depressive disorder without prior episode    Epiretinal membrane (ERM) of both eyes    Stage 3a chronic kidney disease    Chronic left shoulder pain    Decreased range of motion of left shoulder    Shoulder weakness    PVC (premature ventricular contraction)     Patient with coronary artery disease status post CABG.  Last angiogram in 2019 revealed patent bypass grafts with severe underlying coronary artery disease.  Exercise induced chest pain and shortness of breath.   Nuclear portion of the stress test did not show any significant ischemia but EKG was markedly abnormal.  Patient continues to have  lifestyle limiting symptoms.  Had a detailed discussion.  After detailed discussion decided to proceed with angiography and intervention as needed    Risks, benefits and alternatives of the catheterization procedure were discussed with the patient.The risks of coronary angiography include but are not limited to: bleeding, infection, death, heart attack, arrhythmia, kidney injury or failure, potential need for dialysis, allergic reactions, stroke, need for emergency surgery, hematoma, pseudoaneurysm etc.  Should stenting be indicated, the patient has agreed to dual anti-platelet therapy for  1-consecutive year with a drug-eluting stent and a minimum of 1-month with the use of a bare metal stent. Additionally, pt is aware that non-compliance is likely to result in stent clotting with heart attack, heart failure, and/or death  The risks of moderate sedation include hypotension, respiratory depression, arrhythmias, bronchospasm, and death. Informed consent was obtained and the  patient is agreeable to proceed with the procedure. Consent was placed on the chart.      Uncontrolled dyslipidemia:  Patient could not tolerate statins in the past.   Now agreeable to start ezetimibe.  Start ezetimibe 10 mg daily    Hypertension:  Well controlled.  At home stays around 120-130 mmHg as per patient    Follow-up after testing            Thank you very much for involving me in the care of your patient.  Please do not hesitate to contact me if there are any questions.      Milly Cadena MD, FACC, [facility]  Interventional Cardiologist, [facility].           This note was dictated with the help of speech recognition software.  There might be un-intended errors and/or substitutions.

## 2024-09-27 NOTE — H&P (VIEW-ONLY)
CARDIOVASCULAR CONSULTATION    REASON FOR CONSULT:   Eric Jackson is a 83 y.o. male who presents for  evaluation     HISTORY OF PRESENT ILLNESS:       Evaluation patient is a pleasant 82-year-old man.  Past medical history of coronary artery disease status post CABG.  Used to follow with Dr. Soto it, has not seen him since 2019.  Now wants to reestablish care with Cardiology.  Last angiogram had revealed patent bypass grafts.  Last angiogram was done in 2019.  Patient states that for the past few months he has been experiencing chest heaviness.  No particular aggravating or relieving factors.  Also occasional dizziness.  No particular aggravating or relieving factors for the dizziness.  Not on statins and refuses to be on statins, Zetia or Repatha.      2019:      LVEDP: 22mmHg  LVEF: 55% by echo  Wall Motion: normal by echo     Dominance: Right  LM: dist 50%  LAD: mid 99%, competitive flow from LIMA-LAD-OM-RPDA              Diag2 prox 90%, T2 flow (not bypassed)  LCx: prox , territory supplied by LIMA-LAD-OM-RPDA  RCA: mid , territory supplied by LIMA-LAD-OM-RPDA     LIMA-LAD-OM-RPDA patent     No SVG identified     Hemostasis:  RFA     Impression:  NSTEMI  3V CAD, normal LV fxn  Patent LIMA-LAD-OM-RPDA  Culprit likely in small Diag2 (unrevascularized) and likely demand related  Man compression RFA for hemostasis     Plan:  Cont med rx  ASA 81mg qd indefinitely  Plavix 75mg qd for 1 year (thru 3/2020)  BBl/ARB  Statin allergy noted  Consider PCSK9 inhibitor if statin allergy confirmed  Dispo planning appropriate  Pt to follow up with Dr. Seay after discharge           Mar 13 23: doing fine. No ischemia on stress test.  patient states that his symptoms of chest pains went away after we were able to control his blood pressure by increasing his medications on last clinic visit.      Sinus rhythm with heart rates varying between 45 and 124 BPM with an average of 73BPM. Total time in sinus rhythm was  approximately 48 hours.  There were occasional PVCs totalling 768 and averaging 16.59 per hour. There were 10 couplets. There were 3 bigeminal cycles.  There were occasional PACs totalling 866 and averaging 18.7 per hour.        The left ventricle is normal in size with mild concentric hypertrophy and low normal systolic function.  The estimated ejection fraction is 50%.  Normal right ventricular size with low normal right ventricular systolic function.  There is mild aortic valve stenosis.  Aortic valve area is 1.60 cm2; peak velocity is 1.51 m/s; mean gradient is 6 mmHg.  Mild tricuspid regurgitation.  The estimated PA systolic pressure is 30 mmHg.          Normal myocardial perfusion scan. There is no evidence of myocardial ischemia or infarction.    The gated perfusion images showed an ejection fraction of 57% post stress.    There is normal wall motion post stress.    Notes from August 27, 2024: Patient here for follow-up.  When he walks on the treadmill for more than 7 minutes he gets short of breath.  And some chest tightness    Notes from September 20, 2024:  Patient here for follow-up.  Continues having dyspnea on exertion on minimal exertion.  Stress test shows that the EKG portion of the stress test was markedly abnormal, nuclear portion was normal.  Patient is concerned that his symptoms are lifestyle limiting.  After detailed discussion decided to proceed with coronary angiography for further evaluation    Results for orders placed or performed in visit on 08/27/24   IN OFFICE EKG 12-LEAD (to [email or web address])    Collection Time: 08/27/24  3:08 PM   Result Value Ref Range    QRS Duration 98 ms    [facility] QTC Calculation 404 ms    Narrative    Test Reason : I10,    Vent. Rate : 071 BPM     Atrial Rate : 071 BPM     P-R Int : 228 ms          QRS Dur : 098 ms      QT Int : 372 ms       P-R-T Axes : 055 043 -51 degrees     QTc Int : 404 ms    Sinus rhythm with 1st degree A-V block  Possible Anterior infarct (cited on or  before 27-AUG-2024)  ST and T wave abnormality, consider inferior ischemia  Abnormal ECG  When compared with ECG of 01-MAR-2023 09:35,  Previous ECG has undetermined rhythm, needs review  Serial changes of Anterior infarct Present  Confirmed by Odilon Klein MD ([id number]) on 8/28/2024 3:20:49 PM    Referred By:  RUBIO           Confirmed By:Odilon Klein MD       Results for orders placed during the hospital encounter of 09/17/24    Echo    Interpretation Summary    Left Ventricle: The left ventricle is normal in size. Mildly increased wall thickness. There is mild concentric hypertrophy. There is normal systolic function with a visually estimated ejection fraction of 55 - 60%. Grade I diastolic dysfunction.    Right Ventricle: Normal right ventricular cavity size. Systolic function is normal.    Aortic Valve: The aortic valve is a trileaflet valve. There is moderate aortic valve sclerosis. Mildly restricted motion. There is mild stenosis. Aortic valve area by VTI is 1.21 cm². Aortic valve peak velocity is 1.60 m/s. Mean gradient is 6 mmHg. The dimensionless index is 0.40.    Tricuspid Valve: There is mild regurgitation.    Pulmonary Artery: The estimated pulmonary artery systolic pressure is 27 mmHg.      Results for orders placed during the hospital encounter of 09/17/24    Nuclear Stress - Cardiology Interpreted    Interpretation Summary    The patient exercised for 10 minutes 23 seconds on a Gonzalo protocol, corresponding to a functional capacity of 12METS, achieving a peak heart rate of 127 bpm, which is 91% of the age predicted maximum heart rate. The patient reported shortness of breath during the stress test.    Normal myocardial perfusion scan. There is no evidence of myocardial ischemia or infarction.    The gated perfusion images showed an ejection fraction of 55% post stress.    There is normal wall motion at post-stress.    The ECG portion of the study is positive for ischemia.  4mm horizontal ST depression at  peak stress.    The patient reported no chest pain during the stress test.      Results for orders placed during the hospital encounter of 02/28/19    Cardiac catheterization    Conclusion  LVEDP: 22mmHg  LVEF: 55% by echo  Wall Motion: normal by echo    Dominance: Right  LM: dist 50%  LAD: mid 99%, competitive flow from LIMA-LAD-OM-RPDA  Diag2 prox 90%, T2 flow (not bypassed)  LCx: prox , territory supplied by LIMA-LAD-OM-RPDA  RCA: mid , territory supplied by LIMA-LAD-OM-RPDA    LIMA-LAD-OM-RPDA patent    No SVG identified    Hemostasis:  RFA          PAST MEDICAL HISTORY:     Past Medical History:   Diagnosis Date    Anemia     Angina pectoris     Anxiety     Cancer     Coronary artery disease     Depression     Diabetes mellitus type II     Disorder of kidney and ureter     Erectile dysfunction     Eye injuries     fb ou    GERD (gastroesophageal reflux disease)     Hypertension     Intermediate stage nonexudative age-related macular degeneration of both eyes 4/9/2019    Myocardial infarction     Nuclear sclerosis of both eyes 4/9/2019    Prostate cancer     Trouble in sleeping     Type 2 diabetes mellitus     Type 2 diabetes mellitus with ophthalmic manifestations        PAST SURGICAL HISTORY:     Past Surgical History:   Procedure Laterality Date    COLONOSCOPY N/A 2/9/2018    Procedure: COLONOSCOPY;  Surgeon: Mathieu Cao MD;  Location: [facility];  Service: Endoscopy;  Laterality: N/A;    CORONARY ANGIOGRAPHY INCLUDING BYPASS GRAFTS WITH CATHETERIZATION OF LEFT HEART N/A 3/4/2019    Procedure: ANGIOGRAM, CORONARY, INCLUDING BYPASS GRAFT, WITH LEFT HEART CATHETERIZATION;  Surgeon: Jamir Nam MD;  Location: [facility] CATH LAB;  Service: Cardiology;  Laterality: N/A;    CORONARY ARTERY BYPASS GRAFT  2001    ESOPHAGOGASTRODUODENOSCOPY N/A 10/8/2020    Procedure: EGD (ESOPHAGOGASTRODUODENOSCOPY);  Surgeon: Bharaht Herrera MD;  Location: [facility];  Service: Endoscopy;  Laterality: N/A;     ESOPHAGOGASTRODUODENOSCOPY N/A 11/7/2023    Procedure: EGD (ESOPHAGOGASTRODUODENOSCOPY);  Surgeon: Bharath Herrera MD;  Location: [facility] ENDO;  Service: Endoscopy;  Laterality: N/A;    HERNIA REPAIR      LEFT HEART CATHETERIZATION Left 3/4/2019    Procedure: Left heart cath RFA access, 4fr catheter/sheath, not before 9am;  Surgeon: Jamir Nam MD;  Location: [facility] CATH LAB;  Service: Cardiology;  Laterality: Left;    TRANSRECTAL ULTRASOUND OF PROSTATE WITH INSERTION OF GOLD FIDUCIAL MARKER N/A 2/26/2019    Procedure: ULTRASOUND, PROSTATE, RECTAL APPROACH, WITH GOLD FIDUCIAL MARKER INSERTION;  Surgeon: Layne Huff MD;  Location: [facility] OR;  Service: Urology;  Laterality: N/A;    UPPER GASTROINTESTINAL ENDOSCOPY      WRIST SURGERY         ALLERGIES AND MEDICATION:     Review of patient's allergies indicates:   Allergen Reactions    Tamsulosin     Prochlorperazine      convulsions    Statins-hmg-coa reductase inhibitors Other (See Comments)     Muscle weakness        Medication List            Accurate as of September 27, 2024  3:12 PM. If you have any questions, ask your nurse or doctor.                CONTINUE taking these medications      ALPRAZolam 1 MG tablet  Commonly known as: XANAX  Take 1 tablet (1 mg total) by mouth nightly as needed for Anxiety.     aspirin 81 MG EC tablet  Commonly known as: ECOTRIN  Take 1 tablet (81 mg total) by mouth once daily.     blood-glucose meter kit  Commonly known as: TRUE METRIX GLUCOSE METER  For twice daily checking     imiquimod 5 % cream  Commonly known as: ALDARA  APPLY TOPICALLY 3 (THREE) TIMES A WEEK.     lancets [facility]  To check BG 3 times daily, to use with insurance preferred meter     losartan-hydrochlorothiazide 100-25 mg 100-25 mg per tablet  Commonly known as: HYZAAR  Take 1 tablet by mouth once daily.     metFORMIN 500 MG tablet  Commonly known as: GLUCOPHAGE  TAKE 1 TABLET EVERY DAY WITH BREAKFAST     metoprolol succinate 25 MG 24 hr  tablet  Commonly known as: TOPROL-XL  Take 0.5 tablets (12.5 mg total) by mouth once daily.     omeprazole 40 MG capsule  Commonly known as: PRILOSEC  Take 1 capsule (40 mg total) by mouth 2 (two) times daily before meals.     PRESERVISION LUTEIN ORAL     testosterone 20.25 mg/1.25 gram (1.62 %) Glpm  Commonly known as: AndroGeL  Place 1.25 g onto the skin once daily.     TRUE METRIX GLUCOSE TEST STRIP Strp  Generic drug: blood sugar diagnostic  CHECK TWO TIMES DAILY     TRUE METRIX LEVEL 1 Soln  Generic drug: blood glucose control, low  To use with blood glucose meter     vitamin D 1000 units Tab  Commonly known as: VITAMIN D3              SOCIAL HISTORY:     Social History     Socioeconomic History    Marital status:    Tobacco Use    Smoking status: Former     Current packs/day: 0.00     Average packs/day: 2.0 packs/day for 23.0 years (46.0 ttl pk-yrs)     Types: Cigarettes     Start date: 1966     Quit date: 1989     Years since quittin.7    Smokeless tobacco: Never   Substance and Sexual Activity    Alcohol use: Yes     Alcohol/week: 1.0 standard drink of alcohol     Types: 1 Cans of beer per week     Comment: occassional    Drug use: No    Sexual activity: Yes     Partners: Female     Social Determinants of Health     Financial Resource Strain: Low Risk  (2023)    Overall Financial Resource Strain (CARDIA)     Difficulty of Paying Living Expenses: Not hard at all   Food Insecurity: Unknown (2023)    Hunger Vital Sign     Ran Out of Food in the Last Year: Never true   Transportation Needs: No Transportation Needs (2023)    PRAPARE - Transportation     Lack of Transportation (Medical): No     Lack of Transportation (Non-Medical): No   Physical Activity: Insufficiently Active (2023)    Exercise Vital Sign     Days of Exercise per Week: 3 days     Minutes of Exercise per Session: 40 min   Stress: No Stress Concern Present (2023)    [place] [employer]  "Health - Occupational Stress Questionnaire     Feeling of Stress : Only a little   Housing Stability: Low Risk  (7/19/2023)    Housing Stability Vital Sign     Unable to Pay for Housing in the Last Year: No     Number of Places Lived in the Last Year: 1     Unstable Housing in the Last Year: No       FAMILY HISTORY:     Family History   Adopted: Yes   Problem Relation Name Age of Onset    No Known Problems Mother      No Known Problems Father      No Known Problems Sister      No Known Problems Brother      No Known Problems Maternal Aunt      No Known Problems Maternal Uncle      No Known Problems Paternal Aunt      No Known Problems Paternal Uncle      No Known Problems Maternal Grandmother      No Known Problems Maternal Grandfather      No Known Problems Paternal Grandmother      No Known Problems Paternal Grandfather      No Known Problems Other      Amblyopia Neg Hx      Blindness Neg Hx      Cancer Neg Hx      Cataracts Neg Hx      Diabetes Neg Hx      Glaucoma Neg Hx      Hypertension Neg Hx      Macular degeneration Neg Hx      Retinal detachment Neg Hx      Strabismus Neg Hx      Stroke Neg Hx      Thyroid disease Neg Hx         REVIEW OF SYSTEMS:   Review of Systems   Constitutional: Negative.   HENT: Negative.     Eyes: Negative.    Respiratory: Negative.     Endocrine: Negative.    Hematologic/Lymphatic: Negative.    Skin: Negative.    Musculoskeletal: Negative.    Gastrointestinal: Negative.    Genitourinary: Negative.    Neurological: Negative.    Psychiatric/Behavioral: Negative.     Allergic/Immunologic: Negative.        A 10 point review of systems was performed and all the pertinent positives have been mentioned. Rest of review of systems was negative.        PHYSICAL EXAM:     Vitals:    09/27/24 1455   BP: 116/62   Pulse: 65   Resp: 18    Body mass index is 25.97 kg/m².  Weight: 75.2 kg (165 lb 12.6 oz)   Height: 5' 7" (170.2 cm)     Physical Exam  Vitals reviewed.   Constitutional:       " Appearance: He is well-developed.   HENT:      Head: Normocephalic.   Eyes:      Conjunctiva/sclera: Conjunctivae normal.      Pupils: Pupils are equal, round, and reactive to light.   Cardiovascular:      Rate and Rhythm: Normal rate and regular rhythm.      Heart sounds: Normal heart sounds.   Pulmonary:      Effort: Pulmonary effort is normal.      Breath sounds: Normal breath sounds.   Abdominal:      General: Bowel sounds are normal.      Palpations: Abdomen is soft.   Musculoskeletal:      Cervical back: Normal range of motion and neck supple.   Skin:     General: Skin is warm.   Neurological:      Mental Status: He is alert and oriented to person, place, and time.           DATA:     Laboratory:  CBC:  Recent Labs   Lab 05/22/23  0750 10/16/23  0655 04/23/24  0750   WBC 6.17 6.37 7.69   Hemoglobin 14.7 15.3 15.0   Hematocrit 45.4 47.2 45.4   Platelets 216 219 202       CHEMISTRIES:  Recent Labs   Lab 10/15/21  1420 04/16/22  0807 10/17/22  0750 04/17/23  0822 10/16/23  0655 04/23/24  0750   Glucose 98 120 H   < > 120 H 103 123 H   Sodium 142 139   < > 143 142 140   Potassium 4.0 4.1   < > 4.1 4.3 3.6   BUN 13 18   < > 17 16 25 H   Creatinine 1.1 1.1   < > 1.2 1.2 1.3   eGFR if  >60.0 >60.0  --   --   --   --    eGFR if non African American >60.0 >60.0  --   --   --   --    Calcium 10.1 9.6   < > 9.7 9.9 9.8    < > = values in this interval not displayed.       CARDIAC BIOMARKERS:        COAGS:        LIPIDS/LFTS:  Recent Labs   Lab 04/17/23  0822 10/16/23  0655 04/23/24  0750   Cholesterol 288 H 281 H 245 H   Triglycerides 307 H 522 H 180 H   HDL 47 37 L 49   LDL Cholesterol 179.6 H Invalid, Trig>400.0 160.0 H   Non-HDL Cholesterol 241 244 196   AST 23 19 20   ALT 26 19 13       Hemoglobin A1C   Date Value Ref Range Status   04/23/2024 6.1 (H) 4.0 - 5.6 % Final     Comment:     ADA Screening Guidelines:  5.7-6.4%  Consistent with prediabetes  >or=6.5%  Consistent with diabetes    High levels  of fetal hemoglobin interfere with the HbA1C  assay. Heterozygous hemoglobin variants (HbS, HgC, etc)do  not significantly interfere with this assay.   However, presence of multiple variants may affect accuracy.     10/16/2023 6.1 (H) 4.0 - 5.6 % Final     Comment:     ADA Screening Guidelines:  5.7-6.4%  Consistent with prediabetes  >or=6.5%  Consistent with diabetes    High levels of fetal hemoglobin interfere with the HbA1C  assay. Heterozygous hemoglobin variants (HbS, HgC, etc)do  not significantly interfere with this assay.   However, presence of multiple variants may affect accuracy.     04/17/2023 6.2 (H) 4.0 - 5.6 % Final     Comment:     ADA Screening Guidelines:  5.7-6.4%  Consistent with prediabetes  >or=6.5%  Consistent with diabetes    High levels of fetal hemoglobin interfere with the HbA1C  assay. Heterozygous hemoglobin variants (HbS, HgC, etc)do  not significantly interfere with this assay.   However, presence of multiple variants may affect accuracy.         TSH        The ASCVD Risk score (Pratibha LARA, et al., 2019) failed to calculate for the following reasons:    The 2019 ASCVD risk score is only valid for ages 40 to 79    The patient has a prior MI or stroke diagnosis             ASSESSMENT AND PLAN     Patient Active Problem List   Diagnosis    Essential hypertension    Type 2 diabetes mellitus without complication, without long-term current use of insulin    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 2/2018; 11/7/23 EGD Archer's stage C10-M11, biopsied. 4 cm HH path metaplasia;  repeat 3-5 years for Archer's    Anxiety    Depression    Insomnia    Slow urinary stream    Tubular adenoma of colon 2/2018 3 adenomas repeat 3 years    Hypogonadism in male    Prostate cancer 2/25/19 TRUS of prostate and gold fiducial marker placement; 4/2019 s/p XRT    History of Non-STEMI (non-ST elevated myocardial infarction) 3/2019 from sepsis; Culprit likely in small Diag2 (unrevascularized) and likely  demand related    Coronary artery disease 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; 3/2019 [facility] patent graft; Culprit likely in small Diag2 (unrevascularized) and likely demand related    Renal cyst, left, simple 1st seen 2/2019 CT; verified on renal US 11/2020    Status post coronary artery bypass grafting single vessel    Refractive error    Intermediate stage nonexudative age-related macular degeneration of both eyes    Nuclear sclerosis of both eyes    History of DVT from PICC line right upper extremity 4/2019 anticoag x 3 months then stopped    Abdominal aortic atherosclerosis    Type 2 diabetes mellitus with diabetic cataract, without long-term current use of insulin    Current mild episode of major depressive disorder without prior episode    Epiretinal membrane (ERM) of both eyes    Stage 3a chronic kidney disease    Chronic left shoulder pain    Decreased range of motion of left shoulder    Shoulder weakness    PVC (premature ventricular contraction)     Patient with coronary artery disease status post CABG.  Last angiogram in 2019 revealed patent bypass grafts with severe underlying coronary artery disease.  Exercise induced chest pain and shortness of breath.   Nuclear portion of the stress test did not show any significant ischemia but EKG was markedly abnormal.  Patient continues to have  lifestyle limiting symptoms.  Had a detailed discussion.  After detailed discussion decided to proceed with angiography and intervention as needed    Risks, benefits and alternatives of the catheterization procedure were discussed with the patient.The risks of coronary angiography include but are not limited to: bleeding, infection, death, heart attack, arrhythmia, kidney injury or failure, potential need for dialysis, allergic reactions, stroke, need for emergency surgery, hematoma, pseudoaneurysm etc.  Should stenting be indicated, the patient has agreed to dual anti-platelet therapy for  1-consecutive year with a drug-eluting stent and a minimum of 1-month with the use of a bare metal stent. Additionally, pt is aware that non-compliance is likely to result in stent clotting with heart attack, heart failure, and/or death  The risks of moderate sedation include hypotension, respiratory depression, arrhythmias, bronchospasm, and death. Informed consent was obtained and the  patient is agreeable to proceed with the procedure. Consent was placed on the chart.      Uncontrolled dyslipidemia:  Patient could not tolerate statins in the past.   Now agreeable to start ezetimibe.  Start ezetimibe 10 mg daily    Hypertension:  Well controlled.  At home stays around 120-130 mmHg as per patient    Follow-up after testing            Thank you very much for involving me in the care of your patient.  Please do not hesitate to contact me if there are any questions.      Milly Cadena MD, FACC, [facility]  Interventional Cardiologist, [facility].           This note was dictated with the help of speech recognition software.  There might be un-intended errors and/or substitutions.

## 2024-10-04 ENCOUNTER — HOSPITAL ENCOUNTER (OUTPATIENT)
Dept: PREADMISSION TESTING | Facility: HOSPITAL | Age: 84
Discharge: HOME | End: 2024-10-04
Attending: INTERNAL MEDICINE
Payer: MEDICARE

## 2024-10-04 VITALS
WEIGHT: 166.25 LBS | RESPIRATION RATE: 18 BRPM | HEIGHT: 67 IN | OXYGEN SATURATION: 97 % | HEART RATE: 64 BPM | DIASTOLIC BLOOD PRESSURE: 79 MMHG | BODY MASS INDEX: 26.09 KG/M2 | SYSTOLIC BLOOD PRESSURE: 140 MMHG | TEMPERATURE: 97 F

## 2024-10-04 DIAGNOSIS — I25.10 CORONARY ARTERY DISEASE INVOLVING NATIVE CORONARY ARTERY OF NATIVE HEART WITHOUT ANGINA PECTORIS: ICD-10-CM

## 2024-10-04 LAB
ANION GAP SERPL CALC-SCNC: 11 MMOL/L (ref 8–16)
BASOPHILS # BLD AUTO: 0.03 K/UL (ref 0–0.2)
BASOPHILS NFR BLD: 0.3 % (ref 0–1.9)
BUN SERPL-MCNC: 17 MG/DL (ref 8–23)
CALCIUM SERPL-MCNC: 9.7 MG/DL (ref 8.7–10.5)
CHLORIDE SERPL-SCNC: 101 MMOL/L (ref 95–110)
CO2 SERPL-SCNC: 27 MMOL/L (ref 23–29)
CREAT SERPL-MCNC: 1.2 MG/DL (ref 0.5–1.4)
DIFFERENTIAL METHOD BLD: ABNORMAL
EOSINOPHIL # BLD AUTO: 0.2 K/UL (ref 0–0.5)
EOSINOPHIL NFR BLD: 1.9 % (ref 0–8)
ERYTHROCYTE [DISTWIDTH] IN BLOOD BY AUTOMATED COUNT: 13 % (ref 11.5–14.5)
EST. GFR  (NO RACE VARIABLE): >60 ML/MIN/1.73 M^2
GLUCOSE SERPL-MCNC: 101 MG/DL (ref 70–110)
HCT VFR BLD AUTO: 45.8 % (ref 40–54)
HGB BLD-MCNC: 15.3 G/DL (ref 14–18)
IMM GRANULOCYTES # BLD AUTO: 0.05 K/UL (ref 0–0.04)
IMM GRANULOCYTES NFR BLD AUTO: 0.5 % (ref 0–0.5)
LYMPHOCYTES # BLD AUTO: 1.7 K/UL (ref 1–4.8)
LYMPHOCYTES NFR BLD: 16.1 % (ref 18–48)
MCH RBC QN AUTO: 30.6 PG (ref 27–31)
MCHC RBC AUTO-ENTMCNC: 33.4 G/DL (ref 32–36)
MCV RBC AUTO: 92 FL (ref 82–98)
MONOCYTES # BLD AUTO: 0.9 K/UL (ref 0.3–1)
MONOCYTES NFR BLD: 8.3 % (ref 4–15)
NEUTROPHILS # BLD AUTO: 7.7 K/UL (ref 1.8–7.7)
NEUTROPHILS NFR BLD: 72.9 % (ref 38–73)
NRBC BLD-RTO: 0 /100 WBC
PLATELET # BLD AUTO: 203 K/UL (ref 150–450)
PMV BLD AUTO: 9.9 FL (ref 9.2–12.9)
POTASSIUM SERPL-SCNC: 4.1 MMOL/L (ref 3.5–5.1)
RBC # BLD AUTO: 5 M/UL (ref 4.6–6.2)
SODIUM SERPL-SCNC: 139 MMOL/L (ref 136–145)
WBC # BLD AUTO: 10.59 K/UL (ref 3.9–12.7)

## 2024-10-04 PROCEDURE — 36415 COLL VENOUS BLD VENIPUNCTURE: CPT | Mod: HCNC | Performed by: INTERNAL MEDICINE

## 2024-10-04 PROCEDURE — 85025 COMPLETE CBC W/AUTO DIFF WBC: CPT | Mod: HCNC | Performed by: INTERNAL MEDICINE

## 2024-10-04 PROCEDURE — 80048 BASIC METABOLIC PNL TOTAL CA: CPT | Mod: HCNC | Performed by: INTERNAL MEDICINE

## 2024-10-04 NOTE — DISCHARGE INSTRUCTIONS
YOUR PROCEDURE WILL BE AT [facility] at [address] [place] [address]                             Enter through the Main Entrance facing Nancy Soto.                      Report to the Same Day Surgery Registration Desk in the hallway.(Just beside the Same Day Surgery Unit)      Your procedure  is scheduled for _10/9/2024________________.    Call [phone] between 2pm and 5pm on _10/8/2024______to find out your arrival time for the day of surgery.    You may have two visitors.  No children under 12 years old.      You will be going to the Same Day Surgery Unit on the 2nd floor of the hospital.    Important instructions:  Do not eat anything after midnight.  You may have plain water, non carbonated.  You may also have Gatorade or Powerade after midnight.    Stop all fluids 2 hours before your surgery.    It is okay to brush your teeth.  Do not have gum, candy or mints.    Do not take any diabetic medication on the morning of surgery unless instructed to do so by your doctor or pre op nurse.      Metformin, Invokamet and Synjardy must be stopped two days before your procedure.  Do not take on the day of procedure.  Resume when instructed.    Please shower the night before and the morning of your surgery.        Use Chlorhexidine soap as instructed by your pre op nurse.   Please place clean linens on your bed the night before surgery. Please wear fresh clean clothing after each shower.    No shaving of procedural area at least 4-5 days before surgery due to increased risk of skin irritation and/or possible infection.    Contact lenses and removable denture work may not be worn during your procedure.    You may wear deodorant only.     Do not wear powder, body lotion, perfume/cologne or make-up.    Do not wear any jewelry or have any metal on your body.    You will be asked to remove any dentures or partials for the procedure.    If you are going home on the same day  of surgery, you must arrange for a family member or a friend to drive you home.  Public transportation is prohibited.  You will not be able to drive home if you were given anesthesia or sedation.    Please leave money and valuables home.      You may bring your cell phone.    Call the doctor if fever or illness should occur before your surgery.    Call [phone] to contact us here if needed.

## 2024-10-08 ENCOUNTER — TELEPHONE (OUTPATIENT)
Dept: SURGERY | Facility: HOSPITAL | Age: 84
End: 2024-10-08
Payer: MEDICARE

## 2024-10-09 ENCOUNTER — HOSPITAL ENCOUNTER (OUTPATIENT)
Facility: HOSPITAL | Age: 84
Discharge: HOME | End: 2024-10-09
Attending: INTERNAL MEDICINE | Admitting: INTERNAL MEDICINE
Payer: MEDICARE

## 2024-10-09 VITALS
OXYGEN SATURATION: 97 % | WEIGHT: 166.31 LBS | RESPIRATION RATE: 18 BRPM | HEART RATE: 57 BPM | TEMPERATURE: 98 F | DIASTOLIC BLOOD PRESSURE: 64 MMHG | SYSTOLIC BLOOD PRESSURE: 134 MMHG | BODY MASS INDEX: 26.05 KG/M2

## 2024-10-09 DIAGNOSIS — I25.10 CAD (CORONARY ARTERY DISEASE): ICD-10-CM

## 2024-10-09 DIAGNOSIS — N18.31 STAGE 3A CHRONIC KIDNEY DISEASE: ICD-10-CM

## 2024-10-09 DIAGNOSIS — N28.1 RENAL CYST, LEFT: ICD-10-CM

## 2024-10-09 DIAGNOSIS — I70.0 ABDOMINAL AORTIC ATHEROSCLEROSIS: ICD-10-CM

## 2024-10-09 DIAGNOSIS — I10 ESSENTIAL HYPERTENSION: ICD-10-CM

## 2024-10-09 DIAGNOSIS — H35.3132 INTERMEDIATE STAGE NONEXUDATIVE AGE-RELATED MACULAR DEGENERATION OF BOTH EYES: ICD-10-CM

## 2024-10-09 DIAGNOSIS — E29.1 HYPOGONADISM IN MALE: ICD-10-CM

## 2024-10-09 DIAGNOSIS — R39.198 SLOW URINARY STREAM: ICD-10-CM

## 2024-10-09 DIAGNOSIS — F32.0 CURRENT MILD EPISODE OF MAJOR DEPRESSIVE DISORDER WITHOUT PRIOR EPISODE: ICD-10-CM

## 2024-10-09 DIAGNOSIS — Z86.718 HISTORY OF DVT IN ADULTHOOD: ICD-10-CM

## 2024-10-09 DIAGNOSIS — D12.6 TUBULAR ADENOMA OF COLON: ICD-10-CM

## 2024-10-09 DIAGNOSIS — Z95.1 STATUS POST CORONARY ARTERY BYPASS GRAFTING: ICD-10-CM

## 2024-10-09 DIAGNOSIS — I49.3 PVC (PREMATURE VENTRICULAR CONTRACTION): ICD-10-CM

## 2024-10-09 DIAGNOSIS — E11.36 TYPE 2 DIABETES MELLITUS WITH DIABETIC CATARACT, WITHOUT LONG-TERM CURRENT USE OF INSULIN: Primary | ICD-10-CM

## 2024-10-09 DIAGNOSIS — M25.512 CHRONIC LEFT SHOULDER PAIN: ICD-10-CM

## 2024-10-09 DIAGNOSIS — K22.70 BARRETT'S ESOPHAGUS WITHOUT DYSPLASIA: ICD-10-CM

## 2024-10-09 DIAGNOSIS — H35.373 EPIRETINAL MEMBRANE (ERM) OF BOTH EYES: ICD-10-CM

## 2024-10-09 DIAGNOSIS — I25.10 CORONARY ARTERY DISEASE INVOLVING NATIVE CORONARY ARTERY OF NATIVE HEART WITHOUT ANGINA PECTORIS: ICD-10-CM

## 2024-10-09 DIAGNOSIS — C61 PROSTATE CANCER: ICD-10-CM

## 2024-10-09 DIAGNOSIS — H25.13 NUCLEAR SCLEROSIS OF BOTH EYES: ICD-10-CM

## 2024-10-09 DIAGNOSIS — I25.2 HISTORY OF NON-ST ELEVATION MYOCARDIAL INFARCTION (NSTEMI): ICD-10-CM

## 2024-10-09 DIAGNOSIS — I25.118 CORONARY ARTERY DISEASE OF NATIVE ARTERY OF NATIVE HEART WITH STABLE ANGINA PECTORIS: ICD-10-CM

## 2024-10-09 DIAGNOSIS — G89.29 CHRONIC LEFT SHOULDER PAIN: ICD-10-CM

## 2024-10-09 DIAGNOSIS — E11.9 TYPE 2 DIABETES MELLITUS WITHOUT COMPLICATION, WITHOUT LONG-TERM CURRENT USE OF INSULIN: ICD-10-CM

## 2024-10-09 DIAGNOSIS — R29.898 SHOULDER WEAKNESS: ICD-10-CM

## 2024-10-09 DIAGNOSIS — H52.7 REFRACTIVE ERROR: ICD-10-CM

## 2024-10-09 DIAGNOSIS — M25.612 DECREASED RANGE OF MOTION OF LEFT SHOULDER: ICD-10-CM

## 2024-10-09 DIAGNOSIS — F41.9 ANXIETY: ICD-10-CM

## 2024-10-09 LAB — POCT GLUCOSE: 116 MG/DL (ref 70–110)

## 2024-10-09 PROCEDURE — 25000003 PHARM REV CODE 250: Mod: HCNC | Performed by: INTERNAL MEDICINE

## 2024-10-09 PROCEDURE — 99152 MOD SED SAME PHYS/QHP 5/>YRS: CPT | Mod: HCNC | Performed by: INTERNAL MEDICINE

## 2024-10-09 PROCEDURE — C1769 GUIDE WIRE: HCPCS | Mod: HCNC | Performed by: INTERNAL MEDICINE

## 2024-10-09 PROCEDURE — 99152 MOD SED SAME PHYS/QHP 5/>YRS: CPT | Mod: HCNC,,, | Performed by: INTERNAL MEDICINE

## 2024-10-09 PROCEDURE — 93459 L HRT ART/GRFT ANGIO: CPT | Mod: 26,HCNC,, | Performed by: INTERNAL MEDICINE

## 2024-10-09 PROCEDURE — 25500020 PHARM REV CODE 255: Mod: HCNC | Performed by: INTERNAL MEDICINE

## 2024-10-09 PROCEDURE — 93459 L HRT ART/GRFT ANGIO: CPT | Mod: HCNC | Performed by: INTERNAL MEDICINE

## 2024-10-09 PROCEDURE — C1887 CATHETER, GUIDING: HCPCS | Mod: HCNC | Performed by: INTERNAL MEDICINE

## 2024-10-09 PROCEDURE — 63600175 PHARM REV CODE 636 W HCPCS: Mod: HCNC | Performed by: INTERNAL MEDICINE

## 2024-10-09 RX ORDER — SODIUM CHLORIDE 9 MG/ML
INJECTION, SOLUTION INTRAVENOUS CONTINUOUS
Status: DISCONTINUED | OUTPATIENT
Start: 2024-10-09 | End: 2024-10-09 | Stop reason: HOSPADM

## 2024-10-09 RX ORDER — ASPIRIN 81 MG/1
81 TABLET ORAL ONCE
Status: COMPLETED | OUTPATIENT
Start: 2024-10-09 | End: 2024-10-09

## 2024-10-09 RX ORDER — MORPHINE SULFATE 4 MG/ML
3 INJECTION, SOLUTION INTRAMUSCULAR; INTRAVENOUS
Status: DISCONTINUED | OUTPATIENT
Start: 2024-10-09 | End: 2024-10-09 | Stop reason: HOSPADM

## 2024-10-09 RX ORDER — ONDANSETRON HYDROCHLORIDE 2 MG/ML
4 INJECTION, SOLUTION INTRAVENOUS EVERY 12 HOURS PRN
Status: DISCONTINUED | OUTPATIENT
Start: 2024-10-09 | End: 2024-10-09 | Stop reason: HOSPADM

## 2024-10-09 RX ORDER — OXYCODONE HYDROCHLORIDE 5 MG/1
5 TABLET ORAL EVERY 4 HOURS PRN
Status: DISCONTINUED | OUTPATIENT
Start: 2024-10-09 | End: 2024-10-09 | Stop reason: HOSPADM

## 2024-10-09 RX ORDER — DIPHENHYDRAMINE HCL 25 MG
50 CAPSULE ORAL ONCE
Status: COMPLETED | OUTPATIENT
Start: 2024-10-09 | End: 2024-10-09

## 2024-10-09 RX ORDER — CLOPIDOGREL BISULFATE 75 MG/1
75 TABLET ORAL ONCE
Status: COMPLETED | OUTPATIENT
Start: 2024-10-09 | End: 2024-10-09

## 2024-10-09 RX ORDER — ACETAMINOPHEN 325 MG/1
650 TABLET ORAL EVERY 4 HOURS PRN
Status: DISCONTINUED | OUTPATIENT
Start: 2024-10-09 | End: 2024-10-09 | Stop reason: HOSPADM

## 2024-10-09 RX ORDER — FENTANYL CITRATE 50 UG/ML
INJECTION, SOLUTION INTRAMUSCULAR; INTRAVENOUS
Status: DISCONTINUED | OUTPATIENT
Start: 2024-10-09 | End: 2024-10-09 | Stop reason: HOSPADM

## 2024-10-09 RX ORDER — LIDOCAINE HYDROCHLORIDE 10 MG/ML
INJECTION, SOLUTION EPIDURAL; INFILTRATION; INTRACAUDAL; PERINEURAL
Status: DISCONTINUED | OUTPATIENT
Start: 2024-10-09 | End: 2024-10-09 | Stop reason: HOSPADM

## 2024-10-09 RX ORDER — MIDAZOLAM HYDROCHLORIDE 1 MG/ML
INJECTION, SOLUTION INTRAMUSCULAR; INTRAVENOUS
Status: DISCONTINUED | OUTPATIENT
Start: 2024-10-09 | End: 2024-10-09 | Stop reason: HOSPADM

## 2024-10-09 RX ADMIN — CLOPIDOGREL BISULFATE 75 MG: 75 TABLET ORAL at 07:10

## 2024-10-09 RX ADMIN — SODIUM CHLORIDE: 9 INJECTION, SOLUTION INTRAVENOUS at 09:10

## 2024-10-09 RX ADMIN — DIPHENHYDRAMINE HYDROCHLORIDE 50 MG: 25 CAPSULE ORAL at 07:10

## 2024-10-09 RX ADMIN — ASPIRIN 81 MG: 81 TABLET, COATED ORAL at 07:10

## 2024-10-09 RX ADMIN — SODIUM CHLORIDE: 9 INJECTION, SOLUTION INTRAVENOUS at 07:10

## 2024-10-09 NOTE — PLAN OF CARE
Pt verbalized a readiness to go home. VSS. Written and verbal discharge instructions given. Pt verbalized an understanding to resume Metformin on Friday (10/11/24) Pt aware of follow-up appt.

## 2024-10-09 NOTE — Clinical Note
The catheter was inserted into the, was removed from the and was inserted over the wire into the left subclavian artery. An angiography was performed of the graft. Multiple views were taken. The angiography was performed via power injection.

## 2024-10-09 NOTE — DISCHARGE INSTRUCTIONS
Drink plenty of fluids for the next 48 hours and follow your doctor's diet orders.  Rest for the next 72 hours. Try not to keep the injected leg bent for a long period of time.  Remove the dressing in 24 hours, and you may shower. Clean the area with soap and water, and apply a band aid for the  next 5 days.      No Lifting over 5-10 lbs., that is, not more than 1 gallon of water, or straining for 72 hours.    No driving, no drinking alcohol, and no signing legal documents for the next 24 hours.    Call your doctor for elevated temperature, shortness of breath, chest pain, or cold discolored leg.   If oozing occurs at the injections site, lie down. Apply pressure with a clean wash cloth for 20 to 30 minutes and call  your doctor.  If severe bleeding occurs, lie down, apply pressure. Call 911 and request an ambulance to take you to the nearest  hospital emergency room.          Fall Prevention  Millions of people fall every year and injure themselves. You may have had anesthesia or sedation which may increase your risk of falling. You may have health issues that put you at an increased risk of falling.     Here are ways to reduce your risk of falling.    Make your home safe by keeping walkways clear of objects you may trip over.  Use non-slip pads under rugs. Do not use area rugs or small throw rugs.  Use non-slip mats in bathtubs and showers.  Install handrails and lights on staircases.  Do not walk in poorly lit areas.  Do not stand on chairs or wobbly ladders.  Use caution when reaching overhead or looking upward. This position can cause a loss of balance.  Be sure your shoes fit properly, have non-slip bottoms and are in good condition.   Wear shoes both inside and out. Avoid going barefoot or wearing slippers.  Be cautious when going up and down stairs, curbs, and when walking on uneven sidewalks.  If your balance is poor, consider using a cane or walker.  If your fall was related to alcohol use, stop or limit  alcohol intake.   If your fall was related to use of sleeping medicines, talk to your doctor about this. You may need to reduce your dosage at bedtime if you awaken during the night to go to the bathroom.    To reduce the need for nighttime bathroom trips:  Avoid drinking fluids for several hours before going to bed  Empty your bladder before going to bed  Men can keep a urinal at the bedside  Stay as active as you can. Balance, flexibility, strength, and endurance all come from exercise. They all play a role in preventing falls. Ask your healthcare provider which types of activity are right for you.  Get your vision checked on a regular basis.  If you have pets, know where they are before you stand up or walk so you don't trip over them.  Use night lights.

## 2024-10-23 ENCOUNTER — OFFICE VISIT (OUTPATIENT)
Dept: CARDIOLOGY | Facility: CLINIC | Age: 84
End: 2024-10-23
Payer: MEDICARE

## 2024-10-23 VITALS
SYSTOLIC BLOOD PRESSURE: 156 MMHG | BODY MASS INDEX: 25.64 KG/M2 | HEART RATE: 75 BPM | WEIGHT: 163.38 LBS | HEIGHT: 67 IN | OXYGEN SATURATION: 96 % | DIASTOLIC BLOOD PRESSURE: 72 MMHG | RESPIRATION RATE: 18 BRPM

## 2024-10-23 DIAGNOSIS — I70.0 ABDOMINAL AORTIC ATHEROSCLEROSIS: ICD-10-CM

## 2024-10-23 DIAGNOSIS — I25.10 CORONARY ARTERY DISEASE INVOLVING NATIVE CORONARY ARTERY OF NATIVE HEART WITHOUT ANGINA PECTORIS: ICD-10-CM

## 2024-10-23 DIAGNOSIS — I10 ESSENTIAL HYPERTENSION: Primary | ICD-10-CM

## 2024-10-23 DIAGNOSIS — E11.9 TYPE 2 DIABETES MELLITUS WITHOUT COMPLICATION, WITHOUT LONG-TERM CURRENT USE OF INSULIN: ICD-10-CM

## 2024-10-23 DIAGNOSIS — I25.10 CORONARY ARTERY DISEASE, UNSPECIFIED VESSEL OR LESION TYPE, UNSPECIFIED WHETHER ANGINA PRESENT, UNSPECIFIED WHETHER NATIVE OR TRANSPLANTED HEART: ICD-10-CM

## 2024-10-23 DIAGNOSIS — R06.00 DYSPNEA, UNSPECIFIED TYPE: ICD-10-CM

## 2024-10-23 PROCEDURE — 99999 PR PBB SHADOW E&M-EST. PATIENT-LVL V: CPT | Mod: PBBFAC,HCNC,, | Performed by: INTERNAL MEDICINE

## 2024-10-23 RX ORDER — LOSARTAN POTASSIUM 100 MG/1
100 TABLET ORAL DAILY
Qty: 90 TABLET | Refills: 3 | Status: SHIPPED | OUTPATIENT
Start: 2024-10-23

## 2024-10-23 RX ORDER — SPIRONOLACTONE 25 MG/1
25 TABLET ORAL DAILY
Qty: 90 TABLET | Refills: 3 | Status: SHIPPED | OUTPATIENT
Start: 2024-10-23

## 2024-10-23 NOTE — PROGRESS NOTES
CARDIOVASCULAR CONSULTATION    REASON FOR CONSULT:   Eric Jackson is a 83 y.o. male who presents for  evaluation     HISTORY OF PRESENT ILLNESS:       Evaluation patient is a pleasant 82-year-old man.  Past medical history of coronary artery disease status post CABG.  Used to follow with Dr. Soto it, has not seen him since 2019.  Now wants to reestablish care with Cardiology.  Last angiogram had revealed patent bypass grafts.  Last angiogram was done in 2019.  Patient states that for the past few months he has been experiencing chest heaviness.  No particular aggravating or relieving factors.  Also occasional dizziness.  No particular aggravating or relieving factors for the dizziness.  Not on statins and refuses to be on statins, Zetia or Repatha.      2019:      LVEDP: 22mmHg  LVEF: 55% by echo  Wall Motion: normal by echo     Dominance: Right  LM: dist 50%  LAD: mid 99%, competitive flow from LIMA-LAD-OM-RPDA              Diag2 prox 90%, T2 flow (not bypassed)  LCx: prox , territory supplied by LIMA-LAD-OM-RPDA  RCA: mid , territory supplied by LIMA-LAD-OM-RPDA     LIMA-LAD-OM-RPDA patent     No SVG identified     Hemostasis:  RFA     Impression:  NSTEMI  3V CAD, normal LV fxn  Patent LIMA-LAD-OM-RPDA  Culprit likely in small Diag2 (unrevascularized) and likely demand related  Man compression RFA for hemostasis     Plan:  Cont med rx  ASA 81mg qd indefinitely  Plavix 75mg qd for 1 year (thru 3/2020)  BBl/ARB  Statin allergy noted  Consider PCSK9 inhibitor if statin allergy confirmed  Dispo planning appropriate  Pt to follow up with Dr. Seay after discharge           Mar 13 23: doing fine. No ischemia on stress test.  patient states that his symptoms of chest pains went away after we were able to control his blood pressure by increasing his medications on last clinic visit.      Sinus rhythm with heart rates varying between 45 and 124 BPM with an average of 73BPM. Total time in sinus rhythm was  approximately 48 hours.  There were occasional PVCs totalling 768 and averaging 16.59 per hour. There were 10 couplets. There were 3 bigeminal cycles.  There were occasional PACs totalling 866 and averaging 18.7 per hour.        The left ventricle is normal in size with mild concentric hypertrophy and low normal systolic function.  The estimated ejection fraction is 50%.  Normal right ventricular size with low normal right ventricular systolic function.  There is mild aortic valve stenosis.  Aortic valve area is 1.60 cm2; peak velocity is 1.51 m/s; mean gradient is 6 mmHg.  Mild tricuspid regurgitation.  The estimated PA systolic pressure is 30 mmHg.          Normal myocardial perfusion scan. There is no evidence of myocardial ischemia or infarction.    The gated perfusion images showed an ejection fraction of 57% post stress.    There is normal wall motion post stress.    Notes from August 27, 2024: Patient here for follow-up.  When he walks on the treadmill for more than 7 minutes he gets short of breath.  And some chest tightness    Notes from September 20, 2024:  Patient here for follow-up.  Continues having dyspnea on exertion on minimal exertion.  Stress test shows that the EKG portion of the stress test was markedly abnormal, nuclear portion was normal.  Patient is concerned that his symptoms are lifestyle limiting.  After detailed discussion decided to proceed with coronary angiography for further evaluation    Results for orders placed or performed in visit on 08/27/24   IN OFFICE EKG 12-LEAD (to [place])    Collection Time: 08/27/24  3:08 PM   Result Value Ref Range    QRS Duration 98 ms    OHS QTC Calculation 404 ms    Narrative    Test Reason : I10,    Vent. Rate : 071 BPM     Atrial Rate : 071 BPM     P-R Int : 228 ms          QRS Dur : 098 ms      QT Int : 372 ms       P-R-T Axes : 055 043 -51 degrees     QTc Int : 404 ms    Sinus rhythm with 1st degree A-V block  Possible Anterior infarct (cited on or  before 27-AUG-2024)  ST and T wave abnormality, consider inferior ischemia  Abnormal ECG  When compared with ECG of 01-MAR-2023 09:35,  Previous ECG has undetermined rhythm, needs review  Serial changes of Anterior infarct Present  Confirmed by Odilon Klein MD ([id number]) on 8/28/2024 3:20:49 PM    Referred By:  RUBIO           Confirmed By:Odilon Klein MD       Results for orders placed during the hospital encounter of 09/17/24    Echo    Interpretation Summary    Left Ventricle: The left ventricle is normal in size. Mildly increased wall thickness. There is mild concentric hypertrophy. There is normal systolic function with a visually estimated ejection fraction of 55 - 60%. Grade I diastolic dysfunction.    Right Ventricle: Normal right ventricular cavity size. Systolic function is normal.    Aortic Valve: The aortic valve is a trileaflet valve. There is moderate aortic valve sclerosis. Mildly restricted motion. There is mild stenosis. Aortic valve area by VTI is 1.21 cm². Aortic valve peak velocity is 1.60 m/s. Mean gradient is 6 mmHg. The dimensionless index is 0.40.    Tricuspid Valve: There is mild regurgitation.    Pulmonary Artery: The estimated pulmonary artery systolic pressure is 27 mmHg.      Results for orders placed during the hospital encounter of 09/17/24    Nuclear Stress - Cardiology Interpreted    Interpretation Summary    The patient exercised for 10 minutes 23 seconds on a Gonzalo protocol, corresponding to a functional capacity of 12METS, achieving a peak heart rate of 127 bpm, which is 91% of the age predicted maximum heart rate. The patient reported shortness of breath during the stress test.    Normal myocardial perfusion scan. There is no evidence of myocardial ischemia or infarction.    The gated perfusion images showed an ejection fraction of 55% post stress.    There is normal wall motion at post-stress.    The ECG portion of the study is positive for ischemia.  4mm horizontal ST depression at  peak stress.    The patient reported no chest pain during the stress test.      Results for orders placed during the hospital encounter of 02/28/19    Cardiac catheterization    Conclusion  LVEDP: 22mmHg  LVEF: 55% by echo  Wall Motion: normal by echo    Dominance: Right  LM: dist 50%  LAD: mid 99%, competitive flow from LIMA-LAD-OM-RPDA  Diag2 prox 90%, T2 flow (not bypassed)  LCx: prox , territory supplied by LIMA-LAD-OM-RPDA  RCA: mid , territory supplied by LIMA-LAD-OM-RPDA    LIMA-LAD-OM-RPDA patent    No SVG identified    Hemostasis:  RFA      10/23/2024    History of Present Illness    CHIEF COMPLAINT:  Eric presents today for follow-up on shortness of breath.    RESPIRATORY:  He reports experiencing shortness of breath, particularly during exercise. He gets out of breath quickly and has reduced exercise tolerance. He denies any associated chest pain with these episodes.    CARDIOVASCULAR:  He reports occasional swelling in his feet after standing for long periods. He denies experiencing persistent or frequent edema.    MEDICATIONS:  He is currently taking losartan hydrochlorothiazide and ezetimibe for cholesterol management. He reports tolerating the ezetimibe well and denies any side effects.    SOCIAL HISTORY:  He is a former smoker who quit in 1989 after a 20-year history of heavy smoking (2-3 packs per day). He has a past history of heavy alcohol consumption for approximately 3 years. Following an incident of excessive drinking that led to nausea and vomiting, he abstained from alcohol for 6 years.         Here for follow-up after angiogram.  No complications from angiogram    Results for orders placed or performed in visit on 08/27/24   IN OFFICE EKG 12-LEAD (to [place])    Collection Time: 08/27/24  3:08 PM   Result Value Ref Range    QRS Duration 98 ms    OHS QTC Calculation 404 ms    Narrative    Test Reason : I10,    Vent. Rate : 071 BPM     Atrial Rate : 071 BPM     P-R Int : 228 ms           QRS Dur : 098 ms      QT Int : 372 ms       P-R-T Axes : 055 043 -51 degrees     QTc Int : 404 ms    Sinus rhythm with 1st degree A-V block  Possible Anterior infarct (cited on or before 27-AUG-2024)  ST and T wave abnormality, consider inferior ischemia  Abnormal ECG  When compared with ECG of 01-MAR-2023 09:35,  Previous ECG has undetermined rhythm, needs review  Serial changes of Anterior infarct Present  Confirmed by Odilon Klein MD ([id number]) on 8/28/2024 3:20:49 PM    Referred By:  RUBIO           Confirmed By:Odilon Klein MD       Results for orders placed during the hospital encounter of 09/17/24    Echo    Interpretation Summary    Left Ventricle: The left ventricle is normal in size. Mildly increased wall thickness. There is mild concentric hypertrophy. There is normal systolic function with a visually estimated ejection fraction of 55 - 60%. Grade I diastolic dysfunction.    Right Ventricle: Normal right ventricular cavity size. Systolic function is normal.    Aortic Valve: The aortic valve is a trileaflet valve. There is moderate aortic valve sclerosis. Mildly restricted motion. There is mild stenosis. Aortic valve area by VTI is 1.21 cm². Aortic valve peak velocity is 1.60 m/s. Mean gradient is 6 mmHg. The dimensionless index is 0.40.    Tricuspid Valve: There is mild regurgitation.    Pulmonary Artery: The estimated pulmonary artery systolic pressure is 27 mmHg.      Results for orders placed during the hospital encounter of 09/17/24    Nuclear Stress - Cardiology Interpreted    Interpretation Summary    The patient exercised for 10 minutes 23 seconds on a Gonzalo protocol, corresponding to a functional capacity of 12METS, achieving a peak heart rate of 127 bpm, which is 91% of the age predicted maximum heart rate. The patient reported shortness of breath during the stress test.    Normal myocardial perfusion scan. There is no evidence of myocardial ischemia or infarction.    The gated perfusion images  showed an ejection fraction of 55% post stress.    There is normal wall motion at post-stress.    The ECG portion of the study is positive for ischemia.  4mm horizontal ST depression at peak stress.    The patient reported no chest pain during the stress test.      Results for orders placed during the hospital encounter of 10/09/24    Cardiac catheterization    Conclusion    There was three vessel coronary artery disease. There was left main disease.    The Mid LAD lesion was 99% stenosed.    The Prox Cx lesion was 100% stenosed.    The 2nd Diag lesion was 90% stenosed.    The Dist LM lesion was 99% stenosed.    The Prox LAD lesion was 99% stenosed.    The pre-procedure left ventricular end diastolic pressure was 12.    The estimated blood loss was <50 mL.    Coronary angiography:    Left main and severe triple-vessel disease.  RCA is a known     Graft angiography:  LIMA-LAD-OM-RPDA is patent    Assessment plan    Continue medical management and aggressive risk factor modification    Access:  Right common femoral artery access      The procedure log was documented by Documenter: Dion Hoang RN and verified by Milly Cadena MD.    Date: 10/9/2024  Time: 9:25 AM          PAST MEDICAL HISTORY:     Past Medical History:   Diagnosis Date    Anemia     Angina pectoris     Anxiety     Cancer     Coronary artery disease     Depression     Diabetes mellitus type II     Disorder of kidney and ureter     Erectile dysfunction     Eye injuries     fb ou    GERD (gastroesophageal reflux disease)     Hypertension     Intermediate stage nonexudative age-related macular degeneration of both eyes 4/9/2019    Myocardial infarction     Nuclear sclerosis of both eyes 4/9/2019    Prostate cancer     Trouble in sleeping     Type 2 diabetes mellitus     Type 2 diabetes mellitus with ophthalmic manifestations        PAST SURGICAL HISTORY:     Past Surgical History:   Procedure Laterality Date    COLONOSCOPY N/A 2/9/2018     Procedure: COLONOSCOPY;  Surgeon: Mathieu Cao MD;  Location: [facility] ENDO;  Service: Endoscopy;  Laterality: N/A;    CORONARY ANGIOGRAPHY INCLUDING BYPASS GRAFTS WITH CATHETERIZATION OF LEFT HEART N/A 3/4/2019    Procedure: ANGIOGRAM, CORONARY, INCLUDING BYPASS GRAFT, WITH LEFT HEART CATHETERIZATION;  Surgeon: Jamir Nam MD;  Location: [facility] CATH LAB;  Service: Cardiology;  Laterality: N/A;    CORONARY ANGIOGRAPHY INCLUDING BYPASS GRAFTS WITH CATHETERIZATION OF LEFT HEART N/A 10/9/2024    Procedure: ANGIOGRAM, CORONARY, INCLUDING BYPASS GRAFT, WITH LEFT HEART CATHETERIZATION;  Surgeon: Milly Cadena MD;  Location: [facility] CATH LAB;  Service: Cardiology;  Laterality: N/A;  rcfa  RN PREOP 10/4/2024    CORONARY ARTERY BYPASS GRAFT  2001    ESOPHAGOGASTRODUODENOSCOPY N/A 10/8/2020    Procedure: EGD (ESOPHAGOGASTRODUODENOSCOPY);  Surgeon: Bharath Herrera MD;  Location: [facility] ENDO;  Service: Endoscopy;  Laterality: N/A;    ESOPHAGOGASTRODUODENOSCOPY N/A 11/7/2023    Procedure: EGD (ESOPHAGOGASTRODUODENOSCOPY);  Surgeon: Bharath Herrera MD;  Location: [facility] ENDO;  Service: Endoscopy;  Laterality: N/A;    HERNIA REPAIR      LEFT HEART CATHETERIZATION Left 3/4/2019    Procedure: Left heart cath RFA access, 4fr catheter/sheath, not before 9am;  Surgeon: Jamir Nam MD;  Location: [facility] CATH LAB;  Service: Cardiology;  Laterality: Left;    TRANSRECTAL ULTRASOUND OF PROSTATE WITH INSERTION OF GOLD FIDUCIAL MARKER N/A 2/26/2019    Procedure: ULTRASOUND, PROSTATE, RECTAL APPROACH, WITH GOLD FIDUCIAL MARKER INSERTION;  Surgeon: Layne Huff MD;  Location: [facility] OR;  Service: Urology;  Laterality: N/A;    UPPER GASTROINTESTINAL ENDOSCOPY      WRIST SURGERY         ALLERGIES AND MEDICATION:     Review of patient's allergies indicates:   Allergen Reactions    Tamsulosin     Prochlorperazine      convulsions    Statins-hmg-coa reductase inhibitors Other (See Comments)     Muscle weakness         Medication List            Accurate as of October 23, 2024  2:03 PM. If you have any questions, ask your nurse or doctor.                START taking these medications      empagliflozin 10 mg tablet  Commonly known as: JARDIANCE  Take 1 tablet (10 mg total) by mouth once daily.  Started by: Milly Cadena MD     losartan 100 MG tablet  Commonly known as: COZAAR  Take 1 tablet (100 mg total) by mouth once daily.  Started by: Milly Cadena MD     spironolactone 25 MG tablet  Commonly known as: ALDACTONE  Take 1 tablet (25 mg total) by mouth once daily.  Started by: Milly Cadena MD            CONTINUE taking these medications      ALPRAZolam 1 MG tablet  Commonly known as: XANAX  Take 1 tablet (1 mg total) by mouth nightly as needed for Anxiety.     aspirin 81 MG EC tablet  Commonly known as: ECOTRIN  Take 1 tablet (81 mg total) by mouth once daily.     blood-glucose meter kit  Commonly known as: TRUE METRIX GLUCOSE METER  For twice daily checking     clopidogreL 75 mg tablet  Commonly known as: PLAVIX  Take 1 tablet (75 mg total) by mouth once daily.     ezetimibe 10 mg tablet  Commonly known as: ZETIA  Take 1 tablet (10 mg total) by mouth once daily.     imiquimod 5 % cream  Commonly known as: ALDARA  APPLY TOPICALLY 3 (THREE) TIMES A WEEK.     lancets Misc  To check BG 3 times daily, to use with insurance preferred meter     metFORMIN 500 MG tablet  Commonly known as: GLUCOPHAGE  TAKE 1 TABLET EVERY DAY WITH BREAKFAST     metoprolol succinate 25 MG 24 hr tablet  Commonly known as: TOPROL-XL  Take 0.5 tablets (12.5 mg total) by mouth once daily.     omeprazole 40 MG capsule  Commonly known as: PRILOSEC  Take 1 capsule (40 mg total) by mouth 2 (two) times daily before meals.     PRESERVISION LUTEIN ORAL     testosterone 20.25 mg/1.25 gram (1.62 %) Glpm  Commonly known as: AndroGeL  Place 1.25 g onto the skin once daily.     TRUE METRIX GLUCOSE TEST STRIP Strp  Generic drug: blood sugar diagnostic  CHECK TWO TIMES  DAILY     TRUE METRIX LEVEL 1 Soln  Generic drug: blood glucose control, low  To use with blood glucose meter     vitamin D 1000 units Tab  Commonly known as: VITAMIN D3            STOP taking these medications      losartan-hydrochlorothiazide 100-25 mg 100-25 mg per tablet  Commonly known as: HYZAAR  Stopped by: Milly Cadena MD               Where to Get Your Medications        These medications were sent to [facility] - [facility] [address]  [address], [facility] [place] [address]      Phone: [phone]   empagliflozin 10 mg tablet  losartan 100 MG tablet  spironolactone 25 MG tablet         SOCIAL HISTORY:     Social History     Socioeconomic History    Marital status:    Tobacco Use    Smoking status: Former     Current packs/day: 0.00     Average packs/day: 2.0 packs/day for 23.0 years (46.0 ttl pk-yrs)     Types: Cigarettes     Start date: 1966     Quit date: 1989     Years since quittin.8    Smokeless tobacco: Never   Substance and Sexual Activity    Alcohol use: Yes     Alcohol/week: 1.0 standard drink of alcohol     Types: 1 Cans of beer per week     Comment: occassional    Drug use: No    Sexual activity: Yes     Partners: Female     Social Drivers of Health     Financial Resource Strain: Low Risk  (2023)    Overall Financial Resource Strain (CARDIA)     Difficulty of Paying Living Expenses: Not hard at all   Food Insecurity: Unknown (2023)    Hunger Vital Sign     Ran Out of Food in the Last Year: Never true   Transportation Needs: No Transportation Needs (2023)    PRAPARE - Transportation     Lack of Transportation (Medical): No     Lack of Transportation (Non-Medical): No   Physical Activity: Insufficiently Active (2023)    Exercise Vital Sign     Days of Exercise per Week: 3 days     Minutes of Exercise per Session: 40 min   Stress: No Stress Concern Present (2023)    [employer] -  "Occupational Stress Questionnaire     Feeling of Stress : Only a little   Housing Stability: Low Risk  (7/19/2023)    Housing Stability Vital Sign     Unable to Pay for Housing in the Last Year: No     Number of Places Lived in the Last Year: 1     Unstable Housing in the Last Year: No       FAMILY HISTORY:     Family History   Adopted: Yes   Problem Relation Name Age of Onset    No Known Problems Mother      No Known Problems Father      No Known Problems Sister      No Known Problems Brother      No Known Problems Maternal Aunt      No Known Problems Maternal Uncle      No Known Problems Paternal Aunt      No Known Problems Paternal Uncle      No Known Problems Maternal Grandmother      No Known Problems Maternal Grandfather      No Known Problems Paternal Grandmother      No Known Problems Paternal Grandfather      No Known Problems Other      Amblyopia Neg Hx      Blindness Neg Hx      Cancer Neg Hx      Cataracts Neg Hx      Diabetes Neg Hx      Glaucoma Neg Hx      Hypertension Neg Hx      Macular degeneration Neg Hx      Retinal detachment Neg Hx      Strabismus Neg Hx      Stroke Neg Hx      Thyroid disease Neg Hx         REVIEW OF SYSTEMS:   Review of Systems   Constitutional: Negative.   HENT: Negative.     Eyes: Negative.    Respiratory: Negative.     Endocrine: Negative.    Hematologic/Lymphatic: Negative.    Skin: Negative.    Musculoskeletal: Negative.    Gastrointestinal: Negative.    Genitourinary: Negative.    Neurological: Negative.    Psychiatric/Behavioral: Negative.     Allergic/Immunologic: Negative.        A 10 point review of systems was performed and all the pertinent positives have been mentioned. Rest of review of systems was negative.        PHYSICAL EXAM:     Vitals:    10/23/24 1317   BP: (!) 156/72   Pulse: 75   Resp: 18    Body mass index is 25.59 kg/m².  Weight: 74.1 kg (163 lb 5.8 oz)   Height: 5' 7" (170.2 cm)     Physical Exam  Vitals reviewed.   Constitutional:       Appearance: " He is well-developed.   HENT:      Head: Normocephalic.   Eyes:      Conjunctiva/sclera: Conjunctivae normal.      Pupils: Pupils are equal, round, and reactive to light.   Cardiovascular:      Rate and Rhythm: Normal rate and regular rhythm.      Heart sounds: Normal heart sounds.   Pulmonary:      Effort: Pulmonary effort is normal.      Breath sounds: Normal breath sounds.   Abdominal:      General: Bowel sounds are normal.      Palpations: Abdomen is soft.   Musculoskeletal:      Cervical back: Normal range of motion and neck supple.   Skin:     General: Skin is warm.   Neurological:      Mental Status: He is alert and oriented to person, place, and time.           DATA:     Laboratory:  CBC:  Recent Labs   Lab 10/16/23  0655 04/23/24  0750 10/04/24  1245   WBC 6.37 7.69 10.59   Hemoglobin 15.3 15.0 15.3   Hematocrit 47.2 45.4 45.8   Platelets 219 202 203       CHEMISTRIES:  Recent Labs   Lab 04/16/22  0807 10/17/22  0750 10/16/23  0655 04/23/24  0750 10/04/24  1245   Glucose 120 H   < > 103 123 H 101   Sodium 139   < > 142 140 139   Potassium 4.1   < > 4.3 3.6 4.1   BUN 18   < > 16 25 H 17   Creatinine 1.1   < > 1.2 1.3 1.2   eGFR if  >60.0  --   --   --   --    eGFR if non  >60.0  --   --   --   --    Calcium 9.6   < > 9.9 9.8 9.7    < > = values in this interval not displayed.       CARDIAC BIOMARKERS:        COAGS:        LIPIDS/LFTS:  Recent Labs   Lab 04/17/23  0822 10/16/23  0655 04/23/24  0750   Cholesterol 288 H 281 H 245 H   Triglycerides 307 H 522 H 180 H   HDL 47 37 L 49   LDL Cholesterol 179.6 H Invalid, Trig>400.0 160.0 H   Non-HDL Cholesterol 241 244 196   AST 23 19 20   ALT 26 19 13       Hemoglobin A1C   Date Value Ref Range Status   04/23/2024 6.1 (H) 4.0 - 5.6 % Final     Comment:     ADA Screening Guidelines:  5.7-6.4%  Consistent with prediabetes  >or=6.5%  Consistent with diabetes    High levels of fetal hemoglobin interfere with the HbA1C  assay.  Heterozygous hemoglobin variants (HbS, HgC, etc)do  not significantly interfere with this assay.   However, presence of multiple variants may affect accuracy.     10/16/2023 6.1 (H) 4.0 - 5.6 % Final     Comment:     ADA Screening Guidelines:  5.7-6.4%  Consistent with prediabetes  >or=6.5%  Consistent with diabetes    High levels of fetal hemoglobin interfere with the HbA1C  assay. Heterozygous hemoglobin variants (HbS, HgC, etc)do  not significantly interfere with this assay.   However, presence of multiple variants may affect accuracy.     04/17/2023 6.2 (H) 4.0 - 5.6 % Final     Comment:     ADA Screening Guidelines:  5.7-6.4%  Consistent with prediabetes  >or=6.5%  Consistent with diabetes    High levels of fetal hemoglobin interfere with the HbA1C  assay. Heterozygous hemoglobin variants (HbS, HgC, etc)do  not significantly interfere with this assay.   However, presence of multiple variants may affect accuracy.         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 4 cm HH path metaplasia;  repeat 3-5 years for Archer's    Anxiety    Depression    Insomnia    Slow urinary stream    Tubular adenoma of colon 2/2018 3 adenomas repeat 3 years    Hypogonadism in male    Prostate cancer 2/25/19 TRUS of prostate and gold fiducial marker placement; 4/2019 s/p XRT    History of Non-STEMI (non-ST elevated myocardial infarction) 3/2019 from sepsis; Culprit likely in small Diag2 (unrevascularized) and likely  demand related    Coronary artery disease 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; 3/2019 [facility] patent graft; Culprit likely in small Diag2 (unrevascularized) and likely demand related    Renal cyst, left, simple 1st seen 2/2019 CT; verified on renal US 11/2020    Status post coronary artery bypass grafting single vessel    Refractive error    Intermediate stage nonexudative age-related macular degeneration of both eyes    Nuclear sclerosis of both eyes    History of DVT from PICC line right upper extremity 4/2019 anticoag x 3 months then stopped    Abdominal aortic atherosclerosis    Type 2 diabetes mellitus with diabetic cataract, without long-term current use of insulin    Current mild episode of major depressive disorder without prior episode    Epiretinal membrane (ERM) of both eyes    Stage 3a chronic kidney disease    Chronic left shoulder pain    Decreased range of motion of left shoulder    Shoulder weakness    PVC (premature ventricular contraction)     Orders Placed This Encounter   Procedures    Basic Metabolic Panel    Ambulatory referral/consult to Pulmonology    IN OFFICE EKG 12-LEAD (to Muse)       Patient with coronary artery disease status post CABG.  Last angiogram in 2024revealed patent bypass grafts with severe underlying coronary artery disease.  No angina.  Continue medical management    Dyspnea on exertion.  Heart failure with preserved ejection fraction.  Switch from hydrochlorothiazide to spironolactone.  Add Jardiance 10 mg daily.  Check BMP in 1 week.  Follow-up in 1 month.    Referral made to pulmonology because of history of smoking and significant dyspnea on exertion to rule out pulmonary causes for shortness of breath      Uncontrolled dyslipidemia:  Patient could not tolerate statins in the past.   Now on ezetimibe 10 mg daily    Lab Results   Component Value Date    LDLCALC 160.0 (H) 04/23/2024         Hypertension:  Well controlled.  At home stays around  120-130 mmHg as per patient    Aortic atherosclerosis:  Now on ezetimibe.    Follow-up after 1 m            Thank you very much for involving me in the care of your patient.  Please do not hesitate to contact me if there are any questions.      Milly Cadena MD, [facility], [facility]  Interventional Cardiologist, [facility].     Visit today included increased complexity associated with the care of the episodic problem hypertension, dyslipidemia, coronary artery disease status post CABG, heart failure with preserved ejection fraction addressed and managing the longitudinal care of the patient due to the serious and/or complex managed problem(s)   Patient Active Problem List   Diagnosis    Essential hypertension    Type 2 diabetes mellitus without complication, without long-term current use of insulin    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 2/2018; 11/7/23 EGD Archer's stage C10-M11, biopsied. 4 cm HH path metaplasia;  repeat 3-5 years for Archer's    Anxiety    Depression    Insomnia    Slow urinary stream    Tubular adenoma of colon 2/2018 3 adenomas repeat 3 years    Hypogonadism in male    Prostate cancer 2/25/19 TRUS of prostate and gold fiducial marker placement; 4/2019 s/p XRT    History of Non-STEMI (non-ST elevated myocardial infarction) 3/2019 from sepsis; Culprit likely in small Diag2 (unrevascularized) and likely demand related    Coronary artery disease 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; 3/2019 [facility] patent graft; Culprit likely in small Diag2 (unrevascularized) and likely demand related    Renal cyst, left, simple 1st seen 2/2019 CT; verified on renal US 11/2020    Status post coronary artery bypass grafting single vessel    Refractive error    Intermediate stage nonexudative age-related macular degeneration of both eyes    Nuclear sclerosis of both eyes    History of DVT from PICC line right upper extremity 4/2019 anticoag x 3 months then stopped    Abdominal  aortic atherosclerosis    Type 2 diabetes mellitus with diabetic cataract, without long-term current use of insulin    Current mild episode of major depressive disorder without prior episode    Epiretinal membrane (ERM) of both eyes    Stage 3a chronic kidney disease    Chronic left shoulder pain    Decreased range of motion of left shoulder    Shoulder weakness    PVC (premature ventricular contraction)     .        This note was dictated with the help of speech recognition software.  There might be un-intended errors and/or substitutions.

## 2024-10-26 DIAGNOSIS — E11.9 TYPE 2 DIABETES MELLITUS WITHOUT COMPLICATION, WITHOUT LONG-TERM CURRENT USE OF INSULIN: ICD-10-CM

## 2024-10-28 RX ORDER — CALCIUM CITRATE/VITAMIN D3 200MG-6.25
TABLET ORAL
Qty: 200 STRIP | Refills: 3 | Status: SHIPPED | OUTPATIENT
Start: 2024-10-28

## 2024-10-30 ENCOUNTER — PATIENT OUTREACH (OUTPATIENT)
Dept: ADMINISTRATIVE | Facility: HOSPITAL | Age: 84
End: 2024-10-30
Payer: MEDICARE

## 2024-10-30 ENCOUNTER — LAB VISIT (OUTPATIENT)
Dept: LAB | Facility: HOSPITAL | Age: 84
End: 2024-10-30
Attending: INTERNAL MEDICINE
Payer: MEDICARE

## 2024-10-30 DIAGNOSIS — E11.36 TYPE 2 DIABETES MELLITUS WITH DIABETIC CATARACT, WITHOUT LONG-TERM CURRENT USE OF INSULIN: ICD-10-CM

## 2024-10-30 DIAGNOSIS — N18.31 STAGE 3A CHRONIC KIDNEY DISEASE: ICD-10-CM

## 2024-10-30 DIAGNOSIS — E11.9 TYPE 2 DIABETES MELLITUS WITHOUT COMPLICATION, WITHOUT LONG-TERM CURRENT USE OF INSULIN: ICD-10-CM

## 2024-10-30 DIAGNOSIS — I10 ESSENTIAL HYPERTENSION: ICD-10-CM

## 2024-10-30 LAB
ALBUMIN SERPL BCP-MCNC: 3.7 G/DL (ref 3.5–5.2)
ALP SERPL-CCNC: 55 U/L (ref 40–150)
ALT SERPL W/O P-5'-P-CCNC: 19 U/L (ref 10–44)
ANION GAP SERPL CALC-SCNC: 13 MMOL/L (ref 8–16)
AST SERPL-CCNC: 26 U/L (ref 10–40)
BASOPHILS # BLD AUTO: 0.02 K/UL (ref 0–0.2)
BASOPHILS NFR BLD: 0.3 % (ref 0–1.9)
BILIRUB SERPL-MCNC: 0.4 MG/DL (ref 0.1–1)
BUN SERPL-MCNC: 19 MG/DL (ref 8–23)
CALCIUM SERPL-MCNC: 8.9 MG/DL (ref 8.7–10.5)
CHLORIDE SERPL-SCNC: 103 MMOL/L (ref 95–110)
CHOLEST SERPL-MCNC: 195 MG/DL (ref 120–199)
CHOLEST/HDLC SERPL: 5 {RATIO} (ref 2–5)
CO2 SERPL-SCNC: 23 MMOL/L (ref 23–29)
CREAT SERPL-MCNC: 1.2 MG/DL (ref 0.5–1.4)
DIFFERENTIAL METHOD BLD: NORMAL
EOSINOPHIL # BLD AUTO: 0.4 K/UL (ref 0–0.5)
EOSINOPHIL NFR BLD: 6.9 % (ref 0–8)
ERYTHROCYTE [DISTWIDTH] IN BLOOD BY AUTOMATED COUNT: 13.2 % (ref 11.5–14.5)
EST. GFR  (NO RACE VARIABLE): >60 ML/MIN/1.73 M^2
ESTIMATED AVG GLUCOSE: 128 MG/DL (ref 68–131)
GLUCOSE SERPL-MCNC: 107 MG/DL (ref 70–110)
HBA1C MFR BLD: 6.1 % (ref 4–5.6)
HCT VFR BLD AUTO: 44 % (ref 40–54)
HDLC SERPL-MCNC: 39 MG/DL (ref 40–75)
HDLC SERPL: 20 % (ref 20–50)
HGB BLD-MCNC: 14.3 G/DL (ref 14–18)
IMM GRANULOCYTES # BLD AUTO: 0.02 K/UL (ref 0–0.04)
IMM GRANULOCYTES NFR BLD AUTO: 0.3 % (ref 0–0.5)
LDLC SERPL CALC-MCNC: 84.8 MG/DL (ref 63–159)
LYMPHOCYTES # BLD AUTO: 1.7 K/UL (ref 1–4.8)
LYMPHOCYTES NFR BLD: 27.3 % (ref 18–48)
MCH RBC QN AUTO: 30.9 PG (ref 27–31)
MCHC RBC AUTO-ENTMCNC: 32.5 G/DL (ref 32–36)
MCV RBC AUTO: 95 FL (ref 82–98)
MONOCYTES # BLD AUTO: 0.7 K/UL (ref 0.3–1)
MONOCYTES NFR BLD: 10.3 % (ref 4–15)
NEUTROPHILS # BLD AUTO: 3.5 K/UL (ref 1.8–7.7)
NEUTROPHILS NFR BLD: 54.9 % (ref 38–73)
NONHDLC SERPL-MCNC: 156 MG/DL
NRBC BLD-RTO: 0 /100 WBC
PLATELET # BLD AUTO: 205 K/UL (ref 150–450)
PMV BLD AUTO: 10.4 FL (ref 9.2–12.9)
POTASSIUM SERPL-SCNC: 3.7 MMOL/L (ref 3.5–5.1)
PROT SERPL-MCNC: 6.7 G/DL (ref 6–8.4)
RBC # BLD AUTO: 4.63 M/UL (ref 4.6–6.2)
SODIUM SERPL-SCNC: 139 MMOL/L (ref 136–145)
TRIGL SERPL-MCNC: 356 MG/DL (ref 30–150)
WBC # BLD AUTO: 6.38 K/UL (ref 3.9–12.7)

## 2024-10-30 PROCEDURE — 80061 LIPID PANEL: CPT | Mod: HCNC | Performed by: INTERNAL MEDICINE

## 2024-10-30 PROCEDURE — 83036 HEMOGLOBIN GLYCOSYLATED A1C: CPT | Mod: HCNC | Performed by: INTERNAL MEDICINE

## 2024-10-30 PROCEDURE — 80053 COMPREHEN METABOLIC PANEL: CPT | Mod: HCNC | Performed by: INTERNAL MEDICINE

## 2024-10-30 PROCEDURE — 85025 COMPLETE CBC W/AUTO DIFF WBC: CPT | Mod: HCNC | Performed by: INTERNAL MEDICINE

## 2024-11-12 PROBLEM — F32.5 MAJOR DEPRESSIVE DISORDER WITH SINGLE EPISODE, IN FULL REMISSION: Status: ACTIVE | Noted: 2020-02-14

## 2024-11-12 NOTE — PROGRESS NOTES
This note was created by combination of typed  and M-Modal dictation.  Transcription errors may be present.   This note was also generated with the assistance of ambient listening technology. Verbal consent was obtained by the patient and accompanying visitor(s) for the recording of patient appointment to facilitate this note. I attest to having reviewed and edited the generated note for accuracy, though some syntax or spelling errors may persist. Please contact the author of this note for any clarification.    Assessment and Plan:   Assessment and Plan   Type 2 diabetes mellitus with diabetic cataract, without long-term current use of insulin  Type 2 diabetes mellitus without complication, without long-term current use of insulin  -pre visit labs reviewed, stable.  On metformin.  No changes updated prescription.    Cardiology started him on Jardiance primarily for HFpEF but this will also help with blood sugar  Needs to update eye exam.    Started on Zetia.    On ARB  -     metFORMIN (GLUCOPHAGE) 500 MG tablet; TAKE 1 TABLET EVERY DAY WITH BREAKFAST  Dispense: 90 tablet; Refill: 3  -     Comprehensive Metabolic Panel; Future; Expected date: 05/11/2025  -     Lipid Panel; Future; Expected date: 05/11/2025  -     Hemoglobin A1C; Future; Expected date: 05/11/2025  -     CBC Without Differential; Future; Expected date: 05/12/2025    Essential hypertension  Stage 3a chronic kidney disease  -managed by Cardiology as well.  Recent changed from losartan HCT to losartan and spironolactone.  Was started on Jardiance as well for HFpEF  -     metoprolol succinate (TOPROL-XL) 25 MG 24 hr tablet; Take 0.5 tablets (12.5 mg total) by mouth once daily.  Dispense: 90 tablet; Refill: 3    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 2/2018; 11/7/23 EGD Archer's stage C10-M11, biopsied. 4 cm HH path metaplasia;  repeat 3-5 years for Archer's  -     omeprazole (PRILOSEC) 40 MG capsule; Take 1 capsule (40 mg total) by mouth  2 (two) times daily before meals.  Dispense: 180 capsule; Refill: 3    Coronary artery disease 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; 3/2019 [facility] patent graft; Culprit likely in small Diag2 (unrevascularized) and likely demand related  -left heart catheterization with patent grafts.  Was started on Zetia.  Pre visit labs reviewed triglycerides high HDL low but decreased total cholesterol.  It has only been about 6 weeks on medication.  No changes.    Update prescription for metoprolol.  In addition he is on spironolactone and losartan.  HCTZ component was stopped  -     metoprolol succinate (TOPROL-XL) 25 MG 24 hr tablet; Take 0.5 tablets (12.5 mg total) by mouth once daily.  Dispense: 90 tablet; Refill: 3    DAISY (generalized anxiety disorder)  Major depressive disorder with single episode, in full remission  Anxiety  -takes alprazolam as needed.  Refilled.  Current stressors, wife has abnormal weight loss which is causing him a lot of stress  -     ALPRAZolam (XANAX) 1 MG tablet; Take 1 tablet (1 mg total) by mouth nightly as needed for Anxiety.  Dispense: 30 tablet; Refill: 2    Low testosterone with a history of prostate cancer   Followed by Urology.  Takes a half dose of testosterone.  Is due for follow-up with Urology.  Her labs has been ordered but not done.  Our staff to assist in arranging follow-up with pre visit labs    Under stress.  Unfortunately wife is losing weight abnormally, low appetite.  He was trying everything he can to help her sustain weight.  Is helping her with medicinal marijuana but finds it a stressor.      Medications Discontinued During This Encounter   Medication Reason    omeprazole (PRILOSEC) 40 MG capsule Reorder    metFORMIN (GLUCOPHAGE) 500 MG tablet Reorder    ALPRAZolam (XANAX) 1 MG tablet Reorder    metoprolol succinate (TOPROL-XL) 25 MG 24 hr tablet Reorder       meds sent this encounter:  Medications Ordered This Encounter   Medications     ALPRAZolam (XANAX) 1 MG tablet     Sig: Take 1 tablet (1 mg total) by mouth nightly as needed for Anxiety.     Dispense:  30 tablet     Refill:  2     Pharmacy update refills, keep on file, not requesting Rx to be filled today.    metFORMIN (GLUCOPHAGE) 500 MG tablet     Sig: TAKE 1 TABLET EVERY DAY WITH BREAKFAST     Dispense:  90 tablet     Refill:  3     Pharmacy update refills, keep on file, not requesting Rx to be filled today.    metoprolol succinate (TOPROL-XL) 25 MG 24 hr tablet     Sig: Take 0.5 tablets (12.5 mg total) by mouth once daily.     Dispense:  90 tablet     Refill:  3     . Pharmacy update refills, keep on file, not requesting Rx to be filled today.    omeprazole (PRILOSEC) 40 MG capsule     Sig: Take 1 capsule (40 mg total) by mouth 2 (two) times daily before meals.     Dispense:  180 capsule     Refill:  3     Instead of pantoprazole Pharmacy update refills, keep on file, not requesting Rx to be filled today.         Follow Up: follow up 6 months with labs  Future Appointments   Date Time Provider Department Center   2024  1:00 PM Milly Cadena MD [facility] CARDIO [facility]   2024  1:00 PM Jaxon Carrera NP [facility]          Subjective:   Subjective   Chief Complaint   Patient presents with    Follow-up       HPI  Eric is a 83 y.o. male.     Social History     Tobacco Use    Smoking status: Former     Current packs/day: 0.00     Average packs/day: 2.0 packs/day for 23.0 years (46.0 ttl pk-yrs)     Types: Cigarettes     Start date: 1966     Quit date: 1989     Years since quittin.8    Smokeless tobacco: Never   Substance Use Topics    Alcohol use: Yes     Alcohol/week: 1.0 standard drink of alcohol     Types: 1 Cans of beer per week     Comment: occassional          Social History     Social History Narrative    Not on file       Patient Care Team:  Samir Boo MD as PCP - General (Internal Medicine)  René Bills MD (Cardiology)  Vo,  ABBY Sosa as Consulting Physician (Optometry)  Layne Huff MD as Consulting Physician (Urology)  Loida Evans MD as Consulting Physician (Endocrinology)  Loc Garcia MD as Consulting Physician (Radiation Oncology)  Milly Cadena MD as Consulting Physician (Cardiology)  Nathan Odonnell MD as 1st Call (Urology)    Last appointment with this clinic was Visit date not found. Last visit with me 4/30/2024   To summarize last visit and events leading up to today:  Diabetes on metformin  Hypertension, CKD stage IIIA stable   Coronary artery disease status post CABG.  Intolerant of statins and intolerant of Zetia.  Considering Repatha.  Followed by Cardiology  Aortic atherosclerosis with possible ulcerated plaque.  seen by vascular. No vascular surgical intervention indicated.  03/01/2023 TTE LV normal size with mild concentric hypertrophy and low normal systolic function LVEF 50%.  Mild aortic valve stenosis.  03/01/2023 nuclear stress test negative for ischemia.  03/01/2023 48 hour Holter monitor PVCs and PACs  Cardiology adjusted losartan HCT and subsequently added metoprolol  Archer's esophagus, plan is PPI BID, repeat EGD  Constipation, GoLYTELY.  If constipation persists and if abdominal pain persists, check CT abdomen pelvis.  History of colonoscopy in 2018 with TA.  Consider repeat   Anxiety.  Trial of alternative to Xanax.  Trial of BuSpar.  Typically does not take the Xanax nightly anyway.  Plan was BuSpar scheduled nightly for a month and if effective can reduced p.r.n.    History of prostate cancer status post XRT.  Lost to follow up with Urology.  Hypogonadism had not been taking testosterone for the past couple of years.   Subsequently seen by Urology, restart testosterone replacement, close PSA monitoring.      11/7/23 EGD Archer's stage C10-M11, biopsied. 4 cm HH path metaplasia;  repeat 3-5 years for Archer's      Last visit with me 04/30/2024  Diabetes, hypertension, CKD 3A  stable  Coronary artery disease.  Had complained of throat pain which she thought was reflux but does seem to have accompanying dyspnea.  Intolerant of a number of different statins as well as Zetia.  He remains wary of medications such as PCS K9.  Follow up with Cardiology   MDD and DAISY alprazolam p.r.n.  Prostate cancer followed by Urology   Lance taking PPI once daily.  Encouraged to take twice daily and see if this helps with his throat    Saw Urology 05/17/2024.  Stable continue testosterone follow-up 6 months    Saw cardiology 08/27/2024 09/17/2024 TTE LV normal size mildly increased wall thickness mild concentric hypertrophy normal systolic function LVEF 55-60%.  Grade 1 diastolic dysfunction.  Mild aortic stenosis  09/17/2024 nuclear stress test negative for ischemia but ECG portion positive for ischemia    Saw cardiology in follow-up 09/27/2024.  Started on Zetia.  Left heart catheterization.    Outside claims data 09/30/2024 Dermatology for HPV    10/9/24 [facility] patent grafts    10/23/2024 follow-up with Cardiology.  Treat for HFpEF.  Change HCTZ to spironolactone.  Add Jardiance.  Referral to pulmonology    Pre visit labs   CBC normal   CMP normal   Lipid triglycerides high non    A1c 6.1  Urine microalbumin normal    Needs eye    Today's visit:    History of Present Illness    HPI:  Eric reports dyspnea during exercise. When using the treadmill daily, he is now only able to walk at 4 miles per hour and becomes dyspneic after 5 minutes. He feels unable to replenish oxygen despite rapid breathing. A cardiologist, Dr. Cadena, was supposed to provide a referral for a pulmonologist to address this concern.    Eric notes recent weight loss, with his current weight fluctuating between 162-164 lbs. He attributes this partly to stress related to his wife's health issues and possibly to a new medication (Jardiance/empagliflozin) recently prescribed for his heart.    He has been taking testosterone  supplements at half the normal dose because he was concerned about its long-term effects and expresses interest in having his testosterone levels checked regularly.  He is due for follow-up with Urology.    Eric reports a lifelong history of intermittent constipation, with periods of regularity followed by episodes of pain, sometimes occurring in the morning.    Recent cardiac tests did not reveal any particular reason for his dyspnea.     MEDICATIONS:  - Metoprolol, for blood pressure and heart  - Omeprazole (Prilosec), for stomach  - Losartan 100 mg, without diuretic part  - Plavix  - Metformin  - Xanax  - Spironolactone, diuretic with heart benefits, switched from hydrochlorothiazide  - Empagliflozin (Jardiance), for heart   - Testosterone supplement, half dose, topical application on one shoulder  - Zetia started about a month ago    ROS:  General: reports weight loss  Respiratory: reports shortness of breath  Gastrointestinal: reports constipation         ALLERGIES AND MEDICATIONS: updated and reviewed.  Medication List with Changes/Refills   Current Medications    ALPRAZOLAM (XANAX) 1 MG TABLET    Take 1 tablet (1 mg total) by mouth nightly as needed for Anxiety.    ASPIRIN (ECOTRIN) 81 MG EC TABLET    Take 1 tablet (81 mg total) by mouth once daily.    BLOOD GLUCOSE CONTROL, LOW (TRUE METRIX LEVEL 1) SOLN    To use with blood glucose meter    BLOOD-GLUCOSE METER (TRUE METRIX GLUCOSE METER) KIT    For twice daily checking    CLOPIDOGREL (PLAVIX) 75 MG TABLET    Take 1 tablet (75 mg total) by mouth once daily.    EMPAGLIFLOZIN (JARDIANCE) 10 MG TABLET    Take 1 tablet (10 mg total) by mouth once daily.    EZETIMIBE (ZETIA) 10 MG TABLET    Take 1 tablet (10 mg total) by mouth once daily.    IMIQUIMOD (ALDARA) 5 % CREAM    APPLY TOPICALLY 3 (THREE) TIMES A WEEK.    LANCETS MISC    To check BG 3 times daily, to use with insurance preferred meter    LOSARTAN (COZAAR) 100 MG TABLET    Take 1 tablet (100 mg total)  "by mouth once daily.    METFORMIN (GLUCOPHAGE) 500 MG TABLET    TAKE 1 TABLET EVERY DAY WITH BREAKFAST    METOPROLOL SUCCINATE (TOPROL-XL) 25 MG 24 HR TABLET    Take 0.5 tablets (12.5 mg total) by mouth once daily.    OMEPRAZOLE (PRILOSEC) 40 MG CAPSULE    Take 1 capsule (40 mg total) by mouth 2 (two) times daily before meals.    SPIRONOLACTONE (ALDACTONE) 25 MG TABLET    Take 1 tablet (25 mg total) by mouth once daily.    TESTOSTERONE (ANDROGEL) 20.25 MG/1.25 GRAM (1.62 %) GLPM    Place 1.25 g onto the skin once daily.    TRUE METRIX GLUCOSE TEST STRIP STRP    CHECK BLOOD SUGAR TWO TIMES DAILY    VIT C/VIT E AC/LUT/COPPER/ZINC (PRESERVISION LUTEIN ORAL)    Take 1 tablet by mouth once daily.    VITAMIN D 1000 UNITS TAB    Take 1,000 Units by mouth once daily.         Objective:   Objective   Physical Exam   Vitals:    11/13/24 1039   BP: 124/66   Pulse: 83   Temp: 98 °F (36.7 °C)   TempSrc: Oral   SpO2: 97%   Weight: 73.9 kg (162 lb 13 oz)   Height: 5' 7" (1.702 m)    Body mass index is 25.5 kg/m².  Weight: 73.9 kg (162 lb 13 oz)   Height: 5' 7" (170.2 cm)     Physical Exam  Constitutional:       General: He is not in acute distress.     Appearance: He is well-developed.   Eyes:      Extraocular Movements: Extraocular movements intact.   Cardiovascular:      Rate and Rhythm: Normal rate and regular rhythm.      Heart sounds: Normal heart sounds. No murmur heard.  Pulmonary:      Effort: Pulmonary effort is normal.      Breath sounds: Normal breath sounds.   Abdominal:      General: There is no distension.      Palpations: There is no mass.      Tenderness: There is no abdominal tenderness. There is no guarding.   Musculoskeletal:         General: Normal range of motion.      Right lower leg: No edema.      Left lower leg: No edema.   Lymphadenopathy:      Cervical: No cervical adenopathy.   Skin:     General: Skin is warm and dry.   Neurological:      Mental Status: He is alert and oriented to person, place, and " time.      Coordination: Coordination normal.   Psychiatric:         Behavior: Behavior normal.         Thought Content: Thought content normal.         Component      Latest Ref Rng 4/23/2024 10/30/2024   WBC      3.90 - 12.70 K/uL 7.69  6.38    RBC      4.60 - 6.20 M/uL 4.78  4.63    Hemoglobin      14.0 - 18.0 g/dL 15.0  14.3    Hematocrit      40.0 - 54.0 % 45.4  44.0    MCV      82 - 98 fL 95  95    MCH      27.0 - 31.0 pg 31.4 (H)  30.9    MCHC      32.0 - 36.0 g/dL 33.0  32.5    RDW      11.5 - 14.5 % 13.3  13.2    Platelet Count      150 - 450 K/uL 202  205    MPV      9.2 - 12.9 fL 10.9  10.4    Immature Granulocytes      0.0 - 0.5 %  0.3    Gran # (ANC)      1.8 - 7.7 K/uL  3.5    Immature Grans (Abs)      0.00 - 0.04 K/uL  0.02    Lymph #      1.0 - 4.8 K/uL  1.7    Mono #      0.3 - 1.0 K/uL  0.7    Eos #      0.0 - 0.5 K/uL  0.4    Baso #      0.00 - 0.20 K/uL  0.02    nRBC      0 /100 WBC  0    Gran %      38.0 - 73.0 %  54.9    Lymph %      18.0 - 48.0 %  27.3    Mono %      4.0 - 15.0 %  10.3    Eos %      0.0 - 8.0 %  6.9    Basophil %      0.0 - 1.9 %  0.3    Differential Method  Automated    Sodium      136 - 145 mmol/L 140  139    Potassium      3.5 - 5.1 mmol/L 3.6  3.7    Chloride      95 - 110 mmol/L 100  103    CO2      23 - 29 mmol/L 31 (H)  23    Glucose      70 - 110 mg/dL 123 (H)  107    BUN      8 - 23 mg/dL 25 (H)  19    Creatinine      0.5 - 1.4 mg/dL 1.3  1.2    Calcium      8.7 - 10.5 mg/dL 9.8  8.9    PROTEIN TOTAL      6.0 - 8.4 g/dL 7.1  6.7    Albumin      3.5 - 5.2 g/dL 3.9  3.7    BILIRUBIN TOTAL      0.1 - 1.0 mg/dL 0.7  0.4    ALP      40 - 150 U/L 57  55    AST      10 - 40 U/L 20  26    ALT      10 - 44 U/L 13  19    eGFR      >60 mL/min/1.73 m^2 55.2 !  >60.0    Anion Gap      8 - 16 mmol/L 9  13    Cholesterol Total      120 - 199 mg/dL 245 (H)  195    Triglycerides      30 - 150 mg/dL 180 (H)  356 (H)    HDL      40 - 75 mg/dL 49  39 (L)    LDL Cholesterol      63.0 -  159.0 mg/dL 160.0 (H)  84.8    HDL/Cholesterol Ratio      20.0 - 50.0 % 20.0  20.0    Total Cholesterol/HDL Ratio      2.0 - 5.0  5.0  5.0    Non-HDL Cholesterol      mg/dL 196  156    Urine Microalbumin      ug/mL 15.0  7.0    Urine Creatinine      23.0 - 375.0 mg/dL 144.0  82.0    MICROALB/CREAT RATIO      0.0 - 30.0 ug/mg 10.4  8.5    Hemoglobin A1C External      4.0 - 5.6 % 6.1 (H)  6.1 (H)    Estimated Avg Glucose      68 - 131 mg/dL 128  128       Legend:  (H) High  ! Abnormal  (L) Low

## 2024-11-13 ENCOUNTER — OFFICE VISIT (OUTPATIENT)
Dept: FAMILY MEDICINE | Facility: CLINIC | Age: 84
End: 2024-11-13
Payer: MEDICARE

## 2024-11-13 VITALS
SYSTOLIC BLOOD PRESSURE: 124 MMHG | HEIGHT: 67 IN | WEIGHT: 162.81 LBS | TEMPERATURE: 98 F | BODY MASS INDEX: 25.55 KG/M2 | HEART RATE: 83 BPM | OXYGEN SATURATION: 97 % | DIASTOLIC BLOOD PRESSURE: 66 MMHG

## 2024-11-13 DIAGNOSIS — F32.5 MAJOR DEPRESSIVE DISORDER WITH SINGLE EPISODE, IN FULL REMISSION: ICD-10-CM

## 2024-11-13 DIAGNOSIS — I10 ESSENTIAL HYPERTENSION: ICD-10-CM

## 2024-11-13 DIAGNOSIS — E11.9 TYPE 2 DIABETES MELLITUS WITHOUT COMPLICATION, WITHOUT LONG-TERM CURRENT USE OF INSULIN: ICD-10-CM

## 2024-11-13 DIAGNOSIS — E11.36 TYPE 2 DIABETES MELLITUS WITH DIABETIC CATARACT, WITHOUT LONG-TERM CURRENT USE OF INSULIN: Primary | ICD-10-CM

## 2024-11-13 DIAGNOSIS — F41.1 GAD (GENERALIZED ANXIETY DISORDER): ICD-10-CM

## 2024-11-13 DIAGNOSIS — N18.31 STAGE 3A CHRONIC KIDNEY DISEASE: ICD-10-CM

## 2024-11-13 DIAGNOSIS — I25.118 CORONARY ARTERY DISEASE OF NATIVE ARTERY OF NATIVE HEART WITH STABLE ANGINA PECTORIS: ICD-10-CM

## 2024-11-13 DIAGNOSIS — K22.70 BARRETT'S ESOPHAGUS WITHOUT DYSPLASIA: ICD-10-CM

## 2024-11-13 PROCEDURE — 3288F FALL RISK ASSESSMENT DOCD: CPT | Mod: HCNC,CPTII,S$GLB, | Performed by: INTERNAL MEDICINE

## 2024-11-13 PROCEDURE — 1160F RVW MEDS BY RX/DR IN RCRD: CPT | Mod: HCNC,CPTII,S$GLB, | Performed by: INTERNAL MEDICINE

## 2024-11-13 PROCEDURE — 3072F LOW RISK FOR RETINOPATHY: CPT | Mod: HCNC,CPTII,S$GLB, | Performed by: INTERNAL MEDICINE

## 2024-11-13 PROCEDURE — 1126F AMNT PAIN NOTED NONE PRSNT: CPT | Mod: HCNC,CPTII,S$GLB, | Performed by: INTERNAL MEDICINE

## 2024-11-13 PROCEDURE — 3078F DIAST BP <80 MM HG: CPT | Mod: HCNC,CPTII,S$GLB, | Performed by: INTERNAL MEDICINE

## 2024-11-13 PROCEDURE — 99214 OFFICE O/P EST MOD 30 MIN: CPT | Mod: HCNC,S$GLB,, | Performed by: INTERNAL MEDICINE

## 2024-11-13 PROCEDURE — 1101F PT FALLS ASSESS-DOCD LE1/YR: CPT | Mod: HCNC,CPTII,S$GLB, | Performed by: INTERNAL MEDICINE

## 2024-11-13 PROCEDURE — 3074F SYST BP LT 130 MM HG: CPT | Mod: HCNC,CPTII,S$GLB, | Performed by: INTERNAL MEDICINE

## 2024-11-13 PROCEDURE — 99999 PR PBB SHADOW E&M-EST. PATIENT-LVL IV: CPT | Mod: PBBFAC,HCNC,, | Performed by: INTERNAL MEDICINE

## 2024-11-13 PROCEDURE — 1159F MED LIST DOCD IN RCRD: CPT | Mod: HCNC,CPTII,S$GLB, | Performed by: INTERNAL MEDICINE

## 2024-11-13 PROCEDURE — G2211 COMPLEX E/M VISIT ADD ON: HCPCS | Mod: HCNC,S$GLB,, | Performed by: INTERNAL MEDICINE

## 2024-11-13 RX ORDER — ALPRAZOLAM 1 MG/1
1 TABLET ORAL NIGHTLY PRN
Qty: 30 TABLET | Refills: 2 | Status: SHIPPED | OUTPATIENT
Start: 2024-11-13

## 2024-11-13 RX ORDER — METOPROLOL SUCCINATE 25 MG/1
12.5 TABLET, EXTENDED RELEASE ORAL DAILY
Qty: 90 TABLET | Refills: 3 | Status: SHIPPED | OUTPATIENT
Start: 2024-11-13

## 2024-11-13 RX ORDER — OMEPRAZOLE 40 MG/1
40 CAPSULE, DELAYED RELEASE ORAL
Qty: 180 CAPSULE | Refills: 3 | Status: SHIPPED | OUTPATIENT
Start: 2024-11-13 | End: 2025-11-13

## 2024-11-13 RX ORDER — METFORMIN HYDROCHLORIDE 500 MG/1
TABLET ORAL
Qty: 90 TABLET | Refills: 3 | Status: SHIPPED | OUTPATIENT
Start: 2024-11-13

## 2024-11-15 ENCOUNTER — LAB VISIT (OUTPATIENT)
Dept: LAB | Facility: HOSPITAL | Age: 84
End: 2024-11-15
Attending: STUDENT IN AN ORGANIZED HEALTH CARE EDUCATION/TRAINING PROGRAM
Payer: MEDICARE

## 2024-11-15 DIAGNOSIS — E29.1 HYPOGONADISM IN MALE: ICD-10-CM

## 2024-11-15 DIAGNOSIS — Z85.46 HISTORY OF PROSTATE CANCER: ICD-10-CM

## 2024-11-15 LAB
BASOPHILS # BLD AUTO: 0.03 K/UL (ref 0–0.2)
BASOPHILS NFR BLD: 0.4 % (ref 0–1.9)
DIFFERENTIAL METHOD BLD: NORMAL
EOSINOPHIL # BLD AUTO: 0.4 K/UL (ref 0–0.5)
EOSINOPHIL NFR BLD: 5.3 % (ref 0–8)
ERYTHROCYTE [DISTWIDTH] IN BLOOD BY AUTOMATED COUNT: 13.1 % (ref 11.5–14.5)
HCT VFR BLD AUTO: 46.3 % (ref 40–54)
HGB BLD-MCNC: 15 G/DL (ref 14–18)
IMM GRANULOCYTES # BLD AUTO: 0.02 K/UL (ref 0–0.04)
IMM GRANULOCYTES NFR BLD AUTO: 0.3 % (ref 0–0.5)
LYMPHOCYTES # BLD AUTO: 1.7 K/UL (ref 1–4.8)
LYMPHOCYTES NFR BLD: 24.4 % (ref 18–48)
MCH RBC QN AUTO: 30.5 PG (ref 27–31)
MCHC RBC AUTO-ENTMCNC: 32.4 G/DL (ref 32–36)
MCV RBC AUTO: 94 FL (ref 82–98)
MONOCYTES # BLD AUTO: 0.7 K/UL (ref 0.3–1)
MONOCYTES NFR BLD: 10.5 % (ref 4–15)
NEUTROPHILS # BLD AUTO: 4.1 K/UL (ref 1.8–7.7)
NEUTROPHILS NFR BLD: 59.1 % (ref 38–73)
NRBC BLD-RTO: 0 /100 WBC
PLATELET # BLD AUTO: 227 K/UL (ref 150–450)
PMV BLD AUTO: 10.2 FL (ref 9.2–12.9)
RBC # BLD AUTO: 4.92 M/UL (ref 4.6–6.2)
WBC # BLD AUTO: 6.98 K/UL (ref 3.9–12.7)

## 2024-11-15 PROCEDURE — 36415 COLL VENOUS BLD VENIPUNCTURE: CPT | Mod: HCNC,PO | Performed by: STUDENT IN AN ORGANIZED HEALTH CARE EDUCATION/TRAINING PROGRAM

## 2024-11-15 PROCEDURE — 85025 COMPLETE CBC W/AUTO DIFF WBC: CPT | Mod: HCNC | Performed by: STUDENT IN AN ORGANIZED HEALTH CARE EDUCATION/TRAINING PROGRAM

## 2024-11-18 ENCOUNTER — PATIENT MESSAGE (OUTPATIENT)
Dept: FAMILY MEDICINE | Facility: CLINIC | Age: 84
End: 2024-11-18
Payer: MEDICARE

## 2024-11-21 ENCOUNTER — OFFICE VISIT (OUTPATIENT)
Dept: CARDIOLOGY | Facility: CLINIC | Age: 84
End: 2024-11-21
Payer: MEDICARE

## 2024-11-21 VITALS
HEIGHT: 67 IN | BODY MASS INDEX: 25.15 KG/M2 | HEART RATE: 73 BPM | WEIGHT: 160.25 LBS | SYSTOLIC BLOOD PRESSURE: 132 MMHG | DIASTOLIC BLOOD PRESSURE: 60 MMHG | RESPIRATION RATE: 18 BRPM | OXYGEN SATURATION: 98 %

## 2024-11-21 DIAGNOSIS — I10 ESSENTIAL HYPERTENSION: ICD-10-CM

## 2024-11-21 DIAGNOSIS — I25.118 CORONARY ARTERY DISEASE OF NATIVE ARTERY OF NATIVE HEART WITH STABLE ANGINA PECTORIS: ICD-10-CM

## 2024-11-21 DIAGNOSIS — Z95.1 STATUS POST CORONARY ARTERY BYPASS GRAFTING: Primary | ICD-10-CM

## 2024-11-21 PROCEDURE — 99999 PR PBB SHADOW E&M-EST. PATIENT-LVL IV: CPT | Mod: PBBFAC,HCNC,, | Performed by: INTERNAL MEDICINE

## 2024-11-21 RX ORDER — TESTOSTERONE 20.25 MG/1.25G
GEL TOPICAL
COMMUNITY
Start: 2024-10-29

## 2024-11-21 RX ORDER — METOPROLOL SUCCINATE 25 MG/1
12.5 TABLET, EXTENDED RELEASE ORAL DAILY
Qty: 90 TABLET | Refills: 3 | Status: SHIPPED | OUTPATIENT
Start: 2024-11-21

## 2024-11-21 NOTE — PROGRESS NOTES
CARDIOVASCULAR CONSULTATION    REASON FOR CONSULT:   Eric Jackson is a 84 y.o. male who presents for  evaluation     HISTORY OF PRESENT ILLNESS:       Evaluation patient is a pleasant 82-year-old man.  Past medical history of coronary artery disease status post CABG.  Used to follow with Dr. Soto it, has not seen him since 2019.  Now wants to reestablish care with Cardiology.  Last angiogram had revealed patent bypass grafts.  Last angiogram was done in 2019.  Patient states that for the past few months he has been experiencing chest heaviness.  No particular aggravating or relieving factors.  Also occasional dizziness.  No particular aggravating or relieving factors for the dizziness.  Not on statins and refuses to be on statins, Zetia or Repatha.      2019:      LVEDP: 22mmHg  LVEF: 55% by echo  Wall Motion: normal by echo     Dominance: Right  LM: dist 50%  LAD: mid 99%, competitive flow from LIMA-LAD-OM-RPDA              Diag2 prox 90%, T2 flow (not bypassed)  LCx: prox , territory supplied by LIMA-LAD-OM-RPDA  RCA: mid , territory supplied by LIMA-LAD-OM-RPDA     LIMA-LAD-OM-RPDA patent     No SVG identified     Hemostasis:  RFA     Impression:  NSTEMI  3V CAD, normal LV fxn  Patent LIMA-LAD-OM-RPDA  Culprit likely in small Diag2 (unrevascularized) and likely demand related  Man compression RFA for hemostasis     Plan:  Cont med rx  ASA 81mg qd indefinitely  Plavix 75mg qd for 1 year (thru 3/2020)  BBl/ARB  Statin allergy noted  Consider PCSK9 inhibitor if statin allergy confirmed  Dispo planning appropriate  Pt to follow up with Dr. Seay after discharge           Mar 13 23: doing fine. No ischemia on stress test.  patient states that his symptoms of chest pains went away after we were able to control his blood pressure by increasing his medications on last clinic visit.      Sinus rhythm with heart rates varying between 45 and 124 BPM with an average of 73BPM. Total time in sinus rhythm was  approximately 48 hours.  There were occasional PVCs totalling 768 and averaging 16.59 per hour. There were 10 couplets. There were 3 bigeminal cycles.  There were occasional PACs totalling 866 and averaging 18.7 per hour.        The left ventricle is normal in size with mild concentric hypertrophy and low normal systolic function.  The estimated ejection fraction is 50%.  Normal right ventricular size with low normal right ventricular systolic function.  There is mild aortic valve stenosis.  Aortic valve area is 1.60 cm2; peak velocity is 1.51 m/s; mean gradient is 6 mmHg.  Mild tricuspid regurgitation.  The estimated PA systolic pressure is 30 mmHg.          Normal myocardial perfusion scan. There is no evidence of myocardial ischemia or infarction.    The gated perfusion images showed an ejection fraction of 57% post stress.    There is normal wall motion post stress.    Notes from August 27, 2024: Patient here for follow-up.  When he walks on the treadmill for more than 7 minutes he gets short of breath.  And some chest tightness    Notes from September 20, 2024:  Patient here for follow-up.  Continues having dyspnea on exertion on minimal exertion.  Stress test shows that the EKG portion of the stress test was markedly abnormal, nuclear portion was normal.  Patient is concerned that his symptoms are lifestyle limiting.  After detailed discussion decided to proceed with coronary angiography for further evaluation    Results for orders placed or performed in visit on 10/23/24   IN OFFICE EKG 12-LEAD (to [facility])    Collection Time: 10/23/24  1:24 PM   Result Value Ref Range    QRS Duration 100 ms    OHS QTC Calculation 419 ms    Narrative    Test Reason : I10,    Vent. Rate : 071 BPM     Atrial Rate : 071 BPM     P-R Int : 242 ms          QRS Dur : 100 ms      QT Int : 386 ms       P-R-T Axes : 045 047 -49 degrees     QTc Int : 419 ms    Sinus rhythm with 1st degree A-V block  Anterior infarct (cited on or before  23-OCT-2024)  ST and T wave abnormality, consider inferior ischemia  Abnormal ECG  When compared with ECG of 23-OCT-2024 13:24,  No significant change was found  Confirmed by Odilon Klein MD ([id number]) on 10/27/2024 12:02:30 PM    Referred By:  RUBIO           Confirmed By:Odilon Klein MD       Results for orders placed during the hospital encounter of 09/17/24    Echo    Interpretation Summary    Left Ventricle: The left ventricle is normal in size. Mildly increased wall thickness. There is mild concentric hypertrophy. There is normal systolic function with a visually estimated ejection fraction of 55 - 60%. Grade I diastolic dysfunction.    Right Ventricle: Normal right ventricular cavity size. Systolic function is normal.    Aortic Valve: The aortic valve is a trileaflet valve. There is moderate aortic valve sclerosis. Mildly restricted motion. There is mild stenosis. Aortic valve area by VTI is 1.21 cm². Aortic valve peak velocity is 1.60 m/s. Mean gradient is 6 mmHg. The dimensionless index is 0.40.    Tricuspid Valve: There is mild regurgitation.    Pulmonary Artery: The estimated pulmonary artery systolic pressure is 27 mmHg.      Results for orders placed during the hospital encounter of 09/17/24    Nuclear Stress - Cardiology Interpreted    Interpretation Summary    The patient exercised for 10 minutes 23 seconds on a Gonzalo protocol, corresponding to a functional capacity of 12METS, achieving a peak heart rate of 127 bpm, which is 91% of the age predicted maximum heart rate. The patient reported shortness of breath during the stress test.    Normal myocardial perfusion scan. There is no evidence of myocardial ischemia or infarction.    The gated perfusion images showed an ejection fraction of 55% post stress.    There is normal wall motion at post-stress.    The ECG portion of the study is positive for ischemia.  4mm horizontal ST depression at peak stress.    The patient reported no chest pain during the  stress test.      Results for orders placed during the hospital encounter of 02/28/19    Cardiac catheterization    Conclusion  LVEDP: 22mmHg  LVEF: 55% by echo  Wall Motion: normal by echo    Dominance: Right  LM: dist 50%  LAD: mid 99%, competitive flow from LIMA-LAD-OM-RPDA  Diag2 prox 90%, T2 flow (not bypassed)  LCx: prox , territory supplied by LIMA-LAD-OM-RPDA  RCA: mid , territory supplied by LIMA-LAD-OM-RPDA    LIMA-LAD-OM-RPDA patent    No SVG identified    Hemostasis:  RFA      11/21/2024    History of Present Illness              Here for follow-up after angiogram.  No complications from angiogram    Results for orders placed or performed in visit on 10/23/24   IN OFFICE EKG 12-LEAD (to [facility]dplusme)    Collection Time: 10/23/24  1:24 PM   Result Value Ref Range    QRS Duration 100 ms    OHS QTC Calculation 419 ms    Narrative    Test Reason : I10,    Vent. Rate : 071 BPM     Atrial Rate : 071 BPM     P-R Int : 242 ms          QRS Dur : 100 ms      QT Int : 386 ms       P-R-T Axes : 045 047 -49 degrees     QTc Int : 419 ms    Sinus rhythm with 1st degree A-V block  Anterior infarct (cited on or before 23-OCT-2024)  ST and T wave abnormality, consider inferior ischemia  Abnormal ECG  When compared with ECG of 23-OCT-2024 13:24,  No significant change was found  Confirmed by Odilon Klein MD ([id number]) on 10/27/2024 12:02:30 PM    Referred By:  RUBIO           Confirmed By:Odilon Klein MD       Results for orders placed during the hospital encounter of 09/17/24    Echo    Interpretation Summary    Left Ventricle: The left ventricle is normal in size. Mildly increased wall thickness. There is mild concentric hypertrophy. There is normal systolic function with a visually estimated ejection fraction of 55 - 60%. Grade I diastolic dysfunction.    Right Ventricle: Normal right ventricular cavity size. Systolic function is normal.    Aortic Valve: The aortic valve is a trileaflet valve. There is moderate aortic valve  sclerosis. Mildly restricted motion. There is mild stenosis. Aortic valve area by VTI is 1.21 cm². Aortic valve peak velocity is 1.60 m/s. Mean gradient is 6 mmHg. The dimensionless index is 0.40.    Tricuspid Valve: There is mild regurgitation.    Pulmonary Artery: The estimated pulmonary artery systolic pressure is 27 mmHg.      Results for orders placed during the hospital encounter of 09/17/24    Nuclear Stress - Cardiology Interpreted    Interpretation Summary    The patient exercised for 10 minutes 23 seconds on a Gonzalo protocol, corresponding to a functional capacity of 12METS, achieving a peak heart rate of 127 bpm, which is 91% of the age predicted maximum heart rate. The patient reported shortness of breath during the stress test.    Normal myocardial perfusion scan. There is no evidence of myocardial ischemia or infarction.    The gated perfusion images showed an ejection fraction of 55% post stress.    There is normal wall motion at post-stress.    The ECG portion of the study is positive for ischemia.  4mm horizontal ST depression at peak stress.    The patient reported no chest pain during the stress test.      Results for orders placed during the hospital encounter of 10/09/24    Cardiac catheterization    Conclusion    There was three vessel coronary artery disease. There was left main disease.    The Mid LAD lesion was 99% stenosed.    The Prox Cx lesion was 100% stenosed.    The 2nd Diag lesion was 90% stenosed.    The Dist LM lesion was 99% stenosed.    The Prox LAD lesion was 99% stenosed.    The pre-procedure left ventricular end diastolic pressure was 12.    The estimated blood loss was <50 mL.    Coronary angiography:    Left main and severe triple-vessel disease.  RCA is a known     Graft angiography:  LIMA-LAD-OM-RPDA is patent    Assessment plan    Continue medical management and aggressive risk factor modification    Access:  Right common femoral artery access      The procedure log was  "documented by Documenter: Dion Hoang RN and verified by Milly Cadena MD.    Date: 10/9/2024  Time: 9:25 AM      November 24    History of Present Illness    CHIEF COMPLAINT:  Eric presents today for follow-up on medication changes and general health status.    CURRENT SYMPTOMS:  He reports feeling tired and experiencing weight loss. He also describes shortness of breath, which he characterizes as "running out of air." He denies chest pain or tightness.    MEDICATIONS:  He is currently taking azetimibe for cholesterol management, which he tolerates well. His blood pressure medication has been effective in lowering his blood pressure. He confirms adherence to all prescribed medications, including Jardiance, losartan, and aspirin. He is uncertain about taking metoprolol but acknowledges it is listed in his medications. He denies experiencing any side effects from his current medication regimen and reports closely monitoring for any adverse reactions.    UPCOMING APPOINTMENTS:  He has an appointment with a pulmonary specialist scheduled for December 2nd to address his breathing concerns. He also has an upcoming appointment with his urologist scheduled for tomorrow.    RECENT DIAGNOSTIC TESTS:  He completed labs two days ago as requested.         PAST MEDICAL HISTORY:     Past Medical History:   Diagnosis Date    Anemia     Angina pectoris     Anxiety     Cancer     Coronary artery disease     Depression     Diabetes mellitus type II     Disorder of kidney and ureter     Erectile dysfunction     Eye injuries     fb ou    GERD (gastroesophageal reflux disease)     Hypertension     Intermediate stage nonexudative age-related macular degeneration of both eyes 4/9/2019    Myocardial infarction     Nuclear sclerosis of both eyes 4/9/2019    Prostate cancer     Trouble in sleeping     Type 2 diabetes mellitus     Type 2 diabetes mellitus with ophthalmic manifestations        PAST SURGICAL HISTORY:     Past Surgical " History:   Procedure Laterality Date    COLONOSCOPY N/A 2/9/2018    Procedure: COLONOSCOPY;  Surgeon: Mathieu Cao MD;  Location: [facility] ENDO;  Service: Endoscopy;  Laterality: N/A;    CORONARY ANGIOGRAPHY INCLUDING BYPASS GRAFTS WITH CATHETERIZATION OF LEFT HEART N/A 3/4/2019    Procedure: ANGIOGRAM, CORONARY, INCLUDING BYPASS GRAFT, WITH LEFT HEART CATHETERIZATION;  Surgeon: Jamir Nam MD;  Location: [facility] CATH LAB;  Service: Cardiology;  Laterality: N/A;    CORONARY ANGIOGRAPHY INCLUDING BYPASS GRAFTS WITH CATHETERIZATION OF LEFT HEART N/A 10/9/2024    Procedure: ANGIOGRAM, CORONARY, INCLUDING BYPASS GRAFT, WITH LEFT HEART CATHETERIZATION;  Surgeon: Milly Cadena MD;  Location: [facility] CATH LAB;  Service: Cardiology;  Laterality: N/A;  rcfa  RN PREOP 10/4/2024    CORONARY ARTERY BYPASS GRAFT  2001    ESOPHAGOGASTRODUODENOSCOPY N/A 10/8/2020    Procedure: EGD (ESOPHAGOGASTRODUODENOSCOPY);  Surgeon: Bharath Herrera MD;  Location: [facility];  Service: Endoscopy;  Laterality: N/A;    ESOPHAGOGASTRODUODENOSCOPY N/A 11/7/2023    Procedure: EGD (ESOPHAGOGASTRODUODENOSCOPY);  Surgeon: Bharath Herrera MD;  Location: [facility];  Service: Endoscopy;  Laterality: N/A;    HERNIA REPAIR      LEFT HEART CATHETERIZATION Left 3/4/2019    Procedure: Left heart cath RFA access, 4fr catheter/sheath, not before 9am;  Surgeon: Jamir Nam MD;  Location: [facility] CATH LAB;  Service: Cardiology;  Laterality: Left;    TRANSRECTAL ULTRASOUND OF PROSTATE WITH INSERTION OF GOLD FIDUCIAL MARKER N/A 2/26/2019    Procedure: ULTRASOUND, PROSTATE, RECTAL APPROACH, WITH GOLD FIDUCIAL MARKER INSERTION;  Surgeon: Layne Huff MD;  Location: [facility] OR;  Service: Urology;  Laterality: N/A;    UPPER GASTROINTESTINAL ENDOSCOPY      WRIST SURGERY         ALLERGIES AND MEDICATION:     Review of patient's allergies indicates:   Allergen Reactions    Tamsulosin     Prochlorperazine      convulsions     Statins-hmg-coa reductase inhibitors Other (See Comments)     Muscle weakness        Medication List            Accurate as of November 21, 2024  1:46 PM. If you have any questions, ask your nurse or doctor.                CONTINUE taking these medications      ALPRAZolam 1 MG tablet  Commonly known as: XANAX  Take 1 tablet (1 mg total) by mouth nightly as needed for Anxiety.     aspirin 81 MG EC tablet  Commonly known as: ECOTRIN  Take 1 tablet (81 mg total) by mouth once daily.     blood-glucose meter kit  Commonly known as: TRUE METRIX GLUCOSE METER  For twice daily checking     clopidogreL 75 mg tablet  Commonly known as: PLAVIX  Take 1 tablet (75 mg total) by mouth once daily.     empagliflozin 10 mg tablet  Commonly known as: JARDIANCE  Take 1 tablet (10 mg total) by mouth once daily.     ezetimibe 10 mg tablet  Commonly known as: ZETIA  Take 1 tablet (10 mg total) by mouth once daily.     imiquimod 5 % cream  Commonly known as: ALDARA  APPLY TOPICALLY 3 (THREE) TIMES A WEEK.     lancets Misc  To check BG 3 times daily, to use with insurance preferred meter     losartan 100 MG tablet  Commonly known as: COZAAR  Take 1 tablet (100 mg total) by mouth once daily.     metFORMIN 500 MG tablet  Commonly known as: GLUCOPHAGE  TAKE 1 TABLET EVERY DAY WITH BREAKFAST     metoprolol succinate 25 MG 24 hr tablet  Commonly known as: TOPROL-XL  Take 0.5 tablets (12.5 mg total) by mouth once daily.     omeprazole 40 MG capsule  Commonly known as: PRILOSEC  Take 1 capsule (40 mg total) by mouth 2 (two) times daily before meals.     PRESERVISION LUTEIN ORAL     spironolactone 25 MG tablet  Commonly known as: ALDACTONE  Take 1 tablet (25 mg total) by mouth once daily.     testosterone 20.25 mg/1.25 gram (1.62 %) Glpm  Commonly known as: ANDROGEL     TRUE METRIX GLUCOSE TEST STRIP Strp  Generic drug: blood sugar diagnostic  CHECK BLOOD SUGAR TWO TIMES DAILY     TRUE METRIX LEVEL 1 Soln  Generic drug: blood glucose control,  low  To use with blood glucose meter     vitamin D 1000 units Tab  Commonly known as: VITAMIN D3              SOCIAL HISTORY:     Social History     Socioeconomic History    Marital status:    Tobacco Use    Smoking status: Former     Current packs/day: 0.00     Average packs/day: 2.0 packs/day for 23.0 years (46.0 ttl pk-yrs)     Types: Cigarettes     Start date: 1966     Quit date: 1989     Years since quittin.9    Smokeless tobacco: Never   Substance and Sexual Activity    Alcohol use: Yes     Alcohol/week: 1.0 standard drink of alcohol     Types: 1 Cans of beer per week     Comment: occassional    Drug use: No    Sexual activity: Yes     Partners: Female     Social Drivers of Health     Financial Resource Strain: Low Risk  (2023)    Overall Financial Resource Strain (CARDIA)     Difficulty of Paying Living Expenses: Not hard at all   Food Insecurity: Unknown (2023)    Hunger Vital Sign     Ran Out of Food in the Last Year: Never true   Transportation Needs: No Transportation Needs (2023)    PRAPARE - Transportation     Lack of Transportation (Medical): No     Lack of Transportation (Non-Medical): No   Physical Activity: Insufficiently Active (2023)    Exercise Vital Sign     Days of Exercise per Week: 3 days     Minutes of Exercise per Session: 40 min   Stress: No Stress Concern Present (2023)    [employer] - Occupational Stress Questionnaire     Feeling of Stress : Only a little   Housing Stability: Low Risk  (2023)    Housing Stability Vital Sign     Unable to Pay for Housing in the Last Year: No     Number of Places Lived in the Last Year: 1     Unstable Housing in the Last Year: No       FAMILY HISTORY:     Family History   Adopted: Yes   Problem Relation Name Age of Onset    No Known Problems Mother      No Known Problems Father      No Known Problems Sister      No Known Problems Brother      No Known Problems Maternal Aunt    "   No Known Problems Maternal Uncle      No Known Problems Paternal Aunt      No Known Problems Paternal Uncle      No Known Problems Maternal Grandmother      No Known Problems Maternal Grandfather      No Known Problems Paternal Grandmother      No Known Problems Paternal Grandfather      No Known Problems Other      Amblyopia Neg Hx      Blindness Neg Hx      Cancer Neg Hx      Cataracts Neg Hx      Diabetes Neg Hx      Glaucoma Neg Hx      Hypertension Neg Hx      Macular degeneration Neg Hx      Retinal detachment Neg Hx      Strabismus Neg Hx      Stroke Neg Hx      Thyroid disease Neg Hx         REVIEW OF SYSTEMS:   Review of Systems   Constitutional: Negative.   HENT: Negative.     Eyes: Negative.    Respiratory: Negative.     Endocrine: Negative.    Hematologic/Lymphatic: Negative.    Skin: Negative.    Musculoskeletal: Negative.    Gastrointestinal: Negative.    Genitourinary: Negative.    Neurological: Negative.    Psychiatric/Behavioral: Negative.     Allergic/Immunologic: Negative.        A 10 point review of systems was performed and all the pertinent positives have been mentioned. Rest of review of systems was negative.        PHYSICAL EXAM:     Vitals:    11/21/24 1300   BP: 132/60   Pulse: 73   Resp: 18    Body mass index is 25.1 kg/m².  Weight: 72.7 kg (160 lb 4.4 oz)   Height: 5' 7" (170.2 cm)     Physical Exam  Vitals reviewed.   Constitutional:       Appearance: He is well-developed.   HENT:      Head: Normocephalic.   Eyes:      Conjunctiva/sclera: Conjunctivae normal.      Pupils: Pupils are equal, round, and reactive to light.   Cardiovascular:      Rate and Rhythm: Normal rate and regular rhythm.      Heart sounds: Normal heart sounds.   Pulmonary:      Effort: Pulmonary effort is normal.      Breath sounds: Normal breath sounds.   Abdominal:      General: Bowel sounds are normal.      Palpations: Abdomen is soft.   Musculoskeletal:      Cervical back: Normal range of motion and neck supple. "   Skin:     General: Skin is warm.   Neurological:      Mental Status: He is alert and oriented to person, place, and time.         DATA:     Laboratory:  CBC:  Recent Labs   Lab 10/04/24  1245 10/30/24  0729 11/15/24  1033   WBC 10.59 6.38 6.98   Hemoglobin 15.3 14.3 15.0   Hematocrit 45.8 44.0 46.3   Platelets 203 205 227       CHEMISTRIES:  Recent Labs   Lab 04/16/22  0807 10/17/22  0750 04/23/24  0750 10/04/24  1245 10/30/24  0729   Glucose 120 H   < > 123 H 101 107   Sodium 139   < > 140 139 139   Potassium 4.1   < > 3.6 4.1 3.7   BUN 18   < > 25 H 17 19   Creatinine 1.1   < > 1.3 1.2 1.2   eGFR if  >60.0  --   --   --   --    eGFR if non  >60.0  --   --   --   --    Calcium 9.6   < > 9.8 9.7 8.9    < > = values in this interval not displayed.       CARDIAC BIOMARKERS:        COAGS:        LIPIDS/LFTS:  Recent Labs   Lab 10/16/23  0655 04/23/24  0750 10/30/24  0729   Cholesterol 281 H 245 H 195   Triglycerides 522 H 180 H 356 H   HDL 37 L 49 39 L   LDL Cholesterol Invalid, Trig>400.0 160.0 H 84.8   Non-HDL Cholesterol 244 196 156   AST 19 20 26   ALT 19 13 19       Hemoglobin A1C   Date Value Ref Range Status   10/30/2024 6.1 (H) 4.0 - 5.6 % Final     Comment:     ADA Screening Guidelines:  5.7-6.4%  Consistent with prediabetes  >or=6.5%  Consistent with diabetes    High levels of fetal hemoglobin interfere with the HbA1C  assay. Heterozygous hemoglobin variants (HbS, HgC, etc)do  not significantly interfere with this assay.   However, presence of multiple variants may affect accuracy.     04/23/2024 6.1 (H) 4.0 - 5.6 % Final     Comment:     ADA Screening Guidelines:  5.7-6.4%  Consistent with prediabetes  >or=6.5%  Consistent with diabetes    High levels of fetal hemoglobin interfere with the HbA1C  assay. Heterozygous hemoglobin variants (HbS, HgC, etc)do  not significantly interfere with this assay.   However, presence of multiple variants may affect accuracy.     10/16/2023  6.1 (H) 4.0 - 5.6 % Final     Comment:     ADA Screening Guidelines:  5.7-6.4%  Consistent with prediabetes  >or=6.5%  Consistent with diabetes    High levels of fetal hemoglobin interfere with the HbA1C  assay. Heterozygous hemoglobin variants (HbS, HgC, etc)do  not significantly interfere with this assay.   However, presence of multiple variants may affect accuracy.         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; 3/2019 [facility] patent graft; Culprit likely in small Diag2 (unrevascularized) and likely demand related    Renal cyst, left, simple 1st seen 2/2019 CT; verified on renal US 11/2020    Status post coronary artery bypass grafting single vessel    Refractive error    Intermediate stage nonexudative age-related macular degeneration of both eyes    Nuclear sclerosis of both eyes    History of DVT from PICC line right upper extremity 4/2019  anticoag x 3 months then stopped    Abdominal aortic atherosclerosis    Type 2 diabetes mellitus with diabetic cataract, without long-term current use of insulin    Major depressive disorder with single episode, in full remission    Epiretinal membrane (ERM) of both eyes    Stage 3a chronic kidney disease    Chronic left shoulder pain    Decreased range of motion of left shoulder    Shoulder weakness    PVC (premature ventricular contraction)     No orders of the defined types were placed in this encounter.      Patient with coronary artery disease status post CABG.  Last angiogram in 2024revealed patent bypass grafts with severe underlying coronary artery disease.  No angina.  Continue medical management    Dyspnea on exertion.  Heart failure with preserved ejection fraction.  On spironolactone and Jardiance.    Lab Results   Component Value Date    CREATININE 1.2 10/30/2024       Referral made to pulmonology because of history of smoking and significant dyspnea on exertion to rule out pulmonary causes for shortness of breath      Uncontrolled dyslipidemia:  Patient could not tolerate statins in the past.   Now on ezetimibe 10 mg daily.  LDL IMPROVED ON THAT    Lab Results   Component Value Date    LDLCALC 84.8 10/30/2024       Hypertension:  Well controlled.  At home stays around 120-130 mmHg as per patient    Aortic atherosclerosis:  Now on ezetimibe.    Follow-up after 3 m            Thank you very much for involving me in the care of your patient.  Please do not hesitate to contact me if there are any questions.      Milly Cadena MD, FACC, [facility]  Interventional Cardiologist, [facility].     Visit today included increased complexity associated with the care of the episodic problem hypertension, dyslipidemia, coronary artery disease status post CABG, heart failure with preserved ejection fraction addressed and managing the longitudinal care of the patient due to the serious and/or complex managed  problem(s)   Patient Active Problem List   Diagnosis    Essential hypertension    Type 2 diabetes mellitus without complication, without long-term current use of insulin    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 2/2018; 11/7/23 EGD Archer's stage C10-M11, biopsied. 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; 3/2019 C patent graft; Culprit likely in small Diag2 (unrevascularized) and likely demand related    Renal cyst, left, simple 1st seen 2/2019 CT; verified on renal US 11/2020    Status post coronary artery bypass grafting single vessel    Refractive error    Intermediate stage nonexudative age-related macular degeneration of both eyes    Nuclear sclerosis of both eyes    History of DVT from PICC line right upper extremity 4/2019 anticoag x 3 months then stopped    Abdominal aortic atherosclerosis    Type 2 diabetes mellitus with diabetic cataract, without long-term current use of insulin    Major depressive disorder with single episode, in full remission    Epiretinal membrane (ERM) of both eyes    Stage 3a chronic kidney disease    Chronic left shoulder pain    Decreased range of motion of left shoulder    Shoulder weakness    PVC (premature ventricular contraction)     .        This note was dictated with the help of speech recognition software.  There might be un-intended errors and/or substitutions.

## 2024-11-22 ENCOUNTER — OFFICE VISIT (OUTPATIENT)
Dept: UROLOGY | Facility: CLINIC | Age: 84
End: 2024-11-22
Payer: MEDICARE

## 2024-11-22 VITALS — BODY MASS INDEX: 26 KG/M2 | WEIGHT: 166 LBS

## 2024-11-22 DIAGNOSIS — Z85.46 HISTORY OF PROSTATE CANCER: ICD-10-CM

## 2024-11-22 DIAGNOSIS — E29.1 HYPOGONADISM IN MALE: Primary | ICD-10-CM

## 2024-11-22 PROCEDURE — 99999 PR PBB SHADOW E&M-EST. PATIENT-LVL III: CPT | Mod: PBBFAC,HCNC,, | Performed by: STUDENT IN AN ORGANIZED HEALTH CARE EDUCATION/TRAINING PROGRAM

## 2024-11-22 RX ORDER — TESTOSTERONE 20.25 MG/1.25G
1.25 GEL TOPICAL DAILY
Qty: 75 G | Refills: 3 | Status: SHIPPED | OUTPATIENT
Start: 2024-11-22 | End: 2025-05-21

## 2024-11-22 NOTE — PROGRESS NOTES
Patient ID: Eric Jackson is a 84 y.o. male.    Chief Complaint: fu  Referral: No referring provider defined for this encounter.     HPI  84 y.o. who presents to the Urology clinic for evaluation of low T. He feels his symptoms are well managed with testosterone.Denies chest pain, shortness of breath, nausea, vomiting, voiding difficulty, unexplained weight loss.       Microscopic hematuria noted 2/2019 in setting of E coli UTI/ recent fuducial marker placement. Cystoscopy cmpleted in 2021.  Hx of IM risk prostate cancer, T1c, PSA 7.6, Stage IIb, treated with external beam radiation therapy 4/2019. PSA 0.29 ( 6/3/2020)        Medically Necessary ROS documented in HPI    Past Medical History  Active Ambulatory Problems     Diagnosis Date Noted    Essential hypertension 11/14/2012    Type 2 diabetes mellitus without complication, without long-term current use of insulin 11/14/2012    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 2/2018; 11/7/23 EGD Archer's stage C10-M11, biopsied. 4 cm HH path metaplasia;  repeat 3-5 years for Archer's     DAISY (generalized anxiety disorder)     Insomnia 08/10/2014    Slow urinary stream 12/07/2018    Tubular adenoma of colon 2/2018 3 adenomas repeat 3 years 01/11/2019    Hypogonadism in male 01/11/2019    Prostate cancer 2/25/19 TRUS of prostate and gold fiducial marker placement; 4/2019 s/p XRT 01/29/2019    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; 3/2019 [facility] patent graft; Culprit likely in small Diag2 (unrevascularized) and likely demand related 03/01/2019    Renal cyst, left, simple 1st seen 2/2019 CT; verified on renal US 11/2020 03/02/2019    Status post coronary artery bypass grafting single vessel 03/07/2019    Refractive error 04/09/2019     Intermediate stage nonexudative age-related macular degeneration of both eyes 04/09/2019    Nuclear sclerosis of both eyes 04/09/2019    History of DVT from PICC line right upper extremity 4/2019 anticoag x 3 months then stopped 04/29/2019    Abdominal aortic atherosclerosis 12/09/2019    Type 2 diabetes mellitus with diabetic cataract, without long-term current use of insulin 02/14/2020    Major depressive disorder with single episode, in full remission 02/14/2020    Epiretinal membrane (ERM) of both eyes 09/15/2020    Stage 3a chronic kidney disease 01/15/2021    Chronic left shoulder pain 07/21/2021    Decreased range of motion of left shoulder 07/21/2021    Shoulder weakness 07/21/2021    PVC (premature ventricular contraction) 04/22/2023     Resolved Ambulatory Problems     Diagnosis Date Noted    Acute constipation 08/25/2012    GI bleed 11/14/2012    Acute post-hemorrhagic anemia 11/14/2012    Chest pain 02/03/2014    Depression     Other and unspecified angina pectoris 12/30/2014    Type II or unspecified type diabetes mellitus without mention of complication, uncontrolled 12/30/2014    Symptomatic anemia, microcytic/hypochromic  01/15/2018    Left lower lobe pneumonia 01/15/2018    Hyponatremia 01/15/2018    Transaminitis 01/15/2018    Iron deficiency anemia 02/09/2018    Elevated PSA 12/07/2018    Fever 106 degrees F or over 02/28/2019    Sepsis secondary to UTI 03/01/2019    Infectious encephalopathy 03/01/2019    Acute prostatitis 03/01/2019    Urinary retention 03/02/2019    NSTEMI (non-ST elevated myocardial infarction) 03/04/2019    Cardiomyopathy 02/14/2020     Past Medical History:   Diagnosis Date    Anemia     Angina pectoris     Anxiety     Cancer     Coronary artery disease     Diabetes mellitus type II     Disorder of kidney and ureter     Erectile dysfunction     Eye injuries     GERD (gastroesophageal reflux disease)     Hypertension     Myocardial infarction     Trouble in sleeping     Type  2 diabetes mellitus     Type 2 diabetes mellitus with ophthalmic manifestations          Past Surgical History  Past Surgical History:   Procedure Laterality Date    COLONOSCOPY N/A 2/9/2018    Procedure: COLONOSCOPY;  Surgeon: Mathieu Cao MD;  Location: [facility] ENDO;  Service: Endoscopy;  Laterality: N/A;    CORONARY ANGIOGRAPHY INCLUDING BYPASS GRAFTS WITH CATHETERIZATION OF LEFT HEART N/A 3/4/2019    Procedure: ANGIOGRAM, CORONARY, INCLUDING BYPASS GRAFT, WITH LEFT HEART CATHETERIZATION;  Surgeon: Jamir Nam MD;  Location: [facility] CATH LAB;  Service: Cardiology;  Laterality: N/A;    CORONARY ANGIOGRAPHY INCLUDING BYPASS GRAFTS WITH CATHETERIZATION OF LEFT HEART N/A 10/9/2024    Procedure: ANGIOGRAM, CORONARY, INCLUDING BYPASS GRAFT, WITH LEFT HEART CATHETERIZATION;  Surgeon: Milly Cadena MD;  Location: [facility] CATH LAB;  Service: Cardiology;  Laterality: N/A;  rcfa  RN PREOP 10/4/2024    CORONARY ARTERY BYPASS GRAFT  2001    ESOPHAGOGASTRODUODENOSCOPY N/A 10/8/2020    Procedure: EGD (ESOPHAGOGASTRODUODENOSCOPY);  Surgeon: Bharath Herrera MD;  Location: [facility];  Service: Endoscopy;  Laterality: N/A;    ESOPHAGOGASTRODUODENOSCOPY N/A 11/7/2023    Procedure: EGD (ESOPHAGOGASTRODUODENOSCOPY);  Surgeon: Bharath Herrera MD;  Location: [facility];  Service: Endoscopy;  Laterality: N/A;    HERNIA REPAIR      LEFT HEART CATHETERIZATION Left 3/4/2019    Procedure: Left heart cath RFA access, 4fr catheter/sheath, not before 9am;  Surgeon: Jamir Nam MD;  Location: [facility] CATH LAB;  Service: Cardiology;  Laterality: Left;    TRANSRECTAL ULTRASOUND OF PROSTATE WITH INSERTION OF GOLD FIDUCIAL MARKER N/A 2/26/2019    Procedure: ULTRASOUND, PROSTATE, RECTAL APPROACH, WITH GOLD FIDUCIAL MARKER INSERTION;  Surgeon: Layne Huff MD;  Location: [facility];  Service: Urology;  Laterality: N/A;    UPPER GASTROINTESTINAL ENDOSCOPY      WRIST SURGERY         Social History        Medications    Current Outpatient Medications:     ALPRAZolam (XANAX) 1 MG tablet, Take 1 tablet (1 mg total) by mouth nightly as needed for Anxiety., Disp: 30 tablet, Rfl: 2    aspirin (ECOTRIN) 81 MG EC tablet, Take 1 tablet (81 mg total) by mouth once daily., Disp: , Rfl:     blood glucose control, low (TRUE METRIX LEVEL 1) Soln, To use with blood glucose meter, Disp: 1 each, Rfl: 0    blood-glucose meter (TRUE METRIX GLUCOSE METER) kit, For twice daily checking, Disp: 1 each, Rfl: 0    clopidogreL (PLAVIX) 75 mg tablet, Take 1 tablet (75 mg total) by mouth once daily., Disp: 30 tablet, Rfl: 11    empagliflozin (JARDIANCE) 10 mg tablet, Take 1 tablet (10 mg total) by mouth once daily., Disp: 90 tablet, Rfl: 3    ezetimibe (ZETIA) 10 mg tablet, Take 1 tablet (10 mg total) by mouth once daily., Disp: 90 tablet, Rfl: 3    imiquimod (ALDARA) 5 % cream, APPLY TOPICALLY 3 (THREE) TIMES A WEEK., Disp: 24 packet, Rfl: 3    lancets Misc, To check BG 3 times daily, to use with insurance preferred meter, Disp: 300 each, Rfl: 3    losartan (COZAAR) 100 MG tablet, Take 1 tablet (100 mg total) by mouth once daily., Disp: 90 tablet, Rfl: 3    metFORMIN (GLUCOPHAGE) 500 MG tablet, TAKE 1 TABLET EVERY DAY WITH BREAKFAST, Disp: 90 tablet, Rfl: 3    metoprolol succinate (TOPROL-XL) 25 MG 24 hr tablet, Take 0.5 tablets (12.5 mg total) by mouth once daily., Disp: 90 tablet, Rfl: 3    omeprazole (PRILOSEC) 40 MG capsule, Take 1 capsule (40 mg total) by mouth 2 (two) times daily before meals., Disp: 180 capsule, Rfl: 3    spironolactone (ALDACTONE) 25 MG tablet, Take 1 tablet (25 mg total) by mouth once daily., Disp: 90 tablet, Rfl: 3    testosterone (ANDROGEL) 20.25 mg/1.25 gram (1.62 %) GlPm, Apply topically., Disp: , Rfl:     TRUE METRIX GLUCOSE TEST STRIP Strp, CHECK BLOOD SUGAR TWO TIMES DAILY, Disp: 200 strip, Rfl: 3    vit C/vit E ac/lut/copper/zinc (PRESERVISION LUTEIN ORAL), Take 1 tablet by mouth once daily., Disp: , Rfl:      vitamin D 1000 units Tab, Take 1,000 Units by mouth once daily., Disp: , Rfl:     testosterone (ANDROGEL) 20.25 mg/1.25 gram (1.62 %) GlPm, Place 1.25 g onto the skin once daily., Disp: 75 g, Rfl: 3    Allergies  Review of patient's allergies indicates:   Allergen Reactions    Tamsulosin     Prochlorperazine      convulsions    Statins-hmg-coa reductase inhibitors Other (See Comments)     Muscle weakness       Patient's PMH, FH, Social hx, Medications, allergies reviewed and updated as pertinent to today's visit    Objective:      Physical Exam        Lab Results   Component Value Date    PSADIAG 0.50 05/07/2024    PSADIAG 0.47 10/16/2023    PSADIAG 0.43 05/22/2023      Assessment:       1. Hypogonadism in male    2. History of prostate cancer        Plan:       Hx of prostate cancer  Discussed Testosterone/ PSA needed fr follow up  Hct reviewed during recent CBC WNL    Hypogonadism  Discussed with patient his cardiologist is not advising that he continue with testosterone replacement  Patient explained risks of T replacement   Testosterone side effects:   -Risks associated with testosterone replacement in elderly men include, but are not limited to, fluid retention, gynecomastia, worsening of sleep apnea, polycythemia/ thromboembolism/stroke, myocardial infarction ( heart attack) worsened bladder outlet obstruction. Risk of infertility in fertile men.   Patient reports that the benefits outweigh the risks, at his age he feels better with testosterone replacement. He is accepting of the risks.   Shared medical decision making model engaged      RTC 6 m w/ PSA/ CBC/ Testosterone    Visit today included increased complexity associated with the care of the episodic problemd addressed and management.

## 2024-11-25 ENCOUNTER — LAB VISIT (OUTPATIENT)
Dept: LAB | Facility: HOSPITAL | Age: 84
End: 2024-11-25
Attending: INTERNAL MEDICINE
Payer: MEDICARE

## 2024-11-25 DIAGNOSIS — Z85.46 HISTORY OF PROSTATE CANCER: ICD-10-CM

## 2024-11-25 DIAGNOSIS — E29.1 HYPOGONADISM IN MALE: ICD-10-CM

## 2024-11-25 DIAGNOSIS — I10 ESSENTIAL HYPERTENSION: ICD-10-CM

## 2024-11-25 LAB
ANION GAP SERPL CALC-SCNC: 9 MMOL/L (ref 8–16)
BUN SERPL-MCNC: 18 MG/DL (ref 8–23)
CALCIUM SERPL-MCNC: 9.4 MG/DL (ref 8.7–10.5)
CHLORIDE SERPL-SCNC: 108 MMOL/L (ref 95–110)
CO2 SERPL-SCNC: 25 MMOL/L (ref 23–29)
COMPLEXED PSA SERPL-MCNC: 0.34 NG/ML (ref 0–4)
CREAT SERPL-MCNC: 1.3 MG/DL (ref 0.5–1.4)
EST. GFR  (NO RACE VARIABLE): 54.8 ML/MIN/1.73 M^2
GLUCOSE SERPL-MCNC: 108 MG/DL (ref 70–110)
POTASSIUM SERPL-SCNC: 4.6 MMOL/L (ref 3.5–5.1)
SODIUM SERPL-SCNC: 142 MMOL/L (ref 136–145)
TESTOST SERPL-MCNC: 268 NG/DL (ref 304–1227)

## 2024-11-25 PROCEDURE — 84153 ASSAY OF PSA TOTAL: CPT | Mod: HCNC | Performed by: STUDENT IN AN ORGANIZED HEALTH CARE EDUCATION/TRAINING PROGRAM

## 2024-11-25 PROCEDURE — 84403 ASSAY OF TOTAL TESTOSTERONE: CPT | Mod: HCNC | Performed by: STUDENT IN AN ORGANIZED HEALTH CARE EDUCATION/TRAINING PROGRAM

## 2024-11-25 PROCEDURE — 36415 COLL VENOUS BLD VENIPUNCTURE: CPT | Mod: HCNC,PO | Performed by: STUDENT IN AN ORGANIZED HEALTH CARE EDUCATION/TRAINING PROGRAM

## 2024-11-25 PROCEDURE — 80048 BASIC METABOLIC PNL TOTAL CA: CPT | Mod: HCNC | Performed by: INTERNAL MEDICINE

## 2025-01-09 ENCOUNTER — TELEPHONE (OUTPATIENT)
Dept: ENDOCRINOLOGY | Facility: CLINIC | Age: 85
End: 2025-01-09
Payer: MEDICARE

## 2025-01-09 NOTE — TELEPHONE ENCOUNTER
----- Message from Brennon sent at 1/9/2025  2:40 PM CST -----  Regarding: Orders  Contact: [phone]  Patient Requesting Order         Order Needed:Pt's insurance company called to request Test strips that are covered by his insurance:   Codes of the strips approved are : Precision or Free Style   Codes: [address]-25  Code:[address]-99  Code:[address]-53  Code:[address]-01  Code:[address]-99  Code:[address]-87  These are all covered with no copay for the pt.          Communication Preference:Pls call Mrs. Simon at [phone] ext [phone]    Thank you.

## 2025-01-29 ENCOUNTER — TELEPHONE (OUTPATIENT)
Dept: CARDIOLOGY | Facility: CLINIC | Age: 85
End: 2025-01-29
Payer: MEDICARE

## 2025-02-10 ENCOUNTER — TELEPHONE (OUTPATIENT)
Dept: PULMONOLOGY | Facility: CLINIC | Age: 85
End: 2025-02-10
Payer: MEDICARE

## 2025-02-10 NOTE — PROGRESS NOTES
General Pulmonary Clinic  New Patient Visit    Chief Complaint: shortness of breath     HPI     Eric Jackson is a 84 y.o. male with a history of CAD s/p 3V CABG, HFpEF hx of prostate cancer tx w/ XRT, type II DM, HTN, CKD IIIa, up's esophagus presenting with chief complaint of shortness of breath    # shortness of breath    Began having shortness of breath 4-5 months ago. He exercises on the treadmill daily -3-4 mph on the treadmill and has noticed  during this time that he develops a burning pain across his chest and feels more short of breath. He also feels very fatigued.  He notes some intermittent wheezing but no  coughing.   + intermittent GERD 2-3 motnhs, no changes in his voice. No difficulty swallowing   He has swelling in the legs that is intermittent. He notices pain in his left leg when exertion himself.  No blood loss in urine or stool  He has gained weight over this period  He does get blood pressure elevations 180/80s intermittently - tomasz in AM      Sleep ROS-   Hx of sleep apnea - [], using CPAP []   Snoring []  Frequent night awakenings []  Non-restorative sleep []  Daytime sleepiness [] - can easily fall asleep if sitting still in a car or after a meal  Obese [] BMI   HTN [x]  Afib [];    Exposure:  Former smoker 2-3 ppd x 23 years, quit in 1989  Previously worked as  did not wear mask  Denies childhood history of asthma or recurrent infections  No history of recurrent pneumonia    Records reviewed with the following findings ---  Heart catherization 9/27/24 reviewed - 3V disease + L main disease, known RCA  -- LIMA-LAD-OM-RPDA grafts patent, prior NSTEMI likely due to small Diag2 w demand  9/2024 stress test - 12 METs, 91%,. 4 mm STD btu no HTN response  Labs 11/2024     Objective   Past History     Past Medical History:  Past Medical History:   Diagnosis Date    Anemia     Angina pectoris     Anxiety     Cancer     Coronary artery disease     Depression     Diabetes  mellitus type II     Disorder of kidney and ureter     Erectile dysfunction     Eye injuries     fb ou    GERD (gastroesophageal reflux disease)     Hypertension     Intermediate stage nonexudative age-related macular degeneration of both eyes 4/9/2019    Myocardial infarction     Nuclear sclerosis of both eyes 4/9/2019    Prostate cancer     Trouble in sleeping     Type 2 diabetes mellitus     Type 2 diabetes mellitus with ophthalmic manifestations          Past Surgical History:  Past Surgical History:   Procedure Laterality Date    COLONOSCOPY N/A 2/9/2018    Procedure: COLONOSCOPY;  Surgeon: Mathieu Cao MD;  Location: [facility];  Service: Endoscopy;  Laterality: N/A;    CORONARY ANGIOGRAPHY INCLUDING BYPASS GRAFTS WITH CATHETERIZATION OF LEFT HEART N/A 3/4/2019    Procedure: ANGIOGRAM, CORONARY, INCLUDING BYPASS GRAFT, WITH LEFT HEART CATHETERIZATION;  Surgeon: Jamir Nam MD;  Location: [facility] CATH LAB;  Service: Cardiology;  Laterality: N/A;    CORONARY ANGIOGRAPHY INCLUDING BYPASS GRAFTS WITH CATHETERIZATION OF LEFT HEART N/A 10/9/2024    Procedure: ANGIOGRAM, CORONARY, INCLUDING BYPASS GRAFT, WITH LEFT HEART CATHETERIZATION;  Surgeon: Milly Cadena MD;  Location: [facility] CATH LAB;  Service: Cardiology;  Laterality: N/A;  rcfa  RN PREOP 10/4/2024    CORONARY ARTERY BYPASS GRAFT  2001    ESOPHAGOGASTRODUODENOSCOPY N/A 10/8/2020    Procedure: EGD (ESOPHAGOGASTRODUODENOSCOPY);  Surgeon: Bharath Herrera MD;  Location: [facility];  Service: Endoscopy;  Laterality: N/A;    ESOPHAGOGASTRODUODENOSCOPY N/A 11/7/2023    Procedure: EGD (ESOPHAGOGASTRODUODENOSCOPY);  Surgeon: Bharath Herrera MD;  Location: [facility];  Service: Endoscopy;  Laterality: N/A;    HERNIA REPAIR      LEFT HEART CATHETERIZATION Left 3/4/2019    Procedure: Left heart cath RFA access, 4fr catheter/sheath, not before 9am;  Surgeon: Jamir Nam MD;  Location: [facility] CATH LAB;  Service: Cardiology;  Laterality:  Left;    TRANSRECTAL ULTRASOUND OF PROSTATE WITH INSERTION OF GOLD FIDUCIAL MARKER N/A 2019    Procedure: ULTRASOUND, PROSTATE, RECTAL APPROACH, WITH GOLD FIDUCIAL MARKER INSERTION;  Surgeon: Layne Huff MD;  Location: [facility];  Service: Urology;  Laterality: N/A;    UPPER GASTROINTESTINAL ENDOSCOPY      WRIST SURGERY           Social History:   Social History     Socioeconomic History    Marital status:    Tobacco Use    Smoking status: Former     Current packs/day: 0.00     Average packs/day: 2.0 packs/day for 23.0 years (46.0 ttl pk-yrs)     Types: Cigarettes     Start date: 1966     Quit date: 1989     Years since quittin.1    Smokeless tobacco: Never   Substance and Sexual Activity    Alcohol use: Yes     Alcohol/week: 1.0 standard drink of alcohol     Types: 1 Cans of beer per week     Comment: occassional    Drug use: No    Sexual activity: Yes     Partners: Female     Social Drivers of Health     Financial Resource Strain: Low Risk  (2023)    Overall Financial Resource Strain (CARDIA)     Difficulty of Paying Living Expenses: Not hard at all   Food Insecurity: Unknown (2023)    Hunger Vital Sign     Ran Out of Food in the Last Year: Never true   Transportation Needs: No Transportation Needs (2023)    PRAPARE - Transportation     Lack of Transportation (Medical): No     Lack of Transportation (Non-Medical): No   Physical Activity: Insufficiently Active (2023)    Exercise Vital Sign     Days of Exercise per Week: 3 days     Minutes of Exercise per Session: 40 min   Stress: No Stress Concern Present (2023)    [employer] [place] [employer] - Occupational Stress Questionnaire     Feeling of Stress : Only a little   Housing Stability: Low Risk  (2023)    Housing Stability Vital Sign     Unable to Pay for Housing in the Last Year: No     Number of Places Lived in the Last Year: 1     Unstable Housing in the Last Year: No         Family  Hx:  No family history of pulmonary fibrosis, pre-mature graying of hair, cirrhosis, or hematologic malignancies  No family history of emphysema or spontaneous PTX  no family history of lung cancer or lymphoma    Allergies and Pertinent Medications   Allergies and Medications Reviewed    Tamsulosin, Prochlorperazine, and Statins-hmg-coa reductase inhibitors  Current Outpatient Medications on File Prior to Visit   Medication Sig Dispense Refill    ALPRAZolam (XANAX) 1 MG tablet Take 1 tablet (1 mg total) by mouth nightly as needed for Anxiety. 30 tablet 2    aspirin (ECOTRIN) 81 MG EC tablet Take 1 tablet (81 mg total) by mouth once daily.      blood glucose control, low (TRUE METRIX LEVEL 1) Soln To use with blood glucose meter 1 each 0    blood-glucose meter (TRUE METRIX GLUCOSE METER) kit For twice daily checking 1 each 0    clopidogreL (PLAVIX) 75 mg tablet Take 1 tablet (75 mg total) by mouth once daily. 30 tablet 11    empagliflozin (JARDIANCE) 10 mg tablet Take 1 tablet (10 mg total) by mouth once daily. 90 tablet 3    ezetimibe (ZETIA) 10 mg tablet Take 1 tablet (10 mg total) by mouth once daily. 90 tablet 3    imiquimod (ALDARA) 5 % cream APPLY TOPICALLY 3 (THREE) TIMES A WEEK. 24 packet 3    lancets Misc To check BG 3 times daily, to use with insurance preferred meter 300 each 3    losartan (COZAAR) 100 MG tablet Take 1 tablet (100 mg total) by mouth once daily. 90 tablet 3    metFORMIN (GLUCOPHAGE) 500 MG tablet TAKE 1 TABLET EVERY DAY WITH BREAKFAST 90 tablet 3    metoprolol succinate (TOPROL-XL) 25 MG 24 hr tablet Take 0.5 tablets (12.5 mg total) by mouth once daily. 90 tablet 3    omeprazole (PRILOSEC) 40 MG capsule Take 1 capsule (40 mg total) by mouth 2 (two) times daily before meals. 180 capsule 3    spironolactone (ALDACTONE) 25 MG tablet Take 1 tablet (25 mg total) by mouth once daily. 90 tablet 3    testosterone (ANDROGEL) 20.25 mg/1.25 gram (1.62 %) GlPm Apply topically.      testosterone  "(ANDROGEL) 20.25 mg/1.25 gram (1.62 %) GlPm Place 1.25 g onto the skin once daily. 75 g 3    TRUE METRIX GLUCOSE TEST STRIP Strp CHECK BLOOD SUGAR TWO TIMES DAILY 200 strip 3    vit C/vit E ac/lut/copper/zinc (PRESERVISION LUTEIN ORAL) Take 1 tablet by mouth once daily.      vitamin D 1000 units Tab Take 1,000 Units by mouth once daily.       No current facility-administered medications on file prior to visit.              Physical Exam   BP (!) 183/84   Pulse 64   Ht 5' 7" (1.702 m)   Wt 77.6 kg (171 lb 1.2 oz)   SpO2 97%   BMI 26.79 kg/m²       Constitutional: No acute distress, Atraumatic   HEENT: moist mucus membranes, extraocular movements intact  Cardiovascular: regular rate and rhythm, no murmurs, rubs or gallops  Pulmonary: normal respiratory rate and chest rise, no chest wall deformity, normal breath sounds with no wheezing or crackles  Abdominal: non-distended, bowel sounds present  Musculoskeletal: No lower extremity edema, no clubbing  Neurological: normal speech/naresh, moves all extremities against gravity  Skin: no finger cyanosis, no rashes on exposed body parts  Psych: Appropriate affect, normal mood       Diagnostic Studies      All diagnostic studies relevant to chief complaint reviewed personally    Labs:  Lab Results   Component Value Date    WBC 6.98 11/15/2024    HGB 15.0 11/15/2024     11/15/2024    MCV 94 11/15/2024     Lab Results   Component Value Date     11/25/2024    K 4.6 11/25/2024    CO2 25 11/25/2024    BUN 18 11/25/2024    CREATININE 1.3 11/25/2024    MG 2.7 (H) 03/02/2019     Lab Results   Component Value Date    AST 26 10/30/2024    ALT 19 10/30/2024    ALBUMIN 3.7 10/30/2024    ALBUMIN 4.0 05/07/2024    PROT 6.7 10/30/2024    BILITOT 0.4 10/30/2024         PFTs:  Pulmonary Functions Testing Results:  No results found for: "FEV1", "FVC", "DIV7DIJ", "TLC", "DLCO"      FVC FEV1 FEV/FVC TLC DLCO 6MWT Interpret                                                   "       TTE:  Results for orders placed during the hospital encounter of 09/17/24    Echo    Interpretation Summary    Left Ventricle: The left ventricle is normal in size. Mildly increased wall thickness. There is mild concentric hypertrophy. There is normal systolic function with a visually estimated ejection fraction of 55 - 60%. Grade I diastolic dysfunction.    Right Ventricle: Normal right ventricular cavity size. Systolic function is normal.    Aortic Valve: The aortic valve is a trileaflet valve. There is moderate aortic valve sclerosis. Mildly restricted motion. There is mild stenosis. Aortic valve area by VTI is 1.21 cm². Aortic valve peak velocity is 1.60 m/s. Mean gradient is 6 mmHg. The dimensionless index is 0.40.    Tricuspid Valve: There is mild regurgitation.    Pulmonary Artery: The estimated pulmonary artery systolic pressure is 27 mmHg.            Assessment and Plan   Eric Jackson is a 84 y.o. male  with a history of CAD s/p 3V CABG, HFpEF hx of prostate cancer tx w/ XRT, type II DM, HTN, CKD IIIa, up's esophagus presenting with chief complaint of shortness of breath      # shortness of breath - acte, stable - possible airway disease w/ wheezing also w/ welding exposure. Check PFTs. Start spiriva 2 puff daily, albuterol PRN. Consider CT chest given welding exposure  # HTN - chronic, stable - has values 180s/80. No significant HTN response on stress test to suggest etiology of dyspnea. Sent message to PCP with request for management    Explained to the patient I work Monday-Thursdays, so any results or messages received after working hours Thursday through Monday AM would not be addressed until I was back in the office. If he/she experienced any acute symptoms he/she should go the emergency room for immediate help.    Differential, diagnoses, diagnostic work up, and possible treatments were discussed with the patient. Questions were answered.    Irma Anthony MD  Pulmonary-Critical Care  Medicine              For this visit, the following time was spent  preparing to see the patient (e.g., review of tests) 10 minutes  obtaining and/or reviewing separately obtained history 0 minutes  Performing a medically necessary appropriate examination and/or evaluation 10 minutes  Counseling and educating the patient/family/caregiver 10 minutes  Ordering medications, tests, or procedures 2 minutes  Referring and communicating with other health care professionals (when not reported separately) 2minutes  Documenting clinical information in the electronic or other health record 6 minutes  Care coordination (not reported separately) 0minutes    Total time = 40 minutes

## 2025-02-10 NOTE — TELEPHONE ENCOUNTER
Left message to call office back.    CYN C[place]eno/Sleep [facility] [place]  [phone]                   ----- Message from Starla sent at 2/10/2025  2:12 PM CST -----  Regarding: Request for Sooner Apt  Type:  Sooner Appointment Request    Patient is requesting a sooner appointment.  Patient declined first available appointment listed as well as another facility and provider .  Patient will not accept being placed on the waitlist and is requesting a message be sent to doctor.    Name of Caller: Self     When is the first available appointment? Feb 25th     Symptoms: difficulty breathing     Would the patient rather a call back or a response via [facility]? Call     Best Call Back Number: .[phone]]      Additional Information:

## 2025-02-10 NOTE — PATIENT INSTRUCTIONS
DO NOT USE INHALERS FOR AT LEAST 24 HOURS BEFORE LUNG FUNCTION TESTING UNLESS SHORT OF BREATH    I am prescribing you spiriva to help with your breathing. Please take 2 puff in the morning a every day.  Common side effects include dry mouth, palpitations, headache    I am also prescribing an albuterol inhaler to take AS NEEDED for shortness of breath above your baseline. This medication can increase your heart rate. If using for more than 2 times per day, please let me know.    Please watch a video on appropriate use of your inhaler as it is crucial that is delivered to your airways (and not the back of your mouth to be effective.)    Videos:    How to use a meter-dosed inhaler: https://www.youFantasy Feudube.com/watch?v=9ipqxF-4p5g    How to use a spacer (Aerochamber) with meter-dosed inhaler: https://www.doubleTwistube.com/watch?v=ltB-ZOjlkgY    How to use a respimat inhaler: https://youtu.be/HWr_NM1ChHg        For General information on Inhalers:    https://www.[employer].org/conditions/medications/asthma-medications/devices

## 2025-02-11 ENCOUNTER — HOSPITAL ENCOUNTER (OUTPATIENT)
Dept: RESPIRATORY THERAPY | Facility: HOSPITAL | Age: 85
Discharge: HOME | End: 2025-02-11
Attending: INTERNAL MEDICINE
Payer: MEDICARE

## 2025-02-11 ENCOUNTER — OFFICE VISIT (OUTPATIENT)
Dept: PULMONOLOGY | Facility: CLINIC | Age: 85
End: 2025-02-11
Payer: MEDICARE

## 2025-02-11 VITALS
HEART RATE: 64 BPM | WEIGHT: 171.06 LBS | OXYGEN SATURATION: 97 % | BODY MASS INDEX: 26.85 KG/M2 | SYSTOLIC BLOOD PRESSURE: 183 MMHG | HEIGHT: 67 IN | DIASTOLIC BLOOD PRESSURE: 84 MMHG

## 2025-02-11 VITALS — HEART RATE: 82 BPM | RESPIRATION RATE: 18 BRPM | OXYGEN SATURATION: 98 %

## 2025-02-11 DIAGNOSIS — R06.00 DYSPNEA, UNSPECIFIED TYPE: ICD-10-CM

## 2025-02-11 LAB
BRPFT: NORMAL
DLCO ADJ PRE: 14.06 ML/(MIN*MMHG)
DLCO SINGLE BREATH LLN: 15.3
DLCO SINGLE BREATH PRE REF: 63.3 %
DLCO SINGLE BREATH REF: 22.23
DLCOC SBVA LLN: 2.19
DLCOC SBVA PRE REF: 82.6 %
DLCOC SBVA REF: 3.42
DLCOC SINGLE BREATH LLN: 15.3
DLCOC SINGLE BREATH PRE REF: 63.3 %
DLCOC SINGLE BREATH REF: 22.23
DLCOVA LLN: 2.19
DLCOVA PRE REF: 82.6 %
DLCOVA PRE: 2.82 ML/(MIN*MMHG*L)
DLCOVA REF: 3.42
DLVAADJ PRE: 2.82 ML/(MIN*MMHG*L)
ERVN2 LLN: -16449.17
ERVN2 PRE REF: 64.2 %
ERVN2 PRE: 0.53 L
ERVN2 REF: 0.82
FEF 25 75 CHG: 1.2 %
FEF 25 75 LLN: 0.76
FEF 25 75 POST REF: 19.9 %
FEF 25 75 PRE REF: 19.7 %
FEF 25 75 REF: 2.47
FET100 CHG: 16.2 %
FEV1 CHG: 17.2 %
FEV1 FVC CHG: -1.2 %
FEV1 FVC LLN: 60
FEV1 FVC POST REF: 70.7 %
FEV1 FVC PRE REF: 71.5 %
FEV1 FVC REF: 75
FEV1 LLN: 1.75
FEV1 POST REF: 64.1 %
FEV1 PRE REF: 54.7 %
FEV1 REF: 2.55
FRCN2 LLN: 2.64
FRCN2 PRE REF: 89.4 %
FRCN2 REF: 3.63
FVC CHG: 18.7 %
FVC LLN: 2.46
FVC POST REF: 89.9 %
FVC PRE REF: 75.8 %
FVC REF: 3.43
IVC PRE: 2.64 L
IVC SINGLE BREATH LLN: 2.46
IVC SINGLE BREATH PRE REF: 76.9 %
IVC SINGLE BREATH REF: 3.43
PEF CHG: 8.5 %
PEF LLN: 4.18
PEF POST REF: 94.3 %
PEF PRE REF: 86.9 %
PEF REF: 6.3
POST FEF 25 75: 0.49 L/S
POST FET 100: 9.7 SEC
POST FEV1 FVC: 52.94 %
POST FEV1: 1.63 L
POST FVC: 3.08 L
POST PEF: 5.94 L/S
PRE DLCO: 14.06 ML/(MIN*MMHG)
PRE FEF 25 75: 0.49 L/S
PRE FET 100: 8.34 SEC
PRE FEV1 FVC: 53.58 %
PRE FEV1: 1.39 L
PRE FRC N2: 3.24 L
PRE FVC: 2.6 L
PRE PEF: 5.48 L/S
RVN2 LLN: 2.13
RVN2 PRE REF: 96.8 %
RVN2 PRE: 2.71 L
RVN2 REF: 2.8
RVN2TLCN2 LLN: 36.96
RVN2TLCN2 PRE REF: 111.1 %
RVN2TLCN2 PRE: 51.04 %
RVN2TLCN2 REF: 45.94
TLCN2 LLN: 5.35
TLCN2 PRE REF: 81.7 %
TLCN2 PRE: 5.31 L
TLCN2 REF: 6.5
VA PRE: 4.98 L
VA SINGLE BREATH LLN: 6.35
VA SINGLE BREATH PRE REF: 78.4 %
VA SINGLE BREATH REF: 6.35
VCMAXN2 LLN: 2.46
VCMAXN2 PRE REF: 75.9 %
VCMAXN2 PRE: 2.6 L
VCMAXN2 REF: 3.43

## 2025-02-11 PROCEDURE — 3077F SYST BP >= 140 MM HG: CPT | Mod: CPTII,S$GLB,, | Performed by: INTERNAL MEDICINE

## 2025-02-11 PROCEDURE — 94727 GAS DIL/WSHOT DETER LNG VOL: CPT | Mod: 26,,, | Performed by: INTERNAL MEDICINE

## 2025-02-11 PROCEDURE — 1160F RVW MEDS BY RX/DR IN RCRD: CPT | Mod: CPTII,S$GLB,, | Performed by: INTERNAL MEDICINE

## 2025-02-11 PROCEDURE — 25000242 PHARM REV CODE 250 ALT 637 W/ HCPCS: Performed by: INTERNAL MEDICINE

## 2025-02-11 PROCEDURE — 94200 LUNG FUNCTION TEST (MBC/MVV): CPT

## 2025-02-11 PROCEDURE — 94060 EVALUATION OF WHEEZING: CPT | Mod: 26,,, | Performed by: INTERNAL MEDICINE

## 2025-02-11 PROCEDURE — 1101F PT FALLS ASSESS-DOCD LE1/YR: CPT | Mod: CPTII,S$GLB,, | Performed by: INTERNAL MEDICINE

## 2025-02-11 PROCEDURE — 94729 DIFFUSING CAPACITY: CPT | Mod: 26,,, | Performed by: INTERNAL MEDICINE

## 2025-02-11 PROCEDURE — 99999 PR PBB SHADOW E&M-EST. PATIENT-LVL IV: CPT | Mod: PBBFAC,,, | Performed by: INTERNAL MEDICINE

## 2025-02-11 PROCEDURE — 94729 DIFFUSING CAPACITY: CPT

## 2025-02-11 PROCEDURE — 3288F FALL RISK ASSESSMENT DOCD: CPT | Mod: CPTII,S$GLB,, | Performed by: INTERNAL MEDICINE

## 2025-02-11 PROCEDURE — 99203 OFFICE O/P NEW LOW 30 MIN: CPT | Mod: S$GLB,,, | Performed by: INTERNAL MEDICINE

## 2025-02-11 PROCEDURE — 3079F DIAST BP 80-89 MM HG: CPT | Mod: CPTII,S$GLB,, | Performed by: INTERNAL MEDICINE

## 2025-02-11 PROCEDURE — 1159F MED LIST DOCD IN RCRD: CPT | Mod: CPTII,S$GLB,, | Performed by: INTERNAL MEDICINE

## 2025-02-11 PROCEDURE — 1126F AMNT PAIN NOTED NONE PRSNT: CPT | Mod: CPTII,S$GLB,, | Performed by: INTERNAL MEDICINE

## 2025-02-11 RX ORDER — ALBUTEROL SULFATE 90 UG/1
2 INHALANT RESPIRATORY (INHALATION) EVERY 6 HOURS PRN
Qty: 18 G | Refills: 0 | Status: SHIPPED | OUTPATIENT
Start: 2025-02-11 | End: 2025-02-11 | Stop reason: SDUPTHER

## 2025-02-11 RX ORDER — TIOTROPIUM BROMIDE INHALATION SPRAY 3.12 UG/1
2 SPRAY, METERED RESPIRATORY (INHALATION) DAILY
Qty: 4 G | Refills: 11 | Status: SHIPPED | OUTPATIENT
Start: 2025-02-11 | End: 2025-02-11 | Stop reason: SDUPTHER

## 2025-02-11 RX ORDER — ALBUTEROL SULFATE 90 UG/1
2 INHALANT RESPIRATORY (INHALATION) EVERY 6 HOURS PRN
Qty: 18 G | Refills: 0 | Status: SHIPPED | OUTPATIENT
Start: 2025-02-11 | End: 2026-02-11

## 2025-02-11 RX ORDER — ALBUTEROL SULFATE 2.5 MG/.5ML
2.5 SOLUTION RESPIRATORY (INHALATION) ONCE
Status: COMPLETED | OUTPATIENT
Start: 2025-02-11 | End: 2025-02-11

## 2025-02-11 RX ORDER — TIOTROPIUM BROMIDE INHALATION SPRAY 3.12 UG/1
2 SPRAY, METERED RESPIRATORY (INHALATION) DAILY
Qty: 4 G | Refills: 11 | Status: SHIPPED | OUTPATIENT
Start: 2025-02-11 | End: 2026-02-11

## 2025-02-11 RX ADMIN — ALBUTEROL SULFATE 2.5 MG: 2.5 SOLUTION RESPIRATORY (INHALATION) at 03:02

## 2025-02-11 NOTE — Clinical Note
Hi Dr Boo -- I saw the patient today and he was very /80. He tells me he has some intermittent readings in the AM like this. Wondering if thsi is something you can work on with him. Thank you!

## 2025-02-14 PROBLEM — I11.0 HYPERTENSIVE HEART DISEASE WITH HEART FAILURE: Status: ACTIVE | Noted: 2025-02-14

## 2025-03-10 ENCOUNTER — OFFICE VISIT (OUTPATIENT)
Dept: CARDIOLOGY | Facility: CLINIC | Age: 85
End: 2025-03-10
Payer: MEDICARE

## 2025-03-10 VITALS
HEART RATE: 69 BPM | BODY MASS INDEX: 26.02 KG/M2 | SYSTOLIC BLOOD PRESSURE: 116 MMHG | DIASTOLIC BLOOD PRESSURE: 54 MMHG | HEIGHT: 67 IN | WEIGHT: 165.81 LBS | OXYGEN SATURATION: 96 % | RESPIRATION RATE: 18 BRPM

## 2025-03-10 DIAGNOSIS — Z95.1 STATUS POST CORONARY ARTERY BYPASS GRAFTING: ICD-10-CM

## 2025-03-10 DIAGNOSIS — R06.00 DYSPNEA, UNSPECIFIED TYPE: ICD-10-CM

## 2025-03-10 DIAGNOSIS — E11.9 TYPE 2 DIABETES MELLITUS WITHOUT COMPLICATION, WITHOUT LONG-TERM CURRENT USE OF INSULIN: ICD-10-CM

## 2025-03-10 DIAGNOSIS — E11.36 TYPE 2 DIABETES MELLITUS WITH DIABETIC CATARACT, WITHOUT LONG-TERM CURRENT USE OF INSULIN: ICD-10-CM

## 2025-03-10 DIAGNOSIS — I11.0 HYPERTENSIVE HEART DISEASE WITH HEART FAILURE: ICD-10-CM

## 2025-03-10 DIAGNOSIS — Z86.718 HISTORY OF DVT IN ADULTHOOD: ICD-10-CM

## 2025-03-10 DIAGNOSIS — I70.0 ABDOMINAL AORTIC ATHEROSCLEROSIS: ICD-10-CM

## 2025-03-10 DIAGNOSIS — I10 ESSENTIAL HYPERTENSION: Primary | ICD-10-CM

## 2025-03-10 DIAGNOSIS — I25.118 CORONARY ARTERY DISEASE OF NATIVE ARTERY OF NATIVE HEART WITH STABLE ANGINA PECTORIS: ICD-10-CM

## 2025-03-10 DIAGNOSIS — I25.10 CORONARY ARTERY DISEASE, UNSPECIFIED VESSEL OR LESION TYPE, UNSPECIFIED WHETHER ANGINA PRESENT, UNSPECIFIED WHETHER NATIVE OR TRANSPLANTED HEART: ICD-10-CM

## 2025-03-10 DIAGNOSIS — I25.10 CORONARY ARTERY DISEASE INVOLVING NATIVE CORONARY ARTERY OF NATIVE HEART WITHOUT ANGINA PECTORIS: ICD-10-CM

## 2025-03-10 PROCEDURE — 99999 PR PBB SHADOW E&M-EST. PATIENT-LVL V: CPT | Mod: PBBFAC,,, | Performed by: INTERNAL MEDICINE

## 2025-03-10 PROCEDURE — 93000 ELECTROCARDIOGRAM COMPLETE: CPT | Mod: S$GLB,,, | Performed by: INTERNAL MEDICINE

## 2025-03-10 NOTE — PROGRESS NOTES
CARDIOVASCULAR CONSULTATION    REASON FOR CONSULT:   Eric Jackson is a 84 y.o. male who presents for  evaluation     HISTORY OF PRESENT ILLNESS:       Evaluation patient is a pleasant 82-year-old man.  Past medical history of coronary artery disease status post CABG.  Used to follow with Dr. Soto it, has not seen him since 2019.  Now wants to reestablish care with Cardiology.  Last angiogram had revealed patent bypass grafts.  Last angiogram was done in 2019.  Patient states that for the past few months he has been experiencing chest heaviness.  No particular aggravating or relieving factors.  Also occasional dizziness.  No particular aggravating or relieving factors for the dizziness.  Not on statins and refuses to be on statins, Zetia or Repatha.      2019:      LVEDP: 22mmHg  LVEF: 55% by echo  Wall Motion: normal by echo     Dominance: Right  LM: dist 50%  LAD: mid 99%, competitive flow from LIMA-LAD-OM-RPDA              Diag2 prox 90%, T2 flow (not bypassed)  LCx: prox , territory supplied by LIMA-LAD-OM-RPDA  RCA: mid , territory supplied by LIMA-LAD-OM-RPDA     LIMA-LAD-OM-RPDA patent     No SVG identified     Hemostasis:  RFA     Impression:  NSTEMI  3V CAD, normal LV fxn  Patent LIMA-LAD-OM-RPDA  Culprit likely in small Diag2 (unrevascularized) and likely demand related  Man compression RFA for hemostasis     Plan:  Cont med rx  ASA 81mg qd indefinitely  Plavix 75mg qd for 1 year (thru 3/2020)  BBl/ARB  Statin allergy noted  Consider PCSK9 inhibitor if statin allergy confirmed  Dispo planning appropriate  Pt to follow up with Dr. Seay after discharge           Mar 13 23: doing fine. No ischemia on stress test.  patient states that his symptoms of chest pains went away after we were able to control his blood pressure by increasing his medications on last clinic visit.      Sinus rhythm with heart rates varying between 45 and 124 BPM with an average of 73BPM. Total time in sinus rhythm was  approximately 48 hours.  There were occasional PVCs totalling 768 and averaging 16.59 per hour. There were 10 couplets. There were 3 bigeminal cycles.  There were occasional PACs totalling 866 and averaging 18.7 per hour.        The left ventricle is normal in size with mild concentric hypertrophy and low normal systolic function.  The estimated ejection fraction is 50%.  Normal right ventricular size with low normal right ventricular systolic function.  There is mild aortic valve stenosis.  Aortic valve area is 1.60 cm2; peak velocity is 1.51 m/s; mean gradient is 6 mmHg.  Mild tricuspid regurgitation.  The estimated PA systolic pressure is 30 mmHg.          Normal myocardial perfusion scan. There is no evidence of myocardial ischemia or infarction.    The gated perfusion images showed an ejection fraction of 57% post stress.    There is normal wall motion post stress.    Notes from August 27, 2024: Patient here for follow-up.  When he walks on the treadmill for more than 7 minutes he gets short of breath.  And some chest tightness    Notes from September 20, 2024:  Patient here for follow-up.  Continues having dyspnea on exertion on minimal exertion.  Stress test shows that the EKG portion of the stress test was markedly abnormal, nuclear portion was normal.  Patient is concerned that his symptoms are lifestyle limiting.  After detailed discussion decided to proceed with coronary angiography for further evaluation    Results for orders placed or performed in visit on 10/23/24   IN OFFICE EKG 12-LEAD (to [employer])    Collection Time: 10/23/24  1:24 PM   Result Value Ref Range    QRS Duration 100 ms    OHS QTC Calculation 419 ms    Narrative    Test Reason : I10,    Vent. Rate : 071 BPM     Atrial Rate : 071 BPM     P-R Int : 242 ms          QRS Dur : 100 ms      QT Int : 386 ms       P-R-T Axes : 045 047 -49 degrees     QTc Int : 419 ms    Sinus rhythm with 1st degree A-V block  Anterior infarct (cited on or before  23-OCT-2024)  ST and T wave abnormality, consider inferior ischemia  Abnormal ECG  When compared with ECG of 23-OCT-2024 13:24,  No significant change was found  Confirmed by Odilon Klein MD ([id number]) on 10/27/2024 12:02:30 PM    Referred By:  RUBIO           Confirmed By:Odilon Klein MD       Results for orders placed during the hospital encounter of 09/17/24    Echo    Interpretation Summary    Left Ventricle: The left ventricle is normal in size. Mildly increased wall thickness. There is mild concentric hypertrophy. There is normal systolic function with a visually estimated ejection fraction of 55 - 60%. Grade I diastolic dysfunction.    Right Ventricle: Normal right ventricular cavity size. Systolic function is normal.    Aortic Valve: The aortic valve is a trileaflet valve. There is moderate aortic valve sclerosis. Mildly restricted motion. There is mild stenosis. Aortic valve area by VTI is 1.21 cm². Aortic valve peak velocity is 1.60 m/s. Mean gradient is 6 mmHg. The dimensionless index is 0.40.    Tricuspid Valve: There is mild regurgitation.    Pulmonary Artery: The estimated pulmonary artery systolic pressure is 27 mmHg.      Results for orders placed during the hospital encounter of 09/17/24    Nuclear Stress - Cardiology Interpreted    Interpretation Summary    The patient exercised for 10 minutes 23 seconds on a Gonzalo protocol, corresponding to a functional capacity of 12METS, achieving a peak heart rate of 127 bpm, which is 91% of the age predicted maximum heart rate. The patient reported shortness of breath during the stress test.    Normal myocardial perfusion scan. There is no evidence of myocardial ischemia or infarction.    The gated perfusion images showed an ejection fraction of 55% post stress.    There is normal wall motion at post-stress.    The ECG portion of the study is positive for ischemia.  4mm horizontal ST depression at peak stress.    The patient reported no chest pain during the  stress test.      Results for orders placed during the hospital encounter of 02/28/19    Cardiac catheterization    Conclusion  LVEDP: 22mmHg  LVEF: 55% by echo  Wall Motion: normal by echo    Dominance: Right  LM: dist 50%  LAD: mid 99%, competitive flow from LIMA-LAD-OM-RPDA  Diag2 prox 90%, T2 flow (not bypassed)  LCx: prox , territory supplied by LIMA-LAD-OM-RPDA  RCA: mid , territory supplied by LIMA-LAD-OM-RPDA    LIMA-LAD-OM-RPDA patent    No SVG identified    Hemostasis:  RFA      03/10/2025    History of Present Illness    CHIEF COMPLAINT:  Eric presents today for follow up.    RESPIRATORY:  He reports shortness of breath and burning sensation in lungs after 4-5 minutes on treadmill. He recently consulted a pulmonologist who prescribed a daily inhaler, which he has been using for 7-8 days. Using a spirometer at home, he notes improvement in lung capacity measurements 10-15 minutes post-inhaler use.    CARDIOVASCULAR HISTORY:  He has a history of heart bypass surgery with post-operative complications from statin therapy affecting rehabilitation.    MEDICATIONS:  Current medications include Jardiance, Azetamide, Losartan, Metoprolol, and Spironolactone, which are well tolerated. He reports intolerance to statins with multiple failed attempts, even at low doses of Crestor 0.5mg.    SOCIAL HISTORY:  He is a former smoker who started at age 24 and quit in 1989. He has a history of running track until age 23.         Here for follow-up after angiogram.  No complications from angiogram    Results for orders placed or performed in visit on 10/23/24   IN OFFICE EKG 12-LEAD (to [employer])    Collection Time: 10/23/24  1:24 PM   Result Value Ref Range    QRS Duration 100 ms    OHS QTC Calculation 419 ms    Narrative    Test Reason : I10,    Vent. Rate : 071 BPM     Atrial Rate : 071 BPM     P-R Int : 242 ms          QRS Dur : 100 ms      QT Int : 386 ms       P-R-T Axes : 045 047 -49 degrees     QTc Int : 419  ms    Sinus rhythm with 1st degree A-V block  Anterior infarct (cited on or before 23-OCT-2024)  ST and T wave abnormality, consider inferior ischemia  Abnormal ECG  When compared with ECG of 23-OCT-2024 13:24,  No significant change was found  Confirmed by Odilon Klein MD ([id number]) on 10/27/2024 12:02:30 PM    Referred By:  RUBIO           Confirmed By:Odilon Klein MD       Results for orders placed during the hospital encounter of 09/17/24    Echo    Interpretation Summary    Left Ventricle: The left ventricle is normal in size. Mildly increased wall thickness. There is mild concentric hypertrophy. There is normal systolic function with a visually estimated ejection fraction of 55 - 60%. Grade I diastolic dysfunction.    Right Ventricle: Normal right ventricular cavity size. Systolic function is normal.    Aortic Valve: The aortic valve is a trileaflet valve. There is moderate aortic valve sclerosis. Mildly restricted motion. There is mild stenosis. Aortic valve area by VTI is 1.21 cm². Aortic valve peak velocity is 1.60 m/s. Mean gradient is 6 mmHg. The dimensionless index is 0.40.    Tricuspid Valve: There is mild regurgitation.    Pulmonary Artery: The estimated pulmonary artery systolic pressure is 27 mmHg.      Results for orders placed during the hospital encounter of 09/17/24    Nuclear Stress - Cardiology Interpreted    Interpretation Summary    The patient exercised for 10 minutes 23 seconds on a Gonzalo protocol, corresponding to a functional capacity of 12METS, achieving a peak heart rate of 127 bpm, which is 91% of the age predicted maximum heart rate. The patient reported shortness of breath during the stress test.    Normal myocardial perfusion scan. There is no evidence of myocardial ischemia or infarction.    The gated perfusion images showed an ejection fraction of 55% post stress.    There is normal wall motion at post-stress.    The ECG portion of the study is positive for ischemia.  4mm horizontal  "ST depression at peak stress.    The patient reported no chest pain during the stress test.      Results for orders placed during the hospital encounter of 10/09/24    Cardiac catheterization    Conclusion    There was three vessel coronary artery disease. There was left main disease.    The Mid LAD lesion was 99% stenosed.    The Prox Cx lesion was 100% stenosed.    The 2nd Diag lesion was 90% stenosed.    The Dist LM lesion was 99% stenosed.    The Prox LAD lesion was 99% stenosed.    The pre-procedure left ventricular end diastolic pressure was 12.    The estimated blood loss was <50 mL.    Coronary angiography:    Left main and severe triple-vessel disease.  RCA is a known     Graft angiography:  LIMA-LAD-OM-RPDA is patent    Assessment plan    Continue medical management and aggressive risk factor modification    Access:  Right common femoral artery access      The procedure log was documented by Documenter: Dion Hoang RN and verified by Milly Cadena MD.    Date: 10/9/2024  Time: 9:25 AM      November 24    History of Present Illness    CHIEF COMPLAINT:  Eric presents today for follow-up on medication changes and general health status.    CURRENT SYMPTOMS:  He reports feeling tired and experiencing weight loss. He also describes shortness of breath, which he characterizes as "running out of air." He denies chest pain or tightness.    MEDICATIONS:  He is currently taking azetimibe for cholesterol management, which he tolerates well. His blood pressure medication has been effective in lowering his blood pressure. He confirms adherence to all prescribed medications, including Jardiance, losartan, and aspirin. He is uncertain about taking metoprolol but acknowledges it is listed in his medications. He denies experiencing any side effects from his current medication regimen and reports closely monitoring for any adverse reactions.    UPCOMING APPOINTMENTS:  He has an appointment with a pulmonary " specialist scheduled for December 2nd to address his breathing concerns. He also has an upcoming appointment with his urologist scheduled for tomorrow.    RECENT DIAGNOSTIC TESTS:  He completed labs two days ago as requested.         March 25:    History of Present Illness    CHIEF COMPLAINT:  Eric presents today for follow up.    RESPIRATORY:  He reports shortness of breath and burning sensation in lungs after 4-5 minutes on treadmill. He recently consulted a pulmonologist who prescribed a daily inhaler, which he has been using for 7-8 days. Using a spirometer at home, he notes improvement in lung capacity measurements 10-15 minutes post-inhaler use.    CARDIOVASCULAR HISTORY:  He has a history of heart bypass surgery     MEDICATIONS:  Current medications include Jardiance, ezetimibe, Losartan, Metoprolol, and Spironolactone, which are well tolerated. He reports intolerance to statins with multiple failed attempts, even at low doses of Crestor     SOCIAL HISTORY:  He is a former smoker who started at age 24 and quit in 1989. He has a history of running track until age 23.         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/2019    Prostate cancer     Trouble in sleeping     Type 2 diabetes mellitus     Type 2 diabetes mellitus with ophthalmic manifestations        PAST SURGICAL HISTORY:     Past Surgical History:   Procedure Laterality Date    COLONOSCOPY N/A 2/9/2018    Procedure: COLONOSCOPY;  Surgeon: Mathieu HOOKS  MD Kiley;  Location: [facility] ENDO;  Service: Endoscopy;  Laterality: N/A;    CORONARY ANGIOGRAPHY INCLUDING BYPASS GRAFTS WITH CATHETERIZATION OF LEFT HEART N/A 3/4/2019    Procedure: ANGIOGRAM, CORONARY, INCLUDING BYPASS GRAFT, WITH LEFT HEART CATHETERIZATION;  Surgeon: Jamir Nam MD;  Location: [facility] CATH LAB;  Service: Cardiology;  Laterality: N/A;    CORONARY ANGIOGRAPHY INCLUDING BYPASS GRAFTS WITH CATHETERIZATION OF LEFT HEART N/A 10/9/2024    Procedure: ANGIOGRAM, CORONARY, INCLUDING BYPASS GRAFT, WITH LEFT HEART CATHETERIZATION;  Surgeon: Milly Cadena MD;  Location: [facility] CATH LAB;  Service: Cardiology;  Laterality: N/A;  rcfa  RN PREOP 10/4/2024    CORONARY ARTERY BYPASS GRAFT  2001    ESOPHAGOGASTRODUODENOSCOPY N/A 10/8/2020    Procedure: EGD (ESOPHAGOGASTRODUODENOSCOPY);  Surgeon: Bharath Herrera MD;  Location: [facility] ENDO;  Service: Endoscopy;  Laterality: N/A;    ESOPHAGOGASTRODUODENOSCOPY N/A 11/7/2023    Procedure: EGD (ESOPHAGOGASTRODUODENOSCOPY);  Surgeon: Bharath Herrera MD;  Location: [facility] ENDO;  Service: Endoscopy;  Laterality: N/A;    HERNIA REPAIR      LEFT HEART CATHETERIZATION Left 3/4/2019    Procedure: Left heart cath RFA access, 4fr catheter/sheath, not before 9am;  Surgeon: Jamir Nam MD;  Location: [facility] CATH LAB;  Service: Cardiology;  Laterality: Left;    TRANSRECTAL ULTRASOUND OF PROSTATE WITH INSERTION OF GOLD FIDUCIAL MARKER N/A 2/26/2019    Procedure: ULTRASOUND, PROSTATE, RECTAL APPROACH, WITH GOLD FIDUCIAL MARKER INSERTION;  Surgeon: Layne Huff MD;  Location: [facility] OR;  Service: Urology;  Laterality: N/A;    UPPER GASTROINTESTINAL ENDOSCOPY      WRIST SURGERY         ALLERGIES AND MEDICATION:     Review of patient's allergies indicates:   Allergen Reactions    Tamsulosin     Prochlorperazine      convulsions    Statins-hmg-coa reductase inhibitors Other (See Comments)     Muscle weakness        Medication List            Accurate as  of March 10, 2025  3:47 PM. If you have any questions, ask your nurse or doctor.                CONTINUE taking these medications      albuterol 90 mcg/actuation inhaler  Commonly known as: PROVENTIL/VENTOLIN HFA  Inhale 2 puffs into the lungs every 6 (six) hours as needed for Wheezing.     ALPRAZolam 1 MG tablet  Commonly known as: XANAX  Take 1 tablet (1 mg total) by mouth nightly as needed for Anxiety.     aspirin 81 MG EC tablet  Commonly known as: ECOTRIN  Take 1 tablet (81 mg total) by mouth once daily.     blood-glucose meter kit  Commonly known as: TRUE METRIX GLUCOSE METER  For twice daily checking     clopidogreL 75 mg tablet  Commonly known as: PLAVIX  Take 1 tablet (75 mg total) by mouth once daily.     empagliflozin 10 mg tablet  Commonly known as: JARDIANCE  Take 1 tablet (10 mg total) by mouth once daily.     ezetimibe 10 mg tablet  Commonly known as: ZETIA  Take 1 tablet (10 mg total) by mouth once daily.     imiquimod 5 % cream  Commonly known as: ALDARA  APPLY TOPICALLY 3 (THREE) TIMES A WEEK.     lancets Misc  To check BG 3 times daily, to use with insurance preferred meter     losartan 100 MG tablet  Commonly known as: COZAAR  Take 1 tablet (100 mg total) by mouth once daily.     metFORMIN 500 MG tablet  Commonly known as: GLUCOPHAGE  TAKE 1 TABLET EVERY DAY WITH BREAKFAST     metoprolol succinate 25 MG 24 hr tablet  Commonly known as: TOPROL-XL  Take 0.5 tablets (12.5 mg total) by mouth once daily.     omeprazole 40 MG capsule  Commonly known as: PRILOSEC  Take 1 capsule (40 mg total) by mouth 2 (two) times daily before meals.     PRESERVISION LUTEIN ORAL     SPIRIVA RESPIMAT 2.5 mcg/actuation inhaler  Generic drug: tiotropium bromide  Inhale 2 puffs into the lungs Daily. Controller     spironolactone 25 MG tablet  Commonly known as: ALDACTONE  Take 1 tablet (25 mg total) by mouth once daily.     * testosterone 20.25 mg/1.25 gram (1.62 %) Glpm  Commonly known as: ANDROGEL     * testosterone  20.25 mg/1.25 gram (1.62 %) Glpm  Commonly known as: AndroGeL  Place 1.25 g onto the skin once daily.     TRUE METRIX GLUCOSE TEST STRIP Strp  Generic drug: blood sugar diagnostic  CHECK BLOOD SUGAR TWO TIMES DAILY     TRUE METRIX LEVEL 1 Soln  Generic drug: blood glucose control, low  To use with blood glucose meter     vitamin D 1000 units Tab  Commonly known as: VITAMIN D3           * This list has 2 medication(s) that are the same as other medications prescribed for you. Read the directions carefully, and ask your doctor or other care provider to review them with you.                  SOCIAL HISTORY:     Social History     Socioeconomic History    Marital status:    Tobacco Use    Smoking status: Former     Current packs/day: 0.00     Average packs/day: 2.0 packs/day for 23.0 years (46.0 ttl pk-yrs)     Types: Cigarettes     Start date: 1966     Quit date: 1989     Years since quittin.2    Smokeless tobacco: Never   Substance and Sexual Activity    Alcohol use: Yes     Alcohol/week: 1.0 standard drink of alcohol     Types: 1 Cans of beer per week     Comment: occassional    Drug use: No    Sexual activity: Yes     Partners: Female     Social Drivers of Health     Financial Resource Strain: Low Risk  (2023)    Overall Financial Resource Strain (CARDIA)     Difficulty of Paying Living Expenses: Not hard at all   Food Insecurity: Unknown (2023)    Hunger Vital Sign     Ran Out of Food in the Last Year: Never true   Transportation Needs: No Transportation Needs (2023)    PRAPARE - Transportation     Lack of Transportation (Medical): No     Lack of Transportation (Non-Medical): No   Physical Activity: Insufficiently Active (2023)    Exercise Vital Sign     Days of Exercise per Week: 3 days     Minutes of Exercise per Session: 40 min   Stress: No Stress Concern Present (2023)    [employer] [place] [employer] - Occupational Stress Questionnaire     Feeling of  "Stress : Only a little   Housing Stability: Low Risk  (7/19/2023)    Housing Stability Vital Sign     Unable to Pay for Housing in the Last Year: No     Number of Places Lived in the Last Year: 1     Unstable Housing in the Last Year: No       FAMILY HISTORY:     Family History   Adopted: Yes   Problem Relation Name Age of Onset    No Known Problems Mother      No Known Problems Father      No Known Problems Sister      No Known Problems Brother      No Known Problems Maternal Aunt      No Known Problems Maternal Uncle      No Known Problems Paternal Aunt      No Known Problems Paternal Uncle      No Known Problems Maternal Grandmother      No Known Problems Maternal Grandfather      No Known Problems Paternal Grandmother      No Known Problems Paternal Grandfather      No Known Problems Other      Amblyopia Neg Hx      Blindness Neg Hx      Cancer Neg Hx      Cataracts Neg Hx      Diabetes Neg Hx      Glaucoma Neg Hx      Hypertension Neg Hx      Macular degeneration Neg Hx      Retinal detachment Neg Hx      Strabismus Neg Hx      Stroke Neg Hx      Thyroid disease Neg Hx         REVIEW OF SYSTEMS:   Review of Systems   Constitutional: Negative.   HENT: Negative.     Eyes: Negative.    Respiratory: Negative.     Endocrine: Negative.    Hematologic/Lymphatic: Negative.    Skin: Negative.    Musculoskeletal: Negative.    Gastrointestinal: Negative.    Genitourinary: Negative.    Neurological: Negative.    Psychiatric/Behavioral: Negative.     Allergic/Immunologic: Negative.        A 10 point review of systems was performed and all the pertinent positives have been mentioned. Rest of review of systems was negative.        PHYSICAL EXAM:     Vitals:    03/10/25 1514   BP: (!) 116/54   Pulse: 69   Resp: 18    Body mass index is 25.97 kg/m².  Weight: 75.2 kg (165 lb 12.6 oz)   Height: 5' 7" (170.2 cm)     Physical Exam  Vitals reviewed.   Constitutional:       Appearance: He is well-developed.   HENT:      Head: " Normocephalic.   Eyes:      Conjunctiva/sclera: Conjunctivae normal.      Pupils: Pupils are equal, round, and reactive to light.   Cardiovascular:      Rate and Rhythm: Normal rate and regular rhythm.      Heart sounds: Normal heart sounds.   Pulmonary:      Effort: Pulmonary effort is normal.      Breath sounds: Normal breath sounds.   Abdominal:      General: Bowel sounds are normal.      Palpations: Abdomen is soft.   Musculoskeletal:      Cervical back: Normal range of motion and neck supple.   Skin:     General: Skin is warm.   Neurological:      Mental Status: He is alert and oriented to person, place, and time.           DATA:     Laboratory:  CBC:  Recent Labs   Lab 10/04/24  1245 10/30/24  0729 11/15/24  1033   WBC 10.59 6.38 6.98   Hemoglobin 15.3 14.3 15.0   Hematocrit 45.8 44.0 46.3   Platelets 203 205 227       CHEMISTRIES:  Recent Labs   Lab 04/16/22  0807 10/17/22  0750 10/04/24  1245 10/30/24  0729 11/25/24  0815   Glucose 120 H   < > 101 107 108   Sodium 139   < > 139 139 142   Potassium 4.1   < > 4.1 3.7 4.6   BUN 18   < > 17 19 18   Creatinine 1.1   < > 1.2 1.2 1.3   eGFR if  >60.0  --   --   --   --    eGFR if non  >60.0  --   --   --   --    Calcium 9.6   < > 9.7 8.9 9.4    < > = values in this interval not displayed.       CARDIAC BIOMARKERS:        COAGS:        LIPIDS/LFTS:  Recent Labs   Lab 10/16/23  0655 04/23/24  0750 10/30/24  0729   Cholesterol 281 H 245 H 195   Triglycerides 522 H 180 H 356 H   HDL 37 L 49 39 L   LDL Cholesterol Invalid, Trig>400.0 160.0 H 84.8   Non-HDL Cholesterol 244 196 156   AST 19 20 26   ALT 19 13 19       Hemoglobin A1C   Date Value Ref Range Status   10/30/2024 6.1 (H) 4.0 - 5.6 % Final     Comment:     ADA Screening Guidelines:  5.7-6.4%  Consistent with prediabetes  >or=6.5%  Consistent with diabetes    High levels of fetal hemoglobin interfere with the HbA1C  assay. Heterozygous hemoglobin variants (HbS, HgC, etc)do  not  significantly interfere with this assay.   However, presence of multiple variants may affect accuracy.     04/23/2024 6.1 (H) 4.0 - 5.6 % Final     Comment:     ADA Screening Guidelines:  5.7-6.4%  Consistent with prediabetes  >or=6.5%  Consistent with diabetes    High levels of fetal hemoglobin interfere with the HbA1C  assay. Heterozygous hemoglobin variants (HbS, HgC, etc)do  not significantly interfere with this assay.   However, presence of multiple variants may affect accuracy.     10/16/2023 6.1 (H) 4.0 - 5.6 % Final     Comment:     ADA Screening Guidelines:  5.7-6.4%  Consistent with prediabetes  >or=6.5%  Consistent with diabetes    High levels of fetal hemoglobin interfere with the HbA1C  assay. Heterozygous hemoglobin variants (HbS, HgC, etc)do  not significantly interfere with this assay.   However, presence of multiple variants may affect accuracy.         TSH        The ASCVD Risk score (Pratibha LARA, et al., 2019) failed to calculate for the following reasons:    The 2019 ASCVD risk score is only valid for ages 40 to 79    Risk score cannot be calculated because patient has a medical history suggesting prior/existing ASCVD             ASSESSMENT AND PLAN     Patient Active Problem List   Diagnosis    Essential hypertension    Type 2 diabetes mellitus without complication, without long-term current use of insulin    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 2/2018; 11/7/23 EGD Arhcer's stage C10-M11, biopsied. 4 cm HH path metaplasia;  repeat 3-5 years for Archer's    DAISY (generalized anxiety disorder)    Insomnia    Slow urinary stream    Tubular adenoma of colon 2/2018 3 adenomas repeat 3 years    Hypogonadism in male    Prostate cancer 2/25/19 TRUS of prostate and gold fiducial marker placement; 4/2019 s/p XRT    History of Non-STEMI (non-ST elevated myocardial infarction) 3/2019 from sepsis; Culprit likely in small Diag2 (unrevascularized) and likely demand related    Coronary artery disease of  native artery of native heart with stable angina pectoris s/p CABG; 3/2019 [facility] patent graft; Culprit likely in small Diag2 (unrevascularized) and likely demand related    Renal cyst, left, simple 1st seen 2/2019 CT; verified on renal US 11/2020    Status post coronary artery bypass grafting single vessel    Refractive error    Intermediate stage nonexudative age-related macular degeneration of both eyes    Nuclear sclerosis of both eyes    History of DVT from PICC line right upper extremity 4/2019 anticoag x 3 months then stopped    Abdominal aortic atherosclerosis    Type 2 diabetes mellitus with diabetic cataract, without long-term current use of insulin    Major depressive disorder with single episode, in full remission    Epiretinal membrane (ERM) of both eyes    Stage 3a chronic kidney disease    Chronic left shoulder pain    Decreased range of motion of left shoulder    Shoulder weakness    PVC (premature ventricular contraction)    Hypertensive heart disease with heart failure     Orders Placed This Encounter   Procedures    Lipid Panel    IN OFFICE EKG 12-LEAD (to Muse)       Patient with coronary artery disease status post CABG.  Last angiogram in 2024revealed patent bypass grafts with severe underlying coronary artery disease.  No angina.  Continue medical management    Dyspnea on exertion.  Heart failure with preserved ejection fraction.  On spironolactone and Jardiance.    Lab Results   Component Value Date    CREATININE 1.3 11/25/2024       Referral made to pulmonology because of history of smoking and significant dyspnea on exertion to rule out pulmonary causes for shortness of breath      Uncontrolled dyslipidemia:  Patient could not tolerate statins in the past.   Now on ezetimibe 10 mg daily.  LDL IMPROVED ON THAT.  Follow-up in 3 months with lipid profile before.  If LDL not at goal add Leqvio/Repatha    Lab Results   Component Value Date    LDLCALC 84.8 10/30/2024       Hypertension:  Well  controlled.  At home stays around 120-130 mmHg as per patient    Aortic atherosclerosis:  Now on ezetimibe.    Follow-up after 3 m            Thank you very much for involving me in the care of your patient.  Please do not hesitate to contact me if there are any questions.      Milly Cadena MD, [facility], [facility]  Interventional Cardiologist, [facility].     Visit today included increased complexity associated with the care of the episodic problem hypertension, dyslipidemia, coronary artery disease status post CABG, heart failure with preserved ejection fraction addressed and managing the longitudinal care of the patient due to the serious and/or complex managed problem(s)   Patient Active Problem List   Diagnosis    Essential hypertension    Type 2 diabetes mellitus without complication, without long-term current use of insulin    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 2/2018; 11/7/23 EGD Archer's stage C10-M11, biopsied. 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; 3/2019 [facility] patent graft; Culprit likely in small Diag2 (unrevascularized) and likely demand related    Renal cyst, left, simple 1st seen 2/2019 CT; verified on renal US 11/2020    Status post coronary artery bypass grafting single vessel    Refractive error    Intermediate stage nonexudative age-related macular degeneration of both eyes    Nuclear sclerosis of both eyes    History of DVT from PICC line right upper extremity 4/2019  anticoag x 3 months then stopped    Abdominal aortic atherosclerosis    Type 2 diabetes mellitus with diabetic cataract, without long-term current use of insulin    Major depressive disorder with single episode, in full remission    Epiretinal membrane (ERM) of both eyes    Stage 3a chronic kidney disease    Chronic left shoulder pain    Decreased range of motion of left shoulder    Shoulder weakness    PVC (premature ventricular contraction)    Hypertensive heart disease with heart failure     .        This note was dictated with the help of speech recognition software.  There might be un-intended errors and/or substitutions.                           Theron Lunsford

## 2025-03-12 LAB
OHS QRS DURATION: 88 MS
OHS QTC CALCULATION: 421 MS

## 2025-04-17 ENCOUNTER — PATIENT MESSAGE (OUTPATIENT)
Dept: ADMINISTRATIVE | Facility: CLINIC | Age: 85
End: 2025-04-17
Payer: MEDICARE

## 2025-04-22 ENCOUNTER — OFFICE VISIT (OUTPATIENT)
Dept: PULMONOLOGY | Facility: CLINIC | Age: 85
End: 2025-04-22
Payer: MEDICARE

## 2025-04-22 VITALS
SYSTOLIC BLOOD PRESSURE: 135 MMHG | OXYGEN SATURATION: 97 % | WEIGHT: 161.25 LBS | BODY MASS INDEX: 25.31 KG/M2 | HEART RATE: 68 BPM | DIASTOLIC BLOOD PRESSURE: 81 MMHG | HEIGHT: 67 IN

## 2025-04-22 DIAGNOSIS — J84.9 INTERSTITIAL PULMONARY DISEASE, UNSPECIFIED: Primary | ICD-10-CM

## 2025-04-22 DIAGNOSIS — J44.89 ASTHMA-COPD OVERLAP SYNDROME: ICD-10-CM

## 2025-04-22 PROCEDURE — 99999 PR PBB SHADOW E&M-EST. PATIENT-LVL V: CPT | Mod: PBBFAC,,, | Performed by: INTERNAL MEDICINE

## 2025-04-22 PROCEDURE — 1126F AMNT PAIN NOTED NONE PRSNT: CPT | Mod: CPTII,S$GLB,, | Performed by: INTERNAL MEDICINE

## 2025-04-22 PROCEDURE — 1101F PT FALLS ASSESS-DOCD LE1/YR: CPT | Mod: CPTII,S$GLB,, | Performed by: INTERNAL MEDICINE

## 2025-04-22 PROCEDURE — 3288F FALL RISK ASSESSMENT DOCD: CPT | Mod: CPTII,S$GLB,, | Performed by: INTERNAL MEDICINE

## 2025-04-22 PROCEDURE — 1160F RVW MEDS BY RX/DR IN RCRD: CPT | Mod: CPTII,S$GLB,, | Performed by: INTERNAL MEDICINE

## 2025-04-22 PROCEDURE — 3079F DIAST BP 80-89 MM HG: CPT | Mod: CPTII,S$GLB,, | Performed by: INTERNAL MEDICINE

## 2025-04-22 PROCEDURE — 99214 OFFICE O/P EST MOD 30 MIN: CPT | Mod: S$GLB,,, | Performed by: INTERNAL MEDICINE

## 2025-04-22 PROCEDURE — 1159F MED LIST DOCD IN RCRD: CPT | Mod: CPTII,S$GLB,, | Performed by: INTERNAL MEDICINE

## 2025-04-22 PROCEDURE — 3075F SYST BP GE 130 - 139MM HG: CPT | Mod: CPTII,S$GLB,, | Performed by: INTERNAL MEDICINE

## 2025-04-22 RX ORDER — FLUTICASONE PROPIONATE AND SALMETEROL 250; 50 UG/1; UG/1
1 POWDER RESPIRATORY (INHALATION) 2 TIMES DAILY
Qty: 60 EACH | Refills: 11 | Status: SHIPPED | OUTPATIENT
Start: 2025-04-22 | End: 2026-04-22

## 2025-04-22 RX ORDER — FLUTICASONE PROPIONATE AND SALMETEROL 250; 50 UG/1; UG/1
1 POWDER RESPIRATORY (INHALATION) 2 TIMES DAILY
Qty: 60 EACH | Refills: 11 | Status: SHIPPED | OUTPATIENT
Start: 2025-04-22 | End: 2025-04-22

## 2025-04-22 NOTE — PROGRESS NOTES
General Pulmonary Clinic  Follow Up Patient Visit    Chief Complaint: shortness of breath     HPI     Eric Jackson is a 84 y.o. male with a history of CAD s/p 3V CABG, HFpEF hx of prostate cancer tx w/ XRT, type II DM, HTN, CKD IIIa, up's esophagus presenting with chief complaint of COPD-asthma overlap      Interval hx:  Pfts w/ severe obstruction w/ bronchodilator response. Also w/ restrictive component  He notes a sore throat this morning and believes it from acid reflux last night. No fevers or other infectious symptoms  He tried the spiriva but without changes in his breathing      Presenting HPI    # shortness of breath    Began having shortness of breath 4-5 months ago. He exercises on the treadmill daily -3-4 mph on the treadmill and has noticed  during this time that he develops a burning pain across his chest and feels more short of breath. He also feels very fatigued.  He notes some intermittent wheezing but no  coughing.   + intermittent GERD 2-3 motnhs, no changes in his voice. No difficulty swallowing   He has swelling in the legs that is intermittent. He notices pain in his left leg when exertion himself.  No blood loss in urine or stool  He has gained weight over this period  He does get blood pressure elevations 180/80s intermittently - tomasz in AM      Sleep ROS-   Hx of sleep apnea - [], using CPAP []   Snoring []  Frequent night awakenings []  Non-restorative sleep []  Daytime sleepiness [] - can easily fall asleep if sitting still in a car or after a meal  Obese [] BMI   HTN [x]  Afib [];    Exposure:  Former smoker 2-3 ppd x 23 years, quit in 1989  Previously worked as  did not wear mask  Denies childhood history of asthma or recurrent infections  No history of recurrent pneumonia    Records reviewed with the following findings ---  Heart catherization 9/27/24 reviewed - 3V disease + L main disease, known RCA  -- LIMA-LAD-OM-RPDA grafts patent, prior NSTEMI likely due to small  Diag2 w demand  9/2024 stress test - 12 METs, 91%,. 4 mm STD btu no HTN response  Labs 11/2024     Objective   Past History     Past Medical History:  Past Medical History:   Diagnosis Date    Anemia     Angina pectoris     Anxiety     Cancer     Coronary artery disease     Depression     Diabetes mellitus type II     Disorder of kidney and ureter     Erectile dysfunction     Eye injuries     fb ou    GERD (gastroesophageal reflux disease)     Hypertension     Hypertensive heart disease with heart failure 2/14/2025    Intermediate stage nonexudative age-related macular degeneration of both eyes 4/9/2019    Myocardial infarction     Nuclear sclerosis of both eyes 4/9/2019    Prostate cancer     Trouble in sleeping     Type 2 diabetes mellitus     Type 2 diabetes mellitus with ophthalmic manifestations          Past Surgical History:  Past Surgical History:   Procedure Laterality Date    COLONOSCOPY N/A 2/9/2018    Procedure: COLONOSCOPY;  Surgeon: Mathieu Cao MD;  Location: [facility];  Service: Endoscopy;  Laterality: N/A;    CORONARY ANGIOGRAPHY INCLUDING BYPASS GRAFTS WITH CATHETERIZATION OF LEFT HEART N/A 3/4/2019    Procedure: ANGIOGRAM, CORONARY, INCLUDING BYPASS GRAFT, WITH LEFT HEART CATHETERIZATION;  Surgeon: Jamir Nam MD;  Location: [facility] CATH LAB;  Service: Cardiology;  Laterality: N/A;    CORONARY ANGIOGRAPHY INCLUDING BYPASS GRAFTS WITH CATHETERIZATION OF LEFT HEART N/A 10/9/2024    Procedure: ANGIOGRAM, CORONARY, INCLUDING BYPASS GRAFT, WITH LEFT HEART CATHETERIZATION;  Surgeon: Milly Cadena MD;  Location: [facility] CATH LAB;  Service: Cardiology;  Laterality: N/A;  rcfa  RN PREOP 10/4/2024    CORONARY ARTERY BYPASS GRAFT  2001    ESOPHAGOGASTRODUODENOSCOPY N/A 10/8/2020    Procedure: EGD (ESOPHAGOGASTRODUODENOSCOPY);  Surgeon: Bharath Herrera MD;  Location: [facility];  Service: Endoscopy;  Laterality: N/A;    ESOPHAGOGASTRODUODENOSCOPY N/A 11/7/2023    Procedure: EGD  (ESOPHAGOGASTRODUODENOSCOPY);  Surgeon: Bharath Herrera MD;  Location: [facility] ENDO;  Service: Endoscopy;  Laterality: N/A;    HERNIA REPAIR      LEFT HEART CATHETERIZATION Left 3/4/2019    Procedure: Left heart cath RFA access, 4fr catheter/sheath, not before 9am;  Surgeon: Jamir Nam MD;  Location: [facility] CATH LAB;  Service: Cardiology;  Laterality: Left;    TRANSRECTAL ULTRASOUND OF PROSTATE WITH INSERTION OF GOLD FIDUCIAL MARKER N/A 2019    Procedure: ULTRASOUND, PROSTATE, RECTAL APPROACH, WITH GOLD FIDUCIAL MARKER INSERTION;  Surgeon: Layne Huff MD;  Location: [facility] OR;  Service: Urology;  Laterality: N/A;    UPPER GASTROINTESTINAL ENDOSCOPY      WRIST SURGERY           Social History:   Social History     Socioeconomic History    Marital status:    Tobacco Use    Smoking status: Former     Current packs/day: 0.00     Average packs/day: 2.0 packs/day for 23.0 years (46.0 ttl pk-yrs)     Types: Cigarettes     Start date: 1966     Quit date: 1989     Years since quittin.3    Smokeless tobacco: Never   Substance and Sexual Activity    Alcohol use: Yes     Alcohol/week: 1.0 standard drink of alcohol     Types: 1 Cans of beer per week     Comment: occassional    Drug use: No    Sexual activity: Yes     Partners: Female     Social Drivers of Health     Financial Resource Strain: Low Risk  (2023)    Overall Financial Resource Strain (CARDIA)     Difficulty of Paying Living Expenses: Not hard at all   Food Insecurity: Unknown (2023)    Hunger Vital Sign     Ran Out of Food in the Last Year: Never true   Transportation Needs: No Transportation Needs (2023)    PRAPARE - Transportation     Lack of Transportation (Medical): No     Lack of Transportation (Non-Medical): No   Physical Activity: Insufficiently Active (2023)    Exercise Vital Sign     Days of Exercise per Week: 3 days     Minutes of Exercise per Session: 40 min   Stress: No Stress Concern  Present (7/19/2023)    [facility]ational [facility] - Occupational Stress Questionnaire     Feeling of Stress : Only a little   Housing Stability: Low Risk  (7/19/2023)    Housing Stability Vital Sign     Unable to Pay for Housing in the Last Year: No     Number of Places Lived in the Last Year: 1     Unstable Housing in the Last Year: No         Family Hx:  No family history of pulmonary fibrosis, pre-mature graying of hair, cirrhosis, or hematologic malignancies  No family history of emphysema or spontaneous PTX  no family history of lung cancer or lymphoma    Allergies and Pertinent Medications   Allergies and Medications Reviewed    Tamsulosin, Prochlorperazine, and Statins-hmg-coa reductase inhibitors  Current Outpatient Medications on File Prior to Visit   Medication Sig Dispense Refill    albuterol (PROVENTIL/VENTOLIN HFA) 90 mcg/actuation inhaler Inhale 2 puffs into the lungs every 6 (six) hours as needed for Wheezing. 18 g 0    ALPRAZolam (XANAX) 1 MG tablet Take 1 tablet (1 mg total) by mouth nightly as needed for Anxiety. 30 tablet 2    aspirin (ECOTRIN) 81 MG EC tablet Take 1 tablet (81 mg total) by mouth once daily.      blood glucose control, low (TRUE METRIX LEVEL 1) Soln To use with blood glucose meter 1 each 0    blood-glucose meter (TRUE METRIX GLUCOSE METER) kit For twice daily checking 1 each 0    clopidogreL (PLAVIX) 75 mg tablet Take 1 tablet (75 mg total) by mouth once daily. 30 tablet 11    empagliflozin (JARDIANCE) 10 mg tablet Take 1 tablet (10 mg total) by mouth once daily. 90 tablet 3    ezetimibe (ZETIA) 10 mg tablet Take 1 tablet (10 mg total) by mouth once daily. 90 tablet 3    imiquimod (ALDARA) 5 % cream APPLY TOPICALLY 3 (THREE) TIMES A WEEK. 24 packet 3    lancets Misc To check BG 3 times daily, to use with insurance preferred meter 300 each 3    losartan (COZAAR) 100 MG tablet Take 1 tablet (100 mg total) by mouth once daily. 90 tablet 3    metFORMIN (GLUCOPHAGE) 500  "MG tablet TAKE 1 TABLET EVERY DAY WITH BREAKFAST 90 tablet 3    metoprolol succinate (TOPROL-XL) 25 MG 24 hr tablet Take 0.5 tablets (12.5 mg total) by mouth once daily. 90 tablet 3    omeprazole (PRILOSEC) 40 MG capsule Take 1 capsule (40 mg total) by mouth 2 (two) times daily before meals. 180 capsule 3    spironolactone (ALDACTONE) 25 MG tablet Take 1 tablet (25 mg total) by mouth once daily. 90 tablet 3    testosterone (ANDROGEL) 20.25 mg/1.25 gram (1.62 %) GlPm Apply topically.      testosterone (ANDROGEL) 20.25 mg/1.25 gram (1.62 %) GlPm Place 1.25 g onto the skin once daily. 75 g 3    TRUE METRIX GLUCOSE TEST STRIP Strp CHECK BLOOD SUGAR TWO TIMES DAILY 200 strip 3    vit C/vit E ac/lut/copper/zinc (PRESERVISION LUTEIN ORAL) Take 1 tablet by mouth once daily.      vitamin D 1000 units Tab Take 1,000 Units by mouth once daily.      [DISCONTINUED] tiotropium bromide (SPIRIVA RESPIMAT) 2.5 mcg/actuation inhaler Inhale 2 puffs into the lungs Daily. Controller 4 g 11     No current facility-administered medications on file prior to visit.              Physical Exam   /81   Pulse 68   Ht 5' 7" (1.702 m)   Wt 73.1 kg (161 lb 4.3 oz)   SpO2 97%   BMI 25.26 kg/m²       Constitutional: No acute distress, Atraumatic   HEENT: moist mucus membranes, extraocular movements intact  Cardiovascular: regular rate and rhythm, no murmurs, rubs or gallops  Pulmonary: normal respiratory rate and chest rise, no chest wall deformity, normal breath sounds with no wheezing or crackles  Abdominal: non-distended, bowel sounds present  Musculoskeletal: No lower extremity edema, no clubbing  Neurological: normal speech/naresh, moves all extremities against gravity  Skin: no finger cyanosis, no rashes on exposed body parts  Psych: Appropriate affect, normal mood       Diagnostic Studies      All diagnostic studies relevant to chief complaint reviewed personally    Labs:  Lab Results   Component Value Date    WBC 6.98 11/15/2024 "    HGB 15.0 11/15/2024     11/15/2024    MCV 94 11/15/2024     Lab Results   Component Value Date     11/25/2024    K 4.6 11/25/2024    CO2 25 11/25/2024    BUN 18 11/25/2024    CREATININE 1.3 11/25/2024    MG 2.7 (H) 03/02/2019     Lab Results   Component Value Date    AST 26 10/30/2024    ALT 19 10/30/2024    ALBUMIN 3.7 10/30/2024    ALBUMIN 4.0 05/07/2024    PROT 6.7 10/30/2024    BILITOT 0.4 10/30/2024         PFTs:  Pulmonary Functions Testing Results:      TTE:  Results for orders placed during the hospital encounter of 09/17/24    Echo    Interpretation Summary    Left Ventricle: The left ventricle is normal in size. Mildly increased wall thickness. There is mild concentric hypertrophy. There is normal systolic function with a visually estimated ejection fraction of 55 - 60%. Grade I diastolic dysfunction.    Right Ventricle: Normal right ventricular cavity size. Systolic function is normal.    Aortic Valve: The aortic valve is a trileaflet valve. There is moderate aortic valve sclerosis. Mildly restricted motion. There is mild stenosis. Aortic valve area by VTI is 1.21 cm². Aortic valve peak velocity is 1.60 m/s. Mean gradient is 6 mmHg. The dimensionless index is 0.40.    Tricuspid Valve: There is mild regurgitation.    Pulmonary Artery: The estimated pulmonary artery systolic pressure is 27 mmHg.            Assessment and Plan   Eric Jackson is a 84 y.o. male  with a history of CAD s/p 3V CABG, HFpEF hx of prostate cancer tx w/ XRT, type II DM, HTN, CKD IIIa, up's esophagus presenting with chief complaint of COPD-asthma overlap      # COPD-asthma overlap - acte, stable - stop spiriva start advair 250 1 puff bid. May need to consider GERD tx, continue albuterol PRN. 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 Obtain CT chest to assess for fibrosis      Explained to the patient I work Monday-Thursdays, so any results or messages  received after working hours Thursday through Monday AM would not be addressed until I was back in the office. If he/she experienced any acute symptoms he/she should go the emergency room for immediate help.    Differential, diagnoses, diagnostic work up, and possible treatments were discussed with the patient. Questions were answered.    Irma Anthony MD  Pulmonary-Critical Care Medicine              For this visit, the following time was spent  preparing to see the patient (e.g., review of tests) 5 minutes  obtaining and/or reviewing separately obtained history 0 minutes  Performing a medically necessary appropriate examination and/or evaluation 10 minutes  Counseling and educating the patient/family/caregiver 10 minutes  Ordering medications, tests, or procedures 1 minutes  Referring and communicating with other health care professionals (when not reported separately) 0minutes  Documenting clinical information in the electronic or other health record 4 minutes  Care coordination (not reported separately) 0minutes    Total time = 30 minutes

## 2025-04-22 NOTE — PATIENT INSTRUCTIONS
I would like to prescribe you an inhaler. If the inhaler is expensive, please ask the pharmacy if it is because of your deductible or if your insurance prefers a different inhaler -- please get the names of those inhalers and message me back if so.      I am prescribing you advair to help with your COPD-asthma  verlap. Please take 1 puff in the morning and 1 puff in the evening every day.  Please ensure your gargle after each use. There are little to no immediate side effects with this medication as there is minimal absorption to the blood. A low grade yeast infection in the mouth called thrush can develop if you do not rinse/gargle your mouth after use    Common side effects include dry mouth, palpitations, headache    I am also prescribing an albuterol inhaler to take AS NEEDED for shortness of breath above your baseline. This medication can increase your heart rate. If using for more than 2 times per day, please let me know.    Please watch a video on appropriate use of your inhaler as it is crucial that is delivered to your airways (and not the back of your mouth to be effective.)    Videos:    How to use a meter-dosed inhaler: https://www.Babycareube.com/watch?v=9ipqxF-4p5g    How to use a spacer (Aerochamber) with meter-dosed inhaler: https://www.Babycareube.com/watch?v=ltB-ZOjlkgY    How to use a diskus inhaler: https://youtu.be/QN6sbv6TmmI    How to use an ellipta inhaler: https://youtu.be/Nx_JDTcmSeo    How to use a respimat inhaler: https://youtu.be/HWr_NM1ChHg        For General information on Inhalers:    https://www.nationaljewish.org/conditions/medications/asthma-medications/devices

## 2025-04-28 ENCOUNTER — HOSPITAL ENCOUNTER (OUTPATIENT)
Dept: RADIOLOGY | Facility: HOSPITAL | Age: 85
Discharge: HOME | End: 2025-04-28
Attending: INTERNAL MEDICINE
Payer: MEDICARE

## 2025-04-28 DIAGNOSIS — J84.9 INTERSTITIAL PULMONARY DISEASE, UNSPECIFIED: ICD-10-CM

## 2025-04-28 PROCEDURE — 71250 CT THORAX DX C-: CPT | Mod: 26,,, | Performed by: RADIOLOGY

## 2025-04-28 PROCEDURE — 71250 CT THORAX DX C-: CPT | Mod: TC

## 2025-04-29 ENCOUNTER — PATIENT MESSAGE (OUTPATIENT)
Dept: PULMONOLOGY | Facility: CLINIC | Age: 85
End: 2025-04-29
Payer: MEDICARE

## 2025-04-29 DIAGNOSIS — J21.9 BRONCHIOLITIS: ICD-10-CM

## 2025-04-29 DIAGNOSIS — J44.89 ASTHMA-COPD OVERLAP SYNDROME: Primary | ICD-10-CM

## 2025-04-29 RX ORDER — LEVOFLOXACIN 500 MG/1
500 TABLET, FILM COATED ORAL DAILY
Qty: 7 TABLET | Refills: 0 | Status: SHIPPED | OUTPATIENT
Start: 2025-04-29 | End: 2025-05-06

## 2025-04-29 NOTE — PROGRESS NOTES
Brief Pulm Note    Bronchiolitis noted on CT chest  Called patient w/ no infecitous symptos but generalized fatigue    Plan:  Start levofloxacin 500 mg daily x 7 days, educated on tendon rupture risk  Sputum samples  Repeat ct chest in 6 months    Irma Anthony MD

## 2025-05-05 ENCOUNTER — TELEPHONE (OUTPATIENT)
Dept: ORTHOPEDICS | Facility: CLINIC | Age: 85
End: 2025-05-05
Payer: MEDICARE

## 2025-05-05 ENCOUNTER — HOSPITAL ENCOUNTER (EMERGENCY)
Facility: HOSPITAL | Age: 85
Discharge: HOME | End: 2025-05-05
Attending: EMERGENCY MEDICINE
Payer: MEDICARE

## 2025-05-05 VITALS
SYSTOLIC BLOOD PRESSURE: 159 MMHG | OXYGEN SATURATION: 94 % | DIASTOLIC BLOOD PRESSURE: 67 MMHG | BODY MASS INDEX: 25.11 KG/M2 | HEIGHT: 67 IN | RESPIRATION RATE: 16 BRPM | TEMPERATURE: 97 F | WEIGHT: 160 LBS | HEART RATE: 64 BPM

## 2025-05-05 DIAGNOSIS — M25.511 SHOULDER PAIN, RIGHT: ICD-10-CM

## 2025-05-05 PROCEDURE — 99284 EMERGENCY DEPT VISIT MOD MDM: CPT | Mod: 25

## 2025-05-05 PROCEDURE — 93005 ELECTROCARDIOGRAM TRACING: CPT

## 2025-05-05 PROCEDURE — 93010 ELECTROCARDIOGRAM REPORT: CPT | Mod: ,,, | Performed by: INTERNAL MEDICINE

## 2025-05-05 PROCEDURE — 96372 THER/PROPH/DIAG INJ SC/IM: CPT | Performed by: EMERGENCY MEDICINE

## 2025-05-05 PROCEDURE — 63600175 PHARM REV CODE 636 W HCPCS: Performed by: EMERGENCY MEDICINE

## 2025-05-05 RX ORDER — DEXAMETHASONE SODIUM PHOSPHATE 4 MG/ML
8 INJECTION, SOLUTION INTRA-ARTICULAR; INTRALESIONAL; INTRAMUSCULAR; INTRAVENOUS; SOFT TISSUE
Status: COMPLETED | OUTPATIENT
Start: 2025-05-05 | End: 2025-05-05

## 2025-05-05 RX ORDER — KETOROLAC TROMETHAMINE 30 MG/ML
15 INJECTION, SOLUTION INTRAMUSCULAR; INTRAVENOUS
Status: COMPLETED | OUTPATIENT
Start: 2025-05-05 | End: 2025-05-05

## 2025-05-05 RX ADMIN — DEXAMETHASONE SODIUM PHOSPHATE 8 MG: 4 INJECTION INTRA-ARTICULAR; INTRALESIONAL; INTRAMUSCULAR; INTRAVENOUS; SOFT TISSUE at 12:05

## 2025-05-05 RX ADMIN — KETOROLAC TROMETHAMINE 15 MG: 30 INJECTION, SOLUTION INTRAMUSCULAR; INTRAVENOUS at 12:05

## 2025-05-05 NOTE — TELEPHONE ENCOUNTER
LVM with patient. Calling back to answer questions he had. Asked for a call back.     Gissell Celaya MS, ATC, [facility]  Clinical/Surgical Assistant - Dr. Katja Verdin  Orthopedics  Phone: [phone]      ----- Message -----  From: Paola Damon  Sent: 5/5/2025   8:09 AM CDT  To: [facility] Clinical Support Staff  Subject: Patient call back                                .Type: Patient Call BackWho called:self What is the request in detail:would like to ask if a question before scheduling with Orthopedics, will elaborate upon phone callCan the clinic reply by [facility]?no Would the patient rather a call back or a response via [facility]? Call Best call back number:.[phone] Information:

## 2025-05-05 NOTE — ED TRIAGE NOTES
Patient came to the ED for right shoulder pain that he states started 5 days ago, patient states he has been managing the pain with tylenol and warm packs. Patient reports the pain is worse when the arm is hanging on his side. Denies any chest pain or shortness of breath.

## 2025-05-05 NOTE — ED PROVIDER NOTES
Encounter Date: 5/5/2025       History     Chief Complaint   Patient presents with    Shoulder Pain     Pt presents to ER for complaints of right shoulder pain times 5 days. Pt states he tried to get in to the Orthopedic but has not received a call back. Pt rates pain 9/10 and says he took pain killers and has had no relief so he hasn't taken any lately. Pt denies any heavy lifting or falls. Pt denies chest pain, SOB and any other issues at this time. Pt is currently on ASA, had a triple by pass in 2001.      84-year-old male with history of CAD, borderline diabetes, hypertension presents to the ED with right shoulder pain.  Pain started about 5 days ago when waking up.  Pain is very positional, hurts worse when he has his arm hanging down, feels improved with his arm next was chest.  Denies any preceding injury.  Tried to get follow-up with orthopedics this morning but did not hear back so presented to the ED. he is right-hand dominant, denies previous injury to right shoulder.  States he took Tylenol without any relief.    The history is provided by the patient.     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    Cancer     Coronary artery disease     Depression     Diabetes mellitus type II     Disorder of kidney and ureter     Erectile dysfunction     Eye injuries     fb ou    GERD (gastroesophageal reflux disease)     Hypertension     Hypertensive heart disease with heart failure 2/14/2025    Intermediate stage nonexudative age-related macular degeneration of both eyes 4/9/2019    Myocardial infarction     Nuclear sclerosis of both eyes 4/9/2019    Prostate cancer     Trouble in sleeping     Type 2 diabetes mellitus     Type 2 diabetes  mellitus with ophthalmic manifestations      Past Surgical History:   Procedure Laterality Date    COLONOSCOPY N/A 2/9/2018    Procedure: COLONOSCOPY;  Surgeon: Mathieu Cao MD;  Location: [facility];  Service: Endoscopy;  Laterality: N/A;    CORONARY ANGIOGRAPHY INCLUDING BYPASS GRAFTS WITH CATHETERIZATION OF LEFT HEART N/A 3/4/2019    Procedure: ANGIOGRAM, CORONARY, INCLUDING BYPASS GRAFT, WITH LEFT HEART CATHETERIZATION;  Surgeon: Jamir Nam MD;  Location: [facility] CATH LAB;  Service: Cardiology;  Laterality: N/A;    CORONARY ANGIOGRAPHY INCLUDING BYPASS GRAFTS WITH CATHETERIZATION OF LEFT HEART N/A 10/9/2024    Procedure: ANGIOGRAM, CORONARY, INCLUDING BYPASS GRAFT, WITH LEFT HEART CATHETERIZATION;  Surgeon: Milly Cadena MD;  Location: [facility] CATH LAB;  Service: Cardiology;  Laterality: N/A;  rcfa  RN PREOP 10/4/2024    CORONARY ARTERY BYPASS GRAFT  2001    ESOPHAGOGASTRODUODENOSCOPY N/A 10/8/2020    Procedure: EGD (ESOPHAGOGASTRODUODENOSCOPY);  Surgeon: Bharath Herrera MD;  Location: [facility];  Service: Endoscopy;  Laterality: N/A;    ESOPHAGOGASTRODUODENOSCOPY N/A 11/7/2023    Procedure: EGD (ESOPHAGOGASTRODUODENOSCOPY);  Surgeon: Bharath Herrera MD;  Location: [facility];  Service: Endoscopy;  Laterality: N/A;    HERNIA REPAIR      LEFT HEART CATHETERIZATION Left 3/4/2019    Procedure: Left heart cath RFA access, 4fr catheter/sheath, not before 9am;  Surgeon: Jamir Nam MD;  Location: [facility] CATH LAB;  Service: Cardiology;  Laterality: Left;    TRANSRECTAL ULTRASOUND OF PROSTATE WITH INSERTION OF GOLD FIDUCIAL MARKER N/A 2/26/2019    Procedure: ULTRASOUND, PROSTATE, RECTAL APPROACH, WITH GOLD FIDUCIAL MARKER INSERTION;  Surgeon: Layne Huff MD;  Location: [facility];  Service: Urology;  Laterality: N/A;    UPPER GASTROINTESTINAL ENDOSCOPY      WRIST SURGERY       Family History   Adopted: Yes   Problem Relation Name Age of Onset    No Known Problems Mother      No  Known Problems Father      No Known Problems Sister      No Known Problems Brother      No Known Problems Maternal Aunt      No Known Problems Maternal Uncle      No Known Problems Paternal Aunt      No Known Problems Paternal Uncle      No Known Problems Maternal Grandmother      No Known Problems Maternal Grandfather      No Known Problems Paternal Grandmother      No Known Problems Paternal Grandfather      No Known Problems Other      Amblyopia Neg Hx      Blindness Neg Hx      Cancer Neg Hx      Cataracts Neg Hx      Diabetes Neg Hx      Glaucoma Neg Hx      Hypertension Neg Hx      Macular degeneration Neg Hx      Retinal detachment Neg Hx      Strabismus Neg Hx      Stroke Neg Hx      Thyroid disease Neg Hx       Social History[1]  Review of Systems   Constitutional:  Negative for chills and fever.   HENT:  Negative for congestion and sore throat.    Eyes:  Negative for visual disturbance.   Respiratory:  Negative for cough and shortness of breath.    Cardiovascular:  Negative for chest pain.   Gastrointestinal:  Negative for abdominal pain, nausea and vomiting.   Genitourinary:  Negative for dysuria.   Musculoskeletal:  Positive for arthralgias.   Skin:  Negative for wound.   Neurological:  Negative for weakness and numbness.   Psychiatric/Behavioral:  Negative for confusion.        Physical Exam     Initial Vitals [05/05/25 1200]   BP Pulse Resp Temp SpO2   135/66 63 18 97.2 °F (36.2 °C) 95 %      MAP       --         Physical Exam    Nursing note and vitals reviewed.  Constitutional: He appears well-developed and well-nourished. He is not diaphoretic. No distress.   HENT:   Head: Normocephalic and atraumatic.   Eyes: Conjunctivae are normal.   Neck: Neck supple.   Cardiovascular:  Normal rate and regular rhythm.           Pulses:       Radial pulses are 2+ on the right side.   Pulmonary/Chest: Breath sounds normal. No respiratory distress.   Abdominal: Abdomen is soft. Bowel sounds are normal.    Musculoskeletal:         General: No edema.      Right shoulder: Tenderness present. No swelling or deformity.        Arms:       Cervical back: Neck supple.      Comments: Tender to the anterior aspect of the right shoulder near the AC joint     Neurological: He is alert. GCS score is 15. GCS eye subscore is 4. GCS verbal subscore is 5. GCS motor subscore is 6.   Sensation to light touch intact in right hand   Skin: Skin is warm and dry.   Psychiatric: He has a normal mood and affect.         ED Course   Procedures  Labs Reviewed - No data to display  EKG Readings: (Independently Interpreted)   Sinus bradycardia with first-degree AV block, rate 58 beats per minute, KY interval 270 milliseconds,  milliseconds, no STEMI.       Imaging Results              X-Ray Shoulder Trauma Right (Final result)  Result time 05/05/25 13:18:57      Final result by Quinton Carlton III, MD (05/05/25 13:18:57)                   Narrative:    EXAMINATION:  XR SHOULDER TRAUMA 3 VIEW RIGHT    CLINICAL HISTORY:  Pain in right shoulder    FINDINGS:  Shoulder trauma three views right: No fracture, dislocation, or bone destruction seen.      Electronically signed by: Quinton Carlton MD  Date:    05/05/2025  Time:    13:18                                     Medications   dexAMETHasone injection 8 mg (8 mg Intramuscular Given 5/5/25 1244)   ketorolac injection 15 mg (15 mg Intramuscular Given 5/5/25 1241)     Medical Decision Making  84-year-old male presenting to the ED with 5 days of right shoulder pain, pain is very positional, worse when he hangs his hand down.  No preceding injury.  Neurovascularly intact in the right upper extremity.  No localized swelling or erythema.  Tender to the upper aspect of the shoulder where the clavicle meets the shoulder joint.  Differential includes but not limited to musculoskeletal pain, do not suspect ACS equivalent.  Will treat with Decadron and Toradol, x-ray to rule out any occult fracture.   Will refer to ortho.    Amount and/or Complexity of Data Reviewed  Radiology: ordered.     Details: X-ray negative for acute finding  ECG/medicine tests: ordered and independent interpretation performed.    Risk  Prescription drug management.                     Will provide patient with sling to help with comfort, encouraged range-of-motion exercises, may use Tylenol, ortho follow up scheduled for May 14th.                 Clinical Impression:  Final diagnoses:  [M25.511] Shoulder pain, right       This dictation has been generated using M-Modal Fluency Direct dictation; some phonetic errors may occur.      ED Disposition Condition    Discharge Stable          ED Prescriptions    None       Follow-up Information       Follow up With Specialties Details Why Contact Info    [facility]  Call   [address] [place]  [address]    [phone]    Katja Verdin MD Orthopedic Surgery On 2025  [address] [facility] [address]  [phone]      [facility]  As needed, If symptoms worsen [address] [facility] [place] [address] [phone]                 [1]   Social History  Tobacco Use    Smoking status: Former     Current packs/day: 0.00     Average packs/day: 2.0 packs/day for 23.0 years (46.0 ttl pk-yrs)     Types: Cigarettes     Start date: 1966     Quit date: 1989     Years since quittin.3    Smokeless tobacco: Never   Substance Use Topics    Alcohol use: Yes     Alcohol/week: 1.0 standard drink of alcohol     Types: 1 Cans of beer per week     Comment: occassional    Drug use: No        Yane Pina MD  [phone]

## 2025-05-06 ENCOUNTER — TELEPHONE (OUTPATIENT)
Dept: OPTOMETRY | Facility: CLINIC | Age: 85
End: 2025-05-06
Payer: MEDICARE

## 2025-05-06 NOTE — TELEPHONE ENCOUNTER
----- Message from Tracy Rashid sent at 5/6/2025 11:04 AM CDT -----  Regarding: FW: Appt  Contact: Pt  [phone]    ----- Message -----  From: Deanna Alexandre  Sent: 5/6/2025  10:40 AM CDT  To: [facility] Clinical Support Staff  Subject: Appt                                             Appt Type: Est Pt  Date/Time Preference:  States need appt as soon as possible   Treating Provider: Sheila Braden Name:Eric Contact Prefer:  [phone]/Notes: Rescheduling missed appt from 05/05/25 due to hospital stay

## 2025-05-07 ENCOUNTER — HOSPITAL ENCOUNTER (OUTPATIENT)
Dept: RESPIRATORY THERAPY | Facility: HOSPITAL | Age: 85
Discharge: HOME | End: 2025-05-07
Attending: INTERNAL MEDICINE
Payer: MEDICARE

## 2025-05-07 ENCOUNTER — PATIENT MESSAGE (OUTPATIENT)
Dept: FAMILY MEDICINE | Facility: CLINIC | Age: 85
End: 2025-05-07
Payer: MEDICARE

## 2025-05-07 DIAGNOSIS — J44.89 ASTHMA-COPD OVERLAP SYNDROME: ICD-10-CM

## 2025-05-07 DIAGNOSIS — J21.9 BRONCHIOLITIS: ICD-10-CM

## 2025-05-07 LAB
OHS QRS DURATION: 96 MS
OHS QTC CALCULATION: 388 MS

## 2025-05-07 PROCEDURE — 99900035 HC TECH TIME PER 15 MIN (STAT)

## 2025-05-09 ENCOUNTER — LAB VISIT (OUTPATIENT)
Dept: LAB | Facility: HOSPITAL | Age: 85
End: 2025-05-09
Attending: INTERNAL MEDICINE
Payer: MEDICARE

## 2025-05-09 ENCOUNTER — OFFICE VISIT (OUTPATIENT)
Dept: ORTHOPEDICS | Facility: CLINIC | Age: 85
End: 2025-05-09
Payer: MEDICARE

## 2025-05-09 VITALS — BODY MASS INDEX: 25.12 KG/M2 | WEIGHT: 160.06 LBS | HEIGHT: 67 IN

## 2025-05-09 DIAGNOSIS — M75.81 TENDINITIS OF RIGHT ROTATOR CUFF: Primary | ICD-10-CM

## 2025-05-09 DIAGNOSIS — M25.511 SHOULDER PAIN, RIGHT: ICD-10-CM

## 2025-05-09 DIAGNOSIS — E11.9 TYPE 2 DIABETES MELLITUS WITHOUT COMPLICATION, WITHOUT LONG-TERM CURRENT USE OF INSULIN: ICD-10-CM

## 2025-05-09 LAB
ALBUMIN SERPL BCP-MCNC: 3.7 G/DL (ref 3.5–5.2)
ALP SERPL-CCNC: 61 UNIT/L (ref 40–150)
ALT SERPL W/O P-5'-P-CCNC: 15 UNIT/L (ref 10–44)
ANION GAP (OHS): 8 MMOL/L (ref 8–16)
AST SERPL-CCNC: 19 UNIT/L (ref 11–45)
BILIRUB SERPL-MCNC: 0.3 MG/DL (ref 0.1–1)
BUN SERPL-MCNC: 17 MG/DL (ref 8–23)
CALCIUM SERPL-MCNC: 9.5 MG/DL (ref 8.7–10.5)
CHLORIDE SERPL-SCNC: 103 MMOL/L (ref 95–110)
CHOLEST SERPL-MCNC: 183 MG/DL (ref 120–199)
CHOLEST/HDLC SERPL: 3.7 {RATIO} (ref 2–5)
CO2 SERPL-SCNC: 30 MMOL/L (ref 23–29)
CREAT SERPL-MCNC: 1.2 MG/DL (ref 0.5–1.4)
EAG (OHS): 131 MG/DL (ref 68–131)
ERYTHROCYTE [DISTWIDTH] IN BLOOD BY AUTOMATED COUNT: 13.8 % (ref 11.5–14.5)
GFR SERPLBLD CREATININE-BSD FMLA CKD-EPI: 60 ML/MIN/1.73/M2
GLUCOSE SERPL-MCNC: 117 MG/DL (ref 70–110)
HBA1C MFR BLD: 6.2 % (ref 4–5.6)
HCT VFR BLD AUTO: 46.8 % (ref 40–54)
HDLC SERPL-MCNC: 49 MG/DL (ref 40–75)
HDLC SERPL: 26.8 % (ref 20–50)
HGB BLD-MCNC: 15 GM/DL (ref 14–18)
LDLC SERPL CALC-MCNC: 103.6 MG/DL (ref 63–159)
MCH RBC QN AUTO: 29.8 PG (ref 27–31)
MCHC RBC AUTO-ENTMCNC: 32.1 G/DL (ref 32–36)
MCV RBC AUTO: 93 FL (ref 82–98)
NONHDLC SERPL-MCNC: 134 MG/DL
PLATELET # BLD AUTO: 239 K/UL (ref 150–450)
PMV BLD AUTO: 9.1 FL (ref 9.2–12.9)
POTASSIUM SERPL-SCNC: 4.2 MMOL/L (ref 3.5–5.1)
PROT SERPL-MCNC: 7.1 GM/DL (ref 6–8.4)
RBC # BLD AUTO: 5.03 M/UL (ref 4.6–6.2)
SODIUM SERPL-SCNC: 141 MMOL/L (ref 136–145)
TRIGL SERPL-MCNC: 152 MG/DL (ref 30–150)
WBC # BLD AUTO: 7.72 K/UL (ref 3.9–12.7)

## 2025-05-09 PROCEDURE — 36415 COLL VENOUS BLD VENIPUNCTURE: CPT | Mod: PO

## 2025-05-09 PROCEDURE — 99999 PR PBB SHADOW E&M-EST. PATIENT-LVL IV: CPT | Mod: PBBFAC,,,

## 2025-05-09 PROCEDURE — 80061 LIPID PANEL: CPT

## 2025-05-09 PROCEDURE — 85027 COMPLETE CBC AUTOMATED: CPT

## 2025-05-09 PROCEDURE — 83036 HEMOGLOBIN GLYCOSYLATED A1C: CPT

## 2025-05-09 PROCEDURE — 80053 COMPREHEN METABOLIC PANEL: CPT

## 2025-05-09 RX ORDER — TRIAMCINOLONE ACETONIDE 40 MG/ML
40 INJECTION, SUSPENSION INTRA-ARTICULAR; INTRAMUSCULAR
Status: DISCONTINUED | OUTPATIENT
Start: 2025-05-09 | End: 2025-05-09 | Stop reason: HOSPADM

## 2025-05-09 RX ADMIN — TRIAMCINOLONE ACETONIDE 40 MG: 40 INJECTION, SUSPENSION INTRA-ARTICULAR; INTRAMUSCULAR at 01:05

## 2025-05-09 NOTE — PROCEDURES
Large Joint Aspiration/Injection: R subacromial bursa    Date/Time: 5/9/2025 1:30 PM    Performed by: Pam Silverman PA-C  Authorized by: Pam Silverman PA-C    Consent Done?:  Yes (Verbal)  Indications:  Pain  Prep: patient was prepped and draped in usual sterile fashion      Local anesthesia used?: Yes    Anesthesia:  Local infiltration  Local anesthetic:  Lidocaine 2% without epinephrine and topical anesthetic    Details:  Needle Size:  22 G  Ultrasonic Guidance for needle placement?: No    Approach:  Posterior  Location:  Shoulder  Site:  R subacromial bursa  Medications:  40 mg triamcinolone acetonide 40 mg/mL  Patient tolerance:  Patient tolerated the procedure well with no immediate complications

## 2025-05-09 NOTE — PROGRESS NOTES
NEW PATIENT ORTHOPAEDIC: Shoulder     PRIMARY CARE PHYSICIAN: Samir Boo MD   REFERRING PROVIDER: Self, Aaareferral  No address on file     ASSESSMENT & PLAN:    Impression:  Right shoulder rotator cuff tendinitis  Right AC joint arthralgia    Non operative care:    Eric Jackson has physical exam evidence of above and wishes to pursue an non-operative care. The patient and I had a thorough discussion today. We discussed the working diagnosis as well as several other potential alternative diagnoses. Treatment options were discussed, both conservative and surgical. Conservative treatment options would include things such as activity modifications,  a period of rest, oral vs topical OTC and prescription anti-inflammatory medications, physical therapy versus HEP, corticosteroid injections, and others. I am recommending the following:   HEP [id number] - Pam Silverman PA-C, instructed and demonstrated a shoulder HEP. The patient then demonstrated understanding of exercises and proper technique. This program was performed for 15 minutes.   Injection of the right subacromial space  performed, please see procedure note for more details.  Prior to the injection risks and benefits of corticosteroid injection were discussed with the patient including pain, infection, bleeding, skin color changes, swelling, steroid flare. We discussed that over time injections can result in chondral damage, acceleration of arthritis formation, damage to tendons and damage to joints.  The patient consented for the procedure.  Post-injection instructions were given to the patient in writing.  Discussed weaning out of sling to prevent stiffness  Okay to continue Tylenol or over-the-counter anti-inflammatories as needed pain control  Return to clinic in 6 weeks or sooner if needed  Call in the interim with any questions    All questions were answered and patient verbalized understanding of the plan  N. Neubig, PA-C [facility]     The patient has been ordered:  Injection  Home exercise program    CONSULTS:   None    ACTIVE PROBLEM LIST  Problem List[1]        SUBJECTIVE    CHIEF COMPLAINT: Shoulder Pain    Initial visit 05/09/2025 HPI:   Eric Jackson is a 84 y.o. right hand dominant male who presents to clinic for intermittent right shoulder pain. The patient denies known MEHRAN.  The pain started 1 weeks ago and is becoming progressively worse.  Pain is located over (points to) anterior and lateral shoulder  over subdeltoid fossa.  Also endorses some pain over AC joint.  He reports that the pain is a 6 /10 sharp, sore, and pain with movement pain today. Pain is 10/10 at its worst.  The pain is aggravated by elevation, activity, lifting, and repetitive activity. Denies numbness, tingling, radiation. The pain is affecting ADLs and limiting desired level of activity. There is not a history of previous surgery to the shoulder.  Pain became so severe that he went to the emergency room for this on 5/5/25.  He had shoulder radiographs negative for any acute fracture or dislocation.  No significant degenerative changes.  He was treated with IM Toradol and steroid injections which provided significant relief for about 1-2 days.  The day after the ED visit, he did a lot of yd work including lifting large branches out of his pool after a rainstorm.  Does report that this worsened his pain.  Afterwards knee purchased a soft sling and has been wearing this intermittently with some relief.    Previous treatments include rest, sling, and tylenol, IM toradol and IM steroid injection which have provided adequate relief.     Eric Jackson has no additional complaints.      REVIEW OF SYSTEMS:  PAIN ASSESSMENT:  See HPI.  MUSCULOSKELETAL: See HPI.      PAST MEDICAL HISTORY   has a past medical history of Anemia, Angina pectoris, Anxiety, Asthma-COPD overlap syndrome (4/22/2025), Cancer, Coronary artery disease, Depression, Diabetes mellitus  type II, Disorder of kidney and ureter, Erectile dysfunction, Eye injuries, GERD (gastroesophageal reflux disease), Hypertension, Hypertensive heart disease with heart failure (2/14/2025), Intermediate stage nonexudative age-related macular degeneration of both eyes (4/9/2019), Myocardial infarction, Nuclear sclerosis of both eyes (4/9/2019), Prostate cancer, Trouble in sleeping, Type 2 diabetes mellitus, and Type 2 diabetes mellitus with ophthalmic manifestations.     PAST SURGICAL HISTORY   has a past surgical history that includes Hernia repair; Wrist surgery; Coronary artery bypass graft (2001); Colonoscopy (N/A, 2/9/2018); Transrectal ultrasound of prostate with insertion of gold fiducial marker (N/A, 2/26/2019); Left heart catheterization (Left, 3/4/2019); Coronary angiography including bypass grafts with catheterization of left heart (N/A, 3/4/2019); Esophagogastroduodenoscopy (N/A, 10/8/2020); Upper gastrointestinal endoscopy; Esophagogastroduodenoscopy (N/A, 11/7/2023); and Coronary angiography including bypass grafts with catheterization of left heart (N/A, 10/9/2024).     FAMILY HISTORY  family history includes No Known Problems in his brother, father, maternal aunt, maternal grandfather, maternal grandmother, maternal uncle, mother, paternal aunt, paternal grandfather, paternal grandmother, paternal uncle, sister, and another family member. He was adopted.     SOCIAL HISTORY   reports that he quit smoking about 36 years ago. His smoking use included cigarettes. He started smoking about 59 years ago. He has a 46 pack-year smoking history. He has never used smokeless tobacco. He reports current alcohol use of about 1.0 standard drink of alcohol per week. He reports that he does not use drugs.     ALLERGIES:   Review of patient's allergies indicates:   Allergen Reactions    Tamsulosin     Prochlorperazine      convulsions    Statins-hmg-coa reductase inhibitors Other (See Comments)     Muscle weakness     "    MEDICATIONS:   Medications Ordered Prior to Encounter[2]       PHYSICAL EXAM   height is 5' 7" (1.702 m) and weight is 72.6 kg (160 lb 0.9 oz).     All other systems deferred.  GENERAL:  No acute distress  HABITUS:  Normal  GAIT:  Coordinated  SKIN:  No erythema, warmth, fluctuance. Swelling absent     SHOULDER EXAM:    Shoulder Range of Motion    Right     Left   (Active/Passive)       Forward Elevation     165/165             165/165   External rotation (arm at side)  45/45             45/45    Internal rotation behind the back  L5             L5     Range of motion is mildly painful     Acromioclavicular joint is tender  Crossbody test: positive    Neer's positive  Hawkin's positive    Rosario's positive  Drop arm negative  Belly press negative      Cuff Strength     Right     Left   Supraspinatus        5/5    5/5  Infraspinatus     5/5    5/5  Subscapularis     5/5    5/5    Deltoid testing            5/5    5/5    Rubio's test negative  Speeds negative  Yergasons negative    Elbow examination demonstrates no tenderness to palpation and has normal range of motion.     ltsi C5-T1  + epl, io, fds, fdp   2+ RP         DATA:  Diagnostic tests reviewed for today's visit:     3 views of the right shoulder:  negative for degenerative changes of the AC joint. The humeral head is well centered on the AP and axillary views.  There is significant degenerative change of the glenohumeral joint or posterior subluxation of the humeral head. No acute changes or fracture.          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 4 cm HH path metaplasia;  repeat 3-5 years for Archer's    DAISY (generalized anxiety disorder)    Insomnia    Slow urinary stream    Tubular adenoma of colon 2/2018 3 adenomas repeat 3 " years    Hypogonadism in male    Prostate cancer 2/25/19 TRUS of prostate and gold fiducial marker placement; 4/2019 s/p XRT    History of Non-STEMI (non-ST elevated myocardial infarction) 3/2019 from sepsis; Culprit likely in small Diag2 (unrevascularized) and likely demand related    Coronary artery disease 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; 3/2019 C patent graft; Culprit likely in small Diag2 (unrevascularized) and likely demand related    Renal cyst, left, simple 1st seen 2/2019 CT; verified on renal US 11/2020    Status post coronary artery bypass grafting single vessel    Refractive error    Intermediate stage nonexudative age-related macular degeneration of both eyes    Nuclear sclerosis of both eyes    History of DVT from PICC line right upper extremity 4/2019 anticoag x 3 months then stopped    Abdominal aortic atherosclerosis    Type 2 diabetes mellitus with diabetic cataract, without long-term current use of insulin    Major depressive disorder with single episode, in full remission    Epiretinal membrane (ERM) of both eyes    Stage 3a chronic kidney disease    Chronic left shoulder pain    Decreased range of motion of left shoulder    Shoulder weakness    PVC (premature ventricular contraction)    Hypertensive heart disease with heart failure    Asthma-COPD overlap syndrome   [2]   Current Outpatient Medications on File Prior to Visit   Medication Sig Dispense Refill    albuterol (PROVENTIL/VENTOLIN HFA) 90 mcg/actuation inhaler Inhale 2 puffs into the lungs every 6 (six) hours as needed for Wheezing. 18 g 0    ALPRAZolam (XANAX) 1 MG tablet Take 1 tablet (1 mg total) by mouth nightly as needed for Anxiety. 30 tablet 2    aspirin (ECOTRIN) 81 MG EC tablet Take 1 tablet (81 mg total) by mouth once daily.      blood glucose control, low (TRUE METRIX LEVEL 1) Soln To use with blood glucose meter 1 each 0    blood-glucose meter (TRUE METRIX GLUCOSE METER) kit For twice  daily checking 1 each 0    clopidogreL (PLAVIX) 75 mg tablet Take 1 tablet (75 mg total) by mouth once daily. 30 tablet 11    empagliflozin (JARDIANCE) 10 mg tablet Take 1 tablet (10 mg total) by mouth once daily. 90 tablet 3    ezetimibe (ZETIA) 10 mg tablet Take 1 tablet (10 mg total) by mouth once daily. 90 tablet 3    fluticasone-salmeterol diskus inhaler 250-50 mcg Inhale 1 puff into the lungs 2 (two) times daily. Controller 60 each 11    imiquimod (ALDARA) 5 % cream APPLY TOPICALLY 3 (THREE) TIMES A WEEK. 24 packet 3    lancets Misc To check BG 3 times daily, to use with insurance preferred meter 300 each 3    losartan (COZAAR) 100 MG tablet Take 1 tablet (100 mg total) by mouth once daily. 90 tablet 3    metFORMIN (GLUCOPHAGE) 500 MG tablet TAKE 1 TABLET EVERY DAY WITH BREAKFAST 90 tablet 3    metoprolol succinate (TOPROL-XL) 25 MG 24 hr tablet Take 0.5 tablets (12.5 mg total) by mouth once daily. 90 tablet 3    omeprazole (PRILOSEC) 40 MG capsule Take 1 capsule (40 mg total) by mouth 2 (two) times daily before meals. 180 capsule 3    spironolactone (ALDACTONE) 25 MG tablet Take 1 tablet (25 mg total) by mouth once daily. 90 tablet 3    testosterone (ANDROGEL) 20.25 mg/1.25 gram (1.62 %) GlPm Apply topically.      testosterone (ANDROGEL) 20.25 mg/1.25 gram (1.62 %) GlPm Place 1.25 g onto the skin once daily. 75 g 3    TRUE METRIX GLUCOSE TEST STRIP Strp CHECK BLOOD SUGAR TWO TIMES DAILY 200 strip 3    vit C/vit E ac/lut/copper/zinc (PRESERVISION LUTEIN ORAL) Take 1 tablet by mouth once daily.      vitamin D 1000 units Tab Take 1,000 Units by mouth once daily.       No current facility-administered medications on file prior to visit.

## 2025-05-14 ENCOUNTER — RESULTS FOLLOW-UP (OUTPATIENT)
Dept: TRANSPLANT | Facility: CLINIC | Age: 85
End: 2025-05-14

## 2025-05-15 NOTE — ASSESSMENT & PLAN NOTE
05/16/2025:  Has not been taking omeprazole.  Controlled with diet.  However we discussed the findings with Barretts, and risk of progression and would recommend resuming the PPI.  Plan to repeat EGD next year and plan to update colonoscopy along with the EGD.  Overall in good health and I think he would benefit from continued screening  Orders:    omeprazole (PRILOSEC) 40 MG capsule; Take 1 capsule (40 mg total) by mouth 2 (two) times daily before meals.

## 2025-05-15 NOTE — ASSESSMENT & PLAN NOTE
05/16/2025:  Followed by pulmonology.  Spiriva ineffective, does find some improvement with the Wixela.  Has upcoming follow-up

## 2025-05-15 NOTE — PROGRESS NOTES
This note was created by combination of typed  and M-Modal dictation.  Transcription errors may be present.   This note was also generated with the assistance of ambient listening technology. Verbal consent was obtained by the patient and accompanying visitor(s) for the recording of patient appointment to facilitate this note. I attest to having reviewed and edited the generated note for accuracy, though some syntax or spelling errors may persist. Please contact the author of this note for any clarification.    Assessment and Plan:   Assessment and Plan   Assessment & Plan  Type 2 diabetes mellitus without complication, without long-term current use of insulin  Type 2 diabetes mellitus with diabetic cataract, without long-term current use of insulin  05/16/2025: Pre visit labs reviewed, A1c stable.  It is his personal goal to get below 6.0 but has not been at that level in quite some time.  I am okay with where he is at.  He is on Jardiance for heart failure but also for diabetes.  Also on metformin.  No changes  Orders:    Comprehensive Metabolic Panel; Future    Lipid Panel; Future    Hemoglobin A1C; Future    Microalbumin/Creatinine Ratio, Urine; Future    CBC Without Differential; Future    blood-glucose meter (TRUE METRIX GLUCOSE METER) kit; For twice daily checking    lancets Misc; To check BG 2 times daily, to use with insurance preferred meter    blood sugar diagnostic (TRUE METRIX GLUCOSE TEST STRIP) Strp; BID testing    empagliflozin (JARDIANCE) 10 mg tablet; Take 1 tablet (10 mg total) by mouth once daily.    metFORMIN (GLUCOPHAGE) 500 MG tablet; TAKE 1 TABLET EVERY DAY WITH BREAKFAST    Essential hypertension  Chronic heart failure with preserved ejection fraction  Stage 3a chronic kidney disease  05/16/2025:  HFpEF followed by Cardiology.  On Jardiance.  Also on losartan and metoprolol and spironolactone.  He has changed insurance companies with new pharmacy.  Will update those  prescriptions  Orders:    empagliflozin (JARDIANCE) 10 mg tablet; Take 1 tablet (10 mg total) by mouth once daily.    losartan (COZAAR) 100 MG tablet; Take 1 tablet (100 mg total) by mouth once daily.    metoprolol succinate (TOPROL-XL) 25 MG 24 hr tablet; Take 0.5 tablets (12.5 mg total) by mouth once daily.    spironolactone (ALDACTONE) 25 MG tablet; Take 1 tablet (25 mg total) by mouth once daily.    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  Has upcoming follow up with Cardiology and they has been discussing PCS K9  Orders:    ezetimibe (ZETIA) 10 mg tablet; Take 1 tablet (10 mg total) by mouth once daily.    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 4 cm HH path metaplasia;  repeat 3-5 years for Archer's  Tubular adenoma of colon 2/2018 3 adenomas repeat 3 years  05/16/2025:  Has not been taking omeprazole.  Controlled with diet.  However we discussed the findings with Barretts, and risk of progression and would recommend resuming the PPI.  Plan to repeat EGD next year and plan to update colonoscopy along with the EGD.  Overall in good health and I think he would benefit from continued screening  Orders:    omeprazole (PRILOSEC) 40 MG capsule; Take 1 capsule (40 mg total) by mouth 2 (two) times daily before meals.    Asthma-COPD overlap syndrome  05/16/2025:  Followed by pulmonology.  Spiriva ineffective, does find some improvement with the Wixela.  Has upcoming follow-up       Major depressive disorder with single episode, in full remission  DAISY (generalized anxiety disorder)  05/16/2025:  Stable takes alprazolam for nighttime for sleep, update prescription  Orders:    ALPRAZolam (XANAX) 1 MG tablet; Take 1  tablet (1 mg total) by mouth nightly as needed for Anxiety.    History of prostate cancer  05/16/2025: Monitored by Urology.  On testosterone therapy for hypogonadism         Medications Discontinued During This Encounter   Medication Reason    lancets Misc Reorder    blood-glucose meter (TRUE METRIX GLUCOSE METER) kit Reorder    TRUE METRIX GLUCOSE TEST STRIP Strp Reorder    ezetimibe (ZETIA) 10 mg tablet Reorder    spironolactone (ALDACTONE) 25 MG tablet Reorder    losartan (COZAAR) 100 MG tablet Reorder    empagliflozin (JARDIANCE) 10 mg tablet Reorder    ALPRAZolam (XANAX) 1 MG tablet Reorder    metFORMIN (GLUCOPHAGE) 500 MG tablet Reorder    omeprazole (PRILOSEC) 40 MG capsule Reorder    metoprolol succinate (TOPROL-XL) 25 MG 24 hr tablet Reorder    levoFLOXacin (LEVAQUIN) 500 MG tablet        meds sent this encounter:     Medications Ordered This Encounter   Medications    ALPRAZolam (XANAX) 1 MG tablet     Sig: Take 1 tablet (1 mg total) by mouth nightly as needed for Anxiety.     Dispense:  30 tablet     Refill:  2     Pharmacy update refills, keep on file, not requesting Rx to be filled today.    blood sugar diagnostic (TRUE METRIX GLUCOSE TEST STRIP) Strp     Sig: BID testing     Dispense:  200 strip     Refill:  3     Whichever brand covered by insurance    blood-glucose meter (TRUE METRIX GLUCOSE METER) kit     Sig: For twice daily checking     Dispense:  1 each     Refill:  0     Whichever brand covered by insurance    empagliflozin (JARDIANCE) 10 mg tablet     Sig: Take 1 tablet (10 mg total) by mouth once daily.     Dispense:  90 tablet     Refill:  3     Pharmacy update refills, keep on file, not requesting Rx to be filled today.    ezetimibe (ZETIA) 10 mg tablet     Sig: Take 1 tablet (10 mg total) by mouth once daily.     Dispense:  90 tablet     Refill:  3     Pharmacy update refills, keep on file, not requesting Rx to be filled today.    lancets Misc     Sig: To check BG 2 times daily, to use  with insurance preferred meter     Dispense:  200 each     Refill:  3     Whichever brand covered by insurance    losartan (COZAAR) 100 MG tablet     Sig: Take 1 tablet (100 mg total) by mouth once daily.     Dispense:  90 tablet     Refill:  3     Pharmacy update refills, keep on file, not requesting Rx to be filled today.    metFORMIN (GLUCOPHAGE) 500 MG tablet     Sig: TAKE 1 TABLET EVERY DAY WITH BREAKFAST     Dispense:  90 tablet     Refill:  3     Pharmacy update refills, keep on file, not requesting Rx to be filled today.    metoprolol succinate (TOPROL-XL) 25 MG 24 hr tablet     Sig: Take 0.5 tablets (12.5 mg total) by mouth once daily.     Dispense:  90 tablet     Refill:  3     Pharmacy update refills, keep on file, not requesting Rx to be filled today.    omeprazole (PRILOSEC) 40 MG capsule     Sig: Take 1 capsule (40 mg total) by mouth 2 (two) times daily before meals.     Dispense:  180 capsule     Refill:  3     Pharmacy update refills, keep on file, not requesting Rx to be filled today.    spironolactone (ALDACTONE) 25 MG tablet     Sig: Take 1 tablet (25 mg total) by mouth once daily.     Dispense:  90 tablet     Refill:  3     Pharmacy update refills, keep on file, not requesting Rx to be filled today.        Follow Up:  Follow-up 6 months with labs  Future Appointments   Date Time Provider Department Center   6/11/2025  1:40 PM Milly Cadena MD [facility] CARDIO [facility]   6/20/2025  8:00 AM Pam Silverman PA-C [facility] ORTHO [facility]    6/30/2025  9:30 AM Irma Anthony MD [facility] PULSM [facility]   7/14/2025  8:30 AM Sheila Braden OD [facility] OPTOMTY [facility]   11/10/2025  7:00 AM [facility]   11/10/2025  8:00 AM [facility]   11/17/2025 11:00 AM Samir Boo MD [facility] MED [facility]          Subjective:   Subjective   Chief Complaint   Patient presents with    Follow-up       HPI  Eric is a 84 y.o. male.     Social History     Socioeconomic History     Marital status:    Tobacco Use    Smoking status: Former     Current packs/day: 0.00     Average packs/day: 2.0 packs/day for 23.0 years (46.0 ttl pk-yrs)     Types: Cigarettes     Start date: 1966     Quit date: 1989     Years since quittin.3    Smokeless tobacco: Never   Substance and Sexual Activity    Alcohol use: Yes     Alcohol/week: 1.0 standard drink of alcohol     Types: 1 Cans of beer per week     Comment: occassional    Drug use: No    Sexual activity: Yes     Partners: Female     Social Drivers of Health     Financial Resource Strain: Low Risk  (2025)    Overall Financial Resource Strain (CARDIA)     Difficulty of Paying Living Expenses: Not very hard   Food Insecurity: No Food Insecurity (2025)    Hunger Vital Sign     Worried About Running Out of Food in the Last Year: Never true     Ran Out of Food in the Last Year: Never true   Transportation Needs: No Transportation Needs (2025)    PRAPARE - Transportation     Lack of Transportation (Medical): No     Lack of Transportation (Non-Medical): No   Physical Activity: Insufficiently Active (2025)    Exercise Vital Sign     Days of Exercise per Week: 4 days     Minutes of Exercise per Session: 20 min   Stress: Stress Concern Present (2025)    [employer] - Occupational Stress Questionnaire     Feeling of Stress : Rather much   Housing Stability: Low Risk  (2025)    Housing Stability Vital Sign     Unable to Pay for Housing in the Last Year: No     Number of Times Moved in the Last Year: 0     Homeless in the Last Year: No       Last appointment with this clinic was Visit date not found. Last visit with me 2024   To summarize last visit and events leading up to today:  Diabetes on metformin  Hypertension, CKD stage IIIA stable   Coronary artery disease status post CABG.  Intolerant of statins and intolerant of Zetia.  Considering Repatha.  Followed by Cardiology  Aortic  atherosclerosis with possible ulcerated plaque.  seen by vascular. No vascular surgical intervention indicated.  03/01/2023 TTE LV normal size with mild concentric hypertrophy and low normal systolic function LVEF 50%.  Mild aortic valve stenosis.  03/01/2023 nuclear stress test negative for ischemia.  03/01/2023 48 hour Holter monitor PVCs and PACs  Cardiology adjusted losartan HCT and subsequently added metoprolol  Saw cardiology 08/27/2024 09/17/2024 TTE LV normal size mildly increased wall thickness mild concentric hypertrophy normal systolic function LVEF 55-60%.  Grade 1 diastolic dysfunction.  Mild aortic stenosis  09/17/2024 nuclear stress test negative for ischemia but ECG portion positive for ischemia  Saw cardiology in follow-up 09/27/2024.  Started on Zetia.  Left heart catheterization.  10/9/24 LHC patent grafts  10/23/2024 follow-up with Cardiology.  Treat for HFpEF.  Change HCTZ to spironolactone.  Add Jardiance.  Referral to pulmonology  02/11/2025 saw pulmonology for dyspnea.  Check PFTs start Spiriva consider CT  02/11/2025 PFT  Spirometry shows moderate-severe obstruction. Lung volume determination shows mild restriction is also present. Spirometry is improved following bronchodilator administration. DLCO is mildly decreased.   04/22/2025 saw pulmonology in follow up.  Spiriva without relief.  Trial of Advair check CT consider sleep study  04/28/2025 CT  There are patchy pulmonary infiltrates left lung base. Pneumonia cannot be excluded.  No evidence of interstitial lung disease or airway trapping.  Calcified and noncalcified pulmonary micro nodules 4 mm or less. If there is moderate-high risk for developing lung cancer a 12 month screening CT follow-up is recommended.  Coronary artery calcifications.  Cholelithiasis.   Archer's esophagus, plan is PPI BID, repeat EGD  11/7/23 EGD Archer's stage C10-M11, biopsied. 4 cm HH path metaplasia;  repeat 3-5 years for Archer's  Constipation, GoLYTELY.   If constipation persists and if abdominal pain persists, check CT abdomen pelvis.  History of colonoscopy in 2018 with TA.  Consider repeat   Anxiety.  Trial of alternative to Xanax.  Trial of BuSpar.  Typically does not take the Xanax nightly anyway.  Plan was BuSpar scheduled nightly for a month and if effective can reduced p.r.n.    History of prostate cancer status post XRT.    Hypogonadism had not been taking testosterone for the past couple of years.   Subsequently seen by Urology, restart testosterone replacement, close PSA monitoring.  Saw Urology 05/17/2024.  Stable continue testosterone follow-up 6 months     Last visit me 11/13/2024  Diabetes stable.  On Jardiance for HFpEF but also for diabetes.  On Zetia on ARB   Hypertension CKD 3A followed by Cardiology as well.  Recently changed from losartan HCT to losartan and spironolactone.  On Jardiance  Barretts esophagus, omeprazole repeat 2027  Coronary artery disease, recent LHC with patent grafts.  Started on Zetia.  No changes.  On metoprolol spironolactone losartan  Edwin MDD alprazolam as needed  Low testosterone history of prostate cancer followed by Urology taking half dose of testosterone needs follow up    11/21/4 Saw cardiology.  Referred to pulmonology given dyspnea with a history of smoking.  Otherwise stable follow up 3 months    11/22/2024 saw Urology in follow-up.  Testosterone replacement therapy stable follow-up 6 months    02/11/2025 saw pulmonology for dyspnea.  Check PFTs start Spiriva consider CT  02/11/2025 PFT  Spirometry shows moderate-severe obstruction. Lung volume determination shows mild restriction is also present. Spirometry is improved following bronchodilator administration. DLCO is mildly decreased.     Saw cardiology 03/10/2025.  Stable    04/22/2025 saw pulmonology in follow up.  Spiriva without relief.  Trial of Advair check CT consider sleep study    04/28/2025 CT  There are patchy pulmonary infiltrates left lung base.  Pneumonia cannot be excluded.  No evidence of interstitial lung disease or airway trapping.  Calcified and noncalcified pulmonary micro nodules 4 mm or less. If there is moderate-high risk for developing lung cancer a 12 month screening CT follow-up is recommended.  Coronary artery calcifications.  Cholelithiasis.     Treated with Levaquin    ED 05/05/2025 for severe right shoulder pain.  X-ray negative.  EKG no acute findings.  Toradol dexamethasone    05/07/2025 PFTs?  No results    05/09/2025 saw orthopedics.  Shoulder injection    Pre visit labs   CBC normal   CMP normal   Lipid not ideal triglycerides elevated non    A1c 6.2      Today's visit:    History of Present Illness    HPI:  Eric reports a recent rotator cuff injury, which caused significant pain necessitating a hospital visit. He received steroid and anti-inflammatory injections (Toradol and dexamethasone) earlier this month. A specialist evaluated him and administered an injection directly into the shoulder. He has been performing at-home physical therapy exercises for about 6-7 days and notes improvement, though he still has soreness with certain movements.    Eric's A1C has increased to 6.2 from a previous 6.1. He acknowledges becoming less diligent with his diet and exercise regimen, potentially contributing to this rise. Eric expresses a desire to obtain glucose testing strips to monitor his diabetes more closely.    Eric reports a recent lung infection, discovered through a scan (possibly a CT). He was prescribed a strong antibiotic to treat the infection. The antibiotic came with potential side effects, including the risk of Achilles tendon rupture.  Finish the antibiotic, no symptoms.    Regarding his Archer's esophagus, patient has not had GERD symptoms in about 5 weeks. He attributes this improvement to dietary changes, noting that fatty foods and eating after 6:30-7:00 PM tend to trigger his symptoms.  He has not been taking  his PPI and we discussed brisk of Barretts esophagus progression and I would recommend he resume it.    Eric also mentions having age-related macular degeneration (AMD) in one of his eyes, which is being monitored. His last eye scan was 3-4 years ago, and he has an upcoming appointment in July.    Eric denies any current wheezing in his lungs.    MEDICATIONS:  - Losartan, for blood pressure/heart  - Spironolactone, for blood pressure/heart  - Plavix (clopidogrel), blood thinner  - Zetia (ezetimibe), for cholesterol  - Jardiance, for blood sugar and heart  - Metformin, for blood sugar  - Metoprolol, for blood pressure and heart  - wixela, inhaler, once in the morning and once in the evening, for breathing  - Discontinued Omeprazole (Prilosec) for GERD  - Switched from Spiriva to Advair inhaler    ROS:  Musculoskeletal: reports joint pain, reports muscle pain, reports pain with movement, reports limb pain         Patient Care Team:  Samir Boo MD as PCP - General (Internal Medicine)  René Bills MD (Cardiology)  Sheila Braden OD as Consulting Physician (Optometry)  Layne Huff MD as Consulting Physician (Urology)  Loida Evans MD as Consulting Physician (Endocrinology)  Loc Garcia MD as Consulting Physician (Radiation Oncology)  Milly Cadena MD as Consulting Physician (Cardiology)  Nathan Odonnell MD as 1st Call (Urology)    Patient Active Problem List    Diagnosis Date Noted    Asthma-COPD overlap syndrome 04/22/2025    Hypertensive heart disease with heart failure 02/14/2025    PVC (premature ventricular contraction) 04/22/2023    Chronic left shoulder pain 07/21/2021    Decreased range of motion of left shoulder 07/21/2021    Shoulder weakness 07/21/2021    Stage 3a chronic kidney disease 01/15/2021    Epiretinal membrane (ERM) of both eyes 09/15/2020    Type 2 diabetes mellitus with diabetic cataract, without long-term current use of insulin 02/14/2020    Major  depressive disorder with single episode, in full remission 02/14/2020    Abdominal aortic atherosclerosis 12/09/2019 4/2021 CT abd: There is prominent atherosclerotic plaquing of the abdominal aorta with probable eccentric mixed plaque with ulcerated plaque to be considered specifically axial image 68 series 4 with the aorta measuring 2.5 cm in diameter at this level      History of DVT from PICC line right upper extremity 4/2019 anticoag x 3 months then stopped 04/29/2019 4/28/19 RUE US: The right internal jugular vein is patent.  Partial thrombus of the right subclavian vein with floating thrombus.  The right axillary vein is completely thrombosed.  Complete thrombosis of the right basilic vein.  The cephalic vein is patent.  The proximal brachial vein is occluded, the mid and distal vein demonstrate flow within.    7/3/19 RUE US: Residual nonocclusive thrombus in the right subclavian and basilic veins, significantly improved from the 04/28/2019 exam.  No new thrombus.  12/19/19 RUE US: No DVT in the right upper extremity.  Resolved thrombus in the right subclavian vein and mild residual chronic thrombus in the right superficial basilic vein, improved.      Refractive error 04/09/2019    Intermediate stage nonexudative age-related macular degeneration of both eyes 04/09/2019    Nuclear sclerosis of both eyes 04/09/2019    Status post coronary artery bypass grafting single vessel 03/07/2019    Renal cyst, left, simple 1st seen 2/2019 CT; verified on renal US 11/2020 03/02/2019 2/2019 CT abd/pelvis: Small left renal hypodensity which measures slightly higher than simple fluid density.  This could represent a small cyst or complex cyst.  Future nonemergent ultrasound follow-up could be obtained to ensure cystic nature of this lesion.  11/16/20 renal US Simple left renal cyst.      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; 3/2019 [facility] patent graft; Culprit likely in small Diag2 (unrevascularized) and likely demand related 03/01/2019     3/2019 [facility]  LM: dist 50%  LAD: mid 99%, competitive flow from LIMA-LAD-OM-RPDA              Diag2 prox 90%, T2 flow (not bypassed)  LCx: prox , territory supplied by LIMA-LAD-OM-RPDA  RCA: mid , territory supplied by LIMA-LAD-OM-RPDA     LIMA-LAD-OM-RPDA patent   No SVG identified     Impression:  NSTEMI  3V CAD, normal LV fxn  Patent LIMA-LAD-OM-RPDA  Culprit likely in small Diag2 (unrevascularized) and likely demand related     Plan:  Cont med rx  ASA 81mg qd indefinitely  Plavix 75mg qd for 1 year (thru 3/2020)    03/01/2023 TTE LV normal size with mild concentric hypertrophy and low normal systolic function LVEF 50%.  Mild aortic valve stenosis.  03/01/2023 nuclear stress test negative for ischemia.  03/01/2023 48 hour Holter monitor PVCs and PACs    09/17/2024 nuclear stress test normal scan but ECG portion positive for ischemia  10/09/2024 left heart catheterization three-vessel coronary disease.  Left main disease.  Mid LAD 99% proximal circumflex 100% 2nd diagonal 90% distal left main 99% proximal LAD 99%. graft angiography LIMA-LAD-OM-RPDA patent      Prostate cancer 2/25/19 TRUS of prostate and gold fiducial marker placement; 4/2019 s/p XRT 01/29/2019 1/7/19 biopsy showed intermediate risk Etoile 3+4 PCa in 3/12 cores. Etoile 4 up to 30% of one core.   4/2019- Dr. 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 4 cm HH path metaplasia;   repeat 3-5 years for Archer's      10/8/2020 EGD Esophageal mucosal changes suggestive of long-segment Archer's esophagus, classified as Archer's stage C10-M12 per McKenzie criteria. WATS-3D brush biopsy specimens obtained  11/7/23 EGD Archer's stage C10-M11, biopsied. 4 cm HH path metaplasia;  repeat 3-5 years for Archer's      DAISY (generalized anxiety disorder)     Essential hypertension 11/14/2012 03/01/2023 TTE LV normal size with mild concentric hypertrophy and low normal systolic function LVEF 50%.  Mild aortic valve stenosis.  03/01/2023 48 hour Holter monitor PVCs and PACs  09/17/2024 TTE LV normal size mildly increased wall thickness mild concentric hypertrophy normal systolic function LVEF 55-60% grade 1 diastolic dysfunction.      Type 2 diabetes mellitus without complication, without long-term current use of insulin 11/14/2012       PAST MEDICAL PROBLEMS, PAST SURGICAL HISTORY: please see relevant portions of the electronic medical record    ALLERGIES AND MEDICATIONS: updated and reviewed.  Medication List with Changes/Refills   Current Medications    ALBUTEROL (PROVENTIL/VENTOLIN HFA) 90 MCG/ACTUATION INHALER    Inhale 2 puffs into the lungs every 6 (six) hours as needed for Wheezing.    ALPRAZOLAM (XANAX) 1 MG TABLET    Take 1 tablet (1 mg total) by mouth nightly as needed for Anxiety.    ASPIRIN (ECOTRIN) 81 MG EC TABLET    Take 1 tablet (81 mg total) by mouth once daily.    BLOOD GLUCOSE CONTROL, LOW (TRUE METRIX LEVEL 1) SOLN    To use with blood glucose meter    BLOOD-GLUCOSE METER (TRUE METRIX GLUCOSE METER) KIT    For twice daily checking    CLOPIDOGREL (PLAVIX) 75 MG TABLET    Take 1 tablet (75 mg total) by mouth once daily.    EMPAGLIFLOZIN (JARDIANCE) 10 MG TABLET    Take 1 tablet (10 mg total) by mouth once daily.    EZETIMIBE (ZETIA) 10 MG TABLET    Take 1 tablet (10 mg total) by mouth once daily.    FLUTICASONE-SALMETEROL DISKUS INHALER 250-50 MCG    Inhale 1 puff into the lungs 2  "(two) times daily. Controller    IMIQUIMOD (ALDARA) 5 % CREAM    APPLY TOPICALLY 3 (THREE) TIMES A WEEK.    LANCETS MISC    To check BG 3 times daily, to use with insurance preferred meter    LOSARTAN (COZAAR) 100 MG TABLET    Take 1 tablet (100 mg total) by mouth once daily.    METFORMIN (GLUCOPHAGE) 500 MG TABLET    TAKE 1 TABLET EVERY DAY WITH BREAKFAST    METOPROLOL SUCCINATE (TOPROL-XL) 25 MG 24 HR TABLET    Take 0.5 tablets (12.5 mg total) by mouth once daily.    OMEPRAZOLE (PRILOSEC) 40 MG CAPSULE    Take 1 capsule (40 mg total) by mouth 2 (two) times daily before meals.    SPIRONOLACTONE (ALDACTONE) 25 MG TABLET    Take 1 tablet (25 mg total) by mouth once daily.    TESTOSTERONE (ANDROGEL) 20.25 MG/1.25 GRAM (1.62 %) GLPM    Apply topically.    TESTOSTERONE (ANDROGEL) 20.25 MG/1.25 GRAM (1.62 %) GLPM    Place 1.25 g onto the skin once daily.    TRUE METRIX GLUCOSE TEST STRIP STRP    CHECK BLOOD SUGAR TWO TIMES DAILY    VIT C/VIT E AC/LUT/COPPER/ZINC (PRESERVISION LUTEIN ORAL)    Take 1 tablet by mouth once daily.    VITAMIN D 1000 UNITS TAB    Take 1,000 Units by mouth once daily.         Objective:   Objective   Physical Exam   Vitals:    05/16/25 1531   BP: 122/64   BP Location: Right arm   Patient Position: Sitting   Pulse: 61   Temp: 98.5 °F (36.9 °C)   TempSrc: Oral   SpO2: 96%   Weight: 71.7 kg (158 lb 2.9 oz)   Height: 5' 7" (1.702 m)    Body mass index is 24.77 kg/m².  Weight: 71.7 kg (158 lb 2.9 oz)   Height: 5' 7" (170.2 cm)     Physical Exam  Constitutional:       General: He is not in acute distress.     Appearance: He is well-developed.   Eyes:      Extraocular Movements: Extraocular movements intact.   Cardiovascular:      Rate and Rhythm: Normal rate and regular rhythm.      Heart sounds: Normal heart sounds. No murmur heard.  Pulmonary:      Effort: Pulmonary effort is normal.      Breath sounds: Normal breath sounds.   Abdominal:      General: There is no distension.      Palpations: There " is no mass.      Tenderness: There is no abdominal tenderness. There is no guarding.   Musculoskeletal:         General: Normal range of motion.      Right lower leg: No edema.      Left lower leg: No edema.   Skin:     General: Skin is warm and dry.   Neurological:      Mental Status: He is alert and oriented to person, place, and time.      Coordination: Coordination normal.   Psychiatric:         Behavior: Behavior normal.         Thought Content: Thought content normal.         Component      Latest Ref Rng 10/30/2024 5/9/2025   WBC      3.90 - 12.70 K/uL 6.38  7.72    RBC      4.60 - 6.20 M/uL 4.63  5.03    Hemoglobin      14.0 - 18.0 gm/dL 14.3  15.0    Hematocrit      40.0 - 54.0 % 44.0  46.8    MCV      82 - 98 fL 95  93    MCH      27.0 - 31.0 pg 30.9  29.8    MCHC      32.0 - 36.0 g/dL 32.5  32.1    RDW      11.5 - 14.5 % 13.2  13.8    Platelet Count      150 - 450 K/uL 205  239    MPV      9.2 - 12.9 fL 10.4  9.1 (L)    Immature Granulocytes      0.0 - 0.5 % 0.3     Gran # (ANC)      1.8 - 7.7 K/uL 3.5     Immature Grans (Abs)      0.00 - 0.04 K/uL 0.02     Lymph #      1.0 - 4.8 K/uL 1.7     Mono #      0.3 - 1.0 K/uL 0.7     Eos #      0.0 - 0.5 K/uL 0.4     Baso #      0.00 - 0.20 K/uL 0.02     nRBC      0 /100 WBC 0     Gran %      38.0 - 73.0 % 54.9     Lymph %      18.0 - 48.0 % 27.3     Mono %      4.0 - 15.0 % 10.3     Eos %      0.0 - 8.0 % 6.9     Basophil %      0.0 - 1.9 % 0.3     Differential Method Automated     Sodium      136 - 145 mmol/L 139  141    Potassium      3.5 - 5.1 mmol/L 3.7  4.2    Chloride      95 - 110 mmol/L 103  103    CO2      23 - 29 mmol/L 23  30 (H)    Glucose      70 - 110 mg/dL 107  117 (H)    BUN      8 - 23 mg/dL 19  17    Creatinine      0.5 - 1.4 mg/dL 1.2  1.2    Calcium      8.7 - 10.5 mg/dL 8.9  9.5    PROTEIN TOTAL      6.0 - 8.4 gm/dL 6.7  7.1    Albumin      3.5 - 5.2 g/dL 3.7  3.7    BILIRUBIN TOTAL      0.1 - 1.0 mg/dL 0.4  0.3    ALP      40 - 150 unit/L  55  61    AST      11 - 45 unit/L 26  19    ALT      10 - 44 unit/L 19  15    eGFR      >60 mL/min/1.73/m2 >60.0  60 (L)    Anion Gap      8 - 16 mmol/L 13  8    Cholesterol Total      120 - 199 mg/dL 195  183    Triglycerides      30 - 150 mg/dL 356 (H)  152 (H)    HDL      40 - 75 mg/dL 39 (L)  49    LDL Cholesterol      63.0 - 159.0 mg/dL 84.8  103.6    HDL/Cholesterol Ratio      20.0 - 50.0 % 20.0  26.8    Total Cholesterol/HDL Ratio      2.0 - 5.0  5.0  3.7    Non-HDL Cholesterol      mg/dL 156  134    Urine Microalbumin      ug/mL 7.0     Urine Creatinine      23.0 - 375.0 mg/dL 82.0     MICROALB/CREAT RATIO      0.0 - 30.0 ug/mg 8.5     Hemoglobin A1C External      4.0 - 5.6 % 6.1 (H)  6.2 (H)    Estimated Avg Glucose      68 - 131 mg/dL 128  131       Legend:  (H) High  (L) Low

## 2025-05-15 NOTE — ASSESSMENT & PLAN NOTE
05/16/2025: Pre visit labs reviewed, A1c stable.  It is his personal goal to get below 6.0 but has not been at that level in quite some time.  I am okay with where he is at.  He is on Jardiance for heart failure but also for diabetes.  Also on metformin.  No changes  Orders:    Comprehensive Metabolic Panel; Future    Lipid Panel; Future    Hemoglobin A1C; Future    Microalbumin/Creatinine Ratio, Urine; Future    CBC Without Differential; Future    blood-glucose meter (TRUE METRIX GLUCOSE METER) kit; For twice daily checking    lancets Misc; To check BG 2 times daily, to use with insurance preferred meter    blood sugar diagnostic (TRUE METRIX GLUCOSE TEST STRIP) Strp; BID testing    empagliflozin (JARDIANCE) 10 mg tablet; Take 1 tablet (10 mg total) by mouth once daily.    metFORMIN (GLUCOPHAGE) 500 MG tablet; TAKE 1 TABLET EVERY DAY WITH BREAKFAST

## 2025-05-15 NOTE — ASSESSMENT & PLAN NOTE
05/16/2025:  Stable takes alprazolam for nighttime for sleep, update prescription  Orders:    ALPRAZolam (XANAX) 1 MG tablet; Take 1 tablet (1 mg total) by mouth nightly as needed for Anxiety.

## 2025-05-15 NOTE — ASSESSMENT & PLAN NOTE
05/16/2025:  Pre visit labs not ideal on Zetia.  Has upcoming follow up with Cardiology and they has been discussing PCS K9  Orders:    ezetimibe (ZETIA) 10 mg tablet; Take 1 tablet (10 mg total) by mouth once daily.

## 2025-05-15 NOTE — ASSESSMENT & PLAN NOTE
05/16/2025:  HFpEF followed by Cardiology.  On Jardiance.  Also on losartan and metoprolol and spironolactone.  He has changed insurance companies with new pharmacy.  Will update those prescriptions  Orders:    empagliflozin (JARDIANCE) 10 mg tablet; Take 1 tablet (10 mg total) by mouth once daily.    losartan (COZAAR) 100 MG tablet; Take 1 tablet (100 mg total) by mouth once daily.    metoprolol succinate (TOPROL-XL) 25 MG 24 hr tablet; Take 0.5 tablets (12.5 mg total) by mouth once daily.    spironolactone (ALDACTONE) 25 MG tablet; Take 1 tablet (25 mg total) by mouth once daily.

## 2025-05-16 ENCOUNTER — OFFICE VISIT (OUTPATIENT)
Dept: FAMILY MEDICINE | Facility: CLINIC | Age: 85
End: 2025-05-16
Payer: MEDICARE

## 2025-05-16 VITALS
OXYGEN SATURATION: 96 % | SYSTOLIC BLOOD PRESSURE: 122 MMHG | TEMPERATURE: 99 F | WEIGHT: 158.19 LBS | HEART RATE: 61 BPM | BODY MASS INDEX: 24.83 KG/M2 | HEIGHT: 67 IN | DIASTOLIC BLOOD PRESSURE: 64 MMHG

## 2025-05-16 DIAGNOSIS — Z85.46 HISTORY OF PROSTATE CANCER: ICD-10-CM

## 2025-05-16 DIAGNOSIS — I10 ESSENTIAL HYPERTENSION: ICD-10-CM

## 2025-05-16 DIAGNOSIS — I25.118 CORONARY ARTERY DISEASE OF NATIVE ARTERY OF NATIVE HEART WITH STABLE ANGINA PECTORIS: ICD-10-CM

## 2025-05-16 DIAGNOSIS — E11.36 TYPE 2 DIABETES MELLITUS WITH DIABETIC CATARACT, WITHOUT LONG-TERM CURRENT USE OF INSULIN: ICD-10-CM

## 2025-05-16 DIAGNOSIS — K22.70 BARRETT'S ESOPHAGUS WITHOUT DYSPLASIA: ICD-10-CM

## 2025-05-16 DIAGNOSIS — D12.6 TUBULAR ADENOMA OF COLON: ICD-10-CM

## 2025-05-16 DIAGNOSIS — N18.31 STAGE 3A CHRONIC KIDNEY DISEASE: ICD-10-CM

## 2025-05-16 DIAGNOSIS — J44.89 ASTHMA-COPD OVERLAP SYNDROME: ICD-10-CM

## 2025-05-16 DIAGNOSIS — E11.9 TYPE 2 DIABETES MELLITUS WITHOUT COMPLICATION, WITHOUT LONG-TERM CURRENT USE OF INSULIN: Primary | ICD-10-CM

## 2025-05-16 DIAGNOSIS — I70.0 ABDOMINAL AORTIC ATHEROSCLEROSIS: ICD-10-CM

## 2025-05-16 DIAGNOSIS — F41.1 GAD (GENERALIZED ANXIETY DISORDER): ICD-10-CM

## 2025-05-16 DIAGNOSIS — E78.5 DYSLIPIDEMIA: ICD-10-CM

## 2025-05-16 DIAGNOSIS — I50.32 CHRONIC HEART FAILURE WITH PRESERVED EJECTION FRACTION: ICD-10-CM

## 2025-05-16 DIAGNOSIS — F32.5 MAJOR DEPRESSIVE DISORDER WITH SINGLE EPISODE, IN FULL REMISSION: ICD-10-CM

## 2025-05-16 PROCEDURE — 99999 PR PBB SHADOW E&M-EST. PATIENT-LVL IV: CPT | Mod: PBBFAC,,, | Performed by: INTERNAL MEDICINE

## 2025-05-16 RX ORDER — ALPRAZOLAM 1 MG/1
1 TABLET ORAL NIGHTLY PRN
Qty: 30 TABLET | Refills: 2 | Status: SHIPPED | OUTPATIENT
Start: 2025-05-16

## 2025-05-16 RX ORDER — LANCETS
EACH MISCELLANEOUS
Qty: 200 EACH | Refills: 3 | Status: SHIPPED | OUTPATIENT
Start: 2025-05-16

## 2025-05-16 RX ORDER — LOSARTAN POTASSIUM 100 MG/1
100 TABLET ORAL DAILY
Qty: 90 TABLET | Refills: 3 | Status: SHIPPED | OUTPATIENT
Start: 2025-05-16

## 2025-05-16 RX ORDER — METOPROLOL SUCCINATE 25 MG/1
12.5 TABLET, EXTENDED RELEASE ORAL DAILY
Qty: 90 TABLET | Refills: 3 | Status: SHIPPED | OUTPATIENT
Start: 2025-05-16

## 2025-05-16 RX ORDER — SPIRONOLACTONE 25 MG/1
25 TABLET ORAL DAILY
Qty: 90 TABLET | Refills: 3 | Status: SHIPPED | OUTPATIENT
Start: 2025-05-16

## 2025-05-16 RX ORDER — OMEPRAZOLE 40 MG/1
40 CAPSULE, DELAYED RELEASE ORAL
Qty: 180 CAPSULE | Refills: 3 | Status: SHIPPED | OUTPATIENT
Start: 2025-05-16 | End: 2026-05-16

## 2025-05-16 RX ORDER — INSULIN PUMP SYRINGE, 3 ML
EACH MISCELLANEOUS
Qty: 1 EACH | Refills: 0 | Status: SHIPPED | OUTPATIENT
Start: 2025-05-16

## 2025-05-16 RX ORDER — METFORMIN HYDROCHLORIDE 500 MG/1
TABLET ORAL
Qty: 90 TABLET | Refills: 3 | Status: SHIPPED | OUTPATIENT
Start: 2025-05-16

## 2025-05-16 RX ORDER — EZETIMIBE 10 MG/1
10 TABLET ORAL DAILY
Qty: 90 TABLET | Refills: 3 | Status: SHIPPED | OUTPATIENT
Start: 2025-05-16

## 2025-06-11 ENCOUNTER — OFFICE VISIT (OUTPATIENT)
Dept: CARDIOLOGY | Facility: CLINIC | Age: 85
End: 2025-06-11
Payer: MEDICARE

## 2025-06-11 VITALS
WEIGHT: 162.38 LBS | SYSTOLIC BLOOD PRESSURE: 110 MMHG | RESPIRATION RATE: 18 BRPM | HEART RATE: 69 BPM | HEIGHT: 67 IN | OXYGEN SATURATION: 96 % | DIASTOLIC BLOOD PRESSURE: 68 MMHG | BODY MASS INDEX: 25.48 KG/M2

## 2025-06-11 DIAGNOSIS — I25.10 CORONARY ARTERY DISEASE, UNSPECIFIED VESSEL OR LESION TYPE, UNSPECIFIED WHETHER ANGINA PRESENT, UNSPECIFIED WHETHER NATIVE OR TRANSPLANTED HEART: ICD-10-CM

## 2025-06-11 DIAGNOSIS — I25.118 CORONARY ARTERY DISEASE OF NATIVE ARTERY OF NATIVE HEART WITH STABLE ANGINA PECTORIS: ICD-10-CM

## 2025-06-11 DIAGNOSIS — I11.0 HYPERTENSIVE HEART DISEASE WITH HEART FAILURE: ICD-10-CM

## 2025-06-11 DIAGNOSIS — I10 ESSENTIAL HYPERTENSION: Primary | ICD-10-CM

## 2025-06-11 DIAGNOSIS — Z95.1 STATUS POST CORONARY ARTERY BYPASS GRAFTING: ICD-10-CM

## 2025-06-11 PROCEDURE — 99999 PR PBB SHADOW E&M-EST. PATIENT-LVL V: CPT | Mod: PBBFAC,,, | Performed by: INTERNAL MEDICINE

## 2025-06-12 NOTE — PROGRESS NOTES
CARDIOVASCULAR CONSULTATION    REASON FOR CONSULT:   Eric Jackson is a 84 y.o. male who presents for  evaluation     HISTORY OF PRESENT ILLNESS:       Evaluation patient is a pleasant 82-year-old man.  Past medical history of coronary artery disease status post CABG.  Used to follow with Dr. Soto it, has not seen him since 2019.  Now wants to reestablish care with Cardiology.  Last angiogram had revealed patent bypass grafts.  Last angiogram was done in 2019.  Patient states that for the past few months he has been experiencing chest heaviness.  No particular aggravating or relieving factors.  Also occasional dizziness.  No particular aggravating or relieving factors for the dizziness.  Not on statins and refuses to be on statins, Zetia or Repatha.      2019:      LVEDP: 22mmHg  LVEF: 55% by echo  Wall Motion: normal by echo     Dominance: Right  LM: dist 50%  LAD: mid 99%, competitive flow from LIMA-LAD-OM-RPDA              Diag2 prox 90%, T2 flow (not bypassed)  LCx: prox , territory supplied by LIMA-LAD-OM-RPDA  RCA: mid , territory supplied by LIMA-LAD-OM-RPDA     LIMA-LAD-OM-RPDA patent     No SVG identified     Hemostasis:  RFA     Impression:  NSTEMI  3V CAD, normal LV fxn  Patent LIMA-LAD-OM-RPDA  Culprit likely in small Diag2 (unrevascularized) and likely demand related  Man compression RFA for hemostasis     Plan:  Cont med rx  ASA 81mg qd indefinitely  Plavix 75mg qd for 1 year (thru 3/2020)  BBl/ARB  Statin allergy noted  Consider PCSK9 inhibitor if statin allergy confirmed  Dispo planning appropriate  Pt to follow up with Dr. Seay after discharge           Mar 13 23: doing fine. No ischemia on stress test.  patient states that his symptoms of chest pains went away after we were able to control his blood pressure by increasing his medications on last clinic visit.      Sinus rhythm with heart rates varying between 45 and 124 BPM with an average of 73BPM. Total time in sinus rhythm was  approximately 48 hours.  There were occasional PVCs totalling 768 and averaging 16.59 per hour. There were 10 couplets. There were 3 bigeminal cycles.  There were occasional PACs totalling 866 and averaging 18.7 per hour.        The left ventricle is normal in size with mild concentric hypertrophy and low normal systolic function.  The estimated ejection fraction is 50%.  Normal right ventricular size with low normal right ventricular systolic function.  There is mild aortic valve stenosis.  Aortic valve area is 1.60 cm2; peak velocity is 1.51 m/s; mean gradient is 6 mmHg.  Mild tricuspid regurgitation.  The estimated PA systolic pressure is 30 mmHg.          Normal myocardial perfusion scan. There is no evidence of myocardial ischemia or infarction.    The gated perfusion images showed an ejection fraction of 57% post stress.    There is normal wall motion post stress.    Notes from August 27, 2024: Patient here for follow-up.  When he walks on the treadmill for more than 7 minutes he gets short of breath.  And some chest tightness    Notes from September 20, 2024:  Patient here for follow-up.  Continues having dyspnea on exertion on minimal exertion.  Stress test shows that the EKG portion of the stress test was markedly abnormal, nuclear portion was normal.  Patient is concerned that his symptoms are lifestyle limiting.  After detailed discussion decided to proceed with coronary angiography for further evaluation    Results for orders placed or performed during the hospital encounter of 05/05/25   EKG 12-lead    Collection Time: 05/05/25 12:10 PM   Result Value Ref Range    QRS Duration 96 ms    OHS QTC Calculation 388 ms    Narrative    Test Reason : M25.511,    Vent. Rate :  58 BPM     Atrial Rate :  58 BPM     P-R Int : 270 ms          QRS Dur :  96 ms      QT Int : 396 ms       P-R-T Axes :  54  24 -14 degrees    QTcB Int : 388 ms    Sinus bradycardia with 1st degree A-V block  Nonspecific ST  abnormality  Abnormal ECG  When compared with ECG of 10-Mar-2025 15:03,  No significant change was found  Confirmed by Tina Ba ([id number]) on 5/7/2025 9:45:50 PM    Referred By: AAAREFERRAL SELF           Confirmed By: Tina Ba       Results for orders placed during the hospital encounter of 09/17/24    Echo    Interpretation Summary    Left Ventricle: The left ventricle is normal in size. Mildly increased wall thickness. There is mild concentric hypertrophy. There is normal systolic function with a visually estimated ejection fraction of 55 - 60%. Grade I diastolic dysfunction.    Right Ventricle: Normal right ventricular cavity size. Systolic function is normal.    Aortic Valve: The aortic valve is a trileaflet valve. There is moderate aortic valve sclerosis. Mildly restricted motion. There is mild stenosis. Aortic valve area by VTI is 1.21 cm². Aortic valve peak velocity is 1.60 m/s. Mean gradient is 6 mmHg. The dimensionless index is 0.40.    Tricuspid Valve: There is mild regurgitation.    Pulmonary Artery: The estimated pulmonary artery systolic pressure is 27 mmHg.      Results for orders placed during the hospital encounter of 09/17/24    Nuclear Stress - Cardiology Interpreted    Interpretation Summary    The patient exercised for 10 minutes 23 seconds on a Gonzalo protocol, corresponding to a functional capacity of 12METS, achieving a peak heart rate of 127 bpm, which is 91% of the age predicted maximum heart rate. The patient reported shortness of breath during the stress test.    Normal myocardial perfusion scan. There is no evidence of myocardial ischemia or infarction.    The gated perfusion images showed an ejection fraction of 55% post stress.    There is normal wall motion at post-stress.    The ECG portion of the study is positive for ischemia.  4mm horizontal ST depression at peak stress.    The patient reported no chest pain during the stress test.      Results for orders  placed during the hospital encounter of 02/28/19    Cardiac catheterization    Conclusion  LVEDP: 22mmHg  LVEF: 55% by echo  Wall Motion: normal by echo    Dominance: Right  LM: dist 50%  LAD: mid 99%, competitive flow from LIMA-LAD-OM-RPDA  Diag2 prox 90%, T2 flow (not bypassed)  LCx: prox , territory supplied by LIMA-LAD-OM-RPDA  RCA: mid , territory supplied by LIMA-LAD-OM-RPDA    LIMA-LAD-OM-RPDA patent    No SVG identified    Hemostasis:  RFA    Results for orders placed or performed during the hospital encounter of 05/05/25   EKG 12-lead    Collection Time: 05/05/25 12:10 PM   Result Value Ref Range    QRS Duration 96 ms    OHS QTC Calculation 388 ms    Narrative    Test Reason : M25.511,    Vent. Rate :  58 BPM     Atrial Rate :  58 BPM     P-R Int : 270 ms          QRS Dur :  96 ms      QT Int : 396 ms       P-R-T Axes :  54  24 -14 degrees    QTcB Int : 388 ms    Sinus bradycardia with 1st degree A-V block  Nonspecific ST abnormality  Abnormal ECG  When compared with ECG of 10-Mar-2025 15:03,  No significant change was found  Confirmed by Tina Ba ([id number]) on 5/7/2025 9:45:50 PM    Referred By: AAAREFERRAL SELF           Confirmed By: Tina Ba       Results for orders placed during the hospital encounter of 09/17/24    Echo    Interpretation Summary    Left Ventricle: The left ventricle is normal in size. Mildly increased wall thickness. There is mild concentric hypertrophy. There is normal systolic function with a visually estimated ejection fraction of 55 - 60%. Grade I diastolic dysfunction.    Right Ventricle: Normal right ventricular cavity size. Systolic function is normal.    Aortic Valve: The aortic valve is a trileaflet valve. There is moderate aortic valve sclerosis. Mildly restricted motion. There is mild stenosis. Aortic valve area by VTI is 1.21 cm². Aortic valve peak velocity is 1.60 m/s. Mean gradient is 6 mmHg. The dimensionless index is 0.40.    Tricuspid  Valve: There is mild regurgitation.    Pulmonary Artery: The estimated pulmonary artery systolic pressure is 27 mmHg.      Results for orders placed during the hospital encounter of 09/17/24    Nuclear Stress - Cardiology Interpreted    Interpretation Summary    The patient exercised for 10 minutes 23 seconds on a Gonzalo protocol, corresponding to a functional capacity of 12METS, achieving a peak heart rate of 127 bpm, which is 91% of the age predicted maximum heart rate. The patient reported shortness of breath during the stress test.    Normal myocardial perfusion scan. There is no evidence of myocardial ischemia or infarction.    The gated perfusion images showed an ejection fraction of 55% post stress.    There is normal wall motion at post-stress.    The ECG portion of the study is positive for ischemia.  4mm horizontal ST depression at peak stress.    The patient reported no chest pain during the stress test.      Results for orders placed during the hospital encounter of 10/09/24    Cardiac catheterization    Conclusion    There was three vessel coronary artery disease. There was left main disease.    The Mid LAD lesion was 99% stenosed.    The Prox Cx lesion was 100% stenosed.    The 2nd Diag lesion was 90% stenosed.    The Dist LM lesion was 99% stenosed.    The Prox LAD lesion was 99% stenosed.    The pre-procedure left ventricular end diastolic pressure was 12.    The estimated blood loss was <50 mL.    Coronary angiography:    Left main and severe triple-vessel disease.  RCA is a known     Graft angiography:  LIMA-LAD-OM-RPDA is patent    Assessment plan    Continue medical management and aggressive risk factor modification    Access:  Right common femoral artery access      The procedure log was documented by Documenter: Dion Hoang RN and verified by Milly Cadena MD.    Date: 10/9/2024  Time: 9:25 AM      November 24    History of Present Illness    CHIEF COMPLAINT:  Eric presents today for  "follow-up on medication changes and general health status.    CURRENT SYMPTOMS:  He reports feeling tired and experiencing weight loss. He also describes shortness of breath, which he characterizes as "running out of air." He denies chest pain or tightness.    MEDICATIONS:  He is currently taking azetimibe for cholesterol management, which he tolerates well. His blood pressure medication has been effective in lowering his blood pressure. He confirms adherence to all prescribed medications, including Jardiance, losartan, and aspirin. He is uncertain about taking metoprolol but acknowledges it is listed in his medications. He denies experiencing any side effects from his current medication regimen and reports closely monitoring for any adverse reactions.    UPCOMING APPOINTMENTS:  He has an appointment with a pulmonary specialist scheduled for December 2nd to address his breathing concerns. He also has an upcoming appointment with his urologist scheduled for tomorrow.    RECENT DIAGNOSTIC TESTS:  He completed labs two days ago as requested.         March 25:    History of Present Illness    CHIEF COMPLAINT:  Eric presents today for follow up.    RESPIRATORY:  He reports shortness of breath and burning sensation in lungs after 4-5 minutes on treadmill. He recently consulted a pulmonologist who prescribed a daily inhaler, which he has been using for 7-8 days. Using a spirometer at home, he notes improvement in lung capacity measurements 10-15 minutes post-inhaler use.    CARDIOVASCULAR HISTORY:  He has a history of heart bypass surgery     MEDICATIONS:  Current medications include Jardiance, ezetimibe, Losartan, Metoprolol, and Spironolactone, which are well tolerated. He reports intolerance to statins with multiple failed attempts, even at low doses of Crestor     SOCIAL HISTORY:  He is a former smoker who started at age 24 and quit in 1989. He has a history of running track until age 23.     June 25:    History of " Present Illness    CHIEF COMPLAINT:  Eric presents today for follow up.    RESPIRATORY:  He reports onset of lung problems and a recent lung infection that was evaluated and treated by a pulmonologist. He uses a daily inhaler with reported symptom improvement.    HYPERLIPIDEMIA:  His LDL initially decreased from 160 to 84, but subsequently increased to 103.    CURRENT MEDICATIONS:  He takes ASA, Jardiance, Zetia, Plavix, Losartan, Metoprolol, and Spironolactone.             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    Cancer     Coronary artery disease     Depression     Diabetes mellitus type II     Disorder of kidney and ureter     Erectile dysfunction     Eye injuries     fb ou    GERD (gastroesophageal reflux disease)     Hypertension     Hypertensive heart disease with heart failure 2/14/2025    Intermediate stage nonexudative age-related macular degeneration of both eyes 4/9/2019    Myocardial infarction     Nuclear sclerosis of both eyes 4/9/2019    Prostate cancer     Trouble in sleeping     Type 2 diabetes mellitus     Type 2 diabetes mellitus with ophthalmic manifestations        PAST SURGICAL HISTORY:     Past Surgical History:   Procedure Laterality Date    COLONOSCOPY N/A 2/9/2018    Procedure: COLONOSCOPY;  Surgeon: Mathieu Cao MD;  Location: [facility] ENDO;  Service: Endoscopy;  Laterality: N/A;    CORONARY ANGIOGRAPHY INCLUDING BYPASS GRAFTS WITH CATHETERIZATION OF LEFT HEART N/A 3/4/2019    Procedure: ANGIOGRAM, CORONARY, INCLUDING BYPASS GRAFT, WITH LEFT HEART CATHETERIZATION;  Surgeon: Jamir Nam MD;  Location: [facility] CATH LAB;  Service: Cardiology;  Laterality: N/A;    CORONARY ANGIOGRAPHY INCLUDING BYPASS GRAFTS WITH CATHETERIZATION OF LEFT HEART N/A 10/9/2024    Procedure: ANGIOGRAM, CORONARY, INCLUDING BYPASS GRAFT, WITH LEFT HEART CATHETERIZATION;  Surgeon: Milly Cadena,  MD;  Location: [facility] CATH LAB;  Service: Cardiology;  Laterality: N/A;  rcfa  RN PREOP 10/4/2024    CORONARY ARTERY BYPASS GRAFT  2001    ESOPHAGOGASTRODUODENOSCOPY N/A 10/8/2020    Procedure: EGD (ESOPHAGOGASTRODUODENOSCOPY);  Surgeon: Bharath Herrera MD;  Location: [facility] ENDO;  Service: Endoscopy;  Laterality: N/A;    ESOPHAGOGASTRODUODENOSCOPY N/A 11/7/2023    Procedure: EGD (ESOPHAGOGASTRODUODENOSCOPY);  Surgeon: Bharath Herrera MD;  Location: [facility] ENDO;  Service: Endoscopy;  Laterality: N/A;    HERNIA REPAIR      LEFT HEART CATHETERIZATION Left 3/4/2019    Procedure: Left heart cath RFA access, 4fr catheter/sheath, not before 9am;  Surgeon: Jamir Nam MD;  Location: [facility] CATH LAB;  Service: Cardiology;  Laterality: Left;    TRANSRECTAL ULTRASOUND OF PROSTATE WITH INSERTION OF GOLD FIDUCIAL MARKER N/A 2/26/2019    Procedure: ULTRASOUND, PROSTATE, RECTAL APPROACH, WITH GOLD FIDUCIAL MARKER INSERTION;  Surgeon: Layne Huff MD;  Location: [facility] OR;  Service: Urology;  Laterality: N/A;    UPPER GASTROINTESTINAL ENDOSCOPY      WRIST SURGERY         ALLERGIES AND MEDICATION:     Review of patient's allergies indicates:   Allergen Reactions    Tamsulosin     Prochlorperazine      convulsions    Statins-hmg-coa reductase inhibitors Other (See Comments)     Muscle weakness        Medication List            Accurate as of June 11, 2025 11:59 PM. If you have any questions, ask your nurse or doctor.                CONTINUE taking these medications      albuterol 90 mcg/actuation inhaler  Commonly known as: PROVENTIL/VENTOLIN HFA  Inhale 2 puffs into the lungs every 6 (six) hours as needed for Wheezing.     ALPRAZolam 1 MG tablet  Commonly known as: XANAX  Take 1 tablet (1 mg total) by mouth nightly as needed for Anxiety.     aspirin 81 MG EC tablet  Commonly known as: ECOTRIN  Take 1 tablet (81 mg total) by mouth once daily.     blood sugar diagnostic Strp  Commonly known as: TRUE METRIX  GLUCOSE TEST STRIP  BID testing     blood-glucose meter kit  Commonly known as: TRUE METRIX GLUCOSE METER  For twice daily checking     clopidogreL 75 mg tablet  Commonly known as: PLAVIX  Take 1 tablet (75 mg total) by mouth once daily.     empagliflozin 10 mg tablet  Commonly known as: JARDIANCE  Take 1 tablet (10 mg total) by mouth once daily.     ezetimibe 10 mg tablet  Commonly known as: ZETIA  Take 1 tablet (10 mg total) by mouth once daily.     fluticasone-salmeterol 250-50 mcg/dose 250-50 mcg/dose diskus inhaler  Commonly known as: ADVAIR DISKUS  Inhale 1 puff into the lungs 2 (two) times daily. Controller     imiquimod 5 % cream  Commonly known as: ALDARA  APPLY TOPICALLY 3 (THREE) TIMES A WEEK.     lancets Misc  To check BG 2 times daily, to use with insurance preferred meter     losartan 100 MG tablet  Commonly known as: COZAAR  Take 1 tablet (100 mg total) by mouth once daily.     metFORMIN 500 MG tablet  Commonly known as: GLUCOPHAGE  TAKE 1 TABLET EVERY DAY WITH BREAKFAST     metoprolol succinate 25 MG 24 hr tablet  Commonly known as: TOPROL-XL  Take 0.5 tablets (12.5 mg total) by mouth once daily.     omeprazole 40 MG capsule  Commonly known as: PRILOSEC  Take 1 capsule (40 mg total) by mouth 2 (two) times daily before meals.     PRESERVISION LUTEIN ORAL     spironolactone 25 MG tablet  Commonly known as: ALDACTONE  Take 1 tablet (25 mg total) by mouth once daily.     testosterone 20.25 mg/1.25 gram (1.62 %) Glpm  Commonly known as: ANDROGEL     TRUE METRIX LEVEL 1 Soln  Generic drug: blood glucose control, low  To use with blood glucose meter     vitamin D 1000 units Tab  Commonly known as: VITAMIN D3              SOCIAL HISTORY:     Social History     Socioeconomic History    Marital status:    Tobacco Use    Smoking status: Former     Current packs/day: 0.00     Average packs/day: 2.0 packs/day for 23.0 years (46.0 ttl pk-yrs)     Types: Cigarettes     Start date: 1/1/1966     Quit date:  1989     Years since quittin.4    Smokeless tobacco: Never   Substance and Sexual Activity    Alcohol use: Yes     Alcohol/week: 1.0 standard drink of alcohol     Types: 1 Cans of beer per week     Comment: occassional    Drug use: No    Sexual activity: Yes     Partners: Female     Social Drivers of Health     Financial Resource Strain: Low Risk  (2025)    Overall Financial Resource Strain (CARDIA)     Difficulty of Paying Living Expenses: Not very hard   Food Insecurity: No Food Insecurity (2025)    Hunger Vital Sign     Worried About Running Out of Food in the Last Year: Never true     Ran Out of Food in the Last Year: Never true   Transportation Needs: No Transportation Needs (2025)    PRAPARE - Transportation     Lack of Transportation (Medical): No     Lack of Transportation (Non-Medical): No   Physical Activity: Insufficiently Active (2025)    Exercise Vital Sign     Days of Exercise per Week: 4 days     Minutes of Exercise per Session: 20 min   Stress: Stress Concern Present (2025)    [employer] - Occupational Stress Questionnaire     Feeling of Stress : Rather much   Housing Stability: Low Risk  (2025)    Housing Stability Vital Sign     Unable to Pay for Housing in the Last Year: No     Number of Times Moved in the Last Year: 0     Homeless in the Last Year: No       FAMILY HISTORY:     Family History   Adopted: Yes   Problem Relation Name Age of Onset    No Known Problems Mother      No Known Problems Father      No Known Problems Sister      No Known Problems Brother      No Known Problems Maternal Aunt      No Known Problems Maternal Uncle      No Known Problems Paternal Aunt      No Known Problems Paternal Uncle      No Known Problems Maternal Grandmother      No Known Problems Maternal Grandfather      No Known Problems Paternal Grandmother      No Known Problems Paternal Grandfather      No Known Problems Other      Amblyopia Neg Hx       "Blindness Neg Hx      Cancer Neg Hx      Cataracts Neg Hx      Diabetes Neg Hx      Glaucoma Neg Hx      Hypertension Neg Hx      Macular degeneration Neg Hx      Retinal detachment Neg Hx      Strabismus Neg Hx      Stroke Neg Hx      Thyroid disease Neg Hx         REVIEW OF SYSTEMS:   Review of Systems   Constitutional: Negative.   HENT: Negative.     Eyes: Negative.    Respiratory: Negative.     Endocrine: Negative.    Hematologic/Lymphatic: Negative.    Skin: Negative.    Musculoskeletal: Negative.    Gastrointestinal: Negative.    Genitourinary: Negative.    Neurological: Negative.    Psychiatric/Behavioral: Negative.     Allergic/Immunologic: Negative.        A 10 point review of systems was performed and all the pertinent positives have been mentioned. Rest of review of systems was negative.        PHYSICAL EXAM:     Vitals:    06/11/25 1351   BP: 110/68   Pulse: 69   Resp: 18    Body mass index is 25.43 kg/m².  Weight: 73.6 kg (162 lb 5.9 oz)   Height: 5' 7" (170.2 cm)     Physical Exam  Vitals reviewed.   Constitutional:       Appearance: He is well-developed.   HENT:      Head: Normocephalic.   Eyes:      Conjunctiva/sclera: Conjunctivae normal.      Pupils: Pupils are equal, round, and reactive to light.   Cardiovascular:      Rate and Rhythm: Normal rate and regular rhythm.      Heart sounds: Normal heart sounds.   Pulmonary:      Effort: Pulmonary effort is normal.      Breath sounds: Normal breath sounds.   Abdominal:      General: Bowel sounds are normal.      Palpations: Abdomen is soft.   Musculoskeletal:      Cervical back: Normal range of motion and neck supple.   Skin:     General: Skin is warm.   Neurological:      Mental Status: He is alert and oriented to person, place, and time.           DATA:     Laboratory:  CBC:  Recent Labs   Lab 10/30/24  0729 11/15/24  1033 05/09/25  0807   WBC 6.38 6.98 7.72   Hemoglobin 14.3 15.0  --    HGB  --   --  15.0   Hematocrit 44.0 46.3  --    HCT  --   --  " 46.8   Platelet Count  --   --  239   Platelets 205 227  --        CHEMISTRIES:  Recent Labs   Lab 10/30/24  0729 11/25/24  0815 05/09/25  0807   Glucose 107 108 117 H   Sodium 139 142 141   Potassium 3.7 4.6 4.2   BUN 19 18 17   Creatinine 1.2 1.3 1.2   Calcium 8.9 9.4 9.5       CARDIAC BIOMARKERS:        COAGS:        LIPIDS/LFTS:  Recent Labs   Lab 04/23/24  0750 10/30/24  0729 05/09/25  0807   Cholesterol Total  --   --  183   Cholesterol 245 H 195  --    Triglycerides 180 H 356 H  --    Triglyceride  --   --  152 H   HDL 49 39 L  --    HDL Cholesterol  --   --  49   LDL Cholesterol 160.0 H 84.8 103.6   Non-HDL Cholesterol 196 156  --    Non HDL Cholesterol  --   --  134   AST 20 26 19   ALT 13 19 15       Hemoglobin A1C   Date Value Ref Range Status   10/30/2024 6.1 (H) 4.0 - 5.6 % Final     Comment:     ADA Screening Guidelines:  5.7-6.4%  Consistent with prediabetes  >or=6.5%  Consistent with diabetes    High levels of fetal hemoglobin interfere with the HbA1C  assay. Heterozygous hemoglobin variants (HbS, HgC, etc)do  not significantly interfere with this assay.   However, presence of multiple variants may affect accuracy.     04/23/2024 6.1 (H) 4.0 - 5.6 % Final     Comment:     ADA Screening Guidelines:  5.7-6.4%  Consistent with prediabetes  >or=6.5%  Consistent with diabetes    High levels of fetal hemoglobin interfere with the HbA1C  assay. Heterozygous hemoglobin variants (HbS, HgC, etc)do  not significantly interfere with this assay.   However, presence of multiple variants may affect accuracy.     10/16/2023 6.1 (H) 4.0 - 5.6 % Final     Comment:     ADA Screening Guidelines:  5.7-6.4%  Consistent with prediabetes  >or=6.5%  Consistent with diabetes    High levels of fetal hemoglobin interfere with the HbA1C  assay. Heterozygous hemoglobin variants (HbS, HgC, etc)do  not significantly interfere with this assay.   However, presence of multiple variants may affect accuracy.       Hemoglobin A1c   Date  Value Ref Range Status   05/09/2025 6.2 (H) 4.0 - 5.6 % Final     Comment:     ADA Screening Guidelines:  5.7-6.4%  Consistent with prediabetes  >=6.5%  Consistent with diabetes    High levels of fetal hemoglobin interfere with the HbA1C  assay. Heterozygous hemoglobin variants (HbS, HgC, etc)do  not significantly interfere with this assay.   However, presence of multiple variants may affect accuracy.       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; 3/2019 [facility] patent graft; Culprit likely in small Diag2 (unrevascularized) and likely demand related    Renal cyst, left, simple 1st seen 2/2019 CT; verified on renal US 11/2020    Status post coronary artery bypass grafting single vessel    Refractive error    Intermediate stage nonexudative age-related macular degeneration of both eyes    Nuclear sclerosis of both eyes    History of DVT  from PICC line right upper extremity 4/2019 anticoag x 3 months then stopped    Abdominal aortic atherosclerosis    Type 2 diabetes mellitus with diabetic cataract, without long-term current use of insulin    Major depressive disorder with single episode, in full remission    Epiretinal membrane (ERM) of both eyes    Stage 3a chronic kidney disease    Chronic left shoulder pain    Decreased range of motion of left shoulder    Shoulder weakness    PVC (premature ventricular contraction)    Hypertensive heart disease with heart failure    Asthma-COPD overlap syndrome     Orders Placed This Encounter   Procedures    Lipid Panel       Patient with coronary artery disease status post CABG.  Last angiogram in 2024revealed patent bypass grafts with severe underlying coronary artery disease.  No angina.  Continue medical management    Dyspnea on exertion.  Heart failure with preserved ejection fraction.  On spironolactone and Jardiance.  Euvolemic on exam    Lab Results   Component Value Date    CREATININE 1.2 05/09/2025       Referral made to pulmonology because of history of smoking and significant dyspnea on exertion to rule out pulmonary causes for shortness of breath      Uncontrolled dyslipidemia:  Patient could not tolerate statins in the past.   Now on ezetimibe 10 mg daily.  LDL IMPROVED ON THAT.  Follow-up in 3 months with lipid profile before.  If LDL not at goal add Leqvio/Repatha.  Patient would like to wait prior to starting Repatha.    Lab Results   Component Value Date    LDLCALC 103.6 05/09/2025       Hypertension:  Well controlled.  At home stays around 120-130 mmHg as per patient    Aortic atherosclerosis:  Now on ezetimibe.    Follow-up after 3 m            Thank you very much for involving me in the care of your patient.  Please do not hesitate to contact me if there are any questions.      Milly Cadena MD, FACC, [facility]  Interventional Cardiologist, [facility].     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; 3/2019 [facility] patent graft; Culprit likely in small Diag2 (unrevascularized) and likely demand related    Renal cyst, left, simple 1st seen 2/2019 CT; verified on renal US 11/2020    Status post coronary artery bypass grafting single vessel    Refractive error    Intermediate stage nonexudative age-related macular degeneration of both eyes    Nuclear sclerosis of both eyes    History of DVT from PICC line right upper extremity 4/2019 anticoag x 3 months then stopped    Abdominal aortic atherosclerosis    Type 2 diabetes mellitus with diabetic cataract, without long-term current use of insulin    Major depressive disorder with single episode, in full remission    Epiretinal membrane (ERM) of both eyes    Stage 3a chronic kidney disease    Chronic left shoulder pain    Decreased range of motion of left shoulder     Shoulder weakness    PVC (premature ventricular contraction)    Hypertensive heart disease with heart failure    Asthma-COPD overlap syndrome     .        This note was dictated with the help of speech recognition software.  There might be un-intended errors and/or substitutions.

## 2025-06-17 ENCOUNTER — PATIENT MESSAGE (OUTPATIENT)
Dept: FAMILY MEDICINE | Facility: CLINIC | Age: 85
End: 2025-06-17
Payer: MEDICARE

## 2025-06-30 ENCOUNTER — OFFICE VISIT (OUTPATIENT)
Dept: PULMONOLOGY | Facility: CLINIC | Age: 85
End: 2025-06-30
Payer: MEDICARE

## 2025-06-30 ENCOUNTER — RESULTS FOLLOW-UP (OUTPATIENT)
Dept: PULMONOLOGY | Facility: CLINIC | Age: 85
End: 2025-06-30

## 2025-06-30 ENCOUNTER — LAB VISIT (OUTPATIENT)
Dept: LAB | Facility: HOSPITAL | Age: 85
End: 2025-06-30
Attending: INTERNAL MEDICINE
Payer: MEDICARE

## 2025-06-30 ENCOUNTER — TELEPHONE (OUTPATIENT)
Dept: PHARMACY | Facility: CLINIC | Age: 85
End: 2025-06-30
Payer: MEDICARE

## 2025-06-30 VITALS
OXYGEN SATURATION: 95 % | DIASTOLIC BLOOD PRESSURE: 66 MMHG | BODY MASS INDEX: 25.21 KG/M2 | WEIGHT: 160.94 LBS | HEART RATE: 60 BPM | SYSTOLIC BLOOD PRESSURE: 161 MMHG

## 2025-06-30 DIAGNOSIS — J44.89 ASTHMA-COPD OVERLAP SYNDROME: ICD-10-CM

## 2025-06-30 DIAGNOSIS — J44.89 ASTHMA-COPD OVERLAP SYNDROME: Primary | ICD-10-CM

## 2025-06-30 DIAGNOSIS — R29.818 SUSPECTED SLEEP APNEA: ICD-10-CM

## 2025-06-30 DIAGNOSIS — J21.9 BRONCHIOLITIS: ICD-10-CM

## 2025-06-30 LAB
ABSOLUTE EOSINOPHIL (OHS): 0.17 K/UL
ABSOLUTE MONOCYTE (OHS): 0.66 K/UL (ref 0.3–1)
ABSOLUTE NEUTROPHIL COUNT (OHS): 4.66 K/UL (ref 1.8–7.7)
BASOPHILS # BLD AUTO: 0.02 K/UL
BASOPHILS NFR BLD AUTO: 0.3 %
ERYTHROCYTE [DISTWIDTH] IN BLOOD BY AUTOMATED COUNT: 15 % (ref 11.5–14.5)
HCT VFR BLD AUTO: 46.5 % (ref 40–54)
HGB BLD-MCNC: 15.2 GM/DL (ref 14–18)
IMM GRANULOCYTES # BLD AUTO: 0.03 K/UL (ref 0–0.04)
IMM GRANULOCYTES NFR BLD AUTO: 0.4 % (ref 0–0.5)
LYMPHOCYTES # BLD AUTO: 1.2 K/UL (ref 1–4.8)
MCH RBC QN AUTO: 30.7 PG (ref 27–31)
MCHC RBC AUTO-ENTMCNC: 32.7 G/DL (ref 32–36)
MCV RBC AUTO: 94 FL (ref 82–98)
NUCLEATED RBC (/100WBC) (OHS): 0 /100 WBC
PLATELET # BLD AUTO: 222 K/UL (ref 150–450)
PMV BLD AUTO: 9.2 FL (ref 9.2–12.9)
RBC # BLD AUTO: 4.95 M/UL (ref 4.6–6.2)
RELATIVE EOSINOPHIL (OHS): 2.5 %
RELATIVE LYMPHOCYTE (OHS): 17.8 % (ref 18–48)
RELATIVE MONOCYTE (OHS): 9.8 % (ref 4–15)
RELATIVE NEUTROPHIL (OHS): 69.2 % (ref 38–73)
WBC # BLD AUTO: 6.74 K/UL (ref 3.9–12.7)

## 2025-06-30 PROCEDURE — 36415 COLL VENOUS BLD VENIPUNCTURE: CPT

## 2025-06-30 PROCEDURE — 85025 COMPLETE CBC W/AUTO DIFF WBC: CPT

## 2025-06-30 PROCEDURE — 99999 PR PBB SHADOW E&M-EST. PATIENT-LVL IV: CPT | Mod: PBBFAC,,, | Performed by: INTERNAL MEDICINE

## 2025-06-30 RX ORDER — FLUTICASONE PROPIONATE AND SALMETEROL 250; 50 UG/1; UG/1
1 POWDER RESPIRATORY (INHALATION) 2 TIMES DAILY
Qty: 60 EACH | Refills: 11 | Status: SHIPPED | OUTPATIENT
Start: 2025-06-30 | End: 2026-06-30

## 2025-06-30 RX ORDER — ALBUTEROL SULFATE 90 UG/1
2 INHALANT RESPIRATORY (INHALATION) EVERY 6 HOURS PRN
Qty: 18 G | Refills: 0 | Status: SHIPPED | OUTPATIENT
Start: 2025-06-30 | End: 2026-06-30

## 2025-06-30 NOTE — TELEPHONE ENCOUNTER
[facility]/Population Health Chart Review & Patient Outreach Details For Medication Adherence Project    Reason for Outreach Encounter: 3rd Party payor non-compliance report ([employer], [employer], [facility], etc)  2.  Patient Outreach Method: Reviewed Patient Chart  3.   Medication in question: losartan   LAST FILLED: 6/27/25 for 90 day supply  Hypertension Medications              losartan (COZAAR) 100 MG tablet Take 1 tablet (100 mg total) by mouth once daily.    metoprolol succinate (TOPROL-XL) 25 MG 24 hr tablet Take 0.5 tablets (12.5 mg total) by mouth once daily.    spironolactone (ALDACTONE) 25 MG tablet Take 1 tablet (25 mg total) by mouth once daily.              4.  Reviewed and or Updates Made To: Patient Chart  5. Outreach Outcomes and/or actions taken: Patient filled medication and is on track to be adherent

## 2025-06-30 NOTE — PROGRESS NOTES
General Pulmonary Clinic  Follow Up Patient Visit    Chief Complaint: shortness of breath     HPI     Eric Jackson is a 84 y.o. male with a history of CAD s/p 3V CABG, HFpEF hx of prostate cancer tx w/ XRT, type II DM, HTN, CKD IIIa, up's esophagus presenting with chief complaint of COPD-asthma overlap      Interval hx:  CT chest 4/8/25 with patchy bronchiolitis largely in the left lung - tx w/ levofloxacin   Cultures including AFB negative    He notices minimal improvement in his breathing on his advair but he is not using continuously  He notes frequent night awakenings and daytime sleepiness  He denies seasonal allergies   Presenting HPI    # shortness of breath    Began having shortness of breath 4-5 months ago. He exercises on the treadmill daily -3-4 mph on the treadmill and has noticed  during this time that he develops a burning pain across his chest and feels more short of breath. He also feels very fatigued.  He notes some intermittent wheezing but no  coughing.   + intermittent GERD 2-3 motnhs, no changes in his voice. No difficulty swallowing   He has swelling in the legs that is intermittent. He notices pain in his left leg when exertion himself.  No blood loss in urine or stool  He has gained weight over this period  He does get blood pressure elevations 180/80s intermittently - tomasz in AM      Sleep ROS-   Hx of sleep apnea - [], using CPAP []   Snoring []  Frequent night awakenings []  Non-restorative sleep []  Daytime sleepiness [] - can easily fall asleep if sitting still in a car or after a meal  Obese [] BMI   HTN [x]  Afib [];    Exposure:  Former smoker 2-3 ppd x 23 years, quit in 1989  Previously worked as  did not wear mask  Denies childhood history of asthma or recurrent infections  No history of recurrent pneumonia    Records reviewed with the following findings ---  Heart catherization 9/27/24 reviewed - 3V disease + L main disease, known RCA  -- LIMA-LAD-OM-RPDA grafts  patent, prior NSTEMI likely due to small Diag2 w demand  9/2024 stress test - 12 METs, 91%,. 4 mm STD btu no HTN response  Labs 11/2024     Objective   Past History     Past Medical History:  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    Cancer     Coronary artery disease     Depression     Diabetes mellitus type II     Disorder of kidney and ureter     Erectile dysfunction     Eye injuries     fb ou    GERD (gastroesophageal reflux disease)     Hypertension     Hypertensive heart disease with heart failure 2/14/2025    Intermediate stage nonexudative age-related macular degeneration of both eyes 4/9/2019    Myocardial infarction     Nuclear sclerosis of both eyes 4/9/2019    Prostate cancer     Trouble in sleeping     Type 2 diabetes mellitus     Type 2 diabetes mellitus with ophthalmic manifestations          Past Surgical History:  Past Surgical History:   Procedure Laterality Date    COLONOSCOPY N/A 2/9/2018    Procedure: COLONOSCOPY;  Surgeon: Mathieu Cao MD;  Location: [facility];  Service: Endoscopy;  Laterality: N/A;    CORONARY ANGIOGRAPHY INCLUDING BYPASS GRAFTS WITH CATHETERIZATION OF LEFT HEART N/A 3/4/2019    Procedure: ANGIOGRAM, CORONARY, INCLUDING BYPASS GRAFT, WITH LEFT HEART CATHETERIZATION;  Surgeon: Jamir Nam MD;  Location: [facility] CATH LAB;  Service: Cardiology;  Laterality: N/A;    CORONARY ANGIOGRAPHY INCLUDING BYPASS GRAFTS WITH CATHETERIZATION OF LEFT HEART N/A 10/9/2024    Procedure: ANGIOGRAM, CORONARY, INCLUDING BYPASS GRAFT, WITH LEFT HEART CATHETERIZATION;  Surgeon: Milly Cadena MD;  Location: [facility] CATH LAB;  Service: Cardiology;  Laterality: N/A;  rcfa  RN PREOP 10/4/2024    CORONARY ARTERY BYPASS GRAFT  2001    ESOPHAGOGASTRODUODENOSCOPY N/A 10/8/2020    Procedure: EGD (ESOPHAGOGASTRODUODENOSCOPY);  Surgeon: Bharath Herrera MD;  Location: [facility];  Service: Endoscopy;   Laterality: N/A;    ESOPHAGOGASTRODUODENOSCOPY N/A 2023    Procedure: EGD (ESOPHAGOGASTRODUODENOSCOPY);  Surgeon: Bharath Herrera MD;  Location: [facility] ENDO;  Service: Endoscopy;  Laterality: N/A;    HERNIA REPAIR      LEFT HEART CATHETERIZATION Left 3/4/2019    Procedure: Left heart cath RFA access, 4fr catheter/sheath, not before 9am;  Surgeon: Jamir Nam MD;  Location: [facility] CATH LAB;  Service: Cardiology;  Laterality: Left;    TRANSRECTAL ULTRASOUND OF PROSTATE WITH INSERTION OF GOLD FIDUCIAL MARKER N/A 2019    Procedure: ULTRASOUND, PROSTATE, RECTAL APPROACH, WITH GOLD FIDUCIAL MARKER INSERTION;  Surgeon: Layne Huff MD;  Location: [facility] OR;  Service: Urology;  Laterality: N/A;    UPPER GASTROINTESTINAL ENDOSCOPY      WRIST SURGERY           Social History:   Social History     Socioeconomic History    Marital status:    Tobacco Use    Smoking status: Former     Current packs/day: 0.00     Average packs/day: 2.0 packs/day for 23.0 years (46.0 ttl pk-yrs)     Types: Cigarettes     Start date: 1966     Quit date: 1989     Years since quittin.5    Smokeless tobacco: Never   Substance and Sexual Activity    Alcohol use: Yes     Alcohol/week: 1.0 standard drink of alcohol     Types: 1 Cans of beer per week     Comment: occassional    Drug use: No    Sexual activity: Yes     Partners: Female     Social Drivers of Health     Financial Resource Strain: Low Risk  (2025)    Overall Financial Resource Strain (CARDIA)     Difficulty of Paying Living Expenses: Not very hard   Food Insecurity: No Food Insecurity (2025)    Hunger Vital Sign     Worried About Running Out of Food in the Last Year: Never true     Ran Out of Food in the Last Year: Never true   Transportation Needs: No Transportation Needs (2025)    PRAPARE - Transportation     Lack of Transportation (Medical): No     Lack of Transportation (Non-Medical): No   Physical Activity: Insufficiently  Active (5/2/2025)    Exercise Vital Sign     Days of Exercise per Week: 4 days     Minutes of Exercise per Session: 20 min   Stress: Stress Concern Present (5/2/2025)    [employer] [place] [employer] - Occupational Stress Questionnaire     Feeling of Stress : Rather much   Housing Stability: Low Risk  (5/2/2025)    Housing Stability Vital Sign     Unable to Pay for Housing in the Last Year: No     Number of Times Moved in the Last Year: 0     Homeless in the Last Year: No         Family Hx:  No family history of pulmonary fibrosis, pre-mature graying of hair, cirrhosis, or hematologic malignancies  No family history of emphysema or spontaneous PTX  no family history of lung cancer or lymphoma    Allergies and Pertinent Medications   Allergies and Medications Reviewed    Tamsulosin, Prochlorperazine, and Statins-hmg-coa reductase inhibitors  Current Outpatient Medications on File Prior to Visit   Medication Sig Dispense Refill    albuterol (PROVENTIL/VENTOLIN HFA) 90 mcg/actuation inhaler Inhale 2 puffs into the lungs every 6 (six) hours as needed for Wheezing. 18 g 0    ALPRAZolam (XANAX) 1 MG tablet Take 1 tablet (1 mg total) by mouth nightly as needed for Anxiety. 30 tablet 2    aspirin (ECOTRIN) 81 MG EC tablet Take 1 tablet (81 mg total) by mouth once daily.      blood glucose control, low (TRUE METRIX LEVEL 1) Soln To use with blood glucose meter 1 each 0    blood sugar diagnostic (TRUE METRIX GLUCOSE TEST STRIP) Strp BID testing 200 strip 3    blood-glucose meter (TRUE METRIX GLUCOSE METER) kit For twice daily checking 1 each 0    clopidogreL (PLAVIX) 75 mg tablet Take 1 tablet (75 mg total) by mouth once daily. 30 tablet 11    empagliflozin (JARDIANCE) 10 mg tablet Take 1 tablet (10 mg total) by mouth once daily. 90 tablet 3    ezetimibe (ZETIA) 10 mg tablet Take 1 tablet (10 mg total) by mouth once daily. 90 tablet 3    fluticasone-salmeterol diskus inhaler 250-50 mcg Inhale 1 puff into the lungs  2 (two) times daily. Controller 60 each 11    imiquimod (ALDARA) 5 % cream APPLY TOPICALLY 3 (THREE) TIMES A WEEK. 24 packet 3    lancets Misc To check BG 2 times daily, to use with insurance preferred meter 200 each 3    losartan (COZAAR) 100 MG tablet Take 1 tablet (100 mg total) by mouth once daily. 90 tablet 3    metFORMIN (GLUCOPHAGE) 500 MG tablet TAKE 1 TABLET EVERY DAY WITH BREAKFAST 90 tablet 3    metoprolol succinate (TOPROL-XL) 25 MG 24 hr tablet Take 0.5 tablets (12.5 mg total) by mouth once daily. 90 tablet 3    omeprazole (PRILOSEC) 40 MG capsule Take 1 capsule (40 mg total) by mouth 2 (two) times daily before meals. 180 capsule 3    spironolactone (ALDACTONE) 25 MG tablet Take 1 tablet (25 mg total) by mouth once daily. 90 tablet 3    testosterone (ANDROGEL) 20.25 mg/1.25 gram (1.62 %) GlPm Apply topically.      vit C/vit E ac/lut/copper/zinc (PRESERVISION LUTEIN ORAL) Take 1 tablet by mouth once daily.      vitamin D 1000 units Tab Take 1,000 Units by mouth once daily.       No current facility-administered medications on file prior to visit.              Physical Exam   BP (!) 161/66   Pulse 60   Wt 73 kg (160 lb 15 oz)   SpO2 95%   BMI 25.21 kg/m²       Constitutional: No acute distress, Atraumatic   HEENT: moist mucus membranes, extraocular movements intact  Cardiovascular: regular rate and rhythm, no murmurs, rubs or gallops  Pulmonary: normal respiratory rate and chest rise, no chest wall deformity, normal breath sounds with no wheezing or crackles  Abdominal: non-distended, bowel sounds present  Musculoskeletal: No lower extremity edema, no clubbing  Neurological: normal speech/naresh, moves all extremities against gravity  Skin: no finger cyanosis, no rashes on exposed body parts  Psych: Appropriate affect, normal mood       Diagnostic Studies      All diagnostic studies relevant to chief complaint reviewed personally    Labs:  Lab Results   Component Value Date    WBC 7.72 05/09/2025     HGB 15.0 05/09/2025    HGB 15.0 11/15/2024     05/09/2025     11/15/2024    MCV 93 05/09/2025    MCV 94 11/15/2024     Lab Results   Component Value Date     05/09/2025     11/25/2024    K 4.2 05/09/2025    K 4.6 11/25/2024    CO2 30 (H) 05/09/2025    CO2 25 11/25/2024    BUN 17 05/09/2025    CREATININE 1.2 05/09/2025    MG 2.7 (H) 03/02/2019     Lab Results   Component Value Date    AST 19 05/09/2025    AST 26 10/30/2024    ALT 15 05/09/2025    ALT 19 10/30/2024    ALBUMIN 3.7 05/09/2025    ALBUMIN 3.7 10/30/2024    ALBUMIN 4.0 05/07/2024    PROT 7.1 05/09/2025    PROT 6.7 10/30/2024    BILITOT 0.3 05/09/2025    BILITOT 0.4 10/30/2024         PFTs:  Pulmonary Functions Testing Results:      TTE:  Results for orders placed during the hospital encounter of 09/17/24    Echo    Interpretation Summary    Left Ventricle: The left ventricle is normal in size. Mildly increased wall thickness. There is mild concentric hypertrophy. There is normal systolic function with a visually estimated ejection fraction of 55 - 60%. Grade I diastolic dysfunction.    Right Ventricle: Normal right ventricular cavity size. Systolic function is normal.    Aortic Valve: The aortic valve is a trileaflet valve. There is moderate aortic valve sclerosis. Mildly restricted motion. There is mild stenosis. Aortic valve area by VTI is 1.21 cm². Aortic valve peak velocity is 1.60 m/s. Mean gradient is 6 mmHg. The dimensionless index is 0.40.    Tricuspid Valve: There is mild regurgitation.    Pulmonary Artery: The estimated pulmonary artery systolic pressure is 27 mmHg.            Assessment and Plan   Eric Jackson is a 84 y.o. male  with a history of CAD s/p 3V CABG, HFpEF hx of prostate cancer tx w/ XRT, type II DM, HTN, CKD IIIa, up's esophagus presenting with chief complaint of COPD-asthma overlap      # COPD-asthma overlap - acte, stable - no improvement on advair. Recommended trelegy but he would like to use  advair more religiously. Will refer to pulm rehab. Recommended sleep study and reviewed risks for untreated sleep apnea, declined sleep study. CBC to assess AEC for possible dupixent  # bronchiolitis - chronic, stable - s/p levofloxacin, follow up CT chest in late September ordered  # restrictive lung disease - stable - CT chest w/out pulm fibrosis, low threshold to repeat  # suspected sleep  - declines sleep study    Explained to the patient I work Monday-Thursdays, so any results or messages received after working hours Thursday through Monday AM would not be addressed until I was back in the office. If he/she experienced any acute symptoms he/she should go the emergency room for immediate help.    Differential, diagnoses, diagnostic work up, and possible treatments were discussed with the patient. Questions were answered.    Return in early october  Irma Anthony MD  Pulmonary-Critical Care Medicine            For this visit, the following time was spent  preparing to see the patient (e.g., review of tests) 5 minutes  obtaining and/or reviewing separately obtained history 0 minutes  Performing a medically necessary appropriate examination and/or evaluation 10 minutes  Counseling and educating the patient/family/caregiver 10 minutes  Ordering medications, tests, or procedures 1 minutes  Referring and communicating with other health care professionals (when not reported separately) 0minutes  Documenting clinical information in the electronic or other health record 4 minutes  Care coordination (not reported separately) 0minutes    Total time = 30 minutes

## 2025-07-03 ENCOUNTER — PATIENT MESSAGE (OUTPATIENT)
Dept: FAMILY MEDICINE | Facility: CLINIC | Age: 85
End: 2025-07-03
Payer: MEDICARE

## 2025-07-07 ENCOUNTER — OFFICE VISIT (OUTPATIENT)
Dept: FAMILY MEDICINE | Facility: CLINIC | Age: 85
End: 2025-07-07
Payer: MEDICARE

## 2025-07-07 VITALS
DIASTOLIC BLOOD PRESSURE: 60 MMHG | WEIGHT: 165.81 LBS | OXYGEN SATURATION: 96 % | SYSTOLIC BLOOD PRESSURE: 122 MMHG | BODY MASS INDEX: 26.02 KG/M2 | HEART RATE: 66 BPM | HEIGHT: 67 IN | TEMPERATURE: 98 F

## 2025-07-07 DIAGNOSIS — Z00.00 ENCOUNTER FOR MEDICARE ANNUAL WELLNESS EXAM: Primary | ICD-10-CM

## 2025-07-07 DIAGNOSIS — E11.36 TYPE 2 DIABETES MELLITUS WITH DIABETIC CATARACT, WITHOUT LONG-TERM CURRENT USE OF INSULIN: ICD-10-CM

## 2025-07-07 DIAGNOSIS — H91.90 DECREASED HEARING, UNSPECIFIED LATERALITY: ICD-10-CM

## 2025-07-07 DIAGNOSIS — I25.118 CORONARY ARTERY DISEASE OF NATIVE ARTERY OF NATIVE HEART WITH STABLE ANGINA PECTORIS: ICD-10-CM

## 2025-07-07 DIAGNOSIS — E78.5 DYSLIPIDEMIA: ICD-10-CM

## 2025-07-07 DIAGNOSIS — I10 ESSENTIAL HYPERTENSION: ICD-10-CM

## 2025-07-07 PROCEDURE — 3078F DIAST BP <80 MM HG: CPT | Mod: CPTII,S$GLB,,

## 2025-07-07 PROCEDURE — 99999 PR PBB SHADOW E&M-EST. PATIENT-LVL V: CPT | Mod: PBBFAC,,,

## 2025-07-07 PROCEDURE — G0439 PPPS, SUBSEQ VISIT: HCPCS | Mod: S$GLB,,,

## 2025-07-07 PROCEDURE — 1159F MED LIST DOCD IN RCRD: CPT | Mod: CPTII,S$GLB,,

## 2025-07-07 PROCEDURE — 1170F FXNL STATUS ASSESSED: CPT | Mod: CPTII,S$GLB,,

## 2025-07-07 PROCEDURE — 3288F FALL RISK ASSESSMENT DOCD: CPT | Mod: CPTII,S$GLB,,

## 2025-07-07 PROCEDURE — 1101F PT FALLS ASSESS-DOCD LE1/YR: CPT | Mod: CPTII,S$GLB,,

## 2025-07-07 PROCEDURE — 1158F ADVNC CARE PLAN TLK DOCD: CPT | Mod: CPTII,S$GLB,,

## 2025-07-07 PROCEDURE — 1126F AMNT PAIN NOTED NONE PRSNT: CPT | Mod: CPTII,S$GLB,,

## 2025-07-07 PROCEDURE — 1160F RVW MEDS BY RX/DR IN RCRD: CPT | Mod: CPTII,S$GLB,,

## 2025-07-07 PROCEDURE — 3074F SYST BP LT 130 MM HG: CPT | Mod: CPTII,S$GLB,,

## 2025-07-07 NOTE — PROGRESS NOTES
"  Eric Jackson presented for a follow-up Medicare AWV today. The following components were reviewed and updated:    Medical history  Family History  Social history  Allergies and Current Medications  Health Risk Assessment  Health Maintenance  Care Team    **See Completed Assessments for Annual Wellness visit with in the encounter summary    The following assessments were completed:  Depression Screening  Cognitive function Screening  Timed Get Up Test  Whisper Test        Opioid documentation:      Patient does not have a current opioid prescription.          Vitals:    07/07/25 1404   BP: 122/60   Pulse: 66   Temp: 97.6 °F (36.4 °C)   TempSrc: Oral   SpO2: 96%   Weight: 75.2 kg (165 lb 12.6 oz)   Height: 5' 7" (1.702 m)     Body mass index is 25.97 kg/m².       Physical Exam  Constitutional:       Appearance: Normal appearance.   HENT:      Head: Normocephalic and atraumatic.   Eyes:      Extraocular Movements: Extraocular movements intact.      Conjunctiva/sclera: Conjunctivae normal.   Pulmonary:      Effort: Pulmonary effort is normal. No respiratory distress.   Skin:     General: Skin is warm and dry.   Neurological:      General: No focal deficit present.      Mental Status: He is alert and oriented to person, place, and time.   Psychiatric:         Mood and Affect: Mood normal.         Behavior: Behavior normal.         Diagnoses and health risks identified today and associated recommendations/orders:  1. Encounter for Medicare annual wellness exam  - The patient was seen today for an annual Medicare wellness exam. Health maintenance and screening topics were discussed. Proper diet and exercise recommendations were reviewed.     2. Type 2 diabetes mellitus with diabetic cataract, without long-term current use of insulin  - A1C 6.2 on 5/9/25. Stable, continue with Jardiance.   - Lose weight and aim for a healthy weight.  Even losing 10% of your current weight can help.  - Focus on eliminating all added " sugar in your diet and avoiding foods like rice and pasta.  - Focus on lean proteins and plenty of non-starchy vegetable at your meals.  - Exercise at least 30 minutes, at least 5 times per week.      3. Essential hypertension  - Blood pressure today controlled in clinic. stable, continue with Losartan, Metoprolol, Spironolactone  - Recommend low-sodium diet and compliance with blood pressure medication.  - Keep blood pressure goal <140/90 and f/u with clinic if persistently elevated      4. Dyslipidemia  - Reported allergies to statin  - Stable on Zetia, continue with current regimen.  - No mm aches or pain  - The ASCVD Risk score (Pratibha LARA, et al., 2019) failed to calculate for the following reasons:    The 2019 ASCVD risk score is only valid for ages 40 to 79    Risk score cannot be calculated because patient has a medical history suggesting prior/existing ASCVD        5. Coronary artery disease 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; 3/2019 [facility] patent graft; Culprit likely in small Diag2 (unrevascularized) and likely demand related  - Stable on Clopidogrel, with not easy bruising, bleeding. Maintain f/u with cardiology. No chest pain, chest tightness.    6. Decreased hearing, unspecified laterality  - Pt reporting decreased hearing, no recent audiology/hearing test, will place referral for audiology.    - Ambulatory referral/consult to Audiology; Future      Provided Eric with a 5-10 year written screening schedule and personal prevention plan. Recommendations were developed using the USPSTF age appropriate recommendations. Education, counseling, and referrals were provided as needed.  After Visit Summary printed and given to patient which includes a list of additional screenings\tests needed.    Follow up in about 1 year (around 7/7/2026) for AWV.      GEETHA Hays  I offered to discuss advanced care planning, including how to pick a person who would make decisions  for you if you were unable to make them for yourself, called a health care power of , and what kind of decisions you might make such as use of life sustaining treatments such as ventilators and tube feeding when faced with a life limiting illness recorded on a living will that they will need to know. (How you want to be cared for as you near the end of your natural life)     X Patient is interested in learning more about how to make advanced directives.  I provided them paperwork and offered to discuss this with them.

## 2025-07-07 NOTE — PATIENT INSTRUCTIONS
Counseling and Referral of Other Preventative  (Italic type indicates deductible and co-insurance are waived)    Patient Name: Eric Jackson  Today's Date: 7/7/2025    Health Maintenance       Date Due Completion Date    Diabetic Eye Exam 11/09/2024 11/9/2023    Override on 1/6/2017: Done    Override on 2/10/2015: Done    Influenza Vaccine (1) 09/01/2025 9/27/2024 (Declined)    Override on 9/27/2024: Declined    Override on 2/10/2023: Declined (Declined for season)    Override on 12/7/2018: Done    Override on 4/23/2015: Declined    Diabetes Urine Screening 10/30/2025 10/30/2024    Hemoglobin A1c 11/09/2025 5/9/2025    Override on 2/24/2015: Done (Dr. Arias Muñoz)    Lipid Panel 05/09/2026 5/9/2025    Override on 8/26/2016: Done ([facility]-Arias Muñoz MD/GEETHA Razo-Lipid Panel:Cholesterol 227 mg/dL, NON- mg/dL, HDL 61 mg/dL, CHol/HDL Ratio 3.7,  mg/dL, Triglyceride 101 mg/dL)    Override on 2/24/2015: Done (Dr. Arias Muñoz)    Aspirin/Antiplatelet Therapy 06/11/2026 6/11/2025    TETANUS VACCINE 03/30/2027 3/30/2017 (Declined)    Override on 3/30/2017: Declined        No orders of the defined types were placed in this encounter.      The following information is provided to all patients.  This information is to help you find resources for any of the problems found today that may be affecting your health:                  Living healthy guide: www.[facility].[place].gov      Understanding Diabetes: www.diabetes.org      Eating healthy: www.cdc.gov/healthyweight      CDC home safety checklist: www.cdc.gov/steadi/patient.html      Agency on Aging: www.goea.[place].gov      Alcoholics anonymous (AA): www.aa.org      Physical Activity: www.lorenza.nih.gov/ci0ldlu      Tobacco use: www.quitwithusla.org

## 2025-07-14 ENCOUNTER — OFFICE VISIT (OUTPATIENT)
Dept: OPTOMETRY | Facility: CLINIC | Age: 85
End: 2025-07-14
Payer: MEDICARE

## 2025-07-14 DIAGNOSIS — H35.3132 INTERMEDIATE STAGE NONEXUDATIVE AGE-RELATED MACULAR DEGENERATION OF BOTH EYES: ICD-10-CM

## 2025-07-14 DIAGNOSIS — H25.13 NUCLEAR SCLEROSIS OF BOTH EYES: ICD-10-CM

## 2025-07-14 DIAGNOSIS — H35.373 EPIRETINAL MEMBRANE (ERM) OF BOTH EYES: Primary | ICD-10-CM

## 2025-07-14 DIAGNOSIS — H04.123 DRY EYE SYNDROME, BILATERAL: ICD-10-CM

## 2025-07-14 DIAGNOSIS — E11.36 TYPE 2 DIABETES MELLITUS WITH DIABETIC CATARACT, WITHOUT LONG-TERM CURRENT USE OF INSULIN: ICD-10-CM

## 2025-07-14 PROCEDURE — 2023F DILAT RTA XM W/O RTNOPTHY: CPT | Mod: CPTII,S$GLB,, | Performed by: OPTOMETRIST

## 2025-07-14 PROCEDURE — 1160F RVW MEDS BY RX/DR IN RCRD: CPT | Mod: CPTII,S$GLB,, | Performed by: OPTOMETRIST

## 2025-07-14 PROCEDURE — 99214 OFFICE O/P EST MOD 30 MIN: CPT | Mod: S$GLB,,, | Performed by: OPTOMETRIST

## 2025-07-14 PROCEDURE — 99999 PR PBB SHADOW E&M-EST. PATIENT-LVL III: CPT | Mod: PBBFAC,,, | Performed by: OPTOMETRIST

## 2025-07-14 PROCEDURE — 92134 CPTRZ OPH DX IMG PST SGM RTA: CPT | Mod: S$GLB,,, | Performed by: OPTOMETRIST

## 2025-07-14 PROCEDURE — 1159F MED LIST DOCD IN RCRD: CPT | Mod: CPTII,S$GLB,, | Performed by: OPTOMETRIST

## 2025-07-14 NOTE — PROGRESS NOTES
Subjective:       Patient ID: Eric Jackson is a 84 y.o. male      Chief Complaint   Patient presents with    Concerns About Ocular Health    Diabetic Eye Exam     History of Present Illness  HPI    Dls: 11/9/23 Dr. Braden     83 y/o male presents today for diabetic eye exam.   Pt states no changes in vision. Pt wears bifocal glasses.   Pt wants a new rx for glasses.   Pt c/o dry eyes ou    LBS controlled     No tearing  No itching  No burning  No pain  No ha's  No floaters  No flashes    Eye meds  Otc gtts ou prn     Pohx:   MD     Fohx:  None    Hemoglobin A1C       Date                     Value               Ref Range             Status                10/30/2024               6.1 (H)             4.0 - 5.6 %           Final              Comment:    ADA Screening Guidelines:  5.7-6.4%  Consistent with   prediabetes  >or=6.5%  Consistent with diabetes    High levels of fetal   hemoglobin interfere with the HbA1C  assay. Heterozygous hemoglobin   variants (HbS, HgC, etc)do  not significantly interfere with this assay.     However, presence of multiple variants may affect accuracy.         04/23/2024               6.1 (H)             4.0 - 5.6 %           Final              Comment:    ADA Screening Guidelines:  5.7-6.4%  Consistent with   prediabetes  >or=6.5%  Consistent with diabetes    High levels of fetal   hemoglobin interfere with the HbA1C  assay. Heterozygous hemoglobin   variants (HbS, HgC, etc)do  not significantly interfere with this assay.     However, presence of multiple variants may affect accuracy.         10/16/2023               6.1 (H)             4.0 - 5.6 %           Final              Comment:    ADA Screening Guidelines:  5.7-6.4%  Consistent with   prediabetes  >or=6.5%  Consistent with diabetes    High levels of fetal   hemoglobin interfere with the HbA1C  assay. Heterozygous hemoglobin   variants (HbS, HgC, etc)do  not significantly interfere with this assay.     However, presence of  multiple variants may affect accuracy.    ----------  Hemoglobin A1c       Date                     Value               Ref Range             Status                05/09/2025               6.2 (H)             4.0 - 5.6 %           Final              Comment:    ADA Screening Guidelines:  5.7-6.4%  Consistent with prediabetes  >=6.5%    Consistent with diabetes    High levels of fetal hemoglobin interfere   with the HbA1C  assay. Heterozygous hemoglobin variants (HbS, HgC,   etc)do  not significantly interfere with this assay.   However, presence   of multiple variants may affect accuracy.  ----------     Last edited by Rain Bhardwaj MA on 7/14/2025  8:49 AM.      Assessment/Plan:     1. Epiretinal membrane (ERM) of both eyes (Primary)  ERM OD >> OS. Previously noted. Last seen by retina 2017, deferred surgery at that time.  Decrease in VA OD, consult with retina for evaluation, if does not need surgery then can refer for CEIOL.     - Posterior Segment OCT Retina-Both eyes  - Ambulatory referral/consult to Ophthalmology    2. Nuclear sclerosis of both eyes  Decreased VA OD >> OS. Likely OD affected by ERM. Consult retina first, once cleared can schedule for CEIOL consult.     3. Type 2 diabetes mellitus with diabetic cataract, without long-term current use of insulin  No diabetic retinopathy. Discussed with pt the effects of diabetes on vision, importance of good blood sugar control, compliance with meds, and follow up care with PCP. Return in 1 year for dilated eye exam, sooner PRN.    4. Intermediate stage nonexudative age-related macular degeneration of both eyes  Stable. Monitor. Pt taking AREDS    5. Dry eye syndrome, bilateral  Recommend Refresh/Systane BID - QID OU PRN. Discussed chronicity.        Follow up for retina consult.

## 2025-07-15 ENCOUNTER — TELEPHONE (OUTPATIENT)
Dept: OPHTHALMOLOGY | Facility: CLINIC | Age: 85
End: 2025-07-15
Payer: MEDICARE

## 2025-07-18 ENCOUNTER — CLINICAL SUPPORT (OUTPATIENT)
Dept: AUDIOLOGY | Facility: CLINIC | Age: 85
End: 2025-07-18
Payer: MEDICARE

## 2025-07-18 ENCOUNTER — OFFICE VISIT (OUTPATIENT)
Dept: OTOLARYNGOLOGY | Facility: CLINIC | Age: 85
End: 2025-07-18
Payer: MEDICARE

## 2025-07-18 ENCOUNTER — TELEPHONE (OUTPATIENT)
Dept: OPHTHALMOLOGY | Facility: CLINIC | Age: 85
End: 2025-07-18
Payer: MEDICARE

## 2025-07-18 VITALS
DIASTOLIC BLOOD PRESSURE: 76 MMHG | WEIGHT: 164.88 LBS | SYSTOLIC BLOOD PRESSURE: 172 MMHG | HEIGHT: 67 IN | BODY MASS INDEX: 25.88 KG/M2

## 2025-07-18 DIAGNOSIS — H90.3 ASYMMETRICAL SENSORINEURAL HEARING LOSS: Primary | ICD-10-CM

## 2025-07-18 DIAGNOSIS — H61.23 BILATERAL IMPACTED CERUMEN: ICD-10-CM

## 2025-07-18 PROCEDURE — 99203 OFFICE O/P NEW LOW 30 MIN: CPT | Mod: 25,S$GLB,, | Performed by: NURSE PRACTITIONER

## 2025-07-18 PROCEDURE — 1126F AMNT PAIN NOTED NONE PRSNT: CPT | Mod: CPTII,S$GLB,, | Performed by: NURSE PRACTITIONER

## 2025-07-18 PROCEDURE — 3078F DIAST BP <80 MM HG: CPT | Mod: CPTII,S$GLB,, | Performed by: NURSE PRACTITIONER

## 2025-07-18 PROCEDURE — 3077F SYST BP >= 140 MM HG: CPT | Mod: CPTII,S$GLB,, | Performed by: NURSE PRACTITIONER

## 2025-07-18 NOTE — TELEPHONE ENCOUNTER
----- Message from Tracy Paez sent at 7/15/2025  9:03 AM CDT -----  Regarding: FW: referral appt request  Pt lives on the [facility]  ----- Message -----  From: Ella He  Sent: 7/15/2025   8:53 AM CDT  To: Israel Jaeger Staff; Teresa Orellana Staff;#  Subject: referral appt request                            Pt has referral for:    Epiretinal membrane (ERM) of both eyes [H35.373]    Please call pt to schedule. Thank you

## 2025-07-18 NOTE — PROGRESS NOTES
Please click on link to view Audiogram:  Document on 7/18/2025 9:18 AM by Bria Jaramillo AU.D: Audiogram    Mr. Eric Jackson, a 84 y.o. male, was seen in the clinic today for an audiological evaluation. Patient's main complaint was bilateral hearing loss.    Tympanometry testing revealed a Type A tympanogram for the right ear and a Type A tympanogram for the left ear.     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 A speech reception threshold was obtained at 50 dBHL for the right ear and at 60 dBHL for the left ear. Speech discrimination was 52% for the right ear and 92% for the left ear.      Recommendations:  1. Otologic evaluation  2. Annual audiological evaluation, sooner if medically necessary or if hearing changes  3. Hearing aid consultation following medical clearance

## 2025-07-22 ENCOUNTER — HOSPITAL ENCOUNTER (INPATIENT)
Facility: HOSPITAL | Age: 85
LOS: 3 days | Discharge: HOME | DRG: 177 | End: 2025-07-25
Attending: EMERGENCY MEDICINE | Admitting: EMERGENCY MEDICINE
Payer: MEDICARE

## 2025-07-22 DIAGNOSIS — Z13.6 SCREENING FOR CARDIOVASCULAR CONDITION: ICD-10-CM

## 2025-07-22 DIAGNOSIS — J96.01 ACUTE RESPIRATORY FAILURE WITH HYPOXIA: Primary | ICD-10-CM

## 2025-07-22 DIAGNOSIS — J18.9 PNEUMONIA DUE TO INFECTIOUS ORGANISM, UNSPECIFIED LATERALITY, UNSPECIFIED PART OF LUNG: ICD-10-CM

## 2025-07-22 DIAGNOSIS — J44.9 CHRONIC OBSTRUCTIVE PULMONARY DISEASE, UNSPECIFIED COPD TYPE: ICD-10-CM

## 2025-07-22 DIAGNOSIS — R06.02 SHORTNESS OF BREATH: ICD-10-CM

## 2025-07-22 PROBLEM — J96.02 ACUTE RESPIRATORY FAILURE WITH HYPOXIA AND HYPERCARBIA: Status: ACTIVE | Noted: 2025-07-22

## 2025-07-22 PROBLEM — J44.1 CHRONIC OBSTRUCTIVE PULMONARY DISEASE WITH ACUTE EXACERBATION: Status: ACTIVE | Noted: 2025-07-22

## 2025-07-22 LAB
ABSOLUTE EOSINOPHIL (OHS): 0.31 K/UL
ABSOLUTE MONOCYTE (OHS): 0.38 K/UL (ref 0.3–1)
ABSOLUTE NEUTROPHIL COUNT (OHS): 6.21 K/UL (ref 1.8–7.7)
ALBUMIN SERPL BCP-MCNC: 3.8 G/DL (ref 3.5–5.2)
ALLENS TEST: ABNORMAL
ALLENS TEST: NORMAL
ALP SERPL-CCNC: 76 UNIT/L (ref 40–150)
ALT SERPL W/O P-5'-P-CCNC: 16 UNIT/L (ref 10–44)
ANION GAP (OHS): 12 MMOL/L (ref 8–16)
AST SERPL-CCNC: 21 UNIT/L (ref 11–45)
BACTERIA #/AREA URNS AUTO: ABNORMAL /HPF
BASOPHILS # BLD AUTO: 0.02 K/UL
BASOPHILS NFR BLD AUTO: 0.2 %
BILIRUB SERPL-MCNC: 0.4 MG/DL (ref 0.1–1)
BILIRUB UR QL STRIP.AUTO: NEGATIVE
BNP SERPL-MCNC: 189 PG/ML (ref 0–99)
BUN SERPL-MCNC: 12 MG/DL (ref 8–23)
CALCIUM SERPL-MCNC: 9.3 MG/DL (ref 8.7–10.5)
CHLORIDE SERPL-SCNC: 106 MMOL/L (ref 95–110)
CLARITY UR: CLEAR
CO2 SERPL-SCNC: 28 MMOL/L (ref 23–29)
COLOR UR AUTO: YELLOW
CREAT SERPL-MCNC: 1.2 MG/DL (ref 0.5–1.4)
CTP QC/QA: YES
CTP QC/QA: YES
ERYTHROCYTE [DISTWIDTH] IN BLOOD BY AUTOMATED COUNT: 14.4 % (ref 11.5–14.5)
GFR SERPLBLD CREATININE-BSD FMLA CKD-EPI: 60 ML/MIN/1.73/M2
GLUCOSE SERPL-MCNC: 102 MG/DL (ref 70–110)
GLUCOSE SERPL-MCNC: 180 MG/DL (ref 70–110)
GLUCOSE UR QL STRIP: ABNORMAL
HCO3 UR-SCNC: 30.7 MMOL/L (ref 24–28)
HCT VFR BLD AUTO: 48.8 % (ref 40–54)
HGB BLD-MCNC: 15.5 GM/DL (ref 14–18)
HGB UR QL STRIP: NEGATIVE
IMM GRANULOCYTES # BLD AUTO: 0.02 K/UL (ref 0–0.04)
IMM GRANULOCYTES NFR BLD AUTO: 0.2 % (ref 0–0.5)
KETONES UR QL STRIP: NEGATIVE
LACTATE SERPL-SCNC: 3.1 MMOL/L (ref 0.5–2.2)
LACTATE SERPL-SCNC: 3.7 MMOL/L (ref 0.5–2.2)
LDH SERPL L TO P-CCNC: 1.61 MMOL/L (ref 0.5–2.2)
LEUKOCYTE ESTERASE UR QL STRIP: ABNORMAL
LYMPHOCYTES # BLD AUTO: 1.25 K/UL (ref 1–4.8)
MAGNESIUM SERPL-MCNC: 2 MG/DL (ref 1.6–2.6)
MCH RBC QN AUTO: 31.1 PG (ref 27–31)
MCHC RBC AUTO-ENTMCNC: 31.8 G/DL (ref 32–36)
MCV RBC AUTO: 98 FL (ref 82–98)
MICROSCOPIC COMMENT: ABNORMAL
NITRITE UR QL STRIP: NEGATIVE
NUCLEATED RBC (/100WBC) (OHS): 0 /100 WBC
PCO2 BLDA: 60.4 MMHG (ref 35–45)
PH SMN: 7.32 [PH] (ref 7.35–7.45)
PH UR STRIP: 7 [PH]
PLATELET # BLD AUTO: 205 K/UL (ref 150–450)
PMV BLD AUTO: 9.6 FL (ref 9.2–12.9)
PO2 BLDA: 17 MMHG (ref 40–60)
POC BE: 3 MMOL/L (ref -2–2)
POC MOLECULAR INFLUENZA A AGN: NEGATIVE
POC MOLECULAR INFLUENZA B AGN: NEGATIVE
POC SATURATED O2: 20 % (ref 95–100)
POC TCO2: 33 MMOL/L (ref 24–29)
POCT GLUCOSE: 112 MG/DL (ref 70–110)
POTASSIUM SERPL-SCNC: 3.9 MMOL/L (ref 3.5–5.1)
PROT SERPL-MCNC: 7.8 GM/DL (ref 6–8.4)
PROT UR QL STRIP: NEGATIVE
RBC # BLD AUTO: 4.99 M/UL (ref 4.6–6.2)
RBC #/AREA URNS AUTO: 4 /HPF (ref 0–4)
RELATIVE EOSINOPHIL (OHS): 3.8 %
RELATIVE LYMPHOCYTE (OHS): 15.3 % (ref 18–48)
RELATIVE MONOCYTE (OHS): 4.6 % (ref 4–15)
RELATIVE NEUTROPHIL (OHS): 75.9 % (ref 38–73)
SAMPLE: ABNORMAL
SAMPLE: NORMAL
SARS-COV-2 RDRP RESP QL NAA+PROBE: NEGATIVE
SITE: ABNORMAL
SITE: NORMAL
SODIUM SERPL-SCNC: 146 MMOL/L (ref 136–145)
SP GR UR STRIP: >=1.03
SQUAMOUS #/AREA URNS AUTO: <1 /HPF
TROPONIN I SERPL DL<=0.01 NG/ML-MCNC: <0.006 NG/ML
UROBILINOGEN UR STRIP-ACNC: NEGATIVE EU/DL
WBC # BLD AUTO: 8.19 K/UL (ref 3.9–12.7)
WBC #/AREA URNS AUTO: 4 /HPF (ref 0–5)
YEAST UR QL AUTO: ABNORMAL /HPF

## 2025-07-22 PROCEDURE — 25000242 PHARM REV CODE 250 ALT 637 W/ HCPCS: Performed by: EMERGENCY MEDICINE

## 2025-07-22 PROCEDURE — 96368 THER/DIAG CONCURRENT INF: CPT

## 2025-07-22 PROCEDURE — 5A09357 ASSISTANCE WITH RESPIRATORY VENTILATION, LESS THAN 24 CONSECUTIVE HOURS, CONTINUOUS POSITIVE AIRWAY PRESSURE: ICD-10-PCS | Performed by: EMERGENCY MEDICINE

## 2025-07-22 PROCEDURE — 87040 BLOOD CULTURE FOR BACTERIA: CPT

## 2025-07-22 PROCEDURE — 93010 ELECTROCARDIOGRAM REPORT: CPT | Mod: 76,,, | Performed by: INTERNAL MEDICINE

## 2025-07-22 PROCEDURE — 82803 BLOOD GASES ANY COMBINATION: CPT

## 2025-07-22 PROCEDURE — 20000000 HC ICU ROOM

## 2025-07-22 PROCEDURE — 94799 UNLISTED PULMONARY SVC/PX: CPT

## 2025-07-22 PROCEDURE — 25000003 PHARM REV CODE 250

## 2025-07-22 PROCEDURE — 94664 DEMO&/EVAL PT USE INHALER: CPT | Mod: XB

## 2025-07-22 PROCEDURE — 27000646 HC AEROBIKA DEVICE

## 2025-07-22 PROCEDURE — 81003 URINALYSIS AUTO W/O SCOPE: CPT

## 2025-07-22 PROCEDURE — 84484 ASSAY OF TROPONIN QUANT: CPT

## 2025-07-22 PROCEDURE — 85025 COMPLETE CBC W/AUTO DIFF WBC: CPT

## 2025-07-22 PROCEDURE — 83880 ASSAY OF NATRIURETIC PEPTIDE: CPT

## 2025-07-22 PROCEDURE — 80053 COMPREHEN METABOLIC PANEL: CPT

## 2025-07-22 PROCEDURE — 83605 ASSAY OF LACTIC ACID: CPT

## 2025-07-22 PROCEDURE — 83735 ASSAY OF MAGNESIUM: CPT

## 2025-07-22 PROCEDURE — 94640 AIRWAY INHALATION TREATMENT: CPT

## 2025-07-22 PROCEDURE — 99291 CRITICAL CARE FIRST HOUR: CPT

## 2025-07-22 PROCEDURE — 27000221 HC OXYGEN, UP TO 24 HOURS

## 2025-07-22 PROCEDURE — 99900035 HC TECH TIME PER 15 MIN (STAT)

## 2025-07-22 PROCEDURE — 27100171 HC OXYGEN HIGH FLOW UP TO 24 HOURS

## 2025-07-22 PROCEDURE — 87635 SARS-COV-2 COVID-19 AMP PRB: CPT

## 2025-07-22 PROCEDURE — 93010 ELECTROCARDIOGRAM REPORT: CPT | Mod: ,,, | Performed by: INTERNAL MEDICINE

## 2025-07-22 PROCEDURE — 96375 TX/PRO/DX INJ NEW DRUG ADDON: CPT

## 2025-07-22 PROCEDURE — 96365 THER/PROPH/DIAG IV INF INIT: CPT

## 2025-07-22 PROCEDURE — 25000003 PHARM REV CODE 250: Performed by: EMERGENCY MEDICINE

## 2025-07-22 PROCEDURE — 93005 ELECTROCARDIOGRAM TRACING: CPT

## 2025-07-22 PROCEDURE — 63600175 PHARM REV CODE 636 W HCPCS: Performed by: EMERGENCY MEDICINE

## 2025-07-22 PROCEDURE — 25000242 PHARM REV CODE 250 ALT 637 W/ HCPCS

## 2025-07-22 PROCEDURE — 96372 THER/PROPH/DIAG INJ SC/IM: CPT | Mod: 59 | Performed by: HOSPITALIST

## 2025-07-22 PROCEDURE — 87070 CULTURE OTHR SPECIMN AEROBIC: CPT | Performed by: EMERGENCY MEDICINE

## 2025-07-22 PROCEDURE — 94660 CPAP INITIATION&MGMT: CPT

## 2025-07-22 PROCEDURE — 63600175 PHARM REV CODE 636 W HCPCS: Performed by: HOSPITALIST

## 2025-07-22 PROCEDURE — 25500020 PHARM REV CODE 255: Performed by: EMERGENCY MEDICINE

## 2025-07-22 PROCEDURE — 96366 THER/PROPH/DIAG IV INF ADDON: CPT

## 2025-07-22 PROCEDURE — 63600175 PHARM REV CODE 636 W HCPCS

## 2025-07-22 PROCEDURE — 94644 CONT INHLJ TX 1ST HOUR: CPT

## 2025-07-22 PROCEDURE — 94640 AIRWAY INHALATION TREATMENT: CPT | Mod: XB

## 2025-07-22 PROCEDURE — 25000242 PHARM REV CODE 250 ALT 637 W/ HCPCS: Performed by: HOSPITALIST

## 2025-07-22 PROCEDURE — 27000190 HC CPAP FULL FACE MASK W/VALVE

## 2025-07-22 RX ORDER — ENOXAPARIN SODIUM 100 MG/ML
40 INJECTION SUBCUTANEOUS EVERY 24 HOURS
Status: DISCONTINUED | OUTPATIENT
Start: 2025-07-22 | End: 2025-07-25 | Stop reason: HOSPADM

## 2025-07-22 RX ORDER — IBUPROFEN 200 MG
16 TABLET ORAL
Status: DISCONTINUED | OUTPATIENT
Start: 2025-07-22 | End: 2025-07-25 | Stop reason: HOSPADM

## 2025-07-22 RX ORDER — FAMOTIDINE 20 MG/1
20 TABLET, FILM COATED ORAL DAILY
Status: DISCONTINUED | OUTPATIENT
Start: 2025-07-22 | End: 2025-07-25 | Stop reason: HOSPADM

## 2025-07-22 RX ORDER — IPRATROPIUM BROMIDE AND ALBUTEROL SULFATE 2.5; .5 MG/3ML; MG/3ML
SOLUTION RESPIRATORY (INHALATION)
Status: COMPLETED
Start: 2025-07-22 | End: 2025-07-22

## 2025-07-22 RX ORDER — CLOPIDOGREL BISULFATE 75 MG/1
75 TABLET ORAL DAILY
Status: DISCONTINUED | OUTPATIENT
Start: 2025-07-23 | End: 2025-07-25 | Stop reason: HOSPADM

## 2025-07-22 RX ORDER — IPRATROPIUM BROMIDE AND ALBUTEROL SULFATE 2.5; .5 MG/3ML; MG/3ML
3 SOLUTION RESPIRATORY (INHALATION) ONCE
Status: COMPLETED | OUTPATIENT
Start: 2025-07-22 | End: 2025-07-22

## 2025-07-22 RX ORDER — IPRATROPIUM BROMIDE AND ALBUTEROL SULFATE 2.5; .5 MG/3ML; MG/3ML
3 SOLUTION RESPIRATORY (INHALATION)
Status: DISCONTINUED | OUTPATIENT
Start: 2025-07-22 | End: 2025-07-25 | Stop reason: HOSPADM

## 2025-07-22 RX ORDER — IPRATROPIUM BROMIDE AND ALBUTEROL SULFATE 2.5; .5 MG/3ML; MG/3ML
3 SOLUTION RESPIRATORY (INHALATION)
Status: DISCONTINUED | OUTPATIENT
Start: 2025-07-22 | End: 2025-07-22

## 2025-07-22 RX ORDER — BENZONATATE 100 MG/1
100 CAPSULE ORAL
Status: COMPLETED | OUTPATIENT
Start: 2025-07-22 | End: 2025-07-22

## 2025-07-22 RX ORDER — LEVALBUTEROL 1.25 MG/.5ML
1.25 SOLUTION, CONCENTRATE RESPIRATORY (INHALATION)
Status: COMPLETED | OUTPATIENT
Start: 2025-07-22 | End: 2025-07-22

## 2025-07-22 RX ORDER — CEFEPIME HYDROCHLORIDE 1 G/1
1 INJECTION, POWDER, FOR SOLUTION INTRAMUSCULAR; INTRAVENOUS
Status: DISCONTINUED | OUTPATIENT
Start: 2025-07-22 | End: 2025-07-25 | Stop reason: HOSPADM

## 2025-07-22 RX ORDER — METHYLPREDNISOLONE SOD SUCC 125 MG
125 VIAL (EA) INJECTION
Status: COMPLETED | OUTPATIENT
Start: 2025-07-22 | End: 2025-07-22

## 2025-07-22 RX ORDER — ALPRAZOLAM 0.5 MG/1
0.5 TABLET ORAL 3 TIMES DAILY PRN
Status: DISCONTINUED | OUTPATIENT
Start: 2025-07-22 | End: 2025-07-25 | Stop reason: HOSPADM

## 2025-07-22 RX ORDER — GLUCAGON 1 MG
1 KIT INJECTION
Status: DISCONTINUED | OUTPATIENT
Start: 2025-07-22 | End: 2025-07-25 | Stop reason: HOSPADM

## 2025-07-22 RX ORDER — IBUPROFEN 200 MG
24 TABLET ORAL
Status: DISCONTINUED | OUTPATIENT
Start: 2025-07-22 | End: 2025-07-25 | Stop reason: HOSPADM

## 2025-07-22 RX ORDER — MAGNESIUM SULFATE HEPTAHYDRATE 40 MG/ML
2 INJECTION, SOLUTION INTRAVENOUS ONCE
Status: COMPLETED | OUTPATIENT
Start: 2025-07-22 | End: 2025-07-22

## 2025-07-22 RX ORDER — INSULIN ASPART 100 [IU]/ML
0-10 INJECTION, SOLUTION INTRAVENOUS; SUBCUTANEOUS
Status: DISCONTINUED | OUTPATIENT
Start: 2025-07-22 | End: 2025-07-25 | Stop reason: HOSPADM

## 2025-07-22 RX ORDER — GUAIFENESIN 100 MG/5ML
200 LIQUID ORAL
Status: COMPLETED | OUTPATIENT
Start: 2025-07-22 | End: 2025-07-22

## 2025-07-22 RX ADMIN — BENZONATATE 100 MG: 100 CAPSULE ORAL at 12:07

## 2025-07-22 RX ADMIN — IPRATROPIUM BROMIDE AND ALBUTEROL SULFATE 3 ML: 2.5; .5 SOLUTION RESPIRATORY (INHALATION) at 11:07

## 2025-07-22 RX ADMIN — LEVALBUTEROL 1.25 MG: 1.25 SOLUTION, CONCENTRATE RESPIRATORY (INHALATION) at 01:07

## 2025-07-22 RX ADMIN — CEFEPIME 1 G: 1 INJECTION, POWDER, FOR SOLUTION INTRAMUSCULAR; INTRAVENOUS at 08:07

## 2025-07-22 RX ADMIN — ENOXAPARIN SODIUM 40 MG: 40 INJECTION SUBCUTANEOUS at 04:07

## 2025-07-22 RX ADMIN — METHYLPREDNISOLONE SODIUM SUCCINATE 125 MG: 125 INJECTION, POWDER, FOR SOLUTION INTRAMUSCULAR; INTRAVENOUS at 01:07

## 2025-07-22 RX ADMIN — VANCOMYCIN HYDROCHLORIDE 2000 MG: 2 INJECTION, POWDER, LYOPHILIZED, FOR SOLUTION INTRAVENOUS at 12:07

## 2025-07-22 RX ADMIN — IPRATROPIUM BROMIDE AND ALBUTEROL SULFATE 3 ML: 2.5; .5 SOLUTION RESPIRATORY (INHALATION) at 07:07

## 2025-07-22 RX ADMIN — MAGNESIUM SULFATE IN WATER 2 G: 40 INJECTION, SOLUTION INTRAVENOUS at 03:07

## 2025-07-22 RX ADMIN — IOHEXOL 75 ML: 350 INJECTION, SOLUTION INTRAVENOUS at 03:07

## 2025-07-22 RX ADMIN — PIPERACILLIN SODIUM AND TAZOBACTAM SODIUM 4.5 G: 4; .5 INJECTION, POWDER, FOR SOLUTION INTRAVENOUS at 12:07

## 2025-07-22 RX ADMIN — GUAIFENESIN 200 MG: 200 SOLUTION ORAL at 12:07

## 2025-07-22 NOTE — EICU
"Virtual ICU Admission    Admit Date: 2025  LOS: 0  Code Status: Prior   : 1940  Bed: W271/W271 A:     Diagnosis: Acute respiratory failure with hypoxia and hypercarbia    Patient  has a past medical history of Anemia, Angina pectoris, Anxiety, Asthma-COPD overlap syndrome, Cancer, Coronary artery disease, Depression, Diabetes mellitus type II, Disorder of kidney and ureter, Erectile dysfunction, Eye injuries, GERD (gastroesophageal reflux disease), Hypertension, Hypertensive heart disease with heart failure, Intermediate stage nonexudative age-related macular degeneration of both eyes, Myocardial infarction, Nuclear sclerosis of both eyes, Prostate cancer, Trouble in sleeping, Type 2 diabetes mellitus, and Type 2 diabetes mellitus with ophthalmic manifestations.    Last VS: /77 (BP Location: Left arm, Patient Position: Lying)   Pulse 98   Temp 98 °F (36.7 °C) (Oral)   Resp 20   Ht 5' 7" (1.702 m)   Wt 74.5 kg (164 lb 3.9 oz)   SpO2 (!) 93%   BMI 25.72 kg/m²       VICU Review    VICU nurse assessment :  [place] completed, LDA documentation reconciliation completed, Sign and held orders reviewed/released, and VTE prophylaxis review        Nursing orders placed : IP DEIRDRE Peripheral IV Access         "

## 2025-07-22 NOTE — H&P
[facility]  [facility] Medicine  History & Physical    Patient Name: Eric Jackson  MRN: [id number]  Patient Class: Emergency  Admission Date: 7/22/2025  Attending Physician:Lois Mcduffie    Primary Care Provider: Samir Boo MD         Patient information was obtained from patient and ER records.     Subjective:     Principal Problem:Acute respiratory failure with hypoxia and hypercarbia    Chief Complaint:   Chief Complaint   Patient presents with    Shortness of Breath     Pt BIB grandson today for SOB overnight, per grandson pt had cough/congestion last night but this AM pt appeared more SOB than usual. Per grandson pt has had a heart attack in the past.         HPI: Patient is a an 84-year-old male with a history of hypertension,COPD,HTN,DM, CAD s/p CABG, presenting to the ED with respiratory distress.  Patient reports shortness of breath and symptoms has been progressive over the last couple of weeks, but became really severe last night.  Grandson, per documentation, noted that he was more short of breath than normal.  Patient endorses cough and shortness of breath like he has been presentation more acutely.  In ER chest X ray with sign of pneumonia,ABG with PH 7.3,CO 2 of 60 and O 2 of 17,patient received  nebulizer treatment,broads spectrum  IV Abx and placed  on Bipap in improvement in his symptoms,CTA chest is pending,patient is admitted to ICU fort acute,hypoxic,hypercapnic respiratory failure.    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    Cancer     Coronary artery disease     Depression     Diabetes mellitus type II     Disorder of kidney and ureter     Erectile dysfunction     Eye injuries     fb ou    GERD (gastroesophageal reflux disease)     Hypertension     Hypertensive heart disease with heart failure 2/14/2025    Intermediate stage nonexudative age-related macular degeneration of both  eyes 4/9/2019    Myocardial infarction     Nuclear sclerosis of both eyes 4/9/2019    Prostate cancer     Trouble in sleeping     Type 2 diabetes mellitus     Type 2 diabetes mellitus with ophthalmic manifestations        Past Surgical History:   Procedure Laterality Date    COLONOSCOPY N/A 2/9/2018    Procedure: COLONOSCOPY;  Surgeon: Mathieu Cao MD;  Location: [facility] ENDO;  Service: Endoscopy;  Laterality: N/A;    CORONARY ANGIOGRAPHY INCLUDING BYPASS GRAFTS WITH CATHETERIZATION OF LEFT HEART N/A 3/4/2019    Procedure: ANGIOGRAM, CORONARY, INCLUDING BYPASS GRAFT, WITH LEFT HEART CATHETERIZATION;  Surgeon: Jamir Nam MD;  Location: [facility] CATH LAB;  Service: Cardiology;  Laterality: N/A;    CORONARY ANGIOGRAPHY INCLUDING BYPASS GRAFTS WITH CATHETERIZATION OF LEFT HEART N/A 10/9/2024    Procedure: ANGIOGRAM, CORONARY, INCLUDING BYPASS GRAFT, WITH LEFT HEART CATHETERIZATION;  Surgeon: Milly Cadena MD;  Location: [facility] CATH LAB;  Service: Cardiology;  Laterality: N/A;  rcfa  RN PREOP 10/4/2024    CORONARY ARTERY BYPASS GRAFT  2001    ESOPHAGOGASTRODUODENOSCOPY N/A 10/8/2020    Procedure: EGD (ESOPHAGOGASTRODUODENOSCOPY);  Surgeon: Bharath Herrera MD;  Location: [facility];  Service: Endoscopy;  Laterality: N/A;    ESOPHAGOGASTRODUODENOSCOPY N/A 11/7/2023    Procedure: EGD (ESOPHAGOGASTRODUODENOSCOPY);  Surgeon: Bharath Herrera MD;  Location: [facility];  Service: Endoscopy;  Laterality: N/A;    HERNIA REPAIR      LEFT HEART CATHETERIZATION Left 3/4/2019    Procedure: Left heart cath RFA access, 4fr catheter/sheath, not before 9am;  Surgeon: Jamir Nam MD;  Location: [facility] CATH LAB;  Service: Cardiology;  Laterality: Left;    TRANSRECTAL ULTRASOUND OF PROSTATE WITH INSERTION OF GOLD FIDUCIAL MARKER N/A 2/26/2019    Procedure: ULTRASOUND, PROSTATE, RECTAL APPROACH, WITH GOLD FIDUCIAL MARKER INSERTION;  Surgeon: Layne Huff MD;  Location: [facility] OR;  Service: Urology;   Laterality: N/A;    UPPER GASTROINTESTINAL ENDOSCOPY      WRIST SURGERY         Review of patient's allergies indicates:   Allergen Reactions    Tamsulosin     Prochlorperazine      convulsions    Statins-hmg-coa reductase inhibitors Other (See Comments)     Muscle weakness       No current facility-administered medications on file prior to encounter.     Current Outpatient Medications on File Prior to Encounter   Medication Sig    albuterol (PROVENTIL/VENTOLIN HFA) 90 mcg/actuation inhaler Inhale 2 puffs into the lungs every 6 (six) hours as needed for Wheezing.    ALPRAZolam (XANAX) 1 MG tablet Take 1 tablet (1 mg total) by mouth nightly as needed for Anxiety.    aspirin (ECOTRIN) 81 MG EC tablet Take 1 tablet (81 mg total) by mouth once daily.    blood glucose control, low (TRUE METRIX LEVEL 1) Soln To use with blood glucose meter    blood sugar diagnostic (TRUE METRIX GLUCOSE TEST STRIP) Strp BID testing    blood-glucose meter (TRUE METRIX GLUCOSE METER) kit For twice daily checking    clopidogreL (PLAVIX) 75 mg tablet Take 1 tablet (75 mg total) by mouth once daily.    empagliflozin (JARDIANCE) 10 mg tablet Take 1 tablet (10 mg total) by mouth once daily.    ezetimibe (ZETIA) 10 mg tablet Take 1 tablet (10 mg total) by mouth once daily.    fluticasone-salmeterol diskus inhaler 250-50 mcg Inhale 1 puff into the lungs 2 (two) times daily. Controller    imiquimod (ALDARA) 5 % cream APPLY TOPICALLY 3 (THREE) TIMES A WEEK.    lancets Misc To check BG 2 times daily, to use with insurance preferred meter    losartan (COZAAR) 100 MG tablet Take 1 tablet (100 mg total) by mouth once daily.    metFORMIN (GLUCOPHAGE) 500 MG tablet TAKE 1 TABLET EVERY DAY WITH BREAKFAST    metoprolol succinate (TOPROL-XL) 25 MG 24 hr tablet Take 0.5 tablets (12.5 mg total) by mouth once daily.    omeprazole (PRILOSEC) 40 MG capsule Take 1 capsule (40 mg total) by mouth 2 (two) times daily before meals.    spironolactone (ALDACTONE) 25 MG  tablet Take 1 tablet (25 mg total) by mouth once daily.    testosterone (ANDROGEL) 20.25 mg/1.25 gram (1.62 %) GlPm Apply topically.    vit C/vit E ac/lut/copper/zinc (PRESERVISION LUTEIN ORAL) Take 1 tablet by mouth once daily.    vitamin D 1000 units Tab Take 1,000 Units by mouth once daily.     Family History       Problem Relation (Age of Onset)    No Known Problems Mother, Father, Sister, Brother, Maternal Aunt, Maternal Uncle, Paternal Aunt, Paternal Uncle, Maternal Grandmother, Maternal Grandfather, Paternal Grandmother, Paternal Grandfather, Other          Tobacco Use    Smoking status: Former     Current packs/day: 0.00     Average packs/day: 2.0 packs/day for 23.0 years (46.0 ttl pk-yrs)     Types: Cigarettes     Start date: 1966     Quit date: 1989     Years since quittin.5    Smokeless tobacco: Never    Tobacco comments:     poor choice   Substance and Sexual Activity    Alcohol use: Yes     Alcohol/week: 5.0 standard drinks of alcohol     Types: 3 Cans of beer, 2 Drinks containing 0.5 oz of alcohol per week     Comment: occassional    Drug use: No    Sexual activity: Yes     Partners: Female     Review of Systems   Constitutional:  Positive for activity change and appetite change.   HENT:  Negative for congestion and drooling.    Eyes:  Negative for discharge and itching.   Respiratory:  Positive for cough and shortness of breath.    Cardiovascular:  Negative for chest pain and leg swelling.   Gastrointestinal:  Negative for abdominal distention and abdominal pain.   Endocrine: Negative for cold intolerance and heat intolerance.   Genitourinary:  Negative for difficulty urinating and dysuria.   Musculoskeletal:  Negative for arthralgias.   Skin:  Negative for color change.   Neurological:  Positive for weakness.   Psychiatric/Behavioral:  Negative for agitation and behavioral problems.      Objective:     Vital Signs (Most Recent):  Temp: 97.7 °F (36.5 °C) (25 1103)  Pulse: 110  (07/22/25 1453)  Resp: (!) 29 (07/22/25 1336)  BP: (!) 160/75 (07/22/25 1434)  SpO2: 98 % (07/22/25 1453) Vital Signs (24h Range):  Temp:  [97.7 °F (36.5 °C)] 97.7 °F (36.5 °C)  Pulse:  [] 110  Resp:  [21-51] 29  SpO2:  [87 %-99 %] 98 %  BP: (117-170)/(60-88) 160/75     Weight: 74.4 kg (164 lb 0.4 oz)  Body mass index is 25.69 kg/m².     Physical Exam  HENT:      Head: Atraumatic.      Right Ear: There is no impacted cerumen.      Mouth/Throat:      Mouth: Mucous membranes are moist.   Eyes:      Extraocular Movements: Extraocular movements intact.      Pupils: Pupils are equal, round, and reactive to light.   Cardiovascular:      Rate and Rhythm: Normal rate and regular rhythm.   Pulmonary:      Breath sounds: Rales present.   Abdominal:      General: There is no distension.   Musculoskeletal:         General: No swelling or deformity.      Cervical back: Normal range of motion and neck supple.   Skin:     Coloration: Skin is not jaundiced.      Findings: No bruising.   Neurological:      General: No focal deficit present.      Mental Status: He is alert.   Psychiatric:         Mood and Affect: Mood normal.              CRANIAL NERVES     CN III, IV, VI   Pupils are equal, round, and reactive to light.       Significant Labs: All pertinent labs within the past 24 hours have been reviewed.  BMP:   Recent Labs   Lab 07/22/25  1214      *   K 3.9      CO2 28   BUN 12   CREATININE 1.2   CALCIUM 9.3   MG 2.0     CBC:   Recent Labs   Lab 07/22/25  1216   WBC 8.19   HGB 15.5   HCT 48.8        CMP:   Recent Labs   Lab 07/22/25  1214   *   K 3.9      CO2 28      BUN 12   CREATININE 1.2   CALCIUM 9.3   PROT 7.8   ALBUMIN 3.8   BILITOT 0.4   ALKPHOS 76   AST 21   ALT 16   ANIONGAP 12       Significant Imaging: I have reviewed all pertinent imaging results/findings within the past 24 hours.  Assessment/Plan:     Assessment & Plan  Acute respiratory failure with hypoxia and  "hypercarbia  Patient with Hypercapnic and Hypoxic Respiratory failure which is Acute.  he is not on home oxygen. Supplemental oxygen was provided and noted- Oxygen Concentration (%):  [40] 40    .   Signs/symptoms of respiratory failure include- tachypnea, increased work of breathing, respiratory distress, and use of accessory muscles. Contributing diagnoses includes - COPD and Pneumonia Labs and images were reviewed. Patient Has recent ABG, which has been reviewed. Will treat underlying causes and adjust management of respiratory failure as follows-   Pneumonia due to infectious organism  Patient has a diagnosis of pneumonia. The cause of the pneumonia is suspected to be bacterial in etiology but organism is not known. The pneumonia is worsening due to hypoxia. The patient has the following signs/symptoms of pneumonia: persistent hypoxia , cough, and shortness of breath. The patient does have a current oxygen requirement and the patient does have a home oxygen requirement. I have reviewed the pertinent imaging. The following cultures have been collected: Blood cultures and Sputum culture The culture results are listed below.     Current antimicrobial regimen consists of the antibiotics listed below. Will monitor patient closely and continue current treatment plan unchanged.    Antibiotics (From admission, onward)      Start     Stop Route Frequency Ordered    07/23/25 1300  vancomycin 1,250 mg in 0.9% NaCl 250 mL IVPB (admixture device)         -- IV Every 24 hours (non-standard times) 07/22/25 1420    07/22/25 2000  ceFEPIme injection 1 g         -- IV Every 8 hours (non-standard times) 07/22/25 1505    07/22/25 1234  vancomycin - pharmacy to dose  (Sepsis Workup)        Placed in "And" Linked Group    -- IV pharmacy to manage frequency 07/22/25 1134            Microbiology Results (last 7 days)       Procedure Component Value Units Date/Time    Culture, Respiratory with Gram Stain [[id number]] Collected: 07/22/25 " 1316    Order Status: Completed Specimen: Respiratory from Tracheal Aspirate Updated: 07/22/25 1412     GRAM STAIN <10 Epithelial Cells/LPF      Many Gram positive cocci      Rare Gram Positive Rods      Rare Gram Negative Rods    Blood culture x two cultures. Draw prior to antibiotics. [[id number]] Collected: 07/22/25 1215    Order Status: Resulted Specimen: Blood from Peripheral, Antecubital, Left Updated: 07/22/25 1226    Blood culture x two cultures. Draw prior to antibiotics. [[id number]] Collected: 07/22/25 1217    Order Status: Resulted Specimen: Blood from Peripheral, Forearm, Right Updated: 07/22/25 1226          Chronic obstructive pulmonary disease with acute exacerbation  Patient's COPD is with exacerbation noted by continued dyspnea, use of accessory muscles for breathing, and worsening of baseline hypoxia currently.  Patient is currently off COPD Pathway. Continue scheduled inhalers Steroids, Antibiotics, and Supplemental oxygen and monitor respiratory status closely.   Essential hypertension  Patient's blood pressure range in the last 24 hours was: BP  Min: 117/60  Max: 170/82.The patient's inpatient anti-hypertensive regimen is listed below:  Current Antihypertensives       Plan  - BP is controlled, no changes needed to their regimen  -   Type 2 diabetes mellitus without complication, without long-term current use of insulin  On SSI    DAISY (generalized anxiety disorder)  Resumed home Xanax    History of prostate cancer 2/25/19 TRUS of prostate and gold fiducial marker placement; 4/2019 s/p XRT  Per record.    Status post coronary artery bypass grafting single vessel  Denies chest pain,has normal Troponin,resumed ASA,Plavix    Stage 3a chronic kidney disease  Creatine stable for now. BMP reviewed- noted Estimated Creatinine Clearance: 44.4 mL/min (based on SCr of 1.2 mg/dL). according to latest data. Based on current GFR, CKD stage is stage 3 - GFR 30-59.  Monitor UOP and serial BMP and adjust therapy  as needed. Renally dose meds. Avoid nephrotoxic medications and procedures.  VTE Risk Mitigation (From admission, onward)           Ordered     enoxaparin injection 40 mg  Every 24 hours         07/22/25 1510                                           Pharmacokinetic Initial Assessment: IV Vancomycin    Assessment/Plan:    Initiate intravenous vancomycin with loading dose of 2000 mg once followed by a maintenance dose of vancomycin 1250 mg IV every 24 hours  Desired empiric serum trough concentration is 10 to 20 mcg/mL  Draw vancomycin trough level 60 min prior to third dose on 7/24/25 at approximately 12:00  Pharmacy will continue to follow and monitor vancomycin.      Please contact pharmacy at extension [phone] with any questions regarding this assessment.     Thank you for the consult,   Demetria Logan       Patient brief summary:  Eric Jackson is a 84 y.o. male initiated on antimicrobial therapy with IV Vancomycin for treatment of suspected sepsis    Drug Allergies:   Review of patient's allergies indicates:   Allergen Reactions    Tamsulosin     Prochlorperazine      convulsions    Statins-hmg-coa reductase inhibitors Other (See Comments)     Muscle weakness       Actual Body Weight:   74.4 kg    Renal Function:   Estimated Creatinine Clearance: 44.4 mL/min (based on SCr of 1.2 mg/dL).,     Dialysis Method (if applicable):  N/A    CBC (last 72 hours):  Recent Labs   Lab Result Units 07/22/25  1216   WBC K/uL 8.19   HGB gm/dL 15.5   HCT % 48.8   Platelet Count K/uL 205   Lymph % % 15.3*   Mono % % 4.6   Eos % % 3.8   Basophil % % 0.2       Metabolic Panel (last 72 hours):  Recent Labs   Lab Result Units 07/22/25  1214 07/22/25  1316   Sodium mmol/L 146*  --    Potassium mmol/L 3.9  --    Chloride mmol/L 106  --    CO2 mmol/L 28  --    Glucose mg/dL 102  --    Glucose, UA   --  4+*   BUN mg/dL 12  --    Creatinine mg/dL 1.2  --    Albumin g/dL 3.8  --    Bilirubin Total mg/dL 0.4  --    ALP unit/L 76  --    AST  "unit/L 21  --    ALT unit/L 16  --    Magnesium  mg/dL 2.0  --        Drug levels (last 3 results):  No results for input(s): "VANCOMYCINRA", "VANCORANDOM", "VANCOMYCINPE", "VANCOPEAK", "VANCOMYCINTR", "VANCOTROUGH" in the last 72 hours.    Microbiologic Results:  Microbiology Results (last 7 days)       Procedure Component Value Units Date/Time    Culture, Respiratory with Gram Stain [[id number]] Collected: 07/22/25 1316    Order Status: Completed Specimen: Respiratory from Tracheal Aspirate Updated: 07/22/25 1412     GRAM STAIN <10 Epithelial Cells/LPF      Many Gram positive cocci      Rare Gram Positive Rods      Rare Gram Negative Rods    Blood culture x two cultures. Draw prior to antibiotics. [[id number]] Collected: 07/22/25 1215    Order Status: Resulted Specimen: Blood from Peripheral, Antecubital, Left Updated: 07/22/25 1226    Blood culture x two cultures. Draw prior to antibiotics. [[id number]] Collected: 07/22/25 1217    Order Status: Resulted Specimen: Blood from Peripheral, Forearm, Right Updated: 07/22/25 1226              Lois Mcduffie MD  Department of Hospital Medicine  [facility] - Emergency Dept          "

## 2025-07-22 NOTE — PROGRESS NOTES
"Pharmacokinetic Initial Assessment: IV Vancomycin    Assessment/Plan:    Initiate intravenous vancomycin with loading dose of 2000 mg once followed by a maintenance dose of vancomycin 1250 mg IV every 24 hours  Desired empiric serum trough concentration is 10 to 20 mcg/mL  Draw vancomycin trough level 60 min prior to third dose on 7/24/25 at approximately 12:00  Pharmacy will continue to follow and monitor vancomycin.      Please contact pharmacy at extension [phone] with any questions regarding this assessment.     Thank you for the consult,   Demetria Logan       Patient brief summary:  Eric Jackson is a 84 y.o. male initiated on antimicrobial therapy with IV Vancomycin for treatment of suspected sepsis    Drug Allergies:   Review of patient's allergies indicates:   Allergen Reactions    Tamsulosin     Prochlorperazine      convulsions    Statins-hmg-coa reductase inhibitors Other (See Comments)     Muscle weakness       Actual Body Weight:   74.4 kg    Renal Function:   Estimated Creatinine Clearance: 44.4 mL/min (based on SCr of 1.2 mg/dL).,     Dialysis Method (if applicable):  N/A    CBC (last 72 hours):  Recent Labs   Lab Result Units 07/22/25  1216   WBC K/uL 8.19   HGB gm/dL 15.5   HCT % 48.8   Platelet Count K/uL 205   Lymph % % 15.3*   Mono % % 4.6   Eos % % 3.8   Basophil % % 0.2       Metabolic Panel (last 72 hours):  Recent Labs   Lab Result Units 07/22/25  1214 07/22/25  1316   Sodium mmol/L 146*  --    Potassium mmol/L 3.9  --    Chloride mmol/L 106  --    CO2 mmol/L 28  --    Glucose mg/dL 102  --    Glucose, UA   --  4+*   BUN mg/dL 12  --    Creatinine mg/dL 1.2  --    Albumin g/dL 3.8  --    Bilirubin Total mg/dL 0.4  --    ALP unit/L 76  --    AST unit/L 21  --    ALT unit/L 16  --    Magnesium  mg/dL 2.0  --        Drug levels (last 3 results):  No results for input(s): "VANCOMYCINRA", "VANCORANDOM", "VANCOMYCINPE", "VANCOPEAK", "VANCOMYCINTR", "VANCOTROUGH" in the last 72 " hours.    Microbiologic Results:  Microbiology Results (last 7 days)       Procedure Component Value Units Date/Time    Culture, Respiratory with Gram Stain [[id number]] Collected: 07/22/25 1316    Order Status: Completed Specimen: Respiratory from Tracheal Aspirate Updated: 07/22/25 1412     GRAM STAIN <10 Epithelial Cells/LPF      Many Gram positive cocci      Rare Gram Positive Rods      Rare Gram Negative Rods    Blood culture x two cultures. Draw prior to antibiotics. [[id number]] Collected: 07/22/25 1215    Order Status: Resulted Specimen: Blood from Peripheral, Antecubital, Left Updated: 07/22/25 1226    Blood culture x two cultures. Draw prior to antibiotics. [[id number]] Collected: 07/22/25 1217    Order Status: Resulted Specimen: Blood from Peripheral, Forearm, Right Updated: 07/22/25 1226

## 2025-07-22 NOTE — ASSESSMENT & PLAN NOTE
"Patient has a diagnosis of pneumonia. The cause of the pneumonia is suspected to be bacterial in etiology but organism is not known. The pneumonia is worsening due to hypoxia. The patient has the following signs/symptoms of pneumonia: persistent hypoxia , cough, and shortness of breath. The patient does have a current oxygen requirement and the patient does have a home oxygen requirement. I have reviewed the pertinent imaging. The following cultures have been collected: Blood cultures and Sputum culture The culture results are listed below.     Current antimicrobial regimen consists of the antibiotics listed below. Will monitor patient closely and continue current treatment plan unchanged.    Antibiotics (From admission, onward)      Start     Stop Route Frequency Ordered    07/23/25 1300  vancomycin 1,250 mg in 0.9% NaCl 250 mL IVPB (admixture device)         -- IV Every 24 hours (non-standard times) 07/22/25 1420    07/22/25 2000  ceFEPIme injection 1 g         -- IV Every 8 hours (non-standard times) 07/22/25 1505    07/22/25 1234  vancomycin - pharmacy to dose  (Sepsis Workup)        Placed in "And" Linked Group    -- IV pharmacy to manage frequency 07/22/25 1134            Microbiology Results (last 7 days)       Procedure Component Value Units Date/Time    Culture, Respiratory with Gram Stain [[id number]] Collected: 07/22/25 1316    Order Status: Completed Specimen: Respiratory from Tracheal Aspirate Updated: 07/22/25 1412     GRAM STAIN <10 Epithelial Cells/LPF      Many Gram positive cocci      Rare Gram Positive Rods      Rare Gram Negative Rods    Blood culture x two cultures. Draw prior to antibiotics. [[id number]] Collected: 07/22/25 1215    Order Status: Resulted Specimen: Blood from Peripheral, Antecubital, Left Updated: 07/22/25 1226    Blood culture x two cultures. Draw prior to antibiotics. [[id number]] Collected: 07/22/25 1217    Order Status: Resulted Specimen: Blood from Peripheral, Forearm, " Right Updated: 07/22/25 1226

## 2025-07-22 NOTE — HPI
Patient is a an 84-year-old male with a history of hypertension,COPD,HTN,DM, CAD s/p CABG, presenting to the ED with respiratory distress.  Patient reports shortness of breath and symptoms has been progressive over the last couple of weeks, but became really severe last night.  Grandson, per documentation, noted that he was more short of breath than normal.  Patient endorses cough and shortness of breath like he has been presentation more acutely.  In ER chest X ray with sign of pneumonia,ABG with PH 7.3,CO 2 of 60 and O 2 of 17,patient received  nebulizer treatment,broads spectrum  IV Abx and placed  on Bipap in improvement in his symptoms,CTA chest is pending,patient is admitted to ICU [facility] acute,hypoxic,hypercapnic respiratory failure.

## 2025-07-22 NOTE — ASSESSMENT & PLAN NOTE
Patient's blood pressure range in the last 24 hours was: BP  Min: 117/60  Max: 170/82.The patient's inpatient anti-hypertensive regimen is listed below:  Current Antihypertensives       Plan  - BP is controlled, no changes needed to their regimen  -

## 2025-07-22 NOTE — ASSESSMENT & PLAN NOTE
Creatine stable for now. BMP reviewed- noted Estimated Creatinine Clearance: 44.4 mL/min (based on SCr of 1.2 mg/dL). according to latest data. Based on current GFR, CKD stage is stage 3 - GFR 30-59.  Monitor UOP and serial BMP and adjust therapy as needed. Renally dose meds. Avoid nephrotoxic medications and procedures.

## 2025-07-22 NOTE — EICU
Intervention Initiated From:  [facility] / [facility]    Tania intervened regarding:  Rounding (Video assessment)    VICU Night Rounds Checklist  24H Vital Sign Range:  Temp:  [97.7 °F (36.5 °C)-98.4 °F (36.9 °C)]   Pulse:  []   Resp:  [20-51]   BP: (117-170)/(60-88)   SpO2:  [87 %-99 %]     Video rounds and LDA reconciliation

## 2025-07-22 NOTE — ASSESSMENT & PLAN NOTE
Patient with Hypercapnic and Hypoxic Respiratory failure which is Acute.  he is not on home oxygen. Supplemental oxygen was provided and noted- Oxygen Concentration (%):  [40] 40    .   Signs/symptoms of respiratory failure include- tachypnea, increased work of breathing, respiratory distress, and use of accessory muscles. Contributing diagnoses includes - COPD and Pneumonia Labs and images were reviewed. Patient Has recent ABG, which has been reviewed. Will treat underlying causes and adjust management of respiratory failure as follows-

## 2025-07-22 NOTE — ED PROVIDER NOTES
Encounter Date: 7/22/2025       History     Chief Complaint   Patient presents with    Shortness of Breath     Pt LENARD banegas today for SOB overnight, per grandson pt had cough/congestion last night but this AM pt appeared more SOB than usual. Edgard banegas pt has had a heart attack in the past.      HPI    Patient is a an 84-year-old male with a history of hypertension, CAD s/p CABG, diabetes presenting to the ED with respiratory distress.  Patient reports shortness of breath and symptoms has been progressive over the last couple of weeks, but became really severe last night.  Anthony, per documentation, noted that he was more short of breath than normal.  Patient endorses cough and shortness of breath like he has been presentation more acutely.    Treated for pneumonia approximately 1 month ago, had completed course of antibiotics and it overall felt better.    Denies fever, nausea, vomiting, abdominal pain.    Denies recent surgery/travel, DVT/PE, hormone use.    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    Cancer     Coronary artery disease     Depression     Diabetes mellitus type II     Disorder of kidney and ureter     Erectile dysfunction     Eye injuries     fb ou    GERD (gastroesophageal reflux disease)     Hypertension     Hypertensive heart disease with heart failure 2/14/2025    Intermediate stage nonexudative age-related macular degeneration of both eyes 4/9/2019    Myocardial infarction     Nuclear sclerosis of both eyes 4/9/2019    Prostate cancer     Trouble in sleeping     Type 2 diabetes mellitus     Type 2 diabetes mellitus with ophthalmic manifestations      Past Surgical History:   Procedure Laterality Date     COLONOSCOPY N/A 2/9/2018    Procedure: COLONOSCOPY;  Surgeon: Mathieu Cao MD;  Location: [facility] ENDO;  Service: Endoscopy;  Laterality: N/A;    CORONARY ANGIOGRAPHY INCLUDING BYPASS GRAFTS WITH CATHETERIZATION OF LEFT HEART N/A 3/4/2019    Procedure: ANGIOGRAM, CORONARY, INCLUDING BYPASS GRAFT, WITH LEFT HEART CATHETERIZATION;  Surgeon: Jamir Nam MD;  Location: [facility] CATH LAB;  Service: Cardiology;  Laterality: N/A;    CORONARY ANGIOGRAPHY INCLUDING BYPASS GRAFTS WITH CATHETERIZATION OF LEFT HEART N/A 10/9/2024    Procedure: ANGIOGRAM, CORONARY, INCLUDING BYPASS GRAFT, WITH LEFT HEART CATHETERIZATION;  Surgeon: Milly Cadena MD;  Location: [facility] CATH LAB;  Service: Cardiology;  Laterality: N/A;  rcfa  RN PREOP 10/4/2024    CORONARY ARTERY BYPASS GRAFT  2001    ESOPHAGOGASTRODUODENOSCOPY N/A 10/8/2020    Procedure: EGD (ESOPHAGOGASTRODUODENOSCOPY);  Surgeon: Bharath Herrera MD;  Location: [facility] ENDO;  Service: Endoscopy;  Laterality: N/A;    ESOPHAGOGASTRODUODENOSCOPY N/A 11/7/2023    Procedure: EGD (ESOPHAGOGASTRODUODENOSCOPY);  Surgeon: Bharath Herrera MD;  Location: [facility];  Service: Endoscopy;  Laterality: N/A;    HERNIA REPAIR      LEFT HEART CATHETERIZATION Left 3/4/2019    Procedure: Left heart cath RFA access, 4fr catheter/sheath, not before 9am;  Surgeon: Jamir Nam MD;  Location: [facility] CATH LAB;  Service: Cardiology;  Laterality: Left;    TRANSRECTAL ULTRASOUND OF PROSTATE WITH INSERTION OF GOLD FIDUCIAL MARKER N/A 2/26/2019    Procedure: ULTRASOUND, PROSTATE, RECTAL APPROACH, WITH GOLD FIDUCIAL MARKER INSERTION;  Surgeon: Layne Huff MD;  Location: [facility] OR;  Service: Urology;  Laterality: N/A;    UPPER GASTROINTESTINAL ENDOSCOPY      WRIST SURGERY       Family History   Adopted: Yes   Problem Relation Name Age of Onset    No Known Problems Mother      No Known Problems Father      No Known Problems Sister      No Known Problems Brother      No Known  Problems Maternal Aunt      No Known Problems Maternal Uncle      No Known Problems Paternal Aunt      No Known Problems Paternal Uncle      No Known Problems Maternal Grandmother      No Known Problems Maternal Grandfather      No Known Problems Paternal Grandmother      No Known Problems Paternal Grandfather      No Known Problems Other      Amblyopia Neg Hx      Blindness Neg Hx      Cancer Neg Hx      Cataracts Neg Hx      Diabetes Neg Hx      Glaucoma Neg Hx      Hypertension Neg Hx      Macular degeneration Neg Hx      Retinal detachment Neg Hx      Strabismus Neg Hx      Stroke Neg Hx      Thyroid disease Neg Hx       Social History[1]    Physical Exam     Initial Vitals [07/22/25 1103]   BP Pulse Resp Temp SpO2   (!) 144/88 84 (!) 21 97.7 °F (36.5 °C) (!) 87 %      MAP       --         Physical Exam    Constitutional: He appears well-developed and well-nourished. He is not diaphoretic. No distress.   HENT:   Head: Normocephalic and atraumatic.   Eyes: EOM are normal. Pupils are equal, round, and reactive to light.   Cardiovascular:  Regular rhythm.           Tachycardia   Pulmonary/Chest: He is in respiratory distress. He has wheezes.   Diffusely diminished breath sounds; crackles and wheezing   Abdominal: Abdomen is soft. He exhibits no distension. There is no abdominal tenderness.   Musculoskeletal:         General: No tenderness or edema. Normal range of motion.     Neurological: He is alert.   Skin: Skin is warm and dry.   Psychiatric: He has a normal mood and affect. Thought content normal.         ED Course   Procedures  Labs Reviewed   COMPREHENSIVE METABOLIC PANEL - Abnormal       Result Value    Sodium 146 (*)     Potassium 3.9      Chloride 106      CO2 28      Glucose 102      BUN 12      Creatinine 1.2      Calcium 9.3      Protein Total 7.8      Albumin 3.8      Bilirubin Total 0.4      ALP 76      AST 21      ALT 16      Anion Gap 12      eGFR 60 (*)    CBC WITH DIFFERENTIAL - Abnormal    WBC  8.19      RBC 4.99      HGB 15.5      HCT 48.8      MCV 98      MCH 31.1 (*)     MCHC 31.8 (*)     RDW 14.4      Platelet Count 205      MPV 9.6      Nucleated RBC 0      Neut % 75.9 (*)     Lymph % 15.3 (*)     Mono % 4.6      Eos % 3.8      Basophil % 0.2      Imm Grans % 0.2      Neut # 6.21      Lymph # 1.25      Mono # 0.38      Eos # 0.31      Baso # 0.02      Imm Grans # 0.02     B-TYPE NATRIURETIC PEPTIDE - Abnormal     (*)    LACTIC ACID, PLASMA - Abnormal    Lactic Acid Level 3.1 (*)     Narrative:     Falsely low lactic acid results can be found in samples containing >=13.0 mg/dL total bilirubin and/or >=3.5 mg/dL direct bilirubin.    URINALYSIS, REFLEX TO URINE CULTURE - Abnormal    Color, UA Yellow      Appearance, UA Clear      pH, UA 7.0      Spec Grav UA >=1.030 (*)     Protein, UA Negative      Glucose, UA 4+ (*)     Ketones, UA Negative      Bilirubin, UA Negative      Blood, UA Negative      Nitrites, UA Negative      Urobilinogen, UA Negative      Leukocyte Esterase, UA 2+ (*)    URINALYSIS MICROSCOPIC - Abnormal    RBC, UA 4      WBC, UA 4      Bacteria, UA Few (*)     Yeast, UA None      Squamous Epithelial Cells, UA <1      Microscopic Comment       POCT GLUCOSE - Abnormal    POCT Glucose 112 (*)    ISTAT PROCEDURE - Abnormal    POC PH 7.315 (*)     POC PCO2 60.4 (*)     POC PO2 17 (*)     POC HCO3 30.7 (*)     POC BE 3 (*)     POC SATURATED O2 20      POC TCO2 33 (*)     Sample VENOUS      Site Other      Allens Test N/A     POCT GLUCOSE MONITORING CONTINUOUS - Abnormal    POC Glucose 180 (*)    TROPONIN I - Normal    Troponin-I <0.006     MAGNESIUM - Normal    Magnesium  2.0     CULTURE, RESPIRATORY    GRAM STAIN <10 Epithelial Cells/LPF      GRAM STAIN Many Gram positive cocci      GRAM STAIN Rare Gram Positive Rods      GRAM STAIN Rare Gram Negative Rods     CULTURE, BLOOD   CULTURE, BLOOD   CBC W/ AUTO DIFFERENTIAL    Narrative:     The following orders were created for panel order  CBC Auto Differential.  Procedure                               Abnormality         Status                     ---------                               -----------         ------                     CBC with Differential[[id number]]       Abnormal            Final result                 Please view results for these tests on the individual orders.   GREY TOP URINE HOLD   LACTIC ACID, PLASMA   SARS-COV-2 RDRP GENE    POC Rapid COVID Negative       Acceptable Yes     POCT INFLUENZA A/B MOLECULAR    POC Molecular Influenza A Ag Negative      POC Molecular Influenza B Ag Negative       Acceptable Yes     ISTAT LACTATE    POC Lactate 1.61      Sample VENOUS      Site Other      Allens Test N/A       EKG Readings: (Independently Interpreted)   Initial Reading: No STEMI. Rhythm: Normal Sinus Rhythm. Heart Rate: 74. Ectopy: No Ectopy. Conduction: 1st Degree AV Block. T Waves Flipped: AVR. Clinical Impression: Normal Sinus Rhythm and AV Block - 1st Degree     ECG Results              EKG 12-lead (Preliminary result)  Result time 07/22/25 12:29:16      Wet Read by Yane Pina MD (07/22/25 12:29:16, [facility], Emergency Medicine)    Sinus rhythm with first-degree AV block, rate 74 beats per minute, AR interval 270 milliseconds,  milliseconds, no STEMI.  Nonspecific ST changes noted in the lateral leads.  No STEMI.                                  Imaging Results              CTA Chest Non-Coronary (PE Studies) (Final result)  Result time 07/22/25 16:50:38      Final result by Yarely Fairchild MD (07/22/25 16:50:38)                   Impression:      Examination limited due to respiratory motion.  Allowing for this, there is no evidence of pulmonary embolism.    Patchy infiltrates in the right middle lobe and right lower lobe at the lung base, to be correlated clinically for infectious etiology.    Additional findings as above      Electronically signed by: Yarely  MD Gurinder  Date:    07/22/2025  Time:    16:50               Narrative:    EXAMINATION:  CTA CHEST NON CORONARY (PE STUDIES)    CLINICAL HISTORY:  Pulmonary embolism (PE) suspected, unknown D-dimer;    TECHNIQUE:  Low dose axial images, sagittal and coronal reformations were obtained from the thoracic inlet to the lung bases following the IV administration of 75 mL of Omnipaque 350.  Contrast timing was optimized to evaluate the pulmonary arteries.  MIP images were performed.    COMPARISON:  July 22, 2025 radiograph, CT April 28, 2025    FINDINGS:  There is some respiratory motion which degrades image quality particularly at the lung bases.    There is calcification along the wall of the thoracic aorta without aneurysm.  There is coronary artery calcification.    There are sternotomy wires.    No significant mediastinal lymphadenopathy is present.    Suspected hiatal hernia.    Cholelithiasis noted.    There is no evidence of pulmonary embolism within the limits of the patient motion.    There are patchy infiltrates within the right middle lobe and right lower lobe at the lung base.                                       X-Ray Chest 1 View (Final result)  Result time 07/22/25 11:43:39      Final result by Yarely Fairchild MD (07/22/25 11:43:39)                   Impression:      As above      Electronically signed by: Yarely Fairchild MD  Date:    07/22/2025  Time:    11:43               Narrative:    EXAMINATION:  XR CHEST 1 VIEW    CLINICAL HISTORY:  shortness of breath;    TECHNIQUE:  Single frontal view of the chest was performed.    COMPARISON:  January 5, 2024    FINDINGS:  Sternotomy wires are present.  Cardiac size not enlarged.  Calcification present along the wall of the aorta.  No significant pleural effusion noted.  There are mild bibasilar opacities, may represent developing infiltrate, edema or other etiology to be correlated with additional clinical findings.  Osseous structures show degenerative  change.                                       Medications   vancomycin - pharmacy to dose (has no administration in time range)   vancomycin 1,250 mg in 0.9% NaCl 250 mL IVPB (admixture device) (1,250 mg Intravenous Trough Due As Scheduled Before Dose 7/24/25 1200)   albuterol-ipratropium 2.5 mg-0.5 mg/3 mL nebulizer solution 3 mL (has no administration in time range)   ceFEPIme injection 1 g (has no administration in time range)   clopidogreL tablet 75 mg (has no administration in time range)   famotidine tablet 20 mg (20 mg Oral Not Given 7/22/25 1515)   ALPRAZolam tablet 0.5 mg (0.5 mg Oral Not Given 7/22/25 1634)   glucose chewable tablet 16 g (has no administration in time range)   glucose chewable tablet 24 g (has no administration in time range)   dextrose 50% injection 12.5 g (has no administration in time range)   dextrose 50% injection 25 g (has no administration in time range)   glucagon (human recombinant) injection 1 mg (has no administration in time range)   insulin aspart U-100 pen 0-10 Units (has no administration in time range)   predniSONE tablet 50 mg (has no administration in time range)   enoxaparin injection 40 mg (40 mg Subcutaneous Given 7/22/25 1646)   albuterol-ipratropium 2.5 mg-0.5 mg/3 mL nebulizer solution 3 mL (3 mLs Nebulization Given 7/22/25 1130)   piperacillin-tazobactam (ZOSYN) 4.5 g in D5W 100 mL IVPB (MB+) (0 g Intravenous Stopped 7/22/25 1308)   guaiFENesin 100 mg/5 ml syrup 200 mg (200 mg Oral Given 7/22/25 1217)   benzonatate capsule 100 mg (100 mg Oral Given 7/22/25 1219)   vancomycin 2,000 mg in 0.9% NaCl 500 mL IVPB (admixture device) (0 mg Intravenous Stopped 7/22/25 1552)   levalbuterol nebulizer solution 1.25 mg (1.25 mg Nebulization Given 7/22/25 1336)   methylPREDNISolone sodium succinate injection 125 mg (125 mg Intravenous Given 7/22/25 1357)   magnesium sulfate 2g in water 50mL IVPB (premix) (0 g Intravenous Stopped 7/22/25 7008)   iohexoL (OMNIPAQUE 350) injection  75 mL (75 mLs Intravenous Given 7/22/25 1520)     Medical Decision Making  Amount and/or Complexity of Data Reviewed  Labs: ordered. Decision-making details documented in ED Course.  Radiology: ordered. Decision-making details documented in ED Course.    Risk  OTC drugs.  Prescription drug management.  Decision regarding hospitalization.    Patient is a an 84-year-old male with a history of hypertension, CAD s/p CABG, diabetes presenting to the ED with respiratory distress.     On presentation, patient in respiratory distress.  He is tachypneic and hypoxic to 87% on initial vital signs, later noted to be 80% on room air.  Reports acute worsening of symptoms has been progressing over the last couple of weeks, including cough and his shortness of breath.     Differential diagnosis including pneumonia, COPD exacerbation, viral infection, PE, CHF.    Sepsis workup initiated.  Broad-spectrum antibiotics given.  Chest x-ray ordered.  DuoNebs given with improvement in wheezing and crackles, but still with tachypnea/respiratory distress and diminished air movement.  Patient was placed on BiPAP and VBG was obtained. Xopinex and methylprednisolone given.     Fortunately, patient had no leukocytosis.  Initial lactate was normal.  VBG with a CO2 of 60.     Patient evaluated on BiPAP and was able to be taken off, doing well without positive pressure.  CTA shows pulmonary infiltrates, but no PE.    He was admitted to the ICU.             ED Course as of 07/22/25 1755   Tue Jul 22, 2025   1254 BNP(!): 189 [DR]   1254 Sodium(!): 146 [DR]   1254 Comprehensive Metabolic Panel(!)  Electrolytes grossly normal; normal renal function [DR]   1352 Urinalysis, Reflex to Urine Culture Urine, Clean Catch(!)  Non-infectious [DR]   1353 Patient reassessed after being placed on BiPAP.  Effort improved. [LH]   1655 X-Ray Chest 1 View [DR]   1703 Patient reassessed, he has been off of BiPAP for about an hour.  He is on 4 L of nasal cannula satting  95%. [LH]      ED Course User Index  [DR] Kiley Bowman DO  [] Yane Pina MD                               Clinical Impression:  Final diagnoses:  [R06.02] Shortness of breath  [Z13.6] Screening for cardiovascular condition  [J96.01] Acute respiratory failure with hypoxia (Primary)  [J18.9] Pneumonia due to infectious organism, unspecified laterality, unspecified part of lung  [J44.9] Chronic obstructive pulmonary disease, unspecified COPD type          ED Disposition Condition    Admit                       [1]   Social History  Tobacco Use    Smoking status: Former     Current packs/day: 0.00     Average packs/day: 2.0 packs/day for 23.0 years (46.0 ttl pk-yrs)     Types: Cigarettes     Start date: 1966     Quit date: 1989     Years since quittin.5    Smokeless tobacco: Never    Tobacco comments:     poor choice   Substance Use Topics    Alcohol use: Yes     Alcohol/week: 5.0 standard drinks of alcohol     Types: 3 Cans of beer, 2 Drinks containing 0.5 oz of alcohol per week     Comment: occassional    Drug use: No        Kiley Bowman DO  Resident  [phone]

## 2025-07-22 NOTE — ED NOTES
Pt's wife asked for a nurse to come to the bedside. Pt is having uncontrollable tremors and says his breathing is worse. Resp called to the bedside and MD notified.

## 2025-07-22 NOTE — SUBJECTIVE & OBJECTIVE
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    Cancer     Coronary artery disease     Depression     Diabetes mellitus type II     Disorder of kidney and ureter     Erectile dysfunction     Eye injuries     fb ou    GERD (gastroesophageal reflux disease)     Hypertension     Hypertensive heart disease with heart failure 2/14/2025    Intermediate stage nonexudative age-related macular degeneration of both eyes 4/9/2019    Myocardial infarction     Nuclear sclerosis of both eyes 4/9/2019    Prostate cancer     Trouble in sleeping     Type 2 diabetes mellitus     Type 2 diabetes mellitus with ophthalmic manifestations        Past Surgical History:   Procedure Laterality Date    COLONOSCOPY N/A 2/9/2018    Procedure: COLONOSCOPY;  Surgeon: Mathieu Cao MD;  Location: [facility];  Service: Endoscopy;  Laterality: N/A;    CORONARY ANGIOGRAPHY INCLUDING BYPASS GRAFTS WITH CATHETERIZATION OF LEFT HEART N/A 3/4/2019    Procedure: ANGIOGRAM, CORONARY, INCLUDING BYPASS GRAFT, WITH LEFT HEART CATHETERIZATION;  Surgeon: Jamir Nam MD;  Location: [facility] CATH LAB;  Service: Cardiology;  Laterality: N/A;    CORONARY ANGIOGRAPHY INCLUDING BYPASS GRAFTS WITH CATHETERIZATION OF LEFT HEART N/A 10/9/2024    Procedure: ANGIOGRAM, CORONARY, INCLUDING BYPASS GRAFT, WITH LEFT HEART CATHETERIZATION;  Surgeon: Milly Cadena MD;  Location: [facility] CATH LAB;  Service: Cardiology;  Laterality: N/A;  rcfa  RN PREOP 10/4/2024    CORONARY ARTERY BYPASS GRAFT  2001    ESOPHAGOGASTRODUODENOSCOPY N/A 10/8/2020    Procedure: EGD (ESOPHAGOGASTRODUODENOSCOPY);  Surgeon: Bharath Herrera MD;  Location: [facility];  Service: Endoscopy;  Laterality: N/A;    ESOPHAGOGASTRODUODENOSCOPY N/A 11/7/2023    Procedure: EGD (ESOPHAGOGASTRODUODENOSCOPY);  Surgeon: Bharath Herrera MD;  Location: [facility];  Service: Endoscopy;  Laterality: N/A;    HERNIA REPAIR       LEFT HEART CATHETERIZATION Left 3/4/2019    Procedure: Left heart cath RFA access, 4fr catheter/sheath, not before 9am;  Surgeon: Jamir Nam MD;  Location: [facility] CATH LAB;  Service: Cardiology;  Laterality: Left;    TRANSRECTAL ULTRASOUND OF PROSTATE WITH INSERTION OF GOLD FIDUCIAL MARKER N/A 2/26/2019    Procedure: ULTRASOUND, PROSTATE, RECTAL APPROACH, WITH GOLD FIDUCIAL MARKER INSERTION;  Surgeon: Layne Huff MD;  Location: [facility] OR;  Service: Urology;  Laterality: N/A;    UPPER GASTROINTESTINAL ENDOSCOPY      WRIST SURGERY         Review of patient's allergies indicates:   Allergen Reactions    Tamsulosin     Prochlorperazine      convulsions    Statins-hmg-coa reductase inhibitors Other (See Comments)     Muscle weakness       No current facility-administered medications on file prior to encounter.     Current Outpatient Medications on File Prior to Encounter   Medication Sig    albuterol (PROVENTIL/VENTOLIN HFA) 90 mcg/actuation inhaler Inhale 2 puffs into the lungs every 6 (six) hours as needed for Wheezing.    ALPRAZolam (XANAX) 1 MG tablet Take 1 tablet (1 mg total) by mouth nightly as needed for Anxiety.    aspirin (ECOTRIN) 81 MG EC tablet Take 1 tablet (81 mg total) by mouth once daily.    blood glucose control, low (TRUE METRIX LEVEL 1) Soln To use with blood glucose meter    blood sugar diagnostic (TRUE METRIX GLUCOSE TEST STRIP) Strp BID testing    blood-glucose meter (TRUE METRIX GLUCOSE METER) kit For twice daily checking    clopidogreL (PLAVIX) 75 mg tablet Take 1 tablet (75 mg total) by mouth once daily.    empagliflozin (JARDIANCE) 10 mg tablet Take 1 tablet (10 mg total) by mouth once daily.    ezetimibe (ZETIA) 10 mg tablet Take 1 tablet (10 mg total) by mouth once daily.    fluticasone-salmeterol diskus inhaler 250-50 mcg Inhale 1 puff into the lungs 2 (two) times daily. Controller    imiquimod (ALDARA) 5 % cream APPLY TOPICALLY 3 (THREE) TIMES A WEEK.    lancets Misc To  check BG 2 times daily, to use with insurance preferred meter    losartan (COZAAR) 100 MG tablet Take 1 tablet (100 mg total) by mouth once daily.    metFORMIN (GLUCOPHAGE) 500 MG tablet TAKE 1 TABLET EVERY DAY WITH BREAKFAST    metoprolol succinate (TOPROL-XL) 25 MG 24 hr tablet Take 0.5 tablets (12.5 mg total) by mouth once daily.    omeprazole (PRILOSEC) 40 MG capsule Take 1 capsule (40 mg total) by mouth 2 (two) times daily before meals.    spironolactone (ALDACTONE) 25 MG tablet Take 1 tablet (25 mg total) by mouth once daily.    testosterone (ANDROGEL) 20.25 mg/1.25 gram (1.62 %) GlPm Apply topically.    vit C/vit E ac/lut/copper/zinc (PRESERVISION LUTEIN ORAL) Take 1 tablet by mouth once daily.    vitamin D 1000 units Tab Take 1,000 Units by mouth once daily.     Family History       Problem Relation (Age of Onset)    No Known Problems Mother, Father, Sister, Brother, Maternal Aunt, Maternal Uncle, Paternal Aunt, Paternal Uncle, Maternal Grandmother, Maternal Grandfather, Paternal Grandmother, Paternal Grandfather, Other          Tobacco Use    Smoking status: Former     Current packs/day: 0.00     Average packs/day: 2.0 packs/day for 23.0 years (46.0 ttl pk-yrs)     Types: Cigarettes     Start date: 1966     Quit date: 1989     Years since quittin.5    Smokeless tobacco: Never    Tobacco comments:     poor choice   Substance and Sexual Activity    Alcohol use: Yes     Alcohol/week: 5.0 standard drinks of alcohol     Types: 3 Cans of beer, 2 Drinks containing 0.5 oz of alcohol per week     Comment: occassional    Drug use: No    Sexual activity: Yes     Partners: Female     Review of Systems   Constitutional:  Positive for activity change and appetite change.   HENT:  Negative for congestion and drooling.    Eyes:  Negative for discharge and itching.   Respiratory:  Positive for cough and shortness of breath.    Cardiovascular:  Negative for chest pain and leg swelling.   Gastrointestinal:   Negative for abdominal distention and abdominal pain.   Endocrine: Negative for cold intolerance and heat intolerance.   Genitourinary:  Negative for difficulty urinating and dysuria.   Musculoskeletal:  Negative for arthralgias.   Skin:  Negative for color change.   Neurological:  Positive for weakness.   Psychiatric/Behavioral:  Negative for agitation and behavioral problems.      Objective:     Vital Signs (Most Recent):  Temp: 97.7 °F (36.5 °C) (07/22/25 1103)  Pulse: 110 (07/22/25 1453)  Resp: (!) 29 (07/22/25 1336)  BP: (!) 160/75 (07/22/25 1434)  SpO2: 98 % (07/22/25 1453) Vital Signs (24h Range):  Temp:  [97.7 °F (36.5 °C)] 97.7 °F (36.5 °C)  Pulse:  [] 110  Resp:  [21-51] 29  SpO2:  [87 %-99 %] 98 %  BP: (117-170)/(60-88) 160/75     Weight: 74.4 kg (164 lb 0.4 oz)  Body mass index is 25.69 kg/m².     Physical Exam  HENT:      Head: Atraumatic.      Right Ear: There is no impacted cerumen.      Mouth/Throat:      Mouth: Mucous membranes are moist.   Eyes:      Extraocular Movements: Extraocular movements intact.      Pupils: Pupils are equal, round, and reactive to light.   Cardiovascular:      Rate and Rhythm: Normal rate and regular rhythm.   Pulmonary:      Breath sounds: Rales present.   Abdominal:      General: There is no distension.   Musculoskeletal:         General: No swelling or deformity.      Cervical back: Normal range of motion and neck supple.   Skin:     Coloration: Skin is not jaundiced.      Findings: No bruising.   Neurological:      General: No focal deficit present.      Mental Status: He is alert.   Psychiatric:         Mood and Affect: Mood normal.              CRANIAL NERVES     CN III, IV, VI   Pupils are equal, round, and reactive to light.       Significant Labs: All pertinent labs within the past 24 hours have been reviewed.  BMP:   Recent Labs   Lab 07/22/25  1214      *   K 3.9      CO2 28   BUN 12   CREATININE 1.2   CALCIUM 9.3   MG 2.0     CBC:    Recent Labs   Lab 07/22/25  1216   WBC 8.19   HGB 15.5   HCT 48.8        CMP:   Recent Labs   Lab 07/22/25  1214   *   K 3.9      CO2 28      BUN 12   CREATININE 1.2   CALCIUM 9.3   PROT 7.8   ALBUMIN 3.8   BILITOT 0.4   ALKPHOS 76   AST 21   ALT 16   ANIONGAP 12       Significant Imaging: I have reviewed all pertinent imaging results/findings within the past 24 hours.

## 2025-07-23 LAB
ABSOLUTE EOSINOPHIL (OHS): 0 K/UL
ABSOLUTE MONOCYTE (OHS): 0.67 K/UL (ref 0.3–1)
ABSOLUTE NEUTROPHIL COUNT (OHS): 13.4 K/UL (ref 1.8–7.7)
ANION GAP (OHS): 11 MMOL/L (ref 8–16)
BASOPHILS # BLD AUTO: 0.01 K/UL
BASOPHILS NFR BLD AUTO: 0.1 %
BUN SERPL-MCNC: 17 MG/DL (ref 8–23)
CALCIUM SERPL-MCNC: 8.5 MG/DL (ref 8.7–10.5)
CHLORIDE SERPL-SCNC: 107 MMOL/L (ref 95–110)
CO2 SERPL-SCNC: 22 MMOL/L (ref 23–29)
CREAT SERPL-MCNC: 1.1 MG/DL (ref 0.5–1.4)
ERYTHROCYTE [DISTWIDTH] IN BLOOD BY AUTOMATED COUNT: 14.1 % (ref 11.5–14.5)
GFR SERPLBLD CREATININE-BSD FMLA CKD-EPI: >60 ML/MIN/1.73/M2
GLUCOSE SERPL-MCNC: 158 MG/DL (ref 70–110)
HCT VFR BLD AUTO: 39.8 % (ref 40–54)
HGB BLD-MCNC: 12.8 GM/DL (ref 14–18)
HOLD SPECIMEN: NORMAL
IMM GRANULOCYTES # BLD AUTO: 0.1 K/UL (ref 0–0.04)
IMM GRANULOCYTES NFR BLD AUTO: 0.7 % (ref 0–0.5)
LYMPHOCYTES # BLD AUTO: 0.52 K/UL (ref 1–4.8)
MCH RBC QN AUTO: 30.7 PG (ref 27–31)
MCHC RBC AUTO-ENTMCNC: 32.2 G/DL (ref 32–36)
MCV RBC AUTO: 95 FL (ref 82–98)
NUCLEATED RBC (/100WBC) (OHS): 0 /100 WBC
PLATELET # BLD AUTO: 170 K/UL (ref 150–450)
PMV BLD AUTO: 9.8 FL (ref 9.2–12.9)
POCT GLUCOSE: 180 MG/DL (ref 70–110)
POCT GLUCOSE: 281 MG/DL (ref 70–110)
POTASSIUM SERPL-SCNC: 4.1 MMOL/L (ref 3.5–5.1)
RBC # BLD AUTO: 4.17 M/UL (ref 4.6–6.2)
RELATIVE EOSINOPHIL (OHS): 0 %
RELATIVE LYMPHOCYTE (OHS): 3.5 % (ref 18–48)
RELATIVE MONOCYTE (OHS): 4.6 % (ref 4–15)
RELATIVE NEUTROPHIL (OHS): 91.1 % (ref 38–73)
SODIUM SERPL-SCNC: 140 MMOL/L (ref 136–145)
WBC # BLD AUTO: 14.7 K/UL (ref 3.9–12.7)

## 2025-07-23 PROCEDURE — 63600175 PHARM REV CODE 636 W HCPCS: Performed by: EMERGENCY MEDICINE

## 2025-07-23 PROCEDURE — 25000003 PHARM REV CODE 250: Performed by: HOSPITALIST

## 2025-07-23 PROCEDURE — 63600175 PHARM REV CODE 636 W HCPCS: Performed by: HOSPITALIST

## 2025-07-23 PROCEDURE — 94761 N-INVAS EAR/PLS OXIMETRY MLT: CPT

## 2025-07-23 PROCEDURE — 97161 PT EVAL LOW COMPLEX 20 MIN: CPT

## 2025-07-23 PROCEDURE — 80048 BASIC METABOLIC PNL TOTAL CA: CPT | Performed by: HOSPITALIST

## 2025-07-23 PROCEDURE — 12000002 HC ACUTE/MED SURGE SEMI-PRIVATE ROOM

## 2025-07-23 PROCEDURE — 94664 DEMO&/EVAL PT USE INHALER: CPT

## 2025-07-23 PROCEDURE — 36415 COLL VENOUS BLD VENIPUNCTURE: CPT | Performed by: HOSPITALIST

## 2025-07-23 PROCEDURE — 25000242 PHARM REV CODE 250 ALT 637 W/ HCPCS: Performed by: HOSPITALIST

## 2025-07-23 PROCEDURE — 94640 AIRWAY INHALATION TREATMENT: CPT

## 2025-07-23 PROCEDURE — 85025 COMPLETE CBC W/AUTO DIFF WBC: CPT | Performed by: HOSPITALIST

## 2025-07-23 PROCEDURE — 97166 OT EVAL MOD COMPLEX 45 MIN: CPT

## 2025-07-23 PROCEDURE — 25000003 PHARM REV CODE 250: Performed by: EMERGENCY MEDICINE

## 2025-07-23 PROCEDURE — 99900035 HC TECH TIME PER 15 MIN (STAT)

## 2025-07-23 PROCEDURE — 94660 CPAP INITIATION&MGMT: CPT

## 2025-07-23 PROCEDURE — 97530 THERAPEUTIC ACTIVITIES: CPT

## 2025-07-23 PROCEDURE — 27000221 HC OXYGEN, UP TO 24 HOURS

## 2025-07-23 RX ORDER — GUAIFENESIN AND DEXTROMETHORPHAN HYDROBROMIDE 10; 100 MG/5ML; MG/5ML
10 SYRUP ORAL EVERY 6 HOURS PRN
Status: DISCONTINUED | OUTPATIENT
Start: 2025-07-23 | End: 2025-07-25 | Stop reason: HOSPADM

## 2025-07-23 RX ADMIN — INSULIN ASPART 2 UNITS: 100 INJECTION, SOLUTION INTRAVENOUS; SUBCUTANEOUS at 04:07

## 2025-07-23 RX ADMIN — IPRATROPIUM BROMIDE AND ALBUTEROL SULFATE 3 ML: 2.5; .5 SOLUTION RESPIRATORY (INHALATION) at 08:07

## 2025-07-23 RX ADMIN — CEFEPIME 1 G: 1 INJECTION, POWDER, FOR SOLUTION INTRAMUSCULAR; INTRAVENOUS at 11:07

## 2025-07-23 RX ADMIN — PREDNISONE 50 MG: 20 TABLET ORAL at 08:07

## 2025-07-23 RX ADMIN — CLOPIDOGREL BISULFATE 75 MG: 75 TABLET, FILM COATED ORAL at 08:07

## 2025-07-23 RX ADMIN — CEFEPIME 1 G: 1 INJECTION, POWDER, FOR SOLUTION INTRAMUSCULAR; INTRAVENOUS at 08:07

## 2025-07-23 RX ADMIN — CEFEPIME 1 G: 1 INJECTION, POWDER, FOR SOLUTION INTRAMUSCULAR; INTRAVENOUS at 04:07

## 2025-07-23 RX ADMIN — INSULIN ASPART 6 UNITS: 100 INJECTION, SOLUTION INTRAVENOUS; SUBCUTANEOUS at 11:07

## 2025-07-23 RX ADMIN — ALPRAZOLAM 0.5 MG: 0.5 TABLET ORAL at 08:07

## 2025-07-23 RX ADMIN — VANCOMYCIN HYDROCHLORIDE 1250 MG: 1.25 INJECTION, POWDER, LYOPHILIZED, FOR SOLUTION INTRAVENOUS at 12:07

## 2025-07-23 RX ADMIN — IPRATROPIUM BROMIDE AND ALBUTEROL SULFATE 3 ML: 2.5; .5 SOLUTION RESPIRATORY (INHALATION) at 02:07

## 2025-07-23 RX ADMIN — FAMOTIDINE 20 MG: 20 TABLET, FILM COATED ORAL at 08:07

## 2025-07-23 RX ADMIN — ENOXAPARIN SODIUM 40 MG: 40 INJECTION SUBCUTANEOUS at 04:07

## 2025-07-23 NOTE — PLAN OF CARE
Case Management Assessment     PCP: Samir Boo MD   Pharmacy:   [facility]/pharmacy #[id number] - [place] - [address] [place] [address]  Phone: [phone] Fax: [phone]       Patient Arrived From: home  Existing Help at Home: spouse if needed    Barriers to Discharge: none    Discharge Plan:    A. Home with spouse   B. Home with spouse      Patient reports that he lives with his spouse, Ms Santiago.  He is independent and drives.  He does not use any DME.  Ms Santiago will help him to get home at time of DC.          07/23/25 1423   Discharge Assessment   Assessment Type Discharge Planning Assessment   Confirmed/corrected address, phone number and insurance Yes   Confirmed Demographics Correct on Facesheet   Source of Information patient   Communicated WOODY with patient/caregiver Date not available/Unable to determine   People in Home spouse   Name(s) of People in Home [place], spouse   Do you expect to return to your current living situation? Yes   Do you have help at home or someone to help you manage your care at home? Yes   Who are your caregiver(s) and their phone number(s)? spouse able to help as needed   Prior to hospitilization cognitive status: Alert/Oriented   Current cognitive status: Alert/Oriented   Walking or Climbing Stairs Difficulty no   Dressing/Bathing Difficulty no   Home Accessibility stairs to enter home;stairs within home   Number of Stairs, Within Home, Primary ten   Surface of Stairs, Within Home, Primary carpeting   Stair Railings, Within Home, Primary railings safe and in good condition   Landing, Stairs, Within Home, Primary adequate turning radius   Number of Stairs, Main Entrance two   Surface of Stairs, Main Entrance concrete   Stair Railings, Main Entrance none   Home Layout Bedroom on 2nd floor   Equipment Currently Used at Home none   Readmission within 30 days? No   Patient currently being followed by outpatient case management? No   Do you currently have  service(s) that help you manage your care at home? No   Do you take prescription medications? Yes   Do you have prescription coverage? Yes   Do you have any problems affording any of your prescribed medications? No   Is the patient taking medications as prescribed? yes   Who is going to help you get home at discharge? spouse, Duck   How do you get to doctors appointments? car, drives self   Are you on dialysis? No   Do you take coumadin? No   Discharge Plan A Home with family   Discharge Plan B Home with family   DME Needed Upon Discharge    (tbd)   Discharge Plan discussed with: Patient   Transition of Care Barriers None

## 2025-07-23 NOTE — ASSESSMENT & PLAN NOTE
Patient's blood pressure range in the last 24 hours was: BP  Min: 112/58  Max: 170/82.The patient's inpatient anti-hypertensive regimen is listed below:  Current Antihypertensives       Plan  - BP is controlled, no changes needed to their regimen  -

## 2025-07-23 NOTE — PT/OT/SLP EVAL
Occupational Therapy Evaluation and Treatment    Name: Eric Jackson  MRN: [id number]  Admitting Diagnosis: Acute respiratory failure with hypoxia and hypercarbia  Recent Surgery: * No surgery found *      Recommendations:     Discharge Recommendations: No Therapy Indicated  Discharge Equipment Recommendations: none  Barriers to discharge: None    Assessment:     Eric Jackson is a 84 y.o. male with a medical diagnosis of Acute respiratory failure with hypoxia and hypercarbia. He presents with performance deficits affecting function including impaired endurance, impaired functional mobility, impaired balance, impaired cardiopulmonary response to activity.     Rehab Prognosis: Good; patient would benefit from acute OT services to address these deficits and reach maximum level of function.    Plan:     Patient to be seen 3 x/week to address the above listed problems via self-care/home management, therapeutic activities, therapeutic exercises  Plan of Care Expires: 08/06/25  Plan of Care Reviewed with: patient    Subjective     Chief Complaint: None stated  Patient Comments/Goals: Return home at [facility]  Pain/Comfort:  Pain Rating 1: 0/10    Patients cultural, spiritual, Advent conflicts given the current situation:      Social History:  Living Environment: Patient lives with their spouse in a 2 story home with number of outside stair(s): 2 and number of inside stair(s): 1 flight  Prior Level of Function: Prior to admission, patient was independent  Roles and Routines: Patient was performing selfcare, driving and retired prior to admission.  Equipment Used at Home: none  DME owned (not currently used): none  Assistance Upon Discharge: significant other    Objective:     Communicated with Nurse Singh prior to session. Patient found HOB elevated with blood pressure cuff, oxygen, pulse ox (continuous), telemetry upon OT entry to room.    General Precautions: Standard,     Orthopedic Precautions: N/A   Braces: N/A     Respiratory Status: Nasal cannula, flow 3 L/min    Occupational Performance      Bed Mobility:   Supine to sit from right side of bed with supervision    Functional Mobility/Transfers:  Sit <> Stand Transfer with contact guard assistance with no AD  Bed <> Chair Transfer using Step Transfer technique with contact guard assistance with no AD      Activities of Daily Living:  Grooming: Setup assistance at chair level.  Upper Body Dressing: Shaniqua to adjust back of gown for privacy prior to mobility task.  Lower Body Dressing: ModA to don slip in shoes    Cognitive/Visual Perceptual:  Cognitive/Psychosocial Skills:    -     Oriented to: Person, Place, Time, Situation  -     Follows Commands/attention: Follows multistep  commands  -     Safety awareness/insight to disability: VC provided for increased safety during t/fs and mobility tasks  -     Mood/Affect/Coping skills/emotional control: Cooperative  Visual/Perceptual:    -     Intact    Physical Exam:  Balance: -     Sitting: independence  -     Standing: contact guard assistance  Upper Extremity Range of Motion:     -       Right Upper Extremity: WFL  -       Left Upper Extremity: WFL  Upper Extremity Strength:    -       Right Upper Extremity: WFL  -       Left Upper Extremity: WFL    AMPAC 6 Click ADL:  AMPAC Total Score: 21    Treatment & Education:  Therapist provided facilitation and instruction of proper body mechanics, energy conservation, and fall prevention strategies during tasks listed above  Patient educated on role of OT, POC, and goals for therapy  Patient educated on importance of OOB activities with staff member assistance and sitting OOB majority of the day    Patient clear to ambulate to/from bathroom with RN/PCT, assist x1.    Patient left up in chair with all lines intact, call button in reach, and RN notified.    GOALS:   Multidisciplinary Problems       Occupational Therapy Goals          Problem: Occupational Therapy    Goal Priority  Disciplines Outcome Interventions   Occupational Therapy Goal     OT, PT/OT Progressing    Description: Goals to be met by: 08/06/2025     Patient will increase functional independence with ADLs by performing:    LE Dressing with [place].  Grooming while standing with [place].  Toileting from toilet with [place] for hygiene and clothing management.   Toilet transfer to toilet with [place].  Upper extremity exercise program 2x10 reps per handout, with independence.                         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    Cancer     Coronary artery disease     Depression     Diabetes mellitus type II     Disorder of kidney and ureter     Erectile dysfunction     Eye injuries     fb ou    GERD (gastroesophageal reflux disease)     Hypertension     Hypertensive heart disease with heart failure 2/14/2025    Intermediate stage nonexudative age-related macular degeneration of both eyes 4/9/2019    Myocardial infarction     Nuclear sclerosis of both eyes 4/9/2019    Prostate cancer     Trouble in sleeping     Type 2 diabetes mellitus     Type 2 diabetes mellitus with ophthalmic manifestations          Past Surgical History:   Procedure Laterality Date    COLONOSCOPY N/A 2/9/2018    Procedure: COLONOSCOPY;  Surgeon: Mathieu Cao MD;  Location: [facility] ENDO;  Service: Endoscopy;  Laterality: N/A;    CORONARY ANGIOGRAPHY INCLUDING BYPASS GRAFTS WITH CATHETERIZATION OF LEFT HEART N/A 3/4/2019    Procedure: ANGIOGRAM, CORONARY, INCLUDING BYPASS GRAFT, WITH LEFT HEART CATHETERIZATION;  Surgeon: Jamir Nam MD;  Location: [facility] CATH LAB;  Service: Cardiology;  Laterality: N/A;    CORONARY ANGIOGRAPHY INCLUDING BYPASS GRAFTS WITH CATHETERIZATION OF LEFT HEART N/A 10/9/2024    Procedure: ANGIOGRAM, CORONARY, INCLUDING BYPASS  GRAFT, WITH LEFT HEART CATHETERIZATION;  Surgeon: Milly Cadena MD;  Location: [facility] CATH LAB;  Service: Cardiology;  Laterality: N/A;  rcfa  RN PREOP 10/4/2024    CORONARY ARTERY BYPASS GRAFT  2001    ESOPHAGOGASTRODUODENOSCOPY N/A 10/8/2020    Procedure: EGD (ESOPHAGOGASTRODUODENOSCOPY);  Surgeon: Bharath Herrera MD;  Location: [facility] ENDO;  Service: Endoscopy;  Laterality: N/A;    ESOPHAGOGASTRODUODENOSCOPY N/A 11/7/2023    Procedure: EGD (ESOPHAGOGASTRODUODENOSCOPY);  Surgeon: Bharath Herrera MD;  Location: [facility] ENDO;  Service: Endoscopy;  Laterality: N/A;    HERNIA REPAIR      LEFT HEART CATHETERIZATION Left 3/4/2019    Procedure: Left heart cath RFA access, 4fr catheter/sheath, not before 9am;  Surgeon: Jamir Nam MD;  Location: [facility] CATH LAB;  Service: Cardiology;  Laterality: Left;    TRANSRECTAL ULTRASOUND OF PROSTATE WITH INSERTION OF GOLD FIDUCIAL MARKER N/A 2/26/2019    Procedure: ULTRASOUND, PROSTATE, RECTAL APPROACH, WITH GOLD FIDUCIAL MARKER INSERTION;  Surgeon: Layne Huff MD;  Location: [facility] OR;  Service: Urology;  Laterality: N/A;    UPPER GASTROINTESTINAL ENDOSCOPY      WRIST SURGERY         Time Tracking:     OT Date of Treatment: 07/23/25  OT Start Time: 1510  OT Stop Time: 1526  OT Total Time (min): 16 min    Billable Minutes: Evaluation 8 and Therapeutic Activity 8    Co treat with PT    7/23/2025

## 2025-07-23 NOTE — EICU
Virtual ICU Quality Rounds    Admit Date: 7/22/2025  Hospital Day: 1    ICU Day: 14h    24H Vital Sign Range:  Temp:  [97.7 °F (36.5 °C)-98.4 °F (36.9 °C)]   Pulse:  []   Resp:  [18-51]   BP: (112-170)/(54-88)   SpO2:  [87 %-100 %]     VICU Surveillance Screening  LDA reconciliation : Yes

## 2025-07-23 NOTE — ASSESSMENT & PLAN NOTE
"Patient has a diagnosis of pneumonia. The cause of the pneumonia is suspected to be bacterial in etiology but organism is not known. The pneumonia is worsening due to hypoxia. The patient has the following signs/symptoms of pneumonia: persistent hypoxia , cough, and shortness of breath. The patient does have a current oxygen requirement and the patient does have a home oxygen requirement. I have reviewed the pertinent imaging. The following cultures have been collected: Blood cultures and Sputum culture The culture results are listed below.     Current antimicrobial regimen consists of the antibiotics listed below. Will monitor patient closely and continue current treatment plan unchanged.    Antibiotics (From admission, onward)      Start     Stop Route Frequency Ordered    07/23/25 1300  vancomycin 1,250 mg in 0.9% NaCl 250 mL IVPB (admixture device)         -- IV Every 24 hours (non-standard times) 07/22/25 1420    07/22/25 2000  ceFEPIme injection 1 g         -- IV Every 8 hours (non-standard times) 07/22/25 1505    07/22/25 1234  vancomycin - pharmacy to dose  (Sepsis Workup)        Placed in "And" Linked Group    -- IV pharmacy to manage frequency 07/22/25 1134            Microbiology Results (last 7 days)       Procedure Component Value Units Date/Time    Culture, Respiratory with Gram Stain [[id number]]  (Abnormal) Collected: 07/22/25 1316    Order Status: Completed Specimen: Respiratory from Tracheal Aspirate Updated: 07/23/25 0757     Respiratory Culture Many Presumptive E Coli     GRAM STAIN <10 Epithelial Cells/LPF      Many Gram positive cocci      Rare Gram Positive Rods      Rare Gram Negative Rods    Blood culture x two cultures. Draw prior to antibiotics. [[id number]]  (Normal) Collected: 07/22/25 1217    Order Status: Completed Specimen: Blood from Peripheral, Forearm, Right Updated: 07/22/25 1902     Blood Culture No Growth After 6 Hours    Blood culture x two cultures. Draw prior to antibiotics. " [[id number]]  (Normal) Collected: 07/22/25 1215    Order Status: Completed Specimen: Blood from Peripheral, Antecubital, Left Updated: 07/22/25 1902     Blood Culture No Growth After 6 Hours        Sputum culture growing Ecoli,,

## 2025-07-23 NOTE — ASSESSMENT & PLAN NOTE
Creatine stable for now. BMP reviewed- noted Estimated Creatinine Clearance: 48.4 mL/min (based on SCr of 1.1 mg/dL). according to latest data. Based on current GFR, CKD stage is stage 3 - GFR 30-59.  Monitor UOP and serial BMP and adjust therapy as needed. Renally dose meds. Avoid nephrotoxic medications and procedures.

## 2025-07-23 NOTE — PLAN OF CARE
Problem: Occupational Therapy  Goal: Occupational Therapy Goal  Description: Goals to be met by: 08/06/2025     Patient will increase functional independence with ADLs by performing:    LE Dressing with [place].  Grooming while standing with [place].  Toileting from toilet with [place] for hygiene and clothing management.   Toilet transfer to toilet with [place].  Upper extremity exercise program 2x10 reps per handout, with independence.    Outcome: Progressing     Chart reviewed eval completed. See OT eval for detailed assessment.

## 2025-07-23 NOTE — PT/OT/SLP EVAL
Physical Therapy Evaluation     Patient Name: Eric Jackson   MRN: [id number]  Recent Surgery: * No surgery found *      Recommendations:     Discharge Recommendations: No Therapy Indicated   Discharge Equipment Recommendations: none       Assessment:     Eric Jackson is a 84 y.o. male admitted with a medical diagnosis of Acute respiratory failure with hypoxia and hypercarbia. He presents with the following impairments/functional limitations: impaired functional mobility, impaired endurance.     Rehab Prognosis: Good; patient would benefit from acute PT services to address these deficits and reach maximum level of function.    Plan:     During this hospitalization, patient to be seen 3 x/week to address the above listed problems via gait training, therapeutic activities, therapeutic exercises    Plan of Care Expires: 07/25/25    Subjective     Chief Complaint: None  Patient Comments/Goals: Pt agreeable to therapy evaluation  Pain/Comfort:  Pain Rating 1: 0/10    Social History:  Living Environment: Patient lives with their spouse in a 2 story home with number of outside stair(s): 2 wide and number of inside stair(s): 1 flight (R) HR.  Prior Level of Function: Prior to admission, patient was independent  Equipment Used at Home: none  DME owned (not currently used): none  Assistance Upon Discharge: none (not needed)    Objective:     Communicated with nurse, prior to session. Patient found HOB elevated with blood pressure cuff, oxygen, pulse ox (continuous), telemetry upon PT entry to room.    General Precautions: Standard,     Orthopedic Precautions: N/A   Braces: N/A    Respiratory Status: Nasal cannula, flow 3 L/min    Exams:  Cognition: Patient is oriented to Person, Place, Time, Situation  RLE ROM: WFL  RLE Strength: WFL  LLE ROM: WFL  LLE Strength: WFL  Sensation:    -       Intact  light/touch BLEs    Functional Mobility:  Gait belt applied - Yes  Bed Mobility  Supine to Sit: supervision for LE management  and trunk management  Transfers  Sit to Stand: contact guard assistance with no AD  Gait  Patient ambulated 5' with no AD and contact guard assistance. Patient demonstrates steady gait.   Balance  Sitting: independence  Standing: contact guard assistance      Therapeutic Activities and Exercises:   Patient educated on role of acute care PT and PT POC and call light usage  Patient educated about importance of OOB mobility and remaining up in chair most of the day.      AM-PAC 6 CLICK MOBILITY  Total Score:21    Patient left up in chair with all lines intact, call button in reach, RN notified, and all needs in reach.    GOALS:   Multidisciplinary Problems       Physical Therapy Goals          Problem: Physical Therapy    Goal Priority Disciplines Outcome Interventions   Physical Therapy Goal     PT, PT/OT Progressing    Description: Goals to be met by: 25     Patient will increase functional independence with mobility by performin. Pt to be mod I with transfers.  2. Pt to ambulate 150' /s AD and (S).                         DME Justifications:  No DME recommended requiring DME justifications    History:     Past Medical History:   Diagnosis Date    Anemia     Angina pectoris     Anxiety     Asthma-COPD overlap syndrome 2025    Cancer     Coronary artery disease     Depression     Diabetes mellitus type II     Disorder of kidney and ureter     Erectile dysfunction     Eye injuries     fb ou    GERD (gastroesophageal reflux disease)     Hypertension     Hypertensive heart disease with heart failure 2025    Intermediate stage nonexudative age-related macular degeneration of both eyes 2019    Myocardial infarction     Nuclear sclerosis of both eyes 2019    Prostate cancer     Trouble in sleeping     Type 2 diabetes mellitus     Type 2 diabetes mellitus with ophthalmic manifestations        Past Surgical History:   Procedure Laterality Date    COLONOSCOPY N/A 2018    Procedure:  COLONOSCOPY;  Surgeon: Mathieu Cao MD;  Location: [facility] ENDO;  Service: Endoscopy;  Laterality: N/A;    CORONARY ANGIOGRAPHY INCLUDING BYPASS GRAFTS WITH CATHETERIZATION OF LEFT HEART N/A 3/4/2019    Procedure: ANGIOGRAM, CORONARY, INCLUDING BYPASS GRAFT, WITH LEFT HEART CATHETERIZATION;  Surgeon: Jamri Nam MD;  Location: [facility] CATH LAB;  Service: Cardiology;  Laterality: N/A;    CORONARY ANGIOGRAPHY INCLUDING BYPASS GRAFTS WITH CATHETERIZATION OF LEFT HEART N/A 10/9/2024    Procedure: ANGIOGRAM, CORONARY, INCLUDING BYPASS GRAFT, WITH LEFT HEART CATHETERIZATION;  Surgeon: Milly Cadena MD;  Location: [facility] CATH LAB;  Service: Cardiology;  Laterality: N/A;  rcfa  RN PREOP 10/4/2024    CORONARY ARTERY BYPASS GRAFT  2001    ESOPHAGOGASTRODUODENOSCOPY N/A 10/8/2020    Procedure: EGD (ESOPHAGOGASTRODUODENOSCOPY);  Surgeon: Bharath Herrera MD;  Location: [facility] ENDO;  Service: Endoscopy;  Laterality: N/A;    ESOPHAGOGASTRODUODENOSCOPY N/A 11/7/2023    Procedure: EGD (ESOPHAGOGASTRODUODENOSCOPY);  Surgeon: Bharath Herrera MD;  Location: [facility] ENDO;  Service: Endoscopy;  Laterality: N/A;    HERNIA REPAIR      LEFT HEART CATHETERIZATION Left 3/4/2019    Procedure: Left heart cath RFA access, 4fr catheter/sheath, not before 9am;  Surgeon: Jamir Nam MD;  Location: [facility] CATH LAB;  Service: Cardiology;  Laterality: Left;    TRANSRECTAL ULTRASOUND OF PROSTATE WITH INSERTION OF GOLD FIDUCIAL MARKER N/A 2/26/2019    Procedure: ULTRASOUND, PROSTATE, RECTAL APPROACH, WITH GOLD FIDUCIAL MARKER INSERTION;  Surgeon: Layne Huff MD;  Location: [facility] OR;  Service: Urology;  Laterality: N/A;    UPPER GASTROINTESTINAL ENDOSCOPY      WRIST SURGERY         Time Tracking:     PT Received On: 07/23/25  PT Start Time: 1510  PT Stop Time: 1526  PT Total Time (min): 16 min     Billable Minutes: Evaluation 16 (Co-eval with SUNG Hernandez)    7/23/2025

## 2025-07-23 NOTE — SUBJECTIVE & OBJECTIVE
Interval History: ,CTA chest show no PE,but pneumonia,respiratory stress is much improved,stable on 3 liter NC O 2,consulted PT,OT.  Sputum culture growing  Ecoli.    Review of Systems   Constitutional:  Positive for activity change and appetite change.   Respiratory:  Positive for cough and shortness of breath.    Gastrointestinal:  Negative for abdominal distention and abdominal pain.   Musculoskeletal:  Negative for arthralgias.   Neurological:  Positive for weakness.     Objective:     Vital Signs (Most Recent):  Temp: 97.9 °F (36.6 °C) (07/23/25 0720)  Pulse: 67 (07/23/25 0900)  Resp: 18 (07/23/25 0900)  BP: (!) 113/53 (07/23/25 0900)  SpO2: 97 % (07/23/25 0900) Vital Signs (24h Range):  Temp:  [97.7 °F (36.5 °C)-98.4 °F (36.9 °C)] 97.9 °F (36.6 °C)  Pulse:  [] 67  Resp:  [18-51] 18  SpO2:  [87 %-100 %] 97 %  BP: (112-170)/(53-88) 113/53     Weight: 74.5 kg (164 lb 3.9 oz)  Body mass index is 25.72 kg/m².    Intake/Output Summary (Last 24 hours) at 7/23/2025 0937  Last data filed at 7/23/2025 0849  Gross per 24 hour   Intake 1805 ml   Output 975 ml   Net 830 ml         Physical Exam  Cardiovascular:      Rate and Rhythm: Normal rate.      Heart sounds: No murmur heard.  Pulmonary:      Effort: No respiratory distress.      Breath sounds: Rales present.   Abdominal:      General: There is no distension.   Skin:     Coloration: Skin is not jaundiced.   Neurological:      Mental Status: He is alert.               Significant Labs: All pertinent labs within the past 24 hours have been reviewed.  BMP:   Recent Labs   Lab 07/22/25  1214 07/23/25  0431    158*   * 140   K 3.9 4.1    107   CO2 28 22*   BUN 12 17   CREATININE 1.2 1.1   CALCIUM 9.3 8.5*   MG 2.0  --      CBC:   Recent Labs   Lab 07/22/25  1216 07/23/25  0431   WBC 8.19 14.70*   HGB 15.5 12.8*   HCT 48.8 39.8*    170     CMP:   Recent Labs   Lab 07/22/25  1214 07/23/25  0431   * 140   K 3.9 4.1    107   CO2 28  22*    158*   BUN 12 17   CREATININE 1.2 1.1   CALCIUM 9.3 8.5*   PROT 7.8  --    ALBUMIN 3.8  --    BILITOT 0.4  --    ALKPHOS 76  --    AST 21  --    ALT 16  --    ANIONGAP 12 11       Significant Imaging: I have reviewed all pertinent imaging results/findings within the past 24 hours.

## 2025-07-23 NOTE — PLAN OF CARE
Problem: Hospitalized Older Adult  Goal: Optimal Cognitive Function  Outcome: Progressing  Goal: Effective Bowel Elimination  Outcome: Progressing  Goal: Optimal Coping  Outcome: Progressing  Goal: Fluid and Electrolyte Balance  Outcome: Progressing  Goal: Optimal Functional Ability  Outcome: Progressing  Goal: Improved Oral Intake  Outcome: Progressing  Goal: Adequate Sleep/Rest  Outcome: Progressing  Goal: Effective Urinary Elimination  Outcome: Progressing     Problem: Adult Inpatient Plan of Care  Goal: Plan of Care Review  Outcome: Progressing  Goal: Patient-Specific Goal (Individualized)  Outcome: Progressing  Goal: Absence of Hospital-Acquired Illness or Injury  Outcome: Progressing  Goal: Optimal Comfort and Wellbeing  Outcome: Progressing  Goal: Readiness for Transition of Care  Outcome: Progressing     Problem: Diabetes Comorbidity  Goal: Blood Glucose Level Within Targeted Range  Outcome: Progressing     Problem: Pneumonia  Goal: Fluid Balance  Outcome: Progressing  Goal: Resolution of Infection Signs and Symptoms  Outcome: Progressing  Goal: Effective Oxygenation and Ventilation  Outcome: Progressing     Problem: Wound  Goal: Optimal Coping  Outcome: Progressing  Goal: Optimal Functional Ability  Outcome: Progressing  Goal: Absence of Infection Signs and Symptoms  Outcome: Progressing  Goal: Improved Oral Intake  Outcome: Progressing  Goal: Optimal Pain Control and Function  Outcome: Progressing  Goal: Skin Health and Integrity  Outcome: Progressing  Goal: Optimal Wound Healing  Outcome: Progressing     Problem: Skin Injury Risk Increased  Goal: Skin Health and Integrity  Outcome: Progressing

## 2025-07-23 NOTE — NURSING
[facility], [facility]  Nurses Note -- 4 Eyes      7/23/2025       Skin assessed on: Q Shift      [x] No Pressure Injuries Present    [x]Prevention Measures Documented    [] Yes LDA  for Pressure Injury Previously documented     [] Yes New Pressure Injury Discovered   [] LDA for New Pressure Injury Added      Attending RN:  Samantha Herrera, RN     Second RN:  Jasmin

## 2025-07-23 NOTE — PLAN OF CARE
Problem: Hospitalized Older Adult  Goal: Optimal Cognitive Function  Outcome: Progressing  Goal: Optimal Coping  Outcome: Progressing  Goal: Fluid and Electrolyte Balance  Outcome: Progressing  Goal: Optimal Functional Ability  Outcome: Progressing  Goal: Adequate Sleep/Rest  Outcome: Progressing  Goal: Effective Urinary Elimination  Outcome: Progressing

## 2025-07-23 NOTE — PROGRESS NOTES
Vancomycin consult follow-up:    Patient reviewed, renal function stable, no new levels, continue current therapy; Next levels due: trough due 7/24/2025 at 1200

## 2025-07-23 NOTE — NURSING
[facility], [facility] [place]  Nurses Note -- 4 Eyes      7/22/2025       Skin assessed on: Q Shift      [x] No Pressure Injuries Present    [x]Prevention Measures Documented    [] Yes LDA  for Pressure Injury Previously documented     [] Yes New Pressure Injury Discovered   [] LDA for New Pressure Injury Added      Attending RN:  Jasmin Vale RN     Second RN:  Yarely CASTELLANOS

## 2025-07-23 NOTE — HOSPITAL COURSE
84-year-old male with a history of hypertension,COPD,HTN,DM, CAD s/p CABG admitted to ICU on 07/22/2025 for acute respiratory failure with hypoxia secondary to RM/RLL pneumonia and COPD exacerbation.  Presented with respiratory distress and hypoxia and was placed on BiPAP with improvement.  Started on IV antibiotics with vancomycin and cefepime. CTA chest with no PE. Patchy infiltrates in the right middle lobe and right lower lobe at the lung base.  Blood culture with no growth to date.  Sputum culture with E coli.  PT/OT consulted-no therapy indicated.  Patient able to wean off supplemental oxygen to room air.  Have been stable.  Completed 6 minute walk test without any difficulty and without any hypoxia with ambulation.    Patient admits feeling significantly better overall.  States he is at his baseline.  Shortness a breath has resolved, still with mild productive cough but overall improving. Pt denies any fever, headaches, chest pain, palpitations, abdominal pain, nausea, vomiting, or any new weaknesses. Feels ready to go home. Patient's exam on discharge was as follow: Patient is alert and oriented, appears in no acute distress, heart with regular rate and rhythm, lungs with diminished breath sounds in lower lung fields, no wheezing, on room air, abdomen soft, and no new weaknesses or focal deficits seen.    Patient was counseled regarding any abnormal labs, differential diagnosis, treatment options, risk-benefit, lifestyle changes, current condition, and medications. Patient was interactive and attentive.  Patient's questions were answered in a respectful and timely manner. Patient was instructed to follow-up with PCP within 1 week and to continue taking medications as prescribed.  Instructed to also follow up with outpatient pulmonologist. Also, extensively discussed the risks, benefits, and side effects of patient's medications. Discussed with patient about any medication changes. Patient verbalized  understanding and agrees to treatment plan.  Patient is stable for discharge.  Patient has no other questions or concerns at this time.  ED precautions discussed with the patient.    Vital signs are stable. Ambulating without any difficulty. Tolerating p.o. intake without any nausea or vomiting. Afebrile for over 24 hours. Patient is in stable condition and has no questions or concerns. Patient will be discharge to home once transportation secured . Prescriptions sent to pharmacy.  CM/SW to assist with discharge planning.     Vitals:    07/25/25 0709 07/25/25 0715 07/25/25 0733 07/25/25 1054   BP:  (!) 155/79  (!) 155/71   BP Location:  Left arm  Left arm   Patient Position:  Sitting  Sitting   Pulse: (!) 58 (!) 56 74 67   Resp: 18 15  17   Temp:  98.1 °F (36.7 °C)  97.6 °F (36.4 °C)   TempSrc:  Oral  Oral   SpO2: 96% 97%  98%   Weight:       Height:

## 2025-07-23 NOTE — PLAN OF CARE
Problem: Physical Therapy  Goal: Physical Therapy Goal  Description: Goals to be met by: 25     Patient will increase functional independence with mobility by performin. Pt to be mod I with transfers.  2. Pt to ambulate 150' /s AD and (S).    Outcome: Progressing   Initial eval completed, see in chart for details.

## 2025-07-23 NOTE — PROGRESS NOTES
[facility] Medicine  Progress Note    Patient Name: Eric Jackson  MRN: [id number]  Patient Class: IP- Inpatient   Admission Date: 7/22/2025  Length of Stay: 1 days  Attending Physician: Lois Mcduffie, *  Primary Care Provider: Samir Boo MD        Subjective     Principal Problem:Acute respiratory failure with hypoxia and hypercarbia        HPI:  Patient is a an 84-year-old male with a history of hypertension,COPD,HTN,DM, CAD s/p CABG, presenting to the ED with respiratory distress.  Patient reports shortness of breath and symptoms has been progressive over the last couple of weeks, but became really severe last night.  Grandson, per documentation, noted that he was more short of breath than normal.  Patient endorses cough and shortness of breath like he has been presentation more acutely.  In ER chest X ray with sign of pneumonia,ABG with PH 7.3,CO 2 of 60 and O 2 of 17,patient received  nebulizer treatment,broads spectrum  IV Abx and placed  on Bipap in improvement in his symptoms,CTA chest is pending,patient is admitted to ICU fort acute,hypoxic,hypercapnic respiratory failure.    Overview/Hospital Course:  84-year-old male with a history of hypertension,COPD,HTN,DM, CAD s/p CABG, presenting to the ED with respiratory distress.  Patient endorses cough and shortness of breath like he has been presentation more acutely.  In ER chest X ray with sign of pneumonia,ABG with PH 7.3,CO 2 of 60 and O 2 of 17,patient received  nebulizer treatment,broads spectrum  IV Abx and placed  on Bipap in improvement in his symptoms,CTA chest show no PE,but pneumonia,respiratory stress is much improved,stable on 3 liter NC O 2,consulted PT,OT.    Interval History: ,CTA chest show no PE,but pneumonia,respiratory stress is much improved,stable on 3 liter NC O 2,consulted PT,OT.  Sputum culture growing  Ecoli.    Review of Systems   Constitutional:  Positive for activity change and appetite  change.   Respiratory:  Positive for cough and shortness of breath.    Gastrointestinal:  Negative for abdominal distention and abdominal pain.   Musculoskeletal:  Negative for arthralgias.   Neurological:  Positive for weakness.     Objective:     Vital Signs (Most Recent):  Temp: 97.9 °F (36.6 °C) (07/23/25 0720)  Pulse: 67 (07/23/25 0900)  Resp: 18 (07/23/25 0900)  BP: (!) 113/53 (07/23/25 0900)  SpO2: 97 % (07/23/25 0900) Vital Signs (24h Range):  Temp:  [97.7 °F (36.5 °C)-98.4 °F (36.9 °C)] 97.9 °F (36.6 °C)  Pulse:  [] 67  Resp:  [18-51] 18  SpO2:  [87 %-100 %] 97 %  BP: (112-170)/(53-88) 113/53     Weight: 74.5 kg (164 lb 3.9 oz)  Body mass index is 25.72 kg/m².    Intake/Output Summary (Last 24 hours) at 7/23/2025 0937  Last data filed at 7/23/2025 0849  Gross per 24 hour   Intake 1805 ml   Output 975 ml   Net 830 ml         Physical Exam  Cardiovascular:      Rate and Rhythm: Normal rate.      Heart sounds: No murmur heard.  Pulmonary:      Effort: No respiratory distress.      Breath sounds: Rales present.   Abdominal:      General: There is no distension.   Skin:     Coloration: Skin is not jaundiced.   Neurological:      Mental Status: He is alert.               Significant Labs: All pertinent labs within the past 24 hours have been reviewed.  BMP:   Recent Labs   Lab 07/22/25  1214 07/23/25  0431    158*   * 140   K 3.9 4.1    107   CO2 28 22*   BUN 12 17   CREATININE 1.2 1.1   CALCIUM 9.3 8.5*   MG 2.0  --      CBC:   Recent Labs   Lab 07/22/25  1216 07/23/25  0431   WBC 8.19 14.70*   HGB 15.5 12.8*   HCT 48.8 39.8*    170     CMP:   Recent Labs   Lab 07/22/25  1214 07/23/25  0431   * 140   K 3.9 4.1    107   CO2 28 22*    158*   BUN 12 17   CREATININE 1.2 1.1   CALCIUM 9.3 8.5*   PROT 7.8  --    ALBUMIN 3.8  --    BILITOT 0.4  --    ALKPHOS 76  --    AST 21  --    ALT 16  --    ANIONGAP 12 11       Significant Imaging: I have reviewed all pertinent  imaging results/findings within the past 24 hours.      Assessment & Plan  Acute respiratory failure with hypoxia and hypercarbia  Patient with Hypercapnic and Hypoxic Respiratory failure which is Acute.  he is not on home oxygen. Supplemental oxygen was provided and noted- Oxygen Concentration (%):  [35-40] 35    .   Signs/symptoms of respiratory failure include- tachypnea, increased work of breathing, respiratory distress, and use of accessory muscles. Contributing diagnoses includes - COPD and Pneumonia Labs and images were reviewed. Patient Has recent ABG, which has been reviewed. Will treat underlying causes and adjust management of respiratory failure as follows-   ,CTA chest show no PE,but pneumonia,respiratory stress is much improved,stable on 3 liter NC O 2,consulted PT,OT.  Pneumonia due to infectious organism  Patient has a diagnosis of pneumonia. The cause of the pneumonia is suspected to be bacterial in etiology but organism is not known. The pneumonia is worsening due to hypoxia. The patient has the following signs/symptoms of pneumonia: persistent hypoxia , cough, and shortness of breath. The patient does have a current oxygen requirement and the patient does have a home oxygen requirement. I have reviewed the pertinent imaging. The following cultures have been collected: Blood cultures and Sputum culture The culture results are listed below.     Current antimicrobial regimen consists of the antibiotics listed below. Will monitor patient closely and continue current treatment plan unchanged.    Antibiotics (From admission, onward)      Start     Stop Route Frequency Ordered    07/23/25 1300  vancomycin 1,250 mg in 0.9% NaCl 250 mL IVPB (admixture device)         -- IV Every 24 hours (non-standard times) 07/22/25 1420    07/22/25 2000  ceFEPIme injection 1 g         -- IV Every 8 hours (non-standard times) 07/22/25 1505    07/22/25 1234  vancomycin - pharmacy to dose  (Sepsis Workup)        Placed in  ""And" Linked Group    -- IV pharmacy to manage frequency 07/22/25 1134            Microbiology Results (last 7 days)       Procedure Component Value Units Date/Time    Culture, Respiratory with Gram Stain [[id number]]  (Abnormal) Collected: 07/22/25 1316    Order Status: Completed Specimen: Respiratory from Tracheal Aspirate Updated: 07/23/25 0757     Respiratory Culture Many Presumptive E Coli     GRAM STAIN <10 Epithelial Cells/LPF      Many Gram positive cocci      Rare Gram Positive Rods      Rare Gram Negative Rods    Blood culture x two cultures. Draw prior to antibiotics. [[id number]]  (Normal) Collected: 07/22/25 1217    Order Status: Completed Specimen: Blood from Peripheral, Forearm, Right Updated: 07/22/25 1902     Blood Culture No Growth After 6 Hours    Blood culture x two cultures. Draw prior to antibiotics. [[id number]]  (Normal) Collected: 07/22/25 1215    Order Status: Completed Specimen: Blood from Peripheral, Antecubital, Left Updated: 07/22/25 1902     Blood Culture No Growth After 6 Hours        Sputum culture growing Ecoli,,  Chronic obstructive pulmonary disease with acute exacerbation  Patient's COPD is with exacerbation noted by continued dyspnea, use of accessory muscles for breathing, and worsening of baseline hypoxia currently.  Patient is currently off COPD Pathway. Continue scheduled inhalers Steroids, Antibiotics, and Supplemental oxygen and monitor respiratory status closely.   Essential hypertension  Patient's blood pressure range in the last 24 hours was: BP  Min: 112/58  Max: 170/82.The patient's inpatient anti-hypertensive regimen is listed below:  Current Antihypertensives       Plan  - BP is controlled, no changes needed to their regimen  -   Type 2 diabetes mellitus without complication, without long-term current use of insulin  On SSI    DAISY (generalized anxiety disorder)  Resumed home Xanax    History of prostate cancer 2/25/19 TRUS of prostate and gold fiducial marker " placement; 4/2019 s/p XRT  Per record.    Status post coronary artery bypass grafting single vessel  Denies chest pain,has normal Troponin,resumed ASA,Plavix    Stage 3a chronic kidney disease  Creatine stable for now. BMP reviewed- noted Estimated Creatinine Clearance: 48.4 mL/min (based on SCr of 1.1 mg/dL). according to latest data. Based on current GFR, CKD stage is stage 3 - GFR 30-59.  Monitor UOP and serial BMP and adjust therapy as needed. Renally dose meds. Avoid nephrotoxic medications and procedures.  VTE Risk Mitigation (From admission, onward)           Ordered     enoxaparin injection 40 mg  Every 24 hours         07/22/25 1510                    Discharge Planning   WOODY:      Code Status: Full Code   Medical Readiness for Discharge Date:                  Critical care time spent on the evaluation and treatment of severe organ dysfunction, review of pertinent labs and imaging studies, discussions with consulting providers and discussions with patient/family:  over 45  minutes.    Please place Justification for DME      Lois Mcduffie MD  Department of Hospital Medicine   [facility] [place] - Intensive Care

## 2025-07-24 PROBLEM — D64.9 ANEMIA: Status: ACTIVE | Noted: 2025-07-24

## 2025-07-24 LAB
ABSOLUTE EOSINOPHIL (OHS): 0 K/UL
ABSOLUTE MONOCYTE (OHS): 0.88 K/UL (ref 0.3–1)
ABSOLUTE NEUTROPHIL COUNT (OHS): 12.87 K/UL (ref 1.8–7.7)
ANION GAP (OHS): 8 MMOL/L (ref 8–16)
BACTERIA SPEC CULT: ABNORMAL
BASOPHILS # BLD AUTO: 0.01 K/UL
BASOPHILS NFR BLD AUTO: 0.1 %
BUN SERPL-MCNC: 30 MG/DL (ref 8–23)
CALCIUM SERPL-MCNC: 8.9 MG/DL (ref 8.7–10.5)
CHLORIDE SERPL-SCNC: 107 MMOL/L (ref 95–110)
CO2 SERPL-SCNC: 23 MMOL/L (ref 23–29)
CREAT SERPL-MCNC: 1.1 MG/DL (ref 0.5–1.4)
ERYTHROCYTE [DISTWIDTH] IN BLOOD BY AUTOMATED COUNT: 14.3 % (ref 11.5–14.5)
GFR SERPLBLD CREATININE-BSD FMLA CKD-EPI: >60 ML/MIN/1.73/M2
GLUCOSE SERPL-MCNC: 151 MG/DL (ref 70–110)
GRAM STN SPEC: ABNORMAL
HCT VFR BLD AUTO: 40.2 % (ref 40–54)
HGB BLD-MCNC: 13.2 GM/DL (ref 14–18)
HOLD SPECIMEN: NORMAL
IMM GRANULOCYTES # BLD AUTO: 0.08 K/UL (ref 0–0.04)
IMM GRANULOCYTES NFR BLD AUTO: 0.6 % (ref 0–0.5)
LACTATE SERPL-SCNC: 2 MMOL/L (ref 0.5–2.2)
LYMPHOCYTES # BLD AUTO: 0.7 K/UL (ref 1–4.8)
MCH RBC QN AUTO: 31.1 PG (ref 27–31)
MCHC RBC AUTO-ENTMCNC: 32.8 G/DL (ref 32–36)
MCV RBC AUTO: 95 FL (ref 82–98)
NUCLEATED RBC (/100WBC) (OHS): 0 /100 WBC
PLATELET # BLD AUTO: 163 K/UL (ref 150–450)
PMV BLD AUTO: 10 FL (ref 9.2–12.9)
POCT GLUCOSE: 145 MG/DL (ref 70–110)
POCT GLUCOSE: 182 MG/DL (ref 70–110)
POCT GLUCOSE: 184 MG/DL (ref 70–110)
POCT GLUCOSE: 216 MG/DL (ref 70–110)
POTASSIUM SERPL-SCNC: 4.1 MMOL/L (ref 3.5–5.1)
RBC # BLD AUTO: 4.24 M/UL (ref 4.6–6.2)
RELATIVE EOSINOPHIL (OHS): 0 %
RELATIVE LYMPHOCYTE (OHS): 4.8 % (ref 18–48)
RELATIVE MONOCYTE (OHS): 6.1 % (ref 4–15)
RELATIVE NEUTROPHIL (OHS): 88.4 % (ref 38–73)
SODIUM SERPL-SCNC: 138 MMOL/L (ref 136–145)
VANCOMYCIN TROUGH SERPL-MCNC: <1.4 UG/ML (ref 10–22)
WBC # BLD AUTO: 14.54 K/UL (ref 3.9–12.7)

## 2025-07-24 PROCEDURE — 25000003 PHARM REV CODE 250: Performed by: STUDENT IN AN ORGANIZED HEALTH CARE EDUCATION/TRAINING PROGRAM

## 2025-07-24 PROCEDURE — 25000003 PHARM REV CODE 250: Performed by: HOSPITALIST

## 2025-07-24 PROCEDURE — 85025 COMPLETE CBC W/AUTO DIFF WBC: CPT | Performed by: HOSPITALIST

## 2025-07-24 PROCEDURE — 80202 ASSAY OF VANCOMYCIN: CPT | Performed by: HOSPITALIST

## 2025-07-24 PROCEDURE — 97530 THERAPEUTIC ACTIVITIES: CPT | Mod: CQ

## 2025-07-24 PROCEDURE — 94660 CPAP INITIATION&MGMT: CPT

## 2025-07-24 PROCEDURE — 63600175 PHARM REV CODE 636 W HCPCS: Performed by: HOSPITALIST

## 2025-07-24 PROCEDURE — 21400001 HC TELEMETRY ROOM

## 2025-07-24 PROCEDURE — 80048 BASIC METABOLIC PNL TOTAL CA: CPT | Performed by: HOSPITALIST

## 2025-07-24 PROCEDURE — 97116 GAIT TRAINING THERAPY: CPT | Mod: CQ

## 2025-07-24 PROCEDURE — 27000221 HC OXYGEN, UP TO 24 HOURS

## 2025-07-24 PROCEDURE — 99900031 HC PATIENT EDUCATION (STAT)

## 2025-07-24 PROCEDURE — 94761 N-INVAS EAR/PLS OXIMETRY MLT: CPT

## 2025-07-24 PROCEDURE — 36415 COLL VENOUS BLD VENIPUNCTURE: CPT | Performed by: HOSPITALIST

## 2025-07-24 PROCEDURE — 94640 AIRWAY INHALATION TREATMENT: CPT

## 2025-07-24 PROCEDURE — 94664 DEMO&/EVAL PT USE INHALER: CPT

## 2025-07-24 PROCEDURE — 99900035 HC TECH TIME PER 15 MIN (STAT)

## 2025-07-24 PROCEDURE — 25000242 PHARM REV CODE 250 ALT 637 W/ HCPCS: Performed by: HOSPITALIST

## 2025-07-24 PROCEDURE — 83605 ASSAY OF LACTIC ACID: CPT | Performed by: HOSPITALIST

## 2025-07-24 RX ORDER — SODIUM CHLORIDE 0.9 % (FLUSH) 0.9 %
3 SYRINGE (ML) INJECTION
Status: DISCONTINUED | OUTPATIENT
Start: 2025-07-24 | End: 2025-07-25 | Stop reason: HOSPADM

## 2025-07-24 RX ORDER — LOSARTAN POTASSIUM 25 MG/1
100 TABLET ORAL DAILY
Status: DISCONTINUED | OUTPATIENT
Start: 2025-07-24 | End: 2025-07-25 | Stop reason: HOSPADM

## 2025-07-24 RX ORDER — IPRATROPIUM BROMIDE AND ALBUTEROL SULFATE 2.5; .5 MG/3ML; MG/3ML
3 SOLUTION RESPIRATORY (INHALATION)
Status: DISCONTINUED | OUTPATIENT
Start: 2025-07-24 | End: 2025-07-24

## 2025-07-24 RX ADMIN — CLOPIDOGREL BISULFATE 75 MG: 75 TABLET, FILM COATED ORAL at 07:07

## 2025-07-24 RX ADMIN — ALPRAZOLAM 0.5 MG: 0.5 TABLET ORAL at 08:07

## 2025-07-24 RX ADMIN — INSULIN ASPART 2 UNITS: 100 INJECTION, SOLUTION INTRAVENOUS; SUBCUTANEOUS at 09:07

## 2025-07-24 RX ADMIN — INSULIN ASPART 2 UNITS: 100 INJECTION, SOLUTION INTRAVENOUS; SUBCUTANEOUS at 04:07

## 2025-07-24 RX ADMIN — LOSARTAN POTASSIUM 100 MG: 25 TABLET, FILM COATED ORAL at 05:07

## 2025-07-24 RX ADMIN — INSULIN ASPART 2 UNITS: 100 INJECTION, SOLUTION INTRAVENOUS; SUBCUTANEOUS at 12:07

## 2025-07-24 RX ADMIN — ENOXAPARIN SODIUM 40 MG: 40 INJECTION SUBCUTANEOUS at 04:07

## 2025-07-24 RX ADMIN — IPRATROPIUM BROMIDE AND ALBUTEROL SULFATE 3 ML: 2.5; .5 SOLUTION RESPIRATORY (INHALATION) at 07:07

## 2025-07-24 RX ADMIN — VANCOMYCIN HYDROCHLORIDE 1250 MG: 1.25 INJECTION, POWDER, LYOPHILIZED, FOR SOLUTION INTRAVENOUS at 12:07

## 2025-07-24 RX ADMIN — FAMOTIDINE 20 MG: 20 TABLET, FILM COATED ORAL at 07:07

## 2025-07-24 RX ADMIN — CEFEPIME 1 G: 1 INJECTION, POWDER, FOR SOLUTION INTRAMUSCULAR; INTRAVENOUS at 11:07

## 2025-07-24 RX ADMIN — CEFEPIME 1 G: 1 INJECTION, POWDER, FOR SOLUTION INTRAMUSCULAR; INTRAVENOUS at 09:07

## 2025-07-24 RX ADMIN — IPRATROPIUM BROMIDE AND ALBUTEROL SULFATE 3 ML: 2.5; .5 SOLUTION RESPIRATORY (INHALATION) at 08:07

## 2025-07-24 RX ADMIN — CEFEPIME 1 G: 1 INJECTION, POWDER, FOR SOLUTION INTRAMUSCULAR; INTRAVENOUS at 04:07

## 2025-07-24 RX ADMIN — PREDNISONE 50 MG: 20 TABLET ORAL at 07:07

## 2025-07-24 RX ADMIN — IPRATROPIUM BROMIDE AND ALBUTEROL SULFATE 3 ML: 2.5; .5 SOLUTION RESPIRATORY (INHALATION) at 01:07

## 2025-07-24 NOTE — PLAN OF CARE
Pt A&Ox4, free from falls/injury, and able to make needs known during shift. VSS and pt is RA. Pt had no c/o pain during shift. Telemetry monitoring continued on box #[id number], visible and audible on monitor at nurses' station, no acute distress noted. Bed locked and in lowest position. Bedside table and call light in reach. Will cont to monitor.  Problem: Adult Inpatient Plan of Care  Goal: Plan of Care Review  Outcome: Progressing  Goal: Patient-Specific Goal (Individualized)  Outcome: Progressing  Goal: Absence of Hospital-Acquired Illness or Injury  Outcome: Progressing  Goal: Optimal Comfort and Wellbeing  Outcome: Progressing  Goal: Readiness for Transition of Care  Outcome: Progressing     Problem: Diabetes Comorbidity  Goal: Blood Glucose Level Within Targeted Range  Outcome: Progressing     Problem: COPD (Chronic Obstructive Pulmonary Disease)  Goal: Optimal Level of Functional [place]  Outcome: Progressing  Goal: Effective Oxygenation and Ventilation  Outcome: Progressing

## 2025-07-24 NOTE — SUBJECTIVE & OBJECTIVE
Interval History:  No acute overnight events.  Remained afebrile.  Still with some shortness a breath and productive cough, but improving.  Denies any wheezing or any chest pain.  Feeling better today, but admits fatigue.    Review of Systems   Constitutional:  Positive for fatigue. Negative for chills and fever.   Respiratory:  Positive for cough and shortness of breath (Improving). Negative for chest tightness and wheezing.    Cardiovascular:  Negative for chest pain and palpitations.   Gastrointestinal:  Negative for abdominal pain.   Neurological:  Negative for weakness.     Objective:     Vital Signs (Most Recent):  Temp: 98 °F (36.7 °C) (07/24/25 1054)  Pulse: (!) 59 (07/24/25 1108)  Resp: 18 (07/24/25 1054)  BP: (!) 143/65 (07/24/25 1054)  SpO2: 95 % (07/24/25 1054) Vital Signs (24h Range):  Temp:  [97.2 °F (36.2 °C)-98 °F (36.7 °C)] 98 °F (36.7 °C)  Pulse:  [49-93] 59  Resp:  [0-29] 18  SpO2:  [93 %-100 %] 95 %  BP: (108-155)/(55-71) 143/65     Weight: 74.5 kg (164 lb 3.9 oz)  Body mass index is 25.72 kg/m².    Intake/Output Summary (Last 24 hours) at 7/24/2025 1126  Last data filed at 7/24/2025 0930  Gross per 24 hour   Intake 541.07 ml   Output 1650 ml   Net -1108.93 ml         Physical Exam  Vitals and nursing note reviewed.   Constitutional:       General: He is not in acute distress.     Appearance: He is ill-appearing.   Eyes:      Extraocular Movements: Extraocular movements intact.      Pupils: Pupils are equal, round, and reactive to light.   Cardiovascular:      Rate and Rhythm: Normal rate and regular rhythm.      Heart sounds: No murmur heard.  Pulmonary:      Effort: Pulmonary effort is normal. No respiratory distress.      Breath sounds: No wheezing.      Comments: On nasal cannula.  Diminished breath sounds in lower lung fields  Abdominal:      General: There is no distension.      Palpations: Abdomen is soft.      Tenderness: There is no abdominal tenderness.   Musculoskeletal:          General: No swelling or tenderness.   Skin:     General: Skin is warm and dry.   Neurological:      General: No focal deficit present.      Mental Status: He is alert and oriented to person, place, and time.   Psychiatric:         Mood and Affect: Mood normal.         Thought Content: Thought content normal.               Significant Labs: All pertinent labs within the past 24 hours have been reviewed.    Significant Imaging: I have reviewed all pertinent imaging results/findings within the past 24 hours.

## 2025-07-24 NOTE — ASSESSMENT & PLAN NOTE
Patient's blood pressure range in the last 24 hours was: BP  Min: 108/55  Max: 155/69.The patient's inpatient anti-hypertensive regimen is listed below:  Current Antihypertensives       Plan  - BP is controlled, no changes needed to their regimen  -

## 2025-07-24 NOTE — PROGRESS NOTES
[facility] Medicine  Progress Note    Patient Name: Eric Jackson  MRN: [id number]  Patient Class: IP- Inpatient   Admission Date: 7/22/2025  Length of Stay: 2 days  Attending Physician: Armando Hunt DO  Primary Care Provider: Samir Boo MD        Subjective     Principal Problem:Acute respiratory failure with hypoxia and hypercarbia        HPI:  Patient is a an 84-year-old male with a history of hypertension,COPD,HTN,DM, CAD s/p CABG, presenting to the ED with respiratory distress.  Patient reports shortness of breath and symptoms has been progressive over the last couple of weeks, but became really severe last night.  Grandson, per documentation, noted that he was more short of breath than normal.  Patient endorses cough and shortness of breath like he has been presentation more acutely.  In ER chest X ray with sign of pneumonia,ABG with PH 7.3,CO 2 of 60 and O 2 of 17,patient received  nebulizer treatment,broads spectrum  IV Abx and placed  on Bipap in improvement in his symptoms,CTA chest is pending,patient is admitted to ICU fort acute,hypoxic,hypercapnic respiratory failure.    Overview/Hospital Course:   84-year-old male with a history of hypertension,COPD,HTN,DM, CAD s/p CABG admitted to ICU on 07/22/2025 for acute respiratory failure with hypoxia secondary to RM/RLL pneumonia and COPD exacerbation.  Presented with respiratory distress and hypoxia and was placed on BiPAP with improvement.  Started on IV antibiotics with vancomycin and cefepime. CTA chest with no PE. Patchy infiltrates in the right middle lobe and right lower lobe at the lung base.  Blood culture with no growth to date.  Sputum culture with E coli.  PT/OT consulted-no therapy indicated.  Continue to wean O2 as tolerated    Interval History:  No acute overnight events.  Remained afebrile.  Still with some shortness a breath and productive cough, but improving.  Denies any wheezing or any chest pain.  Feeling  better today, but admits fatigue.    Review of Systems   Constitutional:  Positive for fatigue. Negative for chills and fever.   Respiratory:  Positive for cough and shortness of breath (Improving). Negative for chest tightness and wheezing.    Cardiovascular:  Negative for chest pain and palpitations.   Gastrointestinal:  Negative for abdominal pain.   Neurological:  Negative for weakness.     Objective:     Vital Signs (Most Recent):  Temp: 98 °F (36.7 °C) (07/24/25 1054)  Pulse: (!) 59 (07/24/25 1108)  Resp: 18 (07/24/25 1054)  BP: (!) 143/65 (07/24/25 1054)  SpO2: 95 % (07/24/25 1054) Vital Signs (24h Range):  Temp:  [97.2 °F (36.2 °C)-98 °F (36.7 °C)] 98 °F (36.7 °C)  Pulse:  [49-93] 59  Resp:  [0-29] 18  SpO2:  [93 %-100 %] 95 %  BP: (108-155)/(55-71) 143/65     Weight: 74.5 kg (164 lb 3.9 oz)  Body mass index is 25.72 kg/m².    Intake/Output Summary (Last 24 hours) at 7/24/2025 1126  Last data filed at 7/24/2025 0930  Gross per 24 hour   Intake 541.07 ml   Output 1650 ml   Net -1108.93 ml         Physical Exam  Vitals and nursing note reviewed.   Constitutional:       General: He is not in acute distress.     Appearance: He is ill-appearing.   Eyes:      Extraocular Movements: Extraocular movements intact.      Pupils: Pupils are equal, round, and reactive to light.   Cardiovascular:      Rate and Rhythm: Normal rate and regular rhythm.      Heart sounds: No murmur heard.  Pulmonary:      Effort: Pulmonary effort is normal. No respiratory distress.      Breath sounds: No wheezing.      Comments: On nasal cannula.  Diminished breath sounds in lower lung fields  Abdominal:      General: There is no distension.      Palpations: Abdomen is soft.      Tenderness: There is no abdominal tenderness.   Musculoskeletal:         General: No swelling or tenderness.   Skin:     General: Skin is warm and dry.   Neurological:      General: No focal deficit present.      Mental Status: He is alert and oriented to person,  place, and time.   Psychiatric:         Mood and Affect: Mood normal.         Thought Content: Thought content normal.               Significant Labs: All pertinent labs within the past 24 hours have been reviewed.    Significant Imaging: I have reviewed all pertinent imaging results/findings within the past 24 hours.      Assessment & Plan  Acute respiratory failure with hypoxia and hypercarbia  Patient with Hypercapnic and Hypoxic Respiratory failure which is Acute.  he is not on home oxygen. Supplemental oxygen was provided and noted- Oxygen Concentration (%):  [32-35] 35    .   Signs/symptoms of respiratory failure include- tachypnea, increased work of breathing, respiratory distress, and use of accessory muscles. Contributing diagnoses includes - COPD and Pneumonia Labs and images were reviewed. Patient Has recent ABG, which has been reviewed. Will treat underlying causes and adjust management of respiratory failure as follows-     -Presented with respiratory distress and hypoxia and was placed on BiPAP with improvement.    -Started on IV antibiotics with vancomycin and cefepime.   -CTA chest with no PE. Patchy infiltrates in the right middle lobe and right lower lobe at the lung base.    -Sputum culture with E coli.    -Continue to wean O2 as tolerated  -Stop vancomycin.  Continue cefepime at this time  Pneumonia due to infectious organism  Patient has a diagnosis of pneumonia. The cause of the pneumonia is suspected to be bacterial in etiology but organism is not known. The pneumonia is worsening due to hypoxia. The patient has the following signs/symptoms of pneumonia: persistent hypoxia , cough, and shortness of breath. The patient does have a current oxygen requirement and the patient does have a home oxygen requirement. I have reviewed the pertinent imaging. The following cultures have been collected: Blood cultures and Sputum culture The culture results are listed below.     Current antimicrobial regimen  consists of the antibiotics listed below. Will monitor patient closely and continue current treatment plan unchanged.    Antibiotics (From admission, onward)      Start     Stop Route Frequency Ordered    07/23/25 1300  vancomycin 1,250 mg in 0.9% NaCl 250 mL IVPB (admixture device)         -- IV Every 24 hours (non-standard times) 07/22/25 1420    07/22/25 2000  ceFEPIme injection 1 g         -- IV Every 8 hours (non-standard times) 07/22/25 1505            Microbiology Results (last 7 days)       Procedure Component Value Units Date/Time    Culture, Respiratory with Gram Stain [[id number]]  (Abnormal)  (Susceptibility) Collected: 07/22/25 1316    Order Status: Completed Specimen: Respiratory from Tracheal Aspirate Updated: 07/24/25 0627     Respiratory Culture Many Escherichia coli     GRAM STAIN <10 Epithelial Cells/LPF      Many Gram positive cocci      Rare Gram Positive Rods      Rare Gram Negative Rods    Blood culture x two cultures. Draw prior to antibiotics. [[id number]]  (Normal) Collected: 07/22/25 1217    Order Status: Completed Specimen: Blood from Peripheral, Forearm, Right Updated: 07/24/25 0102     Blood Culture No Growth After 36 Hours    Blood culture x two cultures. Draw prior to antibiotics. [[id number]]  (Normal) Collected: 07/22/25 1215    Order Status: Completed Specimen: Blood from Peripheral, Antecubital, Left Updated: 07/24/25 0102     Blood Culture No Growth After 36 Hours            -Presented with respiratory distress and hypoxia and was placed on BiPAP with improvement.    -Started on IV antibiotics with vancomycin and cefepime.   -CTA chest with no PE. Patchy infiltrates in the right middle lobe and right lower lobe at the lung base.    -Sputum culture with E coli.    -Continue to wean O2 as tolerated  -Stop vancomycin.  Continue cefepime at this time  Chronic obstructive pulmonary disease with acute exacerbation  Patient's COPD is with exacerbation noted by continued dyspnea, use of  accessory muscles for breathing, and worsening of baseline hypoxia currently.  Patient is currently on COPD Pathway. Continue scheduled inhalers Steroids, Antibiotics, and Supplemental oxygen and monitor respiratory status closely.   Essential hypertension  Patient's blood pressure range in the last 24 hours was: BP  Min: 108/55  Max: 155/69.The patient's inpatient anti-hypertensive regimen is listed below:  Current Antihypertensives       Plan  - BP is controlled, no changes needed to their regimen  -   Type 2 diabetes mellitus without complication, without long-term current use of insulin  A1c:   Lab Results   Component Value Date    HGBA1C 6.2 (H) 05/09/2025     Meds: SSI PRN to maintain goal 140-180  ADA diet, accuchecks ACHS, hypoglycemic protocol      DAISY (generalized anxiety disorder)   reviewed. Pt on xanax at home  PRN xanax ordered    History of prostate cancer 2/25/19 TRUS of prostate and gold fiducial marker placement; 4/2019 s/p XRT  Per record.    Status post coronary artery bypass grafting single vessel  Denies chest pain,has normal Troponin,resumed ASA,Plavix    Stage 3a chronic kidney disease  Creatine stable for now. BMP reviewed- noted Estimated Creatinine Clearance: 48.4 mL/min (based on SCr of 1.1 mg/dL). according to latest data. Based on current GFR, CKD stage is stage 3 - GFR 30-59.  Monitor UOP and serial BMP and adjust therapy as needed. Renally dose meds. Avoid nephrotoxic medications and procedures.  Anemia  Anemia is likely due to Iron deficiency. Most recent hemoglobin and hematocrit are listed below.  Recent Labs     07/22/25  1216 07/23/25  0431 07/24/25  0426   HGB 15.5 12.8* 13.2*   HCT 48.8 39.8* 40.2     Plan  - Monitor serial CBC: Daily  - Transfuse PRBC if patient becomes hemodynamically unstable, symptomatic or H/H drops below 7/21.  - Patient has not received any PRBC transfusions to date  - Patient's anemia is currently stable  - continue to monitor  VTE Risk Mitigation  (From admission, onward)           Ordered     enoxaparin injection 40 mg  Every 24 hours         07/22/25 1510                    Discharge Planning   WOODY: 7/26/2025     Code Status: Full Code   Medical Readiness for Discharge Date:   Discharge Plan A: Home with family                  Please place Justification for DME      Armando Hunt DO  Department of Hospital Medicine   [facility]

## 2025-07-24 NOTE — ASSESSMENT & PLAN NOTE
A1c:   Lab Results   Component Value Date    HGBA1C 6.2 (H) 05/09/2025     Meds: SSI PRN to maintain goal 140-180  ADA diet, accuchecks ACHS, hypoglycemic protocol

## 2025-07-24 NOTE — NURSING
[facility] [place]  Nurses Note -- 4 Eyes      7/24/2025       Skin assessed on: Q Shift      [x] No Pressure Injuries Present    [x]Prevention Measures Documented    [] Yes LDA  for Pressure Injury Previously documented     [] Yes New Pressure Injury Discovered   [] LDA for New Pressure Injury Added      Attending RN:  Latonia Hyatt RN     Second RN:  JUANJO Toney

## 2025-07-24 NOTE — NURSING
[facility]  Nurses Note -- 4 Eyes      7/23/2025       Skin assessed on: Q Shift      [x] No Pressure Injuries Present    [x]Prevention Measures Documented    [] Yes LDA  for Pressure Injury Previously documented     [] Yes New Pressure Injury Discovered   [] LDA for New Pressure Injury Added      Attending RN:  Nguyen Bolanos RN     Second RN:  EMANUEL Singh

## 2025-07-24 NOTE — ASSESSMENT & PLAN NOTE
Patient has a diagnosis of pneumonia. The cause of the pneumonia is suspected to be bacterial in etiology but organism is not known. The pneumonia is worsening due to hypoxia. The patient has the following signs/symptoms of pneumonia: persistent hypoxia , cough, and shortness of breath. The patient does have a current oxygen requirement and the patient does have a home oxygen requirement. I have reviewed the pertinent imaging. The following cultures have been collected: Blood cultures and Sputum culture The culture results are listed below.     Current antimicrobial regimen consists of the antibiotics listed below. Will monitor patient closely and continue current treatment plan unchanged.    Antibiotics (From admission, onward)      Start     Stop Route Frequency Ordered    07/23/25 1300  vancomycin 1,250 mg in 0.9% NaCl 250 mL IVPB (admixture device)         -- IV Every 24 hours (non-standard times) 07/22/25 1420    07/22/25 2000  ceFEPIme injection 1 g         -- IV Every 8 hours (non-standard times) 07/22/25 1505            Microbiology Results (last 7 days)       Procedure Component Value Units Date/Time    Culture, Respiratory with Gram Stain [[id number]]  (Abnormal)  (Susceptibility) Collected: 07/22/25 1316    Order Status: Completed Specimen: Respiratory from Tracheal Aspirate Updated: 07/24/25 0627     Respiratory Culture Many Escherichia coli     GRAM STAIN <10 Epithelial Cells/LPF      Many Gram positive cocci      Rare Gram Positive Rods      Rare Gram Negative Rods    Blood culture x two cultures. Draw prior to antibiotics. [[id number]]  (Normal) Collected: 07/22/25 1217    Order Status: Completed Specimen: Blood from Peripheral, Forearm, Right Updated: 07/24/25 0102     Blood Culture No Growth After 36 Hours    Blood culture x two cultures. Draw prior to antibiotics. [[id number]]  (Normal) Collected: 07/22/25 1215    Order Status: Completed Specimen: Blood from Peripheral, Antecubital, Left Updated:  07/24/25 0102     Blood Culture No Growth After 36 Hours            -Presented with respiratory distress and hypoxia and was placed on BiPAP with improvement.    -Started on IV antibiotics with vancomycin and cefepime.   -CTA chest with no PE. Patchy infiltrates in the right middle lobe and right lower lobe at the lung base.    -Sputum culture with E coli.    -Continue to wean O2 as tolerated  -Stop vancomycin.  Continue cefepime at this time

## 2025-07-24 NOTE — ASSESSMENT & PLAN NOTE
Patient with Hypercapnic and Hypoxic Respiratory failure which is Acute.  he is not on home oxygen. Supplemental oxygen was provided and noted- Oxygen Concentration (%):  [32-35] 35    .   Signs/symptoms of respiratory failure include- tachypnea, increased work of breathing, respiratory distress, and use of accessory muscles. Contributing diagnoses includes - COPD and Pneumonia Labs and images were reviewed. Patient Has recent ABG, which has been reviewed. Will treat underlying causes and adjust management of respiratory failure as follows-     -Presented with respiratory distress and hypoxia and was placed on BiPAP with improvement.    -Started on IV antibiotics with vancomycin and cefepime.   -CTA chest with no PE. Patchy infiltrates in the right middle lobe and right lower lobe at the lung base.    -Sputum culture with E coli.    -Continue to wean O2 as tolerated  -Stop vancomycin.  Continue cefepime at this time

## 2025-07-24 NOTE — EICU
Intervention Initiated From:  COR / BRITU    Tania intervened regarding:  Rounding (Video assessment)    VICU Night Rounds Checklist  24H Vital Sign Range:  Temp:  [97.2 °F (36.2 °C)-98.2 °F (36.8 °C)]   Pulse:  [52-91]   Resp:  [0-30]   BP: (106-127)/(53-66)   SpO2:  [95 %-100 %]     Video rounds and LDA reconciliation

## 2025-07-24 NOTE — ASSESSMENT & PLAN NOTE
Anemia is likely due to Iron deficiency. Most recent hemoglobin and hematocrit are listed below.  Recent Labs     07/22/25  1216 07/23/25  0431 07/24/25  0426   HGB 15.5 12.8* 13.2*   HCT 48.8 39.8* 40.2     Plan  - Monitor serial CBC: Daily  - Transfuse PRBC if patient becomes hemodynamically unstable, symptomatic or H/H drops below 7/21.  - Patient has not received any PRBC transfusions to date  - Patient's anemia is currently stable  - continue to monitor

## 2025-07-24 NOTE — NURSING TRANSFER
Nursing Transfer Note      7/24/2025   5:35 AM    Nurse giving handoff:Nguyen  Nurse receiving handoff:Ritika    Reason patient is being transferred: step down from ICU    Transfer From:     Transfer via wheelchair    Transfer with  to O2, cardiac monitoring    Transported by transporter    Transfer Vital Signs:  Blood Pressure:154/70  Heart Rate:62  O2:98  Temperature:97.4  Respirations:22    Telemetry: Rhythm NSR  Order for Tele Monitor? Yes    Additional Lines: Oxygen    Medicines sent: none    Any special needs or follow-up needed: none    Patient belongings transferred with patient: Yes    Chart send with patient: Yes    Notified: pt to notify family     Patient reassessed at: 7/24 7337 (date, time)  1  Upon arrival to floor: cardiac monitor applied, patient oriented to room, call bell in reach, and bed in lowest position

## 2025-07-24 NOTE — PLAN OF CARE
07/24/25 1432   Medicare Message   Important Message from Medicare regarding Discharge Appeal Rights Given to patient/caregiver;Explained to patient/caregiver;Signed/date by patient/caregiver   Date IMM was signed 07/24/25   Time IMM was signed [id number]

## 2025-07-24 NOTE — PT/OT/SLP PROGRESS
Physical Therapy Treatment    Patient Name:  Eric Jackson   MRN:  [id number]    Recommendations:     Discharge Recommendations: No Therapy Indicated  Discharge Equipment Recommendations: none  Barriers to discharge: None    Assessment:     Eric Jackson is a 84 y.o. male admitted with a medical diagnosis of Acute respiratory failure with hypoxia and hypercarbia.  He presents with the following impairments/functional limitations: weakness, impaired endurance, decreased lower extremity function, pain, decreased safety awareness, impaired cardiopulmonary response to activity .    Rehab Prognosis: Good; patient would benefit from acute skilled PT services to address these deficits and reach maximum level of function.    Recent Surgery: * No surgery found *      Plan:     During this hospitalization, patient to be seen 3 x/week to address the identified rehab impairments via gait training, therapeutic activities, therapeutic exercises and progress toward the following goals:    Plan of Care Expires:  07/25/25    Subjective     Chief Complaint: none   Patient/Family Comments/goals: pt is agreeable to therapy ( pt is very pleasant and talkative) .  Pain/Comfort:  Pain Rating 1: 0/10  Pain Rating Post-Intervention 1: 0/10      Objective:     Communicated with nurse  prior to session.  Patient found sitting edge of bed with telemetry, peripheral IV upon PT entry to room.     General Precautions: Standard, fall, respiratory  Orthopedic Precautions: N/A  Braces: N/A  Respiratory Status: Room air   SpO2 : 92%-96% on RA on exertions, HR stable . Pt required rest breaks and VC's for pursed lip breathing technique.   Functional Mobility:  Transfers:     Sit <> Stand:  6 trials from bed modified independence with no AD  Gait:  pt ambulated 100 ft with no AD, SBA. Noted with decreased naresh,decreased step length, no LOB. VC's for pursed lip breathing technique.   Balance: good        AM-PAC 6 CLICK MOBILITY  Turning over in  bed (including adjusting bedclothes, sheets and blankets)?: 4  Sitting down on and standing up from a chair with arms (e.g., wheelchair, bedside commode, etc.): 4  Moving from lying on back to sitting on the side of the bed?: 4  Moving to and from a bed to a chair (including a wheelchair)?: 4  Need to walk in hospital room?: 4  Climbing 3-5 steps with a railing?: 3  Basic Mobility Total Score: 23       Treatment & Education:  Educated pt on pursed lip breathing technique and energy conservation techniques. Pt verbalized understanding.  Instructed /encouraged pt to perform BLE : AP , LAQ , hip flexion throughout the day, pt verbalized understanding .    Patient left sitting edge of bed with all lines intact, call button in reach, and nurse notified..    GOALS:   Multidisciplinary Problems       Physical Therapy Goals          Problem: Physical Therapy    Goal Priority Disciplines Outcome Interventions   Physical Therapy Goal     PT, PT/OT Progressing    Description: Goals to be met by: 25     Patient will increase functional independence with mobility by performin. Pt to be mod I with transfers.  2. Pt to ambulate 150' /s AD and (S).                         DME Justifications:      Time Tracking:     PT Received On: 25  PT Start Time: 1615     PT Stop Time: 1643  PT Total Time (min): 28 min     Billable Minutes: Gait Training 14 and Therapeutic Activity 14    Treatment Type: Treatment  PT/PTA: PTA     Number of PTA visits since last PT visit: 2025

## 2025-07-24 NOTE — PT/OT/SLP PROGRESS
Occupational Therapy      Patient Name:  Eric Jackson   MRN:  [id number]    Patient not seen today secondary to  (2 attempts made. 1st attempt pt resting. 2nd attempt, pt with respiratory therapist.). Will follow-up in AM on 07/25/2025.    7/24/2025

## 2025-07-25 VITALS
WEIGHT: 164.25 LBS | BODY MASS INDEX: 25.78 KG/M2 | OXYGEN SATURATION: 98 % | HEIGHT: 67 IN | HEART RATE: 54 BPM | SYSTOLIC BLOOD PRESSURE: 155 MMHG | DIASTOLIC BLOOD PRESSURE: 71 MMHG | TEMPERATURE: 98 F | RESPIRATION RATE: 17 BRPM

## 2025-07-25 LAB
ABSOLUTE EOSINOPHIL (OHS): 0.01 K/UL
ABSOLUTE MONOCYTE (OHS): 0.81 K/UL (ref 0.3–1)
ABSOLUTE NEUTROPHIL COUNT (OHS): 10.14 K/UL (ref 1.8–7.7)
ANION GAP (OHS): 9 MMOL/L (ref 8–16)
BASOPHILS # BLD AUTO: 0.02 K/UL
BASOPHILS NFR BLD AUTO: 0.2 %
BUN SERPL-MCNC: 30 MG/DL (ref 8–23)
CALCIUM SERPL-MCNC: 8.7 MG/DL (ref 8.7–10.5)
CHLORIDE SERPL-SCNC: 107 MMOL/L (ref 95–110)
CO2 SERPL-SCNC: 23 MMOL/L (ref 23–29)
CREAT SERPL-MCNC: 1 MG/DL (ref 0.5–1.4)
ERYTHROCYTE [DISTWIDTH] IN BLOOD BY AUTOMATED COUNT: 14.5 % (ref 11.5–14.5)
GFR SERPLBLD CREATININE-BSD FMLA CKD-EPI: >60 ML/MIN/1.73/M2
GLUCOSE SERPL-MCNC: 118 MG/DL (ref 70–110)
HCT VFR BLD AUTO: 42.4 % (ref 40–54)
HGB BLD-MCNC: 13.4 GM/DL (ref 14–18)
IMM GRANULOCYTES # BLD AUTO: 0.11 K/UL (ref 0–0.04)
IMM GRANULOCYTES NFR BLD AUTO: 0.9 % (ref 0–0.5)
LYMPHOCYTES # BLD AUTO: 1.2 K/UL (ref 1–4.8)
MCH RBC QN AUTO: 30.3 PG (ref 27–31)
MCHC RBC AUTO-ENTMCNC: 31.6 G/DL (ref 32–36)
MCV RBC AUTO: 96 FL (ref 82–98)
NUCLEATED RBC (/100WBC) (OHS): 0 /100 WBC
PLATELET # BLD AUTO: 197 K/UL (ref 150–450)
PMV BLD AUTO: 10 FL (ref 9.2–12.9)
POCT GLUCOSE: 123 MG/DL (ref 70–110)
POCT GLUCOSE: 135 MG/DL (ref 70–110)
POCT GLUCOSE: 136 MG/DL (ref 70–110)
POCT GLUCOSE: 164 MG/DL (ref 70–110)
POTASSIUM SERPL-SCNC: 4 MMOL/L (ref 3.5–5.1)
RBC # BLD AUTO: 4.42 M/UL (ref 4.6–6.2)
RELATIVE EOSINOPHIL (OHS): 0.1 %
RELATIVE LYMPHOCYTE (OHS): 9.8 % (ref 18–48)
RELATIVE MONOCYTE (OHS): 6.6 % (ref 4–15)
RELATIVE NEUTROPHIL (OHS): 82.4 % (ref 38–73)
SODIUM SERPL-SCNC: 139 MMOL/L (ref 136–145)
WBC # BLD AUTO: 12.29 K/UL (ref 3.9–12.7)

## 2025-07-25 PROCEDURE — 25000242 PHARM REV CODE 250 ALT 637 W/ HCPCS: Performed by: HOSPITALIST

## 2025-07-25 PROCEDURE — 25000003 PHARM REV CODE 250: Performed by: STUDENT IN AN ORGANIZED HEALTH CARE EDUCATION/TRAINING PROGRAM

## 2025-07-25 PROCEDURE — 99900035 HC TECH TIME PER 15 MIN (STAT)

## 2025-07-25 PROCEDURE — 63600175 PHARM REV CODE 636 W HCPCS: Performed by: HOSPITALIST

## 2025-07-25 PROCEDURE — 94761 N-INVAS EAR/PLS OXIMETRY MLT: CPT

## 2025-07-25 PROCEDURE — 94640 AIRWAY INHALATION TREATMENT: CPT

## 2025-07-25 PROCEDURE — 85025 COMPLETE CBC W/AUTO DIFF WBC: CPT | Performed by: HOSPITALIST

## 2025-07-25 PROCEDURE — 25000003 PHARM REV CODE 250: Performed by: HOSPITALIST

## 2025-07-25 PROCEDURE — 80048 BASIC METABOLIC PNL TOTAL CA: CPT | Performed by: HOSPITALIST

## 2025-07-25 PROCEDURE — 36415 COLL VENOUS BLD VENIPUNCTURE: CPT | Performed by: HOSPITALIST

## 2025-07-25 RX ORDER — PREDNISONE 50 MG/1
50 TABLET ORAL DAILY
Qty: 2 TABLET | Refills: 0 | Status: SHIPPED | OUTPATIENT
Start: 2025-07-26 | End: 2025-07-28

## 2025-07-25 RX ORDER — CEFPODOXIME PROXETIL 100 MG/1
100 TABLET, FILM COATED ORAL EVERY 12 HOURS
Qty: 14 TABLET | Refills: 0 | Status: SHIPPED | OUTPATIENT
Start: 2025-07-25 | End: 2025-08-01

## 2025-07-25 RX ADMIN — LOSARTAN POTASSIUM 100 MG: 25 TABLET, FILM COATED ORAL at 09:07

## 2025-07-25 RX ADMIN — PREDNISONE 50 MG: 20 TABLET ORAL at 09:07

## 2025-07-25 RX ADMIN — CEFEPIME 1 G: 1 INJECTION, POWDER, FOR SOLUTION INTRAMUSCULAR; INTRAVENOUS at 04:07

## 2025-07-25 RX ADMIN — FAMOTIDINE 20 MG: 20 TABLET, FILM COATED ORAL at 09:07

## 2025-07-25 RX ADMIN — IPRATROPIUM BROMIDE AND ALBUTEROL SULFATE 3 ML: 2.5; .5 SOLUTION RESPIRATORY (INHALATION) at 07:07

## 2025-07-25 RX ADMIN — CLOPIDOGREL BISULFATE 75 MG: 75 TABLET, FILM COATED ORAL at 09:07

## 2025-07-25 NOTE — PLAN OF CARE
Case Management Final Discharge Note    Discharge Disposition: Home Self Care    New DME ordered / company name: None    Relevant SDOH / Transition of Care Barriers:  None    Person available to provide assistance at home when needed and their contact information: Spouse Pamela [phone]    Scheduled followup appointment: PCP - scheduled for 08/01/2025 for 9:30 am.    Referrals placed: N/A    Transportation: Patient spouse will provide transportation home.    Patient and family educated on discharge services and updated on DC plan. Bedside RN Kaylee notified, patient clear to discharge from Case Management Perspective.      07/25/25 1059   Final Note   Assessment Type Final Discharge Note   Anticipated Discharge Disposition Home   What phone number can be called within the next 1-3 days to see how you are doing after discharge? [phone]   Hospital Resources/Appts/Education Provided Appointments scheduled and added to AVS   Post-Acute Status   Discharge Delays None known at this time

## 2025-07-25 NOTE — NURSING
Home Oxygen Evaluation    Date Performed: 2025    1) Patient's Home O2 Sat on room air, while at rest: 92%    2) Patient's O2 Sat on room air while exercisin%

## 2025-07-25 NOTE — ASSESSMENT & PLAN NOTE
Patient with Hypercapnic and Hypoxic Respiratory failure which is Acute.  he is not on home oxygen. Supplemental oxygen was provided and noted- Oxygen Concentration (%):  [21] 21    .   Signs/symptoms of respiratory failure include- tachypnea, increased work of breathing, respiratory distress, and use of accessory muscles. Contributing diagnoses includes - COPD and Pneumonia Labs and images were reviewed. Patient Has recent ABG, which has been reviewed. Will treat underlying causes and adjust management of respiratory failure as follows-     -Presented with respiratory distress and hypoxia and was placed on BiPAP with improvement.    -Started on IV antibiotics with vancomycin and cefepime.   -CTA chest with no PE. Patchy infiltrates in the right middle lobe and right lower lobe at the lung base.    -Sputum culture with E coli.    -Continue to wean O2 as tolerated  -Stop vancomycin.  Continue cefepime at this time  -weaned to room air, hypoxia resolved  -completed 6 minute walk test without any difficulty and without any hypoxia  -transitioned to p.o. antibiotics on discharge

## 2025-07-25 NOTE — PLAN OF CARE
Problem: Hospitalized Older Adult  Goal: Optimal Cognitive Function  Outcome: Adequate for Care Transition  Goal: Effective Bowel Elimination  Outcome: Adequate for Care Transition  Goal: Optimal Coping  Outcome: Adequate for Care Transition  Goal: Fluid and Electrolyte Balance  Outcome: Adequate for Care Transition  Goal: Optimal Functional Ability  Outcome: Adequate for Care Transition  Goal: Improved Oral Intake  Outcome: Adequate for Care Transition  Goal: Adequate Sleep/Rest  Outcome: Adequate for Care Transition  Goal: Effective Urinary Elimination  Outcome: Adequate for Care Transition     Problem: Adult Inpatient Plan of Care  Goal: Plan of Care Review  Outcome: Adequate for Care Transition  Goal: Patient-Specific Goal (Individualized)  Outcome: Adequate for Care Transition  Goal: Absence of Hospital-Acquired Illness or Injury  Outcome: Adequate for Care Transition  Goal: Optimal Comfort and Wellbeing  Outcome: Adequate for Care Transition  Goal: Readiness for Transition of Care  Outcome: Adequate for Care Transition     Problem: Pneumonia  Goal: Fluid Balance  Outcome: Adequate for Care Transition  Goal: Resolution of Infection Signs and Symptoms  Outcome: Adequate for Care Transition  Goal: Effective Oxygenation and Ventilation  Outcome: Adequate for Care Transition     Problem: Diabetes Comorbidity  Goal: Blood Glucose Level Within Targeted Range  Outcome: Adequate for Care Transition     Problem: COPD (Chronic Obstructive Pulmonary Disease)  Goal: Optimal Chronic Illness Coping  Outcome: Adequate for Care Transition  Goal: Optimal Level of Functional [place]  Outcome: Adequate for Care Transition  Goal: Absence of Infection Signs and Symptoms  Outcome: Adequate for Care Transition  Goal: Improved Oral Intake  Outcome: Adequate for Care Transition  Goal: Effective Oxygenation and Ventilation  Outcome: Adequate for Care Transition

## 2025-07-25 NOTE — ASSESSMENT & PLAN NOTE
Patient has a diagnosis of pneumonia. The cause of the pneumonia is suspected to be bacterial in etiology but organism is not known. The pneumonia is worsening due to hypoxia. The patient has the following signs/symptoms of pneumonia: persistent hypoxia , cough, and shortness of breath. The patient does have a current oxygen requirement and the patient does have a home oxygen requirement. I have reviewed the pertinent imaging. The following cultures have been collected: Blood cultures and Sputum culture The culture results are listed below.     Current antimicrobial regimen consists of the antibiotics listed below. Will monitor patient closely and continue current treatment plan unchanged.    Antibiotics (From admission, onward)      Start     Stop Route Frequency Ordered    07/23/25 1300  vancomycin 1,250 mg in 0.9% NaCl 250 mL IVPB (admixture device)         -- IV Every 24 hours (non-standard times) 07/22/25 1420    07/22/25 2000  ceFEPIme injection 1 g         -- IV Every 8 hours (non-standard times) 07/22/25 1505            Microbiology Results (last 7 days)       Procedure Component Value Units Date/Time    Culture, Respiratory with Gram Stain [[id number]]  (Abnormal)  (Susceptibility) Collected: 07/22/25 1316    Order Status: Completed Specimen: Respiratory from Tracheal Aspirate Updated: 07/24/25 0627     Respiratory Culture Many Escherichia coli     GRAM STAIN <10 Epithelial Cells/LPF      Many Gram positive cocci      Rare Gram Positive Rods      Rare Gram Negative Rods            -Presented with respiratory distress and hypoxia and was placed on BiPAP with improvement.    -Started on IV antibiotics with vancomycin and cefepime.   -CTA chest with no PE. Patchy infiltrates in the right middle lobe and right lower lobe at the lung base.    -Sputum culture with E coli.    -Continue to wean O2 as tolerated  -Stop vancomycin.    -transitioned to p.o. antibiotics on discharge

## 2025-07-25 NOTE — NURSING
Report received, care assumed. Resting in bed quietly. AAO, responsive to verbal stimuli. Denies pain/discomfort. Board updated. Plan of care discussed with patient.  [facility] [place]urses [place] -- 4 Eyes      7/25/2025       Skin assessed on: Q Shift      [x] No Pressure Injuries Present    [x]Prevention Measures Documented    [] Yes LDA  for Pressure Injury Previously documented     [] Yes New Pressure Injury Discovered   [] LDA for New Pressure Injury Added      Attending RN:  Kaylee Vee RN     Second RN:  EMANUEL Floyd

## 2025-07-25 NOTE — DISCHARGE SUMMARY
[facility] Medicine  Discharge Summary      Patient Name: Eric Jackson  MRN: [id number]  [facility]: [id number]  Patient Class: IP- Inpatient  Admission Date: 7/22/2025  Hospital Length of Stay: 3 days  Discharge Date and Time: 7/25/2025 12:16 PM  Attending Physician: No att. providers found   Discharging Provider: Armando Hunt DO  Primary Care Provider: Samir Boo MD    Primary Care Team: Networked reference to record PCT     HPI:   Patient is a an 84-year-old male with a history of hypertension,COPD,HTN,DM, CAD s/p CABG, presenting to the ED with respiratory distress.  Patient reports shortness of breath and symptoms has been progressive over the last couple of weeks, but became really severe last night.  Grandson, per documentation, noted that he was more short of breath than normal.  Patient endorses cough and shortness of breath like he has been presentation more acutely.  In ER chest X ray with sign of pneumonia,ABG with PH 7.3,CO 2 of 60 and O 2 of 17,patient received  nebulizer treatment,broads spectrum  IV Abx and placed  on Bipap in improvement in his symptoms,CTA chest is pending,patient is admitted to ICU fort acute,hypoxic,hypercapnic respiratory failure.    * No surgery found *      Hospital Course:    84-year-old male with a history of hypertension,COPD,HTN,DM, CAD s/p CABG admitted to ICU on 07/22/2025 for acute respiratory failure with hypoxia secondary to RM/RLL pneumonia and COPD exacerbation.  Presented with respiratory distress and hypoxia and was placed on BiPAP with improvement.  Started on IV antibiotics with vancomycin and cefepime. CTA chest with no PE. Patchy infiltrates in the right middle lobe and right lower lobe at the lung base.  Blood culture with no growth to date.  Sputum culture with E coli.  PT/OT consulted-no therapy indicated.  Patient able to wean off supplemental oxygen to room air.  Have been stable.  Completed 6 minute walk test without any  difficulty and without any hypoxia with ambulation.    Patient admits feeling significantly better overall.  States he is at his baseline.  Shortness a breath has resolved, still with mild productive cough but overall improving. Pt denies any fever, headaches, chest pain, palpitations, abdominal pain, nausea, vomiting, or any new weaknesses. Feels ready to go home. Patient's exam on discharge was as follow: Patient is alert and oriented, appears in no acute distress, heart with regular rate and rhythm, lungs with diminished breath sounds in lower lung fields, no wheezing, on room air, abdomen soft, and no new weaknesses or focal deficits seen.    Patient was counseled regarding any abnormal labs, differential diagnosis, treatment options, risk-benefit, lifestyle changes, current condition, and medications. Patient was interactive and attentive.  Patient's questions were answered in a respectful and timely manner. Patient was instructed to follow-up with PCP within 1 week and to continue taking medications as prescribed.  Instructed to also follow up with outpatient pulmonologist. Also, extensively discussed the risks, benefits, and side effects of patient's medications. Discussed with patient about any medication changes. Patient verbalized understanding and agrees to treatment plan.  Patient is stable for discharge.  Patient has no other questions or concerns at this time.  ED precautions discussed with the patient.    Vital signs are stable. Ambulating without any difficulty. Tolerating p.o. intake without any nausea or vomiting. Afebrile for over 24 hours. Patient is in stable condition and has no questions or concerns. Patient will be discharge to home once transportation secured . Prescriptions sent to pharmacy.  CM/SW to assist with discharge planning.     Vitals:    07/25/25 0709 07/25/25 0715 07/25/25 0733 07/25/25 1054   BP:  (!) 155/79  (!) 155/71   BP Location:  Left arm  Left arm   Patient Position:   Sitting  Sitting   Pulse: (!) 58 (!) 56 74 67   Resp: 18 15  17   Temp:  98.1 °F (36.7 °C)  97.6 °F (36.4 °C)   TempSrc:  Oral  Oral   SpO2: 96% 97%  98%   Weight:       Height:                Goals of Care Treatment Preferences:  Code Status: Full Code         Consults:     Assessment & Plan  Acute respiratory failure with hypoxia and hypercarbia  Patient with Hypercapnic and Hypoxic Respiratory failure which is Acute.  he is not on home oxygen. Supplemental oxygen was provided and noted- Oxygen Concentration (%):  [21] 21    .   Signs/symptoms of respiratory failure include- tachypnea, increased work of breathing, respiratory distress, and use of accessory muscles. Contributing diagnoses includes - COPD and Pneumonia Labs and images were reviewed. Patient Has recent ABG, which has been reviewed. Will treat underlying causes and adjust management of respiratory failure as follows-     -Presented with respiratory distress and hypoxia and was placed on BiPAP with improvement.    -Started on IV antibiotics with vancomycin and cefepime.   -CTA chest with no PE. Patchy infiltrates in the right middle lobe and right lower lobe at the lung base.    -Sputum culture with E coli.    -Continue to wean O2 as tolerated  -Stop vancomycin.  Continue cefepime at this time  -weaned to room air, hypoxia resolved  -completed 6 minute walk test without any difficulty and without any hypoxia  -transitioned to p.o. antibiotics on discharge  Pneumonia due to infectious organism  Patient has a diagnosis of pneumonia. The cause of the pneumonia is suspected to be bacterial in etiology but organism is not known. The pneumonia is worsening due to hypoxia. The patient has the following signs/symptoms of pneumonia: persistent hypoxia , cough, and shortness of breath. The patient does have a current oxygen requirement and the patient does have a home oxygen requirement. I have reviewed the pertinent imaging. The following cultures have been  collected: Blood cultures and Sputum culture The culture results are listed below.     Current antimicrobial regimen consists of the antibiotics listed below. Will monitor patient closely and continue current treatment plan unchanged.    Antibiotics (From admission, onward)      Start     Stop Route Frequency Ordered    07/23/25 1300  vancomycin 1,250 mg in 0.9% NaCl 250 mL IVPB (admixture device)         -- IV Every 24 hours (non-standard times) 07/22/25 1420    07/22/25 2000  ceFEPIme injection 1 g         -- IV Every 8 hours (non-standard times) 07/22/25 1505            Microbiology Results (last 7 days)       Procedure Component Value Units Date/Time    Culture, Respiratory with Gram Stain [[id number]]  (Abnormal)  (Susceptibility) Collected: 07/22/25 1316    Order Status: Completed Specimen: Respiratory from Tracheal Aspirate Updated: 07/24/25 0627     Respiratory Culture Many Escherichia coli     GRAM STAIN <10 Epithelial Cells/LPF      Many Gram positive cocci      Rare Gram Positive Rods      Rare Gram Negative Rods            -Presented with respiratory distress and hypoxia and was placed on BiPAP with improvement.    -Started on IV antibiotics with vancomycin and cefepime.   -CTA chest with no PE. Patchy infiltrates in the right middle lobe and right lower lobe at the lung base.    -Sputum culture with E coli.    -Continue to wean O2 as tolerated  -Stop vancomycin.    -transitioned to p.o. antibiotics on discharge  Chronic obstructive pulmonary disease with acute exacerbation  Patient's COPD is with exacerbation noted by continued dyspnea, use of accessory muscles for breathing, and worsening of baseline hypoxia currently.  Patient is currently on COPD Pathway. Continue scheduled inhalers Steroids, Antibiotics, and Supplemental oxygen and monitor respiratory status closely.   Final Active Diagnoses:    Diagnosis Date Noted POA    PRINCIPAL PROBLEM:  Acute respiratory failure with hypoxia and hypercarbia  [J96.01, J96.02] 07/22/2025 Yes    Pneumonia due to infectious organism [J18.9] 01/15/2018 Yes    Chronic obstructive pulmonary disease with acute exacerbation [J44.1] 07/22/2025 Yes    Essential hypertension [I10] 11/14/2012 Yes    Type 2 diabetes mellitus without complication, without long-term current use of insulin [E11.9] 11/14/2012 Yes    Anemia [D64.9] 07/24/2025 Unknown    Stage 3a chronic kidney disease [N18.31] 01/15/2021 Yes    DAISY (generalized anxiety disorder) [F41.1]  Yes    History of prostate cancer 2/25/19 TRUS of prostate and gold fiducial marker placement; 4/2019 s/p XRT [Z85.46] 01/29/2019 Not Applicable    Status post coronary artery bypass grafting single vessel [Z95.1] 03/07/2019 Not Applicable      Problems Resolved During this Admission:       Discharged Condition: stable    Disposition: Home or Self Care    Follow Up:   Follow-up Information       Samir Boo MD Follow up in 1 week(s).    Specialty: Internal Medicine  Contact information:  [address]  [place] [id number]  [phone]                           Patient Instructions:      Diet Cardiac     Notify your health care provider if you experience any of the following:  temperature >100.4     Notify your health care provider if you experience any of the following:  difficulty breathing or increased cough     Notify your health care provider if you experience any of the following:  persistent dizziness, light-headedness, or visual disturbances     Notify your health care provider if you experience any of the following:  increased confusion or weakness     Activity as tolerated       Significant Diagnostic Studies: Labs: All labs within the past 24 hours have been reviewed      Recent Results (from the past 100 hours)   POCT glucose    Collection Time: 07/22/25 11:09 AM   Result Value Ref Range    POCT Glucose 112 (H) 70 - 110 mg/dL   ISTAT Lactate    Collection Time: 07/22/25 12:11 PM   Result Value Ref Range    POC Lactate  1.61 0.5 - 2.2 mmol/L    Sample VENOUS     Site Other     Allens Test N/A    Comprehensive Metabolic Panel    Collection Time: 07/22/25 12:14 PM   Result Value Ref Range    Sodium 146 (H) 136 - 145 mmol/L    Potassium 3.9 3.5 - 5.1 mmol/L    Chloride 106 95 - 110 mmol/L    CO2 28 23 - 29 mmol/L    Glucose 102 70 - 110 mg/dL    BUN 12 8 - 23 mg/dL    Creatinine 1.2 0.5 - 1.4 mg/dL    Calcium 9.3 8.7 - 10.5 mg/dL    Protein Total 7.8 6.0 - 8.4 gm/dL    Albumin 3.8 3.5 - 5.2 g/dL    Bilirubin Total 0.4 0.1 - 1.0 mg/dL    ALP 76 40 - 150 unit/L    AST 21 11 - 45 unit/L    ALT 16 10 - 44 unit/L    Anion Gap 12 8 - 16 mmol/L    eGFR 60 (L) >60 mL/min/1.73/m2   Troponin I    Collection Time: 07/22/25 12:14 PM   Result Value Ref Range    Troponin-I <0.006 <=0.026 ng/mL   Brain natriuretic peptide    Collection Time: 07/22/25 12:14 PM   Result Value Ref Range     (H) 0 - 99 pg/mL   Magnesium    Collection Time: 07/22/25 12:14 PM   Result Value Ref Range    Magnesium  2.0 1.6 - 2.6 mg/dL   Blood culture x two cultures. Draw prior to antibiotics.    Collection Time: 07/22/25 12:15 PM    Specimen: Peripheral, Antecubital, Left; Blood   Result Value Ref Range    Blood Culture No Growth After 72 Hours    CBC with Differential    Collection Time: 07/22/25 12:16 PM   Result Value Ref Range    WBC 8.19 3.90 - 12.70 K/uL    RBC 4.99 4.60 - 6.20 M/uL    HGB 15.5 14.0 - 18.0 gm/dL    HCT 48.8 40.0 - 54.0 %    MCV 98 82 - 98 fL    MCH 31.1 (H) 27.0 - 31.0 pg    MCHC 31.8 (L) 32.0 - 36.0 g/dL    RDW 14.4 11.5 - 14.5 %    Platelet Count 205 150 - 450 K/uL    MPV 9.6 9.2 - 12.9 fL    Nucleated RBC 0 <=0 /100 WBC    Neut % 75.9 (H) 38 - 73 %    Lymph % 15.3 (L) 18 - 48 %    Mono % 4.6 4 - 15 %    Eos % 3.8 <=8 %    Basophil % 0.2 <=1.9 %    Imm Grans % 0.2 0.0 - 0.5 %    Neut # 6.21 1.8 - 7.7 K/uL    Lymph # 1.25 1 - 4.8 K/uL    Mono # 0.38 0.3 - 1 K/uL    Eos # 0.31 <=0.5 K/uL    Baso # 0.02 <=0.2 K/uL    Imm Grans # 0.02 0.00 -  0.04 K/uL   Blood culture x two cultures. Draw prior to antibiotics.    Collection Time: 07/22/25 12:17 PM    Specimen: Peripheral, Forearm, Right; Blood   Result Value Ref Range    Blood Culture No Growth After 72 Hours    Urinalysis, Reflex to Urine Culture Urine, Clean Catch    Collection Time: 07/22/25  1:16 PM    Specimen: Urine, Clean Catch   Result Value Ref Range    Color, UA Yellow Straw, Yu, Yellow, Light-Orange    Appearance, UA Clear Clear    pH, UA 7.0 5.0 - 8.0    Spec Grav UA >=1.030 (A) 1.005 - 1.030    Protein, UA Negative Negative    Glucose, UA 4+ (A) Negative    Ketones, UA Negative Negative    Bilirubin, UA Negative Negative    Blood, UA Negative Negative    Nitrites, UA Negative Negative    Urobilinogen, UA Negative <2.0 EU/dL    Leukocyte Esterase, UA 2+ (A) Negative   Culture, Respiratory with Gram Stain    Collection Time: 07/22/25  1:16 PM    Specimen: Tracheal Aspirate; Respiratory   Result Value Ref Range    Respiratory Culture Many Escherichia coli (A)     GRAM STAIN <10 Epithelial Cells/LPF     GRAM STAIN Many Gram positive cocci     GRAM STAIN Rare Gram Positive Rods     GRAM STAIN Rare Gram Negative Rods        Susceptibility    Escherichia coli - YONI     Ampicillin >16 Resistant µg/ml     Ampicillin/Sulbactam 16/8 Intermediate µg/ml     Cefazolin <=2 Sensitive µg/ml     Cefepime <=2 Sensitive µg/ml     Ceftriaxone <=1 Sensitive µg/ml     Ciprofloxacin >2 Resistant µg/ml     Ertapenem <=0.5 Sensitive µg/ml     Gentamicin <=2 Sensitive µg/ml     Levofloxacin >4 Resistant µg/ml     Meropenem <=1 Sensitive µg/ml     Piperacillin/Tazobactam <=8 Sensitive µg/ml     Tobramycin <=2 Sensitive µg/ml     Trimeth/Sulfa >2/38 Resistant µg/ml   GREY TOP URINE HOLD    Collection Time: 07/22/25  1:16 PM   Result Value Ref Range    Extra Tube Hold for add-ons.    Urinalysis Microscopic    Collection Time: 07/22/25  1:16 PM   Result Value Ref Range    RBC, UA 4 0 - 4 /HPF    WBC, UA 4 0 - 5 /HPF     Bacteria, UA Few (A) None, Rare, Occasional /HPF    Yeast, UA None None /HPF    Squamous Epithelial Cells, UA <1 <=5 /HPF    Microscopic Comment     ISTAT PROCEDURE    Collection Time: 07/22/25  1:58 PM   Result Value Ref Range    POC PH 7.315 (L) 7.35 - 7.45    POC PCO2 60.4 (H) 35 - 45 mmHg    POC PO2 17 (L) 40 - 60 mmHg    POC HCO3 30.7 (H) 24 - 28 mmol/L    POC BE 3 (H) -2 to 2 mmol/L    POC SATURATED O2 20 95 - 100 %    POC TCO2 33 (H) 24 - 29 mmol/L    Sample VENOUS     Site Other     Allens Test N/A    Lactic acid, plasma #2    Collection Time: 07/22/25  3:39 PM   Result Value Ref Range    Lactic Acid Level 3.1 (H) 0.5 - 2.2 mmol/L   POCT COVID-19 Rapid Screening    Collection Time: 07/22/25  5:28 PM   Result Value Ref Range    POC Rapid COVID Negative Negative     Acceptable Yes    POCT Influenza A/B Molecular    Collection Time: 07/22/25  5:28 PM   Result Value Ref Range    POC Molecular Influenza A Ag Negative Negative    POC Molecular Influenza B Ag Negative Negative     Acceptable Yes    POCT glucose    Collection Time: 07/22/25  5:39 PM   Result Value Ref Range    POCT Glucose 180 (H) 70 - 110 mg/dL   POCT glucose    Collection Time: 07/22/25  5:40 PM   Result Value Ref Range    POC Glucose 180 (A) 70 - 110 MG/DL   Lactic Acid, Plasma    Collection Time: 07/22/25  6:21 PM   Result Value Ref Range    Lactic Acid Level 3.7 (HH) 0.5 - 2.2 mmol/L   Basic Metabolic Panel    Collection Time: 07/23/25  4:31 AM   Result Value Ref Range    Sodium 140 136 - 145 mmol/L    Potassium 4.1 3.5 - 5.1 mmol/L    Chloride 107 95 - 110 mmol/L    CO2 22 (L) 23 - 29 mmol/L    Glucose 158 (H) 70 - 110 mg/dL    BUN 17 8 - 23 mg/dL    Creatinine 1.1 0.5 - 1.4 mg/dL    Calcium 8.5 (L) 8.7 - 10.5 mg/dL    Anion Gap 11 8 - 16 mmol/L    eGFR >60 >60 mL/min/1.73/m2   CBC with Differential    Collection Time: 07/23/25  4:31 AM   Result Value Ref Range    WBC 14.70 (H) 3.90 - 12.70 K/uL    RBC 4.17 (L)  4.60 - 6.20 M/uL    HGB 12.8 (L) 14.0 - 18.0 gm/dL    HCT 39.8 (L) 40.0 - 54.0 %    MCV 95 82 - 98 fL    MCH 30.7 27.0 - 31.0 pg    MCHC 32.2 32.0 - 36.0 g/dL    RDW 14.1 11.5 - 14.5 %    Platelet Count 170 150 - 450 K/uL    MPV 9.8 9.2 - 12.9 fL    Nucleated RBC 0 <=0 /100 WBC    Neut % 91.1 (H) 38 - 73 %    Lymph % 3.5 (L) 18 - 48 %    Mono % 4.6 4 - 15 %    Eos % 0.0 <=8 %    Basophil % 0.1 <=1.9 %    Imm Grans % 0.7 (H) 0.0 - 0.5 %    Neut # 13.40 (H) 1.8 - 7.7 K/uL    Lymph # 0.52 (L) 1 - 4.8 K/uL    Mono # 0.67 0.3 - 1 K/uL    Eos # 0.00 <=0.5 K/uL    Baso # 0.01 <=0.2 K/uL    Imm Grans # 0.10 (H) 0.00 - 0.04 K/uL   POCT glucose    Collection Time: 07/23/25 11:38 AM   Result Value Ref Range    POCT Glucose 281 (H) 70 - 110 mg/dL   Lactic acid, plasma    Collection Time: 07/24/25 12:08 AM   Result Value Ref Range    Lactic Acid Level 2.0 0.5 - 2.2 mmol/L   Basic Metabolic Panel    Collection Time: 07/24/25  4:26 AM   Result Value Ref Range    Sodium 138 136 - 145 mmol/L    Potassium 4.1 3.5 - 5.1 mmol/L    Chloride 107 95 - 110 mmol/L    CO2 23 23 - 29 mmol/L    Glucose 151 (H) 70 - 110 mg/dL    BUN 30 (H) 8 - 23 mg/dL    Creatinine 1.1 0.5 - 1.4 mg/dL    Calcium 8.9 8.7 - 10.5 mg/dL    Anion Gap 8 8 - 16 mmol/L    eGFR >60 >60 mL/min/1.73/m2   CBC with Differential    Collection Time: 07/24/25  4:26 AM   Result Value Ref Range    WBC 14.54 (H) 3.90 - 12.70 K/uL    RBC 4.24 (L) 4.60 - 6.20 M/uL    HGB 13.2 (L) 14.0 - 18.0 gm/dL    HCT 40.2 40.0 - 54.0 %    MCV 95 82 - 98 fL    MCH 31.1 (H) 27.0 - 31.0 pg    MCHC 32.8 32.0 - 36.0 g/dL    RDW 14.3 11.5 - 14.5 %    Platelet Count 163 150 - 450 K/uL    MPV 10.0 9.2 - 12.9 fL    Nucleated RBC 0 <=0 /100 WBC    Neut % 88.4 (H) 38 - 73 %    Lymph % 4.8 (L) 18 - 48 %    Mono % 6.1 4 - 15 %    Eos % 0.0 <=8 %    Basophil % 0.1 <=1.9 %    Imm Grans % 0.6 (H) 0.0 - 0.5 %    Neut # 12.87 (H) 1.8 - 7.7 K/uL    Lymph # 0.70 (L) 1 - 4.8 K/uL    Mono # 0.88 0.3 - 1 K/uL    Eos  # 0.00 <=0.5 K/uL    Baso # 0.01 <=0.2 K/uL    Imm Grans # 0.08 (H) 0.00 - 0.04 K/uL   POCT glucose    Collection Time: 07/24/25  7:43 AM   Result Value Ref Range    POCT Glucose 145 (H) 70 - 110 mg/dL   VANCOMYCIN, TROUGH    Collection Time: 07/24/25 10:16 AM   Result Value Ref Range    Vancomycin Trough <1.4 (L) 10.0 - 22.0 ug/ml   POCT glucose    Collection Time: 07/24/25 10:51 AM   Result Value Ref Range    POCT Glucose 184 (H) 70 - 110 mg/dL   Lavender Top Hold    Collection Time: 07/24/25 11:13 AM   Result Value Ref Range    Extra Tube Hold for add-ons.    POCT glucose    Collection Time: 07/24/25  3:46 PM   Result Value Ref Range    POCT Glucose 182 (H) 70 - 110 mg/dL   POCT glucose    Collection Time: 07/24/25  7:27 PM   Result Value Ref Range    POCT Glucose 216 (H) 70 - 110 mg/dL   Basic Metabolic Panel    Collection Time: 07/25/25  5:28 AM   Result Value Ref Range    Sodium 139 136 - 145 mmol/L    Potassium 4.0 3.5 - 5.1 mmol/L    Chloride 107 95 - 110 mmol/L    CO2 23 23 - 29 mmol/L    Glucose 118 (H) 70 - 110 mg/dL    BUN 30 (H) 8 - 23 mg/dL    Creatinine 1.0 0.5 - 1.4 mg/dL    Calcium 8.7 8.7 - 10.5 mg/dL    Anion Gap 9 8 - 16 mmol/L    eGFR >60 >60 mL/min/1.73/m2   CBC with Differential    Collection Time: 07/25/25  5:28 AM   Result Value Ref Range    WBC 12.29 3.90 - 12.70 K/uL    RBC 4.42 (L) 4.60 - 6.20 M/uL    HGB 13.4 (L) 14.0 - 18.0 gm/dL    HCT 42.4 40.0 - 54.0 %    MCV 96 82 - 98 fL    MCH 30.3 27.0 - 31.0 pg    MCHC 31.6 (L) 32.0 - 36.0 g/dL    RDW 14.5 11.5 - 14.5 %    Platelet Count 197 150 - 450 K/uL    MPV 10.0 9.2 - 12.9 fL    Nucleated RBC 0 <=0 /100 WBC    Neut % 82.4 (H) 38 - 73 %    Lymph % 9.8 (L) 18 - 48 %    Mono % 6.6 4 - 15 %    Eos % 0.1 <=8 %    Basophil % 0.2 <=1.9 %    Imm Grans % 0.9 (H) 0.0 - 0.5 %    Neut # 10.14 (H) 1.8 - 7.7 K/uL    Lymph # 1.20 1 - 4.8 K/uL    Mono # 0.81 0.3 - 1 K/uL    Eos # 0.01 <=0.5 K/uL    Baso # 0.02 <=0.2 K/uL    Imm Grans # 0.11 (H) 0.00 -  0.04 K/uL   POCT glucose    Collection Time: 07/25/25  7:17 AM   Result Value Ref Range    POCT Glucose 123 (H) 70 - 110 mg/dL   POCT glucose    Collection Time: 07/25/25 10:55 AM   Result Value Ref Range    POCT Glucose 135 (H) 70 - 110 mg/dL       Microbiology Results (last 7 days)       Procedure Component Value Units Date/Time    Culture, Respiratory with Gram Stain [[id number]]  (Abnormal)  (Susceptibility) Collected: 07/22/25 1316    Order Status: Completed Specimen: Respiratory from Tracheal Aspirate Updated: 07/24/25 0627     Respiratory Culture Many Escherichia coli     GRAM STAIN <10 Epithelial Cells/LPF      Many Gram positive cocci      Rare Gram Positive Rods      Rare Gram Negative Rods            Imaging Results              CTA Chest Non-Coronary (PE Studies) (Final result)  Result time 07/22/25 16:50:38      Final result by Yarely Fairchild MD (07/22/25 16:50:38)                   Impression:      Examination limited due to respiratory motion.  Allowing for this, there is no evidence of pulmonary embolism.    Patchy infiltrates in the right middle lobe and right lower lobe at the lung base, to be correlated clinically for infectious etiology.    Additional findings as above      Electronically signed by: Yarely Fairchild MD  Date:    07/22/2025  Time:    16:50               Narrative:    EXAMINATION:  CTA CHEST NON CORONARY (PE STUDIES)    CLINICAL HISTORY:  Pulmonary embolism (PE) suspected, unknown D-dimer;    TECHNIQUE:  Low dose axial images, sagittal and coronal reformations were obtained from the thoracic inlet to the lung bases following the IV administration of 75 mL of Omnipaque 350.  Contrast timing was optimized to evaluate the pulmonary arteries.  MIP images were performed.    COMPARISON:  July 22, 2025 radiograph, CT April 28, 2025    FINDINGS:  There is some respiratory motion which degrades image quality particularly at the lung bases.    There is calcification along the wall of the  thoracic aorta without aneurysm.  There is coronary artery calcification.    There are sternotomy wires.    No significant mediastinal lymphadenopathy is present.    Suspected hiatal hernia.    Cholelithiasis noted.    There is no evidence of pulmonary embolism within the limits of the patient motion.    There are patchy infiltrates within the right middle lobe and right lower lobe at the lung base.                                       X-Ray Chest 1 View (Final result)  Result time 07/22/25 11:43:39      Final result by Yarely Fairchild MD (07/22/25 11:43:39)                   Impression:      As above      Electronically signed by: Yarely Fairchild MD  Date:    07/22/2025  Time:    11:43               Narrative:    EXAMINATION:  XR CHEST 1 VIEW    CLINICAL HISTORY:  shortness of breath;    TECHNIQUE:  Single frontal view of the chest was performed.    COMPARISON:  January 5, 2024    FINDINGS:  Sternotomy wires are present.  Cardiac size not enlarged.  Calcification present along the wall of the aorta.  No significant pleural effusion noted.  There are mild bibasilar opacities, may represent developing infiltrate, edema or other etiology to be correlated with additional clinical findings.  Osseous structures show degenerative change.                                          Pending Diagnostic Studies:       None           Medications:  Reconciled Home Medications:      Medication List        START taking these medications      cefpodoxime 100 MG tablet  Commonly known as: VANTIN  Take 1 tablet (100 mg total) by mouth every 12 (twelve) hours. for 7 days  Notes to patient: Antibiotic     predniSONE 50 MG Tab  Commonly known as: DELTASONE  Take 1 tablet (50 mg total) by mouth once daily. for 2 days  Start taking on: July 26, 2025  Notes to patient: Antiinflammatory            CONTINUE taking these medications      albuterol 90 mcg/actuation inhaler  Commonly known as: PROVENTIL/VENTOLIN HFA  Inhale 2 puffs into the  lungs every 6 (six) hours as needed for Wheezing.     ALPRAZolam 1 MG tablet  Commonly known as: XANAX  Take 1 tablet (1 mg total) by mouth nightly as needed for Anxiety.     aspirin 81 MG EC tablet  Commonly known as: ECOTRIN  Take 1 tablet (81 mg total) by mouth once daily.     blood sugar diagnostic Strp  Commonly known as: TRUE METRIX GLUCOSE TEST STRIP  BID testing     blood-glucose meter kit  Commonly known as: TRUE METRIX GLUCOSE METER  For twice daily checking     clopidogreL 75 mg tablet  Commonly known as: PLAVIX  Take 1 tablet (75 mg total) by mouth once daily.     empagliflozin 10 mg tablet  Commonly known as: JARDIANCE  Take 1 tablet (10 mg total) by mouth once daily.     ezetimibe 10 mg tablet  Commonly known as: ZETIA  Take 1 tablet (10 mg total) by mouth once daily.     fluticasone-salmeterol 250-50 mcg/dose 250-50 mcg/dose diskus inhaler  Commonly known as: ADVAIR DISKUS  Inhale 1 puff into the lungs 2 (two) times daily. Controller     imiquimod 5 % cream  Commonly known as: ALDARA  APPLY TOPICALLY 3 (THREE) TIMES A WEEK.     lancets Misc  To check BG 2 times daily, to use with insurance preferred meter     losartan 100 MG tablet  Commonly known as: COZAAR  Take 1 tablet (100 mg total) by mouth once daily.     metFORMIN 500 MG tablet  Commonly known as: GLUCOPHAGE  TAKE 1 TABLET EVERY DAY WITH BREAKFAST     metoprolol succinate 25 MG 24 hr tablet  Commonly known as: TOPROL-XL  Take 0.5 tablets (12.5 mg total) by mouth once daily.     omeprazole 40 MG capsule  Commonly known as: PRILOSEC  Take 1 capsule (40 mg total) by mouth 2 (two) times daily before meals.     PRESERVISION LUTEIN ORAL  Take 1 tablet by mouth once daily.     spironolactone 25 MG tablet  Commonly known as: ALDACTONE  Take 1 tablet (25 mg total) by mouth once daily.     testosterone 20.25 mg/1.25 gram (1.62 %) Glpm  Commonly known as: ANDROGEL  Apply topically.     TRUE METRIX LEVEL 1 Soln  Generic drug: blood glucose control,  low  To use with blood glucose meter     vitamin D 1000 units Tab  Commonly known as: VITAMIN D3  Take 1,000 Units by mouth once daily.              Indwelling Lines/Drains at time of discharge:   Lines/Drains/Airways       None                       Time spent on the discharge of patient:  Greater than 35 minutes         Armando Hunt DO  Department of Hospital Medicine  [facility] [place] [facility]

## 2025-07-25 NOTE — NURSING
Pt resting in bed, IV saline locked.  Tele box monitored. Pt able to ambulate to the restroom and void without difficulty.  Diet tolerated well, pt denies N/V.  No acute distress noted, pt free from falls or injury.  Bed in low position, wheels locked, call light in reach for assistance, will continue to monitor.    [facility], [facility]  Nurses Note -- 4 Eyes      7/24/2025       Skin assessed on: Q Shift      [x] No Pressure Injuries Present    [x]Prevention Measures Documented    [] Yes LDA  for Pressure Injury Previously documented     [] Yes New Pressure Injury Discovered   [] LDA for New Pressure Injury Added      Attending RN:  Mariel Fonseca RN     Second RN:  EMANUEL Solitario

## 2025-07-25 NOTE — DISCHARGE INSTRUCTIONS
Our goal at [facility] is to always give you outstanding care and exceptional service. You may receive a survey from [employer] by mail, text or e-mail in the next 24-48 hours asking about the care you received with us. The survey should only take 5-10 minutes to complete and is very important to us.     Your feedback provides us with a way to recognize our staff who work tirelessly to provide the best care! Also, your responses help us learn how to improve when your experience was below our aspiration of excellence. We are always looking for ways to improve your stay. We WILL use your feedback to continue making improvements to help us provide the highest quality care. We keep your personal information and feedback confidential. We appreciate your time completing this survey and can't wait to hear from you!!!    We look forward to your continued care with us! Thanks so much for choosing [facility] for your healthcare needs!

## 2025-07-25 NOTE — PLAN OF CARE
Problem: Hospitalized Older Adult  Goal: Optimal Cognitive Function  Outcome: Progressing     Problem: Hospitalized Older Adult  Goal: Effective Bowel Elimination  Outcome: Progressing     Problem: Hospitalized Older Adult  Goal: Optimal Coping  Outcome: Progressing     Problem: Hospitalized Older Adult  Goal: Effective Urinary Elimination  Outcome: Progressing     Problem: Adult Inpatient Plan of Care  Goal: Plan of Care Review  Outcome: Progressing     Problem: Adult Inpatient Plan of Care  Goal: Optimal Comfort and Wellbeing  Outcome: Progressing     Problem: Diabetes Comorbidity  Goal: Blood Glucose Level Within Targeted Range  Outcome: Progressing     Problem: Pneumonia  Goal: Fluid Balance  Outcome: Progressing     Problem: Skin Injury Risk Increased  Goal: Skin Health and Integrity  Outcome: Progressing     Problem: COPD (Chronic Obstructive Pulmonary Disease)  Goal: Optimal Chronic Illness Coping  Outcome: Progressing

## 2025-07-25 NOTE — NURSING
Patient discharged at this time. No distress noted. No complaints voiced. Declined wheelchair transport. Ambulated  safely off unit with personal belongings.  [facility], [facility] [place]  Nurses Note -- 4 Eyes      7/25/2025       Skin assessed on: Discharge      [x] No Pressure Injuries Present    [x]Prevention Measures Documented    [] Yes LDA  for Pressure Injury Previously documented     [] Yes New Pressure Injury Discovered   [] LDA for New Pressure Injury Added      Attending RN:  Kaylee Vee RN     Second RN:  JUANJO Perdomo

## 2025-07-26 LAB
OHS QRS DURATION: 88 MS
OHS QRS DURATION: 88 MS
OHS QTC CALCULATION: 401 MS
OHS QTC CALCULATION: 417 MS

## 2025-07-27 LAB
BACTERIA BLD CULT: NORMAL
BACTERIA BLD CULT: NORMAL

## 2025-07-28 ENCOUNTER — PATIENT OUTREACH (OUTPATIENT)
Dept: ADMINISTRATIVE | Facility: CLINIC | Age: 85
End: 2025-07-28
Payer: MEDICARE

## 2025-07-28 NOTE — PROGRESS NOTES
C3 nurse spoke with Eric Jackson  for a TCC post hospital discharge follow up call. The patient has a scheduled HOSFU appointment with Anna Matamoros on 8/1/25 @ 5102.

## 2025-07-29 NOTE — PROGRESS NOTES
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 male  presenting today for a new evaluation and treatment of hearing loss.  Pt reports having difficulty hearing for the past 10 years.  Pt has history of occupational noise exposure without use of hearing protection. Pt denies any otalgia, otorrhea, fullness or pressure.    Past Medical History  Past Medical History:   Diagnosis Date    Anemia     Anemia 7/24/2025    Angina pectoris     Anxiety     Asthma-COPD overlap syndrome 4/22/2025    Cancer     Coronary artery disease     Depression     Diabetes mellitus type II     Disorder of kidney and ureter     Erectile dysfunction     Eye injuries     fb ou    GERD (gastroesophageal reflux disease)     Hypertension     Hypertensive heart disease with heart failure 2/14/2025    Intermediate stage nonexudative age-related macular degeneration of both eyes 4/9/2019    Myocardial infarction     Nuclear sclerosis of both eyes 4/9/2019    Prostate cancer     Trouble in sleeping     Type 2 diabetes mellitus     Type 2 diabetes mellitus with ophthalmic manifestations       Past Surgical History  Past Surgical History:   Procedure Laterality Date    COLONOSCOPY N/A 2/9/2018    Procedure: COLONOSCOPY;  Surgeon: Mathieu Cao MD;  Location: [facility] ENDO;  Service: Endoscopy;  Laterality: N/A;    CORONARY ANGIOGRAPHY INCLUDING BYPASS GRAFTS WITH CATHETERIZATION OF LEFT HEART N/A 3/4/2019    Procedure: ANGIOGRAM, CORONARY, INCLUDING BYPASS GRAFT, WITH LEFT HEART CATHETERIZATION;  Surgeon: Jamir Nam MD;  Location: [facility] CATH LAB;  Service: Cardiology;  Laterality: N/A;    CORONARY ANGIOGRAPHY INCLUDING BYPASS GRAFTS WITH CATHETERIZATION OF LEFT HEART N/A 10/9/2024    Procedure: ANGIOGRAM, CORONARY,  INCLUDING BYPASS GRAFT, WITH LEFT HEART CATHETERIZATION;  Surgeon: Milly Cadena MD;  Location: [facility] CATH LAB;  Service: Cardiology;  Laterality: N/A;  rcfa  RN PREOP 10/4/2024    CORONARY ARTERY BYPASS GRAFT  2001    ESOPHAGOGASTRODUODENOSCOPY N/A 10/8/2020    Procedure: EGD (ESOPHAGOGASTRODUODENOSCOPY);  Surgeon: Bharath Herrera MD;  Location: [facility] ENDO;  Service: Endoscopy;  Laterality: N/A;    ESOPHAGOGASTRODUODENOSCOPY N/A 11/7/2023    Procedure: EGD (ESOPHAGOGASTRODUODENOSCOPY);  Surgeon: Bharath Herrera MD;  Location: [facility] ENDO;  Service: Endoscopy;  Laterality: N/A;    HERNIA REPAIR      LEFT HEART CATHETERIZATION Left 3/4/2019    Procedure: Left heart cath RFA access, 4fr catheter/sheath, not before 9am;  Surgeon: Jamir Nam MD;  Location: [facility] CATH LAB;  Service: Cardiology;  Laterality: Left;    TRANSRECTAL ULTRASOUND OF PROSTATE WITH INSERTION OF GOLD FIDUCIAL MARKER N/A 2/26/2019    Procedure: ULTRASOUND, PROSTATE, RECTAL APPROACH, WITH GOLD FIDUCIAL MARKER INSERTION;  Surgeon: Layne Huff MD;  Location: [facility] OR;  Service: Urology;  Laterality: N/A;    UPPER GASTROINTESTINAL ENDOSCOPY      WRIST SURGERY          Medications  Medications Ordered Prior to Encounter[1]    Review of Systems  Review of Systems   Constitutional: Negative.    HENT:  Positive for hearing loss.    Eyes: Negative.    Respiratory: Negative.     Cardiovascular: Negative.    Gastrointestinal: Negative.    Skin: Negative.       Social History   reports that he quit smoking about 36 years ago. His smoking use included cigarettes. He started smoking about 59 years ago. He has a 46 pack-year smoking history. He has never used smokeless tobacco. He reports current alcohol use of about 5.0 standard drinks of alcohol per week. He reports that he does not use drugs.     Family History  Family History   Adopted: Yes   Problem Relation Name Age of Onset    No Known Problems Mother      No Known  "Problems Father      No Known Problems Sister      No Known Problems Brother      No Known Problems Maternal Aunt      No Known Problems Maternal Uncle      No Known Problems Paternal Aunt      No Known Problems Paternal Uncle      No Known Problems Maternal Grandmother      No Known Problems Maternal Grandfather      No Known Problems Paternal Grandmother      No Known Problems Paternal Grandfather      No Known Problems Other      Amblyopia Neg Hx      Blindness Neg Hx      Cancer Neg Hx      Cataracts Neg Hx      Diabetes Neg Hx      Glaucoma Neg Hx      Hypertension Neg Hx      Macular degeneration Neg Hx      Retinal detachment Neg Hx      Strabismus Neg Hx      Stroke Neg Hx      Thyroid disease Neg Hx        Physical Exam   Vitals:    07/18/25 0806   BP: (!) 172/76    Body mass index is 25.83 kg/m².  Weight: 74.8 kg (164 lb 14.5 oz)   Height: 5' 7" (170.2 cm)     GENERAL: no acute distress.  HEAD: normocephalic.   EYES: lids and lashes normal. No scleral icterus  EARS: external ear without lesion, normal pinna shape and position.  External auditory canal with cerumen impaction bilaterally.     NOSE: external nose without significant bony abnormality  ORAL CAVITY/OROPHARYNX: tongue midline and mobile. Symmetric palate rise.   NECK: trachea midline.   LYMPH NODES:No cervical lymphadenopathy.  RESPIRATORY: no stridor, no stertor. Voice normal. Respirations nonlabored.  NEURO: alert, responds to questions appropriately.   Cranial nerve exam as indicated in above sections and additionally showed facial movement symmetric with good eye closure and symmetric smile.   PSYCH:mood appropriate    Procedure Note   Procedure performed:microscopic examination of ears with cerumen disimpaction    Indication for procedure: bilateral cerumen impaction     Description of procedure:  After verbal consent was obtained, the patient was positioned in semi recumbent position and speculum was placed in the right ear and microscope " brought into the field.  The microscope was positioned and magnification adjusted for appropriate visualization. Otologic instruments including various size otologic suctions and curette were used to remove the cerumen from the right external auditory canals under visualization with the operating microscope. After cleaning, visualization was again performed and at various levels of higher magnification to optimize views of the ear canal, tympanic membrane, ossicles and middle ear space. The same procedure was then repeated for the left ear. Findings as indicated below. All portions of the procedure and examination by otomicroscopy were tolerated well without complication.     Findings:  Right ear: Complete cerumen impaction removed entirely revealing normal external auditory canal; tympanic membrane without bulging, retraction, or perforation; no evidence of middle ear fluid or effusion.   Left ear: Complete cerumen impaction removed entirely revealing normal external auditory canal; tympanic membrane without bulging, retraction, or perforation; no evidence of middle ear fluid or effusion.     Imaging:  The patient does not have any pertinent and/or recent imaging of the head and neck.     Labs:  CBC  Recent Labs   Lab 07/23/25  0431 07/24/25  0426 07/25/25  0528   WBC 14.70 H 14.54 H 12.29   HGB 12.8 L 13.2 L 13.4 L   HCT 39.8 L 40.2 42.4   MCV 95 95 96   Platelet Count 170 163 197     BMP  Recent Labs   Lab 07/22/25  1214 07/23/25  0431 07/24/25  0426 07/25/25  0528   Glucose 102 158 H 151 H 118 H   Sodium 146 H 140 138 139   Potassium 3.9 4.1 4.1 4.0   Chloride 106 107 107 107   CO2 28 22 L 23 23   BUN 12 17 30 H 30 H   Creatinine 1.2 1.1 1.1 1.0   Calcium 9.3 8.5 L 8.9 8.7   Magnesium  2.0  --   --   --      COAGS      AUDIOLOGY RESULTS  Audiometric evaluation including audiogram, tympanometry, acoustic reflexes, and speech discrimination which was performed  was personally reviewed and interpreted.  Notable  findings on the audiogram were  a moderate sloping to profound sensorineural hearing loss (SNHL) for the right ear and a mild sloping to severe SNHL for the left ear. A speech reception threshold was obtained at 50 dBHL for the right ear and at 60 dBHL for the left ear. Speech discrimination was 52% for the right ear and 92% for the left ear.   Tympanometry revealed Type A tympanogram on the left and Type A tympanogram on the right.   Report of the audiologist performing this audiometric testing was also reviewed      Assessment  1. Asymmetrical sensorineural hearing loss    2. Bilateral impacted cerumen     Plan:  ASNHL:  Audiogram reviewed and discussed with patient. Recheck hearing in 1 year or sooner if subjective change noted. Encouraged hearing protection while in noisy environment.  Additionally, amplification with hearing aids is generally the best option for hearing rehabilitation, except where the hearing loss is profound.  Cerumen Impaction:  We discussed preventative measures and treatment options. Q-tips must be avoided, instead the ears can be cleaned with OTC ear rinses (or Debrox).   Discussed plan of care with patient in detail and all questions answered. Patient reported understanding of plan of care.        [1]   Current Outpatient Medications on File Prior to Visit   Medication Sig Dispense Refill    albuterol (PROVENTIL/VENTOLIN HFA) 90 mcg/actuation inhaler Inhale 2 puffs into the lungs every 6 (six) hours as needed for Wheezing. 18 g 0    ALPRAZolam (XANAX) 1 MG tablet Take 1 tablet (1 mg total) by mouth nightly as needed for Anxiety. 30 tablet 2    aspirin (ECOTRIN) 81 MG EC tablet Take 1 tablet (81 mg total) by mouth once daily.      blood glucose control, low (TRUE METRIX LEVEL 1) Soln To use with blood glucose meter 1 each 0    blood sugar diagnostic (TRUE METRIX GLUCOSE TEST STRIP) Strp BID testing 200 strip 3    blood-glucose meter (TRUE METRIX GLUCOSE METER) kit For twice daily checking  1 each 0    clopidogreL (PLAVIX) 75 mg tablet Take 1 tablet (75 mg total) by mouth once daily. 30 tablet 11    empagliflozin (JARDIANCE) 10 mg tablet Take 1 tablet (10 mg total) by mouth once daily. 90 tablet 3    ezetimibe (ZETIA) 10 mg tablet Take 1 tablet (10 mg total) by mouth once daily. 90 tablet 3    fluticasone-salmeterol diskus inhaler 250-50 mcg Inhale 1 puff into the lungs 2 (two) times daily. Controller 60 each 11    imiquimod (ALDARA) 5 % cream APPLY TOPICALLY 3 (THREE) TIMES A WEEK. 24 packet 3    lancets Misc To check BG 2 times daily, to use with insurance preferred meter 200 each 3    losartan (COZAAR) 100 MG tablet Take 1 tablet (100 mg total) by mouth once daily. 90 tablet 3    metFORMIN (GLUCOPHAGE) 500 MG tablet TAKE 1 TABLET EVERY DAY WITH BREAKFAST 90 tablet 3    metoprolol succinate (TOPROL-XL) 25 MG 24 hr tablet Take 0.5 tablets (12.5 mg total) by mouth once daily. 90 tablet 3    omeprazole (PRILOSEC) 40 MG capsule Take 1 capsule (40 mg total) by mouth 2 (two) times daily before meals. (Patient taking differently: Take 40 mg by mouth Daily.) 180 capsule 3    spironolactone (ALDACTONE) 25 MG tablet Take 1 tablet (25 mg total) by mouth once daily. 90 tablet 3    testosterone (ANDROGEL) 20.25 mg/1.25 gram (1.62 %) GlPm Apply topically.      vit C/vit E ac/lut/copper/zinc (PRESERVISION LUTEIN ORAL) Take 1 tablet by mouth once daily.      vitamin D 1000 units Tab Take 1,000 Units by mouth once daily.       No current facility-administered medications on file prior to visit.

## 2025-07-30 ENCOUNTER — CLINICAL SUPPORT (OUTPATIENT)
Dept: OPHTHALMOLOGY | Facility: CLINIC | Age: 85
End: 2025-07-30
Payer: MEDICARE

## 2025-07-30 ENCOUNTER — OFFICE VISIT (OUTPATIENT)
Dept: OPHTHALMOLOGY | Facility: CLINIC | Age: 85
End: 2025-07-30
Attending: OPHTHALMOLOGY
Payer: MEDICARE

## 2025-07-30 DIAGNOSIS — H43.813 PVD (POSTERIOR VITREOUS DETACHMENT), BOTH EYES: ICD-10-CM

## 2025-07-30 DIAGNOSIS — H35.373 EPIRETINAL MEMBRANE (ERM) OF BOTH EYES: Primary | ICD-10-CM

## 2025-07-30 DIAGNOSIS — E11.36 DIABETIC CATARACT OF BOTH EYES: ICD-10-CM

## 2025-07-30 DIAGNOSIS — H35.3132 INTERMEDIATE STAGE NONEXUDATIVE AGE-RELATED MACULAR DEGENERATION OF BOTH EYES: ICD-10-CM

## 2025-07-30 PROCEDURE — 99999 PR PBB SHADOW E&M-EST. PATIENT-LVL III: CPT | Mod: PBBFAC,,, | Performed by: OPHTHALMOLOGY

## 2025-07-30 PROCEDURE — 92134 CPTRZ OPH DX IMG PST SGM RTA: CPT | Mod: S$GLB,,, | Performed by: OPHTHALMOLOGY

## 2025-07-30 PROCEDURE — 92014 COMPRE OPH EXAM EST PT 1/>: CPT | Mod: S$GLB,,, | Performed by: OPHTHALMOLOGY

## 2025-07-30 PROCEDURE — 3288F FALL RISK ASSESSMENT DOCD: CPT | Mod: CPTII,S$GLB,, | Performed by: OPHTHALMOLOGY

## 2025-07-30 PROCEDURE — 2023F DILAT RTA XM W/O RTNOPTHY: CPT | Mod: CPTII,S$GLB,, | Performed by: OPHTHALMOLOGY

## 2025-07-30 PROCEDURE — 1159F MED LIST DOCD IN RCRD: CPT | Mod: CPTII,S$GLB,, | Performed by: OPHTHALMOLOGY

## 2025-07-30 PROCEDURE — 92201 OPSCPY EXTND RTA DRAW UNI/BI: CPT | Mod: S$GLB,,, | Performed by: OPHTHALMOLOGY

## 2025-07-30 PROCEDURE — 1101F PT FALLS ASSESS-DOCD LE1/YR: CPT | Mod: CPTII,S$GLB,, | Performed by: OPHTHALMOLOGY

## 2025-07-30 PROCEDURE — 1160F RVW MEDS BY RX/DR IN RCRD: CPT | Mod: CPTII,S$GLB,, | Performed by: OPHTHALMOLOGY

## 2025-07-30 NOTE — PROGRESS NOTES
Subjective:       Patient ID: Eric Jackson is a 84 y.o. male      Chief Complaint   Patient presents with    Referral     History of Present Illness  HPI    New patient  to retina clinic   Ref by DR Braden for ERM     Pt has trouble with vision at night.  Feels that vision gradually   blurrier.  Some glare.  Very diff for pt to describe any vision problems he's having    Meds:  AREDS 2            Last edited by Mil Wood MD on 7/30/2025  4:42 PM.        Imaging:    See report    Assessment/Plan:     1. Epiretinal membrane (ERM) of both eyes (Primary)  Stable for numerous years with better vision than today  Likely not main contributor to vision problems  Recommend observe for now    - Posterior Segment OCT Retina-Both eyes    2. Diabetic cataract of both eyes  Appear vis sig  Recommend eval for CE/IOL since Dr Braden could not refract sig better  Refer to Dr RODRIGUEZ    3. Intermediate stage nonexudative age-related macular degeneration of both eyes  Minimal findings, barely intermediate  Discussed Dry and Wet AMD in detail  Recommend continue AREDS 2 Vitamins  Home Amsler Grid Testing  RTC immediately PRN any changes in vision    4. PVD both  No breaks  RD precautions  RTC immediately PRN (especially ANY change flashes, floaters, vision, visual field)    - Posterior Segment OCT Retina-Both eyes    Follow up if symptoms worsen or fail to improve.

## 2025-08-19 ENCOUNTER — PATIENT MESSAGE (OUTPATIENT)
Dept: DERMATOLOGY | Facility: CLINIC | Age: 85
End: 2025-08-19
Payer: MEDICARE

## 2025-08-19 ENCOUNTER — TELEPHONE (OUTPATIENT)
Dept: DERMATOLOGY | Facility: CLINIC | Age: 85
End: 2025-08-19
Payer: MEDICARE

## 2025-08-27 ENCOUNTER — PATIENT MESSAGE (OUTPATIENT)
Dept: DERMATOLOGY | Facility: CLINIC | Age: 85
End: 2025-08-27
Payer: MEDICARE

## (undated) DEVICE — GUIDEWIRE STD .035X260CM ANG

## (undated) DEVICE — WIRE GUIDE SAFE-T-J .035 260CM

## (undated) DEVICE — KIT PRECISION ACCESS 5FR

## (undated) DEVICE — CATH DXTERITY JL40 100CM 5FR

## (undated) DEVICE — ANGIOTOUCH KIT

## (undated) DEVICE — CATH EMPULSE ANGLED 5FR PIGTAI

## (undated) DEVICE — OMNIPAQUE 350MG 150ML VIAL

## (undated) DEVICE — KIT MANIFOLD LOW PRESS TUBING

## (undated) DEVICE — CATH DXTERITY JR40 100CM 5FR

## (undated) DEVICE — OMNIPAQUE CONTRAST 350MG/100ML

## (undated) DEVICE — CATH IMA INFINITI 4FRX100CM

## (undated) DEVICE — GUIDEWIRE SAFE T J TIP 145X2.5

## (undated) DEVICE — PACK CATH LAB

## (undated) DEVICE — PAD RADI FEMORAL

## (undated) DEVICE — KIT SYR REUSABLE

## (undated) DEVICE — CATH INFINITI 4F JL4 .042X100

## (undated) DEVICE — PAD DEFIB CADENCE ADULT R2

## (undated) DEVICE — ELECTRODE EDGE SYSTEM RTS

## (undated) DEVICE — GUIDEWIRE TORQUE 3CM/260CML

## (undated) DEVICE — INTRODUCER CATH 4F 11CM

## (undated) DEVICE — CATH INFINITI JUDKINS JR4

## (undated) DEVICE — KIT HAND CONTROL HIGH PRESSUR